# Patient Record
Sex: FEMALE | Race: WHITE | NOT HISPANIC OR LATINO | Employment: FULL TIME | URBAN - METROPOLITAN AREA
[De-identification: names, ages, dates, MRNs, and addresses within clinical notes are randomized per-mention and may not be internally consistent; named-entity substitution may affect disease eponyms.]

---

## 2019-09-29 ENCOUNTER — HOSPITAL ENCOUNTER (EMERGENCY)
Facility: HOSPITAL | Age: 59
Discharge: HOME | End: 2019-09-29
Attending: EMERGENCY MEDICINE | Admitting: EMERGENCY MEDICINE

## 2019-09-29 ENCOUNTER — APPOINTMENT (OUTPATIENT)
Dept: GENERAL RADIOLOGY | Facility: HOSPITAL | Age: 59
End: 2019-09-29

## 2019-09-29 VITALS
HEART RATE: 74 BPM | DIASTOLIC BLOOD PRESSURE: 68 MMHG | HEIGHT: 62 IN | SYSTOLIC BLOOD PRESSURE: 124 MMHG | WEIGHT: 158 LBS | OXYGEN SATURATION: 96 % | RESPIRATION RATE: 16 BRPM | BODY MASS INDEX: 29.08 KG/M2 | TEMPERATURE: 98.1 F

## 2019-09-29 DIAGNOSIS — B34.9 ACUTE VIRAL SYNDROME: ICD-10-CM

## 2019-09-29 DIAGNOSIS — J30.89 ENVIRONMENTAL AND SEASONAL ALLERGIES: ICD-10-CM

## 2019-09-29 DIAGNOSIS — R03.0 ELEVATED BLOOD PRESSURE READING: ICD-10-CM

## 2019-09-29 DIAGNOSIS — Z82.49 FAMILY HISTORY OF CORONARY ARTERY DISEASE IN FATHER: ICD-10-CM

## 2019-09-29 DIAGNOSIS — R06.02 SHORTNESS OF BREATH: Primary | ICD-10-CM

## 2019-09-29 LAB
ALBUMIN SERPL-MCNC: 4.9 G/DL (ref 3.5–5.2)
ALBUMIN/GLOB SERPL: 1.7 G/DL
ALP SERPL-CCNC: 79 U/L (ref 39–117)
ALT SERPL W P-5'-P-CCNC: 20 U/L (ref 1–33)
ANION GAP SERPL CALCULATED.3IONS-SCNC: 13 MMOL/L (ref 5–15)
AST SERPL-CCNC: 19 U/L (ref 1–32)
BASOPHILS # BLD AUTO: 0.09 10*3/MM3 (ref 0–0.2)
BASOPHILS NFR BLD AUTO: 1 % (ref 0–1.5)
BILIRUB SERPL-MCNC: 0.3 MG/DL (ref 0.2–1.2)
BUN BLD-MCNC: 16 MG/DL (ref 6–20)
BUN/CREAT SERPL: 26.7 (ref 7–25)
CALCIUM SPEC-SCNC: 9.6 MG/DL (ref 8.6–10.5)
CHLORIDE SERPL-SCNC: 102 MMOL/L (ref 98–107)
CO2 SERPL-SCNC: 27 MMOL/L (ref 22–29)
CREAT BLD-MCNC: 0.6 MG/DL (ref 0.57–1)
D DIMER PPP FEU-MCNC: 0.51 MCGFEU/ML (ref 0–0.56)
DEPRECATED RDW RBC AUTO: 45.9 FL (ref 37–54)
EOSINOPHIL # BLD AUTO: 0.23 10*3/MM3 (ref 0–0.4)
EOSINOPHIL NFR BLD AUTO: 2.7 % (ref 0.3–6.2)
ERYTHROCYTE [DISTWIDTH] IN BLOOD BY AUTOMATED COUNT: 13.4 % (ref 12.3–15.4)
GFR SERPL CREATININE-BSD FRML MDRD: 103 ML/MIN/1.73
GLOBULIN UR ELPH-MCNC: 2.9 GM/DL
GLUCOSE BLD-MCNC: 96 MG/DL (ref 65–99)
HCT VFR BLD AUTO: 45.3 % (ref 34–46.6)
HGB BLD-MCNC: 14.9 G/DL (ref 12–15.9)
HOLD SPECIMEN: NORMAL
HOLD SPECIMEN: NORMAL
IMM GRANULOCYTES # BLD AUTO: 0.04 10*3/MM3 (ref 0–0.05)
IMM GRANULOCYTES NFR BLD AUTO: 0.5 % (ref 0–0.5)
LYMPHOCYTES # BLD AUTO: 2.52 10*3/MM3 (ref 0.7–3.1)
LYMPHOCYTES NFR BLD AUTO: 29.1 % (ref 19.6–45.3)
MCH RBC QN AUTO: 30.3 PG (ref 26.6–33)
MCHC RBC AUTO-ENTMCNC: 32.9 G/DL (ref 31.5–35.7)
MCV RBC AUTO: 92.3 FL (ref 79–97)
MONOCYTES # BLD AUTO: 0.61 10*3/MM3 (ref 0.1–0.9)
MONOCYTES NFR BLD AUTO: 7 % (ref 5–12)
NEUTROPHILS # BLD AUTO: 5.18 10*3/MM3 (ref 1.7–7)
NEUTROPHILS NFR BLD AUTO: 59.7 % (ref 42.7–76)
NRBC BLD AUTO-RTO: 0 /100 WBC (ref 0–0.2)
NT-PROBNP SERPL-MCNC: 90.6 PG/ML (ref 5–900)
PLATELET # BLD AUTO: 373 10*3/MM3 (ref 140–450)
PMV BLD AUTO: 9.8 FL (ref 6–12)
POTASSIUM BLD-SCNC: 3.9 MMOL/L (ref 3.5–5.2)
PROT SERPL-MCNC: 7.8 G/DL (ref 6–8.5)
RBC # BLD AUTO: 4.91 10*6/MM3 (ref 3.77–5.28)
SODIUM BLD-SCNC: 142 MMOL/L (ref 136–145)
TROPONIN T SERPL-MCNC: <0.01 NG/ML (ref 0–0.03)
TROPONIN T SERPL-MCNC: <0.01 NG/ML (ref 0–0.03)
WBC NRBC COR # BLD: 8.67 10*3/MM3 (ref 3.4–10.8)
WHOLE BLOOD HOLD SPECIMEN: NORMAL
WHOLE BLOOD HOLD SPECIMEN: NORMAL

## 2019-09-29 PROCEDURE — 83880 ASSAY OF NATRIURETIC PEPTIDE: CPT

## 2019-09-29 PROCEDURE — 99284 EMERGENCY DEPT VISIT MOD MDM: CPT

## 2019-09-29 PROCEDURE — 71045 X-RAY EXAM CHEST 1 VIEW: CPT

## 2019-09-29 PROCEDURE — 85379 FIBRIN DEGRADATION QUANT: CPT | Performed by: EMERGENCY MEDICINE

## 2019-09-29 PROCEDURE — 99283 EMERGENCY DEPT VISIT LOW MDM: CPT

## 2019-09-29 PROCEDURE — 84484 ASSAY OF TROPONIN QUANT: CPT | Performed by: EMERGENCY MEDICINE

## 2019-09-29 PROCEDURE — 93005 ELECTROCARDIOGRAM TRACING: CPT | Performed by: EMERGENCY MEDICINE

## 2019-09-29 PROCEDURE — 93005 ELECTROCARDIOGRAM TRACING: CPT

## 2019-09-29 PROCEDURE — 80053 COMPREHEN METABOLIC PANEL: CPT

## 2019-09-29 PROCEDURE — 94640 AIRWAY INHALATION TREATMENT: CPT

## 2019-09-29 PROCEDURE — 96374 THER/PROPH/DIAG INJ IV PUSH: CPT

## 2019-09-29 PROCEDURE — 25010000002 METHYLPREDNISOLONE PER 125 MG: Performed by: EMERGENCY MEDICINE

## 2019-09-29 PROCEDURE — 85025 COMPLETE CBC W/AUTO DIFF WBC: CPT

## 2019-09-29 PROCEDURE — 84484 ASSAY OF TROPONIN QUANT: CPT

## 2019-09-29 RX ORDER — IPRATROPIUM BROMIDE AND ALBUTEROL SULFATE 2.5; .5 MG/3ML; MG/3ML
3 SOLUTION RESPIRATORY (INHALATION) ONCE
Status: COMPLETED | OUTPATIENT
Start: 2019-09-29 | End: 2019-09-29

## 2019-09-29 RX ORDER — FLUTICASONE PROPIONATE 50 MCG
2 SPRAY, SUSPENSION (ML) NASAL DAILY
Qty: 1 BOTTLE | Refills: 0 | Status: SHIPPED | OUTPATIENT
Start: 2019-09-29

## 2019-09-29 RX ORDER — METHYLPREDNISOLONE SODIUM SUCCINATE 125 MG/2ML
125 INJECTION, POWDER, LYOPHILIZED, FOR SOLUTION INTRAMUSCULAR; INTRAVENOUS ONCE
Status: COMPLETED | OUTPATIENT
Start: 2019-09-29 | End: 2019-09-29

## 2019-09-29 RX ORDER — SODIUM CHLORIDE 0.9 % (FLUSH) 0.9 %
10 SYRINGE (ML) INJECTION AS NEEDED
Status: DISCONTINUED | OUTPATIENT
Start: 2019-09-29 | End: 2019-09-29 | Stop reason: HOSPADM

## 2019-09-29 RX ORDER — PREDNISONE 20 MG/1
20 TABLET ORAL DAILY
Qty: 5 TABLET | Refills: 0 | Status: ON HOLD | OUTPATIENT
Start: 2019-09-29 | End: 2022-11-26

## 2019-09-29 RX ADMIN — METHYLPREDNISOLONE SODIUM SUCCINATE 125 MG: 125 INJECTION, POWDER, FOR SOLUTION INTRAMUSCULAR; INTRAVENOUS at 17:38

## 2019-09-29 RX ADMIN — IPRATROPIUM BROMIDE AND ALBUTEROL SULFATE 3 ML: 2.5; .5 SOLUTION RESPIRATORY (INHALATION) at 18:08

## 2019-10-03 NOTE — PROGRESS NOTES
"[facility]  [facility]      Encounter Date:10/04/2019     Lauryn Romo  [address] [place] [address]  [unfilled]    1960    Melissa Lazo APRN    Lauryn Romo is a 58 y.o. female.      Subjective:     Chief Complaint:  Shortness of Breath and Establish Care       HPI   Patient is a 58-year-old female with past medical history significant for hypertension who presents to the chest pain clinic at the request of Dr. Montanez.  Patient presented to the ED on 9/29/2019 with complaints of shortness of breath, fatigue and dizziness.  She denies chest pain.  Labs, chest x-ray and EKG unremarkable. She reports a few weeks ago she started noticing shortness of breath when walking on level ground which is unusual for her. On the day of the ED visit she woke up and was working around the house and couldn't catch her breath and felt lightheaded. She went to rest and had a \"film over her left eye\". Symptoms have slightly improved. Dyspnea aggravated by heat, fast walking. Dizziness is chronic which she relates to vertigo. No near syncope. No palpitations.  Patient unable to exert herself as she normally does due to MILLER    Cardiac risks:   Borderline age, HTN, family hx    Past Medical History:   Diagnosis Date   • Hypertension        Past Surgical History:   Procedure Laterality Date   • BREAST LUMPECTOMY  2012   • HIATAL HERNIA REPAIR  2015   • SINUS SURGERY      x4       Family History   Problem Relation Age of Onset   • No Known Problems Mother    • Heart attack Father 42   • Heart attack Paternal Aunt    • Heart attack Paternal Uncle    • No Known Problems Sister    • Cancer Maternal Grandmother    • Heart failure Maternal Grandmother    • No Known Problems Maternal Grandfather    • No Known Problems Paternal Grandmother    • No Known Problems Paternal Grandfather    • Diabetes Half-Brother        Social History     Socioeconomic History   • Marital status:      Spouse " name: Not on file   • Number of children: Not on file   • Years of education: Not on file   • Highest education level: Not on file   Tobacco Use   • Smoking status: Former Smoker     Last attempt to quit: 10/4/2004     Years since quitting: 15.0   • Smokeless tobacco: Never Used   Substance and Sexual Activity   • Alcohol use: No     Frequency: Never   • Drug use: No   • Sexual activity: Defer   Social History Narrative    Caffeine: 6-7 cups daily       No Known Allergies      Current Outpatient Medications:   •  albuterol (PROAIR RESPICLICK) 108 (90 Base) MCG/ACT inhaler, Inhale 2 puffs Every 4 (Four) Hours As Needed for Wheezing or Shortness of Air., Disp: 1 inhaler, Rfl: 0  •  fluticasone (FLONASE) 50 MCG/ACT nasal spray, 2 sprays into the nostril(s) as directed by provider Daily., Disp: 1 bottle, Rfl: 0  •  predniSONE (DELTASONE) 20 MG tablet, Take 1 tablet by mouth Daily., Disp: 5 tablet, Rfl: 0  •  aspirin 81 MG chewable tablet, Chew 1 tablet Daily., Disp: , Rfl:   •  Cholecalciferol (VITAMIN D3) 400 units capsule, Take 1 tablet by mouth Daily., Disp: , Rfl:   •  Glucosamine Sulfate (SYNOVACIN PO), Take 1,000 mg by mouth Daily., Disp: , Rfl:   •  moexipril (UNIVASC) 7.5 MG tablet, Take 4 tablets by mouth Daily., Disp: , Rfl:   •  Naproxen Sodium (ALEVE) 220 MG capsule, Take 2 tablets by mouth 2 (Two) Times a Day As Needed. Monday through Thursday, Disp: , Rfl:   •  Omega-3 Fatty Acids (FISH OIL) 1000 MG capsule capsule, Take 1 capsule by mouth Daily., Disp: , Rfl:     The following portions of the patient's history were reviewed today and updated as appropriate: allergies, current medications, past family history, past medical history, past social history, past surgical history and problem list     Review of Systems   Constitution: Negative for chills and fever.   HENT: Negative.    Eyes: Negative.    Cardiovascular: Positive for dyspnea on exertion. Negative for chest pain, claudication, cyanosis, irregular  "heartbeat, leg swelling, near-syncope, orthopnea, palpitations, paroxysmal nocturnal dyspnea and syncope.   Respiratory: Positive for shortness of breath. Negative for cough and snoring.    Endocrine: Negative.    Hematologic/Lymphatic: Does not bruise/bleed easily.   Skin: Negative for poor wound healing.   Musculoskeletal: Negative.    Gastrointestinal: Negative for abdominal pain, heartburn, hematemesis, melena, nausea and vomiting.   Genitourinary: Negative.  Negative for hematuria.   Neurological: Positive for dizziness and vertigo.   Psychiatric/Behavioral: Negative.    Allergic/Immunologic: Negative.        Objective:     Vitals:    10/04/19 0804 10/04/19 0805 10/04/19 0806   BP: 138/73 132/69 143/83   BP Location: Right arm Left arm Right arm   Patient Position: Sitting Sitting Standing   Cuff Size: Adult Adult Adult   Pulse: 70 68    Resp: 18     Temp: 97.3 °F (36.3 °C)     TempSrc: Temporal     SpO2: 100% 100%    Weight: 72.7 kg (160 lb 4 oz)     Height: 157.5 cm (62\")         Physical Exam   Constitutional: She is oriented to person, place, and time. She appears well-developed and well-nourished. No distress.   HENT:   Head: Normocephalic.   Eyes: Conjunctivae are normal. Pupils are equal, round, and reactive to light.   Neck: Neck supple. No JVD present. No thyromegaly present.   Cardiovascular: Normal rate, regular rhythm, normal heart sounds and intact distal pulses. Exam reveals no gallop and no friction rub.   No murmur heard.  Pulmonary/Chest: Effort normal and breath sounds normal. No respiratory distress. She has no wheezes. She has no rales. She exhibits no tenderness.   Abdominal: Soft. Bowel sounds are normal.   Musculoskeletal: Normal range of motion. She exhibits no edema.   Neurological: She is alert and oriented to person, place, and time.   Skin: Skin is warm and dry.   Psychiatric: She has a normal mood and affect. Her behavior is normal. Thought content normal.   Vitals " reviewed.      Lab and Diagnostic Review:  Results for orders placed or performed during the hospital encounter of 09/29/19   Comprehensive Metabolic Panel   Result Value Ref Range    Glucose 96 65 - 99 mg/dL    BUN 16 6 - 20 mg/dL    Creatinine 0.60 0.57 - 1.00 mg/dL    Sodium 142 136 - 145 mmol/L    Potassium 3.9 3.5 - 5.2 mmol/L    Chloride 102 98 - 107 mmol/L    CO2 27.0 22.0 - 29.0 mmol/L    Calcium 9.6 8.6 - 10.5 mg/dL    Total Protein 7.8 6.0 - 8.5 g/dL    Albumin 4.90 3.50 - 5.20 g/dL    ALT (SGPT) 20 1 - 33 U/L    AST (SGOT) 19 1 - 32 U/L    Alkaline Phosphatase 79 39 - 117 U/L    Total Bilirubin 0.3 0.2 - 1.2 mg/dL    eGFR Non African Amer 103 >60 mL/min/1.73    Globulin 2.9 gm/dL    A/G Ratio 1.7 g/dL    BUN/Creatinine Ratio 26.7 (H) 7.0 - 25.0    Anion Gap 13.0 5.0 - 15.0 mmol/L   BNP   Result Value Ref Range    proBNP 90.6 5.0 - 900.0 pg/mL   Troponin   Result Value Ref Range    Troponin T <0.010 0.000 - 0.030 ng/mL   CBC Auto Differential   Result Value Ref Range    WBC 8.67 3.40 - 10.80 10*3/mm3    RBC 4.91 3.77 - 5.28 10*6/mm3    Hemoglobin 14.9 12.0 - 15.9 g/dL    Hematocrit 45.3 34.0 - 46.6 %    MCV 92.3 79.0 - 97.0 fL    MCH 30.3 26.6 - 33.0 pg    MCHC 32.9 31.5 - 35.7 g/dL    RDW 13.4 12.3 - 15.4 %    RDW-SD 45.9 37.0 - 54.0 fl    MPV 9.8 6.0 - 12.0 fL    Platelets 373 140 - 450 10*3/mm3    Neutrophil % 59.7 42.7 - 76.0 %    Lymphocyte % 29.1 19.6 - 45.3 %    Monocyte % 7.0 5.0 - 12.0 %    Eosinophil % 2.7 0.3 - 6.2 %    Basophil % 1.0 0.0 - 1.5 %    Immature Grans % 0.5 0.0 - 0.5 %    Neutrophils, Absolute 5.18 1.70 - 7.00 10*3/mm3    Lymphocytes, Absolute 2.52 0.70 - 3.10 10*3/mm3    Monocytes, Absolute 0.61 0.10 - 0.90 10*3/mm3    Eosinophils, Absolute 0.23 0.00 - 0.40 10*3/mm3    Basophils, Absolute 0.09 0.00 - 0.20 10*3/mm3    Immature Grans, Absolute 0.04 0.00 - 0.05 10*3/mm3    nRBC 0.0 0.0 - 0.2 /100 WBC   D-dimer, Quantitative   Result Value Ref Range    D-Dimer, Quantitative 0.51 0.00 -  0.56 MCGFEU/mL   Troponin   Result Value Ref Range    Troponin T <0.010 0.000 - 0.030 ng/mL     EKG 9/29/19 NSR    Assessment and Plan:   1. Shortness of breath  DANI risk score 1 but multiple ASCVD risks and new exercise intolerance concerning for anginal equivalent  - Adult Transthoracic Echo Complete W/ Cont if Necessary Per Protocol; Future  - Stress Test With Myocardial Perfusion (1 Day); Future    2. Anginal equivalent (CMS/HCC)  - Stress Test With Myocardial Perfusion (1 Day); Future    3. Essential hypertension  Controlled  - Adult Transthoracic Echo Complete W/ Cont if Necessary Per Protocol; Future  - Stress Test With Myocardial Perfusion (1 Day); Future    4. Family history of premature CAD  - Stress Test With Myocardial Perfusion (1 Day); Future    5. Lightheadedness  Patient relates symptoms to vertigo  Echo  If recurrent or worsening symptoms, may consider holter monitor    Further plans pending testing    UNFORTUNATELY HER INSURANCE WILL NOT COVER CARDIOLITE SO I HAD TO CHANGE ORDER TO GXT      It has been a pleasure to participate in the care of this patient.  Patient was instructed to call the [facility] with any questions, concerns, or worsening symptoms.    *Please note that portions of this note were completed with a voice recognition program. Efforts were made to edit the dictations, but occasionally words are mistranscribed.

## 2019-10-04 ENCOUNTER — OFFICE VISIT (OUTPATIENT)
Dept: CARDIOLOGY | Facility: HOSPITAL | Age: 59
End: 2019-10-04

## 2019-10-04 ENCOUNTER — TELEPHONE (OUTPATIENT)
Dept: CARDIOLOGY | Facility: HOSPITAL | Age: 59
End: 2019-10-04

## 2019-10-04 ENCOUNTER — HOSPITAL ENCOUNTER (OUTPATIENT)
Dept: CARDIOLOGY | Facility: HOSPITAL | Age: 59
Discharge: HOME | End: 2019-10-04
Admitting: NURSE PRACTITIONER

## 2019-10-04 ENCOUNTER — HOSPITAL ENCOUNTER (OUTPATIENT)
Dept: CARDIOLOGY | Facility: HOSPITAL | Age: 59
Discharge: HOME | End: 2019-10-04

## 2019-10-04 VITALS
HEART RATE: 69 BPM | SYSTOLIC BLOOD PRESSURE: 150 MMHG | BODY MASS INDEX: 29.44 KG/M2 | DIASTOLIC BLOOD PRESSURE: 80 MMHG | WEIGHT: 160 LBS | HEIGHT: 62 IN

## 2019-10-04 VITALS
HEIGHT: 62 IN | DIASTOLIC BLOOD PRESSURE: 83 MMHG | WEIGHT: 160.25 LBS | RESPIRATION RATE: 18 BRPM | HEART RATE: 68 BPM | BODY MASS INDEX: 29.49 KG/M2 | SYSTOLIC BLOOD PRESSURE: 143 MMHG | TEMPERATURE: 97.3 F | OXYGEN SATURATION: 100 %

## 2019-10-04 VITALS
DIASTOLIC BLOOD PRESSURE: 90 MMHG | SYSTOLIC BLOOD PRESSURE: 118 MMHG | BODY MASS INDEX: 29.44 KG/M2 | WEIGHT: 160 LBS | HEIGHT: 62 IN

## 2019-10-04 DIAGNOSIS — Z82.49 FAMILY HISTORY OF PREMATURE CAD: ICD-10-CM

## 2019-10-04 DIAGNOSIS — I10 ESSENTIAL HYPERTENSION: ICD-10-CM

## 2019-10-04 DIAGNOSIS — I20.8 ANGINAL EQUIVALENT (HCC): ICD-10-CM

## 2019-10-04 DIAGNOSIS — R06.02 SHORTNESS OF BREATH: ICD-10-CM

## 2019-10-04 DIAGNOSIS — R42 LIGHTHEADEDNESS: ICD-10-CM

## 2019-10-04 DIAGNOSIS — R06.02 SHORTNESS OF BREATH: Primary | ICD-10-CM

## 2019-10-04 LAB
BH CV ECHO MEAS - AI DEC SLOPE: 151.8 CM/SEC^2
BH CV ECHO MEAS - AI MAX PG: 50.4 MMHG
BH CV ECHO MEAS - AI MAX VEL: 354.1 CM/SEC
BH CV ECHO MEAS - AI P1/2T: 683.1 MSEC
BH CV ECHO MEAS - AO MAX PG (FULL): 1.9 MMHG
BH CV ECHO MEAS - AO MAX PG: 5 MMHG
BH CV ECHO MEAS - AO MEAN PG (FULL): 1.3 MMHG
BH CV ECHO MEAS - AO MEAN PG: 2.9 MMHG
BH CV ECHO MEAS - AO ROOT AREA (BSA CORRECTED): 1.8
BH CV ECHO MEAS - AO ROOT AREA: 7.9 CM^2
BH CV ECHO MEAS - AO ROOT DIAM: 3.2 CM
BH CV ECHO MEAS - AO V2 MAX: 111.5 CM/SEC
BH CV ECHO MEAS - AO V2 MEAN: 78.2 CM/SEC
BH CV ECHO MEAS - AO V2 VTI: 21.2 CM
BH CV ECHO MEAS - ASC AORTA: 3 CM
BH CV ECHO MEAS - AVA(I,A): 2.7 CM^2
BH CV ECHO MEAS - AVA(I,D): 2.7 CM^2
BH CV ECHO MEAS - AVA(V,A): 2.3 CM^2
BH CV ECHO MEAS - AVA(V,D): 2.3 CM^2
BH CV ECHO MEAS - BSA(HAYCOCK): 1.8 M^2
BH CV ECHO MEAS - BSA: 1.7 M^2
BH CV ECHO MEAS - BZI_BMI: 29.3 KILOGRAMS/M^2
BH CV ECHO MEAS - BZI_METRIC_HEIGHT: 157.5 CM
BH CV ECHO MEAS - BZI_METRIC_WEIGHT: 72.6 KG
BH CV ECHO MEAS - EDV(CUBED): 56.1 ML
BH CV ECHO MEAS - EDV(MOD-SP4): 63 ML
BH CV ECHO MEAS - EDV(TEICH): 63 ML
BH CV ECHO MEAS - EF(CUBED): 63.9 %
BH CV ECHO MEAS - EF(MOD-SP4): 69.8 %
BH CV ECHO MEAS - EF(TEICH): 56.2 %
BH CV ECHO MEAS - ESV(CUBED): 20.2 ML
BH CV ECHO MEAS - ESV(MOD-SP4): 19 ML
BH CV ECHO MEAS - ESV(TEICH): 27.6 ML
BH CV ECHO MEAS - FS: 28.8 %
BH CV ECHO MEAS - IVS/LVPW: 0.9
BH CV ECHO MEAS - IVSD: 0.87 CM
BH CV ECHO MEAS - LA DIMENSION: 3.5 CM
BH CV ECHO MEAS - LA/AO: 1.1
BH CV ECHO MEAS - LAT PEAK E' VEL: 11.1 CM/SEC
BH CV ECHO MEAS - LATERAL E/E' RATIO: 6
BH CV ECHO MEAS - LV DIASTOLIC VOL/BSA (35-75): 36.2 ML/M^2
BH CV ECHO MEAS - LV MASS(C)D: 104.2 GRAMS
BH CV ECHO MEAS - LV MASS(C)DI: 60 GRAMS/M^2
BH CV ECHO MEAS - LV MAX PG: 3.1 MMHG
BH CV ECHO MEAS - LV MEAN PG: 1.6 MMHG
BH CV ECHO MEAS - LV SYSTOLIC VOL/BSA (12-30): 10.9 ML/M^2
BH CV ECHO MEAS - LV V1 MAX: 87.4 CM/SEC
BH CV ECHO MEAS - LV V1 MEAN: 58.8 CM/SEC
BH CV ECHO MEAS - LV V1 VTI: 19.9 CM
BH CV ECHO MEAS - LVIDD: 3.8 CM
BH CV ECHO MEAS - LVIDS: 2.7 CM
BH CV ECHO MEAS - LVLD AP4: 7 CM
BH CV ECHO MEAS - LVLS AP4: 5.2 CM
BH CV ECHO MEAS - LVOT AREA (M): 2.8 CM^2
BH CV ECHO MEAS - LVOT AREA: 2.9 CM^2
BH CV ECHO MEAS - LVOT DIAM: 1.9 CM
BH CV ECHO MEAS - LVPWD: 0.96 CM
BH CV ECHO MEAS - MED PEAK E' VEL: 7.3 CM/SEC
BH CV ECHO MEAS - MEDIAL E/E' RATIO: 9
BH CV ECHO MEAS - MV A MAX VEL: 76 CM/SEC
BH CV ECHO MEAS - MV DEC TIME: 0.22 SEC
BH CV ECHO MEAS - MV E MAX VEL: 67.6 CM/SEC
BH CV ECHO MEAS - MV E/A: 0.89
BH CV ECHO MEAS - PA ACC SLOPE: 550.3 CM/SEC^2
BH CV ECHO MEAS - PA ACC TIME: 0.15 SEC
BH CV ECHO MEAS - PA MAX PG: 4.8 MMHG
BH CV ECHO MEAS - PA PR(ACCEL): 13.2 MMHG
BH CV ECHO MEAS - PA V2 MAX: 109 CM/SEC
BH CV ECHO MEAS - RVDD: 2 CM
BH CV ECHO MEAS - SI(AO): 96.1 ML/M^2
BH CV ECHO MEAS - SI(CUBED): 20.6 ML/M^2
BH CV ECHO MEAS - SI(LVOT): 33.5 ML/M^2
BH CV ECHO MEAS - SI(MOD-SP4): 25.3 ML/M^2
BH CV ECHO MEAS - SI(TEICH): 20.4 ML/M^2
BH CV ECHO MEAS - SV(AO): 167.1 ML
BH CV ECHO MEAS - SV(CUBED): 35.9 ML
BH CV ECHO MEAS - SV(LVOT): 58.2 ML
BH CV ECHO MEAS - SV(MOD-SP4): 44 ML
BH CV ECHO MEAS - SV(TEICH): 35.4 ML
BH CV ECHO MEAS - TAPSE (>1.6): 1.2 CM2
BH CV ECHO MEAS - TV MAX PG: 1.4 MMHG
BH CV ECHO MEAS - TV V2 MAX: 58.7 CM/SEC
BH CV ECHO MEASUREMENTS AVERAGE E/E' RATIO: 7.35
BH CV STRESS BP STAGE 1: NORMAL
BH CV STRESS BP STAGE 2: NORMAL
BH CV STRESS BP STAGE 3: NORMAL
BH CV STRESS DURATION MIN STAGE 1: 3
BH CV STRESS DURATION MIN STAGE 2: 3
BH CV STRESS DURATION MIN STAGE 3: 1
BH CV STRESS DURATION SEC STAGE 1: 0
BH CV STRESS DURATION SEC STAGE 2: 0
BH CV STRESS DURATION SEC STAGE 3: 1
BH CV STRESS GRADE STAGE 1: 10
BH CV STRESS GRADE STAGE 2: 12
BH CV STRESS GRADE STAGE 3: 14
BH CV STRESS HR STAGE 1: 118
BH CV STRESS HR STAGE 2: 141
BH CV STRESS HR STAGE 3: 146
BH CV STRESS METS STAGE 1: 5
BH CV STRESS METS STAGE 2: 7.5
BH CV STRESS METS STAGE 3: 10
BH CV STRESS O2 STAGE 1: 94
BH CV STRESS O2 STAGE 2: 99
BH CV STRESS O2 STAGE 3: 100
BH CV STRESS PROTOCOL 1: NORMAL
BH CV STRESS RECOVERY BP: NORMAL MMHG
BH CV STRESS RECOVERY HR: 91 BPM
BH CV STRESS SPEED STAGE 1: 1.7
BH CV STRESS SPEED STAGE 2: 2.5
BH CV STRESS SPEED STAGE 3: 3.4
BH CV STRESS STAGE 1: 1
BH CV STRESS STAGE 2: 2
BH CV STRESS STAGE 3: 3
BH CV VAS BP RIGHT ARM: NORMAL MMHG
BH CV XLRA - RV BASE: 3 CM
BH CV XLRA - RV LENGTH: 5 CM
BH CV XLRA - RV MID: 3 CM
BH CV XLRA - TDI S': 11.8 CM/SEC
LV EF 2D ECHO EST: 65 %
MAXIMAL PREDICTED HEART RATE: 163 BPM
MAXIMAL PREDICTED HEART RATE: 163 BPM
PERCENT MAX PREDICTED HR: 89.57 %
STRESS BASELINE BP: NORMAL MMHG
STRESS BASELINE HR: 67 BPM
STRESS O2 SAT REST: 100 %
STRESS PERCENT HR: 105 %
STRESS POST ESTIMATED WORKLOAD: 8.5 METS
STRESS POST EXERCISE DUR MIN: 7 MIN
STRESS POST EXERCISE DUR SEC: 1 SEC
STRESS POST O2 SAT PEAK: 100 %
STRESS POST PEAK BP: NORMAL MMHG
STRESS POST PEAK HR: 146 BPM
STRESS TARGET HR: 139 BPM
STRESS TARGET HR: 139 BPM

## 2019-10-04 PROCEDURE — 93017 CV STRESS TEST TRACING ONLY: CPT

## 2019-10-04 PROCEDURE — 99204 OFFICE O/P NEW MOD 45 MIN: CPT | Performed by: NURSE PRACTITIONER

## 2019-10-04 PROCEDURE — 93306 TTE W/DOPPLER COMPLETE: CPT | Performed by: INTERNAL MEDICINE

## 2019-10-04 PROCEDURE — 93306 TTE W/DOPPLER COMPLETE: CPT

## 2019-10-04 PROCEDURE — 93018 CV STRESS TEST I&R ONLY: CPT | Performed by: INTERNAL MEDICINE

## 2019-10-04 RX ORDER — MOEXIPRIL HYDROCHLORIDE 7.5 MG/1
4 TABLET, FILM COATED ORAL DAILY
COMMUNITY
End: 2022-11-26 | Stop reason: DRUGHIGH

## 2019-10-04 RX ORDER — IBUPROFEN 800 MG
1 TABLET ORAL DAILY
COMMUNITY

## 2019-10-04 RX ORDER — ASPIRIN 81 MG/1
1 TABLET, CHEWABLE ORAL DAILY
COMMUNITY

## 2019-10-04 RX ORDER — CHLORAL HYDRATE 500 MG
1 CAPSULE ORAL DAILY
COMMUNITY

## 2019-10-04 RX ORDER — COVID-19 ANTIGEN TEST
2 KIT MISCELLANEOUS 2 TIMES DAILY PRN
COMMUNITY
End: 2022-12-29

## 2022-11-26 ENCOUNTER — APPOINTMENT (OUTPATIENT)
Dept: GENERAL RADIOLOGY | Facility: HOSPITAL | Age: 62
End: 2022-11-26

## 2022-11-26 ENCOUNTER — APPOINTMENT (OUTPATIENT)
Dept: MRI IMAGING | Facility: HOSPITAL | Age: 62
End: 2022-11-26

## 2022-11-26 ENCOUNTER — APPOINTMENT (OUTPATIENT)
Dept: CT IMAGING | Facility: HOSPITAL | Age: 62
End: 2022-11-26

## 2022-11-26 ENCOUNTER — HOSPITAL ENCOUNTER (INPATIENT)
Facility: HOSPITAL | Age: 62
LOS: 4 days | Discharge: HOME | End: 2022-12-02
Attending: EMERGENCY MEDICINE | Admitting: INTERNAL MEDICINE

## 2022-11-26 DIAGNOSIS — I67.1 BRAIN ANEURYSM: ICD-10-CM

## 2022-11-26 DIAGNOSIS — G93.89 MASS OF BRAIN: Primary | ICD-10-CM

## 2022-11-26 DIAGNOSIS — R42 DIZZINESS: ICD-10-CM

## 2022-11-26 DIAGNOSIS — R41.841 COGNITIVE COMMUNICATION DEFICIT: ICD-10-CM

## 2022-11-26 DIAGNOSIS — R93.0 ABNORMAL MRI OF THE HEAD: ICD-10-CM

## 2022-11-26 DIAGNOSIS — I63.9 CEREBROVASCULAR ACCIDENT (CVA), UNSPECIFIED MECHANISM: ICD-10-CM

## 2022-11-26 LAB
ALBUMIN SERPL-MCNC: 4.7 G/DL (ref 3.5–5.2)
ALBUMIN/GLOB SERPL: 1.7 G/DL
ALP SERPL-CCNC: 101 U/L (ref 39–117)
ALT SERPL W P-5'-P-CCNC: 20 U/L (ref 1–33)
ANION GAP SERPL CALCULATED.3IONS-SCNC: 11 MMOL/L (ref 5–15)
AST SERPL-CCNC: 18 U/L (ref 1–32)
BACTERIA UR QL AUTO: ABNORMAL /HPF
BASOPHILS # BLD AUTO: 0.04 10*3/MM3 (ref 0–0.2)
BASOPHILS NFR BLD AUTO: 0.5 % (ref 0–1.5)
BILIRUB SERPL-MCNC: 0.5 MG/DL (ref 0–1.2)
BILIRUB UR QL STRIP: NEGATIVE
BUN SERPL-MCNC: 20 MG/DL (ref 8–23)
BUN/CREAT SERPL: 35.7 (ref 7–25)
CALCIUM SPEC-SCNC: 9.6 MG/DL (ref 8.6–10.5)
CHLORIDE SERPL-SCNC: 104 MMOL/L (ref 98–107)
CLARITY UR: CLEAR
CO2 SERPL-SCNC: 24 MMOL/L (ref 22–29)
COLOR UR: YELLOW
CREAT SERPL-MCNC: 0.56 MG/DL (ref 0.57–1)
DEPRECATED RDW RBC AUTO: 46.7 FL (ref 37–54)
EGFRCR SERPLBLD CKD-EPI 2021: 104 ML/MIN/1.73
EOSINOPHIL # BLD AUTO: 0.08 10*3/MM3 (ref 0–0.4)
EOSINOPHIL NFR BLD AUTO: 1.1 % (ref 0.3–6.2)
ERYTHROCYTE [DISTWIDTH] IN BLOOD BY AUTOMATED COUNT: 13.5 % (ref 12.3–15.4)
GLOBULIN UR ELPH-MCNC: 2.8 GM/DL
GLUCOSE BLDC GLUCOMTR-MCNC: 89 MG/DL (ref 70–130)
GLUCOSE SERPL-MCNC: 101 MG/DL (ref 65–99)
GLUCOSE UR STRIP-MCNC: NEGATIVE MG/DL
HCT VFR BLD AUTO: 40.4 % (ref 34–46.6)
HGB BLD-MCNC: 13.8 G/DL (ref 12–15.9)
HGB UR QL STRIP.AUTO: NEGATIVE
HOLD SPECIMEN: NORMAL
HYALINE CASTS UR QL AUTO: ABNORMAL /LPF
IMM GRANULOCYTES # BLD AUTO: 0.02 10*3/MM3 (ref 0–0.05)
IMM GRANULOCYTES NFR BLD AUTO: 0.3 % (ref 0–0.5)
KETONES UR QL STRIP: ABNORMAL
LEUKOCYTE ESTERASE UR QL STRIP.AUTO: ABNORMAL
LYMPHOCYTES # BLD AUTO: 2.1 10*3/MM3 (ref 0.7–3.1)
LYMPHOCYTES NFR BLD AUTO: 28.1 % (ref 19.6–45.3)
MAGNESIUM SERPL-MCNC: 2.4 MG/DL (ref 1.6–2.4)
MCH RBC QN AUTO: 32.2 PG (ref 26.6–33)
MCHC RBC AUTO-ENTMCNC: 34.2 G/DL (ref 31.5–35.7)
MCV RBC AUTO: 94.2 FL (ref 79–97)
MONOCYTES # BLD AUTO: 0.46 10*3/MM3 (ref 0.1–0.9)
MONOCYTES NFR BLD AUTO: 6.2 % (ref 5–12)
NEUTROPHILS NFR BLD AUTO: 4.77 10*3/MM3 (ref 1.7–7)
NEUTROPHILS NFR BLD AUTO: 63.8 % (ref 42.7–76)
NITRITE UR QL STRIP: NEGATIVE
NRBC BLD AUTO-RTO: 0 /100 WBC (ref 0–0.2)
PH UR STRIP.AUTO: 6.5 [PH] (ref 5–8)
PLATELET # BLD AUTO: 251 10*3/MM3 (ref 140–450)
PMV BLD AUTO: 10.8 FL (ref 6–12)
POTASSIUM SERPL-SCNC: 4.7 MMOL/L (ref 3.5–5.2)
PROT SERPL-MCNC: 7.5 G/DL (ref 6–8.5)
PROT UR QL STRIP: NEGATIVE
RBC # BLD AUTO: 4.29 10*6/MM3 (ref 3.77–5.28)
RBC # UR STRIP: ABNORMAL /HPF
REF LAB TEST METHOD: ABNORMAL
SODIUM SERPL-SCNC: 139 MMOL/L (ref 136–145)
SP GR UR STRIP: 1.01 (ref 1–1.03)
SQUAMOUS #/AREA URNS HPF: ABNORMAL /HPF
TRANS CELLS #/AREA URNS HPF: ABNORMAL /HPF
TROPONIN T SERPL-MCNC: <0.01 NG/ML (ref 0–0.03)
UROBILINOGEN UR QL STRIP: ABNORMAL
WBC # UR STRIP: ABNORMAL /HPF
WBC NRBC COR # BLD: 7.47 10*3/MM3 (ref 3.4–10.8)
WHOLE BLOOD HOLD SPECIMEN: NORMAL

## 2022-11-26 PROCEDURE — G0378 HOSPITAL OBSERVATION PER HR: HCPCS

## 2022-11-26 PROCEDURE — 82962 GLUCOSE BLOOD TEST: CPT

## 2022-11-26 PROCEDURE — 81001 URINALYSIS AUTO W/SCOPE: CPT | Performed by: EMERGENCY MEDICINE

## 2022-11-26 PROCEDURE — 0 GADOBENATE DIMEGLUMINE 529 MG/ML SOLUTION: Performed by: EMERGENCY MEDICINE

## 2022-11-26 PROCEDURE — 83735 ASSAY OF MAGNESIUM: CPT | Performed by: EMERGENCY MEDICINE

## 2022-11-26 PROCEDURE — A9577 INJ MULTIHANCE: HCPCS | Performed by: EMERGENCY MEDICINE

## 2022-11-26 PROCEDURE — 80053 COMPREHEN METABOLIC PANEL: CPT | Performed by: EMERGENCY MEDICINE

## 2022-11-26 PROCEDURE — 71045 X-RAY EXAM CHEST 1 VIEW: CPT

## 2022-11-26 PROCEDURE — 99219 PR INITIAL OBSERVATION CARE/DAY 50 MINUTES: CPT | Performed by: INTERNAL MEDICINE

## 2022-11-26 PROCEDURE — 84484 ASSAY OF TROPONIN QUANT: CPT | Performed by: EMERGENCY MEDICINE

## 2022-11-26 PROCEDURE — 93005 ELECTROCARDIOGRAM TRACING: CPT | Performed by: EMERGENCY MEDICINE

## 2022-11-26 PROCEDURE — 93005 ELECTROCARDIOGRAM TRACING: CPT

## 2022-11-26 PROCEDURE — 99285 EMERGENCY DEPT VISIT HI MDM: CPT

## 2022-11-26 PROCEDURE — 85025 COMPLETE CBC W/AUTO DIFF WBC: CPT | Performed by: EMERGENCY MEDICINE

## 2022-11-26 PROCEDURE — 0 IOPAMIDOL PER 1 ML: Performed by: INTERNAL MEDICINE

## 2022-11-26 PROCEDURE — 36415 COLL VENOUS BLD VENIPUNCTURE: CPT

## 2022-11-26 PROCEDURE — 99223 1ST HOSP IP/OBS HIGH 75: CPT | Performed by: CLINICAL NURSE SPECIALIST

## 2022-11-26 PROCEDURE — 70553 MRI BRAIN STEM W/O & W/DYE: CPT

## 2022-11-26 PROCEDURE — 70498 CT ANGIOGRAPHY NECK: CPT

## 2022-11-26 PROCEDURE — 70496 CT ANGIOGRAPHY HEAD: CPT

## 2022-11-26 RX ORDER — SODIUM CHLORIDE 0.9 % (FLUSH) 0.9 %
10 SYRINGE (ML) INJECTION EVERY 12 HOURS SCHEDULED
Status: DISCONTINUED | OUTPATIENT
Start: 2022-11-26 | End: 2022-11-29

## 2022-11-26 RX ORDER — CLOPIDOGREL BISULFATE 75 MG/1
300 TABLET ORAL ONCE
Status: COMPLETED | OUTPATIENT
Start: 2022-11-26 | End: 2022-11-27

## 2022-11-26 RX ORDER — ASPIRIN 81 MG/1
81 TABLET, CHEWABLE ORAL DAILY
Status: DISCONTINUED | OUTPATIENT
Start: 2022-11-26 | End: 2022-12-02 | Stop reason: HOSPADM

## 2022-11-26 RX ORDER — ATORVASTATIN CALCIUM 40 MG/1
80 TABLET, FILM COATED ORAL NIGHTLY
Status: DISCONTINUED | OUTPATIENT
Start: 2022-11-26 | End: 2022-12-02 | Stop reason: HOSPADM

## 2022-11-26 RX ORDER — ALBUTEROL SULFATE 2.5 MG/3ML
2.5 SOLUTION RESPIRATORY (INHALATION) EVERY 6 HOURS PRN
Status: DISCONTINUED | OUTPATIENT
Start: 2022-11-26 | End: 2022-11-27

## 2022-11-26 RX ORDER — CLOPIDOGREL BISULFATE 75 MG/1
75 TABLET ORAL DAILY
Status: DISCONTINUED | OUTPATIENT
Start: 2022-11-27 | End: 2022-12-02 | Stop reason: HOSPADM

## 2022-11-26 RX ORDER — MECLIZINE HYDROCHLORIDE 25 MG/1
25 TABLET ORAL ONCE
Status: COMPLETED | OUTPATIENT
Start: 2022-11-26 | End: 2022-11-26

## 2022-11-26 RX ORDER — ASPIRIN 300 MG/1
300 SUPPOSITORY RECTAL DAILY
Status: DISCONTINUED | OUTPATIENT
Start: 2022-11-26 | End: 2022-12-02 | Stop reason: HOSPADM

## 2022-11-26 RX ORDER — ONDANSETRON 4 MG/1
TABLET, ORALLY DISINTEGRATING ORAL
COMMUNITY
End: 2023-03-27 | Stop reason: SDUPTHER

## 2022-11-26 RX ORDER — MOEXIPRIL HCL 15 MG
15 TABLET ORAL 2 TIMES DAILY
COMMUNITY
End: 2022-12-29

## 2022-11-26 RX ORDER — SODIUM CHLORIDE 9 MG/ML
40 INJECTION, SOLUTION INTRAVENOUS AS NEEDED
Status: DISCONTINUED | OUTPATIENT
Start: 2022-11-26 | End: 2022-11-29

## 2022-11-26 RX ORDER — SODIUM CHLORIDE 0.9 % (FLUSH) 0.9 %
10 SYRINGE (ML) INJECTION AS NEEDED
Status: DISCONTINUED | OUTPATIENT
Start: 2022-11-26 | End: 2022-11-29

## 2022-11-26 RX ORDER — LIDOCAINE 50 MG/G
OINTMENT TOPICAL
COMMUNITY
End: 2022-12-29

## 2022-11-26 RX ADMIN — GADOBENATE DIMEGLUMINE 10 ML: 529 INJECTION, SOLUTION INTRAVENOUS at 17:27

## 2022-11-26 RX ADMIN — IOPAMIDOL 75 ML: 755 INJECTION, SOLUTION INTRAVENOUS at 20:11

## 2022-11-26 RX ADMIN — ASPIRIN 81 MG CHEWABLE TABLET 81 MG: 81 TABLET CHEWABLE at 20:28

## 2022-11-26 RX ADMIN — Medication 10 ML: at 20:29

## 2022-11-26 RX ADMIN — SODIUM CHLORIDE 1000 ML: 9 INJECTION, SOLUTION INTRAVENOUS at 13:42

## 2022-11-26 RX ADMIN — ATORVASTATIN CALCIUM 80 MG: 40 TABLET, FILM COATED ORAL at 20:29

## 2022-11-26 RX ADMIN — MECLIZINE HYDROCHLORIDE 25 MG: 25 TABLET ORAL at 13:42

## 2022-11-27 ENCOUNTER — APPOINTMENT (OUTPATIENT)
Dept: CARDIOLOGY | Facility: HOSPITAL | Age: 62
End: 2022-11-27

## 2022-11-27 PROBLEM — I67.1 BRAIN ANEURYSM: Status: ACTIVE | Noted: 2022-11-27

## 2022-11-27 LAB
CHOLEST SERPL-MCNC: 186 MG/DL (ref 0–200)
GLUCOSE BLDC GLUCOMTR-MCNC: 84 MG/DL (ref 70–130)
HBA1C MFR BLD: 5.5 % (ref 4.8–5.6)
HDLC SERPL-MCNC: 50 MG/DL (ref 40–60)
LDLC SERPL CALC-MCNC: 122 MG/DL (ref 0–100)
LDLC/HDLC SERPL: 2.41 {RATIO}
QT INTERVAL: 406 MS
QTC INTERVAL: 415 MS
TRIGL SERPL-MCNC: 77 MG/DL (ref 0–150)
VLDLC SERPL-MCNC: 14 MG/DL (ref 5–40)

## 2022-11-27 PROCEDURE — 97165 OT EVAL LOW COMPLEX 30 MIN: CPT

## 2022-11-27 PROCEDURE — 92523 SPEECH SOUND LANG COMPREHEN: CPT

## 2022-11-27 PROCEDURE — 83036 HEMOGLOBIN GLYCOSYLATED A1C: CPT | Performed by: CLINICAL NURSE SPECIALIST

## 2022-11-27 PROCEDURE — 97535 SELF CARE MNGMENT TRAINING: CPT

## 2022-11-27 PROCEDURE — 99225 PR SBSQ OBSERVATION CARE/DAY 25 MINUTES: CPT | Performed by: INTERNAL MEDICINE

## 2022-11-27 PROCEDURE — 97161 PT EVAL LOW COMPLEX 20 MIN: CPT

## 2022-11-27 PROCEDURE — G0378 HOSPITAL OBSERVATION PER HR: HCPCS

## 2022-11-27 PROCEDURE — 82962 GLUCOSE BLOOD TEST: CPT

## 2022-11-27 PROCEDURE — 99232 SBSQ HOSP IP/OBS MODERATE 35: CPT | Performed by: PSYCHIATRY & NEUROLOGY

## 2022-11-27 PROCEDURE — 80061 LIPID PANEL: CPT | Performed by: CLINICAL NURSE SPECIALIST

## 2022-11-27 PROCEDURE — 93306 TTE W/DOPPLER COMPLETE: CPT

## 2022-11-27 PROCEDURE — 99222 1ST HOSP IP/OBS MODERATE 55: CPT | Performed by: RADIOLOGY

## 2022-11-27 RX ORDER — FLUTICASONE PROPIONATE 50 MCG
2 SPRAY, SUSPENSION (ML) NASAL DAILY
Status: DISCONTINUED | OUTPATIENT
Start: 2022-11-27 | End: 2022-12-02 | Stop reason: HOSPADM

## 2022-11-27 RX ORDER — BUTALBITAL, ACETAMINOPHEN AND CAFFEINE 50; 325; 40 MG/1; MG/1; MG/1
1 TABLET ORAL EVERY 4 HOURS PRN
Status: DISCONTINUED | OUTPATIENT
Start: 2022-11-27 | End: 2022-12-02 | Stop reason: HOSPADM

## 2022-11-27 RX ORDER — ALBUTEROL SULFATE 2.5 MG/3ML
2.5 SOLUTION RESPIRATORY (INHALATION) EVERY 6 HOURS PRN
Status: DISCONTINUED | OUTPATIENT
Start: 2022-11-27 | End: 2022-12-02 | Stop reason: HOSPADM

## 2022-11-27 RX ADMIN — ASPIRIN 81 MG CHEWABLE TABLET 81 MG: 81 TABLET CHEWABLE at 08:39

## 2022-11-27 RX ADMIN — ATORVASTATIN CALCIUM 80 MG: 40 TABLET, FILM COATED ORAL at 20:02

## 2022-11-27 RX ADMIN — CLOPIDOGREL BISULFATE 75 MG: 75 TABLET ORAL at 08:39

## 2022-11-27 RX ADMIN — CLOPIDOGREL BISULFATE 300 MG: 75 TABLET ORAL at 00:26

## 2022-11-27 RX ADMIN — BUTALBITAL, ACETAMINOPHEN, AND CAFFEINE 1 TABLET: 50; 325; 40 TABLET ORAL at 12:06

## 2022-11-27 RX ADMIN — BUTALBITAL, ACETAMINOPHEN, AND CAFFEINE 1 TABLET: 50; 325; 40 TABLET ORAL at 15:46

## 2022-11-27 RX ADMIN — Medication 10 ML: at 20:02

## 2022-11-28 ENCOUNTER — ANESTHESIA EVENT (OUTPATIENT)
Dept: CARDIOLOGY | Facility: HOSPITAL | Age: 62
End: 2022-11-28

## 2022-11-28 PROBLEM — G93.89 MASS OF BRAIN: Status: ACTIVE | Noted: 2022-11-28

## 2022-11-28 LAB
BH CV ECHO MEAS - AI P1/2T: 691.1 MSEC
BH CV ECHO MEAS - AO MAX PG: 7.7 MMHG
BH CV ECHO MEAS - AO MEAN PG: 4.5 MMHG
BH CV ECHO MEAS - AO ROOT DIAM: 3.1 CM
BH CV ECHO MEAS - AO V2 MAX: 138.5 CM/SEC
BH CV ECHO MEAS - AO V2 VTI: 29.2 CM
BH CV ECHO MEAS - AVA(I,D): 2.8 CM2
BH CV ECHO MEAS - EDV(CUBED): 59.3 ML
BH CV ECHO MEAS - EDV(MOD-SP2): 54.3 ML
BH CV ECHO MEAS - EDV(MOD-SP4): 59.7 ML
BH CV ECHO MEAS - EF(MOD-BP): 61.5 %
BH CV ECHO MEAS - EF(MOD-SP2): 64.3 %
BH CV ECHO MEAS - EF(MOD-SP4): 60.5 %
BH CV ECHO MEAS - ESV(CUBED): 13.8 ML
BH CV ECHO MEAS - ESV(MOD-SP2): 19.4 ML
BH CV ECHO MEAS - ESV(MOD-SP4): 23.6 ML
BH CV ECHO MEAS - FS: 38.5 %
BH CV ECHO MEAS - IVS/LVPW: 1.33 CM
BH CV ECHO MEAS - IVSD: 0.8 CM
BH CV ECHO MEAS - LA DIMENSION: 2.3 CM
BH CV ECHO MEAS - LAT PEAK E' VEL: 17.2 CM/SEC
BH CV ECHO MEAS - LV MASS(C)D: 75.1 GRAMS
BH CV ECHO MEAS - LV MAX PG: 6.4 MMHG
BH CV ECHO MEAS - LV MEAN PG: 3 MMHG
BH CV ECHO MEAS - LV V1 MAX: 125 CM/SEC
BH CV ECHO MEAS - LV V1 VTI: 25.8 CM
BH CV ECHO MEAS - LVIDD: 3.9 CM
BH CV ECHO MEAS - LVIDS: 2.4 CM
BH CV ECHO MEAS - LVOT AREA: 3.1 CM2
BH CV ECHO MEAS - LVOT DIAM: 2 CM
BH CV ECHO MEAS - LVPWD: 0.6 CM
BH CV ECHO MEAS - MED PEAK E' VEL: 10.7 CM/SEC
BH CV ECHO MEAS - MR MAX PG: 22.7 MMHG
BH CV ECHO MEAS - MR MAX VEL: 238 CM/SEC
BH CV ECHO MEAS - MV A MAX VEL: 97.5 CM/SEC
BH CV ECHO MEAS - MV DEC SLOPE: 393 CM/SEC2
BH CV ECHO MEAS - MV DEC TIME: 0.2 MSEC
BH CV ECHO MEAS - MV E MAX VEL: 94.9 CM/SEC
BH CV ECHO MEAS - MV E/A: 0.97
BH CV ECHO MEAS - MV P1/2T: 73 MSEC
BH CV ECHO MEAS - MVA(P1/2T): 3 CM2
BH CV ECHO MEAS - PA ACC TIME: 0.16 SEC
BH CV ECHO MEAS - PA PR(ACCEL): 7.9 MMHG
BH CV ECHO MEAS - PA V2 MAX: 115 CM/SEC
BH CV ECHO MEAS - RAP SYSTOLE: 3 MMHG
BH CV ECHO MEAS - RVSP: 30 MMHG
BH CV ECHO MEAS - SV(LVOT): 81.1 ML
BH CV ECHO MEAS - SV(MOD-SP2): 34.9 ML
BH CV ECHO MEAS - SV(MOD-SP4): 36.1 ML
BH CV ECHO MEAS - TAPSE (>1.6): 2.36 CM
BH CV ECHO MEAS - TR MAX PG: 27 MMHG
BH CV ECHO MEAS - TR MAX VEL: 245.8 CM/SEC
BH CV ECHO MEASUREMENTS AVERAGE E/E' RATIO: 6.8
BH CV ECHO SHUNT ASSESSMENT PERFORMED (HIDDEN SCRIPTING): 1
BH CV XLRA - RV BASE: 3.3 CM
BH CV XLRA - RV LENGTH: 6.6 CM
BH CV XLRA - RV MID: 2.5 CM
BH CV XLRA - TDI S': 18.2 CM/SEC
LEFT ATRIUM VOLUME INDEX: 25.7 ML/M2
MAXIMAL PREDICTED HEART RATE: 159 BPM
PA ADP PRP-ACNC: 73 PRU
STRESS TARGET HR: 135 BPM

## 2022-11-28 PROCEDURE — 99232 SBSQ HOSP IP/OBS MODERATE 35: CPT | Performed by: INTERNAL MEDICINE

## 2022-11-28 PROCEDURE — 85576 BLOOD PLATELET AGGREGATION: CPT | Performed by: RADIOLOGY

## 2022-11-28 PROCEDURE — 03VG3HZ RESTRICTION OF INTRACRANIAL ARTERY WITH INTRALUMINAL DEVICE, FLOW DIVERTER, PERCUTANEOUS APPROACH: ICD-10-PCS | Performed by: RADIOLOGY

## 2022-11-28 RX ADMIN — FLUTICASONE PROPIONATE 2 SPRAY: 50 SPRAY, METERED NASAL at 09:27

## 2022-11-28 RX ADMIN — ATORVASTATIN CALCIUM 80 MG: 40 TABLET, FILM COATED ORAL at 20:15

## 2022-11-28 RX ADMIN — CLOPIDOGREL BISULFATE 75 MG: 75 TABLET ORAL at 09:25

## 2022-11-28 RX ADMIN — BUTALBITAL, ACETAMINOPHEN, AND CAFFEINE 1 TABLET: 50; 325; 40 TABLET ORAL at 18:52

## 2022-11-28 RX ADMIN — ASPIRIN 81 MG CHEWABLE TABLET 81 MG: 81 TABLET CHEWABLE at 09:25

## 2022-11-28 RX ADMIN — BUTALBITAL, ACETAMINOPHEN, AND CAFFEINE 1 TABLET: 50; 325; 40 TABLET ORAL at 09:25

## 2022-11-28 RX ADMIN — BUTALBITAL, ACETAMINOPHEN, AND CAFFEINE 1 TABLET: 50; 325; 40 TABLET ORAL at 15:14

## 2022-11-29 ENCOUNTER — ANESTHESIA (OUTPATIENT)
Dept: CARDIOLOGY | Facility: HOSPITAL | Age: 62
End: 2022-11-29

## 2022-11-29 PROBLEM — E78.5 HYPERLIPIDEMIA: Status: ACTIVE | Noted: 2022-11-29

## 2022-11-29 PROBLEM — I10 ESSENTIAL HYPERTENSION: Status: ACTIVE | Noted: 2022-11-29

## 2022-11-29 PROBLEM — K44.9 PARAESOPHAGEAL HERNIA: Status: ACTIVE | Noted: 2022-11-29

## 2022-11-29 PROCEDURE — C1769 GUIDE WIRE: HCPCS | Performed by: RADIOLOGY

## 2022-11-29 PROCEDURE — 0 IODIXANOL PER 1 ML: Performed by: RADIOLOGY

## 2022-11-29 PROCEDURE — 75894 X-RAYS TRANSCATH THERAPY: CPT | Performed by: RADIOLOGY

## 2022-11-29 PROCEDURE — 75898 FOLLOW-UP ANGIOGRAPHY: CPT | Performed by: RADIOLOGY

## 2022-11-29 PROCEDURE — C1887 CATHETER, GUIDING: HCPCS | Performed by: RADIOLOGY

## 2022-11-29 PROCEDURE — 36224 PLACE CATH CAROTD ART: CPT | Performed by: RADIOLOGY

## 2022-11-29 PROCEDURE — 25010000002 DEXAMETHASONE PER 1 MG: Performed by: NURSE ANESTHETIST, CERTIFIED REGISTERED

## 2022-11-29 PROCEDURE — C1766 INTRO/SHEATH,STRBLE,NON-PEEL: HCPCS | Performed by: RADIOLOGY

## 2022-11-29 PROCEDURE — 25010000002 PHENYLEPHRINE 10 MG/ML SOLUTION: Performed by: NURSE ANESTHETIST, CERTIFIED REGISTERED

## 2022-11-29 PROCEDURE — C1876 STENT, NON-COA/NON-COV W/DEL: HCPCS | Performed by: RADIOLOGY

## 2022-11-29 PROCEDURE — 99232 SBSQ HOSP IP/OBS MODERATE 35: CPT

## 2022-11-29 PROCEDURE — 36226 PLACE CATH VERTEBRAL ART: CPT | Performed by: RADIOLOGY

## 2022-11-29 PROCEDURE — 99232 SBSQ HOSP IP/OBS MODERATE 35: CPT | Performed by: INTERNAL MEDICINE

## 2022-11-29 PROCEDURE — C1894 INTRO/SHEATH, NON-LASER: HCPCS | Performed by: RADIOLOGY

## 2022-11-29 PROCEDURE — C1725 CATH, TRANSLUMIN NON-LASER: HCPCS | Performed by: RADIOLOGY

## 2022-11-29 PROCEDURE — 25010000002 DEXAMETHASONE PER 1 MG

## 2022-11-29 PROCEDURE — 25010000002 HEPARIN (PORCINE) PER 1000 UNITS: Performed by: NURSE ANESTHETIST, CERTIFIED REGISTERED

## 2022-11-29 PROCEDURE — C1760 CLOSURE DEV, VASC: HCPCS | Performed by: RADIOLOGY

## 2022-11-29 PROCEDURE — 25010000002 PROPOFOL 10 MG/ML EMULSION: Performed by: NURSE ANESTHETIST, CERTIFIED REGISTERED

## 2022-11-29 PROCEDURE — 61624 TCAT PERM OCCLS/EMBOLJ CNS: CPT | Performed by: RADIOLOGY

## 2022-11-29 PROCEDURE — 61630 BALO ANGIOPLASTY ICR PERQ: CPT | Performed by: RADIOLOGY

## 2022-11-29 PROCEDURE — 76377 3D RENDER W/INTRP POSTPROCES: CPT | Performed by: RADIOLOGY

## 2022-11-29 PROCEDURE — 25010000002 ONDANSETRON PER 1 MG

## 2022-11-29 DEVICE — IMPLANTABLE DEVICE: Type: IMPLANTABLE DEVICE | Status: FUNCTIONAL

## 2022-11-29 RX ORDER — MEPERIDINE HYDROCHLORIDE 25 MG/ML
12.5 INJECTION INTRAMUSCULAR; INTRAVENOUS; SUBCUTANEOUS
Status: DISCONTINUED | OUTPATIENT
Start: 2022-11-29 | End: 2022-11-29 | Stop reason: HOSPADM

## 2022-11-29 RX ORDER — SODIUM CHLORIDE 0.9 % (FLUSH) 0.9 %
10 SYRINGE (ML) INJECTION AS NEEDED
Status: DISCONTINUED | OUTPATIENT
Start: 2022-11-29 | End: 2022-12-02 | Stop reason: HOSPADM

## 2022-11-29 RX ORDER — IODIXANOL 320 MG/ML
INJECTION, SOLUTION INTRAVASCULAR
Status: DISCONTINUED | OUTPATIENT
Start: 2022-11-29 | End: 2022-11-29 | Stop reason: HOSPADM

## 2022-11-29 RX ORDER — SODIUM CHLORIDE 0.9 % (FLUSH) 0.9 %
3 SYRINGE (ML) INJECTION EVERY 12 HOURS SCHEDULED
Status: DISCONTINUED | OUTPATIENT
Start: 2022-11-29 | End: 2022-11-29 | Stop reason: HOSPADM

## 2022-11-29 RX ORDER — LIDOCAINE HYDROCHLORIDE 10 MG/ML
0.5 INJECTION, SOLUTION EPIDURAL; INFILTRATION; INTRACAUDAL; PERINEURAL ONCE AS NEEDED
Status: DISCONTINUED | OUTPATIENT
Start: 2022-11-29 | End: 2022-11-29

## 2022-11-29 RX ORDER — FENTANYL CITRATE 50 UG/ML
50 INJECTION, SOLUTION INTRAMUSCULAR; INTRAVENOUS
Status: DISCONTINUED | OUTPATIENT
Start: 2022-11-29 | End: 2022-11-29 | Stop reason: HOSPADM

## 2022-11-29 RX ORDER — MIDAZOLAM HYDROCHLORIDE 1 MG/ML
1 INJECTION INTRAMUSCULAR; INTRAVENOUS
Status: DISCONTINUED | OUTPATIENT
Start: 2022-11-29 | End: 2022-11-29

## 2022-11-29 RX ORDER — SODIUM CHLORIDE, SODIUM LACTATE, POTASSIUM CHLORIDE, CALCIUM CHLORIDE 600; 310; 30; 20 MG/100ML; MG/100ML; MG/100ML; MG/100ML
9 INJECTION, SOLUTION INTRAVENOUS CONTINUOUS
Status: DISCONTINUED | OUTPATIENT
Start: 2022-11-29 | End: 2022-11-29

## 2022-11-29 RX ORDER — HYDROMORPHONE HYDROCHLORIDE 1 MG/ML
0.5 INJECTION, SOLUTION INTRAMUSCULAR; INTRAVENOUS; SUBCUTANEOUS
Status: DISCONTINUED | OUTPATIENT
Start: 2022-11-29 | End: 2022-11-29 | Stop reason: HOSPADM

## 2022-11-29 RX ORDER — ONDANSETRON 2 MG/ML
4 INJECTION INTRAMUSCULAR; INTRAVENOUS ONCE AS NEEDED
Status: DISCONTINUED | OUTPATIENT
Start: 2022-11-29 | End: 2022-11-29 | Stop reason: HOSPADM

## 2022-11-29 RX ORDER — HYDRALAZINE HYDROCHLORIDE 20 MG/ML
5 INJECTION INTRAMUSCULAR; INTRAVENOUS
Status: DISCONTINUED | OUTPATIENT
Start: 2022-11-29 | End: 2022-11-29 | Stop reason: HOSPADM

## 2022-11-29 RX ORDER — LIDOCAINE HYDROCHLORIDE 10 MG/ML
INJECTION, SOLUTION EPIDURAL; INFILTRATION; INTRACAUDAL; PERINEURAL AS NEEDED
Status: DISCONTINUED | OUTPATIENT
Start: 2022-11-29 | End: 2022-11-29 | Stop reason: SURG

## 2022-11-29 RX ORDER — SODIUM CHLORIDE 0.9 % (FLUSH) 0.9 %
10 SYRINGE (ML) INJECTION EVERY 12 HOURS SCHEDULED
Status: DISCONTINUED | OUTPATIENT
Start: 2022-11-29 | End: 2022-12-02 | Stop reason: HOSPADM

## 2022-11-29 RX ORDER — SODIUM CHLORIDE 9 MG/ML
40 INJECTION, SOLUTION INTRAVENOUS AS NEEDED
Status: DISCONTINUED | OUTPATIENT
Start: 2022-11-29 | End: 2022-11-29

## 2022-11-29 RX ORDER — PROMETHAZINE HYDROCHLORIDE 25 MG/1
25 TABLET ORAL ONCE AS NEEDED
Status: DISCONTINUED | OUTPATIENT
Start: 2022-11-29 | End: 2022-11-29 | Stop reason: HOSPADM

## 2022-11-29 RX ORDER — DEXAMETHASONE SODIUM PHOSPHATE 4 MG/ML
4 INJECTION, SOLUTION INTRA-ARTICULAR; INTRALESIONAL; INTRAMUSCULAR; INTRAVENOUS; SOFT TISSUE EVERY 6 HOURS
Status: DISCONTINUED | OUTPATIENT
Start: 2022-11-29 | End: 2022-12-01

## 2022-11-29 RX ORDER — LABETALOL HYDROCHLORIDE 5 MG/ML
5 INJECTION, SOLUTION INTRAVENOUS
Status: DISCONTINUED | OUTPATIENT
Start: 2022-11-29 | End: 2022-11-29 | Stop reason: HOSPADM

## 2022-11-29 RX ORDER — ROCURONIUM BROMIDE 10 MG/ML
INJECTION, SOLUTION INTRAVENOUS AS NEEDED
Status: DISCONTINUED | OUTPATIENT
Start: 2022-11-29 | End: 2022-11-29 | Stop reason: SURG

## 2022-11-29 RX ORDER — FAMOTIDINE 20 MG/1
20 TABLET, FILM COATED ORAL ONCE
Status: DISCONTINUED | OUTPATIENT
Start: 2022-11-29 | End: 2022-11-30

## 2022-11-29 RX ORDER — HYDROCODONE BITARTRATE AND ACETAMINOPHEN 5; 325 MG/1; MG/1
1 TABLET ORAL ONCE AS NEEDED
Status: DISCONTINUED | OUTPATIENT
Start: 2022-11-29 | End: 2022-11-29 | Stop reason: HOSPADM

## 2022-11-29 RX ORDER — ONDANSETRON 2 MG/ML
4 INJECTION INTRAMUSCULAR; INTRAVENOUS EVERY 6 HOURS PRN
Status: DISCONTINUED | OUTPATIENT
Start: 2022-11-29 | End: 2022-12-02 | Stop reason: HOSPADM

## 2022-11-29 RX ORDER — SODIUM CHLORIDE, SODIUM LACTATE, POTASSIUM CHLORIDE, CALCIUM CHLORIDE 600; 310; 30; 20 MG/100ML; MG/100ML; MG/100ML; MG/100ML
INJECTION, SOLUTION INTRAVENOUS CONTINUOUS PRN
Status: DISCONTINUED | OUTPATIENT
Start: 2022-11-29 | End: 2022-11-29 | Stop reason: SURG

## 2022-11-29 RX ORDER — DROPERIDOL 2.5 MG/ML
0.62 INJECTION, SOLUTION INTRAMUSCULAR; INTRAVENOUS ONCE AS NEEDED
Status: DISCONTINUED | OUTPATIENT
Start: 2022-11-29 | End: 2022-11-29 | Stop reason: HOSPADM

## 2022-11-29 RX ORDER — PROPOFOL 10 MG/ML
VIAL (ML) INTRAVENOUS AS NEEDED
Status: DISCONTINUED | OUTPATIENT
Start: 2022-11-29 | End: 2022-11-29 | Stop reason: SURG

## 2022-11-29 RX ORDER — SODIUM CHLORIDE 0.9 % (FLUSH) 0.9 %
10 SYRINGE (ML) INJECTION EVERY 12 HOURS SCHEDULED
Status: DISCONTINUED | OUTPATIENT
Start: 2022-11-29 | End: 2022-11-29

## 2022-11-29 RX ORDER — SODIUM CHLORIDE 0.9 % (FLUSH) 0.9 %
3-10 SYRINGE (ML) INJECTION AS NEEDED
Status: DISCONTINUED | OUTPATIENT
Start: 2022-11-29 | End: 2022-11-29 | Stop reason: HOSPADM

## 2022-11-29 RX ORDER — HEPARIN SODIUM 1000 [USP'U]/ML
INJECTION, SOLUTION INTRAVENOUS; SUBCUTANEOUS AS NEEDED
Status: DISCONTINUED | OUTPATIENT
Start: 2022-11-29 | End: 2022-11-29 | Stop reason: SURG

## 2022-11-29 RX ORDER — SODIUM CHLORIDE 0.9 % (FLUSH) 0.9 %
10 SYRINGE (ML) INJECTION AS NEEDED
Status: DISCONTINUED | OUTPATIENT
Start: 2022-11-29 | End: 2022-11-29

## 2022-11-29 RX ORDER — IPRATROPIUM BROMIDE AND ALBUTEROL SULFATE 2.5; .5 MG/3ML; MG/3ML
3 SOLUTION RESPIRATORY (INHALATION) ONCE AS NEEDED
Status: DISCONTINUED | OUTPATIENT
Start: 2022-11-29 | End: 2022-11-29 | Stop reason: HOSPADM

## 2022-11-29 RX ORDER — SODIUM CHLORIDE 9 MG/ML
75 INJECTION, SOLUTION INTRAVENOUS CONTINUOUS
Status: DISCONTINUED | OUTPATIENT
Start: 2022-11-29 | End: 2022-12-01

## 2022-11-29 RX ORDER — PROMETHAZINE HYDROCHLORIDE 25 MG/1
25 SUPPOSITORY RECTAL ONCE AS NEEDED
Status: DISCONTINUED | OUTPATIENT
Start: 2022-11-29 | End: 2022-11-29 | Stop reason: HOSPADM

## 2022-11-29 RX ORDER — FAMOTIDINE 10 MG/ML
20 INJECTION, SOLUTION INTRAVENOUS ONCE
Status: COMPLETED | OUTPATIENT
Start: 2022-11-29 | End: 2022-11-29

## 2022-11-29 RX ORDER — PANTOPRAZOLE SODIUM 40 MG/1
40 TABLET, DELAYED RELEASE ORAL
Status: DISCONTINUED | OUTPATIENT
Start: 2022-11-30 | End: 2022-12-02 | Stop reason: HOSPADM

## 2022-11-29 RX ORDER — SODIUM CHLORIDE 9 MG/ML
40 INJECTION, SOLUTION INTRAVENOUS AS NEEDED
Status: DISCONTINUED | OUTPATIENT
Start: 2022-11-29 | End: 2022-12-02 | Stop reason: HOSPADM

## 2022-11-29 RX ORDER — ESMOLOL HYDROCHLORIDE 10 MG/ML
INJECTION INTRAVENOUS AS NEEDED
Status: DISCONTINUED | OUTPATIENT
Start: 2022-11-29 | End: 2022-11-29 | Stop reason: SURG

## 2022-11-29 RX ORDER — NALOXONE HCL 0.4 MG/ML
0.4 VIAL (ML) INJECTION AS NEEDED
Status: DISCONTINUED | OUTPATIENT
Start: 2022-11-29 | End: 2022-11-29 | Stop reason: HOSPADM

## 2022-11-29 RX ORDER — SODIUM CHLORIDE 9 MG/ML
40 INJECTION, SOLUTION INTRAVENOUS AS NEEDED
Status: DISCONTINUED | OUTPATIENT
Start: 2022-11-29 | End: 2022-11-29 | Stop reason: HOSPADM

## 2022-11-29 RX ORDER — ACETAMINOPHEN 325 MG/1
650 TABLET ORAL EVERY 4 HOURS PRN
Status: DISCONTINUED | OUTPATIENT
Start: 2022-11-29 | End: 2022-12-02 | Stop reason: HOSPADM

## 2022-11-29 RX ORDER — DEXAMETHASONE SODIUM PHOSPHATE 4 MG/ML
INJECTION, SOLUTION INTRA-ARTICULAR; INTRALESIONAL; INTRAMUSCULAR; INTRAVENOUS; SOFT TISSUE AS NEEDED
Status: DISCONTINUED | OUTPATIENT
Start: 2022-11-29 | End: 2022-11-29 | Stop reason: SURG

## 2022-11-29 RX ORDER — LIDOCAINE HYDROCHLORIDE 10 MG/ML
INJECTION, SOLUTION EPIDURAL; INFILTRATION; INTRACAUDAL; PERINEURAL
Status: DISCONTINUED | OUTPATIENT
Start: 2022-11-29 | End: 2022-11-29 | Stop reason: HOSPADM

## 2022-11-29 RX ORDER — DROPERIDOL 2.5 MG/ML
0.62 INJECTION, SOLUTION INTRAMUSCULAR; INTRAVENOUS
Status: DISCONTINUED | OUTPATIENT
Start: 2022-11-29 | End: 2022-11-29 | Stop reason: HOSPADM

## 2022-11-29 RX ORDER — NICARDIPINE HYDROCHLORIDE 2.5 MG/ML
INJECTION INTRAVENOUS
Status: DISPENSED
Start: 2022-11-29 | End: 2022-11-30

## 2022-11-29 RX ORDER — PHENYLEPHRINE HYDROCHLORIDE 10 MG/ML
INJECTION INTRAVENOUS AS NEEDED
Status: DISCONTINUED | OUTPATIENT
Start: 2022-11-29 | End: 2022-11-29 | Stop reason: SURG

## 2022-11-29 RX ORDER — SCOLOPAMINE TRANSDERMAL SYSTEM 1 MG/1
1 PATCH, EXTENDED RELEASE TRANSDERMAL
Status: DISCONTINUED | OUTPATIENT
Start: 2022-11-29 | End: 2022-12-02 | Stop reason: HOSPADM

## 2022-11-29 RX ADMIN — Medication 10 ML: at 21:20

## 2022-11-29 RX ADMIN — ACETAMINOPHEN 650 MG: 325 TABLET ORAL at 18:07

## 2022-11-29 RX ADMIN — SODIUM CHLORIDE 75 ML/HR: 9 INJECTION, SOLUTION INTRAVENOUS at 14:38

## 2022-11-29 RX ADMIN — NICARDIPINE HYDROCHLORIDE 5 MG/HR: 25 INJECTION, SOLUTION INTRAVENOUS at 14:38

## 2022-11-29 RX ADMIN — DEXAMETHASONE SODIUM PHOSPHATE 4 MG: 4 INJECTION INTRA-ARTICULAR; INTRALESIONAL; INTRAMUSCULAR; INTRAVENOUS; SOFT TISSUE at 18:07

## 2022-11-29 RX ADMIN — LIDOCAINE HYDROCHLORIDE 60 MG: 10 INJECTION, SOLUTION EPIDURAL; INFILTRATION; INTRACAUDAL; PERINEURAL at 10:42

## 2022-11-29 RX ADMIN — PHENYLEPHRINE HYDROCHLORIDE 50 MCG: 10 INJECTION INTRAVENOUS at 10:19

## 2022-11-29 RX ADMIN — PROPOFOL 25 MCG/KG/MIN: 10 INJECTION, EMULSION INTRAVENOUS at 10:55

## 2022-11-29 RX ADMIN — DEXAMETHASONE SODIUM PHOSPHATE 4 MG: 4 INJECTION INTRA-ARTICULAR; INTRALESIONAL; INTRAMUSCULAR; INTRAVENOUS; SOFT TISSUE at 23:51

## 2022-11-29 RX ADMIN — PROPOFOL 150 MG: 10 INJECTION, EMULSION INTRAVENOUS at 10:42

## 2022-11-29 RX ADMIN — ROCURONIUM BROMIDE 50 MG: 10 INJECTION, SOLUTION INTRAVENOUS at 10:42

## 2022-11-29 RX ADMIN — HEPARIN SODIUM 5000 UNITS: 1000 INJECTION, SOLUTION INTRAVENOUS; SUBCUTANEOUS at 11:36

## 2022-11-29 RX ADMIN — FAMOTIDINE 20 MG: 10 INJECTION INTRAVENOUS at 14:37

## 2022-11-29 RX ADMIN — ATORVASTATIN CALCIUM 80 MG: 40 TABLET, FILM COATED ORAL at 20:29

## 2022-11-29 RX ADMIN — NICARDIPINE HYDROCHLORIDE 5 MG/HR: 25 INJECTION, SOLUTION INTRAVENOUS at 13:31

## 2022-11-29 RX ADMIN — SODIUM CHLORIDE, POTASSIUM CHLORIDE, SODIUM LACTATE AND CALCIUM CHLORIDE: 600; 310; 30; 20 INJECTION, SOLUTION INTRAVENOUS at 10:23

## 2022-11-29 RX ADMIN — ROCURONIUM BROMIDE 30 MG: 10 INJECTION, SOLUTION INTRAVENOUS at 11:47

## 2022-11-29 RX ADMIN — SUGAMMADEX 200 MG: 100 INJECTION, SOLUTION INTRAVENOUS at 12:27

## 2022-11-29 RX ADMIN — PHENYLEPHRINE HYDROCHLORIDE 50 MCG: 10 INJECTION INTRAVENOUS at 11:55

## 2022-11-29 RX ADMIN — ESMOLOL HYDROCHLORIDE 10 MG: 10 INJECTION, SOLUTION INTRAVENOUS at 10:42

## 2022-11-29 RX ADMIN — PHENYLEPHRINE HYDROCHLORIDE 50 MCG: 10 INJECTION INTRAVENOUS at 11:43

## 2022-11-29 RX ADMIN — DEXAMETHASONE SODIUM PHOSPHATE 8 MG: 4 INJECTION INTRA-ARTICULAR; INTRALESIONAL; INTRAMUSCULAR; INTRAVENOUS; SOFT TISSUE at 10:46

## 2022-11-29 RX ADMIN — ONDANSETRON 4 MG: 2 INJECTION INTRAMUSCULAR; INTRAVENOUS at 12:26

## 2022-11-30 LAB
ALBUMIN SERPL-MCNC: 3.7 G/DL (ref 3.5–5.2)
ALBUMIN/GLOB SERPL: 1.7 G/DL
ALP SERPL-CCNC: 86 U/L (ref 39–117)
ALT SERPL W P-5'-P-CCNC: 13 U/L (ref 1–33)
ANION GAP SERPL CALCULATED.3IONS-SCNC: 11 MMOL/L (ref 5–15)
AST SERPL-CCNC: 11 U/L (ref 1–32)
BASOPHILS # BLD AUTO: 0.02 10*3/MM3 (ref 0–0.2)
BASOPHILS NFR BLD AUTO: 0.2 % (ref 0–1.5)
BILIRUB SERPL-MCNC: 0.3 MG/DL (ref 0–1.2)
BUN SERPL-MCNC: 12 MG/DL (ref 8–23)
BUN/CREAT SERPL: 25.5 (ref 7–25)
CALCIUM SPEC-SCNC: 8.8 MG/DL (ref 8.6–10.5)
CHLORIDE SERPL-SCNC: 105 MMOL/L (ref 98–107)
CO2 SERPL-SCNC: 22 MMOL/L (ref 22–29)
CREAT SERPL-MCNC: 0.47 MG/DL (ref 0.57–1)
DEPRECATED RDW RBC AUTO: 46 FL (ref 37–54)
EGFRCR SERPLBLD CKD-EPI 2021: 108.5 ML/MIN/1.73
EOSINOPHIL # BLD AUTO: 0 10*3/MM3 (ref 0–0.4)
EOSINOPHIL NFR BLD AUTO: 0 % (ref 0.3–6.2)
ERYTHROCYTE [DISTWIDTH] IN BLOOD BY AUTOMATED COUNT: 13.2 % (ref 12.3–15.4)
GLOBULIN UR ELPH-MCNC: 2.2 GM/DL
GLUCOSE SERPL-MCNC: 126 MG/DL (ref 65–99)
HCT VFR BLD AUTO: 38.9 % (ref 34–46.6)
HGB BLD-MCNC: 13 G/DL (ref 12–15.9)
IMM GRANULOCYTES # BLD AUTO: 0.06 10*3/MM3 (ref 0–0.05)
IMM GRANULOCYTES NFR BLD AUTO: 0.6 % (ref 0–0.5)
LYMPHOCYTES # BLD AUTO: 1.11 10*3/MM3 (ref 0.7–3.1)
LYMPHOCYTES NFR BLD AUTO: 10.5 % (ref 19.6–45.3)
MAGNESIUM SERPL-MCNC: 1.8 MG/DL (ref 1.6–2.4)
MCH RBC QN AUTO: 31.3 PG (ref 26.6–33)
MCHC RBC AUTO-ENTMCNC: 33.4 G/DL (ref 31.5–35.7)
MCV RBC AUTO: 93.5 FL (ref 79–97)
MONOCYTES # BLD AUTO: 0.31 10*3/MM3 (ref 0.1–0.9)
MONOCYTES NFR BLD AUTO: 2.9 % (ref 5–12)
NEUTROPHILS NFR BLD AUTO: 85.8 % (ref 42.7–76)
NEUTROPHILS NFR BLD AUTO: 9.05 10*3/MM3 (ref 1.7–7)
NRBC BLD AUTO-RTO: 0 /100 WBC (ref 0–0.2)
PHOSPHATE SERPL-MCNC: 4.5 MG/DL (ref 2.5–4.5)
PLATELET # BLD AUTO: 271 10*3/MM3 (ref 140–450)
PMV BLD AUTO: 10.7 FL (ref 6–12)
POTASSIUM SERPL-SCNC: 4.1 MMOL/L (ref 3.5–5.2)
PROT SERPL-MCNC: 5.9 G/DL (ref 6–8.5)
RBC # BLD AUTO: 4.16 10*6/MM3 (ref 3.77–5.28)
SODIUM SERPL-SCNC: 138 MMOL/L (ref 136–145)
WBC NRBC COR # BLD: 10.55 10*3/MM3 (ref 3.4–10.8)

## 2022-11-30 PROCEDURE — 83735 ASSAY OF MAGNESIUM: CPT

## 2022-11-30 PROCEDURE — 85025 COMPLETE CBC W/AUTO DIFF WBC: CPT

## 2022-11-30 PROCEDURE — 99231 SBSQ HOSP IP/OBS SF/LOW 25: CPT | Performed by: RADIOLOGY

## 2022-11-30 PROCEDURE — 99232 SBSQ HOSP IP/OBS MODERATE 35: CPT

## 2022-11-30 PROCEDURE — 80053 COMPREHEN METABOLIC PANEL: CPT

## 2022-11-30 PROCEDURE — 0 MAGNESIUM SULFATE 4 GM/100ML SOLUTION: Performed by: INTERNAL MEDICINE

## 2022-11-30 PROCEDURE — 25010000002 DEXAMETHASONE PER 1 MG

## 2022-11-30 PROCEDURE — 84100 ASSAY OF PHOSPHORUS: CPT

## 2022-11-30 RX ORDER — MAGNESIUM SULFATE HEPTAHYDRATE 40 MG/ML
2 INJECTION, SOLUTION INTRAVENOUS AS NEEDED
Status: DISCONTINUED | OUTPATIENT
Start: 2022-11-30 | End: 2022-12-02 | Stop reason: HOSPADM

## 2022-11-30 RX ORDER — MAGNESIUM SULFATE HEPTAHYDRATE 40 MG/ML
4 INJECTION, SOLUTION INTRAVENOUS AS NEEDED
Status: DISCONTINUED | OUTPATIENT
Start: 2022-11-30 | End: 2022-12-02 | Stop reason: HOSPADM

## 2022-11-30 RX ADMIN — Medication 10 ML: at 08:43

## 2022-11-30 RX ADMIN — ATORVASTATIN CALCIUM 80 MG: 40 TABLET, FILM COATED ORAL at 20:42

## 2022-11-30 RX ADMIN — DEXAMETHASONE SODIUM PHOSPHATE 4 MG: 4 INJECTION INTRA-ARTICULAR; INTRALESIONAL; INTRAMUSCULAR; INTRAVENOUS; SOFT TISSUE at 18:21

## 2022-11-30 RX ADMIN — SODIUM CHLORIDE 75 ML/HR: 9 INJECTION, SOLUTION INTRAVENOUS at 04:00

## 2022-11-30 RX ADMIN — Medication 10 ML: at 21:09

## 2022-11-30 RX ADMIN — SODIUM CHLORIDE 75 ML/HR: 9 INJECTION, SOLUTION INTRAVENOUS at 22:04

## 2022-11-30 RX ADMIN — DEXAMETHASONE SODIUM PHOSPHATE 4 MG: 4 INJECTION INTRA-ARTICULAR; INTRALESIONAL; INTRAMUSCULAR; INTRAVENOUS; SOFT TISSUE at 12:04

## 2022-11-30 RX ADMIN — MAGNESIUM SULFATE HEPTAHYDRATE 4 G: 40 INJECTION, SOLUTION INTRAVENOUS at 08:43

## 2022-11-30 RX ADMIN — ACETAMINOPHEN 650 MG: 325 TABLET ORAL at 03:59

## 2022-11-30 RX ADMIN — CLOPIDOGREL BISULFATE 75 MG: 75 TABLET ORAL at 08:43

## 2022-11-30 RX ADMIN — ASPIRIN 81 MG CHEWABLE TABLET 81 MG: 81 TABLET CHEWABLE at 08:43

## 2022-11-30 RX ADMIN — DEXAMETHASONE SODIUM PHOSPHATE 4 MG: 4 INJECTION INTRA-ARTICULAR; INTRALESIONAL; INTRAMUSCULAR; INTRAVENOUS; SOFT TISSUE at 05:42

## 2022-11-30 RX ADMIN — DEXAMETHASONE SODIUM PHOSPHATE 4 MG: 4 INJECTION INTRA-ARTICULAR; INTRALESIONAL; INTRAMUSCULAR; INTRAVENOUS; SOFT TISSUE at 23:44

## 2022-11-30 RX ADMIN — PANTOPRAZOLE SODIUM 40 MG: 40 TABLET, DELAYED RELEASE ORAL at 05:30

## 2022-12-01 ENCOUNTER — APPOINTMENT (OUTPATIENT)
Dept: CT IMAGING | Facility: HOSPITAL | Age: 62
End: 2022-12-01

## 2022-12-01 LAB — MAGNESIUM SERPL-MCNC: 2.2 MG/DL (ref 1.6–2.4)

## 2022-12-01 PROCEDURE — 97164 PT RE-EVAL EST PLAN CARE: CPT

## 2022-12-01 PROCEDURE — 97535 SELF CARE MNGMENT TRAINING: CPT

## 2022-12-01 PROCEDURE — 70496 CT ANGIOGRAPHY HEAD: CPT

## 2022-12-01 PROCEDURE — 97530 THERAPEUTIC ACTIVITIES: CPT

## 2022-12-01 PROCEDURE — 0 IOPAMIDOL PER 1 ML: Performed by: INTERNAL MEDICINE

## 2022-12-01 PROCEDURE — 99231 SBSQ HOSP IP/OBS SF/LOW 25: CPT | Performed by: RADIOLOGY

## 2022-12-01 PROCEDURE — 99232 SBSQ HOSP IP/OBS MODERATE 35: CPT

## 2022-12-01 PROCEDURE — 97168 OT RE-EVAL EST PLAN CARE: CPT

## 2022-12-01 PROCEDURE — 83735 ASSAY OF MAGNESIUM: CPT | Performed by: INTERNAL MEDICINE

## 2022-12-01 PROCEDURE — 92507 TX SP LANG VOICE COMM INDIV: CPT

## 2022-12-01 PROCEDURE — 63710000001 DEXAMETHASONE PER 0.25 MG: Performed by: RADIOLOGY

## 2022-12-01 PROCEDURE — 25010000002 DEXAMETHASONE PER 1 MG

## 2022-12-01 RX ORDER — DEXAMETHASONE 4 MG/1
4 TABLET ORAL EVERY 8 HOURS SCHEDULED
Status: DISCONTINUED | OUTPATIENT
Start: 2022-12-01 | End: 2022-12-02 | Stop reason: HOSPADM

## 2022-12-01 RX ADMIN — PANTOPRAZOLE SODIUM 40 MG: 40 TABLET, DELAYED RELEASE ORAL at 05:36

## 2022-12-01 RX ADMIN — Medication 10 ML: at 20:10

## 2022-12-01 RX ADMIN — ATORVASTATIN CALCIUM 80 MG: 40 TABLET, FILM COATED ORAL at 22:04

## 2022-12-01 RX ADMIN — CLOPIDOGREL BISULFATE 75 MG: 75 TABLET ORAL at 08:40

## 2022-12-01 RX ADMIN — DEXAMETHASONE 4 MG: 4 TABLET ORAL at 22:04

## 2022-12-01 RX ADMIN — ASPIRIN 81 MG CHEWABLE TABLET 81 MG: 81 TABLET CHEWABLE at 08:40

## 2022-12-01 RX ADMIN — DEXAMETHASONE SODIUM PHOSPHATE 4 MG: 4 INJECTION INTRA-ARTICULAR; INTRALESIONAL; INTRAMUSCULAR; INTRAVENOUS; SOFT TISSUE at 05:40

## 2022-12-01 RX ADMIN — FLUTICASONE PROPIONATE 2 SPRAY: 50 SPRAY, METERED NASAL at 08:42

## 2022-12-01 RX ADMIN — IOPAMIDOL 75 ML: 755 INJECTION, SOLUTION INTRAVENOUS at 05:02

## 2022-12-01 RX ADMIN — Medication 10 ML: at 08:41

## 2022-12-01 RX ADMIN — DEXAMETHASONE 4 MG: 4 TABLET ORAL at 13:23

## 2022-12-01 NOTE — PLAN OF CARE
Goal Outcome Evaluation:  Plan of Care Reviewed With: patient, spouse        Progress: improving  Outcome Evaluation: PT re-eval completed. Pt limited by decreased functional endurance, mild weakness, and balance deficit. Pt ambulated 500ft and navigated 13 steps with unilateral handrail and SBA. Pt scored 22/24 on DGI. Rec continued skilled PT. d/c rec for home with assist.

## 2022-12-01 NOTE — PLAN OF CARE
Goal Outcome Evaluation:  Plan of Care Reviewed With: patient, spouse        Progress: improving  Outcome Evaluation: OT Re-eval complete. Pt is CGA for functional mobility and independent w/ ADL performance. Pt presents w/ decreased balance from baseline w/ one episode of minor LOB w/ dynamic balance tasks. Recommend cont skilled IPOT POC. Recommend pt DC home w/ assist.

## 2022-12-01 NOTE — THERAPY RE-EVALUATION
Patient Name: Lauryn Romo  : 1960    MRN: [id number]                              Today's Date: 2022       Admit Date: 2022    Visit Dx:     ICD-10-CM ICD-9-CM   1. Mass of brain  G93.89 348.89   2. Dizziness  R42 780.4   3. Abnormal MRI of the head  R93.0 793.0   4. Cerebrovascular accident (CVA), unspecified mechanism (HCC)  I63.9 434.91   5. Cognitive communication deficit  R41.841 799.52     Patient Active Problem List   Diagnosis   • Acute CVA   • Brain aneurysm   • Paraesophageal hernia   • Hyperlipidemia   • HTN     Past Medical History:   Diagnosis Date   • Hypertension      Past Surgical History:   Procedure Laterality Date   • BREAST LUMPECTOMY     • EMBOLIZATION CEREBRAL N/A 2022    Procedure: CV EMBOLIZATION CEREBRAL IR;  Surgeon: Enoch Savage MD;  Location: [facility] INVASIVE LOCATION;  Service: Interventional Radiology;  Laterality: N/A;   • HIATAL HERNIA REPAIR     • SINUS SURGERY      x4      General Information     Row Name 22 1051          Physical Therapy Time and Intention    Document Type re-evaluation  -AY     Mode of Treatment physical therapy  -AY     Row Name 22 1051          General Information    Patient Profile Reviewed yes  -AY     Prior Level of Function independent:;all household mobility;community mobility;gait;transfer;bed mobility;using stairs  -AY     Existing Precautions/Restrictions fall  -AY     Barriers to Rehab medically complex  -AY     Row Name 22 1051          Living Environment    People in Home spouse  -AY     Row Name 22 1051          Home Main Entrance    Number of Stairs, Main Entrance three  -AY     Stair Railings, Main Entrance railing on right side (ascending)  -AY     Row Name 22 1051          Stairs Within Home, Primary    Number of Stairs, Within Home, Primary twelve  -AY     Stair Railings, Within Home, Primary railing on right side (ascending)  -AY     Row Name 22 1051           Cognition    Orientation Status (Cognition) oriented x 4  -AY     Row Name 12/01/22 1051          Safety Issues, Functional Mobility    Safety Issues Affecting Function (Mobility) insight into deficits/self-awareness;sequencing abilities;awareness of need for assistance;safety precaution awareness  -AY     Impairments Affecting Function (Mobility) balance;strength;shortness of breath  -AY           User Key  (r) = Recorded By, (t) = Taken By, (c) = Cosigned By    Initials Name Provider Type    AY Karina Zaman PT Physical Therapist               Mobility     Row Name 12/01/22 1056          Sit-Stand Transfer    Sit-Stand Gray (Transfers) standby assist  -AY     Row Name 12/01/22 1056          Gait/Stairs (Locomotion)    Gray Level (Gait) standby assist  -AY     Distance in Feet (Gait) 500  -AY     Deviations/Abnormal Patterns (Gait) torrie decreased;gait speed decreased;stride length decreased;weight shifting decreased;base of support, narrow  -AY     Bilateral Gait Deviations forward flexed posture;heel strike decreased  -AY     Gray Level (Stairs) stand by assist  -AY     Handrail Location (Stairs) right side (ascending);left side (descending)  -AY     Number of Steps (Stairs) 13  -AY     Ascending Technique (Stairs) step-over-step  -AY     Descending Technique (Stairs) step-over-step  -AY     Comment, (Gait/Stairs) pt demonstrated step through gait pattern with decreased torrie. No LOB noted. DGI performed.  -AY           User Key  (r) = Recorded By, (t) = Taken By, (c) = Cosigned By    Initials Name Provider Type    Karina Sargent PT Physical Therapist               Obj/Interventions     Row Name 12/01/22 1057          Range of Motion Comprehensive    General Range of Motion bilateral lower extremity ROM WFL  -AY     Row Name 12/01/22 1057          Strength Comprehensive (MMT)    General Manual Muscle Testing (MMT) Assessment lower extremity strength deficits identified  -AY      Comment, General Manual Muscle Testing (MMT) Assessment BLE grossly 4/5  -AY     Row Name 12/01/22 1057          Motor Skills    Motor Skills coordination  -AY     Coordination WFL;bilateral;lower extremity;heel to holt  -AY     Row Name 12/01/22 1057          Balance    Balance Assessment sitting static balance;sitting dynamic balance;sit to stand dynamic balance;standing dynamic balance;standing static balance  -AY     Static Sitting Balance independent  -AY     Dynamic Sitting Balance independent  -AY     Position, Sitting Balance unsupported;sitting in chair  -AY     Sit to Stand Dynamic Balance standby assist  -AY     Static Standing Balance standby assist  -AY     Dynamic Standing Balance standby assist  -AY     Position/Device Used, Standing Balance unsupported  -AY     Row Name 12/01/22 1057          Sensory Assessment (Somatosensory)    Sensory Assessment (Somatosensory) LE sensation intact  -AY           User Key  (r) = Recorded By, (t) = Taken By, (c) = Cosigned By    Initials Name Provider Type    Karina Sargent, PT Physical Therapist               Goals/Plan     Row Name 12/01/22 1102          Bed Mobility Goal 1 (PT)    Activity/Assistive Device (Bed Mobility Goal 1, PT) sit to supine/supine to sit  -AY     [place] Level/Cues Needed (Bed Mobility Goal 1, PT) independent  -AY     Time Frame (Bed Mobility Goal 1, PT) long term goal (LTG);10 days  -AY     Progress/Outcomes (Bed Mobility Goal 1, PT) goal ongoing  -AY     Row Name 12/01/22 1102          Transfer Goal 1 (PT)    Activity/Assistive Device (Transfer Goal 1, PT) sit-to-stand/stand-to-sit  -AY     [place] Level/Cues Needed (Transfer Goal 1, PT) independent  -AY     Time Frame (Transfer Goal 1, PT) long term goal (LTG);10 days  -AY     Progress/Outcome (Transfer Goal 1, PT) goal ongoing  -AY     Row Name 12/01/22 1102          Gait Training Goal 1 (PT)    Activity/Assistive Device (Gait Training Goal 1, PT) gait (walking locomotion)   -AY     [place] Level (Gait Training Goal 1, PT) independent  -AY     Distance (Gait Training Goal 1, PT) 500  -AY     Time Frame (Gait Training Goal 1, PT) long term goal (LTG);10 days  -AY     Progress/Outcome (Gait Training Goal 1, PT) goal ongoing  -AY     Row Name 12/01/22 1102          Stairs Goal 1 (PT)    Activity/Assistive Device (Stairs Goal 1, PT) ascending stairs;descending stairs  -AY     [place] Level/Cues Needed (Stairs Goal 1, PT) modified independence  -AY     Number of Stairs (Stairs Goal 1, PT) 12  -AY     Time Frame (Stairs Goal 1, PT) long term goal (LTG);10 days  -AY     Progress/Outcome (Stairs Goal 1, PT) goal ongoing  -AY     Row Name 12/01/22 1102          Therapy Assessment/Plan (PT)    Planned Therapy Interventions (PT) balance training;bed mobility training;gait training;home exercise program;postural re-education;transfer training;strengthening;stair training;patient/family education  -AY           User Key  (r) = Recorded By, (t) = Taken By, (c) = Cosigned By    Initials Name Provider Type    AY Karina Zaman, PT Physical Therapist               Clinical Impression     Row Name 12/01/22 1059          Pain    Pretreatment Pain Rating 1/10  -AY     Posttreatment Pain Rating 1/10  -AY     Pain Location incisional  -AY     Pain Intervention(s) Ambulation/increased activity;Repositioned  -AY     Additional Documentation Pain Scale: Numbers Pre/Post-Treatment (Group)  -AY     Row Name 12/01/22 1058          Plan of Care Review    Plan of Care Reviewed With patient;spouse  -AY     Progress improving  -AY     Outcome Evaluation PT re-eval completed. Pt limited by decreased functional endurance, mild weakness, and balance deficit. Pt ambulated 500ft and navigated 13 steps with unilateral handrail and SBA. Pt scored 22/24 on DGI. Rec continued skilled PT. d/c rec for home with assist.  -AY     Row Name 12/01/22 1053          Therapy Assessment/Plan (PT)    Rehab Potential (PT) good, to  achieve stated therapy goals  -AY     Criteria for Skilled Interventions Met (PT) yes;meets criteria;skilled treatment is necessary  -AY     Therapy Frequency (PT) daily  -AY     Row Name 12/01/22 1059          Vital Signs    Pre Systolic BP Rehab 115  -AY     Pre Treatment Diastolic BP 72  -AY     Post Systolic BP Rehab 124  -AY     Post Treatment Diastolic BP 62  -AY     Pretreatment Heart Rate (beats/min) 56  -AY     Posttreatment Heart Rate (beats/min) 52  -AY     Pre SpO2 (%) 100  -AY     O2 Delivery Pre Treatment room air  -AY     O2 Delivery Intra Treatment room air  -AY     Post SpO2 (%) 100  -AY     O2 Delivery Post Treatment room air  -AY     Pre Patient Position Sitting  -AY     Intra Patient Position Standing  -AY     Post Patient Position Sitting  -AY     Row Name 12/01/22 1059          Positioning and Restraints    Pre-Treatment Position sitting in chair/recliner  -AY     Post Treatment Position chair  -AY     In Chair notified nsg;reclined;sitting;call light within reach;encouraged to call for assist;exit alarm on;legs elevated;with family/caregiver;waffle cushion  -AY           User Key  (r) = Recorded By, (t) = Taken By, (c) = Cosigned By    Initials Name Provider Type    Karina Sargent, PT Physical Therapist               Outcome Measures     Row Name 12/01/22 1102          How much help from another person do you currently need...    Turning from your back to your side while in flat bed without using bedrails? 4  -AY     Moving from lying on back to sitting on the side of a flat bed without bedrails? 4  -AY     Moving to and from a bed to a chair (including a wheelchair)? 3  -AY     Standing up from a chair using your arms (e.g., wheelchair, bedside chair)? 3  -AY     Climbing 3-5 steps with a railing? 3  -AY     To walk in hospital room? 3  -AY     AM-PAC 6 Clicks Score (PT) 20  -AY     Highest level of mobility 6 --> Walked 10 steps or more  -AY     Row Name 12/01/22 1102          Modified  Scott Scale    Pre-Stroke Modified Scott Scale 6 - Unable to determine (UTD) from the medical record documentation  -AY     Modified Scott Scale 2 - Slight disability.  Unable to carry out all previous activities but able to look after own affairs without assistance.  -AY     Row Name 12/01/22 1102          Functional Assessment    Outcome Measure Options AM-PAC 6 Clicks Basic Mobility (PT);Modified Jose Roberto  -AY     Row Name 12/01/22 1102          Dynamic Gait Index (DGI)    Gait Level Surface 3  -AY     Change in Gait Speed 3  -AY     Gait with Horizontal Head Turns 3  -AY     Gait with Vertical Head Turns 3  -AY     Gait and Pivot Turn 3  -AY     Step Over Obstacle 2  -AY     Step Around Obstacles 3  -AY     Steps 2  -AY     Dynamic Gait Index Score 22  -AY           User Key  (r) = Recorded By, (t) = Taken By, (c) = Cosigned By    Initials Name Provider Type    Karina Sargent PT Physical Therapist                             Physical Therapy Education     Title: PT OT SLP Therapies (In Progress)     Topic: Physical Therapy (Done)     Point: Mobility training (Done)     Learning Progress Summary           Patient Acceptance, E,TB, VU,NR by AY at 12/1/2022 1103    Acceptance, E, VU by FW at 11/27/2022 1300   Family Acceptance, E,TB, VU,NR by AY at 12/1/2022 1103                   Point: Home exercise program (Done)     Learning Progress Summary           Patient Acceptance, E,TB, VU,NR by AY at 12/1/2022 1103   Family Acceptance, E,TB, VU,NR by AY at 12/1/2022 1103                   Point: Body mechanics (Done)     Learning Progress Summary           Patient Acceptance, E,TB, VU,NR by AY at 12/1/2022 1103    Acceptance, E, VU by FW at 11/27/2022 1300   Family Acceptance, E,TB, VU,NR by AY at 12/1/2022 1103                   Point: Precautions (Done)     Learning Progress Summary           Patient Acceptance, E,TB, VU,NR by AY at 12/1/2022 1103    Acceptance, E, VU by FW at 11/27/2022 1300   Family Acceptance,  E,TB, VU,NR by AY at 12/1/2022 1103                               User Key     Initials Effective Dates Name Provider Type Discipline    FW 05/05/22 -  Bijan Mims, PT Physical Therapist PT    AY 11/10/20 -  Karina Zaman PT Physical Therapist PT              PT Recommendation and Plan  Planned Therapy Interventions (PT): balance training, bed mobility training, gait training, home exercise program, postural re-education, transfer training, strengthening, stair training, patient/family education  Plan of Care Reviewed With: patient, spouse  Progress: improving  Outcome Evaluation: PT re-eval completed. Pt limited by decreased functional endurance, mild weakness, and balance deficit. Pt ambulated 500ft and navigated 13 steps with unilateral handrail and SBA. Pt scored 22/24 on DGI. Rec continued skilled PT. d/c rec for home with assist.     Time Calculation:    PT Charges     Row Name 12/01/22 1104             Time Calculation    Start Time 1000  -AY      PT Received On 12/01/22  -AY      PT Goal Re-Cert Due Date 12/11/22  -AY         Time Calculation- PT    Total Timed Code Minutes- PT 38 minute(s)  -AY         Timed Charges    39104 - PT Therapeutic Activity Minutes 38  -AY         Untimed Charges    PT Eval/Re-eval Minutes 20  -AY         Total Minutes    Timed Charges Total Minutes 38  -AY      Untimed Charges Total Minutes 20  -AY       Total Minutes 58  -AY            User Key  (r) = Recorded By, (t) = Taken By, (c) = Cosigned By    Initials Name Provider Type    AY Karina Zaman PT Physical Therapist              Therapy Charges for Today     Code Description Service Date Service Provider Modifiers Qty    11675099326 HC PT THERAPEUTIC ACT EA 15 MIN 12/1/2022 Karina Zaman, PT GP 3    67781378469 HC PT RE-EVAL ESTABLISHED PLAN 2 12/1/2022 Karina Zaman, PT GP 1          PT G-Codes  Outcome Measure Options: AM-PAC 6 Clicks Basic Mobility (PT), Modified [place]  AM-PAC 6 Clicks Score (PT): 20  AM-PAC 6  Clicks Score (OT): 22  Modified Jose Roberto Scale: 2 - Slight disability.  Unable to carry out all previous activities but able to look after own affairs without assistance.  PT Discharge Summary  Anticipated Discharge Disposition (PT): home with assist    Karina Zaman, BALDO  12/1/2022

## 2022-12-01 NOTE — PROGRESS NOTES
Care Follow-up     Hospital:  LOS: 3 days   Ms. Lauryn Romo, 62 y.o. female is followed for:   Brain aneurysm          Subjective   Interval History:  Chart reviewed. No acute events overnight. No HA or focal neuro changes. Repeat CTA this AM showed further interval thrombosis of the aneurysm without complicating features. Patient resting comfortably in chair.       The patient's past medical, surgical and social history were reviewed and updated in Epic as appropriate.       Objective     Infusions:  niCARdipine, 5-15 mg/hr, Last Rate: Stopped (11/29/22 1500)      Medications:  aspirin, 81 mg, Oral, Daily   Or  aspirin, 300 mg, Rectal, Daily  atorvastatin, 80 mg, Oral, Nightly  clopidogrel, 75 mg, Oral, Daily  dexamethasone, 4 mg, Oral, Q8H  fluticasone, 2 spray, Each Nare, Daily  pantoprazole, 40 mg, Oral, Q AM  Scopolamine, 1 patch, Transdermal, Q72H  sodium chloride, 10 mL, Intravenous, Q12H      I reviewed the patient's medications.    Vital Sign Min/Max for last 24 hours  Temp  Min: 97.6 °F (36.4 °C)  Max: 98.6 °F (37 °C)   BP  Min: 102/64  Max: 143/80   Pulse  Min: 51  Max: 72   Resp  Min: 12  Max: 18   SpO2  Min: 84 %  Max: 100 %   No data recorded       Input/Output for last 24 hour shift  11/30 0701 - 12/01 0700  In: 1605.1 [P.O.:100; I.V.:1505.1]  Out: -         Physical Exam:  GENERAL: Patient sitting in chair and conversant. No acute distress.   HEENT: Normocephalic and atraumatic. Trachea midline. PER. EOM WNL.   LUNGS: Chest rise of normal depth and symmetric. Lungs clear to auscultation bilaterally. No wheezes, rhonchi, or rales.   HEART: S1,S2 detected. Regular rate and rhythm. No rub, murmur, or gallop.   ABDOMEN: Soft, round, nondistended, and nontender. Bowel sounds present.   EXTREMITIES: No clubbing, edema, or cyanosis. Peripheral pulses present. Skin warm and dry.    NEURO/PSYCH: Alert and oriented. Follows commands. Moves all extremities. No focal deficits.      Results from last 7 days    Lab Units 11/30/22  0555 11/26/22  1332   WBC 10*3/mm3 10.55 7.47   HEMOGLOBIN g/dL 13.0 13.8   PLATELETS 10*3/mm3 271 251     Results from last 7 days   Lab Units 12/01/22  0435 11/30/22  0555 11/26/22  1235   SODIUM mmol/L  --  138 139   POTASSIUM mmol/L  --  4.1 4.7   CO2 mmol/L  --  22.0 24.0   BUN mg/dL  --  12 20   CREATININE mg/dL  --  0.47* 0.56*   MAGNESIUM mg/dL 2.2 1.8 2.4   PHOSPHORUS mg/dL  --  4.5  --    GLUCOSE mg/dL  --  126* 101*     Estimated Creatinine Clearance: 104.2 mL/min (A) (by C-G formula based on SCr of 0.47 mg/dL (L)).          I reviewed the patient's new clinical results.  I reviewed the patient's new imaging results/reports including actual images and agree with reports.     Imaging Results (Last 24 Hours)     Procedure Component Value Units Date/Time    CT Angiogram Head [[id number]] Resulted: 12/01/22 0453     Updated: 12/01/22 0502          Assessment & Plan   Impression      Brain aneurysm    Acute CVA    Hyperlipidemia    HTN       Plan      Lauryn Romo is a 62 year-old female with a PMH of former tobacco use, hypertension, and hyperlipidemia that was having dizziness, ataxia, and generalized weakness for a couple of days at home, admitted to [facility] on 11/29 for a stroke. Imaging showed a tiny right occipital infarct with a giant, partially thrombosed basilar aneurysm. She was monitored under hospital medicine with BP control and DAPT and then underwent an embolization of the basilar aneurysm on 11/29 with Dr. Savage. She has been monitored in the ICU post-procedure and she is doing well with no new neurological deficits. Repeat CTA this AM showed further thrombosis of aneurysm without complicating features.     Brain aneurysm s/p embolization (11/29)  Acute CVA  Hyperlipidemia  HTN  Post-op management per Dr. Savage, BP control, neuro checks per protocol, DAPT, statin, dexamethasone, monitor s/p sheath sites for s&s of bleeding. H&H stable thus far.  Pulmonary toilet. Ambulate.  PT/OT to see.  Educated on diet and encouraged intake. Reports poor appetite still.  AM Labs    DVT Prophylaxis: Venous foot pumps  Dispo: Keep in ICU, potential discharge in AM    Time spent: 35 minutes    Plan of care and goals reviewed with multidisciplinary/antibiotic stewardship team during rounds.   I discussed the patient's findings and my recommendations with patient, family and nursing staff     Yasemin Srivastava, MSN, APRN, AGACNP-BC  Pulmonary and Critical Care Medicine

## 2022-12-01 NOTE — PLAN OF CARE
Goal Outcome Evaluation:    SLP treatment completed. Will sign-off cognitive communication. Please see note for further details and recommendations.

## 2022-12-01 NOTE — PROGRESS NOTES
NAME: MEDHAT PAL  : 1960  PCP: Melissa Lazo APRN  ADMITTING PHYSICIAN: Vito Lyons MD    DATE OF ADMISSION:  2022  DATE OF SERVICE: 2022    HOSPITAL DAY:  3 days    HISTORY OF PRESENT ILLNESS:  62 y.o. female  admitted with dizziness and ataxia, found to have a tiny right occipital infarct and angina (partially thrombosed) proximal basilar cerebral aneurysm.  After lengthy discussion, Ms. Pal elected to pursue embolization, and this was treated with flow diverter therapy (Pipeline Shield) on 2022.    REVIEW OF IMAGING:  CT angiogram of the head dated 2022 was reviewed along with its corresponding radiologic report.  Comparison is made to prior study from 2022.  Changes related to Pipeline embolization for the patient's partially thrombosed, giant basilar aneurysm are noted.  The Pipeline stent construct, and parent vasculature, remain widely patent.  There has been further interval thrombosis of the aneurysm, with only small amount of residual opacification along the superior margin of the aneurysm/neck.  No complicating features.    LABS:  Lab Results   Component Value Date    WBC 10.55 2022    HGB 13.0 2022    HCT 38.9 2022    MCV 93.5 2022     2022     Lab Results   Component Value Date    GLUCOSE 126 (H) 2022    CALCIUM 8.8 2022     2022    K 4.1 2022    CO2 22.0 2022     2022    BUN 12 2022    CREATININE 0.47 (L) 2022    EGFRIFNONA 103 2019    BCR 25.5 (H) 2022    ANIONGAP 11.0 2022     CURRENT MEDS:  Current Facility-Administered Medications   Medication Dose Route Frequency Provider Last Rate Last Admin   • acetaminophen (TYLENOL) tablet 650 mg  650 mg Oral Q4H PRN aJn Matos PA-C   650 mg at 22 0359   • albuterol (PROVENTIL) nebulizer solution 0.083% 2.5 mg/3mL  2.5 mg Nebulization Q6H PRN Jan Matos PA-C        • aspirin chewable tablet 81 mg  81 mg Oral Daily Jan Matos PA-C   81 mg at 12/01/22 0840    Or   • aspirin suppository 300 mg  300 mg Rectal Daily Jan Matos PA-C       • atorvastatin (LIPITOR) tablet 80 mg  80 mg Oral Nightly Jan Matos PA-C   80 mg at 11/30/22 2042   • butalbital-acetaminophen-caffeine (FIORICET, ESGIC) -40 MG per tablet 1 tablet  1 tablet Oral Q4H PRN Jan Matos PA-C   1 tablet at 11/28/22 1852   • clopidogrel (PLAVIX) tablet 75 mg  75 mg Oral Daily Jan Matos PA-C   75 mg at 12/01/22 0840   • dexamethasone (DECADRON) injection 4 mg  4 mg Intravenous Q6H Jan Matos PA-C   4 mg at 12/01/22 0540   • fluticasone (FLONASE) 50 MCG/ACT nasal spray 2 spray  2 spray Each Nare Daily Jan Matos PA-C   2 spray at 12/01/22 0842   • Magnesium Sulfate 2 gram Bolus, followed by 8 gram infusion (total Mg dose 10 grams)- Mg less than or equal to 1mg/dL  2 g Intravenous PRN Vito Lyons MD        Or   • Magnesium Sulfate 2 gram / 50mL Infusion (GIVE X 3 BAGS TO EQUAL 6GM TOTAL DOSE) - Mg 1.1 - 1.5 mg/dl  2 g Intravenous PRN Vito Lynos MD        Or   • Magnesium Sulfate 4 gram infusion- Mg 1.6-1.9 mg/dL  4 g Intravenous PRN Vito Lyons MD 25 mL/hr at 11/30/22 0843 4 g at 11/30/22 0843   • niCARdipine (CARDENE) 25mg in 250mL NS infusion  5-15 mg/hr Intravenous Titrated Jan Matos PA-C   Stopped at 11/29/22 1500   • ondansetron (ZOFRAN) injection 4 mg  4 mg Intravenous Q6H PRN Jan Matos PA-C   4 mg at 11/29/22 1226   • pantoprazole (PROTONIX) EC tablet 40 mg  40 mg Oral Q AM Jan Matos PA-C   40 mg at 12/01/22 0536   • scopolamine patch 1 mg/72 hr  1 patch Transdermal Q72H Jan Matos PA-C       • sodium chloride 0.9 % flush 10 mL  10 mL Intravenous Q12H Jan Matos PA-C   10 mL at 12/01/22 0841   • sodium chloride 0.9 %  flush 10 mL  10 mL Intravenous PRN Jan Matos PA-C       • sodium chloride 0.9 % infusion 40 mL  40 mL Intravenous PRN Jan Matos PA-C           PHYSICAL EXAM:  Vitals:    12/01/22 1100   BP: 115/75   Pulse: 56   Resp:    Temp:    SpO2: 99%      Alert and oriented x3.  Nonfocal neurologic exam.  Speech is clear.  Resting comfortably in bedside chair.  She ambulated around the quach today with PT, without incident. No vision changes or diplopia.  No difficulty swallowing, tolerating p.o. intake very well.    ASSESSMENT/PLAN:  Ms. Romo is a 62 y.o. female status post embolization (flow diverter therapy) for giant, partially thrombosed proximal basilar aneurysm on 11/29/22 .  Ms. Romo is doing very well to this point, with no new headache, brainstem symptoms, or evidence of stroke.  She will need to remain on a dual antiplatelet regimen for at least the next 3-6 months or so.  We will keep her on a steroid taper.  CT angiogram demonstrates a further interval thrombosis of the giant aneurysm, without complicating feature.  I anticipate her being ready to discharge home tomorrow a.m., barring any overnight complications.       Copied text and portions of the note have been reviewed and are accurate as of 12/1/22.

## 2022-12-01 NOTE — THERAPY DISCHARGE NOTE
Acute Care - Speech Language Pathology   Treatment Note/Discharge  [facility]     Patient Name: Lauryn Romo  : 1960  MRN: [id number]  Today's Date: 2022               Admit Date: 2022     Visit Dx:    ICD-10-CM ICD-9-CM   1. Mass of brain  G93.89 348.89   2. Dizziness  R42 780.4   3. Abnormal MRI of the head  R93.0 793.0   4. Cerebrovascular accident (CVA), unspecified mechanism (HCC)  I63.9 434.91   5. Cognitive communication deficit  R41.841 799.52     Patient Active Problem List   Diagnosis   • Acute CVA   • Brain aneurysm   • Paraesophageal hernia   • Hyperlipidemia   • HTN     Past Medical History:   Diagnosis Date   • Hypertension      Past Surgical History:   Procedure Laterality Date   • BREAST LUMPECTOMY     • EMBOLIZATION CEREBRAL N/A 2022    Procedure: CV EMBOLIZATION CEREBRAL IR;  Surgeon: Enoch Savage MD;  Location: [facility] INVASIVE LOCATION;  Service: Interventional Radiology;  Laterality: N/A;   • HIATAL HERNIA REPAIR     • SINUS SURGERY      x4       SLP Recommendation and Plan  SLP Diagnosis: functional speech/language skills, functional cognitive-linguistic skills (22)        [facility] [place] Criteria for Skilled Therapy Interventions Met: yes (22)  Anticipated Discharge Disposition (SLP): home (22)        Daily Summary of Progress (SLP): progress toward functional goals is good, prepare for discharge (22)              Reason for Discharge: all goals and outcomes met, no further needs identified (22)     Treatment Assessment (SLP): improved, cognitive-linguistic disorder (22)     Plan for Continued Treatment (SLP): treatment no longer indicated as all goals met (22)         SLP EVALUATION (last 72 hours)     SLP SLC Evaluation     Row Name 22                   Communication Assessment/Intervention    Document Type therapy note (daily note)  -CH        Subjective Information no  complaints  -        Patient Observations alert;cooperative;agree to therapy  -        Patient/Family/Caregiver Comments/Observations spouse present  -        Patient Effort excellent  -           General Information    Patient Profile Reviewed yes  -        Precautions/Limitations, Vision WFL with corrective lenses;other (see comments)  -        Precautions/Limitations, Hearing WFL;for purposes of eval  -           Pain    Additional Documentation Pain Scale: FACES Pre/Post-Treatment (Group)  -           Pain Scale: FACES Pre/Post-Treatment    Pain: FACES Scale, Pretreatment 0-->no hurt  -        Posttreatment Pain Rating 0-->no hurt  -           Comprehension Assessment/Intervention    Comprehension Assessment/Intervention Auditory Comprehension;Reading Comprehension  -           Auditory Comprehension Assessment/Intervention    Auditory Comprehension (Communication) WFL  -        Able to Identify Objects/Pictures (Communication) WFL;pictures of common objects  -        Answers Questions (Communication) WFL;complex;yes/no  -        Able to Follow Commands (Communication) WFL;multi-step  -        Narrative Discourse WFL;simple paragraph level  -        Successful Auditory Strategies (Communication) repetition  -           Reading Comprehension Assessment/Intervention    Reading Comprehension (Communication) WNL  -        Scanning (Reading) paragraphs;WNL  -CH        Paragraph Level WFL  -CH           Expression Assessment/Intervention    Expression Assessment/Intervention verbal expression  -           Verbal Expression Assessment/Intervention    Verbal Expression WFL  -        Automatic Speech (Communication) WFL  -        Repetition WFL;sentences  -        Phrase Completion WFL;automatic/predictable;unpredictable  -        Responsive Naming WFL;simple  -        Confrontational Naming WFL;low frequency;high frequency  -        Spontaneous/Functional Words WFL  -         Sentence Formulation WFL  -        Conversational Discourse/Fluency WFL  -           Graphic Expression Assessment/Intervention    Graphic Expression WFL  -        Functional Correspondence WFL  -           Motor Speech Assessment/Intervention    Motor Speech Function WNL  -           Cursory Voice Assessment/Intervention    Quality and Resonance (Voice) WNL  -           Cognitive Assessment Intervention- SLP    Orientation Status (Cognition) awareness of basic personal information;person;place;time;situation;WNL  -        Memory (Cognitive) WFL;immediate;delayed  -        Attention (Cognitive) mild impairment;attention to detail  -        Thought Organization (Cognitive) WFL;abstract divergent;concrete divergent;concrete convergent;abstract convergent;drawing conclusions;verbal sequencing;mental manipulation  -        Reasoning (Cognitive) WFL;simple;deductive;analogies  -        Problem Solving (Cognitive) WFL;simple  -        Functional Math (Cognitive) WFL;simple  -        Executive Function (Cognition) WFL  -        Pragmatics (Communication) WFL  -           SLP Evaluation Clinical Impressions    SLP Diagnosis functional speech/language skills;functional cognitive-linguistic skills  -        SLP Diagnosis Comments --  -        Rehab Potential/Prognosis good  -        SLC Criteria for Skilled Therapy Interventions Met yes  -        Functional Impact --  -           SLP Treatment Clinical Impressions    Treatment Assessment (SLP) improved;cognitive-linguistic disorder  -        Daily Summary of Progress (SLP) progress toward functional goals is good;prepare for discharge  -        Plan for Continued Treatment (SLP) treatment no longer indicated as all goals met  -        Care Plan Review evaluation/treatment results reviewed;care plan/treatment goals reviewed  -        Care Plan Review, Other Participant(s) spouse  -           Recommendations    Anticipated  Discharge Disposition (SLP) home  -           SLP Discharge Summary    Discharge Destination unknown  -        Discharge Diagnostic Statement speech, language and cognition are at baseline and WFL  -        Progress Toward Achieving Short/long Term Goals all goals met within established timelines  -        Reason for Discharge all goals and outcomes met, no further needs identified  -              User Key  (r) = Recorded By, (t) = Taken By, (c) = Cosigned By    Initials Name Effective Dates    Larisa Garcia MS CCC-SLP 06/16/21 -                    EDUCATION  The patient has been educated in the following areas:   Cognitive Impairment Communication Impairment.                  Time Calculation:    Time Calculation- SLP     Row Name 12/01/22 1311             Time Calculation- SLP    SLP Start Time 1045  -      SLP Received On 12/01/22  -         Untimed Charges    [id number] Treatment/ST Modification Prosth Aug Alter  45  -CH         Total Minutes    Untimed Charges Total Minutes 45  -CH       Total Minutes 45  -CH            User Key  (r) = Recorded By, (t) = Taken By, (c) = Cosigned By    Initials Name Provider Type    Larisa Garcia MS CCC-SLP Speech and Language Pathologist                Therapy Charges for Today     Code Description Service Date Service Provider Modifiers Qty    [id number]  ST TREATMENT SPEECH 3 12/1/2022 Larisa Cao MS CCC-SLP GN 1                   SLP Discharge Summary  Anticipated Discharge Disposition (SLP): home  Reason for Discharge: all goals and outcomes met, no further needs identified  Progress Toward Achieving Short/long Term Goals: all goals met within established timelines  Discharge Destination: unknown    Larisa Cao MS CCC-SLP  12/1/2022       Patient was not wearing a face mask and did not exhibit coughing during this therapy encounter.  Procedure performed was aerosolizing, involved close contact (within 6 feet for at least 15  minutes or longer), and did not involve contact with infectious secretions or specimens.  Therapist used appropriate personal protective equipment including gloves and standard procedure mask.  Appropriate PPE was worn during the entire therapy session.  Hand hygiene was completed before and after therapy session.

## 2022-12-01 NOTE — THERAPY RE-EVALUATION
Patient Name: Lauryn Romo  : 1960    MRN: [id number]                              Today's Date: 2022       Admit Date: 2022    Visit Dx:     ICD-10-CM ICD-9-CM   1. Mass of brain  G93.89 348.89   2. Dizziness  R42 780.4   3. Abnormal MRI of the head  R93.0 793.0   4. Cerebrovascular accident (CVA), unspecified mechanism (HCC)  I63.9 434.91   5. Cognitive communication deficit  R41.841 799.52     Patient Active Problem List   Diagnosis   • Acute CVA   • Brain aneurysm   • Paraesophageal hernia   • Hyperlipidemia   • HTN     Past Medical History:   Diagnosis Date   • Hypertension      Past Surgical History:   Procedure Laterality Date   • BREAST LUMPECTOMY     • EMBOLIZATION CEREBRAL N/A 2022    Procedure: CV EMBOLIZATION CEREBRAL IR;  Surgeon: Enoch Savage MD;  Location: [facility] INVASIVE LOCATION;  Service: Interventional Radiology;  Laterality: N/A;   • HIATAL HERNIA REPAIR     • SINUS SURGERY      x4      General Information     Row Name 22 1131          OT Time and Intention    Document Type re-evaluation  -CS     Mode of Treatment occupational therapy  -CS     Row Name 22 1131          General Information    Patient Profile Reviewed yes  -CS     Prior Level of Function independent:;all household mobility;ADL's  -CS     Existing Precautions/Restrictions fall  -CS     Barriers to Rehab medically complex  -CS     Row Name 22 1131          Living Environment    People in Home spouse  -CS     Row Name 22 1131          Home Main Entrance    Number of Stairs, Main Entrance two  -CS     Row Name 22 1131          Stairs Within Home, Primary    Stairs, Within Home, Primary 14  -CS     Row Name 22 1131          Cognition    Orientation Status (Cognition) oriented x 4  -CS     Row Name 22 1131          Safety Issues, Functional Mobility    Impairments Affecting Function (Mobility) balance;strength  -CS           User Key  (r) = Recorded  By, (t) = Taken By, (c) = Cosigned By    Initials Name Provider Type    Raeann Soares OT Occupational Therapist                 Mobility/ADL's     Row Name 12/01/22 1132          Transfers    Transfers sit-stand transfer;stand-sit transfer;toilet transfer  -     Row Name 12/01/22 1132          Sit-Stand Transfer    Sit-Stand [place] (Transfers) standby assist  -     Row Name 12/01/22 1132          Stand-Sit Transfer    Stand-Sit [place] (Transfers) standby assist  -     Row Name 12/01/22 1132          Toilet Transfer    Type (Toilet Transfer) stand-sit;sit-stand  -     [place] Level (Toilet Transfer) standby assist  -     Assistive Device (Toilet Transfer) grab bars/safety frame  -     Row Name 12/01/22 1132          Functional Mobility    Functional Mobility- Ind. Level contact guard assist;verbal cues required  -     Functional Mobility-Distance (Feet) --  to/from the bathroom  -     Row Name 12/01/22 1132          Activities of Daily Living    BADL Assessment/Intervention lower body dressing;toileting;grooming  -[facility] Name 12/01/22 1132          Lower Body Dressing Assessment/Training    [place] Level (Lower Body Dressing) don;socks;independent  -CS     Position (Lower Body Dressing) supported sitting  -[facility] Name 12/01/22 1132          Toileting Assessment/Training    [place] Level (Toileting) adjust/manage clothing;change pad/brief;perform perineal hygiene;independent  -CS     Position (Toileting) supported sitting;supported standing  -[facility] Name 12/01/22 1132          Grooming Assessment/Training    [place] Level (Grooming) wash face, hands;independent  -CS     Position (Grooming) unsupported standing  -           User Key  (r) = Recorded By, (t) = Taken By, (c) = Cosigned By    Initials Name Provider Type    Raeann Soares OT Occupational Therapist               Obj/Interventions     Row Name 12/01/22 1133          Sensory Assessment  (Somatosensory)    Sensory Assessment (Somatosensory) UE sensation intact  -     Row Name 12/01/22 1133          Vision Assessment/Intervention    Visual Impairment/Limitations WFL  -     Row Name 12/01/22 1133          Range of Motion Comprehensive    General Range of Motion bilateral upper extremity ROM L  -     Row Name 12/01/22 1133          Strength Comprehensive (MMT)    Comment, General Manual Muscle Testing (MMT) Assessment BUE grossly L  -     Row Name 12/01/22 1133          Motor Skills    Motor Skills coordination  -     Coordination bilateral;upper extremity;fine motor deficit;gross motor deficit;L  -     Row Name 12/01/22 1133          Balance    Balance Assessment sitting static balance;sitting dynamic balance;standing static balance;standing dynamic balance  -     Static Sitting Balance independent  -     Dynamic Sitting Balance independent  -     Position, Sitting Balance sitting in chair  -     Static Standing Balance standby assist  -     Dynamic Standing Balance contact guard  -     Balance Interventions sitting;standing;static;dynamic;dynamic reaching;occupation based/functional task;weight shifting activity  -     Comment, Balance Pt w/ one episode of minor LOB w/ CGA and self-corrected  -           User Key  (r) = Recorded By, (t) = Taken By, (c) = Cosigned By    Initials Name Provider Type    Raeann Soares, OT Occupational Therapist               Goals/Plan     Row Name 12/01/22 1135          Transfer Goal 1 (OT)    Progress/Outcome (Transfer Goal 1, OT) goal partially met  -     Row Name 12/01/22 1135          Dressing Goal 1 (OT)    Progress/Outcome (Dressing Goal 1, OT) goal partially met  -[facility] Name 12/01/22 1135          Toileting Goal 1 (OT)    Progress/Outcome (Toileting Goal 1, OT) goal met  -[facility] Name 12/01/22 1135          Therapy Assessment/Plan (OT)    Planned Therapy Interventions (OT) activity tolerance training;BADL  retraining;adaptive equipment training;functional balance retraining;occupation/activity based interventions;ROM/therapeutic exercise;strengthening exercise;transfer/mobility retraining  -           User Key  (r) = Recorded By, (t) = Taken By, (c) = Cosigned By    Initials Name Provider Type    CS Raeann Oseguera, TOMI Occupational Therapist               Clinical Impression     Row Name 12/01/22 1134          Pain Assessment    Pretreatment Pain Rating 0/10 - no pain  -CS     Posttreatment Pain Rating 0/10 - no pain  -CS     Row Name 12/01/22 1134          Plan of Care Review    Plan of Care Reviewed With patient;spouse  -CS     Progress improving  -     Outcome Evaluation OT Re-eval complete. Pt is CGA for functional mobility and independent w/ ADL performance. Pt presents w/ decreased balance from baseline w/ one episode of minor LOB w/ dynamic balance tasks. Recommend cont skilled IPOT POC. Recommend pt DC home w/ assist.  -     Row Name 12/01/22 1134          Therapy Assessment/Plan (OT)    Patient/Family Therapy Goal Statement (OT) Return to PLOF  -     Rehab Potential (OT) good, to achieve stated therapy goals  -     Criteria for Skilled Therapeutic Interventions Met (OT) yes;skilled treatment is necessary  -     Therapy Frequency (OT) daily  -CS     Row Name 12/01/22 1134          Therapy Plan Review/Discharge Plan (OT)    Anticipated Discharge Disposition (OT) home with assist  -     Row Name 12/01/22 1134          Vital Signs    Pre Systolic BP Rehab --  VSS  -CS     O2 Delivery Pre Treatment room air  -CS     O2 Delivery Intra Treatment room air  -CS     O2 Delivery Post Treatment room air  -CS     Pre Patient Position Sitting  -CS     Intra Patient Position Standing  -CS     Post Patient Position Sitting  -CS     Row Name 12/01/22 1134          Positioning and Restraints    Pre-Treatment Position sitting in chair/recliner  -CS     Post Treatment Position chair  -CS     In Chair notified  nsg;reclined;call light within reach;encouraged to call for assist;exit alarm on;waffle cushion;legs elevated;with family/caregiver  -CS           User Key  (r) = Recorded By, (t) = Taken By, (c) = Cosigned By    Initials Name Provider Type    CS Raeann Oseguera, TOMI Occupational Therapist               Outcome Measures     Row Name 12/01/22 1136          How much help from another is currently needed...    Putting on and taking off regular lower body clothing? 4  -CS     Bathing (including washing, rinsing, and drying) 3  -CS     Toileting (which includes using toilet bed pan or urinal) 4  -CS     Putting on and taking off regular upper body clothing 4  -CS     Taking care of personal grooming (such as brushing teeth) 4  -CS     Eating meals 4  -CS     AM-PAC 6 Clicks Score (OT) 23  -CS     Row Name 12/01/22 1102          How much help from another person do you currently need...    Turning from your back to your side while in flat bed without using bedrails? 4  -AY     Moving from lying on back to sitting on the side of a flat bed without bedrails? 4  -AY     Moving to and from a bed to a chair (including a wheelchair)? 3  -AY     Standing up from a chair using your arms (e.g., wheelchair, bedside chair)? 3  -AY     Climbing 3-5 steps with a railing? 3  -AY     To walk in hospital room? 3  -AY     AM-PAC 6 Clicks Score (PT) 20  -AY     Highest level of mobility 6 --> Walked 10 steps or more  -AY     Row Name 12/01/22 1136 12/01/22 1102       Modified [place] Scale    Pre-Stroke Modified [place] Scale -- 6 - Unable to determine (UTD) from the medical record documentation  -AY    Modified [place] Scale 2 - Slight disability.  Unable to carry out all previous activities but able to look after own affairs without assistance.  -CS 2 - Slight disability.  Unable to carry out all previous activities but able to look after own affairs without assistance.  -AY    Row Name 12/01/22 1136 12/01/22 1102       Functional Assessment     Outcome Measure Options AM-PAC 6 Clicks Daily Activity (OT)  - AM-PAC 6 Clicks Basic Mobility (PT);Modified [place]  -AY          User Key  (r) = Recorded By, (t) = Taken By, (c) = Cosigned By    Initials Name Provider Type     Raeann Oseguera, OT Occupational Therapist    Karina Sargent, BALDO Physical Therapist                Occupational Therapy Education     Title: PT OT SLP Therapies (In Progress)     Topic: Occupational Therapy (In Progress)     Point: ADL training (In Progress)     Description:   Instruct learner(s) on proper safety adaptation and remediation techniques during self care or transfers.   Instruct in proper use of assistive devices.              Learning Progress Summary           Patient Acceptance, E, NR by  at 12/1/2022 1316    Acceptance, E, NR by  at 11/27/2022 1312                   Point: Home exercise program (Not Started)     Description:   Instruct learner(s) on appropriate technique for monitoring, assisting and/or progressing therapeutic exercises/activities.              Learner Progress:  Not documented in this visit.          Point: Precautions (In Progress)     Description:   Instruct learner(s) on prescribed precautions during self-care and functional transfers.              Learning Progress Summary           Patient Acceptance, E, NR by  at 12/1/2022 1316    Acceptance, E, NR by  at 11/27/2022 1312                   Point: Body mechanics (In Progress)     Description:   Instruct learner(s) on proper positioning and spine alignment during self-care, functional mobility activities and/or exercises.              Learning Progress Summary           Patient Acceptance, E, NR by  at 12/1/2022 1316    Acceptance, E, NR by  at 11/27/2022 1312                               User Key     Initials Effective Dates Name Provider Type [facility] 09/02/21 -  Raeann Oseguera, OT Occupational Therapist OT     06/16/21 -  Kenna Hernandes OT Occupational Therapist OT               OT Recommendation and Plan  Planned Therapy Interventions (OT): activity tolerance training, BADL retraining, adaptive equipment training, functional balance retraining, occupation/activity based interventions, ROM/therapeutic exercise, strengthening exercise, transfer/mobility retraining  Therapy Frequency (OT): daily  Plan of Care Review  Plan of Care Reviewed With: patient, spouse  Progress: improving  Outcome Evaluation: OT Re-eval complete. Pt is CGA for functional mobility and independent w/ ADL performance. Pt presents w/ decreased balance from baseline w/ one episode of minor LOB w/ dynamic balance tasks. Recommend cont skilled IPOT POC. Recommend pt DC home w/ assist.     Time Calculation:    Time Calculation- OT     Row Name 12/01/22 1316             Time Calculation- OT    OT Start Time 0935  -CS      OT Received On 12/01/22  -CS      OT Goal Re-Cert Due Date 12/07/22  -CS         Timed Charges    78541 - OT Therapeutic Activity Minutes 10  -CS      24543 - OT Self Care/Mgmt Minutes 15  -CS         Untimed Charges    OT Eval/Re-eval Minutes 20  -CS         Total Minutes    Timed Charges Total Minutes 25  -CS      Untimed Charges Total Minutes 20  -CS       Total Minutes 45  -CS            User Key  (r) = Recorded By, (t) = Taken By, (c) = Cosigned By    Initials Name Provider Type    CS Raeann Oseguera OT Occupational Therapist              Therapy Charges for Today     Code Description Service Date Service Provider Modifiers Qty    75238740956 HC OT THERAPEUTIC ACT EA 15 MIN 12/1/2022 Raeann Oseguera OT GO 1    01835746459 HC OT SELF CARE/MGMT/TRAIN EA 15 MIN 12/1/2022 Raeann Oseguera OT GO 1    96420586817 HC OT RE-EVAL 2 12/1/2022 Raeann Oseguera OT GO 1               Raeann Oseguera OT  12/1/2022

## 2022-12-02 ENCOUNTER — READMISSION MANAGEMENT (OUTPATIENT)
Dept: CALL CENTER | Facility: HOSPITAL | Age: 62
End: 2022-12-02

## 2022-12-02 VITALS
SYSTOLIC BLOOD PRESSURE: 104 MMHG | OXYGEN SATURATION: 100 % | TEMPERATURE: 97.6 F | RESPIRATION RATE: 18 BRPM | HEIGHT: 61 IN | DIASTOLIC BLOOD PRESSURE: 68 MMHG | WEIGHT: 131.17 LBS | BODY MASS INDEX: 24.77 KG/M2 | HEART RATE: 66 BPM

## 2022-12-02 LAB
ANION GAP SERPL CALCULATED.3IONS-SCNC: 10 MMOL/L (ref 5–15)
BUN SERPL-MCNC: 15 MG/DL (ref 8–23)
BUN/CREAT SERPL: 34.9 (ref 7–25)
CALCIUM SPEC-SCNC: 8.9 MG/DL (ref 8.6–10.5)
CHLORIDE SERPL-SCNC: 106 MMOL/L (ref 98–107)
CO2 SERPL-SCNC: 25 MMOL/L (ref 22–29)
CREAT SERPL-MCNC: 0.43 MG/DL (ref 0.57–1)
DEPRECATED RDW RBC AUTO: 46.6 FL (ref 37–54)
EGFRCR SERPLBLD CKD-EPI 2021: 110.8 ML/MIN/1.73
ERYTHROCYTE [DISTWIDTH] IN BLOOD BY AUTOMATED COUNT: 13.7 % (ref 12.3–15.4)
GLUCOSE SERPL-MCNC: 110 MG/DL (ref 65–99)
HCT VFR BLD AUTO: 38.1 % (ref 34–46.6)
HGB BLD-MCNC: 13 G/DL (ref 12–15.9)
MAGNESIUM SERPL-MCNC: 2 MG/DL (ref 1.6–2.4)
MCH RBC QN AUTO: 31.8 PG (ref 26.6–33)
MCHC RBC AUTO-ENTMCNC: 34.1 G/DL (ref 31.5–35.7)
MCV RBC AUTO: 93.2 FL (ref 79–97)
PHOSPHATE SERPL-MCNC: 4.2 MG/DL (ref 2.5–4.5)
PLATELET # BLD AUTO: 246 10*3/MM3 (ref 140–450)
PMV BLD AUTO: 11.4 FL (ref 6–12)
POTASSIUM SERPL-SCNC: 4.4 MMOL/L (ref 3.5–5.2)
RBC # BLD AUTO: 4.09 10*6/MM3 (ref 3.77–5.28)
SODIUM SERPL-SCNC: 141 MMOL/L (ref 136–145)
WBC NRBC COR # BLD: 12.26 10*3/MM3 (ref 3.4–10.8)

## 2022-12-02 PROCEDURE — 84100 ASSAY OF PHOSPHORUS: CPT

## 2022-12-02 PROCEDURE — 99239 HOSP IP/OBS DSCHRG MGMT >30: CPT

## 2022-12-02 PROCEDURE — 85027 COMPLETE CBC AUTOMATED: CPT

## 2022-12-02 PROCEDURE — 63710000001 DEXAMETHASONE PER 0.25 MG: Performed by: RADIOLOGY

## 2022-12-02 PROCEDURE — 80048 BASIC METABOLIC PNL TOTAL CA: CPT

## 2022-12-02 PROCEDURE — 83735 ASSAY OF MAGNESIUM: CPT

## 2022-12-02 PROCEDURE — 99232 SBSQ HOSP IP/OBS MODERATE 35: CPT

## 2022-12-02 RX ORDER — CLOPIDOGREL BISULFATE 75 MG/1
75 TABLET ORAL DAILY
Qty: 30 TABLET | Refills: 3 | Status: SHIPPED | OUTPATIENT
Start: 2022-12-02 | End: 2022-12-29 | Stop reason: SDUPTHER

## 2022-12-02 RX ORDER — PANTOPRAZOLE SODIUM 40 MG/1
40 TABLET, DELAYED RELEASE ORAL
Qty: 30 TABLET | Refills: 0 | Status: SHIPPED | OUTPATIENT
Start: 2022-12-03

## 2022-12-02 RX ORDER — DEXAMETHASONE 2 MG/1
TABLET ORAL
Qty: 30 TABLET | Refills: 0 | Status: SHIPPED | OUTPATIENT
Start: 2022-12-02 | End: 2022-12-29

## 2022-12-02 RX ADMIN — DEXAMETHASONE 4 MG: 4 TABLET ORAL at 06:07

## 2022-12-02 RX ADMIN — PANTOPRAZOLE SODIUM 40 MG: 40 TABLET, DELAYED RELEASE ORAL at 06:06

## 2022-12-02 RX ADMIN — ASPIRIN 81 MG CHEWABLE TABLET 81 MG: 81 TABLET CHEWABLE at 08:25

## 2022-12-02 RX ADMIN — FLUTICASONE PROPIONATE 2 SPRAY: 50 SPRAY, METERED NASAL at 08:25

## 2022-12-02 RX ADMIN — CLOPIDOGREL BISULFATE 75 MG: 75 TABLET ORAL at 08:25

## 2022-12-02 RX ADMIN — Medication 10 ML: at 08:25

## 2022-12-02 NOTE — DISCHARGE SUMMARY
[facility]    Date of Admission: 11/26/2022  Date of Discharge:  12/2/2022    Discharge Diagnosis:   Status post embolization of giant basilar cerebral aneurysm.    Procedures Performed  Procedure(s):  CV EMBOLIZATION CEREBRAL IR       Presenting Problem/History of Present Illness  Mass of brain [G93.89]     Hospital Course  Patient is a 62 y.o. female presented with dizziness and ataxia found to have a tiny right occipital infarct and angina partially thrombosed proximal basilar cerebral aneurysm.  After lengthy discussion, Ms. Romo elected to pursue embolization and this was treated with flow diverter therapy (pipeline shield) on 11/29/2022.  Patient was then admitted to the neuro ICU for monitoring postembolization, where she had an uneventful recovery and will be discharged home today to self-care.  This morning at the bedside Ms. Arroyo is doing very well, with no new headache or brainstem symptoms, no evidence of stroke.  She will need to remain on dual antiplatelet regimen with aspirin and Plavix for the next 3 to 6 months.  We will keep her on a steroid taper of 2 mg p.o. dexamethasone.  Patient most recent CT angiogram demonstrates further interval thrombosis of the giant basilar aneurysm, without complicating feature.  We will follow-up with Ms. Romo in the outpatient clinic in 1 month's time with a new CTA of the head to assess further thrombosis of the giant aneurysm.    Condition on Discharge:  satisfactory    Discharge Disposition  Home or Self Care    Discharge Medications     Discharge Medications      New Medications      Instructions Start Date   clopidogrel 75 MG tablet  Commonly known as: PLAVIX   75 mg, Oral, Daily      dexamethasone 2 MG tablet  Commonly known as: DECADRON   Instruct the patient to take 4 mg PO every 8 hours x 2 days, 2 mg Q8 x3 days, 2 mg every 12 x3 days, 1 mg every 12 x3 days, 1 mg PO daily for 3 days.       pantoprazole 40 MG EC tablet  Commonly known as: PROTONIX   40 mg, Oral, Every Early Morning   Start Date: December 3, 2022        Continue These Medications      Instructions Start Date   albuterol 108 (90 Base) MCG/ACT inhaler  Commonly known as: ProAir RespiClick   2 puffs, Inhalation, Every 4 Hours PRN      aspirin 81 MG chewable tablet   1 tablet, Oral, Daily      fish oil 1000 MG capsule capsule   1 capsule, Oral, Daily      fluticasone 50 MCG/ACT nasal spray  Commonly known as: FLONASE   2 sprays, Nasal, Daily      lidocaine 5 % ointment  Commonly known as: XYLOCAINE   lidocaine 5 % topical ointment      moexipril 15 MG tablet  Commonly known as: UNIVASC   15 mg, Daily      Naproxen Sodium 220 MG capsule   2 tablets, Oral, 2 Times Daily PRN, Monday through Thursday      ondansetron ODT 4 MG disintegrating tablet  Commonly known as: ZOFRAN-ODT   ondansetron 4 mg disintegrating tablet      SYNOVACIN PO   1,000 mg, Oral, Daily      Vitamin D3 10 MCG (400 UNIT) capsule   1 tablet, Oral, Daily      Zinc 50 MG capsule   Oral             Follow-up Appointments  No future appointments.  Additional Instructions for the Follow-ups that You Need to Schedule     Discharge Follow-up with Specified Provider: Dr. Savage; 1 Month   As directed      To: Dr. Savage    Follow Up: 1 Month    Follow Up Details: Follow-up with Dr. Savage in the outpatient clinic in 1 month's time.               Referring Provider  No ref. provider found    PCP   Melissa Lazo APRN Robert P Tinsley, PA-C  12/02/22  09:05 EST

## 2022-12-02 NOTE — PROGRESS NOTES
Intensive Care Follow-up     Hospital:  LOS: 4 days   Ms. Lauryn Romo, 62 y.o. female is followed for:   Glycemic, Electrolyte, Respiratory, and Medical management        Subjective   Interval History:  No events overnight.Patient sitting comfortably in bed. Alert and oriented. Conversant. Reports not eating well since admission, but overall feels well and ready to go home. 97% on RA, NSR 74, /68.       Review of Systems - History obtained from chart review and the patient  General ROS: negative for - fatigue, fever or sleep disturbance  Respiratory ROS: no cough, shortness of breath, or wheezing  Cardiovascular ROS: no chest pain or dyspnea on exertion  Gastrointestinal ROS: no abdominal pain, change in bowel habits, or black or bloody stools  Musculoskeletal ROS: negative for - muscular weakness    The patient's past medical, surgical and social history were reviewed and updated in Epic as appropriate.     Objective     Infusions:  niCARdipine, 5-15 mg/hr, Last Rate: Stopped (11/29/22 1500)    Medications:  aspirin, 81 mg, Oral, Daily   Or  aspirin, 300 mg, Rectal, Daily  atorvastatin, 80 mg, Oral, Nightly  clopidogrel, 75 mg, Oral, Daily  dexamethasone, 4 mg, Oral, Q8H  fluticasone, 2 spray, Each Nare, Daily  pantoprazole, 40 mg, Oral, Q AM  Scopolamine, 1 patch, Transdermal, Q72H  sodium chloride, 10 mL, Intravenous, Q12H      I reviewed the patient's medications.    Vital Sign Min/Max for last 24 hours  Temp  Min: 97.6 °F (36.4 °C)  Max: 98.4 °F (36.9 °C)   BP  Min: 95/64  Max: 126/68   Pulse  Min: 44  Max: 61   Resp  Min: 16  Max: 18   SpO2  Min: 88 %  Max: 99 %   No data recorded       Input/Output for last 24 hour shift  12/01 0701 - 12/02 0700  In: 530 [P.O.:530]  Out: -         Physical Exam:  GENERAL: Patient sitting in bed and conversant. No acute distress.   HEENT: Normocephalic and atraumatic. Trachea midline. PER. EOM WNL.   LUNGS: Chest rise of normal depth and symmetric. Lungs clear to  auscultation bilaterally. No wheezes, rhonchi, or rales.   HEART: S1,S2 detected. Regular rate and rhythm. No rub, murmur, or gallop.   ABDOMEN: Soft, round, nondistended, and nontender. Bowel sounds present.   EXTREMITIES: No clubbing, edema, or cyanosis. Peripheral pulses present. Skin warm and dry. Right groin site C/D/I.   NEURO/PSYCH: Alert and oriented. Follows commands. Moves all extremities. No focal deficits.      Results from last 7 days   Lab Units 12/02/22 0448 11/30/22 0555 11/26/22  1332   WBC 10*3/mm3 12.26* 10.55 7.47   HEMOGLOBIN g/dL 13.0 13.0 13.8   PLATELETS 10*3/mm3 246 271 251     Results from last 7 days   Lab Units 12/02/22 0448 12/01/22  0435 11/30/22  0555 11/26/22  1235   SODIUM mmol/L 141  --  138 139   POTASSIUM mmol/L 4.4  --  4.1 4.7   CO2 mmol/L 25.0  --  22.0 24.0   BUN mg/dL 15  --  12 20   CREATININE mg/dL 0.43*  --  0.47* 0.56*   MAGNESIUM mg/dL 2.0 2.2 1.8 2.4   PHOSPHORUS mg/dL 4.2  --  4.5  --    GLUCOSE mg/dL 110*  --  126* 101*     Estimated Creatinine Clearance: 113.9 mL/min (A) (by C-G formula based on SCr of 0.43 mg/dL (L)).      I reviewed the patient's new clinical results.  I reviewed the patient's new imaging results/reports including actual images and agree with reports.     Imaging Results (Last 24 Hours)     Procedure Component Value Units Date/Time    CT Angiogram Head [[id number]] Collected: 12/01/22 1626     Updated: 12/01/22 1638    Narrative:      DATE OF EXAM: 12/1/2022 4:52 AM     PROCEDURE: CT ANGIOGRAM HEAD-     INDICATIONS: giant basilar aneurysm; G93.89-Other specified disorders of  brain; R42-Dizziness and giddiness; R93.0-Abnormal findings on  diagnostic imaging of skull and head, not elsewhere classified;  I63.9-Cerebral infarction, unspecified; R41.841-Cognitive communication  deficit     COMPARISON: 11/26/2022     TECHNIQUE: CTA of the head was performed after the intravenous  administration of 75 mL of Isovue 370. Reconstructed coronal  and  sagittal images were also obtained. In addition, a 3-D volume rendered  image was obtained after post processing. Automated exposure control and  iterative reconstruction methods were used.     The radiation dose reduction device was turned on for each scan per the  ALARA (As Low as Reasonably Achievable) protocol.     FINDINGS:  Limited nonvascular evaluation demonstrates no acute intracranial  findings. The globes are symmetric in the orbits are normal.  Postoperative changes are present from prior endoscopic sinus surgery.  The carotid siphons demonstrate some calcific atherosclerotic change,  with mild narrowing of the cavernous and supraclinoid segments  bilaterally. The anterior cerebral arteries are normal in course and  caliber. The right middle cerebral artery demonstrates no evidence of  flow-limiting stenosis, large vessel occlusion or aneurysmal dilatation.  The left middle cerebral artery is similarly normal in course and  caliber. The vertebral arteries are patent bilaterally. Stent placement  is noted within a previously identified partially thrombosed basilar  artery aneurysm, without evidence of in-stent narrowing or new basilar  artery stenosis. Surrounding fusiform aneurysm measures 8 mm in greatest  transverse dimension by 11 mm in length, demonstrating continued  interval thrombosis from comparison. The posterior cerebral arteries are  patent bilaterally.       Impression:      Patent basilar artery stent noted within the previously identified  partially thrombosed giant basilar artery aneurysm, today demonstrating  continued thrombosis. Parent vasculature remains widely patent.     This report was finalized on 12/1/2022 4:35 PM by Casey Francis.             Assessment & Plan   Impression      Brain aneurysm    Acute CVA    Hyperlipidemia    HTN       Plan      Lauryn Romo is a 62 y.o. female with PMH former tobacco use, hypertension, and dyslipidemia who was admitted to [facility] on 11/29/2022 after experiencing dizziness, ataxia, and generalized weakness for several days at home.  Imaging revealed a tiny right occipital infarct with a giant, partially-thrombosed basilar aneurysm.  She was monitored by hospital medicine with blood pressure control and DAPT.  She underwent embolization and flow diverter therapy of the basilar aneurysm on 11/29/2022 per Dr. Savage.  She is monitored in the ICU postoperatively and is doing well with no neurological deficits.  Repeat CTA on 12/1/2022 demonstrated further thrombosis of aneurysm without complicating features. No events overnight. She is medically stable for discharge home today, 12/02/2022.    Brain aneurysm s/p embolization 11/29/2022  Post-operative management per neurosurgery  Follow-up with Dr. Savage 1 month  Dexamethasone taper per neurosurgery    Acute CVA  DAPT at discharge    Hyperlipidemia  Continue Lipitor 80 mg    HTN  Continue moexipril 15 mg    DVT Prophylaxis: Mechanical  GI Prophylaxis: PPI  Dispo: Discharge home today, 12/02/2022    Time spent: 15 minutes  Plan of care and goals reviewed with multidisciplinary/antibiotic stewardship team during rounds.   I discussed the patient's findings and my recommendations with patient, family and nursing staff     Slime Delacruz, MSN, APRN, ACNPC-AG  Pulmonary and Critical Care Medicine  Electronically signed by CAILIN Abbott, 12/02/22, 10:51 AM EST.

## 2022-12-02 NOTE — PROGRESS NOTES
Enter Query Response Below      Query Response: Abnormal radiologic findings only             If applicable, please update the problem list.      Patient: Lauryn Romo        : 1960  Account: [id number]           Admit Date:         How to Respond to this query:       a. Click New Note     b. Answer query within the yellow box.                c. Update the Problem List, if applicable.      If you have any questions about this query contact me at: [phone]    Dr. Savage:     62 year old female admitted witha right occipital stroke, and partially thrombosed proximal basilar cerebral aneurysm.  The head CT results from 22 includes findings of mass effect upon the vertebral arteries, proximal basilar artery, and medulla.  On 22 a cerbral embolization was performed.  Medications during her hospital stay included IV and oral Decadron.      After study, can the patient's condition be further clarified as:     Brain compression of clinical significance   Abnormal radiologic findings only  Other____________________________  Unable to determine     By submitting this query, we are merely seeking further clarification of documentation to accurately reflect all conditions that you are monitoring, evaluating, treating or that extend the hospitalization or utilize additional resources of care. Please utilize your independent clinical judgment when addressing the question(s) above.     This query and your response, once completed, will be entered into the legal medical record.    Sincerely,  Alona Coreas RN,BSN    [email or web address]  Clinical Documentation Integrity Program

## 2022-12-03 NOTE — OUTREACH NOTE
Prep Survey    Flowsheet Row Responses   [facility] patient discharged from? [facility]   Is LACE score < 7 ? No   Emergency Room discharge w/ pulse ox? No   Eligibility Readm Mgmt   Discharge diagnosis CV EMBOLIZATION CEREBRAL IR  Brain aneurysm   Does the patient have one of the following disease processes/diagnoses(primary or secondary)? General Surgery   Does the patient have Home health ordered? No   Is there a DME ordered? No   Prep survey completed? Yes          ALONA FLORES - Registered Nurse

## 2022-12-05 ENCOUNTER — READMISSION MANAGEMENT (OUTPATIENT)
Dept: CALL CENTER | Facility: HOSPITAL | Age: 62
End: 2022-12-05

## 2022-12-06 NOTE — OUTREACH NOTE
General Surgery Week 1 Survey    Flowsheet Row Responses   [facility] patient discharged from? [place]   Does the patient have one of the following disease processes/diagnoses(primary or secondary)? General Surgery   Week 1 attempt successful? Yes   Call start time 1911   Call end time 1914   Discharge diagnosis CV EMBOLIZATION CEREBRAL IR  Brain aneurysm   Meds reviewed with patient/caregiver? Yes   Is the patient having any side effects they believe may be caused by any medication additions or changes? No   Does the patient have all medications related to this admission filled (includes all antibiotics, pain medications, etc.) Yes   Is the patient taking all medications as directed (includes completed medication regime)? Yes   Does the patient have a follow up appointment scheduled with their surgeon? Yes   Has the patient kept scheduled appointments due by today? N/A   Has home health visited the patient within 72 hours of discharge? N/A   Psychosocial issues? No   Did the patient receive a copy of their discharge instructions? Yes   Nursing interventions Reviewed instructions with patient   What is the patient's perception of their health status since discharge? Improving   Nursing interventions Nurse provided patient education   Is the patient /caregiver able to teach back basic post-op care? Take showers only when approved by MD-sponge bathe until then, No tub bath, swimming, or hot tub until instructed by MD, Keep incision areas clean,dry and protected, Do not remove steri-strips, Lifting as instructed by MD in discharge instructions   Is the patient/caregiver able to teach back signs and symptoms of incisional infection? Increased redness, swelling or pain at the incisonal site, Increased drainage or bleeding, Incisional warmth, Pus or odor from incision, Fever   Is the patient/caregiver able to teach back steps to recovery at home? Set small, achievable goals for return to baseline health, Rest and  rebuild strength, gradually increase activity, Eat a well-balance diet   If the patient is a current smoker, are they able to teach back resources for cessation? Not a smoker   Is the patient/caregiver able to teach back the hierarchy of who to call/visit for symptoms/problems? PCP, Specialist, Home health nurse, Urgent Care, ED, 911 Yes   Week 1 call completed? Yes          EVENS SKINNER - Registered Nurse

## 2022-12-08 ENCOUNTER — TELEPHONE (OUTPATIENT)
Dept: NEUROSURGERY | Facility: CLINIC | Age: 62
End: 2022-12-08

## 2022-12-08 NOTE — TELEPHONE ENCOUNTER
I spoke with the patient about her bandage and further postop instructions.  Instructed her that the bandage can be removed since she is a week out from her embolization/procedure.  I told her continue with gradually increasing activities continue with avoiding heavy lifting or straining.  The patient can now run water over incision site do not completely submerge.  Patient thankful for the call back.  Patient states she is doing well and denies any other symptoms.  Patient is to continue all medications including her dual antiplatelet therapy and steroid taper as directed and prescribed.

## 2022-12-08 NOTE — TELEPHONE ENCOUNTER
Provider:  Given  Surgery/Procedure:  CV EMBOLIZATION CEREBRAL IR  Surgery/Procedure Date:  11/29/2022  Last visit:   Hosp  Next visit:   12/29/2022     I spoke with the patient about her bandage and further postop instructions.  Instructed her that the bandage can be removed since she is a week out from her embolization/procedure.  I told her continue with gradually increasing activities continue with avoiding heavy lifting or straining.  The patient can now run water over incision site do not completely submerge.  Patient thankful for the call back.  Patient states she is doing well and denies any other symptoms.     Reason for call:    Patients  Chris called and LVM on the clinical line regarding questions about the patients bandage.     I called and talked to the patient. Patient is wanting to know if her bandage can be removed, and if she can shower and let water run over the incision. Patient would like Jan to call back regarding post op care.     #[phone]

## 2022-12-13 ENCOUNTER — READMISSION MANAGEMENT (OUTPATIENT)
Dept: CALL CENTER | Facility: HOSPITAL | Age: 62
End: 2022-12-13

## 2022-12-13 NOTE — OUTREACH NOTE
General Surgery Week 2 Survey    Flowsheet Row Responses   [facility] patient discharged from? [place]   Does the patient have one of the following disease processes/diagnoses(primary or secondary)? General Surgery   Week 2 attempt successful? Yes   Call start time 1514   Call end time 1521   Is patient permission given to speak with other caregiver? Yes   List who call center can speak with pt   Medication alerts for this patient plavix, steroids.   Meds reviewed with patient/caregiver? Yes   Is the patient taking all medications as directed (includes completed medication regime)? Yes   Has the patient kept scheduled appointments due by today? N/A   What is the patient's perception of their health status since discharge? Improving   Week 2 call completed? Yes   Wrap up additional comments Pt reports no pain but severe fatigue. Pt to see pcp this week. Pt has neuro follow up on December 29, 2022.          JUNIE BASILIO - Registered Nurse

## 2022-12-14 ENCOUNTER — APPOINTMENT (OUTPATIENT)
Dept: GENERAL RADIOLOGY | Facility: HOSPITAL | Age: 62
End: 2022-12-14

## 2022-12-14 ENCOUNTER — APPOINTMENT (OUTPATIENT)
Dept: CT IMAGING | Facility: HOSPITAL | Age: 62
End: 2022-12-14

## 2022-12-14 PROCEDURE — 83605 ASSAY OF LACTIC ACID: CPT | Performed by: EMERGENCY MEDICINE

## 2022-12-14 PROCEDURE — 84484 ASSAY OF TROPONIN QUANT: CPT | Performed by: EMERGENCY MEDICINE

## 2022-12-14 PROCEDURE — 99285 EMERGENCY DEPT VISIT HI MDM: CPT

## 2022-12-14 PROCEDURE — 85025 COMPLETE CBC W/AUTO DIFF WBC: CPT | Performed by: EMERGENCY MEDICINE

## 2022-12-14 PROCEDURE — 80053 COMPREHEN METABOLIC PANEL: CPT | Performed by: EMERGENCY MEDICINE

## 2022-12-14 PROCEDURE — 93005 ELECTROCARDIOGRAM TRACING: CPT | Performed by: EMERGENCY MEDICINE

## 2022-12-14 PROCEDURE — 83735 ASSAY OF MAGNESIUM: CPT | Performed by: EMERGENCY MEDICINE

## 2022-12-14 PROCEDURE — 70450 CT HEAD/BRAIN W/O DYE: CPT

## 2022-12-14 PROCEDURE — 84145 PROCALCITONIN (PCT): CPT | Performed by: EMERGENCY MEDICINE

## 2022-12-14 PROCEDURE — 93005 ELECTROCARDIOGRAM TRACING: CPT

## 2022-12-14 PROCEDURE — 71045 X-RAY EXAM CHEST 1 VIEW: CPT

## 2022-12-14 RX ORDER — SODIUM CHLORIDE 0.9 % (FLUSH) 0.9 %
10 SYRINGE (ML) INJECTION AS NEEDED
Status: DISCONTINUED | OUTPATIENT
Start: 2022-12-14 | End: 2022-12-17 | Stop reason: HOSPADM

## 2022-12-15 ENCOUNTER — HOSPITAL ENCOUNTER (OUTPATIENT)
Facility: HOSPITAL | Age: 62
Setting detail: OBSERVATION
Discharge: HOME | End: 2022-12-17
Attending: EMERGENCY MEDICINE | Admitting: INTERNAL MEDICINE

## 2022-12-15 ENCOUNTER — APPOINTMENT (OUTPATIENT)
Dept: NEUROLOGY | Facility: HOSPITAL | Age: 62
End: 2022-12-15

## 2022-12-15 ENCOUNTER — APPOINTMENT (OUTPATIENT)
Dept: MRI IMAGING | Facility: HOSPITAL | Age: 62
End: 2022-12-15

## 2022-12-15 ENCOUNTER — APPOINTMENT (OUTPATIENT)
Dept: CARDIOLOGY | Facility: HOSPITAL | Age: 62
End: 2022-12-15

## 2022-12-15 ENCOUNTER — APPOINTMENT (OUTPATIENT)
Dept: CT IMAGING | Facility: HOSPITAL | Age: 62
End: 2022-12-15

## 2022-12-15 ENCOUNTER — READMISSION MANAGEMENT (OUTPATIENT)
Dept: CALL CENTER | Facility: HOSPITAL | Age: 62
End: 2022-12-15

## 2022-12-15 DIAGNOSIS — N30.00 ACUTE CYSTITIS WITHOUT HEMATURIA: ICD-10-CM

## 2022-12-15 DIAGNOSIS — R55 SYNCOPE AND COLLAPSE: ICD-10-CM

## 2022-12-15 DIAGNOSIS — R55 SYNCOPE, UNSPECIFIED SYNCOPE TYPE: Primary | ICD-10-CM

## 2022-12-15 DIAGNOSIS — R41.841 COGNITIVE COMMUNICATION DEFICIT: ICD-10-CM

## 2022-12-15 LAB
ALBUMIN SERPL-MCNC: 4.1 G/DL (ref 3.5–5.2)
ALBUMIN/GLOB SERPL: 1.5 G/DL
ALP SERPL-CCNC: 86 U/L (ref 39–117)
ALT SERPL W P-5'-P-CCNC: 29 U/L (ref 1–33)
ANION GAP SERPL CALCULATED.3IONS-SCNC: 11 MMOL/L (ref 5–15)
AST SERPL-CCNC: 18 U/L (ref 1–32)
BACTERIA UR QL AUTO: ABNORMAL /HPF
BASOPHILS # BLD AUTO: 0.06 10*3/MM3 (ref 0–0.2)
BASOPHILS NFR BLD AUTO: 0.5 % (ref 0–1.5)
BH CV ECHO MEAS - AO MAX PG: 5.7 MMHG
BH CV ECHO MEAS - AO MEAN PG: 3 MMHG
BH CV ECHO MEAS - AO ROOT DIAM: 3 CM
BH CV ECHO MEAS - AO V2 MAX: 119 CM/SEC
BH CV ECHO MEAS - AO V2 VTI: 29.2 CM
BH CV ECHO MEAS - AVA(I,D): 2.6 CM2
BH CV ECHO MEAS - EDV(CUBED): 103.8 ML
BH CV ECHO MEAS - EDV(MOD-SP2): 48.8 ML
BH CV ECHO MEAS - EDV(MOD-SP4): 56.8 ML
BH CV ECHO MEAS - EF(MOD-BP): 66.7 %
BH CV ECHO MEAS - EF(MOD-SP2): 63.3 %
BH CV ECHO MEAS - EF(MOD-SP4): 68.3 %
BH CV ECHO MEAS - ESV(CUBED): 42.9 ML
BH CV ECHO MEAS - ESV(MOD-SP2): 17.9 ML
BH CV ECHO MEAS - ESV(MOD-SP4): 18 ML
BH CV ECHO MEAS - FS: 25.5 %
BH CV ECHO MEAS - IVS/LVPW: 1 CM
BH CV ECHO MEAS - IVSD: 0.7 CM
BH CV ECHO MEAS - LA DIMENSION: 3.5 CM
BH CV ECHO MEAS - LAT PEAK E' VEL: 13.4 CM/SEC
BH CV ECHO MEAS - LV MASS(C)D: 103.1 GRAMS
BH CV ECHO MEAS - LV MAX PG: 4.5 MMHG
BH CV ECHO MEAS - LV MEAN PG: 2 MMHG
BH CV ECHO MEAS - LV V1 MAX: 106 CM/SEC
BH CV ECHO MEAS - LV V1 VTI: 24.6 CM
BH CV ECHO MEAS - LVIDD: 4.7 CM
BH CV ECHO MEAS - LVIDS: 3.5 CM
BH CV ECHO MEAS - LVOT AREA: 3.1 CM2
BH CV ECHO MEAS - LVOT DIAM: 2 CM
BH CV ECHO MEAS - LVPWD: 0.7 CM
BH CV ECHO MEAS - MED PEAK E' VEL: 10.6 CM/SEC
BH CV ECHO MEAS - MV A MAX VEL: 61.3 CM/SEC
BH CV ECHO MEAS - MV DEC SLOPE: 313 CM/SEC2
BH CV ECHO MEAS - MV DEC TIME: 0.25 MSEC
BH CV ECHO MEAS - MV E MAX VEL: 81.2 CM/SEC
BH CV ECHO MEAS - MV E/A: 1.32
BH CV ECHO MEAS - MV P1/2T: 94.5 MSEC
BH CV ECHO MEAS - MVA(P1/2T): 2.33 CM2
BH CV ECHO MEAS - PA ACC TIME: 0.23 SEC
BH CV ECHO MEAS - PA PR(ACCEL): -24 MMHG
BH CV ECHO MEAS - RAP SYSTOLE: 3 MMHG
BH CV ECHO MEAS - RVSP: 19 MMHG
BH CV ECHO MEAS - SV(LVOT): 77.3 ML
BH CV ECHO MEAS - SV(MOD-SP2): 30.9 ML
BH CV ECHO MEAS - SV(MOD-SP4): 38.8 ML
BH CV ECHO MEAS - TAPSE (>1.6): 1.7 CM
BH CV ECHO MEAS - TR MAX PG: 16.4 MMHG
BH CV ECHO MEAS - TR MAX VEL: 202 CM/SEC
BH CV ECHO MEASUREMENTS AVERAGE E/E' RATIO: 6.77
BH CV XLRA - RV BASE: 3.6 CM
BH CV XLRA - RV LENGTH: 6.2 CM
BH CV XLRA - RV MID: 2.7 CM
BH CV XLRA - TDI S': 13.2 CM/SEC
BILIRUB SERPL-MCNC: 0.3 MG/DL (ref 0–1.2)
BILIRUB UR QL STRIP: NEGATIVE
BUN SERPL-MCNC: 20 MG/DL (ref 8–23)
BUN/CREAT SERPL: 33.9 (ref 7–25)
CALCIUM SPEC-SCNC: 8.9 MG/DL (ref 8.6–10.5)
CHLORIDE SERPL-SCNC: 103 MMOL/L (ref 98–107)
CLARITY UR: ABNORMAL
CO2 SERPL-SCNC: 26 MMOL/L (ref 22–29)
COLOR UR: YELLOW
CREAT SERPL-MCNC: 0.59 MG/DL (ref 0.57–1)
D-LACTATE SERPL-SCNC: 1 MMOL/L (ref 0.5–2)
DEPRECATED RDW RBC AUTO: 51.5 FL (ref 37–54)
EGFRCR SERPLBLD CKD-EPI 2021: 102.7 ML/MIN/1.73
EOSINOPHIL # BLD AUTO: 0.15 10*3/MM3 (ref 0–0.4)
EOSINOPHIL NFR BLD AUTO: 1.4 % (ref 0.3–6.2)
ERYTHROCYTE [DISTWIDTH] IN BLOOD BY AUTOMATED COUNT: 14.7 % (ref 12.3–15.4)
FLUAV RNA RESP QL NAA+PROBE: NOT DETECTED
FLUBV RNA RESP QL NAA+PROBE: NOT DETECTED
GLOBULIN UR ELPH-MCNC: 2.8 GM/DL
GLUCOSE BLDC GLUCOMTR-MCNC: 89 MG/DL (ref 70–130)
GLUCOSE BLDC GLUCOMTR-MCNC: 94 MG/DL (ref 70–130)
GLUCOSE SERPL-MCNC: 112 MG/DL (ref 65–99)
GLUCOSE UR STRIP-MCNC: NEGATIVE MG/DL
HCT VFR BLD AUTO: 42.3 % (ref 34–46.6)
HGB BLD-MCNC: 14.1 G/DL (ref 12–15.9)
HGB UR QL STRIP.AUTO: ABNORMAL
HOLD SPECIMEN: NORMAL
HYALINE CASTS UR QL AUTO: ABNORMAL /LPF
IMM GRANULOCYTES # BLD AUTO: 0.06 10*3/MM3 (ref 0–0.05)
IMM GRANULOCYTES NFR BLD AUTO: 0.5 % (ref 0–0.5)
KETONES UR QL STRIP: ABNORMAL
LEFT ATRIUM VOLUME INDEX: 22.3 ML/M2
LEFT ATRIUM VOLUME: 34.6 ML
LEUKOCYTE ESTERASE UR QL STRIP.AUTO: ABNORMAL
LYMPHOCYTES # BLD AUTO: 2.56 10*3/MM3 (ref 0.7–3.1)
LYMPHOCYTES NFR BLD AUTO: 23.4 % (ref 19.6–45.3)
MAGNESIUM SERPL-MCNC: 2.3 MG/DL (ref 1.6–2.4)
MAXIMAL PREDICTED HEART RATE: 159 BPM
MCH RBC QN AUTO: 32 PG (ref 26.6–33)
MCHC RBC AUTO-ENTMCNC: 33.3 G/DL (ref 31.5–35.7)
MCV RBC AUTO: 95.9 FL (ref 79–97)
MONOCYTES # BLD AUTO: 0.79 10*3/MM3 (ref 0.1–0.9)
MONOCYTES NFR BLD AUTO: 7.2 % (ref 5–12)
NEUTROPHILS NFR BLD AUTO: 67 % (ref 42.7–76)
NEUTROPHILS NFR BLD AUTO: 7.34 10*3/MM3 (ref 1.7–7)
NITRITE UR QL STRIP: POSITIVE
NRBC BLD AUTO-RTO: 0 /100 WBC (ref 0–0.2)
PH UR STRIP.AUTO: <=5 [PH] (ref 5–8)
PLATELET # BLD AUTO: 299 10*3/MM3 (ref 140–450)
PMV BLD AUTO: 10.4 FL (ref 6–12)
POTASSIUM SERPL-SCNC: 4 MMOL/L (ref 3.5–5.2)
PROCALCITONIN SERPL-MCNC: <0.02 NG/ML (ref 0–0.25)
PROT SERPL-MCNC: 6.9 G/DL (ref 6–8.5)
PROT UR QL STRIP: NEGATIVE
QT INTERVAL: 398 MS
QT INTERVAL: 410 MS
QTC INTERVAL: 417 MS
QTC INTERVAL: 433 MS
RBC # BLD AUTO: 4.41 10*6/MM3 (ref 3.77–5.28)
RBC # UR STRIP: ABNORMAL /HPF
REF LAB TEST METHOD: ABNORMAL
SARS-COV-2 RNA RESP QL NAA+PROBE: NOT DETECTED
SODIUM SERPL-SCNC: 140 MMOL/L (ref 136–145)
SP GR UR STRIP: 1.02 (ref 1–1.03)
SQUAMOUS #/AREA URNS HPF: ABNORMAL /HPF
STRESS TARGET HR: 135 BPM
TROPONIN T SERPL-MCNC: <0.01 NG/ML (ref 0–0.03)
TROPONIN T SERPL-MCNC: <0.01 NG/ML (ref 0–0.03)
UROBILINOGEN UR QL STRIP: ABNORMAL
WBC # UR STRIP: ABNORMAL /HPF
WBC NRBC COR # BLD: 10.96 10*3/MM3 (ref 3.4–10.8)
WHOLE BLOOD HOLD COAG: NORMAL
WHOLE BLOOD HOLD SPECIMEN: NORMAL

## 2022-12-15 PROCEDURE — 70498 CT ANGIOGRAPHY NECK: CPT

## 2022-12-15 PROCEDURE — 87086 URINE CULTURE/COLONY COUNT: CPT | Performed by: EMERGENCY MEDICINE

## 2022-12-15 PROCEDURE — 99214 OFFICE O/P EST MOD 30 MIN: CPT

## 2022-12-15 PROCEDURE — G0378 HOSPITAL OBSERVATION PER HR: HCPCS

## 2022-12-15 PROCEDURE — 25010000002 CEFTRIAXONE PER 250 MG: Performed by: EMERGENCY MEDICINE

## 2022-12-15 PROCEDURE — 93306 TTE W/DOPPLER COMPLETE: CPT

## 2022-12-15 PROCEDURE — 96361 HYDRATE IV INFUSION ADD-ON: CPT

## 2022-12-15 PROCEDURE — 96365 THER/PROPH/DIAG IV INF INIT: CPT

## 2022-12-15 PROCEDURE — 70548 MR ANGIOGRAPHY NECK W/DYE: CPT

## 2022-12-15 PROCEDURE — 70551 MRI BRAIN STEM W/O DYE: CPT

## 2022-12-15 PROCEDURE — 82962 GLUCOSE BLOOD TEST: CPT

## 2022-12-15 PROCEDURE — 92523 SPEECH SOUND LANG COMPREHEN: CPT

## 2022-12-15 PROCEDURE — 87636 SARSCOV2 & INF A&B AMP PRB: CPT | Performed by: EMERGENCY MEDICINE

## 2022-12-15 PROCEDURE — 99220 PR INITIAL OBSERVATION CARE/DAY 70 MINUTES: CPT | Performed by: HOSPITALIST

## 2022-12-15 PROCEDURE — 70496 CT ANGIOGRAPHY HEAD: CPT

## 2022-12-15 PROCEDURE — 87040 BLOOD CULTURE FOR BACTERIA: CPT | Performed by: EMERGENCY MEDICINE

## 2022-12-15 PROCEDURE — 0 IOPAMIDOL PER 1 ML: Performed by: EMERGENCY MEDICINE

## 2022-12-15 PROCEDURE — 87186 SC STD MICRODIL/AGAR DIL: CPT | Performed by: EMERGENCY MEDICINE

## 2022-12-15 PROCEDURE — 70544 MR ANGIOGRAPHY HEAD W/O DYE: CPT

## 2022-12-15 PROCEDURE — 96372 THER/PROPH/DIAG INJ SC/IM: CPT

## 2022-12-15 PROCEDURE — 93306 TTE W/DOPPLER COMPLETE: CPT | Performed by: INTERNAL MEDICINE

## 2022-12-15 PROCEDURE — 93005 ELECTROCARDIOGRAM TRACING: CPT | Performed by: EMERGENCY MEDICINE

## 2022-12-15 PROCEDURE — 81001 URINALYSIS AUTO W/SCOPE: CPT | Performed by: EMERGENCY MEDICINE

## 2022-12-15 PROCEDURE — 0 LEVETIRACETAM IN NACL 0.75% 1000 MG/100ML SOLUTION: Performed by: EMERGENCY MEDICINE

## 2022-12-15 PROCEDURE — A9577 INJ MULTIHANCE: HCPCS | Performed by: HOSPITALIST

## 2022-12-15 PROCEDURE — 95816 EEG AWAKE AND DROWSY: CPT

## 2022-12-15 PROCEDURE — 96375 TX/PRO/DX INJ NEW DRUG ADDON: CPT

## 2022-12-15 PROCEDURE — C9803 HOPD COVID-19 SPEC COLLECT: HCPCS

## 2022-12-15 PROCEDURE — 0 GADOBENATE DIMEGLUMINE 529 MG/ML SOLUTION: Performed by: HOSPITALIST

## 2022-12-15 PROCEDURE — 25010000002 HEPARIN (PORCINE) PER 1000 UNITS: Performed by: HOSPITALIST

## 2022-12-15 PROCEDURE — 87088 URINE BACTERIA CULTURE: CPT | Performed by: EMERGENCY MEDICINE

## 2022-12-15 RX ORDER — SODIUM CHLORIDE 9 MG/ML
40 INJECTION, SOLUTION INTRAVENOUS AS NEEDED
Status: DISCONTINUED | OUTPATIENT
Start: 2022-12-15 | End: 2022-12-15 | Stop reason: SDUPTHER

## 2022-12-15 RX ORDER — SODIUM CHLORIDE 0.9 % (FLUSH) 0.9 %
10 SYRINGE (ML) INJECTION AS NEEDED
Status: DISCONTINUED | OUTPATIENT
Start: 2022-12-15 | End: 2022-12-17 | Stop reason: HOSPADM

## 2022-12-15 RX ORDER — ASPIRIN 81 MG/1
81 TABLET, CHEWABLE ORAL DAILY
Status: DISCONTINUED | OUTPATIENT
Start: 2022-12-15 | End: 2022-12-17 | Stop reason: HOSPADM

## 2022-12-15 RX ORDER — FLUTICASONE PROPIONATE 50 MCG
2 SPRAY, SUSPENSION (ML) NASAL DAILY
Status: DISCONTINUED | OUTPATIENT
Start: 2022-12-15 | End: 2022-12-17 | Stop reason: HOSPADM

## 2022-12-15 RX ORDER — CLOPIDOGREL BISULFATE 75 MG/1
75 TABLET ORAL DAILY
Status: DISCONTINUED | OUTPATIENT
Start: 2022-12-15 | End: 2022-12-16

## 2022-12-15 RX ORDER — CLOPIDOGREL BISULFATE 75 MG/1
75 TABLET ORAL DAILY
Status: DISCONTINUED | OUTPATIENT
Start: 2022-12-15 | End: 2022-12-17 | Stop reason: HOSPADM

## 2022-12-15 RX ORDER — BISACODYL 10 MG
10 SUPPOSITORY, RECTAL RECTAL DAILY PRN
Status: DISCONTINUED | OUTPATIENT
Start: 2022-12-15 | End: 2022-12-17 | Stop reason: HOSPADM

## 2022-12-15 RX ORDER — HEPARIN SODIUM 5000 [USP'U]/ML
5000 INJECTION, SOLUTION INTRAVENOUS; SUBCUTANEOUS EVERY 8 HOURS SCHEDULED
Status: DISCONTINUED | OUTPATIENT
Start: 2022-12-15 | End: 2022-12-17 | Stop reason: HOSPADM

## 2022-12-15 RX ORDER — ATORVASTATIN CALCIUM 40 MG/1
80 TABLET, FILM COATED ORAL NIGHTLY
Status: DISCONTINUED | OUTPATIENT
Start: 2022-12-15 | End: 2022-12-17 | Stop reason: HOSPADM

## 2022-12-15 RX ORDER — SODIUM CHLORIDE 0.9 % (FLUSH) 0.9 %
10 SYRINGE (ML) INJECTION EVERY 12 HOURS SCHEDULED
Status: DISCONTINUED | OUTPATIENT
Start: 2022-12-15 | End: 2022-12-17 | Stop reason: HOSPADM

## 2022-12-15 RX ORDER — LEVETIRACETAM 10 MG/ML
1000 INJECTION INTRAVASCULAR ONCE
Status: COMPLETED | OUTPATIENT
Start: 2022-12-15 | End: 2022-12-15

## 2022-12-15 RX ORDER — DEXAMETHASONE 2 MG/1
1 TABLET ORAL
Status: DISCONTINUED | OUTPATIENT
Start: 2022-12-15 | End: 2022-12-16

## 2022-12-15 RX ORDER — SODIUM CHLORIDE 9 MG/ML
40 INJECTION, SOLUTION INTRAVENOUS AS NEEDED
Status: DISCONTINUED | OUTPATIENT
Start: 2022-12-15 | End: 2022-12-17 | Stop reason: HOSPADM

## 2022-12-15 RX ORDER — LISINOPRIL 10 MG/1
10 TABLET ORAL
Status: DISCONTINUED | OUTPATIENT
Start: 2022-12-15 | End: 2022-12-16

## 2022-12-15 RX ORDER — AMOXICILLIN 250 MG
2 CAPSULE ORAL 2 TIMES DAILY
Status: DISCONTINUED | OUTPATIENT
Start: 2022-12-15 | End: 2022-12-17 | Stop reason: HOSPADM

## 2022-12-15 RX ORDER — ONDANSETRON 4 MG/1
4 TABLET, FILM COATED ORAL EVERY 6 HOURS PRN
Status: DISCONTINUED | OUTPATIENT
Start: 2022-12-15 | End: 2022-12-17 | Stop reason: HOSPADM

## 2022-12-15 RX ORDER — ACETAMINOPHEN 325 MG/1
650 TABLET ORAL EVERY 6 HOURS PRN
Status: DISCONTINUED | OUTPATIENT
Start: 2022-12-15 | End: 2022-12-17 | Stop reason: HOSPADM

## 2022-12-15 RX ORDER — ASPIRIN 300 MG/1
300 SUPPOSITORY RECTAL DAILY
Status: DISCONTINUED | OUTPATIENT
Start: 2022-12-15 | End: 2022-12-16

## 2022-12-15 RX ORDER — ASPIRIN 81 MG/1
81 TABLET, CHEWABLE ORAL DAILY
Status: DISCONTINUED | OUTPATIENT
Start: 2022-12-15 | End: 2022-12-16

## 2022-12-15 RX ORDER — POLYETHYLENE GLYCOL 3350 17 G/17G
17 POWDER, FOR SOLUTION ORAL DAILY PRN
Status: DISCONTINUED | OUTPATIENT
Start: 2022-12-15 | End: 2022-12-17 | Stop reason: HOSPADM

## 2022-12-15 RX ORDER — BISACODYL 5 MG/1
5 TABLET, DELAYED RELEASE ORAL DAILY PRN
Status: DISCONTINUED | OUTPATIENT
Start: 2022-12-15 | End: 2022-12-17 | Stop reason: HOSPADM

## 2022-12-15 RX ORDER — SODIUM CHLORIDE 0.9 % (FLUSH) 0.9 %
10 SYRINGE (ML) INJECTION EVERY 12 HOURS SCHEDULED
Status: DISCONTINUED | OUTPATIENT
Start: 2022-12-15 | End: 2022-12-15

## 2022-12-15 RX ORDER — PANTOPRAZOLE SODIUM 40 MG/1
40 TABLET, DELAYED RELEASE ORAL
Status: DISCONTINUED | OUTPATIENT
Start: 2022-12-15 | End: 2022-12-17 | Stop reason: HOSPADM

## 2022-12-15 RX ADMIN — ACETAMINOPHEN 650 MG: 325 TABLET ORAL at 13:09

## 2022-12-15 RX ADMIN — GADOBENATE DIMEGLUMINE 10 ML: 529 INJECTION, SOLUTION INTRAVENOUS at 21:46

## 2022-12-15 RX ADMIN — LEVETIRACETAM 1000 MG: 10 INJECTION INTRAVASCULAR at 06:19

## 2022-12-15 RX ADMIN — HEPARIN SODIUM 5000 UNITS: 5000 INJECTION INTRAVENOUS; SUBCUTANEOUS at 22:24

## 2022-12-15 RX ADMIN — SENNOSIDES AND DOCUSATE SODIUM 2 TABLET: 50; 8.6 TABLET ORAL at 20:21

## 2022-12-15 RX ADMIN — SODIUM CHLORIDE 1000 ML: 9 INJECTION, SOLUTION INTRAVENOUS at 09:23

## 2022-12-15 RX ADMIN — HEPARIN SODIUM 5000 UNITS: 5000 INJECTION INTRAVENOUS; SUBCUTANEOUS at 09:30

## 2022-12-15 RX ADMIN — Medication 10 ML: at 09:30

## 2022-12-15 RX ADMIN — DEXAMETHASONE 1 MG: 2 TABLET ORAL at 09:27

## 2022-12-15 RX ADMIN — FLUTICASONE PROPIONATE 2 SPRAY: 50 SPRAY, METERED NASAL at 09:29

## 2022-12-15 RX ADMIN — CLOPIDOGREL BISULFATE 75 MG: 75 TABLET ORAL at 09:27

## 2022-12-15 RX ADMIN — SODIUM CHLORIDE 1 G: 900 INJECTION INTRAVENOUS at 04:38

## 2022-12-15 RX ADMIN — Medication 10 ML: at 19:02

## 2022-12-15 RX ADMIN — PANTOPRAZOLE SODIUM 40 MG: 40 TABLET, DELAYED RELEASE ORAL at 09:27

## 2022-12-15 RX ADMIN — ATORVASTATIN CALCIUM 80 MG: 40 TABLET, FILM COATED ORAL at 20:21

## 2022-12-15 RX ADMIN — IOPAMIDOL 75 ML: 755 INJECTION, SOLUTION INTRAVENOUS at 03:52

## 2022-12-15 RX ADMIN — ASPIRIN 81 MG CHEWABLE TABLET 81 MG: 81 TABLET CHEWABLE at 09:29

## 2022-12-15 RX ADMIN — HEPARIN SODIUM 5000 UNITS: 5000 INJECTION INTRAVENOUS; SUBCUTANEOUS at 16:46

## 2022-12-15 NOTE — ED PROVIDER NOTES
EMERGENCY DEPARTMENT ENCOUNTER    Pt Name: Lauryn Romo  MRN: [id number]  Pt :   1960  Room Number:    Date of encounter:  2022  PCP: Melissa Lazo APRN  ED Provider: Hawk Acharya MD    Historian: Patient and spouse      HPI:  Chief Complaint: Syncope        Context: Lauryn Romo is a 62 y.o. female who presents to the ED c/o syncopal event at home.  Her  relays the events as he witnessed her collapsed after he was bandaging her left arm after there pet dog scratched her at home with a minor abrasion.  She has been cared for, with a recent stroke including a cerebral stent, and is on medications after this and had little more bleeding, however shortly after this she collapsed in a chair, without injuring herself and was unresponsive for a minute he said she had some tremor or shaking during this class episode but it was not on going, and no history of seizure previously.  Patient states she does not recall any chest pain before this, headache, or other events.      PAST MEDICAL HISTORY  Past Medical History:   Diagnosis Date   • Hypertension          PAST SURGICAL HISTORY  Past Surgical History:   Procedure Laterality Date   • BREAST LUMPECTOMY     • EMBOLIZATION CEREBRAL N/A 2022    Procedure: CV EMBOLIZATION CEREBRAL IR;  Surgeon: Enoch Savage MD;  Location: [facility] INVASIVE LOCATION;  Service: Interventional Radiology;  Laterality: N/A;   • HIATAL HERNIA REPAIR     • SINUS SURGERY      x4         FAMILY HISTORY  Family History   Problem Relation Age of Onset   • No Known Problems Mother    • Heart attack Father 42   • Heart attack Paternal Aunt    • Heart attack Paternal Uncle    • No Known Problems Sister    • Cancer Maternal Grandmother    • Heart failure Maternal Grandmother    • No Known Problems Maternal Grandfather    • No Known Problems Paternal Grandmother    • No Known Problems Paternal Grandfather    • Diabetes Half-Brother          SOCIAL  HISTORY  Social History     Socioeconomic History   • Marital status:    Tobacco Use   • Smoking status: Former     Types: Cigarettes     Quit date: 10/4/2004     Years since quittin.2   • Smokeless tobacco: Never   Substance and Sexual Activity   • Alcohol use: No   • Drug use: No   • Sexual activity: Defer         ALLERGIES  Patient has no known allergies.        REVIEW OF SYSTEMS  Review of Systems       Constitutional: Negative. No fever, no weakness.   HENT: Negative for sneezing and sore throat.    Respiratory: Negative for cough. Negative for shortness of breath.    Cardiovascular: Negative.  Negative for chest pain.   Gastrointestinal: Negative.  Negative for abdominal pain.   Genitourinary: Negative.  Negative for difficulty urinating.     All systems reviewwed and negative except as noted in HPI.    PHYSICAL EXAM    I have reviewed the triage vital signs and nursing notes.    ED Triage Vitals [22]   Temp Heart Rate Resp BP SpO2   98.2 °F (36.8 °C) 77 18 137/79 100 %      Temp src Heart Rate Source Patient Position BP Location FiO2 (%)   -- -- -- -- --       Physical Exam  GENERAL:   Appears alert and oriented conversant  HENT: Nares patent.  EYES: No scleral icterus.  CV: Regular rhythm, regular rate.  RESPIRATORY: Normal effort.  No audible wheezes, rales or rhonchi.  ABDOMEN: Soft, nontender  MUSCULOSKELETAL: No deformities.   NEURO: Alert, moves all extremities, follows commands.  SKIN: Warm, dry, no rash visualized.        LAB RESULTS  Recent Results (from the past 24 hour(s))   ECG 12 Lead ED Triage Standing Order; Weak / Dizzy / AMS    Collection Time: 22  9:01 PM   Result Value Ref Range    QT Interval 398 ms    QTC Interval 417 ms   Comprehensive Metabolic Panel    Collection Time: 22 11:54 PM    Specimen: Blood   Result Value Ref Range    Glucose 112 (H) 65 - 99 mg/dL    BUN 20 8 - 23 mg/dL    Creatinine 0.59 0.57 - 1.00 mg/dL    Sodium 140 136 - 145 mmol/L     Potassium 4.0 3.5 - 5.2 mmol/L    Chloride 103 98 - 107 mmol/L    CO2 26.0 22.0 - 29.0 mmol/L    Calcium 8.9 8.6 - 10.5 mg/dL    Total Protein 6.9 6.0 - 8.5 g/dL    Albumin 4.10 3.50 - 5.20 g/dL    ALT (SGPT) 29 1 - 33 U/L    AST (SGOT) 18 1 - 32 U/L    Alkaline Phosphatase 86 39 - 117 U/L    Total Bilirubin 0.3 0.0 - 1.2 mg/dL    Globulin 2.8 gm/dL    A/G Ratio 1.5 g/dL    BUN/Creatinine Ratio 33.9 (H) 7.0 - 25.0    Anion Gap 11.0 5.0 - 15.0 mmol/L    eGFR 102.7 >60.0 mL/min/1.73   Troponin    Collection Time: 12/14/22 11:54 PM    Specimen: Blood   Result Value Ref Range    Troponin T <0.010 0.000 - 0.030 ng/mL   Magnesium    Collection Time: 12/14/22 11:54 PM    Specimen: Blood   Result Value Ref Range    Magnesium 2.3 1.6 - 2.4 mg/dL   Green Top (Gel)    Collection Time: 12/14/22 11:54 PM   Result Value Ref Range    Extra Tube Hold for add-ons.    Lavender Top    Collection Time: 12/14/22 11:54 PM   Result Value Ref Range    Extra Tube hold for add-on    Gold Top - SST    Collection Time: 12/14/22 11:54 PM   Result Value Ref Range    Extra Tube Hold for add-ons.    Gray Top    Collection Time: 12/14/22 11:54 PM   Result Value Ref Range    Extra Tube Hold for add-ons.    Light Blue Top    Collection Time: 12/14/22 11:54 PM   Result Value Ref Range    Extra Tube Hold for add-ons.    CBC Auto Differential    Collection Time: 12/14/22 11:54 PM    Specimen: Blood   Result Value Ref Range    WBC 10.96 (H) 3.40 - 10.80 10*3/mm3    RBC 4.41 3.77 - 5.28 10*6/mm3    Hemoglobin 14.1 12.0 - 15.9 g/dL    Hematocrit 42.3 34.0 - 46.6 %    MCV 95.9 79.0 - 97.0 fL    MCH 32.0 26.6 - 33.0 pg    MCHC 33.3 31.5 - 35.7 g/dL    RDW 14.7 12.3 - 15.4 %    RDW-SD 51.5 37.0 - 54.0 fl    MPV 10.4 6.0 - 12.0 fL    Platelets 299 140 - 450 10*3/mm3    Neutrophil % 67.0 42.7 - 76.0 %    Lymphocyte % 23.4 19.6 - 45.3 %    Monocyte % 7.2 5.0 - 12.0 %    Eosinophil % 1.4 0.3 - 6.2 %    Basophil % 0.5 0.0 - 1.5 %    Immature Grans % 0.5 0.0 - 0.5  %    Neutrophils, Absolute 7.34 (H) 1.70 - 7.00 10*3/mm3    Lymphocytes, Absolute 2.56 0.70 - 3.10 10*3/mm3    Monocytes, Absolute 0.79 0.10 - 0.90 10*3/mm3    Eosinophils, Absolute 0.15 0.00 - 0.40 10*3/mm3    Basophils, Absolute 0.06 0.00 - 0.20 10*3/mm3    Immature Grans, Absolute 0.06 (H) 0.00 - 0.05 10*3/mm3    nRBC 0.0 0.0 - 0.2 /100 WBC   Lactic Acid, Plasma    Collection Time: 12/14/22 11:54 PM    Specimen: Blood   Result Value Ref Range    Lactate 1.0 0.5 - 2.0 mmol/L   Procalcitonin    Collection Time: 12/14/22 11:54 PM    Specimen: Blood   Result Value Ref Range    Procalcitonin <0.02 0.00 - 0.25 ng/mL   Troponin    Collection Time: 12/14/22 11:54 PM    Specimen: Blood   Result Value Ref Range    Troponin T <0.010 0.000 - 0.030 ng/mL   Urinalysis With Microscopic If Indicated (No Culture) - Urine, Clean Catch    Collection Time: 12/15/22 12:00 AM    Specimen: Urine, Clean Catch   Result Value Ref Range    Color, UA Yellow Yellow, Straw    Appearance, UA Cloudy (A) Clear    pH, UA <=5.0 5.0 - 8.0    Specific Gravity, UA 1.019 1.001 - 1.030    Glucose, UA Negative Negative    Ketones, UA Trace (A) Negative    Bilirubin, UA Negative Negative    Blood, UA Trace (A) Negative    Protein, UA Negative Negative    Leuk Esterase, UA Large (3+) (A) Negative    Nitrite, UA Positive (A) Negative    Urobilinogen, UA 0.2 E.U./dL 0.2 - 1.0 E.U./dL   Urinalysis, Microscopic Only - Urine, Clean Catch    Collection Time: 12/15/22 12:00 AM    Specimen: Urine, Clean Catch   Result Value Ref Range    RBC, UA 0-2 None Seen, 0-2 /HPF    WBC, UA Too Numerous to Count (A) None Seen, 0-2 /HPF    Bacteria, UA 4+ (A) None Seen, Trace /HPF    Squamous Epithelial Cells, UA 0-2 None Seen, 0-2 /HPF    Hyaline Casts, UA 21-30 0 - 6 /LPF    Methodology Automated Microscopy    COVID-19 and FLU A/B PCR - Swab, Nasopharynx    Collection Time: 12/15/22  3:45 AM    Specimen: Nasopharynx; Swab   Result Value Ref Range    COVID19 Not Detected Not  Detected - Ref. Range    Influenza A PCR Not Detected Not Detected    Influenza B PCR Not Detected Not Detected   ECG 12 Lead Syncope    Collection Time: 12/15/22  4:30 AM   Result Value Ref Range    QT Interval 410 ms    QTC Interval 433 ms       If labs were ordered, I independently reviewed the results.        RADIOLOGY  CT Head Without Contrast    Result Date: 12/14/2022  DATE OF EXAM: 12/14/2022 9:51 PM  PROCEDURE: CT HEAD WO CONTRAST-  INDICATIONS: seizure  COMPARISON: CTA head 12/1/2022, MR brain 11/26/2022  TECHNIQUE: Routine transaxial and coronal reconstruction images were obtained through the head without the administration of contrast. Automated exposure control and iterative reconstruction methods were used.  The radiation dose reduction device was turned on for each scan per the ALARA (As Low as Reasonably Achievable) protocol.  FINDINGS: Parenchyma:No acute intracranial hemorrhage. No loss of gray-white differentiation to suggest large territory infarct. Mild parenchymal volume loss. Scattered periventricular and subcortical white matter hypodensities most consistent with small vessel ischemic changes. 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  Ventricles and extra axial spaces:Prominent ventricles and sulci secondary to volume loss. No extra axial fluid collection seen. Again seen is a giant basilar artery aneurysm measuring approximately 2.7 x 2.7 x 2.7 cm, grossly similar in size to prior examination. A stent is seen. Again seen is mass effect on the underlying brain stem from this aneurysm.  Other:Orbits are grossly intact. Paranasal sinuses are clear. Small left mastoid effusion. Calvarium is intact.      No evidence of acute intracranial hemorrhage or large territory infarct.  Again seen is a giant basilar artery aneurysm status post stenting. The aneurysm appears grossly similar in size to prior examinations.  This report was finalized on 12/14/2022 11:09 PM by Ever Shelley.      CT  Angiogram Neck    Result Date: 12/15/2022  EXAM: CT ANGIOGRAM HEAD W AI ANALYSIS OF LVO, CT ANGIOGRAM NECK EXAM DATE: 12/15/2022 3:51 AM INDICATION: Dizziness, headache, possible seizure. History of basilar artery aneurysm status post stent. COMPARISON: 11/26/2022, 12/1/2022. TECHNIQUE: Images through the head and neck with intravenous contrast in the angiographic phase. Maximum intensity projections (MIPs) and/or 3D reconstructions constructed and reviewed at time of study interpretation. NASCET criteria used for interpretation. Low-dose CT acquisition technique included one or more of the following options: Automated exposure control; Adjustment of mA and/or KV according to patient's size; Use of iterative reconstruction.  CONTRAST: Administered.   FINDINGS: TECHNICAL: Technically adequate study. CTA: AORTIC ARCH: No significant stenosis identified. Minimal plaque.  BRACHIOCEPHALIC, SUBCLAVIAN, AND VISUALIZED AXILLARY ARTERIES: No significant stenosis identified. Minimal plaque. RIGHT COMMON CAROTID ARTERY (CCA): No significant stenosis identified. RIGHT INTERNAL CAROTID ARTERY (ICA): No significant stenosis of the proximal internal carotid artery identified near the carotid bifurcation. Minimal plaque about the carotid bifurcation. At most mild intracranial stenosis. Tortuous cervical internal carotid artery. LEFT COMMON CAROTID ARTERY (CCA): No significant stenosis identified. LEFT INTERNAL CAROTID ARTERY (ICA): No stenosis of the proximal internal carotid artery identified near the carotid bifurcation. At most mild intracranial stenosis. Tortuous cervical internal carotid artery. RIGHT VERTEBRAL ARTERY: No significant stenosis identified. LEFT VERTEBRAL ARTERY: No significant stenosis identified. ANTERIOR CEREBRAL ARTERIES (ACAs): No significant stenosis identified. RIGHT MIDDLE CEREBRAL ARTERY (MCA): No significant stenosis identified. LEFT MIDDLE CEREBRAL ARTERY (MCA): No significant stenosis identified.  RIGHT POSTERIOR CEREBRAL ARTERY (PCA): No significant stenosis identified. LEFT POSTERIOR CEREBRAL ARTERY (PCA): No significant stenosis identified. BASILAR ARTERY: No significant stenosis identified. 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 Small volume opacification of the bypassed aneurysm appears decreased since 12/1/2022. BRAIN: No regions of decreased parenchymal blush to suggest acute large territorial infarct. OTHER FINDINGS: OTHER SIGNIFICANT FINDINGS: None. PARANASAL SINUSES: At most mild opacification. Posterior MASTOID AIR CELLS: Opacification of the left mastoid air cells, mild. LUNGS: Calcified lung nodule suggesting prior granulomatous infection.     CTA: No adverse interval change in the appearance of the arteries. 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 Small volume opacification of the bypassed aneurysm appears decreased since 12/1/2022 suggesting progressive thrombosis. Elsewhere, at most mild arterial stenoses, as detailed above. Mild left mastoid effusion. COMMENT: If clinically indicated, and no contraindication, head MRI is offered for further evaluation. Electronically signed by:  Rinku Sapp M.D.  12/15/2022 2:38 AM Mountain Time    XR Chest 1 View    Result Date: 12/14/2022  DATE OF EXAM: 12/14/2022 10:19 PM  PROCEDURE: XR CHEST 1 VW-  INDICATIONS: Weak/Dizzy/AMS triage protocol  COMPARISON: 11/26/2022  TECHNIQUE: Single radiographic AP view of the chest was obtained.  FINDINGS: Normal cardiomediastinal silhouette. No focal opacity, pleural effusion or pneumothorax. Likely calcified granulomas in the right upper lung, similar to prior exam. No evidence of acute osseous abnormality. Visualized upper abdomen is unremarkable.      No radiographic evidence of acute cardiopulmonary process.  This report was finalized on 12/14/2022  11:10 PM by Ever Shelley.      CT Angiogram Head w AI Analysis of LVO    Result Date: 12/15/2022  EXAM: CT ANGIOGRAM HEAD W AI ANALYSIS OF LVO, CT ANGIOGRAM NECK EXAM DATE: 12/15/2022 3:51 AM INDICATION: Dizziness, headache, possible seizure. History of basilar artery aneurysm status post stent. COMPARISON: 11/26/2022, 12/1/2022. TECHNIQUE: Images through the head and neck with intravenous contrast in the angiographic phase. Maximum intensity projections (MIPs) and/or 3D reconstructions constructed and reviewed at time of study interpretation. NASCET criteria used for interpretation. Low-dose CT acquisition technique included one or more of the following options: Automated exposure control; Adjustment of mA and/or KV according to patient's size; Use of iterative reconstruction.  CONTRAST: Administered.   FINDINGS: TECHNICAL: Technically adequate study. CTA: AORTIC ARCH: No significant stenosis identified. Minimal plaque.  BRACHIOCEPHALIC, SUBCLAVIAN, AND VISUALIZED AXILLARY ARTERIES: No significant stenosis identified. Minimal plaque. RIGHT COMMON CAROTID ARTERY (CCA): No significant stenosis identified. RIGHT INTERNAL CAROTID ARTERY (ICA): No significant stenosis of the proximal internal carotid artery identified near the carotid bifurcation. Minimal plaque about the carotid bifurcation. At most mild intracranial stenosis. Tortuous cervical internal carotid artery. LEFT COMMON CAROTID ARTERY (CCA): No significant stenosis identified. LEFT INTERNAL CAROTID ARTERY (ICA): No stenosis of the proximal internal carotid artery identified near the carotid bifurcation. At most mild intracranial stenosis. Tortuous cervical internal carotid artery. RIGHT VERTEBRAL ARTERY: No significant stenosis identified. LEFT VERTEBRAL ARTERY: No significant stenosis identified. ANTERIOR CEREBRAL ARTERIES (ACAs): No significant stenosis identified. RIGHT MIDDLE CEREBRAL ARTERY (MCA): No significant stenosis identified. LEFT MIDDLE  CEREBRAL ARTERY (MCA): No significant stenosis identified. RIGHT POSTERIOR CEREBRAL ARTERY (PCA): No significant stenosis identified. LEFT POSTERIOR CEREBRAL ARTERY (PCA): No significant stenosis identified. BASILAR ARTERY: No significant stenosis identified. 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 Small volume opacification of the bypassed aneurysm appears decreased since 12/1/2022. BRAIN: No regions of decreased parenchymal blush to suggest acute large territorial infarct. OTHER FINDINGS: OTHER SIGNIFICANT FINDINGS: None. PARANASAL SINUSES: At most mild opacification. Posterior MASTOID AIR CELLS: Opacification of the left mastoid air cells, mild. LUNGS: Calcified lung nodule suggesting prior granulomatous infection.     CTA: No adverse interval change in the appearance of the arteries. 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 Small volume opacification of the bypassed aneurysm appears decreased since 12/1/2022 suggesting progressive thrombosis. Elsewhere, at most mild arterial stenoses, as detailed above. Mild left mastoid effusion. COMMENT: If clinically indicated, and no contraindication, head MRI is offered for further evaluation. Electronically signed by:  Rinku Sapp M.D.  12/15/2022 2:38 AM Mountain Time      I ordered and reviewed the above noted radiographic studies.      I viewed images of CT head which showed no intracranial hemorrhage per my independent interpretation.    See radiologist's dictation for official interpretation.        PROCEDURES    Procedures    ECG 12 Lead Syncope   Preliminary Result   Test Reason : Syncope   Blood Pressure :   */*   mmHG   Vent. Rate :  67 BPM     Atrial Rate :  67 BPM      P-R Int : 136 ms          QRS Dur :  94 ms       QT Int : 410 ms       P-R-T Axes :  23 -19  22 degrees      QTc Int : 433 ms       Normal sinus rhythm   Normal ECG   When compared with ECG of 14-DEC-2022 21:01,   No significant change was found      Referred By: EDMD           Confirmed By:       ECG 12 Lead ED Triage Standing Order; Weak / Dizzy / AMS   Final Result   Test Reason : ED Triage Standing Order~   Blood Pressure :   */*   mmHG   Vent. Rate :  66 BPM     Atrial Rate :  66 BPM      P-R Int : 138 ms          QRS Dur :  94 ms       QT Int : 398 ms       P-R-T Axes :  46   0  43 degrees      QTc Int : 417 ms      Normal sinus rhythm   Normal ECG   When compared with ECG of 26-NOV-2022 12:22,   No significant change was found   Confirmed by YESSENIA GONSALEZ ([id number]) on 12/15/2022 3:09:00 AM      Referred By: EDMD           Confirmed By: YESSENIA GONSALEZ          MEDICATIONS GIVEN IN ER    Medications   sodium chloride 0.9 % flush 10 mL (has no administration in time range)   sodium chloride 0.9 % flush 10 mL (0 mL Intravenous Hold 12/15/22 0936)   sodium chloride 0.9 % flush 10 mL (has no administration in time range)   sodium chloride 0.9 % infusion 40 mL (has no administration in time range)   atorvastatin (LIPITOR) tablet 80 mg (has no administration in time range)   aspirin chewable tablet 81 mg (81 mg Oral Not Given 12/15/22 0935)     Or   aspirin suppository 300 mg ( Rectal Not Given:  See Alt 12/15/22 0935)   clopidogrel (PLAVIX) tablet 75 mg (0 mg Oral Hold 12/15/22 0934)   aspirin chewable tablet 81 mg (81 mg Oral Given 12/15/22 0929)   clopidogrel (PLAVIX) tablet 75 mg (75 mg Oral Given 12/15/22 0927)   dexamethasone (DECADRON) tablet 1 mg (1 mg Oral Given 12/15/22 0927)   fluticasone (FLONASE) 50 MCG/ACT nasal spray 2 spray (2 sprays Nasal Given 12/15/22 0929)   lisinopril (PRINIVIL,ZESTRIL) tablet 10 mg (0 mg Oral Hold 12/15/22 0832)   ondansetron (ZOFRAN) tablet 4 mg (has no administration in time range)   pantoprazole (PROTONIX) EC tablet 40 mg (40 mg Oral Given 12/15/22 0927)   sodium chloride 0.9 % flush 10 mL (has no  administration in time range)   sodium chloride 0.9 % infusion 40 mL (has no administration in time range)   heparin (porcine) 5000 UNIT/ML injection 5,000 Units (5,000 Units Subcutaneous Given 12/15/22 0930)   sennosides-docusate (PERICOLACE) 8.6-50 MG per tablet 2 tablet (has no administration in time range)     And   polyethylene glycol (MIRALAX) packet 17 g (has no administration in time range)     And   bisacodyl (DULCOLAX) EC tablet 5 mg (has no administration in time range)     And   bisacodyl (DULCOLAX) suppository 10 mg (has no administration in time range)   sodium chloride 0.9 % bolus 1,000 mL (1,000 mL Intravenous New Bag 12/15/22 0923)   cefTRIAXone (ROCEPHIN) 1 g/100 mL 0.9% NS (MBP) (has no administration in time range)   acetaminophen (TYLENOL) tablet 650 mg (has no administration in time range)   cefTRIAXone (ROCEPHIN) 1 g/100 mL 0.9% NS (MBP) (0 g Intravenous Stopped 12/15/22 0557)   iopamidol (ISOVUE-370) 76 % injection 100 mL (75 mL Intravenous Given 12/15/22 0352)   levETIRAcetam in NaCl 0.75% (KEPPRA) IVPB 1,000 mg (0 mg Intravenous Stopped 12/15/22 0383)         PROGRESS, DATA ANALYSIS, CONSULTS, AND MEDICAL DECISION MAKING    All labs have been independently reviewed by me.  All radiology studies have been reviewed by me and the radiologist dictating the report.   EKG's have been independently viewed and interpreted by me.      Differential diagnoses: Syncope, cardiac event, hypertension, seizure           The stroke team was contacted and evaluated the patient, and ordered CT angiogram for follow-up studies which showed no acute disease.      AS OF 10:09 EST VITALS:    BP - 97/59  HR - 64  TEMP - 98.2 °F (36.8 °C)  O2 SATS - 100%                  DIAGNOSIS  Final diagnoses:   Acute cystitis without hematuria   Syncope and collapse         DISPOSITION  Met           Hawk Acharya MD  12/15/22 1010

## 2022-12-15 NOTE — OUTREACH NOTE
General Surgery Week 3 Survey    Flowsheet Row Responses   [facility] patient discharged from? [facility]   Does the patient have one of the following disease processes/diagnoses(primary or secondary)? General Surgery   Week 3 attempt successful? No   Unsuccessful attempts Attempt 1   Revoke Readmitted          GWEN OSEGUERA - Registered Nurse

## 2022-12-15 NOTE — CONSULTS
Diabetes Education    Patient Name:  Lauryn Romo  YOB: 1960  MRN: [id number]  Admit Date:  12/15/2022      Pt does not meet criteria for diabetes education. Current A1c is 5.5 from 11/27/22, noted during chart review. Pt is on no home meds for diabetes and has no documented history of diabetes. Thank you.    Electronically signed by:  Lydia Klein RN  12/15/22 09:36 EST

## 2022-12-15 NOTE — H&P
[facility] Medicine Services  HISTORY AND PHYSICAL    Patient Name: Lauryn Romo  : 1960  MRN: [id number]  Primary Care Physician: Melissa Lazo APRN  Date of admission: 12/15/2022      Subjective   Subjective     Chief Complaint: Syncope    HPI:  Lauryn Romo is a 62 y.o. 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  Since leaving she states that she has been eating or drinking well, but has otherwise been in her usual state of health. Last night around 6:45 PM she got up and went to the kitchen. She states she had been ambulating for 2 minutes. Then she noted feeling dizzy and that she was beginning to black out. Her  got her into a chair. In the chair she passed out. Her  says she was diaphoretic. He denies witnessing seizure activity. She was out around 2 minutes. She was nauseated after she aroused. He notes that her speech seemed slurred thereafter. Beyond fatigue she feels near baseline now. She denies prior palpitations.      Review of Systems   Constitutional: Positive for activity change, appetite change, diaphoresis and fatigue.   HENT: Negative.    Respiratory: Negative.    Cardiovascular: Negative for palpitations.   Gastrointestinal: Positive for nausea.   Genitourinary: Negative.    Musculoskeletal: Negative.    Neurological: Positive for syncope and light-headedness.   Psychiatric/Behavioral: Negative.         All other systems reviewed and are negative.     Personal History     Past Medical History:   Diagnosis Date   • Hypertension          Past Surgical History:   Procedure Laterality Date   • BREAST LUMPECTOMY     • EMBOLIZATION CEREBRAL N/A 2022    Procedure: CV EMBOLIZATION CEREBRAL IR;  Surgeon: Enoch Savage MD;  Location: [facility] INVASIVE LOCATION;  Service: Interventional Radiology;  Laterality: N/A;   • HIATAL HERNIA  REPAIR  2015   • SINUS SURGERY      x4       Family History: Her family history includes Cancer in her maternal grandmother; Diabetes in her half-brother; Heart attack in her paternal aunt and paternal uncle; Heart attack (age of onset: 42) in her father; Heart failure in her maternal grandmother; No Known Problems in her maternal grandfather, mother, paternal grandfather, paternal grandmother, and sister. Otherwise pertinent FHx was reviewed and unremarkable.     Social History:  reports that she quit smoking about 18 years ago. She has never used smokeless tobacco. She reports that she does not drink alcohol and does not use drugs.  Social History     Social History Narrative    Caffeine: 6-7 cups daily         No Known Allergies    Objective   Objective     Vital Signs:   Temp:  [98.2 °F (36.8 °C)] 98.2 °F (36.8 °C)  Heart Rate:  [61-77] 62  Resp:  [15-18] 15  BP: ()/(63-90) 106/66  Total (NIH Stroke Scale): 2    Physical Exam   NAD, alert and oriented  OP clear, dry MM  Neck supple  No LAD  RRR  CTAB  +BS, ND, NT, soft  HAWK  Normal affect  No rashes    Result Review:  I have personally reviewed the results from the time of this admission to 12/15/2022 07:28 EST and agree with these findings:  []  Laboratory list / accordion  []  Microbiology  []  Radiology  []  EKG/Telemetry   []  Cardiology/Vascular   []  Pathology  []  Old records  []  Other:  Most notable findings include: UA reviewed        LAB RESULTS:      Lab 12/14/22  2354   WBC 10.96*   HEMOGLOBIN 14.1   HEMATOCRIT 42.3   PLATELETS 299   NEUTROS ABS 7.34*   IMMATURE GRANS (ABS) 0.06*   LYMPHS ABS 2.56   MONOS ABS 0.79   EOS ABS 0.15   MCV 95.9   PROCALCITONIN <0.02   LACTATE 1.0         Lab 12/14/22  2354   SODIUM 140   POTASSIUM 4.0   CHLORIDE 103   CO2 26.0   ANION GAP 11.0   BUN 20   CREATININE 0.59   EGFR 102.7   GLUCOSE 112*   CALCIUM 8.9   MAGNESIUM 2.3         Lab 12/14/22  2354   TOTAL PROTEIN 6.9   ALBUMIN 4.10   GLOBULIN 2.8   ALT (SGPT)  29   AST (SGOT) 18   BILIRUBIN 0.3   ALK PHOS 86         Lab 12/14/22  2354   TROPONIN T <0.010  <0.010                 UA    Urinalysis 11/26/22 11/26/22 12/15/22 12/15/22    1241 1241 0000 0000   Squamous Epithelial Cells, UA  0-2  0-2   Specific Gravity, UA 1.006  1.019    Ketones, UA Trace (A)  Trace (A)    Blood, UA Negative  Trace (A)    Leukocytes, UA Small (1+) (A)  Large (3+) (A)    Nitrite, UA Negative  Positive (A)    RBC, UA  0-2  0-2   WBC, UA  3-5 (A)  Too Numerous to Count (A)   Bacteria, UA  Trace  4+ (A)   (A) Abnormal value              Microbiology Results (last 10 days)     Procedure Component Value - Date/Time    COVID PRE-OP / PRE-PROCEDURE SCREENING ORDER (NO ISOLATION) - Swab, Nasopharynx [[id number]]  (Normal) Collected: 12/15/22 0345    Lab Status: Final result Specimen: Swab from Nasopharynx Updated: 12/15/22 0417    Narrative:      The following orders were created for panel order COVID PRE-OP / PRE-PROCEDURE SCREENING ORDER (NO ISOLATION) - Swab, Nasopharynx.  Procedure                               Abnormality         Status                     ---------                               -----------         ------                     COVID-19 and FLU A/B PCR...[[id number]]  Normal              Final result                 Please view results for these tests on the individual orders.    COVID-19 and FLU A/B PCR - Swab, Nasopharynx [[id number]]  (Normal) Collected: 12/15/22 0345    Lab Status: Final result Specimen: Swab from Nasopharynx Updated: 12/15/22 0417     COVID19 Not Detected     Influenza A PCR Not Detected     Influenza B PCR Not Detected    Narrative:      Fact sheet for providers: https://www.fda.gov/media/551374/download    Fact sheet for patients: https://www.fda.gov/media/789368/download    Test performed by PCR.          CT Head Without Contrast    Result Date: 12/14/2022  DATE OF EXAM: 12/14/2022 9:51 PM  PROCEDURE: CT HEAD WO CONTRAST-  INDICATIONS: seizure  COMPARISON: CTA  head 12/1/2022, MR brain 11/26/2022  TECHNIQUE: Routine transaxial and coronal reconstruction images were obtained through the head without the administration of contrast. Automated exposure control and iterative reconstruction methods were used.  The radiation dose reduction device was turned on for each scan per the ALARA (As Low as Reasonably Achievable) protocol.  FINDINGS: Parenchyma:No acute intracranial hemorrhage. No loss of gray-white differentiation to suggest large territory infarct. Mild parenchymal volume loss. Scattered periventricular and subcortical white matter hypodensities most consistent with small vessel ischemic changes. 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  Ventricles and extra axial spaces:Prominent ventricles and sulci secondary to volume loss. No extra axial fluid collection seen. Again seen is a giant basilar artery aneurysm measuring approximately 2.7 x 2.7 x 2.7 cm, grossly similar in size to prior examination. A stent is seen. Again seen is mass effect on the underlying brain stem from this aneurysm.  Other:Orbits are grossly intact. Paranasal sinuses are clear. Small left mastoid effusion. Calvarium is intact.      Impression: No evidence of acute intracranial hemorrhage or large territory infarct.  Again seen is a giant basilar artery aneurysm status post stenting. The aneurysm appears grossly similar in size to prior examinations.  This report was finalized on 12/14/2022 11:09 PM by Ever Shelley.      CT Angiogram Neck    Result Date: 12/15/2022  EXAM: CT ANGIOGRAM HEAD W AI ANALYSIS OF LVO, CT ANGIOGRAM NECK EXAM DATE: 12/15/2022 3:51 AM INDICATION: Dizziness, headache, possible seizure. History of basilar artery aneurysm status post stent. COMPARISON: 11/26/2022, 12/1/2022. TECHNIQUE: Images through the head and neck with intravenous contrast in the angiographic phase. Maximum intensity projections (MIPs) and/or 3D reconstructions constructed and reviewed at time of  study interpretation. NASCET criteria used for interpretation. Low-dose CT acquisition technique included one or more of the following options: Automated exposure control; Adjustment of mA and/or KV according to patient's size; Use of iterative reconstruction.  CONTRAST: Administered.   FINDINGS: TECHNICAL: Technically adequate study. CTA: AORTIC ARCH: No significant stenosis identified. Minimal plaque.  BRACHIOCEPHALIC, SUBCLAVIAN, AND VISUALIZED AXILLARY ARTERIES: No significant stenosis identified. Minimal plaque. RIGHT COMMON CAROTID ARTERY (CCA): No significant stenosis identified. RIGHT INTERNAL CAROTID ARTERY (ICA): No significant stenosis of the proximal internal carotid artery identified near the carotid bifurcation. Minimal plaque about the carotid bifurcation. At most mild intracranial stenosis. Tortuous cervical internal carotid artery. LEFT COMMON CAROTID ARTERY (CCA): No significant stenosis identified. LEFT INTERNAL CAROTID ARTERY (ICA): No stenosis of the proximal internal carotid artery identified near the carotid bifurcation. At most mild intracranial stenosis. Tortuous cervical internal carotid artery. RIGHT VERTEBRAL ARTERY: No significant stenosis identified. LEFT VERTEBRAL ARTERY: No significant stenosis identified. ANTERIOR CEREBRAL ARTERIES (ACAs): No significant stenosis identified. RIGHT MIDDLE CEREBRAL ARTERY (MCA): No significant stenosis identified. LEFT MIDDLE CEREBRAL ARTERY (MCA): No significant stenosis identified. RIGHT POSTERIOR CEREBRAL ARTERY (PCA): No significant stenosis identified. LEFT POSTERIOR CEREBRAL ARTERY (PCA): No significant stenosis identified. BASILAR ARTERY: No significant stenosis identified. 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 Small volume opacification of the bypassed aneurysm appears decreased since 12/1/2022. BRAIN: No regions of  decreased parenchymal blush to suggest acute large territorial infarct. OTHER FINDINGS: OTHER SIGNIFICANT FINDINGS: None. PARANASAL SINUSES: At most mild opacification. Posterior MASTOID AIR CELLS: Opacification of the left mastoid air cells, mild. LUNGS: Calcified lung nodule suggesting prior granulomatous infection.     Impression: CTA: No adverse interval change in the appearance of the arteries. 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 Small volume opacification of the bypassed aneurysm appears decreased since 12/1/2022 suggesting progressive thrombosis. Elsewhere, at most mild arterial stenoses, as detailed above. Mild left mastoid effusion. COMMENT: If clinically indicated, and no contraindication, head MRI is offered for further evaluation. Electronically signed by:  Rinku Sapp M.D.  12/15/2022 2:38 AM Mountain Time    XR Chest 1 View    Result Date: 12/14/2022  DATE OF EXAM: 12/14/2022 10:19 PM  PROCEDURE: XR CHEST 1 VW-  INDICATIONS: Weak/Dizzy/AMS triage protocol  COMPARISON: 11/26/2022  TECHNIQUE: Single radiographic AP view of the chest was obtained.  FINDINGS: Normal cardiomediastinal silhouette. No focal opacity, pleural effusion or pneumothorax. Likely calcified granulomas in the right upper lung, similar to prior exam. No evidence of acute osseous abnormality. Visualized upper abdomen is unremarkable.      Impression: No radiographic evidence of acute cardiopulmonary process.  This report was finalized on 12/14/2022 11:10 PM by Ever Shelley.      CT Angiogram Head w AI Analysis of LVO    Result Date: 12/15/2022  EXAM: CT ANGIOGRAM HEAD W AI ANALYSIS OF LVO, CT ANGIOGRAM NECK EXAM DATE: 12/15/2022 3:51 AM INDICATION: Dizziness, headache, possible seizure. History of basilar artery aneurysm status post stent. COMPARISON: 11/26/2022, 12/1/2022. TECHNIQUE: Images through the head and neck with intravenous contrast in the angiographic phase. Maximum  intensity projections (MIPs) and/or 3D reconstructions constructed and reviewed at time of study interpretation. NASCET criteria used for interpretation. Low-dose CT acquisition technique included one or more of the following options: Automated exposure control; Adjustment of mA and/or KV according to patient's size; Use of iterative reconstruction.  CONTRAST: Administered.   FINDINGS: TECHNICAL: Technically adequate study. CTA: AORTIC ARCH: No significant stenosis identified. Minimal plaque.  BRACHIOCEPHALIC, SUBCLAVIAN, AND VISUALIZED AXILLARY ARTERIES: No significant stenosis identified. Minimal plaque. RIGHT COMMON CAROTID ARTERY (CCA): No significant stenosis identified. RIGHT INTERNAL CAROTID ARTERY (ICA): No significant stenosis of the proximal internal carotid artery identified near the carotid bifurcation. Minimal plaque about the carotid bifurcation. At most mild intracranial stenosis. Tortuous cervical internal carotid artery. LEFT COMMON CAROTID ARTERY (CCA): No significant stenosis identified. LEFT INTERNAL CAROTID ARTERY (ICA): No stenosis of the proximal internal carotid artery identified near the carotid bifurcation. At most mild intracranial stenosis. Tortuous cervical internal carotid artery. RIGHT VERTEBRAL ARTERY: No significant stenosis identified. LEFT VERTEBRAL ARTERY: No significant stenosis identified. ANTERIOR CEREBRAL ARTERIES (ACAs): No significant stenosis identified. RIGHT MIDDLE CEREBRAL ARTERY (MCA): No significant stenosis identified. LEFT MIDDLE CEREBRAL ARTERY (MCA): No significant stenosis identified. RIGHT POSTERIOR CEREBRAL ARTERY (PCA): No significant stenosis identified. LEFT POSTERIOR CEREBRAL ARTERY (PCA): No significant stenosis identified. BASILAR ARTERY: No significant stenosis identified. 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 Small volume  opacification of the bypassed aneurysm appears decreased since 12/1/2022. BRAIN: No regions of decreased parenchymal blush to suggest acute large territorial infarct. OTHER FINDINGS: OTHER SIGNIFICANT FINDINGS: None. PARANASAL SINUSES: At most mild opacification. Posterior MASTOID AIR CELLS: Opacification of the left mastoid air cells, mild. LUNGS: Calcified lung nodule suggesting prior granulomatous infection.     Impression: CTA: No adverse interval change in the appearance of the arteries. 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 Small volume opacification of the bypassed aneurysm appears decreased since 12/1/2022 suggesting progressive thrombosis. Elsewhere, at most mild arterial stenoses, as detailed above. Mild left mastoid effusion. COMMENT: If clinically indicated, and no contraindication, head MRI is offered for further evaluation. Electronically signed by:  Rinku Sapp M.D.  12/15/2022 2:38 AM Mountain Time      Results for orders placed during the hospital encounter of 11/26/22    Adult Transthoracic Echo Complete W/ Cont if Necessary Per Protocol (With Agitated Saline)    Interpretation Summary  •  Saline test results are negative.  •  There is calcification of the aortic valve.  •  Estimated right ventricular systolic pressure from tricuspid regurgitation is normal (<35 mmHg). Calculated right ventricular systolic pressure from tricuspid regurgitation is 30 mmHg.  •  Normal LV systolic function  •  Mild AI  •  Mild MR      Assessment & Plan   Assessment & Plan     Active Hospital Problems    Diagnosis  POA   • **Syncope [R55]  Yes   • HTN [I10]  Yes   • Hyperlipidemia [E78.5]  Yes   • Paraesophageal hernia [K44.9]  Yes   • Brain aneurysm [I67.1]  Yes       Syncope  -IVF, check orthostatics  -does not seem vasovagal  -EKG NSR  -check ECHO  -ask for neurology opinion  -Abnormal UA on admission, possible UTI    UTI/POA  -Rocephin    Recent cerebral aneurysm  embolization    Hx of HTN    HL    DVT prophylaxis:  SCDS      CODE STATUS:  Full/CPR  Code Status and Medical Interventions:   Ordered at: 12/15/22 0726     Code Status (Patient has no pulse and is not breathing):    CPR (Attempt to Resuscitate)     Medical Interventions (Patient has pulse or is breathing):    Full Support       Expected Discharge 12/16       Jr Doan MD  12/15/22

## 2022-12-15 NOTE — PLAN OF CARE
Goal Outcome Evaluation:  Plan of Care Reviewed With: patient        Progress: no change  Outcome Evaluation: Patient admitted from the ED. NIH 2. Passed bedside dysphagia screen, diet advanced.Patient ambulates to bathroom x1 assist. Plan of care discussed with the patient and  at bedside.

## 2022-12-15 NOTE — THERAPY EVALUATION
Acute Care - Speech Language Pathology Initial Evaluation  [facility]   Cognitive-Communication Evaluation     Patient Name: Lauryn Romo  : 1960  MRN: [id number]  Today's Date: 12/15/2022               Admit Date: 12/15/2022     Visit Dx:    ICD-10-CM ICD-9-CM   1. Acute cystitis without hematuria  N30.00 595.0   2. Syncope and collapse  R55 780.2   3. Cognitive communication deficit  R41.841 799.52     Patient Active Problem List   Diagnosis   • Acute CVA   • Brain aneurysm   • Paraesophageal hernia   • Hyperlipidemia   • HTN   • Syncope     Past Medical History:   Diagnosis Date   • Hypertension      Past Surgical History:   Procedure Laterality Date   • BREAST LUMPECTOMY     • EMBOLIZATION CEREBRAL N/A 2022    Procedure: CV EMBOLIZATION CEREBRAL IR;  Surgeon: Enoch Savage MD;  Location: [facility] INVASIVE LOCATION;  Service: Interventional Radiology;  Laterality: N/A;   • HIATAL HERNIA REPAIR     • SINUS SURGERY      x4       SLP Recommendation and Plan  SLP Diagnosis: cognitive-linguistic disorder (12/15/22 1500)           [facility] Criteria for Skilled Therapy Interventions Met: yes (12/15/22 1500)  Anticipated Discharge Disposition (SLP): anticipate therapy at next level of care (12/15/22 1500)        Predicted Duration Therapy Intervention (Days): until discharge (12/15/22 1500)                          Plan of Care Reviewed With: patient, spouse (12/15/22 1608)      SLP EVALUATION (last 72 hours)     SLP SLC Evaluation     Row Name 12/15/22 1500                   Communication Assessment/Intervention    Document Type evaluation  -SM        Subjective Information no complaints  -SM        Patient Observations lethargic;cooperative  -SM           General Information    Patient Profile Reviewed yes  -SM        Pertinent History Of Current Problem Adm with syncope episode. R droop, seemingly resolved. MRI pending. Recent cerebral aneurysm embolization. Pt on full liquid diet,  pending documentation of passed nursing dysphagia screen.  -        Plans/Goals Discussed with patient;spouse/S.O.;agreed upon  -        Barriers to Rehab previous functional deficit  -        Patient's Goals for Discharge patient did not state  -        Family Goals for Discharge patient able to return to previous activities/roles;functional communication;functional cognition  -           Pain    Additional Documentation Pain Scale: Numbers Pre/Post-Treatment (Group)  -           Pain Scale: Numbers Pre/Post-Treatment    Pretreatment Pain Rating 0/10 - no pain  -SM        Posttreatment Pain Rating 0/10 - no pain  -           Comprehension Assessment/Intervention    Comprehension Assessment/Intervention Auditory Comprehension  -           Auditory Comprehension Assessment/Intervention    Answers Questions (Communication) yes/no;wh questions;personal;simple;concrete;WFL;mulit-unit;complex;abstract;mild impairment;moderate impairment  -        Able to Follow Commands (Communication) 1-step;WFL;2-step;mild impairment  -           Expression Assessment/Intervention    Expression Assessment/Intervention verbal expression  -           Verbal Expression Assessment/Intervention    Sentence Formulation simple;WFL  -           Motor Speech Assessment/Intervention    Motor Speech Function mild impairment  -        Characteristics Consistent with Dysarthria decreased articulation;decreased intensity  -           Cognitive Assessment Intervention- SLP    Cognitive Function (Cognition) unable/difficult to assess;other (see comments)  -        Cognition, Comment Pt's spouse reports recent increase speech and cognitive difficulties, especially memory. Pt quite lethargic, not appropriate for full assessment at this time. Pt/spouse agreeable to for tx, where will continue dx tx.  -           SLP Evaluation Clinical Impressions    SLP Diagnosis cognitive-linguistic disorder  -        Rehab  Potential/Prognosis good  -SM        SLC Criteria for Skilled Therapy Interventions Met yes  -SM        Functional Impact difficulty completing home management task  -           Recommendations    Therapy Frequency (SLP SLC) 5 days per week  -SM        Predicted Duration Therapy Intervention (Days) until discharge  -        Anticipated Discharge Disposition (SLP) anticipate therapy at next level of care  -              User Key  (r) = Recorded By, (t) = Taken By, (c) = Cosigned By    Initials Name Effective Dates    Leandra Weiner MS CCC-SLP 06/16/21 -                    EDUCATION  The patient has been educated in the following areas:     Cognitive Impairment Communication Impairment.           SLP GOALS     Row Name 12/15/22 1500             Phonation Goal 1 (SLP)    Improve Phonation By Goal 1 (SLP) using loud speech;90%;with minimal cues (75-90%)  -SM      Time Frame (Phonation Goal 1, SLP) short term goal (STG)  -SM         Articulation Goal 1 (SLP)    Improve Articulation Goal 1 (SLP) by over-articulating at word level;90%;with minimal cues (75-90%)  -SM      Time Frame (Articulation Goal 1, SLP) short term goal (STG)  -SM         SLC STG Goal 1 (SLP)    Additional Goal 1, SLP Dx tx cognitive-communication skills when fully alert  -      Time Frame (Additional Goal 1, SLP) short term goal (STG)  -SM         Patient will demonstrate functional cognitive-linguistic skills for return to discharge environment    [place] with minimal cues  -      Time frame by discharge  -            User Key  (r) = Recorded By, (t) = Taken By, (c) = Cosigned By    Initials Name Provider Type    Leandra Weiner MS CCC-SLP Speech and Language Pathologist                        Time Calculation:      Time Calculation- SLP     Row Name 12/15/22 1609             Time Calculation- SLP    SLP Start Time 1500  -      SLP Received On 12/15/22  -         Untimed Charges    [id number] Eval Speech and  Production w/ Language Minutes 26  -SM         Total Minutes    Untimed Charges Total Minutes 26  -SM       Total Minutes 26  -SM            User Key  (r) = Recorded By, (t) = Taken By, (c) = Cosigned By    Initials Name Provider Type    Leandra Weiner, MS CCC-SLP Speech and Language Pathologist                Therapy Charges for Today     Code Description Service Date Service Provider Modifiers Qty    [id number] HC ST EVAL SPEECH AND PROD W LANG  2 12/15/2022 Leandra Gaffney MS CCC-SLP GN 1                     Leandra Gaffnye MS CCC-SLP  12/15/2022

## 2022-12-15 NOTE — PLAN OF CARE
Goal Outcome Evaluation:  Plan of Care Reviewed With: patient, spouse            SLP evaluation completed. Will continue to address cog-comm deficits. Please see note for further details and recommendations.

## 2022-12-15 NOTE — CONSULTS
Stroke Consult Note    Patient Name: Lauryn Romo   MRN: [id number]  Age: 62 y.o.  Sex: female  : 1960    Primary Care Physician: Melissa Lazo APRN  Referring Physician:  Dr. Acharya    TIME STROKE TEAM CALLED: 0310 EST     TIME PATIENT SEEN: 0320 EST    Handedness: Right  Race:     Chief Complaint/Reason for Consultation: Dizziness, syncopal episode    HPI:  is a 62-year-old  female with known medical diagnosis of hypertension, remote smoking history, recent right occipital infarct and recent giant partially thrombosed basilar aneurysm of which she had embolization and was treated with flow diverter therapy with Dr. Savage on 2022.  Following this procedure she was started on dual antiplatelet therapy as well as a steroid taper and was discharged home.  She presents to [facility] ED today with concerns of a syncopal episode at home.  Per patient and her 's report around  patient was in the kitchen when her  felt that she did not look well, he had her sit down, she states she did not feel well, he notes that she became pale and diaphoretic and passed out.  She states she had a headache was nauseous, dizzy, and lightheaded at this time.  Patient then lost control of her bowels and had generalized shaking noted following this episode as well as right facial droop and dysarthria per patient's .  Patient's  states that EMS was unavailable to transfer patient to hospital so she was driven via private vehicle to [facility] for further evaluation and work-up.  NIH 2 on my assessment.     Last Known Normal Date/Time: 2022 1845 EST     Review of Systems   Constitutional: Positive for fatigue.   Eyes: Negative for visual disturbance.   Gastrointestinal: Positive for nausea.   Musculoskeletal: Positive for gait problem.   Neurological: Positive for dizziness, facial asymmetry, speech difficulty, weakness, light-headedness and headaches.      Past Medical  History:   Diagnosis Date   • Hypertension      Past Surgical History:   Procedure Laterality Date   • BREAST LUMPECTOMY     • EMBOLIZATION CEREBRAL N/A 2022    Procedure: CV EMBOLIZATION CEREBRAL IR;  Surgeon: Enoch Savage MD;  Location: [facility] INVASIVE LOCATION;  Service: Interventional Radiology;  Laterality: N/A;   • HIATAL HERNIA REPAIR  2015   • SINUS SURGERY      x4     Family History   Problem Relation Age of Onset   • No Known Problems Mother    • Heart attack Father 42   • Heart attack Paternal Aunt    • Heart attack Paternal Uncle    • No Known Problems Sister    • Cancer Maternal Grandmother    • Heart failure Maternal Grandmother    • No Known Problems Maternal Grandfather    • No Known Problems Paternal Grandmother    • No Known Problems Paternal Grandfather    • Diabetes Half-Brother      Social History     Socioeconomic History   • Marital status:    Tobacco Use   • Smoking status: Former     Types: Cigarettes     Quit date: 10/4/2004     Years since quittin.2   • Smokeless tobacco: Never   Substance and Sexual Activity   • Alcohol use: No   • Drug use: No   • Sexual activity: Defer     No Known Allergies  Prior to Admission medications    Medication Sig Start Date End Date Taking? Authorizing Provider   albuterol (PROAIR RESPICLICK) 108 (90 Base) MCG/ACT inhaler Inhale 2 puffs Every 4 (Four) Hours As Needed for Wheezing or Shortness of Air. 19   Kwesi Montanez MD   aspirin 81 MG chewable tablet Chew 1 tablet Daily.    Provider, MD Ghulam   Cholecalciferol (VITAMIN D3) 400 units capsule Take 1 tablet by mouth Daily.    Provider, MD Ghulam   clopidogrel (PLAVIX) 75 MG tablet Take 1 tablet by mouth Daily. 22   Jan Matos PA-C   dexamethasone (DECADRON) 2 MG tablet Instruct the patient to take 4 mg PO every 8 hours x 2 days, 2 mg Q8 x3 days, 2 mg every 12 x3 days, 1 mg every 12 x3 days, 1 mg PO daily for 3 days. 22   Shawna  Jan Luciano PA-C   fluticasone (FLONASE) 50 MCG/ACT nasal spray 2 sprays into the nostril(s) as directed by provider Daily. 9/29/19   Kwesi Montanez MD   Glucosamine Sulfate (SYNOVACIN PO) Take 1,000 mg by mouth Daily.    Ghulam Harvey MD   lidocaine (XYLOCAINE) 5 % ointment lidocaine 5 % topical ointment    Guhlam Harvey MD   moexipril (UNIVASC) 15 MG tablet 15 mg Daily.    Ghulam Harvey MD   Naproxen Sodium 220 MG capsule Take 2 tablets by mouth 2 (Two) Times a Day As Needed. Monday through Thursday    Ghulam Harvey MD   Omega-3 Fatty Acids (FISH OIL) 1000 MG capsule capsule Take 1 capsule by mouth Daily.    Ghulam Harvey MD   ondansetron ODT (ZOFRAN-ODT) 4 MG disintegrating tablet ondansetron 4 mg disintegrating tablet    Ghulam Harvey MD   pantoprazole (PROTONIX) 40 MG EC tablet Take 1 tablet by mouth Every Morning. Indications: Take while on the steroid taper. 12/3/22   Jan Matos PA-C   Zinc 50 MG capsule Take  by mouth.    Ghulam Harvey MD         Temp:  [98.2 °F (36.8 °C)] 98.2 °F (36.8 °C)  Heart Rate:  [61-77] 62  Resp:  [15-18] 15  BP: ()/(63-90) 106/66  Neurological Exam  Mental Status  Awake, alert and oriented to person, place and time.Alert. Oriented to person, place, and time. Mild dysarthria present. Language is fluent with no aphasia.    Cranial Nerves  CN II: Right normal visual field. Left normal visual field.  CN III, IV, VI: Extraocular movements intact bilaterally. Pupils equal round and reactive to light bilaterally.  CN V:  Right: Facial sensation is normal.  Left: Facial sensation is normal on the left.  CN VII: Full and symmetric facial movement.  CN IX, X: Palate elevates symmetrically  CN XII: Tongue midline without atrophy or fasciculations.    Motor   Normal muscle tone. Strength is 5/5 in all four extremities except as noted.  LUE +3/5  LLE +3/5.    Sensory  Sensation is intact to light touch,  pinprick, vibration and proprioception in all four extremities.    Coordination  Left: Finger-to-nose abnormality:    Gait    Not assessed.      Physical Exam  Vitals and nursing note reviewed.   Constitutional:       General: She is not in acute distress.  HENT:      Head: Normocephalic and atraumatic.      Mouth/Throat:      Mouth: Mucous membranes are moist.      Pharynx: Oropharynx is clear.   Eyes:      Extraocular Movements: Extraocular movements intact.      Pupils: Pupils are equal, round, and reactive to light.   Cardiovascular:      Rate and Rhythm: Normal rate and regular rhythm.      Pulses: Normal pulses.   Pulmonary:      Effort: Pulmonary effort is normal.   Abdominal:      Palpations: Abdomen is soft.   Musculoskeletal:      Right lower leg: No edema.      Left lower leg: No edema.   Skin:     General: Skin is warm and dry.   Neurological:      Mental Status: She is alert and oriented to person, place, and time.      Cranial Nerves: Dysarthria present. No cranial nerve deficit.      Sensory: No sensory deficit.      Motor: Weakness present.      Coordination: Coordination abnormal.   Psychiatric:         Mood and Affect: Mood normal.         Behavior: Behavior normal.         Thought Content: Thought content normal.         Judgment: Judgment normal.         Acute Stroke Data    Thrombolytic Inclusion / Exclusion Criteria    Time: 07:07 EST  Person Administering Scale: CAILIN Farrar    Inclusion Criteria  [x]   18 years of age or greater   []   Onset of symptoms < 4.5 hours before beginning treatment (stroke onset = time patient was last seen well or without symptoms).   []   Diagnosis of acute ischemic stroke causing measurable disabling deficit (Complete Hemianopia, Any Aphasia, Visual or Sensory Extinction, Any weakness limiting sustained effort against gravity)   []   Any remaining deficit considered potentially disabling in view of patient and practitioner   Exclusion criteria (Do not  proceed with Alteplase if any are checked under exclusion criteria)  [x]   Onset unknown or GREATER than 4.5 hours   []   ICH on CT/MRI   []   CT demonstrates hypodensity representing acute or subacute infarct   []   Significant head trauma or prior stroke in the previous 3 months   []   Symptoms suggestive of subarachnoid hemorrhage   []   History of un-ruptured intracranial aneurysm GREATER than 10 mm   []   Recent intracranial or intraspinal surgery within the last 3 months   []   Arterial puncture at a non-compressible site in the previous 7 days   []   Active internal bleeding   []   Acute bleeding tendency   []   Platelet count LESS than 100,000 for known hematological diseases such as leukemia, thrombocytopenia or chronic cirrhosis   []   Current use of anticoagulant with INR GREATER than 1.7 or PT GREATER than 15 seconds, aPTT GREATER than 40 seconds   []   Heparin received within 48 hours, resulting in abnormally elevated aPTT GREATER than upper limit of normal   []   Current use of direct thrombin inhibitors or direct factor Xa inhibitors in the past 48 hours   []   Elevated blood pressure refractory to treatment (systolic GREATER than 185 mm/Hg or diastolic  GREATER than 110 mm/Hg   []   Suspected infective endocarditis and aortic arch dissection   []   Current use of therapeutic treatment dose of low-molecular-weight heparin (LMWH) within the previous 24 hours   []   Structural GI malignancy or bleed   Relative exclusion for all patients  []   Only minor non-disabling symptoms   []   Pregnancy   []   Seizure at onset with postictal residual neurological impairments   []   Major surgery or previous trauma within past 14 days   []   History of previous spontaneous ICH, intracranial neoplasm, or AV malformation   []   Postpartum (within previous 14 days)   []   Recent GI or urinary tract hemorrhage (within previous 21 days)   []   Recent acute MI (within previous 3 months)   []   History of un-ruptured  intracranial aneurysm LESS than 10 mm   []   History of ruptured intracranial aneurysm   []   Blood glucose LESS than 50 mg/dL (2.7 mmol/L)   []   Dural puncture within the last 7 days   []   Known GREATER than 10 cerebral microbleeds   Additional exclusions for patients with symptoms onset between 3 and 4.5 hours.  []   Age > 80.   []   On any anticoagulants regardless of INR  >>> Warfarin (Coumadin), Heparin, Enoxaparin (Lovenox), fondaparinux (Arixtra), bivalirudin (Angiomax), Argatroban, dabigatran (Pradaxa), rivaroxaban (Xarelto), or apixaban (Eliquis)   []   Severe stroke (NIHSS > 25).   []   History of BOTH diabetes and previous ischemic stroke.   []   The risks and benefits have been discussed with the patient or family related to the administration of IV thrombolytic therapy for stroke symptoms.   []   I have discussed and reviewed the patient's case and imaging with the attending prior to IV thrombolytic therapy.   NA Time IV thrombolytic administered       Hospital Meds:  Scheduled-    Infusions-     PRNs- •  sodium chloride    Functional Status Prior to Current Stroke/[place] Score: 2-3 following most recent hospitalization    NIH Stroke Scale  Time: 07:07 EST  Person Administering Scale: CAILIN Farrar    Interval: baseline  1a. Level of Consciousness: 0-->Alert, keenly responsive  1b. LOC Questions: 0-->Answers both questions correctly  1c. LOC Commands: 0-->Performs both tasks correctly  2. Best Gaze: 0-->Normal  3. Visual: 0-->No visual loss  4. Facial Palsy: 0-->Normal symmetrical movements  5a. Motor Arm, Left: 0-->No drift, limb holds 90 (or 45) degrees for full 10 secs  5b. Motor Arm, Right: 0-->No drift, limb holds 90 (or 45) degrees for full 10 secs  6a. Motor Leg, Left: 0-->No drift, leg holds 30 degree position for full 5 secs  6b. Motor Leg, Right: 0-->No drift, leg holds 30 degree position for full 5 secs  7. Limb Ataxia: 1-->Present in one limb  8. Sensory: 0-->Normal, no sensory  loss  9. Best Language: 0-->No aphasia, normal  10. Dysarthria: 1-->Mild-to-moderate dysarthria, patient slurs at least some words and, at worst, can be understood with some difficulty  11. Extinction and Inattention (formerly Neglect): 0-->No abnormality    Total (NIH Stroke Scale): 2    Results Reviewed:  I have personally reviewed current lab, radiology, and data and agree with results.    CT Head Without Contrast    Result Date: 12/14/2022  No evidence of acute intracranial hemorrhage or large territory infarct.  Again seen is a giant basilar artery aneurysm status post stenting. The aneurysm appears grossly similar in size to prior examinations.  This report was finalized on 12/14/2022 11:09 PM by Ever Shelley.      CT Angiogram Neck    Result Date: 12/15/2022  CTA: No adverse interval change in the appearance of the arteries. 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 Small volume opacification of the bypassed aneurysm appears decreased since 12/1/2022 suggesting progressive thrombosis. Elsewhere, at most mild arterial stenoses, as detailed above. Mild left mastoid effusion. COMMENT: If clinically indicated, and no contraindication, head MRI is offered for further evaluation. Electronically signed by:  Rinku Sapp M.D.  12/15/2022 2:38 AM Mountain Time    XR Chest 1 View    Result Date: 12/14/2022  No radiographic evidence of acute cardiopulmonary process.  This report was finalized on 12/14/2022 11:10 PM by Ever Shelley.      CT Angiogram Head w AI Analysis of LVO    Result Date: 12/15/2022  CTA: No adverse interval change in the appearance of the arteries. 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 Small volume opacification of the bypassed aneurysm appears decreased since 12/1/2022 suggesting progressive thrombosis. Elsewhere, at most mild arterial stenoses, as detailed above. Mild left mastoid effusion. COMMENT: If  clinically indicated, and no contraindication, head MRI is offered for further evaluation. Electronically signed by:  Rinku Sapp M.D.  12/15/2022 2:38 AM Mountain Time    BUN 20/creatinine 0.59  ALT 29/AST 18  Hemoglobin 14.1/hematocrit 42.3  Platelets 299    Results for orders placed during the hospital encounter of 11/26/22    Adult Transthoracic Echo Complete W/ Cont if Necessary Per Protocol (With Agitated Saline)    Interpretation Summary  •  Saline test results are negative.  •  There is calcification of the aortic valve.  •  Estimated right ventricular systolic pressure from tricuspid regurgitation is normal (<35 mmHg). Calculated right ventricular systolic pressure from tricuspid regurgitation is 30 mmHg.  •  Normal LV systolic function  •  Mild AI  •  Mild MR     P2Y12 73  Hemoglobin A1c 5.5  Total cholesterol 186 HDL 50     triglycerides 77    Assessment/Plan:    is a 62-year-old  female with known medical diagnosis of hypertension, remote smoking history, recent right occipital infarct and recent giant partially thrombosed basilar aneurysm of which she had embolization and was treated with flow diverter therapy with Dr. Savage on 11/29/2022.  Following this procedure she was started on dual antiplatelet therapy as well as a steroid taper and was discharged home.  She presents to [facility] ED today with concerns of a syncopal episode at home.  Per patient and her 's report around 1845 patient was in the kitchen when her  felt that she did not look well, he had her sit down, she states she did not feel well, he notes that she became pale and diaphoretic and passed out.  She states she had a headache was nauseous, dizzy, and lightheaded at this time.  Patient then lost control of her bowels and had generalized shaking noted following this episode as well as right facial droop and dysarthria per patient's .  Patient's  states that EMS was unavailable to  transfer patient to hospital so she was driven via private vehicle to [facility] for further evaluation and work-up.  NIH 2 on my assessment.       Antiplatelet PTA: Aspirin 81 mg, Plavix 75 mg  Anticoagulant PTA: N/A        1. Dizziness, syncopal episode  -Repeat CTA head and neck show no adverse interval change of the arteries  -Patient received Keppra 1 g in ED  -EEG in a.m.  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 until cleared by bedside dysphagia screen, the may have cardiac diet  -Neurochecks and NIHSS per protocol  -PT/OT/SLP to evaluate and treat  -Patient recently had TTE, hemoglobin A1c, and lipid panel done, no need to repeat at this time  -Activity as tolerated, fall precautions  -MRI brain without contrast    Discussed plan of care with patient, patient's , nursing staff, and Dr. Acharya.  Patient be admitted to hospitalist service for further evaluation and work-up.  Stroke neurology will continue to follow, please call with any questions or concerns.  Thank you for allowing us to participate in the care of this patient.    Syl Martins, CAILIN  December 15, 2022  03:18 EST

## 2022-12-16 LAB
25(OH)D3 SERPL-MCNC: 42 NG/ML (ref 30–100)
ALBUMIN SERPL-MCNC: 3.8 G/DL (ref 3.5–5.2)
ALBUMIN/GLOB SERPL: 2 G/DL
ALP SERPL-CCNC: 65 U/L (ref 39–117)
ALT SERPL W P-5'-P-CCNC: 19 U/L (ref 1–33)
ANION GAP SERPL CALCULATED.3IONS-SCNC: 11 MMOL/L (ref 5–15)
AST SERPL-CCNC: 11 U/L (ref 1–32)
BASOPHILS # BLD AUTO: 0.04 10*3/MM3 (ref 0–0.2)
BASOPHILS NFR BLD AUTO: 0.5 % (ref 0–1.5)
BILIRUB SERPL-MCNC: 0.5 MG/DL (ref 0–1.2)
BUN SERPL-MCNC: 13 MG/DL (ref 8–23)
BUN/CREAT SERPL: 31 (ref 7–25)
CALCIUM SPEC-SCNC: 8.6 MG/DL (ref 8.6–10.5)
CHLORIDE SERPL-SCNC: 106 MMOL/L (ref 98–107)
CO2 SERPL-SCNC: 22 MMOL/L (ref 22–29)
CREAT SERPL-MCNC: 0.42 MG/DL (ref 0.57–1)
DEPRECATED RDW RBC AUTO: 52.2 FL (ref 37–54)
EGFRCR SERPLBLD CKD-EPI 2021: 111.4 ML/MIN/1.73
EOSINOPHIL # BLD AUTO: 0.21 10*3/MM3 (ref 0–0.4)
EOSINOPHIL NFR BLD AUTO: 2.7 % (ref 0.3–6.2)
ERYTHROCYTE [DISTWIDTH] IN BLOOD BY AUTOMATED COUNT: 14.8 % (ref 12.3–15.4)
FERRITIN SERPL-MCNC: 150.6 NG/ML (ref 13–150)
GLOBULIN UR ELPH-MCNC: 1.9 GM/DL
GLUCOSE BLDC GLUCOMTR-MCNC: 75 MG/DL (ref 70–130)
GLUCOSE SERPL-MCNC: 83 MG/DL (ref 65–99)
HCT VFR BLD AUTO: 38.9 % (ref 34–46.6)
HGB BLD-MCNC: 13 G/DL (ref 12–15.9)
IMM GRANULOCYTES # BLD AUTO: 0.02 10*3/MM3 (ref 0–0.05)
IMM GRANULOCYTES NFR BLD AUTO: 0.3 % (ref 0–0.5)
LYMPHOCYTES # BLD AUTO: 2.33 10*3/MM3 (ref 0.7–3.1)
LYMPHOCYTES NFR BLD AUTO: 29.7 % (ref 19.6–45.3)
MCH RBC QN AUTO: 32.1 PG (ref 26.6–33)
MCHC RBC AUTO-ENTMCNC: 33.4 G/DL (ref 31.5–35.7)
MCV RBC AUTO: 96 FL (ref 79–97)
MONOCYTES # BLD AUTO: 0.58 10*3/MM3 (ref 0.1–0.9)
MONOCYTES NFR BLD AUTO: 7.4 % (ref 5–12)
NEUTROPHILS NFR BLD AUTO: 4.67 10*3/MM3 (ref 1.7–7)
NEUTROPHILS NFR BLD AUTO: 59.4 % (ref 42.7–76)
NRBC BLD AUTO-RTO: 0 /100 WBC (ref 0–0.2)
PA ADP PRP-ACNC: 16 PRU
PLATELET # BLD AUTO: 221 10*3/MM3 (ref 140–450)
PMV BLD AUTO: 10.8 FL (ref 6–12)
POTASSIUM SERPL-SCNC: 4.1 MMOL/L (ref 3.5–5.2)
PROT SERPL-MCNC: 5.7 G/DL (ref 6–8.5)
RBC # BLD AUTO: 4.05 10*6/MM3 (ref 3.77–5.28)
SODIUM SERPL-SCNC: 139 MMOL/L (ref 136–145)
TSH SERPL DL<=0.05 MIU/L-ACNC: 0.85 UIU/ML (ref 0.27–4.2)
VIT B12 BLD-MCNC: 1243 PG/ML (ref 211–946)
WBC NRBC COR # BLD: 7.85 10*3/MM3 (ref 3.4–10.8)

## 2022-12-16 PROCEDURE — G0378 HOSPITAL OBSERVATION PER HR: HCPCS

## 2022-12-16 PROCEDURE — 80050 GENERAL HEALTH PANEL: CPT | Performed by: HOSPITALIST

## 2022-12-16 PROCEDURE — 25010000002 CEFTRIAXONE PER 250 MG: Performed by: HOSPITALIST

## 2022-12-16 PROCEDURE — 97161 PT EVAL LOW COMPLEX 20 MIN: CPT

## 2022-12-16 PROCEDURE — 96372 THER/PROPH/DIAG INJ SC/IM: CPT

## 2022-12-16 PROCEDURE — 82306 VITAMIN D 25 HYDROXY: CPT | Performed by: INTERNAL MEDICINE

## 2022-12-16 PROCEDURE — 82607 VITAMIN B-12: CPT | Performed by: INTERNAL MEDICINE

## 2022-12-16 PROCEDURE — 99225 PR SBSQ OBSERVATION CARE/DAY 25 MINUTES: CPT | Performed by: INTERNAL MEDICINE

## 2022-12-16 PROCEDURE — 85576 BLOOD PLATELET AGGREGATION: CPT | Performed by: RADIOLOGY

## 2022-12-16 PROCEDURE — 99214 OFFICE O/P EST MOD 30 MIN: CPT | Performed by: STUDENT IN AN ORGANIZED HEALTH CARE EDUCATION/TRAINING PROGRAM

## 2022-12-16 PROCEDURE — 25010000002 HEPARIN (PORCINE) PER 1000 UNITS: Performed by: HOSPITALIST

## 2022-12-16 PROCEDURE — 97165 OT EVAL LOW COMPLEX 30 MIN: CPT

## 2022-12-16 PROCEDURE — 82962 GLUCOSE BLOOD TEST: CPT

## 2022-12-16 PROCEDURE — 99221 1ST HOSP IP/OBS SF/LOW 40: CPT | Performed by: RADIOLOGY

## 2022-12-16 PROCEDURE — 82728 ASSAY OF FERRITIN: CPT | Performed by: INTERNAL MEDICINE

## 2022-12-16 PROCEDURE — 25010000002 LEVETIRACETAM IN NACL 0.82% 500 MG/100ML SOLUTION

## 2022-12-16 PROCEDURE — 97116 GAIT TRAINING THERAPY: CPT

## 2022-12-16 RX ORDER — LEVETIRACETAM 5 MG/ML
500 INJECTION INTRAVASCULAR EVERY 12 HOURS SCHEDULED
Status: DISCONTINUED | OUTPATIENT
Start: 2022-12-16 | End: 2022-12-16

## 2022-12-16 RX ORDER — DEXAMETHASONE 2 MG/1
1 TABLET ORAL DAILY
Status: DISCONTINUED | OUTPATIENT
Start: 2022-12-16 | End: 2022-12-16 | Stop reason: SDUPTHER

## 2022-12-16 RX ORDER — DEXAMETHASONE 2 MG/1
1 TABLET ORAL DAILY
Status: COMPLETED | OUTPATIENT
Start: 2022-12-17 | End: 2022-12-17

## 2022-12-16 RX ADMIN — Medication 10 ML: at 20:36

## 2022-12-16 RX ADMIN — SENNOSIDES AND DOCUSATE SODIUM 2 TABLET: 50; 8.6 TABLET ORAL at 20:36

## 2022-12-16 RX ADMIN — PANTOPRAZOLE SODIUM 40 MG: 40 TABLET, DELAYED RELEASE ORAL at 05:10

## 2022-12-16 RX ADMIN — ATORVASTATIN CALCIUM 80 MG: 40 TABLET, FILM COATED ORAL at 20:36

## 2022-12-16 RX ADMIN — SENNOSIDES AND DOCUSATE SODIUM 2 TABLET: 50; 8.6 TABLET ORAL at 08:52

## 2022-12-16 RX ADMIN — SODIUM CHLORIDE 1 G: 900 INJECTION INTRAVENOUS at 05:10

## 2022-12-16 RX ADMIN — HEPARIN SODIUM 5000 UNITS: 5000 INJECTION INTRAVENOUS; SUBCUTANEOUS at 13:20

## 2022-12-16 RX ADMIN — DEXAMETHASONE 1 MG: 2 TABLET ORAL at 08:53

## 2022-12-16 RX ADMIN — ASPIRIN 81 MG CHEWABLE TABLET 81 MG: 81 TABLET CHEWABLE at 08:52

## 2022-12-16 RX ADMIN — HEPARIN SODIUM 5000 UNITS: 5000 INJECTION INTRAVENOUS; SUBCUTANEOUS at 20:36

## 2022-12-16 RX ADMIN — ACETAMINOPHEN 650 MG: 325 TABLET ORAL at 05:12

## 2022-12-16 RX ADMIN — HEPARIN SODIUM 5000 UNITS: 5000 INJECTION INTRAVENOUS; SUBCUTANEOUS at 05:10

## 2022-12-16 RX ADMIN — CLOPIDOGREL BISULFATE 75 MG: 75 TABLET ORAL at 08:53

## 2022-12-16 RX ADMIN — POLYETHYLENE GLYCOL 3350 17 G: 17 POWDER, FOR SOLUTION ORAL at 09:05

## 2022-12-16 RX ADMIN — Medication 10 ML: at 08:52

## 2022-12-16 RX ADMIN — LEVETIRACETAM 500 MG: 5 INJECTION INTRAVASCULAR at 06:39

## 2022-12-16 NOTE — PLAN OF CARE
Goal Outcome Evaluation:               Pt pleasant, AxOx4, flat in affect but cooperative. Had MRI last pm (see chart results) [No acute process identified per radiologist]. Vitals wnl, up to bathroom x1 last night to urinate. Blood sugars remain under control. Neuro on board. NIH at beginning of shift is 1 (see chart). Slept most of night. No complaints.

## 2022-12-16 NOTE — CONSULTS
NAME: MEDHAT PAL  : 1960  PCP: Melissa Lazo APRN  Attending MD: Leida Collins MD    Date of Admission:  12/15/2022  Date of Service: 2022    History of Present Illness:  62 y.o.  female who is well-known to the neuro interventional service, having undergone prior embolization (Pipeline Shield) for giant (partially thrombosed) proximal basilar aneurysm on 2022.  She had initially presented with symptoms of dizziness and ataxia, and was found to have a tiny right occipital infarct.  She had done well since her hospitalization, but on 12/15/2022 she experienced a syncopal episode.  She was sitting on the couch and when she stood up to walk to the kitchen she became diaphoretic and passed out.  She did not lose bowel/bladder control.      Past Medical History:  Past Medical History:   Diagnosis Date   • Hypertension        Past Surgical History:  Past Surgical History:   Procedure Laterality Date   • BREAST LUMPECTOMY     • EMBOLIZATION CEREBRAL N/A 2022    Procedure: CV EMBOLIZATION CEREBRAL IR;  Surgeon: Enoch Savage MD;  Location: [facility] INVASIVE LOCATION;  Service: Interventional Radiology;  Laterality: N/A;   • HIATAL HERNIA REPAIR     • SINUS SURGERY      x4         Medications  Medications Prior to Admission   Medication Sig Dispense Refill Last Dose   • clopidogrel (PLAVIX) 75 MG tablet Take 1 tablet by mouth Daily. 30 tablet 3 2022   • dexamethasone (DECADRON) 2 MG tablet Instruct the patient to take 4 mg PO every 8 hours x 2 days, 2 mg Q8 x3 days, 2 mg every 12 x3 days, 1 mg every 12 x3 days, 1 mg PO daily for 3 days. 30 tablet 0 2022   • moexipril (UNIVASC) 15 MG tablet 15 mg 2 (Two) Times a Day.   2022   • pantoprazole (PROTONIX) 40 MG EC tablet Take 1 tablet by mouth Every Morning. Indications: Take while on the steroid taper. 30 tablet 0 2022   • albuterol (PROAIR RESPICLICK) 108 (90 Base) MCG/ACT inhaler Inhale 2 puffs Every 4  (Four) Hours As Needed for Wheezing or Shortness of Air. 1 inhaler 0    • aspirin 81 MG chewable tablet Chew 1 tablet Daily.      • Cholecalciferol (VITAMIN D3) 400 units capsule Take 1 tablet by mouth Daily.      • fluticasone (FLONASE) 50 MCG/ACT nasal spray 2 sprays into the nostril(s) as directed by provider Daily. 1 bottle 0    • Glucosamine Sulfate (SYNOVACIN PO) Take 1,000 mg by mouth Daily.      • lidocaine (XYLOCAINE) 5 % ointment lidocaine 5 % topical ointment      • Naproxen Sodium 220 MG capsule Take 2 tablets by mouth 2 (Two) Times a Day As Needed. Monday through Thursday      • Omega-3 Fatty Acids (FISH OIL) 1000 MG capsule capsule Take 1 capsule by mouth Daily.      • ondansetron ODT (ZOFRAN-ODT) 4 MG disintegrating tablet ondansetron 4 mg disintegrating tablet      • Zinc 50 MG capsule Take  by mouth.          Allergies:  No Known Allergies    Social Hx:  Social History     Socioeconomic History   • Marital status:    Tobacco Use   • Smoking status: Former     Types: Cigarettes     Quit date: 10/4/2004     Years since quittin.2   • Smokeless tobacco: Never   Substance and Sexual Activity   • Alcohol use: No   • Drug use: No   • Sexual activity: Defer       Family Hx:  Family History   Problem Relation Age of Onset   • No Known Problems Mother    • Heart attack Father 42   • Heart attack Paternal Aunt    • Heart attack Paternal Uncle    • No Known Problems Sister    • Cancer Maternal Grandmother    • Heart failure Maternal Grandmother    • No Known Problems Maternal Grandfather    • No Known Problems Paternal Grandmother    • No Known Problems Paternal Grandfather    • Diabetes Half-Brother        Review of Imaging:  CT/CTA, as well as MRI/MRI examinations from [facility] on 12/15/2022.  Again seen is a largely thrombosed giant proximal basilar aneurysm.  The pipeline stent extending from the right vertebral artery to the basilar artery remains patent without flow limitation  or complicating feature.  Small amount of residual filling of the aneurysm neck has further decreased compared to the 12/1/2022 study consistent with further interval thrombosis.  There are no areas of acute infarct.  There remains mass-effect on the adjacent brainstem, but no edema within the jong or medulla.    Laboratory Result:  Lab Results   Component Value Date    WBC 7.85 12/16/2022    HGB 13.0 12/16/2022    HCT 38.9 12/16/2022    MCV 96.0 12/16/2022     12/16/2022     Lab Results   Component Value Date    GLUCOSE 83 12/16/2022    CALCIUM 8.6 12/16/2022     12/16/2022    K 4.1 12/16/2022    CO2 22.0 12/16/2022     12/16/2022    BUN 13 12/16/2022    CREATININE 0.42 (L) 12/16/2022    EGFRIFNONA 103 09/29/2019    BCR 31.0 (H) 12/16/2022    ANIONGAP 11.0 12/16/2022     Lab Results   Component Value Date    HGBA1C 5.50 11/27/2022     Lab Results   Component Value Date    CHOL 186 11/27/2022    TRIG 77 11/27/2022    HDL 50 11/27/2022     (H) 11/27/2022       Physical Examination:  Vitals:    12/16/22 0852   BP: 101/70   Pulse: 52   Resp:    Temp:    SpO2:         General Appearance:   Well developed, well nourished, well groomed, alert, and cooperative.  Neurological examination:  Alert and oriented x3.  Nonfocal neurologic exam.  Speech is clear.  No diplopia.  Extraocular muscles are full.  Ambulates unassisted.    Diagnoses/Plan:    Ms. Romo is a 62 y.o. female status postembolization of a giant, largely thrombosed proximal basilar aneurysm with Pipeline Shield flow diverter therapy on 11/29/2022.  She initially presented in November with symptoms of ataxia and dizziness, but on 12/15/2022, she experienced a syncopal episode.  Her imaging studies demonstrated progressive thrombosis of the aneurysm with preservation of flow in all parent vasculature and no complicating features.  Specifically, while there does remain mass-effect on the brainstem, there is no appreciable edema.  There  "are no areas of acute infarction.    I greatly appreciate neurology seeing her, as I suspect that her syncopal episode represents a vasovagal syncope/orthostatic hypotension versus less likely a central cephalic seizures.  Her blood pressure has been pretty stable, but she does have episodes of slight bradycardia with heart rate in the 50s.  I think she might benefit from wearing a \"Zio patch\", to assess for any arrhythmia that might contribute to her vasovagal symptoms.    Additionally, Ms. Benz and family are concerned about her general fatigue, tiredness, difficulty sleeping, and poor appetite.  Certainly she could have an element of a depression and situational anxiety related to her recent hospitalizations and medical issues.  She is going to discuss this further with the hospitalist and/or her PCP, as she likely would benefit from a some sort of antidepressant.    She is scheduled to follow-up with me on 12/29/2022, and she should keep this outpatient appointment.  She does not need to have additional imaging performed on 12/29/2022.    "

## 2022-12-16 NOTE — PROGRESS NOTES
Malnutrition Severity Assessment    Patient Name:  Lauryn Romo  YOB: 1960  MRN: [id number]  Admit Date:  12/15/2022    Patient meets criteria for : Moderate (non-severe) Malnutrition (PO intakes <75% est. needs at least past month, loss 6.9% body weight X 1m, mild muscle wasting, and mild subcutaneous fat loss.)    Malnutrition Severity Assessment  Malnutrition Type: Acute Disease or Injury - Related Malnutrition  Malnutrition Type (last 8 hours)     Malnutrition Severity Assessment     Row Name 12/16/22 1717       Malnutrition Severity Assessment    Malnutrition Type Acute Disease or Injury - Related Malnutrition    Row Name 12/16/22 1717       Insufficient Energy Intake     Insufficient Energy Intake Findings Severe  PO intakes <75% est. needs at least past month    Insufficient Energy Intake  <75% of est. energy requirement for > or equal to 1 month    Row Name 12/16/22 1717       Unintentional Weight Loss     Unintentional Weight Loss Findings Severe  loss 6.9% body weight X 1m    Unintentional Weight Loss  Weight loss greater than 5% in one month    Row Name 12/16/22 1717       Muscle Loss    Loss of Muscle Mass Findings Mild  mild temporalis, acromion, clavicular, dorsal, posterior    Row Name 12/16/22 1717       Fat Loss    Subcutaneous Fat Loss Findings Mild  mild orbital, UAR, and thoracic    Row Name 12/16/22 1717       Criteria Met (Must meet criteria for severity in at least 2 of these categories: M Wasting, Fat Loss, Fluid, Secondary Signs, Wt. Status, Intake)    Patient meets criteria for  Moderate (non-severe) Malnutrition  PO intakes <75% est. needs at least past month, loss 6.9% body weight X 1m, mild muscle wasting, and mild subcutaneous fat loss.                Electronically signed by:  Leila Aguila RD  12/16/22 17:20 EST

## 2022-12-16 NOTE — CASE MANAGEMENT/SOCIAL WORK
Discharge Planning Assessment  [facility]     Patient Name: Lauryn Romo  MRN: [id number]  Today's Date: 12/16/2022    Admit Date: 12/15/2022    Plan: Home   Discharge Needs Assessment     Row Name 12/16/22 1418       Living Environment    People in Home spouse    Name(s) of People in Home Chris,     Current Living Arrangements home    Potentially Unsafe Housing Conditions unable to assess    Primary Care Provided by self    Provides Primary Care For no one    Family Caregiver if Needed spouse    Quality of Family Relationships helpful;involved;supportive    Able to Return to Prior Arrangements yes       Resource/Environmental Concerns    Resource/Environmental Concerns none    Transportation Concerns none       Transition Planning    Patient/Family Anticipates Transition to home with family    Patient/Family Anticipated Services at Transition     Transportation Anticipated family or friend will provide       Discharge Needs Assessment    Readmission Within the Last 30 Days no previous admission in last 30 days    Equipment Currently Used at Home none    Concerns to be Addressed denies needs/concerns at this time    Anticipated Changes Related to Illness none               Discharge Plan     Row Name 12/16/22 2654       Plan    Plan Home    Patient/Family in Agreement with Plan yes    Plan Comments Spoke with patient and  at bedside to initiate discharge planning.  Patient confirms she lives in [facility]; PCP is Melissa Lazo; and insurance is [employer].  Patient states she is independent with ADLs and mobility.  She denies DME and HH.  Patient's  will provide transportation at time of discharge.  CM will continue to follow.      15:35 EST  PT recs RW and shower bench, patient agreeable.  Jesus with [employer] will deliver to pt's room prior to DC.    Final Discharge Disposition Code 01 - home or self-care              Continued Care and Services - Admitted Since 12/15/2022     Coordination has not been started for this encounter.       Expected Discharge Date and Time     Expected Discharge Date Expected Discharge Time    Dec 19, 2022          Demographic Summary     Row Name 12/16/22 1417       General Information    Arrived From home    Referral Source admission list    Reason for Consult discharge planning    Preferred Language English       Contact Information    Permission Granted to Share Info With                Functional Status     Row Name 12/16/22 1418       Functional Status    Usual Activity Tolerance good    Current Activity Tolerance good       Functional Status, IADL    Medications independent    Meal Preparation independent    Housekeeping independent    Laundry independent    Shopping independent               Psychosocial    No documentation.                Abuse/Neglect    No documentation.                Legal    No documentation.                Substance Abuse    No documentation.                Patient Forms    No documentation.                   Jemma Machado RN

## 2022-12-16 NOTE — PROGRESS NOTES
[facility] Medicine Services  PROGRESS NOTE    Patient Name: Lauryn Romo  : 1960  MRN: [id number]    Date of Admission: 12/15/2022  Primary Care Physician: Melissa Lazo APRN    Subjective   Subjective     CC:  dizziness    HPI:  Describes episode - felt lightheaded and dizzy, was trying to sit down when she lost consciousness falling into chair. No jerking movements but right sided facial droop + dysarthria during episode. Awoke and had urinated on herself but quickly returned to her previous cognitive function. Glucose normal,  obtained vitals around the time that EMS came with /74, HR 68.  Has dizziness, unsteadiness for a while. Slow to get up to avoid lightheadedness/dizziness.  No chest pain, no palpitations    ROS:  Gen- No fevers, chills  CV- No chest pain, palpitations  Resp- No cough, dyspnea    Objective   Objective     Vital Signs:   Temp:  [97.5 °F (36.4 °C)-98.2 °F (36.8 °C)] 97.9 °F (36.6 °C)  Heart Rate:  [48-72] 55  Resp:  [15-18] 18  BP: ()/(53-78) 119/75     Physical Exam:  Constitutional: No acute distress, awake, alert older female sitting up in bed.  bedside  HENT: NCAT, mucous membranes moist  Respiratory: Clear to auscultation bilaterally, respiratory effort normal   Cardiovascular: Bradycardia, regular rhythm, no murmurs, rubs, or gallops  Gastrointestinal: Soft, nontender, nondistended  Musculoskeletal: Muscle tone decreased throughout, no joint effusions appreciated  Psychiatric: Appropriate affect, cooperative  Neurologic: Alert and oriented, facial movements symmetric and spontaneous movement of all 4 extremities grossly equal bilaterally, speech clear  Skin: Bruise right upper arm, no rashes    Results Reviewed:  LAB RESULTS:      Lab 22  0442 22  2354   WBC 7.85 10.96*   HEMOGLOBIN 13.0 14.1   HEMATOCRIT 38.9 42.3   PLATELETS 221 299   NEUTROS ABS 4.67 7.34*   IMMATURE GRANS (ABS) 0.02 0.06*   LYMPHS ABS 2.33  2.56   MONOS ABS 0.58 0.79   EOS ABS 0.21 0.15   MCV 96.0 95.9   PROCALCITONIN  --  <0.02   LACTATE  --  1.0         Lab 12/16/22 0442 12/14/22 2354   SODIUM 139 140   POTASSIUM 4.1 4.0   CHLORIDE 106 103   CO2 22.0 26.0   ANION GAP 11.0 11.0   BUN 13 20   CREATININE 0.42* 0.59   EGFR 111.4 102.7   GLUCOSE 83 112*   CALCIUM 8.6 8.9   MAGNESIUM  --  2.3         Lab 12/16/22 0442 12/14/22  2354   TOTAL PROTEIN 5.7* 6.9   ALBUMIN 3.80 4.10   GLOBULIN 1.9 2.8   ALT (SGPT) 19 29   AST (SGOT) 11 18   BILIRUBIN 0.5 0.3   ALK PHOS 65 86         Lab 12/14/22 2354   TROPONIN T <0.010  <0.010                 Brief Urine Lab Results  (Last result in the past 365 days)      Color   Clarity   Blood   Leuk Est   Nitrite   Protein   CREAT   Urine HCG        12/15/22 0000 Yellow   Cloudy   Trace   Large (3+)   Positive   Negative                 Microbiology Results Abnormal     Procedure Component Value - Date/Time    Blood Culture - Blood, Hand, Right [[id number]]  (Normal) Collected: 12/15/22 0345    Lab Status: Preliminary result Specimen: Blood from Hand, Right Updated: 12/16/22 0615     Blood Culture No growth at 24 hours    Blood Culture - Blood, Arm, Left [[id number]]  (Normal) Collected: 12/15/22 0345    Lab Status: Preliminary result Specimen: Blood from Arm, Left Updated: 12/16/22 0615     Blood Culture No growth at 24 hours    COVID PRE-OP / PRE-PROCEDURE SCREENING ORDER (NO ISOLATION) - Swab, Nasopharynx [[id number]]  (Normal) Collected: 12/15/22 0345    Lab Status: Final result Specimen: Swab from Nasopharynx Updated: 12/15/22 0417    Narrative:      The following orders were created for panel order COVID PRE-OP / PRE-PROCEDURE SCREENING ORDER (NO ISOLATION) - Swab, Nasopharynx.  Procedure                               Abnormality         Status                     ---------                               -----------         ------                     COVID-19 and FLU A/B PCR...[[id number]]  Normal              Final  result                 Please view results for these tests on the individual orders.    COVID-19 and FLU A/B PCR - Swab, Nasopharynx [[id number]]  (Normal) Collected: 12/15/22 0345    Lab Status: Final result Specimen: Swab from Nasopharynx Updated: 12/15/22 0417     COVID19 Not Detected     Influenza A PCR Not Detected     Influenza B PCR Not Detected    Narrative:      Fact sheet for providers: https://www.fda.gov/media/999909/download    Fact sheet for patients: https://www.fda.gov/media/785264/download    Test performed by PCR.          Adult Transthoracic Echo Complete W/ Cont if Necessary Per Protocol    Result Date: 12/15/2022  •  Left ventricular systolic function is normal. Left ventricular ejection fraction appears to be 56 - 60%. •  Mild aortic valve regurgitation is present.     EEG    Result Date: 12/15/2022  Reason for referral: 62 y.o.female with seizures Technical Summary:  A 19 channel digital EEG was performed using the international 10-20 placement system, including eye leads and EKG leads. Duration: 22 minutes Findings: The awake tracing shows a medium amplitude well-regulated 9 Hz posterior rhythm present symmetrically over the occipital leads.  Low to medium amplitude intermixed theta and alpha activity is seen anteriorly.  Hyperventilation does not elicit abnormality.  Drowsiness is seen with mild slowing of the background but stage II sleep is not seen.  Photic stimulation does not change the background.  No focal features or epileptiform activity are seen Video: Off Technical quality: Good SUMMARY: Normal EEG in the awake and lightly drowsy states No focal features or epileptiform activity are seen     Impression: Normal study This report is transcribed using the Dragon dictation system.      CT Head Without Contrast    Result Date: 12/14/2022  DATE OF EXAM: 12/14/2022 9:51 PM  PROCEDURE: CT HEAD WO CONTRAST-  INDICATIONS: seizure  COMPARISON: CTA head 12/1/2022, MR brain 11/26/2022   TECHNIQUE: Routine transaxial and coronal reconstruction images were obtained through the head without the administration of contrast. Automated exposure control and iterative reconstruction methods were used.  The radiation dose reduction device was turned on for each scan per the ALARA (As Low as Reasonably Achievable) protocol.  FINDINGS: Parenchyma:No acute intracranial hemorrhage. No loss of gray-white differentiation to suggest large territory infarct. Mild parenchymal volume loss. Scattered periventricular and subcortical white matter hypodensities most consistent with small vessel ischemic changes. 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  Ventricles and extra axial spaces:Prominent ventricles and sulci secondary to volume loss. No extra axial fluid collection seen. Again seen is a giant basilar artery aneurysm measuring approximately 2.7 x 2.7 x 2.7 cm, grossly similar in size to prior examination. A stent is seen. Again seen is mass effect on the underlying brain stem from this aneurysm.  Other:Orbits are grossly intact. Paranasal sinuses are clear. Small left mastoid effusion. Calvarium is intact.      Impression: No evidence of acute intracranial hemorrhage or large territory infarct.  Again seen is a giant basilar artery aneurysm status post stenting. The aneurysm appears grossly similar in size to prior examinations.  This report was finalized on 12/14/2022 11:09 PM by Ever Shelley.      CT Angiogram Neck    Result Date: 12/15/2022  EXAM: CT ANGIOGRAM HEAD W AI ANALYSIS OF LVO, CT ANGIOGRAM NECK EXAM DATE: 12/15/2022 3:51 AM INDICATION: Dizziness, headache, possible seizure. History of basilar artery aneurysm status post stent. COMPARISON: 11/26/2022, 12/1/2022. TECHNIQUE: Images through the head and neck with intravenous contrast in the angiographic phase. Maximum intensity projections (MIPs) and/or 3D reconstructions constructed and reviewed at time of study interpretation. NASCET criteria  used for interpretation. Low-dose CT acquisition technique included one or more of the following options: Automated exposure control; Adjustment of mA and/or KV according to patient's size; Use of iterative reconstruction.  CONTRAST: Administered.   FINDINGS: TECHNICAL: Technically adequate study. CTA: AORTIC ARCH: No significant stenosis identified. Minimal plaque.  BRACHIOCEPHALIC, SUBCLAVIAN, AND VISUALIZED AXILLARY ARTERIES: No significant stenosis identified. Minimal plaque. RIGHT COMMON CAROTID ARTERY (CCA): No significant stenosis identified. RIGHT INTERNAL CAROTID ARTERY (ICA): No significant stenosis of the proximal internal carotid artery identified near the carotid bifurcation. Minimal plaque about the carotid bifurcation. At most mild intracranial stenosis. Tortuous cervical internal carotid artery. LEFT COMMON CAROTID ARTERY (CCA): No significant stenosis identified. LEFT INTERNAL CAROTID ARTERY (ICA): No stenosis of the proximal internal carotid artery identified near the carotid bifurcation. At most mild intracranial stenosis. Tortuous cervical internal carotid artery. RIGHT VERTEBRAL ARTERY: No significant stenosis identified. LEFT VERTEBRAL ARTERY: No significant stenosis identified. ANTERIOR CEREBRAL ARTERIES (ACAs): No significant stenosis identified. RIGHT MIDDLE CEREBRAL ARTERY (MCA): No significant stenosis identified. LEFT MIDDLE CEREBRAL ARTERY (MCA): No significant stenosis identified. RIGHT POSTERIOR CEREBRAL ARTERY (PCA): No significant stenosis identified. LEFT POSTERIOR CEREBRAL ARTERY (PCA): No significant stenosis identified. BASILAR ARTERY: No significant stenosis identified. 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 Small volume opacification of the bypassed aneurysm appears decreased since 12/1/2022. BRAIN: No regions of decreased parenchymal blush to suggest acute  large territorial infarct. OTHER FINDINGS: OTHER SIGNIFICANT FINDINGS: None. PARANASAL SINUSES: At most mild opacification. Posterior MASTOID AIR CELLS: Opacification of the left mastoid air cells, mild. LUNGS: Calcified lung nodule suggesting prior granulomatous infection.     Impression: CTA: No adverse interval change in the appearance of the arteries. 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 Small volume opacification of the bypassed aneurysm appears decreased since 12/1/2022 suggesting progressive thrombosis. Elsewhere, at most mild arterial stenoses, as detailed above. Mild left mastoid effusion. COMMENT: If clinically indicated, and no contraindication, head MRI is offered for further evaluation. Electronically signed by:  Rinku Sapp M.D.  12/15/2022 2:38 AM Mountain Time    MRI Angiogram Head Without Contrast    Result Date: 12/16/2022  DATE OF EXAM: 12/15/2022 9:39 PM  PROCEDURE: MRI BRAIN WO CONTRAST-, MRI ANGIOGRAM HEAD WO CONTRAST-, MRI ANGIOGRAM NECK W CONTRAST-  INDICATIONS: Stroke, follow up; N30.00-Acute cystitis without hematuria; R55-Syncope and collapse; R41.841-Cognitive communication deficit  COMPARISON: Same day CTA  TECHNIQUE: Multiplanar multisequence images of the brain were performed without contrast according to routine brain MRI protocol.  Noncontrast MR angiography of the head was performed.  Contrast enhanced MR angiography of the neck was performed after the administration of 10 mL of MultiHance contrast.  FINDINGS: Parenchyma: No evidence of acute infarct. No evidence of hemorrhage. Midline structures appear intact. No midline shift or herniation. Again seen is mass effect on the brainstem from large basilar artery aneurysm. Few scattered periventricular and subcortical white matter FLAIR hyperintensities suggestive chronic small vessel ischemic changes. Normal parenchymal volume.  Ventricles and extra axial spaces: Normal caliber of  ventricles and sulci. No extra axial fluid collections.  Other: Orbits appear intact. Trace bilateral mastoid effusions. Scattered paranasal sinus mucosal thickening. Calvarial marrow signal is intact.  Head angiogram: The distal carotid, middle cerebral anterior cerebral and anterior communicate arteries appear patent without flow-limiting stenosis. The left vertebral artery terminates at the distal V4 segment and is excluded from the stented area. The right vertebral artery is patent and is stented into the basilar artery across the basilar artery aneurysm. The basilar artery appears patent. The posterior cerebral arteries are patent. The superior cerebellar and posterior inferior cerebellar arteries are patent. The anterior inferior cerebellar arteries are not seen. There is residual flow related signal seen into the aneurysm sac from the basilar artery better appreciated on recent CTA.  Neck angiogram: The cervical portions of the internal carotid artery and the common carotid artery are patent. The cervical portions of the vertebral artery are patent. Again seen is exclusion of the left vertebral artery from the stented portion of the basilar artery. Additionally again seen is contrast flow into the aneurysm sac better evaluated on prior CTA.      Impression: No evidence of acute infarct  Again seen is large basilar artery aneurysm. There is stenting extending from the right distal vertebral artery across the basilar artery. The left vertebral artery is excluded from the stenting and terminates at the distal V4 segment. There is residual flow seen within this to the aneurysm sac better evaluated on recent CTA.  The remaining vessels of the head and neck demonstrate no occlusion or flow-limiting stenosis.  This report was finalized on 12/16/2022 1:05 AM by Ever Shelley.      MRI Angiogram Neck With Contrast    Result Date: 12/16/2022  DATE OF EXAM: 12/15/2022 9:39 PM  PROCEDURE: MRI BRAIN WO CONTRAST-, MRI  ANGIOGRAM HEAD WO CONTRAST-, MRI ANGIOGRAM NECK W CONTRAST-  INDICATIONS: Stroke, follow up; N30.00-Acute cystitis without hematuria; R55-Syncope and collapse; R41.841-Cognitive communication deficit  COMPARISON: Same day CTA  TECHNIQUE: Multiplanar multisequence images of the brain were performed without contrast according to routine brain MRI protocol.  Noncontrast MR angiography of the head was performed.  Contrast enhanced MR angiography of the neck was performed after the administration of 10 mL of MultiHance contrast.  FINDINGS: Parenchyma: No evidence of acute infarct. No evidence of hemorrhage. Midline structures appear intact. No midline shift or herniation. Again seen is mass effect on the brainstem from large basilar artery aneurysm. Few scattered periventricular and subcortical white matter FLAIR hyperintensities suggestive chronic small vessel ischemic changes. Normal parenchymal volume.  Ventricles and extra axial spaces: Normal caliber of ventricles and sulci. No extra axial fluid collections.  Other: Orbits appear intact. Trace bilateral mastoid effusions. Scattered paranasal sinus mucosal thickening. Calvarial marrow signal is intact.  Head angiogram: The distal carotid, middle cerebral anterior cerebral and anterior communicate arteries appear patent without flow-limiting stenosis. The left vertebral artery terminates at the distal V4 segment and is excluded from the stented area. The right vertebral artery is patent and is stented into the basilar artery across the basilar artery aneurysm. The basilar artery appears patent. The posterior cerebral arteries are patent. The superior cerebellar and posterior inferior cerebellar arteries are patent. The anterior inferior cerebellar arteries are not seen. There is residual flow related signal seen into the aneurysm sac from the basilar artery better appreciated on recent CTA.  Neck angiogram: The cervical portions of the internal carotid artery and the  common carotid artery are patent. The cervical portions of the vertebral artery are patent. Again seen is exclusion of the left vertebral artery from the stented portion of the basilar artery. Additionally again seen is contrast flow into the aneurysm sac better evaluated on prior CTA.      Impression: No evidence of acute infarct  Again seen is large basilar artery aneurysm. There is stenting extending from the right distal vertebral artery across the basilar artery. The left vertebral artery is excluded from the stenting and terminates at the distal V4 segment. There is residual flow seen within this to the aneurysm sac better evaluated on recent CTA.  The remaining vessels of the head and neck demonstrate no occlusion or flow-limiting stenosis.  This report was finalized on 12/16/2022 1:05 AM by Ever Shelley.      MRI Brain Without Contrast    Result Date: 12/16/2022  DATE OF EXAM: 12/15/2022 9:39 PM  PROCEDURE: MRI BRAIN WO CONTRAST-, MRI ANGIOGRAM HEAD WO CONTRAST-, MRI ANGIOGRAM NECK W CONTRAST-  INDICATIONS: Stroke, follow up; N30.00-Acute cystitis without hematuria; R55-Syncope and collapse; R41.841-Cognitive communication deficit  COMPARISON: Same day CTA  TECHNIQUE: Multiplanar multisequence images of the brain were performed without contrast according to routine brain MRI protocol.  Noncontrast MR angiography of the head was performed.  Contrast enhanced MR angiography of the neck was performed after the administration of 10 mL of MultiHance contrast.  FINDINGS: Parenchyma: No evidence of acute infarct. No evidence of hemorrhage. Midline structures appear intact. No midline shift or herniation. Again seen is mass effect on the brainstem from large basilar artery aneurysm. Few scattered periventricular and subcortical white matter FLAIR hyperintensities suggestive chronic small vessel ischemic changes. Normal parenchymal volume.  Ventricles and extra axial spaces: Normal caliber of ventricles and  sulci. No extra axial fluid collections.  Other: Orbits appear intact. Trace bilateral mastoid effusions. Scattered paranasal sinus mucosal thickening. Calvarial marrow signal is intact.  Head angiogram: The distal carotid, middle cerebral anterior cerebral and anterior communicate arteries appear patent without flow-limiting stenosis. The left vertebral artery terminates at the distal V4 segment and is excluded from the stented area. The right vertebral artery is patent and is stented into the basilar artery across the basilar artery aneurysm. The basilar artery appears patent. The posterior cerebral arteries are patent. The superior cerebellar and posterior inferior cerebellar arteries are patent. The anterior inferior cerebellar arteries are not seen. There is residual flow related signal seen into the aneurysm sac from the basilar artery better appreciated on recent CTA.  Neck angiogram: The cervical portions of the internal carotid artery and the common carotid artery are patent. The cervical portions of the vertebral artery are patent. Again seen is exclusion of the left vertebral artery from the stented portion of the basilar artery. Additionally again seen is contrast flow into the aneurysm sac better evaluated on prior CTA.      Impression: No evidence of acute infarct  Again seen is large basilar artery aneurysm. There is stenting extending from the right distal vertebral artery across the basilar artery. The left vertebral artery is excluded from the stenting and terminates at the distal V4 segment. There is residual flow seen within this to the aneurysm sac better evaluated on recent CTA.  The remaining vessels of the head and neck demonstrate no occlusion or flow-limiting stenosis.  This report was finalized on 12/16/2022 1:05 AM by Ever Shelley.      XR Chest 1 View    Result Date: 12/14/2022  DATE OF EXAM: 12/14/2022 10:19 PM  PROCEDURE: XR CHEST 1 VW-  INDICATIONS: Weak/Dizzy/AMS triage protocol   COMPARISON: 11/26/2022  TECHNIQUE: Single radiographic AP view of the chest was obtained.  FINDINGS: Normal cardiomediastinal silhouette. No focal opacity, pleural effusion or pneumothorax. Likely calcified granulomas in the right upper lung, similar to prior exam. No evidence of acute osseous abnormality. Visualized upper abdomen is unremarkable.      Impression: No radiographic evidence of acute cardiopulmonary process.  This report was finalized on 12/14/2022 11:10 PM by Ever Shelley.      CT Angiogram Head w AI Analysis of LVO    Result Date: 12/15/2022  EXAM: CT ANGIOGRAM HEAD W AI ANALYSIS OF LVO, CT ANGIOGRAM NECK EXAM DATE: 12/15/2022 3:51 AM INDICATION: Dizziness, headache, possible seizure. History of basilar artery aneurysm status post stent. COMPARISON: 11/26/2022, 12/1/2022. TECHNIQUE: Images through the head and neck with intravenous contrast in the angiographic phase. Maximum intensity projections (MIPs) and/or 3D reconstructions constructed and reviewed at time of study interpretation. NASCET criteria used for interpretation. Low-dose CT acquisition technique included one or more of the following options: Automated exposure control; Adjustment of mA and/or KV according to patient's size; Use of iterative reconstruction.  CONTRAST: Administered.   FINDINGS: TECHNICAL: Technically adequate study. CTA: AORTIC ARCH: No significant stenosis identified. Minimal plaque.  BRACHIOCEPHALIC, SUBCLAVIAN, AND VISUALIZED AXILLARY ARTERIES: No significant stenosis identified. Minimal plaque. RIGHT COMMON CAROTID ARTERY (CCA): No significant stenosis identified. RIGHT INTERNAL CAROTID ARTERY (ICA): No significant stenosis of the proximal internal carotid artery identified near the carotid bifurcation. Minimal plaque about the carotid bifurcation. At most mild intracranial stenosis. Tortuous cervical internal carotid artery. LEFT COMMON CAROTID ARTERY (CCA): No significant stenosis identified. LEFT INTERNAL  CAROTID ARTERY (ICA): No stenosis of the proximal internal carotid artery identified near the carotid bifurcation. At most mild intracranial stenosis. Tortuous cervical internal carotid artery. RIGHT VERTEBRAL ARTERY: No significant stenosis identified. LEFT VERTEBRAL ARTERY: No significant stenosis identified. ANTERIOR CEREBRAL ARTERIES (ACAs): No significant stenosis identified. RIGHT MIDDLE CEREBRAL ARTERY (MCA): No significant stenosis identified. LEFT MIDDLE CEREBRAL ARTERY (MCA): No significant stenosis identified. RIGHT POSTERIOR CEREBRAL ARTERY (PCA): No significant stenosis identified. LEFT POSTERIOR CEREBRAL ARTERY (PCA): No significant stenosis identified. BASILAR ARTERY: No significant stenosis identified. 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 Small volume opacification of the bypassed aneurysm appears decreased since 12/1/2022. BRAIN: No regions of decreased parenchymal blush to suggest acute large territorial infarct. OTHER FINDINGS: OTHER SIGNIFICANT FINDINGS: None. PARANASAL SINUSES: At most mild opacification. Posterior MASTOID AIR CELLS: Opacification of the left mastoid air cells, mild. LUNGS: Calcified lung nodule suggesting prior granulomatous infection.     Impression: CTA: No adverse interval change in the appearance of the arteries. 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 Small volume opacification of the bypassed aneurysm appears decreased since 12/1/2022 suggesting progressive thrombosis. Elsewhere, at most mild arterial stenoses, as detailed above. Mild left mastoid effusion. COMMENT: If clinically indicated, and no contraindication, head MRI is offered for further evaluation. Electronically signed by:  Rinku Sapp M.D.  12/15/2022 2:38 AM Mountain Time      Results for orders placed during the hospital encounter of  12/15/22    Adult Transthoracic Echo Complete W/ Cont if Necessary Per Protocol    Interpretation Summary  •  Left ventricular systolic function is normal. Left ventricular ejection fraction appears to be 56 - 60%.  •  Mild aortic valve regurgitation is present.      I have reviewed the medications:  Scheduled Meds:aspirin, 81 mg, Oral, Daily  atorvastatin, 80 mg, Oral, Nightly  cefTRIAXone, 1 g, Intravenous, Q24H  clopidogrel, 75 mg, Oral, Daily  dexamethasone, 1 mg, Oral, Daily With Breakfast  fluticasone, 2 spray, Nasal, Daily  heparin (porcine), 5,000 Units, Subcutaneous, Q8H  levETIRAcetam, 500 mg, Intravenous, Q12H  pantoprazole, 40 mg, Oral, Q AM  senna-docusate sodium, 2 tablet, Oral, BID  sodium chloride, 10 mL, Intravenous, Q12H      Continuous Infusions:   PRN Meds:.•  acetaminophen  •  senna-docusate sodium **AND** polyethylene glycol **AND** bisacodyl **AND** bisacodyl  •  ondansetron  •  sodium chloride  •  sodium chloride  •  sodium chloride  •  sodium chloride    Assessment & Plan   Assessment & Plan     Active Hospital Problems    Diagnosis  POA   • **Syncope [R55]  Yes   • HTN [I10]  Yes   • Hyperlipidemia [E78.5]  Yes   • Paraesophageal hernia [K44.9]  Yes   • Brain aneurysm [I67.1]  Yes      Resolved Hospital Problems   No resolved problems to display.        Brief Hospital Course to date:  Lauryn Romo is a 62 y.o. 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     1) Spell with LOC  -preceding orthostatic sx, LOC, w transient right-sided facial droop + dysarthria. Normal Glc  -except for right-sided facial droop + dysarthria, sounds like orthostatic syncope episode  -neurology recs pending: EEG normal, dc keppra per neurology  -Dr Ngo evaluated and no need for further imaging  -will hold lisinopril and monitor on telemetry. Can d/c with heart  monitor but have lower concern for arrhythmia given patient's chronicity of many lightheaded/dizzy/vertigo symptoms    2) E coli UTI - patient w sx. IV ceftriaxone for now    3) Moderate malnutrition w significant weight loss  -on restrictive diet + intermittent fasting x 7 months with weight loss of >30 lbs. Sees herself as overweight. We have long discussions of food as energy: ordered labs including B12, TSH, ferritin, Vit D levels  -appreciate nutrition support  -feel rapid weight loss contributes to some chronic sx related to #1    Giant proximal basilar aneurysm s/p embolization  - DAPT, s/p embolism procedure w Dr Ngo 12/2  - completes dexamethasone taper 12/17 (last dose ordered)  - f/u 12/29 w Dr Ngo. No need for further imaging at this time and no s/s of edema    Small right PCA territory stroke - likely 2/2 above lesion and evaluated last admission      Expected Discharge Location and Transportation: home  Expected Discharge Date: 12/17 or 12/18      DVT prophylaxis:  Medical and mechanical DVT prophylaxis orders are present.     AM-PAC 6 Clicks Score (PT): 21 (12/16/22 1217)    CODE STATUS:   Code Status and Medical Interventions:   Ordered at: 12/15/22 1208     Code Status (Patient has no pulse and is not breathing):    CPR (Attempt to Resuscitate)     Medical Interventions (Patient has pulse or is breathing):    Full Support       Leida Collins MD  12/16/22

## 2022-12-16 NOTE — THERAPY EVALUATION
Patient Name: Lauryn Romo  : 1960    MRN: [id number]                              Today's Date: 2022       Admit Date: 12/15/2022    Visit Dx:     ICD-10-CM ICD-9-CM   1. Acute cystitis without hematuria  N30.00 595.0   2. Syncope and collapse  R55 780.2   3. Cognitive communication deficit  R41.841 799.52     Patient Active Problem List   Diagnosis   • Acute CVA   • Brain aneurysm   • Paraesophageal hernia   • Hyperlipidemia   • HTN   • Syncope     Past Medical History:   Diagnosis Date   • Hypertension      Past Surgical History:   Procedure Laterality Date   • BREAST LUMPECTOMY     • EMBOLIZATION CEREBRAL N/A 2022    Procedure: CV EMBOLIZATION CEREBRAL IR;  Surgeon: Enoch Savage MD;  Location: [facility] INVASIVE LOCATION;  Service: Interventional Radiology;  Laterality: N/A;   • HIATAL HERNIA REPAIR     • SINUS SURGERY      x4      General Information     Row Name 22 1127          Physical Therapy Time and Intention    Document Type evaluation  -CD     Mode of Treatment physical therapy  -CD     Row Name 22 1127          General Information    Patient Profile Reviewed yes  -CD     Prior Level of Function independent:;all household mobility;ADL's;driving;work  S/P ANEURYSM EMBOLIZATION/CVA 22. 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  PT REPORTS SHE HAS NOT GONE BACK TO WORK YET. C/O GENERALIZED WEAKNESS, FATIGUE, LACK OF APPETITE AND NO ENERGY. DR TURNER NOTIFIED.  -CD     Existing Precautions/Restrictions fall  IMPAIRED COORDINATION L LE WITH INTERMITTENT STAGGER STEP AND LISTING TO THE R ON OCCASION DURING GAIT IN THE [place].  -CD     Barriers to Rehab medically complex  -CD     Row Name 22 1127          Living Environment    People in Home spouse  -CD     Row Name 22 1127          Home Main Entrance    Number of Stairs, Main Entrance other (see comments)  14 STEPS UP FROM BASEMENT.  -CD     Stair Railings, Main Entrance  railings safe and in good condition  -CD     Row Name 12/16/22 1127          Stairs Within Home, Primary    Stairs, Within Home, Primary ONE LEVEL HOME OVER GARAGE BASEMENT.  -CD     Row Name 12/16/22 1127          Cognition    Orientation Status (Cognition) oriented x 3  -CD     Row Name 12/16/22 1127          Safety Issues, Functional Mobility    Safety Issues Affecting Function (Mobility) insight into deficits/self-awareness;safety precaution awareness;safety precautions follow-through/compliance  -CD     Impairments Affecting Function (Mobility) balance;coordination;strength  -CD     Comment, Safety Issues/Impairments (Mobility) PT FOLLOWS ALL COMMANDS. HAS FLAT AFFECT AND SPOUSE CONCERNED PT MAY BE DEPRESSED. DR TURNER/AMANDA NOTIFIED.  -CD           User Key  (r) = Recorded By, (t) = Taken By, (c) = Cosigned By    Initials Name Provider Type    CD Rachell Lawrence, PT Physical Therapist               Mobility     Row Name 12/16/22 1149          Bed Mobility    Comment, (Bed Mobility) PT UI UPON ARRIVAL. STAFF IN TO DRAW BLOOD DURING P.T. EVAL.  -CD     Row Name 12/16/22 1149          Transfers    Comment, (Transfers) PT MILDLY UNSTEADY UPON STANDING.  -CD     Row Name 12/16/22 1149          Sit-Stand Transfer    Sit-Stand [place] (Transfers) supervision  -CD     Row Name 12/16/22 1149          Gait/Stairs (Locomotion)    [place] Level (Gait) contact guard;verbal cues  -CD     Assistive Device (Gait) --  L UE SUPPORT X 150 FEET, R WALKER X 50 FEET.  -CD     Distance in Feet (Gait) 200 FEET  -CD     Deviations/Abnormal Patterns (Gait) gait speed decreased;weight shifting decreased;stride length decreased;base of support, narrow  -CD     Bilateral Gait Deviations forward flexed posture  -CD     Comment, (Gait/Stairs) PT WITH INTERMITTENT SCISSOR STEP WITH L LE AND VEERS TO THE R ON OCCASION. STEADIER WITH R WALKER.  -CD           User Key  (r) = Recorded By, (t) = Taken By, (c) = Cosigned By    Initials  Name Provider Type    CD Rachell Lawrence PT Physical Therapist               Obj/Interventions     Row Name 12/16/22 1158          Range of Motion Comprehensive    General Range of Motion bilateral lower extremity ROM WFL  -CD     Row Name 12/16/22 1158          Strength Comprehensive (MMT)    General Manual Muscle Testing (MMT) Assessment lower extremity strength deficits identified  -CD     Comment, General Manual Muscle Testing (MMT) Assessment GROSSLY 4 TO 4+/5 BLE'S  -CD     Row Name 12/16/22 1158          Motor Skills    Motor Skills coordination  -CD     Coordination minimal impairment;left;lower extremity;heel to shin  DECREASED RECIPROCAL TOE TAP ON L.  -CD     Row Name 12/16/22 1158          Balance    Balance Assessment sitting static balance;sitting dynamic balance;sit to stand dynamic balance;standing dynamic balance;standing static balance  -CD     Static Sitting Balance independent  -CD     Dynamic Sitting Balance independent  -CD     Sit to Stand Dynamic Balance supervision  -CD     Static Standing Balance supervision  -CD     Dynamic Standing Balance contact guard  -CD     Position/Device Used, Standing Balance supported  -CD     Balance Interventions sitting;standing;sit to stand;supported;static;dynamic  -CD     Comment, Balance SEE GAIT NOTE.  -CD     Row Name 12/16/22 1158          Sensory Assessment (Somatosensory)    Sensory Assessment (Somatosensory) LE sensation intact  -CD           User Key  (r) = Recorded By, (t) = Taken By, (c) = Cosigned By    Initials Name Provider Type    CD Rachell Lawrence, PT Physical Therapist               Goals/Plan     Row Name 12/16/22 1216          Bed Mobility Goal 1 (PT)    Activity/Assistive Device (Bed Mobility Goal 1, PT) bed mobility activities, all  -CD     [place] Level/Cues Needed (Bed Mobility Goal 1, PT) independent  -CD     Time Frame (Bed Mobility Goal 1, PT) long term goal (LTG);2 weeks  -CD     Row Name 12/16/22 1216          Transfer Goal 1  (PT)    Activity/Assistive Device (Transfer Goal 1, PT) sit-to-stand/stand-to-sit;bed-to-chair/chair-to-bed  -CD     [place] Level/Cues Needed (Transfer Goal 1, PT) independent  -CD     Time Frame (Transfer Goal 1, PT) long term goal (LTG);2 weeks  -CD     Row Name 12/16/22 1216          Gait Training Goal 1 (PT)    Activity/Assistive Device (Gait Training Goal 1, PT) gait (walking locomotion);assistive device use  -CD     [place] Level (Gait Training Goal 1, PT) independent  -CD     Distance (Gait Training Goal 1, PT) 600 FEET  -CD     Time Frame (Gait Training Goal 1, PT) long term goal (LTG);2 weeks  -CD     Row Name 12/16/22 1216          Stairs Goal 1 (PT)    Activity/Assistive Device (Stairs Goal 1, PT) ascending stairs;descending stairs;step-over step  -CD     [place] Level/Cues Needed (Stairs Goal 1, PT) standby assist  -CD     Number of Stairs (Stairs Goal 1, PT) 14  -CD     Time Frame (Stairs Goal 1, PT) long term goal (LTG);2 weeks  -CD     Row Name 12/16/22 1216          Therapy Assessment/Plan (PT)    Planned Therapy Interventions (PT) balance training;bed mobility training;gait training;strengthening;stair training;motor coordination training;patient/family education;home exercise program  -CD           User Key  (r) = Recorded By, (t) = Taken By, (c) = Cosigned By    Initials Name Provider Type    CD Rachell Lawrence, PT Physical Therapist               Clinical Impression     Row Name 12/16/22 1201          Pain    Pretreatment Pain Rating 5/10  -CD     Posttreatment Pain Rating 5/10  -CD     Pre/Posttreatment Pain Comment PT REPORTS CHRONIC HEADACHE SINCE PRIOR CVA/ANEURYSM EMBOLIZATION. RECOMMENDED PT DISCUSS CHRONIC HA'S WITH MD WHILE ADMITTED.  -CD     Pain Intervention(s) Repositioned;Rest  -CD     Row Name 12/16/22 1201          Plan of Care Review    Plan of Care Reviewed With patient;spouse  -CD     Outcome Evaluation PT PRESENTS WITH EVOLVING SYMPTOMS TO INCLUDE DECRASED  COORDINATION, ATAXIA L LE, IMPAIRED BALANCE AND DECLINE IN FUNCTIONAL MOBILITY. PT STEADIER WITH R WALKER DUE TO L LE INTERMITTENT SCISSORING AND VEERING TO THE R DURING GAIT IN [place]. PT C/O CHRONIC HA, FATIGUE, NO EVERGY, AND DECRASED APPETITE SINCE CVA/ANEURYSM EMBOLIZATION 11/29/22. 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 DR TURNER NOTIFIED. RECOMMEND R WALKER AT D/C AND OPPT. PT/SPOUSE ARE IN AGREEMENT.  -CD     Row Name 12/16/22 1201          Therapy Assessment/Plan (PT)    Patient/Family Therapy Goals Statement (PT) TO RETURN TO PLOF, ENERGY BEFORE RECENT SURGERY.  -CD     Rehab Potential (PT) good, to achieve stated therapy goals  -CD     Criteria for Skilled Interventions Met (PT) yes;meets criteria;skilled treatment is necessary  -CD     Therapy Frequency (PT) daily  -CD     Predicted Duration of Therapy Intervention (PT) 2 WEEKS  -CD     Row Name 12/16/22 1201          Vital Signs    Pre Systolic BP Rehab 136  -CD     Pre Treatment Diastolic BP 84  PRIOR BP STANDING WITH O.T. AT END OF SESSION.  -CD     Post Systolic BP Rehab 119  -CD     Post Treatment Diastolic BP 75  AFTER GAIT IN [place] X 200 FEET.  -CD     Pretreatment Heart Rate (beats/min) 57  -CD     Posttreatment Heart Rate (beats/min) 58  -CD     Pre Patient Position Sitting  -CD     Intra Patient Position Standing  -CD     Post Patient Position Sitting  -CD     Row Name 12/16/22 1201          Positioning and Restraints    Pre-Treatment Position sitting in chair/recliner  -CD     Post Treatment Position chair  -CD     In Chair reclined;call light within reach;encouraged to call for assist;exit alarm on;with family/caregiver;legs elevated;notified nsg  DR TURNER PRESENT.  -CD           User Key  (r) = Recorded By, (t) = Taken By, (c) = Cosigned By    Initials Name Provider Type    CD Rachell Lawrence, PT Physical Therapist               Outcome Measures     Row Name 12/16/22 1217 12/16/22 0800       How much help from  another person do you currently need...    Turning from your back to your side while in flat bed without using bedrails? 4  -CD 4  -HS    Moving from lying on back to sitting on the side of a flat bed without bedrails? 4  -CD 4  -HS    Moving to and from a bed to a chair (including a wheelchair)? 3  -CD 4  -HS    Standing up from a chair using your arms (e.g., wheelchair, bedside chair)? 4  -CD 4  -HS    Climbing 3-5 steps with a railing? 3  -CD 4  -HS    To walk in hospital room? 3  -CD 4  -HS    AM-PAC 6 Clicks Score (PT) 21  -CD 24  -HS    Highest level of mobility 6 --> Walked 10 steps or more  -CD 8 --> Walked 250 feet or more  -HS    Row Name 12/16/22 1217 12/16/22 1057       Modified Joser Oberto Scale    Modified [place] Scale 3 - Moderate disability.  Requiring some help, but able to walk without assistance.  -CD 1 - No significant disability despite symptoms.  Able to carry out all usual duties and activities.  -    Row Name 12/16/22 1057          Functional Assessment    Outcome Measure Options AM-PAC 6 Clicks Daily Activity (OT);Modified [place]  -CS           User Key  (r) = Recorded By, (t) = Taken By, (c) = Cosigned By    Initials Name Provider Type    CD Rachell Lawrence, PT Physical Therapist    HS Olinda Oseguera, RN Registered Nurse    Josh King, OT Occupational Therapist                             Physical Therapy Education     Title: PT OT SLP Therapies (In Progress)     Topic: Physical Therapy (Done)     Point: Mobility training (Done)     Learning Progress Summary           Patient Acceptance, E, VU,NR by CD at 12/16/2022 1218    Comment: BENEFITS OF OOB ACTIVITY, SAFETY WITH MOBILITY, PROGRESSION OF POC, D/C PLANNING,   Significant Other Acceptance, E, VU,NR by CD at 12/16/2022 1218    Comment: BENEFITS OF OOB ACTIVITY, SAFETY WITH MOBILITY, PROGRESSION OF POC, D/C PLANNING,                   Point: Home exercise program (Done)     Learning Progress Summary           Patient Acceptance,  E, VU,NR by CD at 12/16/2022 1218    Comment: BENEFITS OF OOB ACTIVITY, SAFETY WITH MOBILITY, PROGRESSION OF POC, D/C PLANNING,   Significant Other Acceptance, E, VU,NR by CD at 12/16/2022 1218    Comment: BENEFITS OF OOB ACTIVITY, SAFETY WITH MOBILITY, PROGRESSION OF POC, D/C PLANNING,                   Point: Body mechanics (Done)     Learning Progress Summary           Patient Acceptance, E, VU,NR by CD at 12/16/2022 1218    Comment: BENEFITS OF OOB ACTIVITY, SAFETY WITH MOBILITY, PROGRESSION OF POC, D/C PLANNING,   Significant Other Acceptance, E, VU,NR by CD at 12/16/2022 1218    Comment: BENEFITS OF OOB ACTIVITY, SAFETY WITH MOBILITY, PROGRESSION OF POC, D/C PLANNING,                   Point: Precautions (Done)     Learning Progress Summary           Patient Acceptance, E, VU,NR by CD at 12/16/2022 1218    Comment: BENEFITS OF OOB ACTIVITY, SAFETY WITH MOBILITY, PROGRESSION OF POC, D/C PLANNING,   Significant Other Acceptance, E, VU,NR by CD at 12/16/2022 1218    Comment: BENEFITS OF OOB ACTIVITY, SAFETY WITH MOBILITY, PROGRESSION OF POC, D/C PLANNING,                               User Key     Initials Effective Dates Name Provider Type Discipline    CD 06/16/21 -  Rachell Lawrence, PT Physical Therapist PT              PT Recommendation and Plan  Planned Therapy Interventions (PT): balance training, bed mobility training, gait training, strengthening, stair training, motor coordination training, patient/family education, home exercise program  Plan of Care Reviewed With: patient, spouse  Outcome Evaluation: PT PRESENTS WITH EVOLVING SYMPTOMS TO INCLUDE DECRASED COORDINATION, ATAXIA L LE, IMPAIRED BALANCE AND DECLINE IN FUNCTIONAL MOBILITY. PT STEADIER WITH R WALKER DUE TO L LE INTERMITTENT SCISSORING AND VEERING TO THE R DURING GAIT IN [place]. PT C/O CHRONIC HA, FATIGUE, NO EVERGY, AND DECRASED APPETITE SINCE CVA/ANEURYSM EMBOLIZATION 11/29/22. 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 DR TURNER NOTIFIED. RECOMMEND R WALKER AT D/C AND OPPT. PT/SPOUSE ARE IN AGREEMENT.     Time Calculation:    PT Charges     Row Name 12/16/22 1219             Time Calculation    Start Time 1041  -CD      PT Received On 12/16/22  -CD      PT Goal Re-Cert Due Date 12/26/22  -CD         Time Calculation- PT    Total Timed Code Minutes- PT 20 minute(s)  -CD         Timed Charges    33049 - Gait Training Minutes  10  -CD      47845 - PT Therapeutic Activity Minutes 10  -CD         Untimed Charges    PT Eval/Re-eval Minutes 60  -CD         Total Minutes    Timed Charges Total Minutes 20  -CD      Untimed Charges Total Minutes 60  -CD       Total Minutes 80  -CD            User Key  (r) = Recorded By, (t) = Taken By, (c) = Cosigned By    Initials Name Provider Type    CD Rachell Lawrence, PT Physical Therapist              Therapy Charges for Today     Code Description Service Date Service Provider Modifiers Qty    48970161159 HC GAIT TRAINING EA 15 MIN 12/16/2022 Rachell Lawrence, PT GP 1    89404649796 HC PT AQUA EVAL LOW COMPLEXITY 4 12/16/2022 Rachell Lawrence, PT GP 1          PT G-Codes  Outcome Measure Options: AM-PAC 6 Clicks Daily Activity (OT), Modified Jose Roberto  AM-PAC 6 Clicks Score (PT): 21  AM-PAC 6 Clicks Score (OT): 24  Modified Branch Scale: 3 - Moderate disability.  Requiring some help, but able to walk without assistance.  PT Discharge Summary  Anticipated Discharge Disposition (PT): home with assist, home with outpatient therapy services    Rachell Lawrence, BALDO  12/16/2022

## 2022-12-16 NOTE — PLAN OF CARE
Goal Outcome Evaluation:  Plan of Care Reviewed With: patient, spouse           Outcome Evaluation: PT PRESENTS WITH EVOLVING SYMPTOMS TO INCLUDE DECRASED COORDINATION, ATAXIA L LE, IMPAIRED BALANCE AND DECLINE IN FUNCTIONAL MOBILITY. PT STEADIER WITH R WALKER DUE TO L LE INTERMITTENT SCISSORING AND VEERING TO THE R DURING GAIT IN [place]. PT C/O CHRONIC HA, FATIGUE, NO EVERGY, AND DECRASED APPETITE SINCE CVA/ANEURYSM EMBOLIZATION 11/29/22. 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 DR TURNER NOTIFIED. RECOMMEND R WALKER AT D/C AND OPPT. PT/SPOUSE ARE IN AGREEMENT.

## 2022-12-16 NOTE — CONSULTS
"Clinical Nutrition   Reason For Visit: MST score 2+, Reduced oral intake, \"Unsure\" unintentional weight loss    Patient Name: Lauryn Romo  YOB: 1960  MRN: [id number]  Date of Encounter: 12/16/22 16:52 EST  Admission date: 12/15/2022    Pt meets criteria non severe malnutrition in the context of acute illness, see MSA for full assessment.     Diet education/counseling for general balanced diet/malnutrition/disorded eating introduced, will f/u t/o admission/as feasible for further.     Nutrition Assessment     Admission Problem List:    Syncope    Brain aneurysm    Paraesophageal hernia    Hyperlipidemia    HTN     Applicable PMH:    Applicable Nutrition-Related Information:    Applicable medical tests/procedures since admission:    PMH: She  has a past medical history of Hypertension.   PSxH: She  has a past surgical history that includes Sinus surgery; Hiatal hernia repair (2015); Breast lumpectomy (2012); and embolization cerebral (N/A, 11/29/2022).        Reported/Observed/Food/Nutrition Related History     Pt seen earlier today for consult of chronic poor intakes. At time of writing this  note MD placed another consult for \"Patient w very restrictive home diet (micro tracking + intermittent fasting) and needs educational support/diet planning for appropriate intake/energy needs/calories. Feel diet overly restrictive and patient with some negative thought processes around food, d/w patient and open to nutrition support.\"   When speaking with pt earlier, RD did obtain this information about pt's diet and eating behaviors as well. Diet education/counseling was introduced for a general balanced diet and moving from a mindset of restriction to that of nourishment. Pt was very receptive and not opposed to this, voiced understanding importance balanced nutrition. She reported to RD that she has lost ~30 lb over the past ~8 months with a fad diet called \"the fasting solution.\" This involves " intermittent fasting as well as macronutrient tracking/restriction. Did inform pt she is nearing underweight for age/height and identified muscle wasting on NFPE. Also informed of rapid weight loss in the past month and dangers of this. She does report decreased appetite and further reduced intakes in that time. Again pt receptive and not defensive of behaviors, willing to work towards a more balanced diet. She was open to trying ONS. She denied further dietary needs/preferences, NKFA. Will continue to educate/ t/o admission, pt may need further counseling as outpatient.    Anthropometrics   Height: 61 in  Weight: 125 lb stated - 127 lb per bed scale at time RD visit   BMI: 23.63  BMI classification: Normal: 18.5-24.9kg/m2   IBW: 105 lb    UBW: Last 15 Recorded Weights  View Complete Flowsheet  Weight Weight (kg) Weight (lbs) Weight Method VISIT REPORT   12/15/2022 56.7 kg 125 lb - -   12/14/2022 56.7 kg 125 lb - -   11/30/2022 59.5 kg 131 lb 2.8 oz Bed scale -   11/27/2022 55.3 kg 121 lb 14.6 oz - -   11/27/2022 55.339 kg 122 lb Standing scale -   11/26/2022 58.968 kg 130 lb - -   10/4/2019 72.576 kg 160 lb - Report   10/4/2019 72.576 kg 160 lb - Report   10/4/2019 72.689 kg 160 lb 4 oz - Report   9/29/2019 71.668 kg 158 lb - -   11/18/22   131 lb at MDOV    Weight change: pt had loss 9 lb / 6.9% body weight from 11/18-11/27  *suspect greater weight loss since then, however current weight is stated/bed weight likely >actual. Will hold off on asking for zeroed/standing weight with concern for hyperfocus on weight    Nutrition Focused Physical Exam     Mild muscle wasting and mild subcutaneous fat loss, see MSA for full assessment    Labs reviewed   Labs reviewed: Yes    Results from last 7 days   Lab Units 12/16/22  0442 12/14/22  2354   SODIUM mmol/L 139 140   POTASSIUM mmol/L 4.1 4.0   CHLORIDE mmol/L 106 103   CO2 mmol/L 22.0 26.0   BUN mg/dL 13 20   CREATININE mg/dL 0.42* 0.59   GLUCOSE mg/dL 83 112*    CALCIUM mg/dL 8.6 8.9   MAGNESIUM mg/dL  --  2.3     Results from last 7 days   Lab Units 12/16/22  0442 12/14/22  2354   WBC 10*3/mm3 7.85 10.96*   ALBUMIN g/dL 3.80 4.10     Results from last 7 days   Lab Units 12/16/22  0556 12/15/22  2350 12/15/22  1738   GLUCOSE mg/dL 75 89 94     Lab Results   Lab Value Date/Time    HGBA1C 5.50 11/27/2022 0513     Medications reviewed   Medications reviewed: Yes  Scheduled: abx, protonix, pericolace  GTT:  PRN: miralax    Needs Assessment   Height used:   Weight used:   IBW:     Estimated Calories needs:       Estimated Protein needs:       Estimated Fluid needs:       Current Nutrition Prescription   PO: Diet: Cardiac Diets; Healthy Heart (2-3 Na+); Texture: Regular Texture (IDDSI 7); Fluid Consistency: Thin (IDDSI 0)  Oral Nutrition Supplement: No active supplement orders       Average PO intake: none yet documented      Nutrition Diagnosis     Problem Malnutrition non-severe, acute   Etiology Energy needs>energy intake   Signs/Symptoms PO intakes <75% est. needs at least past month, loss 6.9% body weight X 1m, mild muscle wasting, and mild subcutaneous fat loss.        Problem Inadequate energy intake   Etiology Disordered eating behaviors   Signs/Symptoms Pt report on restrictive diet X past 8 months, diet hx reveals disordered eating thoughts and behaviors       Goal:   General: Nutrition to support treatment  PO: Increase intake       Intervention   Intervention: Follow treatment progress, Care plan reviewed, Advise alternate selection, Advised available snacks, Interview for preferences, Menu provided, Encourage intake, Supplement provided, Education provided introduced for malnutrition, disordered eating, general balanced diet    Boost B, Premier Protein D    Monitoring/Evaluation:   Monitoring/Evaluation: Per protocol, I&O, PO intake, Supplement intake, Pertinent labs, Weight, Skin status    Leila Aguila RD  Time Spent: 45

## 2022-12-16 NOTE — PROGRESS NOTES
NAME: MEDHAT PAL  : 1960  PCP: Melissa Lazo APRN  Attending MD: Leida Collins MD    Date of Admission:  12/15/2022  Date of Service: 2022    History of Present Illness:  62 y.o.  female Who is well-known to the neuro interventional service, having undergone prior embolization (Pipeline Shield) for giant (partially thrombosed) proximal basilar aneurysm on 2022.  She had initially presented with symptoms of dizziness and ataxia, and was found to have a tiny right occipital infarct.  She had done well since her hospitalization, but on 12/15/2022 she experienced a syncopal episode.  She was sitting on the couch and when she stood up to walk to the kitchen she became diaphoretic and passed out.  She did not lose bowel/bladder control.      Past Medical History:  Past Medical History:   Diagnosis Date   • Hypertension        Past Surgical History:  Past Surgical History:   Procedure Laterality Date   • BREAST LUMPECTOMY     • EMBOLIZATION CEREBRAL N/A 2022    Procedure: CV EMBOLIZATION CEREBRAL IR;  Surgeon: Enoch Savage MD;  Location: [facility] INVASIVE LOCATION;  Service: Interventional Radiology;  Laterality: N/A;   • HIATAL HERNIA REPAIR     • SINUS SURGERY      x4         Medications  Medications Prior to Admission   Medication Sig Dispense Refill Last Dose   • clopidogrel (PLAVIX) 75 MG tablet Take 1 tablet by mouth Daily. 30 tablet 3 2022   • dexamethasone (DECADRON) 2 MG tablet Instruct the patient to take 4 mg PO every 8 hours x 2 days, 2 mg Q8 x3 days, 2 mg every 12 x3 days, 1 mg every 12 x3 days, 1 mg PO daily for 3 days. 30 tablet 0 2022   • moexipril (UNIVASC) 15 MG tablet 15 mg 2 (Two) Times a Day.   2022   • pantoprazole (PROTONIX) 40 MG EC tablet Take 1 tablet by mouth Every Morning. Indications: Take while on the steroid taper. 30 tablet 0 2022   • albuterol (PROAIR RESPICLICK) 108 (90 Base) MCG/ACT inhaler Inhale 2 puffs Every 4  (Four) Hours As Needed for Wheezing or Shortness of Air. 1 inhaler 0    • aspirin 81 MG chewable tablet Chew 1 tablet Daily.      • Cholecalciferol (VITAMIN D3) 400 units capsule Take 1 tablet by mouth Daily.      • fluticasone (FLONASE) 50 MCG/ACT nasal spray 2 sprays into the nostril(s) as directed by provider Daily. 1 bottle 0    • Glucosamine Sulfate (SYNOVACIN PO) Take 1,000 mg by mouth Daily.      • lidocaine (XYLOCAINE) 5 % ointment lidocaine 5 % topical ointment      • Naproxen Sodium 220 MG capsule Take 2 tablets by mouth 2 (Two) Times a Day As Needed. Monday through Thursday      • Omega-3 Fatty Acids (FISH OIL) 1000 MG capsule capsule Take 1 capsule by mouth Daily.      • ondansetron ODT (ZOFRAN-ODT) 4 MG disintegrating tablet ondansetron 4 mg disintegrating tablet      • Zinc 50 MG capsule Take  by mouth.          Allergies:  No Known Allergies    Social Hx:  Social History     Socioeconomic History   • Marital status:    Tobacco Use   • Smoking status: Former     Types: Cigarettes     Quit date: 10/4/2004     Years since quittin.2   • Smokeless tobacco: Never   Substance and Sexual Activity   • Alcohol use: No   • Drug use: No   • Sexual activity: Defer       Family Hx:  Family History   Problem Relation Age of Onset   • No Known Problems Mother    • Heart attack Father 42   • Heart attack Paternal Aunt    • Heart attack Paternal Uncle    • No Known Problems Sister    • Cancer Maternal Grandmother    • Heart failure Maternal Grandmother    • No Known Problems Maternal Grandfather    • No Known Problems Paternal Grandmother    • No Known Problems Paternal Grandfather    • Diabetes Half-Brother        Review of Imaging:  CT/CTA, as well as MRI/MRI examinations from [facility] on 12/15/2022.  Again seen is a largely thrombosed giant proximal basilar aneurysm.  The pipeline stent extending from the right vertebral artery to the basilar artery remains patent without flow limitation  or complicating feature.  Small amount of residual filling of the aneurysm neck has further decreased compared to the 12/1/2022 study consistent with further interval thrombosis.  There are no areas of acute infarct.  There remains mass-effect on the adjacent brainstem, but no edema within the jong or medulla.    Laboratory Result:  Lab Results   Component Value Date    WBC 7.85 12/16/2022    HGB 13.0 12/16/2022    HCT 38.9 12/16/2022    MCV 96.0 12/16/2022     12/16/2022     Lab Results   Component Value Date    GLUCOSE 83 12/16/2022    CALCIUM 8.6 12/16/2022     12/16/2022    K 4.1 12/16/2022    CO2 22.0 12/16/2022     12/16/2022    BUN 13 12/16/2022    CREATININE 0.42 (L) 12/16/2022    EGFRIFNONA 103 09/29/2019    BCR 31.0 (H) 12/16/2022    ANIONGAP 11.0 12/16/2022     Lab Results   Component Value Date    HGBA1C 5.50 11/27/2022     Lab Results   Component Value Date    CHOL 186 11/27/2022    TRIG 77 11/27/2022    HDL 50 11/27/2022     (H) 11/27/2022       Physical Examination:  Vitals:    12/16/22 0852   BP: 101/70   Pulse: 52   Resp:    Temp:    SpO2:         General Appearance:   Well developed, well nourished, well groomed, alert, and cooperative.  Neurological examination:  Alert and oriented x3.  Nonfocal neurologic exam.  Speech is clear.  No diplopia.  Extraocular muscles are full.  Ambulates unassisted.    Diagnoses/Plan:    Ms. Romo is a 62 y.o. female status postembolization of a giant, largely thrombosed proximal basilar aneurysm with Pipeline Shield flow diverter therapy on 11/29/2022.  She initially presented in November with symptoms of ataxia and dizziness, but on 12/15/2022, she experienced a syncopal episode.  Her imaging studies demonstrated progressive thrombosis of the aneurysm with preservation of flow in all parent vasculature and no complicating features.  Specifically, while there does remain mass-effect on the brainstem, there is no appreciable edema.  There  "are no areas of acute infarction.    I greatly appreciate neurology seeing her, as I suspect that her syncopal episode represents a vasovagal syncope/orthostatic hypotension versus less likely a central cephalic seizures.  Her blood pressure has been pretty stable, but she does have episodes of slight bradycardia with heart rate in the 50s.  I think she might benefit from wearing a \"Zio patch\", to assess for any arrhythmia that might contribute to her vasovagal symptoms.    Additionally, Ms. Benz and family are concerned about her general fatigue, tiredness, difficulty sleeping, and poor appetite.  Certainly she could have an element of a depression and situational anxiety related to her recent hospitalizations and medical issues.  She is going to discuss this further with the hospitalist and/or her PCP, as she likely would benefit from a some sort of antidepressant.    She is scheduled to follow-up with me on 12/29/2022, and she should keep this outpatient appointment.  She does not need to have additional imaging performed on 12/29/2022.                  "

## 2022-12-16 NOTE — PLAN OF CARE
Problem: Adult Inpatient Plan of Care  Goal: Plan of Care Review  Recent Flowsheet Documentation  Taken 12/16/2022 1054 by Josh Oliver OT  Progress: (OT eval completed.) no change  Plan of Care Reviewed With:   patient   spouse  Outcome Evaluation: OT michael completed. Pt presents at baseline for ADL completion with symmetrical BUE strength and sensation intact. Mild bilateral gross motor deficit observed during finger-to-nose assessment, WFL for ADL completion. BP stable during positional changes. OT signing off, please re-consult if needed. Rec d/c to home with assist prn.

## 2022-12-16 NOTE — PROGRESS NOTES
Stroke Progress Note       Chief Complaint: AMS    Subjective    Subjective     Subjective:  Appears fatigued         Objective      Temp:  [97.5 °F (36.4 °C)-98.2 °F (36.8 °C)] 97.9 °F (36.6 °C)  Heart Rate:  [48-72] 55  Resp:  [15-18] 18  BP: (101-132)/(62-78) 119/75    NEURO  MENTAL STATUS fatigue    LANG/SPEECH: Naming and repetition intact, fluent, follows 3-step commands    CRANIAL NERVES:    Pupils equal and reactive, EOMI intact, no gaze deviation, no nystagmus  No facial droop, cough and gag intact, shoulder shrug intact, tongue midline     MOTOR:  Moves all extremities equally    SENSORY: Normal to light touch all throughout     COORDINATION: Normal finger to nose and heel to shin, no tremor, no dysmetria    STATION: Not assessed due to patient condition    GAIT: Not assessed due to patient condition  Results Review:    I reviewed the patient's new clinical results.    Lab Results (last 24 hours)     Procedure Component Value Units Date/Time    Urine Culture - Urine, Urine, Clean Catch [[id number]]  (Abnormal) Collected: 12/15/22 0000    Specimen: Urine, Clean Catch Updated: 12/16/22 1303     Urine Culture >100,000 CFU/mL Escherichia coli    Narrative:      Colonization of the urinary tract without infection is common. Treatment is discouraged unless the patient is symptomatic, pregnant, or undergoing an invasive urologic procedure.    Comprehensive Metabolic Panel [[id number]]  (Abnormal) Collected: 12/16/22 0442    Specimen: Blood Updated: 12/16/22 0624     Glucose 83 mg/dL      BUN 13 mg/dL      Creatinine 0.42 mg/dL      Sodium 139 mmol/L      Potassium 4.1 mmol/L      Chloride 106 mmol/L      CO2 22.0 mmol/L      Calcium 8.6 mg/dL      Total Protein 5.7 g/dL      Albumin 3.80 g/dL      ALT (SGPT) 19 U/L      AST (SGOT) 11 U/L      Alkaline Phosphatase 65 U/L      Total Bilirubin 0.5 mg/dL      Globulin 1.9 gm/dL      Comment: Calculated Result        A/G Ratio 2.0 g/dL      BUN/Creatinine Ratio 31.0      Anion Gap 11.0 mmol/L      eGFR 111.4 mL/min/1.73      Comment: National Kidney Foundation and American Society of Nephrology (ASN) Task Force recommended calculation based on the Chronic Kidney Disease Epidemiology Collaboration (CKD-EPI) equation refit without adjustment for race.       Narrative:      GFR Normal >60  Chronic Kidney Disease <60  Kidney Failure <15      Blood Culture - Blood, Hand, Right [[id number]]  (Normal) Collected: 12/15/22 0345    Specimen: Blood from Hand, Right Updated: 12/16/22 0615     Blood Culture No growth at 24 hours    Blood Culture - Blood, Arm, Left [[id number]]  (Normal) Collected: 12/15/22 0345    Specimen: Blood from Arm, Left Updated: 12/16/22 0615     Blood Culture No growth at 24 hours    POC Glucose Once [[id number]]  (Normal) Collected: 12/16/22 0556    Specimen: Blood Updated: 12/16/22 0558     Glucose 75 mg/dL      Comment: Meter: [id number] : [id number] Andre Ramirez       CBC Auto Differential [[id number]]  (Normal) Collected: 12/16/22 0442    Specimen: Blood Updated: 12/16/22 0539     WBC 7.85 10*3/mm3      RBC 4.05 10*6/mm3      Hemoglobin 13.0 g/dL      Hematocrit 38.9 %      MCV 96.0 fL      MCH 32.1 pg      MCHC 33.4 g/dL      RDW 14.8 %      RDW-SD 52.2 fl      MPV 10.8 fL      Platelets 221 10*3/mm3      Neutrophil % 59.4 %      Lymphocyte % 29.7 %      Monocyte % 7.4 %      Eosinophil % 2.7 %      Basophil % 0.5 %      Immature Grans % 0.3 %      Neutrophils, Absolute 4.67 10*3/mm3      Lymphocytes, Absolute 2.33 10*3/mm3      Monocytes, Absolute 0.58 10*3/mm3      Eosinophils, Absolute 0.21 10*3/mm3      Basophils, Absolute 0.04 10*3/mm3      Immature Grans, Absolute 0.02 10*3/mm3      nRBC 0.0 /100 WBC     POC Glucose Once [[id number]]  (Normal) Collected: 12/15/22 2350    Specimen: Blood Updated: 12/15/22 2351     Glucose 89 mg/dL      Comment: Meter: [id number] : [id number] Maddie Sharp       POC Glucose Once [[id number]]  (Normal) Collected: 12/15/22  1738    Specimen: Blood Updated: 12/15/22 1749     Glucose 94 mg/dL      Comment: Meter: [id number] : [id number] Cabrera           EEG    Result Date: 12/15/2022  Normal study This report is transcribed using the Dragon dictation system.      CT Head Without Contrast    Result Date: 12/14/2022  No evidence of acute intracranial hemorrhage or large territory infarct.  Again seen is a giant basilar artery aneurysm status post stenting. The aneurysm appears grossly similar in size to prior examinations.  This report was finalized on 12/14/2022 11:09 PM by Ever Shelley.      CT Angiogram Neck    Result Date: 12/15/2022  CTA: No adverse interval change in the appearance of the arteries. 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 Small volume opacification of the bypassed aneurysm appears decreased since 12/1/2022 suggesting progressive thrombosis. Elsewhere, at most mild arterial stenoses, as detailed above. Mild left mastoid effusion. COMMENT: If clinically indicated, and no contraindication, head MRI is offered for further evaluation. Electronically signed by:  Rinku Sapp M.D.  12/15/2022 2:38 AM Mountain Time    MRI Angiogram Head Without Contrast    Result Date: 12/16/2022  No evidence of acute infarct  Again seen is large basilar artery aneurysm. There is stenting extending from the right distal vertebral artery across the basilar artery. The left vertebral artery is excluded from the stenting and terminates at the distal V4 segment. There is residual flow seen within this to the aneurysm sac better evaluated on recent CTA.  The remaining vessels of the head and neck demonstrate no occlusion or flow-limiting stenosis.  This report was finalized on 12/16/2022 1:05 AM by Ever Shelley.      MRI Angiogram Neck With Contrast    Result Date: 12/16/2022  No evidence of acute infarct  Again seen is large basilar artery aneurysm. There is stenting extending from  the right distal vertebral artery across the basilar artery. The left vertebral artery is excluded from the stenting and terminates at the distal V4 segment. There is residual flow seen within this to the aneurysm sac better evaluated on recent CTA.  The remaining vessels of the head and neck demonstrate no occlusion or flow-limiting stenosis.  This report was finalized on 12/16/2022 1:05 AM by Ever Shelley.      MRI Brain Without Contrast    Result Date: 12/16/2022  No evidence of acute infarct  Again seen is large basilar artery aneurysm. There is stenting extending from the right distal vertebral artery across the basilar artery. The left vertebral artery is excluded from the stenting and terminates at the distal V4 segment. There is residual flow seen within this to the aneurysm sac better evaluated on recent CTA.  The remaining vessels of the head and neck demonstrate no occlusion or flow-limiting stenosis.  This report was finalized on 12/16/2022 1:05 AM by Ever Shelley.      XR Chest 1 View    Result Date: 12/14/2022  No radiographic evidence of acute cardiopulmonary process.  This report was finalized on 12/14/2022 11:10 PM by Ever Shelley.      CT Angiogram Head w AI Analysis of LVO    Result Date: 12/15/2022  CTA: No adverse interval change in the appearance of the arteries. 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 Small volume opacification of the bypassed aneurysm appears decreased since 12/1/2022 suggesting progressive thrombosis. Elsewhere, at most mild arterial stenoses, as detailed above. Mild left mastoid effusion. COMMENT: If clinically indicated, and no contraindication, head MRI is offered for further evaluation. Electronically signed by:  Rinku Sapp M.D.  12/15/2022 2:38 AM Mountain Time    Results for orders placed during the hospital encounter of 12/15/22    Adult Transthoracic Echo Complete W/ Cont if Necessary Per  Protocol    Interpretation Summary  •  Left ventricular systolic function is normal. Left ventricular ejection fraction appears to be 56 - 60%.  •  Mild aortic valve regurgitation is present.            Assessment/Plan     Assessment/Plan:    Patient was sitting in the couch, stood up and walked to the kitchen, slowly slumped down, with right facial droop, blank stare, followed by loss of bowel and urinary control, generalized shaking .  Patient was also diaphoretic during this episode.     On my exam, patient is weak, with no focal deficits.    The story is consistent with vasovagal syncope/ convulsive syncope/arrythmia./ infectious cause    EEG routine-normal - less likely a seizure, we can DC Keppra.    Plan  -DC Keppra   -Continue aspirin, Plavix and daily, P2Y2 response pending   -Normal blood pressure goals.  -Avoid hypotension  -MRI and MRAs- not impressive for any acute pathology- reviewed with Dr. Savage   -needs an event monitor/orthostatic measurements         UTI- treatment as per hospitalist.     Neurology will follow with the results.       Shay Purvis MD  12/16/22  14:54 EST

## 2022-12-17 VITALS
HEIGHT: 61 IN | BODY MASS INDEX: 23.6 KG/M2 | HEART RATE: 61 BPM | SYSTOLIC BLOOD PRESSURE: 103 MMHG | RESPIRATION RATE: 18 BRPM | WEIGHT: 125 LBS | DIASTOLIC BLOOD PRESSURE: 66 MMHG | TEMPERATURE: 97.3 F | OXYGEN SATURATION: 96 %

## 2022-12-17 PROBLEM — E44.0 MODERATE MALNUTRITION: Status: ACTIVE | Noted: 2022-12-17

## 2022-12-17 LAB — BACTERIA SPEC AEROBE CULT: ABNORMAL

## 2022-12-17 PROCEDURE — 96366 THER/PROPH/DIAG IV INF ADDON: CPT

## 2022-12-17 PROCEDURE — 99217 PR OBSERVATION CARE DISCHARGE MANAGEMENT: CPT | Performed by: INTERNAL MEDICINE

## 2022-12-17 PROCEDURE — 96372 THER/PROPH/DIAG INJ SC/IM: CPT

## 2022-12-17 PROCEDURE — 99214 OFFICE O/P EST MOD 30 MIN: CPT | Performed by: STUDENT IN AN ORGANIZED HEALTH CARE EDUCATION/TRAINING PROGRAM

## 2022-12-17 PROCEDURE — 25010000002 HEPARIN (PORCINE) PER 1000 UNITS: Performed by: HOSPITALIST

## 2022-12-17 PROCEDURE — 25010000002 CEFTRIAXONE PER 250 MG: Performed by: HOSPITALIST

## 2022-12-17 PROCEDURE — G0378 HOSPITAL OBSERVATION PER HR: HCPCS

## 2022-12-17 RX ORDER — ATORVASTATIN CALCIUM 80 MG/1
80 TABLET, FILM COATED ORAL NIGHTLY
Qty: 90 TABLET | Refills: 0 | Status: SHIPPED | OUTPATIENT
Start: 2022-12-17 | End: 2023-01-19 | Stop reason: SDUPTHER

## 2022-12-17 RX ORDER — NITROFURANTOIN 25; 75 MG/1; MG/1
100 CAPSULE ORAL 2 TIMES DAILY
Qty: 10 CAPSULE | Refills: 0 | Status: SHIPPED | OUTPATIENT
Start: 2022-12-18 | End: 2022-12-23

## 2022-12-17 RX ADMIN — PANTOPRAZOLE SODIUM 40 MG: 40 TABLET, DELAYED RELEASE ORAL at 05:47

## 2022-12-17 RX ADMIN — BISACODYL 5 MG: 5 TABLET, COATED ORAL at 09:16

## 2022-12-17 RX ADMIN — Medication 10 ML: at 09:17

## 2022-12-17 RX ADMIN — HEPARIN SODIUM 5000 UNITS: 5000 INJECTION INTRAVENOUS; SUBCUTANEOUS at 05:47

## 2022-12-17 RX ADMIN — DEXAMETHASONE 1 MG: 2 TABLET ORAL at 09:16

## 2022-12-17 RX ADMIN — SENNOSIDES AND DOCUSATE SODIUM 2 TABLET: 50; 8.6 TABLET ORAL at 09:16

## 2022-12-17 RX ADMIN — SODIUM CHLORIDE 1 G: 900 INJECTION INTRAVENOUS at 05:47

## 2022-12-17 RX ADMIN — CLOPIDOGREL BISULFATE 75 MG: 75 TABLET ORAL at 09:16

## 2022-12-17 RX ADMIN — POLYETHYLENE GLYCOL 3350 17 G: 17 POWDER, FOR SOLUTION ORAL at 09:16

## 2022-12-17 RX ADMIN — ACETAMINOPHEN 650 MG: 325 TABLET ORAL at 09:19

## 2022-12-17 RX ADMIN — ASPIRIN 81 MG CHEWABLE TABLET 81 MG: 81 TABLET CHEWABLE at 09:16

## 2022-12-17 NOTE — DISCHARGE SUMMARY
[facility] Medicine Services  DISCHARGE SUMMARY    Patient Name: Lauryn Romo  : 1960  MRN: [id number]    Date of Admission: 12/15/2022  2:46 AM  Date of Discharge:  2022  Primary Care Physician: Melissa Lazo APRN    Consults     No orders found from 2022 to 2022.          Hospital Course     Presenting Problem:   Syncope [R55]    Active Hospital Problems    Diagnosis  POA   • **Syncope [R55]  Yes   • HTN [I10]  Yes   • Hyperlipidemia [E78.5]  Yes   • Paraesophageal hernia [K44.9]  Yes   • Brain aneurysm [I67.1]  Yes      Resolved Hospital Problems   No resolved problems to display.          Hospital Course:  Lauryn Romo is a 62 y.o. female  w remote history of tobacco use, h/o giant proximal basilar aneurysm s/p embolization on  who presented after an episode of LOC with associated right-sided facial droop and dysarthria.      Spell with LOC  -preceding orthostatic sx, LOC, w transient right-sided facial droop + dysarthria. Normal Glc  -except for right-sided facial droop + dysarthria, sounds like orthostatic syncope episode  -neurology consulted.   --  EEG normal, dc keppra per neurology  -- Dr Savage evaluated and no need for further imaging  -- Can d/c with heart monitor but have lower concern for arrhythmia given patient's chronicity of many lightheaded/dizzy/vertigo symptoms. Pt to have heart monitor mailed to her on Monday,       E coli UTI   - patient w sx.   --IV ceftriaxone D2/7, Ecoli is pan-susceptible, will continue with PO ABX upon d/c to complete course     Moderate malnutrition w significant weight loss  -on restrictive diet + intermittent fasting x 7 months with weight loss of >30 lbs. Sees herself as overweight. My partner had long discussion with patient re:  food as energy: Vitamin B12 wnl, TSH wnl, ferritin high, Vit D levels  -appreciate nutrition team consult  -feel rapid weight loss contributes to some chronic sx related  to above     Giant proximal basilar aneurysm s/p embolization  - DAPT, s/p embolism procedure w Dr Savage 12/2  - completes dexamethasone taper today, 12/17 (last dose ordered)  - f/u 12/29 w Dr Ngo. No need for further imaging at this time and no s/s of edema     Small right PCA territory stroke - likely due to above lesion and evaluated last admission              Discharge Follow Up Recommendations for outpatient labs/diagnostics:  Follow up with PCP within one week  Follow up with Dr. Savage on 12/29    Day of Discharge     HPI:   Doing well this am, no new issues overnight. Was able to ambulate to bathroom without syncope or dizziness.     Review of Systems  Gen- No fevers, chills  CV- No chest pain, palpitations  Resp- No cough, dyspnea  GI- No N/V/D, abd pain        Vital Signs:   Temp:  [97.8 °F (36.6 °C)-98.2 °F (36.8 °C)] 98 °F (36.7 °C)  Heart Rate:  [52-62] 60  Resp:  [16-20] 18  BP: (105-126)/(68-81) 105/72      Physical Exam:  Constitutional: No acute distress, awake, alert  HENT: NCAT, mucous membranes moist  Respiratory: Clear to auscultation bilaterally, respiratory effort normal   Cardiovascular: RRR, no murmurs, rubs, or gallops  Gastrointestinal: Positive bowel sounds, soft, nontender, nondistended  Musculoskeletal: No bilateral ankle edema  Psychiatric: flat affect, cooperative  Neurologic: Oriented x 3, strength symmetric in all extremities, Cranial Nerves grossly intact to confrontation, speech clear  Skin: No rashes    Pertinent  and/or Most Recent Results     LAB RESULTS:      Lab 12/16/22  0442 12/14/22  2354   WBC 7.85 10.96*   HEMOGLOBIN 13.0 14.1   HEMATOCRIT 38.9 42.3   PLATELETS 221 299   NEUTROS ABS 4.67 7.34*   IMMATURE GRANS (ABS) 0.02 0.06*   LYMPHS ABS 2.33 2.56   MONOS ABS 0.58 0.79   EOS ABS 0.21 0.15   MCV 96.0 95.9   PROCALCITONIN  --  <0.02   LACTATE  --  1.0         Lab 12/16/22  0442 12/14/22  2354   SODIUM 139 140   POTASSIUM 4.1 4.0   CHLORIDE 106 103   CO2 22.0 26.0    ANION GAP 11.0 11.0   BUN 13 20   CREATININE 0.42* 0.59   EGFR 111.4 102.7   GLUCOSE 83 112*   CALCIUM 8.6 8.9   MAGNESIUM  --  2.3   TSH 0.848  --          Lab 12/16/22  0442 12/14/22  2354   TOTAL PROTEIN 5.7* 6.9   ALBUMIN 3.80 4.10   GLOBULIN 1.9 2.8   ALT (SGPT) 19 29   AST (SGOT) 11 18   BILIRUBIN 0.5 0.3   ALK PHOS 65 86         Lab 12/14/22  2354   TROPONIN T <0.010  <0.010             Lab 12/16/22  1649 12/16/22 0442   FERRITIN  --  150.60*   VITAMIN B 12 1,243*  --          Brief Urine Lab Results  (Last result in the past 365 days)      Color   Clarity   Blood   Leuk Est   Nitrite   Protein   CREAT   Urine HCG        12/15/22 0000 Yellow   Cloudy   Trace   Large (3+)   Positive   Negative               Microbiology Results (last 10 days)     Procedure Component Value - Date/Time    Blood Culture - Blood, Hand, Right [[id number]]  (Normal) Collected: 12/15/22 0345    Lab Status: Preliminary result Specimen: Blood from Hand, Right Updated: 12/17/22 0615     Blood Culture No growth at 2 days    Blood Culture - Blood, Arm, Left [[id number]]  (Normal) Collected: 12/15/22 0345    Lab Status: Preliminary result Specimen: Blood from Arm, Left Updated: 12/17/22 0615     Blood Culture No growth at 2 days    COVID PRE-OP / PRE-PROCEDURE SCREENING ORDER (NO ISOLATION) - Swab, Nasopharynx [[id number]]  (Normal) Collected: 12/15/22 0345    Lab Status: Final result Specimen: Swab from Nasopharynx Updated: 12/15/22 0417    Narrative:      The following orders were created for panel order COVID PRE-OP / PRE-PROCEDURE SCREENING ORDER (NO ISOLATION) - Swab, Nasopharynx.  Procedure                               Abnormality         Status                     ---------                               -----------         ------                     COVID-19 and FLU A/B PCR...[[id number]]  Normal              Final result                 Please view results for these tests on the individual orders.    COVID-19 and FLU A/B PCR -  Swab, Nasopharynx [[id number]]  (Normal) Collected: 12/15/22 0345    Lab Status: Final result Specimen: Swab from Nasopharynx Updated: 12/15/22 0417     COVID19 Not Detected     Influenza A PCR Not Detected     Influenza B PCR Not Detected    Narrative:      Fact sheet for providers: https://www.fda.gov/media/649409/download    Fact sheet for patients: https://www.fda.gov/media/361813/download    Test performed by PCR.    Urine Culture - Urine, Urine, Clean Catch [[id number]]  (Abnormal)  (Susceptibility) Collected: 12/15/22 0000    Lab Status: Final result Specimen: Urine, Clean Catch Updated: 12/17/22 0632     Urine Culture >100,000 CFU/mL Escherichia coli    Narrative:      Colonization of the urinary tract without infection is common. Treatment is discouraged unless the patient is symptomatic, pregnant, or undergoing an invasive urologic procedure.    Susceptibility      Escherichia coli      RODNEY      Ampicillin Susceptible      Ampicillin + Sulbactam Susceptible      Cefazolin Susceptible      Cefepime Susceptible      Ceftazidime Susceptible      Ceftriaxone Susceptible      Gentamicin Susceptible      Levofloxacin Susceptible      Nitrofurantoin Susceptible      Piperacillin + Tazobactam Susceptible      Trimethoprim + Sulfamethoxazole Susceptible                                 Adult Transthoracic Echo Complete W/ Cont if Necessary Per Protocol    Result Date: 12/15/2022  •  Left ventricular systolic function is normal. Left ventricular ejection fraction appears to be 56 - 60%. •  Mild aortic valve regurgitation is present.     EEG    Result Date: 12/15/2022  Reason for referral: 62 y.o.female with seizures Technical Summary:  A 19 channel digital EEG was performed using the international 10-20 placement system, including eye leads and EKG leads. Duration: 22 minutes Findings: The awake tracing shows a medium amplitude well-regulated 9 Hz posterior rhythm present symmetrically over the occipital leads.  Low to  medium amplitude intermixed theta and alpha activity is seen anteriorly.  Hyperventilation does not elicit abnormality.  Drowsiness is seen with mild slowing of the background but stage II sleep is not seen.  Photic stimulation does not change the background.  No focal features or epileptiform activity are seen Video: Off Technical quality: Good SUMMARY: Normal EEG in the awake and lightly drowsy states No focal features or epileptiform activity are seen     Normal study This report is transcribed using the Dragon dictation system.      CT Head Without Contrast    Result Date: 12/14/2022  DATE OF EXAM: 12/14/2022 9:51 PM  PROCEDURE: CT HEAD WO CONTRAST-  INDICATIONS: seizure  COMPARISON: CTA head 12/1/2022, MR brain 11/26/2022  TECHNIQUE: Routine transaxial and coronal reconstruction images were obtained through the head without the administration of contrast. Automated exposure control and iterative reconstruction methods were used.  The radiation dose reduction device was turned on for each scan per the ALARA (As Low as Reasonably Achievable) protocol.  FINDINGS: Parenchyma:No acute intracranial hemorrhage. No loss of gray-white differentiation to suggest large territory infarct. Mild parenchymal volume loss. Scattered periventricular and subcortical white matter hypodensities most consistent with small vessel ischemic changes. 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  Ventricles and extra axial spaces:Prominent ventricles and sulci secondary to volume loss. No extra axial fluid collection seen. Again seen is a giant basilar artery aneurysm measuring approximately 2.7 x 2.7 x 2.7 cm, grossly similar in size to prior examination. A stent is seen. Again seen is mass effect on the underlying brain stem from this aneurysm.  Other:Orbits are grossly intact. Paranasal sinuses are clear. Small left mastoid effusion. Calvarium is intact.      No evidence of acute intracranial hemorrhage or large territory infarct.   Again seen is a giant basilar artery aneurysm status post stenting. The aneurysm appears grossly similar in size to prior examinations.  This report was finalized on 12/14/2022 11:09 PM by Ever Shelley.      CT Angiogram Neck    Result Date: 12/15/2022  EXAM: CT ANGIOGRAM HEAD W AI ANALYSIS OF LVO, CT ANGIOGRAM NECK EXAM DATE: 12/15/2022 3:51 AM INDICATION: Dizziness, headache, possible seizure. History of basilar artery aneurysm status post stent. COMPARISON: 11/26/2022, 12/1/2022. TECHNIQUE: Images through the head and neck with intravenous contrast in the angiographic phase. Maximum intensity projections (MIPs) and/or 3D reconstructions constructed and reviewed at time of study interpretation. NASCET criteria used for interpretation. Low-dose CT acquisition technique included one or more of the following options: Automated exposure control; Adjustment of mA and/or KV according to patient's size; Use of iterative reconstruction.  CONTRAST: Administered.   FINDINGS: TECHNICAL: Technically adequate study. CTA: AORTIC ARCH: No significant stenosis identified. Minimal plaque.  BRACHIOCEPHALIC, SUBCLAVIAN, AND VISUALIZED AXILLARY ARTERIES: No significant stenosis identified. Minimal plaque. RIGHT COMMON CAROTID ARTERY (CCA): No significant stenosis identified. RIGHT INTERNAL CAROTID ARTERY (ICA): No significant stenosis of the proximal internal carotid artery identified near the carotid bifurcation. Minimal plaque about the carotid bifurcation. At most mild intracranial stenosis. Tortuous cervical internal carotid artery. LEFT COMMON CAROTID ARTERY (CCA): No significant stenosis identified. LEFT INTERNAL CAROTID ARTERY (ICA): No stenosis of the proximal internal carotid artery identified near the carotid bifurcation. At most mild intracranial stenosis. Tortuous cervical internal carotid artery. RIGHT VERTEBRAL ARTERY: No significant stenosis identified. LEFT VERTEBRAL ARTERY: No significant stenosis identified.  ANTERIOR CEREBRAL ARTERIES (ACAs): No significant stenosis identified. RIGHT MIDDLE CEREBRAL ARTERY (MCA): No significant stenosis identified. LEFT MIDDLE CEREBRAL ARTERY (MCA): No significant stenosis identified. RIGHT POSTERIOR CEREBRAL ARTERY (PCA): No significant stenosis identified. LEFT POSTERIOR CEREBRAL ARTERY (PCA): No significant stenosis identified. BASILAR ARTERY: No significant stenosis identified. 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 Small volume opacification of the bypassed aneurysm appears decreased since 12/1/2022. BRAIN: No regions of decreased parenchymal blush to suggest acute large territorial infarct. OTHER FINDINGS: OTHER SIGNIFICANT FINDINGS: None. PARANASAL SINUSES: At most mild opacification. Posterior MASTOID AIR CELLS: Opacification of the left mastoid air cells, mild. LUNGS: Calcified lung nodule suggesting prior granulomatous infection.     CTA: No adverse interval change in the appearance of the arteries. 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 Small volume opacification of the bypassed aneurysm appears decreased since 12/1/2022 suggesting progressive thrombosis. Elsewhere, at most mild arterial stenoses, as detailed above. Mild left mastoid effusion. COMMENT: If clinically indicated, and no contraindication, head MRI is offered for further evaluation. Electronically signed by:  Rinku Sapp M.D.  12/15/2022 2:38 AM Mountain Time    MRI Angiogram Head Without Contrast    Result Date: 12/16/2022  DATE OF EXAM: 12/15/2022 9:39 PM  PROCEDURE: MRI BRAIN WO CONTRAST-, MRI ANGIOGRAM HEAD WO CONTRAST-, MRI ANGIOGRAM NECK W CONTRAST-  INDICATIONS: Stroke, follow up; N30.00-Acute cystitis without hematuria; R55-Syncope and collapse; R41.841-Cognitive communication deficit  COMPARISON: Same day CTA  TECHNIQUE: Multiplanar  multisequence images of the brain were performed without contrast according to routine brain MRI protocol.  Noncontrast MR angiography of the head was performed.  Contrast enhanced MR angiography of the neck was performed after the administration of 10 mL of MultiHance contrast.  FINDINGS: Parenchyma: No evidence of acute infarct. No evidence of hemorrhage. Midline structures appear intact. No midline shift or herniation. Again seen is mass effect on the brainstem from large basilar artery aneurysm. Few scattered periventricular and subcortical white matter FLAIR hyperintensities suggestive chronic small vessel ischemic changes. Normal parenchymal volume.  Ventricles and extra axial spaces: Normal caliber of ventricles and sulci. No extra axial fluid collections.  Other: Orbits appear intact. Trace bilateral mastoid effusions. Scattered paranasal sinus mucosal thickening. Calvarial marrow signal is intact.  Head angiogram: The distal carotid, middle cerebral anterior cerebral and anterior communicate arteries appear patent without flow-limiting stenosis. The left vertebral artery terminates at the distal V4 segment and is excluded from the stented area. The right vertebral artery is patent and is stented into the basilar artery across the basilar artery aneurysm. The basilar artery appears patent. The posterior cerebral arteries are patent. The superior cerebellar and posterior inferior cerebellar arteries are patent. The anterior inferior cerebellar arteries are not seen. There is residual flow related signal seen into the aneurysm sac from the basilar artery better appreciated on recent CTA.  Neck angiogram: The cervical portions of the internal carotid artery and the common carotid artery are patent. The cervical portions of the vertebral artery are patent. Again seen is exclusion of the left vertebral artery from the stented portion of the basilar artery. Additionally again seen is contrast flow into the  aneurysm sac better evaluated on prior CTA.      No evidence of acute infarct  Again seen is large basilar artery aneurysm. There is stenting extending from the right distal vertebral artery across the basilar artery. The left vertebral artery is excluded from the stenting and terminates at the distal V4 segment. There is residual flow seen within this to the aneurysm sac better evaluated on recent CTA.  The remaining vessels of the head and neck demonstrate no occlusion or flow-limiting stenosis.  This report was finalized on 12/16/2022 1:05 AM by Ever Shelley.      MRI Angiogram Neck With Contrast    Result Date: 12/16/2022  DATE OF EXAM: 12/15/2022 9:39 PM  PROCEDURE: MRI BRAIN WO CONTRAST-, MRI ANGIOGRAM HEAD WO CONTRAST-, MRI ANGIOGRAM NECK W CONTRAST-  INDICATIONS: Stroke, follow up; N30.00-Acute cystitis without hematuria; R55-Syncope and collapse; R41.841-Cognitive communication deficit  COMPARISON: Same day CTA  TECHNIQUE: Multiplanar multisequence images of the brain were performed without contrast according to routine brain MRI protocol.  Noncontrast MR angiography of the head was performed.  Contrast enhanced MR angiography of the neck was performed after the administration of 10 mL of MultiHance contrast.  FINDINGS: Parenchyma: No evidence of acute infarct. No evidence of hemorrhage. Midline structures appear intact. No midline shift or herniation. Again seen is mass effect on the brainstem from large basilar artery aneurysm. Few scattered periventricular and subcortical white matter FLAIR hyperintensities suggestive chronic small vessel ischemic changes. Normal parenchymal volume.  Ventricles and extra axial spaces: Normal caliber of ventricles and sulci. No extra axial fluid collections.  Other: Orbits appear intact. Trace bilateral mastoid effusions. Scattered paranasal sinus mucosal thickening. Calvarial marrow signal is intact.  Head angiogram: The distal carotid, middle cerebral anterior  cerebral and anterior communicate arteries appear patent without flow-limiting stenosis. The left vertebral artery terminates at the distal V4 segment and is excluded from the stented area. The right vertebral artery is patent and is stented into the basilar artery across the basilar artery aneurysm. The basilar artery appears patent. The posterior cerebral arteries are patent. The superior cerebellar and posterior inferior cerebellar arteries are patent. The anterior inferior cerebellar arteries are not seen. There is residual flow related signal seen into the aneurysm sac from the basilar artery better appreciated on recent CTA.  Neck angiogram: The cervical portions of the internal carotid artery and the common carotid artery are patent. The cervical portions of the vertebral artery are patent. Again seen is exclusion of the left vertebral artery from the stented portion of the basilar artery. Additionally again seen is contrast flow into the aneurysm sac better evaluated on prior CTA.      No evidence of acute infarct  Again seen is large basilar artery aneurysm. There is stenting extending from the right distal vertebral artery across the basilar artery. The left vertebral artery is excluded from the stenting and terminates at the distal V4 segment. There is residual flow seen within this to the aneurysm sac better evaluated on recent CTA.  The remaining vessels of the head and neck demonstrate no occlusion or flow-limiting stenosis.  This report was finalized on 12/16/2022 1:05 AM by Ever Shelley.      MRI Brain Without Contrast    Result Date: 12/16/2022  DATE OF EXAM: 12/15/2022 9:39 PM  PROCEDURE: MRI BRAIN WO CONTRAST-, MRI ANGIOGRAM HEAD WO CONTRAST-, MRI ANGIOGRAM NECK W CONTRAST-  INDICATIONS: Stroke, follow up; N30.00-Acute cystitis without hematuria; R55-Syncope and collapse; R41.841-Cognitive communication deficit  COMPARISON: Same day CTA  TECHNIQUE: Multiplanar multisequence images of the brain  were performed without contrast according to routine brain MRI protocol.  Noncontrast MR angiography of the head was performed.  Contrast enhanced MR angiography of the neck was performed after the administration of 10 mL of MultiHance contrast.  FINDINGS: Parenchyma: No evidence of acute infarct. No evidence of hemorrhage. Midline structures appear intact. No midline shift or herniation. Again seen is mass effect on the brainstem from large basilar artery aneurysm. Few scattered periventricular and subcortical white matter FLAIR hyperintensities suggestive chronic small vessel ischemic changes. Normal parenchymal volume.  Ventricles and extra axial spaces: Normal caliber of ventricles and sulci. No extra axial fluid collections.  Other: Orbits appear intact. Trace bilateral mastoid effusions. Scattered paranasal sinus mucosal thickening. Calvarial marrow signal is intact.  Head angiogram: The distal carotid, middle cerebral anterior cerebral and anterior communicate arteries appear patent without flow-limiting stenosis. The left vertebral artery terminates at the distal V4 segment and is excluded from the stented area. The right vertebral artery is patent and is stented into the basilar artery across the basilar artery aneurysm. The basilar artery appears patent. The posterior cerebral arteries are patent. The superior cerebellar and posterior inferior cerebellar arteries are patent. The anterior inferior cerebellar arteries are not seen. There is residual flow related signal seen into the aneurysm sac from the basilar artery better appreciated on recent CTA.  Neck angiogram: The cervical portions of the internal carotid artery and the common carotid artery are patent. The cervical portions of the vertebral artery are patent. Again seen is exclusion of the left vertebral artery from the stented portion of the basilar artery. Additionally again seen is contrast flow into the aneurysm sac better evaluated on prior  CTA.      No evidence of acute infarct  Again seen is large basilar artery aneurysm. There is stenting extending from the right distal vertebral artery across the basilar artery. The left vertebral artery is excluded from the stenting and terminates at the distal V4 segment. There is residual flow seen within this to the aneurysm sac better evaluated on recent CTA.  The remaining vessels of the head and neck demonstrate no occlusion or flow-limiting stenosis.  This report was finalized on 12/16/2022 1:05 AM by Ever Shelley.      XR Chest 1 View    Result Date: 12/14/2022  DATE OF EXAM: 12/14/2022 10:19 PM  PROCEDURE: XR CHEST 1 VW-  INDICATIONS: Weak/Dizzy/AMS triage protocol  COMPARISON: 11/26/2022  TECHNIQUE: Single radiographic AP view of the chest was obtained.  FINDINGS: Normal cardiomediastinal silhouette. No focal opacity, pleural effusion or pneumothorax. Likely calcified granulomas in the right upper lung, similar to prior exam. No evidence of acute osseous abnormality. Visualized upper abdomen is unremarkable.      No radiographic evidence of acute cardiopulmonary process.  This report was finalized on 12/14/2022 11:10 PM by Ever Shelley.      CT Angiogram Head w AI Analysis of LVO    Result Date: 12/15/2022  EXAM: CT ANGIOGRAM HEAD W AI ANALYSIS OF LVO, CT ANGIOGRAM NECK EXAM DATE: 12/15/2022 3:51 AM INDICATION: Dizziness, headache, possible seizure. History of basilar artery aneurysm status post stent. COMPARISON: 11/26/2022, 12/1/2022. TECHNIQUE: Images through the head and neck with intravenous contrast in the angiographic phase. Maximum intensity projections (MIPs) and/or 3D reconstructions constructed and reviewed at time of study interpretation. NASCET criteria used for interpretation. Low-dose CT acquisition technique included one or more of the following options: Automated exposure control; Adjustment of mA and/or KV according to patient's size; Use of iterative reconstruction.  CONTRAST:  Administered.   FINDINGS: TECHNICAL: Technically adequate study. CTA: AORTIC ARCH: No significant stenosis identified. Minimal plaque.  BRACHIOCEPHALIC, SUBCLAVIAN, AND VISUALIZED AXILLARY ARTERIES: No significant stenosis identified. Minimal plaque. RIGHT COMMON CAROTID ARTERY (CCA): No significant stenosis identified. RIGHT INTERNAL CAROTID ARTERY (ICA): No significant stenosis of the proximal internal carotid artery identified near the carotid bifurcation. Minimal plaque about the carotid bifurcation. At most mild intracranial stenosis. Tortuous cervical internal carotid artery. LEFT COMMON CAROTID ARTERY (CCA): No significant stenosis identified. LEFT INTERNAL CAROTID ARTERY (ICA): No stenosis of the proximal internal carotid artery identified near the carotid bifurcation. At most mild intracranial stenosis. Tortuous cervical internal carotid artery. RIGHT VERTEBRAL ARTERY: No significant stenosis identified. LEFT VERTEBRAL ARTERY: No significant stenosis identified. ANTERIOR CEREBRAL ARTERIES (ACAs): No significant stenosis identified. RIGHT MIDDLE CEREBRAL ARTERY (MCA): No significant stenosis identified. LEFT MIDDLE CEREBRAL ARTERY (MCA): No significant stenosis identified. RIGHT POSTERIOR CEREBRAL ARTERY (PCA): No significant stenosis identified. LEFT POSTERIOR CEREBRAL ARTERY (PCA): No significant stenosis identified. BASILAR ARTERY: No significant stenosis identified. 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 Small volume opacification of the bypassed aneurysm appears decreased since 12/1/2022. BRAIN: No regions of decreased parenchymal blush to suggest acute large territorial infarct. OTHER FINDINGS: OTHER SIGNIFICANT FINDINGS: None. PARANASAL SINUSES: At most mild opacification. Posterior MASTOID AIR CELLS: Opacification of the left mastoid air cells, mild. LUNGS: Calcified lung nodule  suggesting prior granulomatous infection.     CTA: No adverse interval change in the appearance of the arteries. 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 Small volume opacification of the bypassed aneurysm appears decreased since 12/1/2022 suggesting progressive thrombosis. Elsewhere, at most mild arterial stenoses, as detailed above. Mild left mastoid effusion. COMMENT: If clinically indicated, and no contraindication, head MRI is offered for further evaluation. Electronically signed by:  Rinku Sapp M.D.  12/15/2022 2:38 AM Mountain Time              Results for orders placed during the hospital encounter of 12/15/22    Adult Transthoracic Echo Complete W/ Cont if Necessary Per Protocol    Interpretation Summary  •  Left ventricular systolic function is normal. Left ventricular ejection fraction appears to be 56 - 60%.  •  Mild aortic valve regurgitation is present.      Plan for Follow-up of Pending Labs/Results:   Pending Labs     Order Current Status    Blood Culture - Blood, Arm, Left Preliminary result    Blood Culture - Blood, Hand, Right Preliminary result        Discharge Details        Discharge Medications      New Medications      Instructions Start Date   atorvastatin 80 MG tablet  Commonly known as: LIPITOR   80 mg, Oral, Nightly      nitrofurantoin (macrocrystal-monohydrate) 100 MG capsule  Commonly known as: Macrobid   100 mg, Oral, 2 Times Daily   Start Date: December 18, 2022        Continue These Medications      Instructions Start Date   albuterol 108 (90 Base) MCG/ACT inhaler  Commonly known as: ProAir RespiClick   2 puffs, Inhalation, Every 4 Hours PRN      aspirin 81 MG chewable tablet   1 tablet, Oral, Daily      clopidogrel 75 MG tablet  Commonly known as: PLAVIX   75 mg, Oral, Daily      dexamethasone 2 MG tablet  Commonly known as: DECADRON   Instruct the patient to take 4 mg PO every 8 hours x 2 days, 2 mg Q8 x3 days, 2 mg every 12 x3 days, 1  mg every 12 x3 days, 1 mg PO daily for 3 days.      fish oil 1000 MG capsule capsule   1 capsule, Oral, Daily      fluticasone 50 MCG/ACT nasal spray  Commonly known as: FLONASE   2 sprays, Nasal, Daily      lidocaine 5 % ointment  Commonly known as: XYLOCAINE   lidocaine 5 % topical ointment      moexipril 15 MG tablet  Commonly known as: UNIVASC   15 mg, 2 Times Daily      Naproxen Sodium 220 MG capsule   2 tablets, Oral, 2 Times Daily PRN, Monday through Thursday      ondansetron ODT 4 MG disintegrating tablet  Commonly known as: ZOFRAN-ODT   ondansetron 4 mg disintegrating tablet      pantoprazole 40 MG EC tablet  Commonly known as: PROTONIX   40 mg, Oral, Every Early Morning      SYNOVACIN PO   1,000 mg, Oral, Daily      Vitamin D3 10 MCG (400 UNIT) capsule   1 tablet, Oral, Daily      Zinc 50 MG capsule   Oral             No Known Allergies      Discharge Disposition:      Diet:  Hospital:  Diet Order   Procedures   • Diet: Cardiac Diets; Healthy Heart (2-3 Na+); Texture: Regular Texture (IDDSI 7); Fluid Consistency: Thin (IDDSI 0)       Activity:      Restrictions or Other Recommendations:         CODE STATUS:    Code Status and Medical Interventions:   Ordered at: 12/15/22 0726     Code Status (Patient has no pulse and is not breathing):    CPR (Attempt to Resuscitate)     Medical Interventions (Patient has pulse or is breathing):    Full Support       Future Appointments   Date Time Provider Department Center   12/29/2022 10:45 AM [facility] CT 1 [facility] CT SO [facility]   12/29/2022 12:30 PM Given, Enoch CORBETT MD [facility]       Additional Instructions for the Follow-ups that You Need to Schedule     Ambulatory Referral to Physical Therapy Evaluate and treat   As directed      Specialty needed: Evaluate and treat    Follow-up needed: Yes                     Alessandra Simon MD  12/17/22      Time Spent on Discharge:  I spent  35 minutes on this discharge activity which included: face-to-face  encounter with the patient, reviewing the data in the system, coordination of the care with the nursing staff as well as consultants, documentation, and entering orders.

## 2022-12-17 NOTE — PROGRESS NOTES
Stroke Progress Note       Chief Complaint: AMS    Subjective    Subjective     Subjective:  Exam is stable, without dizzy spells         Objective      Temp:  [97.3 °F (36.3 °C)-98.2 °F (36.8 °C)] 97.3 °F (36.3 °C)  Heart Rate:  [52-62] 61  Resp:  [16-20] 18  BP: (103-126)/(66-81) 103/66    NEURO  MENTAL STATUS fatigue    LANG/SPEECH: Naming and repetition intact, fluent, follows 3-step commands    CRANIAL NERVES:    Pupils equal and reactive, EOMI intact, no gaze deviation, no nystagmus  No facial droop, cough and gag intact, shoulder shrug intact, tongue midline     MOTOR:  Moves all extremities equally    SENSORY: Normal to light touch all throughout     COORDINATION: Normal finger to nose and heel to shin, no tremor, no dysmetria    STATION: Not assessed due to patient condition    GAIT: Not assessed due to patient condition  Results Review:    I reviewed the patient's new clinical results.    Lab Results (last 24 hours)     Procedure Component Value Units Date/Time    Urine Culture - Urine, Urine, Clean Catch [[id number]]  (Abnormal)  (Susceptibility) Collected: 12/15/22 0000    Specimen: Urine, Clean Catch Updated: 12/17/22 0632     Urine Culture >100,000 CFU/mL Escherichia coli    Narrative:      Colonization of the urinary tract without infection is common. Treatment is discouraged unless the patient is symptomatic, pregnant, or undergoing an invasive urologic procedure.    Susceptibility      Escherichia coli      RODNEY      Ampicillin Susceptible      Ampicillin + Sulbactam Susceptible      Cefazolin Susceptible      Cefepime Susceptible      Ceftazidime Susceptible      Ceftriaxone Susceptible      Gentamicin Susceptible      Levofloxacin Susceptible      Nitrofurantoin Susceptible      Piperacillin + Tazobactam Susceptible      Trimethoprim + Sulfamethoxazole Susceptible                           Blood Culture - Blood, Hand, Right [[id number]]  (Normal) Collected: 12/15/22 0345    Specimen: Blood from Hand,  Right Updated: 12/17/22 0615     Blood Culture No growth at 2 days    Blood Culture - Blood, Arm, Left [[id number]]  (Normal) Collected: 12/15/22 0345    Specimen: Blood from Arm, Left Updated: 12/17/22 0615     Blood Culture No growth at 2 days    Vitamin D,25-Hydroxy [[id number]]  (Normal) Collected: 12/16/22 1649    Specimen: Blood Updated: 12/16/22 2334     25 Hydroxy, Vitamin D 42.0 ng/ml     Narrative:      Reference Range for Total Vitamin D 25(OH)     Deficiency <20.0 ng/mL   Insufficiency 21-29 ng/mL   Sufficiency  ng/mL  Toxicity >100 ng/ml    Results may be falsely increased if patient taking Biotin.      Vitamin B12 [[id number]]  (Abnormal) Collected: 12/16/22 1649    Specimen: Blood Updated: 12/16/22 2334     Vitamin B-12 1,243 pg/mL     Narrative:      Results may be falsely increased if patient taking Biotin.      P2Y12 Platelet Inhibition [[id number]] Collected: 12/16/22 1810    Specimen: Blood Updated: 12/16/22 1829     P2Y12 Reactivity Unit 16 PRU     Narrative:      P2Y12 Interpretation:  Pre-Drug normal reference range is 194-418 PRU.  Test results are reported in P2Y12 reaction units (PRU). This measures the extent of platelet aggregation in the presence of P2Y12 inhibitor drugs, such as clopidogrel (Plavix), prasugrel (Effient), ticagrelor (Brilinta), ticlopidine (Ticlid).  P2Y12 values <194 PRU (low end of reference range) are specific evidence of a P2Y12 inhibitor effect.  Patients who have been treated with Glycoprotein IIb/IIIa inhibitors should not be tested until platelet function has recovered. This time period is approximately 14 days after discontinuation of abciximab (ReoPro) and up to 48 hours after discontinuation of eptifibatide (Integrilin) and tirofiban (Aggrastat).   The P2Y12 test results should be interpreted in conjunction with other clinical and lab data available to the clinician.    Ferritin [[id number]]  (Abnormal) Collected: 12/16/22 0442    Specimen: Blood Updated:  12/16/22 1708     Ferritin 150.60 ng/mL     Narrative:      Results may be falsely decreased if patient taking Biotin.      TSH [[id number]]  (Normal) Collected: 12/16/22 0442    Specimen: Blood Updated: 12/16/22 1708     TSH 0.848 uIU/mL         MRI Angiogram Head Without Contrast    Result Date: 12/16/2022  No evidence of acute infarct  Again seen is large basilar artery aneurysm. There is stenting extending from the right distal vertebral artery across the basilar artery. The left vertebral artery is excluded from the stenting and terminates at the distal V4 segment. There is residual flow seen within this to the aneurysm sac better evaluated on recent CTA.  The remaining vessels of the head and neck demonstrate no occlusion or flow-limiting stenosis.  This report was finalized on 12/16/2022 1:05 AM by Ever Shelley.      MRI Angiogram Neck With Contrast    Result Date: 12/16/2022  No evidence of acute infarct  Again seen is large basilar artery aneurysm. There is stenting extending from the right distal vertebral artery across the basilar artery. The left vertebral artery is excluded from the stenting and terminates at the distal V4 segment. There is residual flow seen within this to the aneurysm sac better evaluated on recent CTA.  The remaining vessels of the head and neck demonstrate no occlusion or flow-limiting stenosis.  This report was finalized on 12/16/2022 1:05 AM by Ever Shelley.      MRI Brain Without Contrast    Result Date: 12/16/2022  No evidence of acute infarct  Again seen is large basilar artery aneurysm. There is stenting extending from the right distal vertebral artery across the basilar artery. The left vertebral artery is excluded from the stenting and terminates at the distal V4 segment. There is residual flow seen within this to the aneurysm sac better evaluated on recent CTA.  The remaining vessels of the head and neck demonstrate no occlusion or flow-limiting stenosis.  This  report was finalized on 12/16/2022 1:05 AM by Ever Shelley.      Results for orders placed during the hospital encounter of 12/15/22    Adult Transthoracic Echo Complete W/ Cont if Necessary Per Protocol    Interpretation Summary  •  Left ventricular systolic function is normal. Left ventricular ejection fraction appears to be 56 - 60%.  •  Mild aortic valve regurgitation is present.            Assessment/Plan     Assessment/Plan:    Patient was sitting in the couch, stood up and walked to the kitchen, slowly slumped down, with right facial droop, blank stare, followed by loss of bowel and urinary control, generalized shaking .  Patient was also diaphoretic during this episode.     On my exam, patient had no spells.    The story is consistent with vasovagal syncope/ convulsive syncope/arrythmia./ infectious cause    EEG routine-normal - less likely a seizure, we can DC Keppra.    Plan  -DC Keppra   -Continue aspirin, Plavix and daily, P2Y2 response 16.  -Normal blood pressure goals.  -Avoid hypotension  -MRI and MRAs- not impressive for any acute pathology- reviewed with Dr. Savage   -Stroke clinic follow-up in a month.         UTI- treatment as per hospitalist.     No further work-up from stroke standpoint      Shay Purvis MD  12/17/22  13:39 EST

## 2022-12-20 LAB
BACTERIA SPEC AEROBE CULT: NORMAL
BACTERIA SPEC AEROBE CULT: NORMAL

## 2022-12-29 ENCOUNTER — APPOINTMENT (OUTPATIENT)
Dept: CT IMAGING | Facility: HOSPITAL | Age: 62
End: 2022-12-29

## 2022-12-29 ENCOUNTER — OFFICE VISIT (OUTPATIENT)
Dept: NEUROSURGERY | Facility: CLINIC | Age: 62
End: 2022-12-29

## 2022-12-29 VITALS
WEIGHT: 122 LBS | BODY MASS INDEX: 22.45 KG/M2 | TEMPERATURE: 97.7 F | DIASTOLIC BLOOD PRESSURE: 64 MMHG | HEIGHT: 62 IN | SYSTOLIC BLOOD PRESSURE: 108 MMHG

## 2022-12-29 DIAGNOSIS — I67.1 CEREBRAL ANEURYSM, NONRUPTURED: Primary | ICD-10-CM

## 2022-12-29 PROCEDURE — 99214 OFFICE O/P EST MOD 30 MIN: CPT | Performed by: RADIOLOGY

## 2022-12-29 RX ORDER — CLOPIDOGREL BISULFATE 75 MG/1
75 TABLET ORAL DAILY
Qty: 30 TABLET | Refills: 6 | Status: SHIPPED | OUTPATIENT
Start: 2022-12-29

## 2022-12-29 NOTE — PROGRESS NOTES
"NAME: MEDHAT PAL   DOS: 2022  : 1960  PCP: Melissa Lazo APRN    Chief Complaint:    Chief Complaint   Patient presents with   • Cerebral Aneurysm       History of Present Illness:  62 y.o. female who is well-known to the neuro interventional service, having undergone prior embolization (Pipeline Shield) for giant (partially thrombosed) proximal basilar aneurysm on 2022.  She had initially presented with symptoms of dizziness and ataxia, and was found to have a tiny right occipital infarct.    She did well following her embolization, but was readmitted on 12/15/2022 for a \"syncopal\" episode.  Noninvasive imaging studies demonstrated further occlusion of the partially thrombosed giant proximal basilar aneurysm, with no acute infarcts or complicating features.  She continues to struggle with some unsteady gait and dizziness symptoms, but is otherwise neurologically intact.  She has a poor appetite, but denies any nausea or vomiting, no diplopia.  No difficulty swallowing or hoarseness.  She is tolerating a dual antiplatelet regimen quite well, and presents today for routine follow-up.        Past Medical History:  Past Medical History:   Diagnosis Date   • Aneurysm (HCC) 2022   • Headache    • Hyperlipidemia    • Hypertension    • Stroke (HCC) 2022       Past Surgical History:  Past Surgical History:   Procedure Laterality Date   • ARTERIAL ANEURYSM REPAIR     • BREAST LUMPECTOMY     • EMBOLIZATION CEREBRAL N/A 2022    Procedure: CV EMBOLIZATION CEREBRAL IR;  Surgeon: Enoch Savage MD;  Location: [facility] INVASIVE LOCATION;  Service: Interventional Radiology;  Laterality: N/A;   • HIATAL HERNIA REPAIR  2015   • SINUS SURGERY      x4       Review of Systems:        Review of Systems   Constitutional: Positive for activity change and fatigue. Negative for appetite change, chills, diaphoresis, fever and unexpected weight change.   HENT: Negative for congestion, dental " problem, drooling, ear discharge, ear pain, facial swelling, hearing loss, mouth sores, nosebleeds, postnasal drip, rhinorrhea, sinus pressure, sneezing, sore throat, tinnitus, trouble swallowing and voice change.    Eyes: Negative for photophobia, pain, discharge, redness, itching and visual disturbance.   Respiratory: Negative for apnea, cough, choking, chest tightness, shortness of breath, wheezing and stridor.    Cardiovascular: Negative for chest pain, palpitations and leg swelling.   Gastrointestinal: Positive for constipation and nausea. Negative for abdominal distention, abdominal pain, anal bleeding, blood in stool, diarrhea, rectal pain and vomiting.   Endocrine: Negative for cold intolerance, heat intolerance, polydipsia, polyphagia and polyuria.   Genitourinary: Negative for decreased urine volume, difficulty urinating, dysuria, enuresis, flank pain, frequency, genital sores, hematuria and urgency.   Musculoskeletal: Negative for arthralgias, back pain, gait problem, joint swelling, myalgias, neck pain and neck stiffness.   Skin: Negative for color change, pallor, rash and wound.   Allergic/Immunologic: Positive for environmental allergies. Negative for food allergies and immunocompromised state.   Neurological: Positive for dizziness, speech difficulty, light-headedness and headaches. Negative for tremors, seizures, syncope, facial asymmetry, weakness and numbness.   Hematological: Negative for adenopathy. Bruises/bleeds easily.   Psychiatric/Behavioral: Negative for agitation, behavioral problems, confusion, decreased concentration, dysphoric mood, hallucinations, self-injury, sleep disturbance and suicidal ideas. The patient is not nervous/anxious and is not hyperactive.    All other systems reviewed and are negative.       Medications    Current Outpatient Medications:   •  albuterol (PROAIR RESPICLICK) 108 (90 Base) MCG/ACT inhaler, Inhale 2 puffs Every 4 (Four) Hours As Needed for Wheezing or  Shortness of Air., Disp: 1 inhaler, Rfl: 0  •  aspirin 81 MG chewable tablet, Chew 1 tablet Daily., Disp: , Rfl:   •  atorvastatin (LIPITOR) 80 MG tablet, Take 1 tablet by mouth Every Night., Disp: 90 tablet, Rfl: 0  •  Cholecalciferol (VITAMIN D3) 400 units capsule, Take 1 tablet by mouth Daily., Disp: , Rfl:   •  clopidogrel (PLAVIX) 75 MG tablet, Take 1 tablet by mouth Daily., Disp: 30 tablet, Rfl: 6  •  fluticasone (FLONASE) 50 MCG/ACT nasal spray, 2 sprays into the nostril(s) as directed by provider Daily., Disp: 1 bottle, Rfl: 0  •  Glucosamine Sulfate (SYNOVACIN PO), Take 1,000 mg by mouth Daily., Disp: , Rfl:   •  Omega-3 Fatty Acids (FISH OIL) 1000 MG capsule capsule, Take 1 capsule by mouth Daily., Disp: , Rfl:   •  ondansetron ODT (ZOFRAN-ODT) 4 MG disintegrating tablet, ondansetron 4 mg disintegrating tablet, Disp: , Rfl:   •  pantoprazole (PROTONIX) 40 MG EC tablet, Take 1 tablet by mouth Every Morning. Indications: Take while on the steroid taper., Disp: 30 tablet, Rfl: 0  •  Zinc 50 MG capsule, Take  by mouth., Disp: , Rfl:     Allergies:  No Known Allergies    Social Hx:  Social History     Tobacco Use   • Smoking status: Former     Packs/day: 1.00     Years: 15.00     Pack years: 15.00     Types: Cigarettes     Quit date: 10/4/2004     Years since quittin.2   • Smokeless tobacco: Never   Substance Use Topics   • Alcohol use: No   • Drug use: No       Family Hx:  Family History   Problem Relation Age of Onset   • No Known Problems Mother    • Heart attack Father 42   • Heart attack Paternal Aunt    • Heart attack Paternal Uncle    • No Known Problems Sister    • Cancer Maternal Grandmother    • Heart failure Maternal Grandmother    • No Known Problems Maternal Grandfather    • No Known Problems Paternal Grandmother    • No Known Problems Paternal Grandfather    • Diabetes Half-Brother        Review of Imaging:  No new imaging.    Physical Examination:  Vitals:    22 1254   BP: 108/64    Temp: 97.7 °F (36.5 °C)        General Appearance:   Well developed, well nourished, well groomed, alert, and cooperative.  Cardiovascular: Regular rate and rhythm. No carotid bruits      Neurological examination:  Neurologic Exam     Mental Status   Oriented to person, place, and time.   Speech: speech is normal   Level of consciousness: alert    Cranial Nerves     CN II   Visual fields full to confrontation.     CN III, IV, VI   Extraocular motions are normal.     CN VII   Facial expression full, symmetric.     CN VIII   CN VIII normal.     CN IX, X   Palate: symmetric    CN XII   CN XII normal.     Motor Exam     Strength   Strength 5/5 throughout.     Sensory Exam   Light touch normal.     Gait, Coordination, and Reflexes     Gait  Gait: normal      Diagnoses/Plan:    Ms. Romo is a 62 y.o. female status postembolization of a giant, largely thrombosed proximal basilar aneurysm with Pipeline Shield flow diverter therapy on 11/29/2022.    She has done well following her embolization procedure and remains neurologically intact, but was readmitted on 12/15/2022 with a syncopal episode.  Noninvasive imaging studies during her most recent hospitalization demonstrated further interval thrombosis of the aneurysm with preservation of flow in all parent vasculature and no complicating features.  Specifically, while there does remain mass-effect on the brainstem, there is no appreciable edema.  There are no areas of acute infarction.    She has not experienced any additional syncopal episodes since her hospitalization, but does still feel like she has an unsteady gait and some dizziness.  She denies any nausea/vomiting, no diplopia, no brainstem compression symptoms.  We will continue her on her dual antiplatelet regimen for at least the next 3-4 months or so, and I plan on following up with her in the end of March with CT angiogram of the head.  During the interim, she will contact our office and/or call 911 if she  experiences any stroke or TIA additionally symptoms.    From a neurologic standpoint, she is okay to work with physical therapy.  Ms. Benz is not ready to return to work, and I suspect that she will need to be off at least until the end of March '23, at which time we will reassess whether she will be able to return to work in any capacity.     Copied text and portions of the note have been reviewed and are accurate as of 12/29/22.

## 2023-01-05 ENCOUNTER — TELEPHONE (OUTPATIENT)
Dept: NEUROSURGERY | Facility: CLINIC | Age: 63
End: 2023-01-05
Payer: COMMERCIAL

## 2023-01-05 NOTE — TELEPHONE ENCOUNTER
Provider:  Janette  Surgery/Procedure:   CV EMBOLIZATION CEREBRAL IR  Surgery/Procedure Date:  11/29/2022  Last visit:   Office Visit with Enoch Savage MD (12/29/2022)  Next visit: 03/27/2023     Reason for call:    Patient LVM today at 4:35 stating that her left eye has been twitching, starting yesterday.     I s/w Dr. Savage's PA to determine if this is an urgent matter. Jan reviewed patient's history and stated unless she starts to develop double vision, unsteady gait or dizziness she can call to let us know. I s/w patient and told her unless she starts to develop blurry vision in both eyes, balance issues or dizziness to let us know.   Patient voiced understanding and was thankful for the call.

## 2023-01-06 ENCOUNTER — TELEPHONE (OUTPATIENT)
Dept: NEUROSURGERY | Facility: CLINIC | Age: 63
End: 2023-01-06
Payer: COMMERCIAL

## 2023-01-06 NOTE — TELEPHONE ENCOUNTER
I am calling Ms. Romo back today to again reassure her that this new onset left eye \"twitching\" is nothing to currently be concerned with.  We also talked about her current status as far as dizziness and unstable gait.  She stated her dizziness is doing better, but still feels a little bit unsteady on her feet.  Again, reiterated to the patient that if she experiences any diplopia, new syncopal episodes, nausea/vomiting or any stroke/TIA like symptoms to give our office a call or call 911.  She will continue on her dual antiplatelet regimen and we will follow up with her in March with a new CTA of the head.  Patient was thankful for the call back.

## 2023-01-11 ENCOUNTER — TELEPHONE (OUTPATIENT)
Dept: NEUROSURGERY | Facility: CLINIC | Age: 63
End: 2023-01-11

## 2023-01-11 NOTE — TELEPHONE ENCOUNTER
Caller: AZAR PAL    Relationship to patient: Emergency Contact    Best call back number: [phone]    Patient is needing: PATIENTS  CALLED TO LET OFFICE KNOW DISABILITY WILL BE REACHING OUT TO REQUEST MED RECS.          Face Mask

## 2023-01-19 ENCOUNTER — TELEPHONE (OUTPATIENT)
Dept: NEUROSURGERY | Facility: CLINIC | Age: 63
End: 2023-01-19
Payer: COMMERCIAL

## 2023-01-19 DIAGNOSIS — I67.1 CEREBRAL ANEURYSM, NONRUPTURED: Primary | ICD-10-CM

## 2023-01-19 RX ORDER — ATORVASTATIN CALCIUM 80 MG/1
80 TABLET, FILM COATED ORAL NIGHTLY
Qty: 90 TABLET | Refills: 0 | Status: SHIPPED | OUTPATIENT
Start: 2023-01-19

## 2023-01-19 NOTE — TELEPHONE ENCOUNTER
I spoke with the patient's  regarding the patient's chronic headaches following her procedure.  Again, I reassured the patient's  that these headaches are chronic and ongoing and that if she experiences any worst headache of life or any signs or symptoms of subarachnoid hemorrhage or intracranial hemorrhage which was discussed with the patient at her last office visit as well as with the  over the phone, that she is to call 911 or report to the nearest emergency room immediately.  I also encouraged the patient's  to talk with the patient's primary care, they state they are going to visit the primary care on Monday about some new chronic migraine/headache medications seeing is that the patient is only on Tylenol and tramadol for her chronic headaches.  Patient and family was thankful for the call back, they were also instructed to call our office if anything changes in the interim between her next appointment in March.

## 2023-03-27 ENCOUNTER — HOSPITAL ENCOUNTER (OUTPATIENT)
Dept: CT IMAGING | Facility: HOSPITAL | Age: 63
Discharge: HOME | End: 2023-03-27
Admitting: RADIOLOGY
Payer: COMMERCIAL

## 2023-03-27 ENCOUNTER — TELEPHONE (OUTPATIENT)
Dept: NEUROSURGERY | Facility: CLINIC | Age: 63
End: 2023-03-27

## 2023-03-27 ENCOUNTER — OFFICE VISIT (OUTPATIENT)
Dept: NEUROSURGERY | Facility: CLINIC | Age: 63
End: 2023-03-27
Payer: COMMERCIAL

## 2023-03-27 VITALS
SYSTOLIC BLOOD PRESSURE: 140 MMHG | DIASTOLIC BLOOD PRESSURE: 84 MMHG | WEIGHT: 123.4 LBS | HEART RATE: 93 BPM | OXYGEN SATURATION: 100 % | HEIGHT: 61 IN | BODY MASS INDEX: 23.3 KG/M2 | TEMPERATURE: 96.9 F

## 2023-03-27 DIAGNOSIS — I67.1 CEREBRAL ANEURYSM, NONRUPTURED: Primary | ICD-10-CM

## 2023-03-27 DIAGNOSIS — I67.1 CEREBRAL ANEURYSM, NONRUPTURED: ICD-10-CM

## 2023-03-27 LAB — CREAT BLDA-MCNC: 0.5 MG/DL (ref 0.6–1.3)

## 2023-03-27 PROCEDURE — 82565 ASSAY OF CREATININE: CPT

## 2023-03-27 PROCEDURE — 25510000001 IOPAMIDOL PER 1 ML: Performed by: RADIOLOGY

## 2023-03-27 PROCEDURE — 99214 OFFICE O/P EST MOD 30 MIN: CPT | Performed by: RADIOLOGY

## 2023-03-27 PROCEDURE — 70496 CT ANGIOGRAPHY HEAD: CPT

## 2023-03-27 RX ORDER — BUTALBITAL, ACETAMINOPHEN AND CAFFEINE 300; 40; 50 MG/1; MG/1; MG/1
CAPSULE ORAL
COMMUNITY

## 2023-03-27 RX ORDER — NORTRIPTYLINE HYDROCHLORIDE 10 MG/1
CAPSULE ORAL
COMMUNITY
Start: 2023-02-21

## 2023-03-27 RX ORDER — ONDANSETRON 4 MG/1
TABLET, FILM COATED ORAL
COMMUNITY
Start: 2023-02-24

## 2023-03-27 RX ORDER — SERTRALINE HYDROCHLORIDE 25 MG/1
TABLET, FILM COATED ORAL
COMMUNITY
Start: 2023-01-19

## 2023-03-27 RX ORDER — OXYBUTYNIN CHLORIDE 5 MG/1
TABLET, EXTENDED RELEASE ORAL
COMMUNITY

## 2023-03-27 RX ADMIN — IOPAMIDOL 75 ML: 755 INJECTION, SOLUTION INTRAVENOUS at 10:33

## 2023-03-27 NOTE — TELEPHONE ENCOUNTER
"Caller: AZAR PAL    Relationship: Emergency Contact (ON [facility]; SPOUSE)    Best call back number: [phone]    What is the best time to reach you: ANY; OK TO LEAVE V/M IF DOES NOT ANSWER.    Who are you requesting to speak with (clinical staff, provider,  specific staff member): CALLER STATED 'SHAY RAFA' GAVE CARD TO CALL WITH ANY QUESTIONS AFTER TODAY'S APPT.    What was the call regarding: CALLER EXPRESSED GRATITUDE FOR TODAY'S APPOINTMENT, THAT ONE 'RESTRICTION' THAT WAS IN PLACE WAS NO 'SUBMERGED BATHING'; SEATED SHOWERS/SHOWERS ONLY.    PATIENT & CALLER STATE FORGOT TO ASK ABOUT THIS RESTRICTION; \"NEED TO KNOW IF PATIENT IS OKAY TO TAKE SEATED BATH INSTEAD OF ONLY SHOWERS; IS IT OKAY TO BE FULLY SUBMERGED IN WATER BECAUSE OF THE GROIN CATH, OR GO INTO A POOL FOR EXAMPLE.\"    Do you require a callback: YES. CALLER GRATEFUL; WILL AWAIT C/B. NO OTHER QUESTIONS FOLLOWING TODAY'S APPT.  "

## 2023-03-27 NOTE — PROGRESS NOTES
"NAME: MEDHAT PAL   DOS: 3/27/2023  : 1960  PCP: Melissa Lazo APRN    Chief Complaint:    Chief Complaint   Patient presents with   • Follow-up     Giant basilar aneurysm, s/p embolization         History of Present Illness:  62 y.o. female who is well-known to the neuro interventional service, having undergone prior embolization (Pipeline Shield) for giant (partially thrombosed) proximal basilar aneurysm on 2022.  She had initially presented with symptoms of dizziness and ataxia, and was found to have a tiny right occipital infarct.    She did well following her embolization, but was readmitted on 12/15/2022 for a \"syncopal\" episode.  Noninvasive imaging studies demonstrated further occlusion of the partially thrombosed giant proximal basilar aneurysm, with no acute infarcts or complicating features.      She continues to struggle with some unsteady gait and dizziness symptoms, which significantly limits her mobility.  She continues to struggle with generalized weakness.  She has a poor appetite, but denies any nausea or vomiting, no diplopia.  No difficulty swallowing or hoarseness.  She is tolerating a dual antiplatelet regimen quite well, and presents today for routine follow-up.      Past Medical History:  Past Medical History:   Diagnosis Date   • Aneurysm (HCC) 2022   • Headache    • Hyperlipidemia    • Hypertension    • Stroke (HCC) 2022       Past Surgical History:  Past Surgical History:   Procedure Laterality Date   • ARTERIAL ANEURYSM REPAIR     • BREAST LUMPECTOMY     • EMBOLIZATION CEREBRAL N/A 2022    Procedure: CV EMBOLIZATION CEREBRAL IR;  Surgeon: Enoch Savage MD;  Location: [facility] INVASIVE LOCATION;  Service: Interventional Radiology;  Laterality: N/A;   • HIATAL HERNIA REPAIR  2015   • SINUS SURGERY      x4       Review of Systems:        Review of Systems   Constitutional: Negative for activity change, appetite change, chills, diaphoresis, " fatigue, fever and unexpected weight change.   HENT: Negative for congestion, dental problem, drooling, ear discharge, ear pain, facial swelling, hearing loss, mouth sores, nosebleeds, postnasal drip, rhinorrhea, sinus pressure, sinus pain, sneezing, sore throat, tinnitus, trouble swallowing and voice change.    Eyes: Negative for photophobia, pain, discharge, redness, itching and visual disturbance.   Respiratory: Negative for apnea, cough, choking, chest tightness, shortness of breath, wheezing and stridor.    Cardiovascular: Negative for chest pain, palpitations and leg swelling.   Gastrointestinal: Negative for abdominal distention, abdominal pain, anal bleeding, blood in stool, constipation, diarrhea, nausea, rectal pain and vomiting.   Endocrine: Negative for cold intolerance, heat intolerance, polydipsia, polyphagia and polyuria.   Genitourinary: Positive for dysuria. Negative for decreased urine volume, difficulty urinating, dyspareunia, enuresis, flank pain, frequency, genital sores, hematuria, menstrual problem, pelvic pain, urgency, vaginal bleeding, vaginal discharge and vaginal pain.   Musculoskeletal: Negative for arthralgias, back pain, gait problem, joint swelling, myalgias, neck pain and neck stiffness.   Skin: Negative for color change, pallor, rash and wound.   Allergic/Immunologic: Negative for environmental allergies, food allergies and immunocompromised state.   Neurological: Positive for dizziness. Negative for tremors, seizures, syncope, facial asymmetry, speech difficulty, weakness, light-headedness, numbness and headaches.   Hematological: Negative for adenopathy. Does not bruise/bleed easily.   Psychiatric/Behavioral: Negative for agitation, behavioral problems, confusion, decreased concentration, dysphoric mood, hallucinations, self-injury, sleep disturbance and suicidal ideas. The patient is not nervous/anxious and is not hyperactive.         Medications    Current Outpatient  Medications:   •  aspirin 81 MG chewable tablet, Chew 1 tablet Daily., Disp: , Rfl:   •  atorvastatin (LIPITOR) 80 MG tablet, Take 1 tablet by mouth Every Night., Disp: 90 tablet, Rfl: 0  •  butalbital-acetaminophen-caffeine (ORBIVAN) -40 MG capsule capsule, butalbital-acetaminophen-caffeine 50 mg-300 mg-40 mg capsule  Take by oral route for 5 days., Disp: , Rfl:   •  clopidogrel (PLAVIX) 75 MG tablet, Take 1 tablet by mouth Daily., Disp: 30 tablet, Rfl: 6  •  fluticasone (FLONASE) 50 MCG/ACT nasal spray, 2 sprays into the nostril(s) as directed by provider Daily., Disp: 1 bottle, Rfl: 0  •  nortriptyline (PAMELOR) 10 MG capsule, , Disp: , Rfl:   •  ondansetron (ZOFRAN) 4 MG tablet, Zofran 4 MG Oral Tablet QTY: 0 tablet Days: 0 Refills: 0  Written: 02/24/23 Patient Instructions:, Disp: , Rfl:   •  oxybutynin XL (DITROPAN-XL) 5 MG 24 hr tablet, oxybutynin chloride ER 5 mg tablet,extended release 24 hr, Disp: , Rfl:   •  sertraline (ZOLOFT) 25 MG tablet, , Disp: , Rfl:   •  albuterol (PROAIR RESPICLICK) 108 (90 Base) MCG/ACT inhaler, Inhale 2 puffs Every 4 (Four) Hours As Needed for Wheezing or Shortness of Air. (Patient not taking: Reported on 3/27/2023), Disp: 1 inhaler, Rfl: 0  •  Cholecalciferol (VITAMIN D3) 400 units capsule, Take 1 tablet by mouth Daily. (Patient not taking: Reported on 3/27/2023), Disp: , Rfl:   •  Glucosamine Sulfate (SYNOVACIN PO), Take 1,000 mg by mouth Daily. (Patient not taking: Reported on 3/27/2023), Disp: , Rfl:   •  Omega-3 Fatty Acids (FISH OIL) 1000 MG capsule capsule, Take 1 capsule by mouth Daily. (Patient not taking: Reported on 3/27/2023), Disp: , Rfl:   •  pantoprazole (PROTONIX) 40 MG EC tablet, Take 1 tablet by mouth Every Morning. Indications: Take while on the steroid taper. (Patient not taking: Reported on 3/27/2023), Disp: 30 tablet, Rfl: 0  •  Zinc 50 MG capsule, Take  by mouth. (Patient not taking: Reported on 3/27/2023), Disp: , Rfl:   No current  facility-administered medications for this visit.    Allergies:  Allergies   Allergen Reactions   • Tramadol Other (See Comments)     Flushng and passing out       Social Hx:  Social History     Tobacco Use   • Smoking status: Former     Packs/day: 1.00     Years: 15.00     Pack years: 15.00     Types: Cigarettes     Quit date: 10/4/2004     Years since quittin.4   • Smokeless tobacco: Never   Substance Use Topics   • Alcohol use: No   • Drug use: No       Family Hx:  Family History   Problem Relation Age of Onset   • No Known Problems Mother    • Heart attack Father 42   • Heart attack Paternal Aunt    • Heart attack Paternal Uncle    • No Known Problems Sister    • Cancer Maternal Grandmother    • Heart failure Maternal Grandmother    • No Known Problems Maternal Grandfather    • No Known Problems Paternal Grandmother    • No Known Problems Paternal Grandfather    • Diabetes Half-Brother        Review of Imaging:  CT angiogram of the head dated 3/27/2023 from [facility] was reviewed along with its corresponding radiologic report.  Comparison is made to multiple prior studies, the most recent being on 12/15/2022.  Again seen are changes related to prior flow diverter therapy/embolization (Pipeline Shield) for a giant mid basilar trunk aneurysm.  There has been progressive occlusion/thrombosis of the aneurysm, with only a tiny amount of residual opacification (2 mm) outside the confines of the flow diverter stent construct.  The thrombosed giant basilar aneurysm remained stable in size, with posterior displacement of the brainstem.  No new or additional vascular abnormalities are identified.    Physical Examination:  Vitals:    23 1238   BP: 140/84   Pulse: 93   Temp: 96.9 °F (36.1 °C)   SpO2: 100%        General Appearance:   Well developed, well nourished, well groomed, alert, and cooperative.  Cardiovascular: Regular rate and rhythm. No carotid bruits      Neurological examination:  Alert  and oriented x3.  Her speech is clear.  Extraocular muscles are full.  I do not appreciate any gross visual field deficits.  No facial droop or pronator drift.  She does ambulate with assistance of a cane for stability purposes and secondary to dizziness.    Diagnoses/Plan:    Ms. Romo is a 62 y.o. female status postembolization of a giant, largely thrombosed proximal basilar aneurysm with Pipeline Shield flow diverter therapy on 11/29/2022.    She has done well following her embolization procedure, but continues to struggle with the dizziness and unsteady gait.  CT angiogram of the head on 3/27/2023 demonstrates progressive thrombosis of the giant aneurysm with only a tiny amount of a residual opacification (2 mm) at the aneurysm neck.  Aneurysm size remains stable, with posterior displacement of the brainstem appearing unchanged.      We will continue dual antiplatelet regimen for the next 2-3 months or so, and I plan on following up with Ms. Romo in late May 2023, at which time we will likely discontinue her Plavix, albeit she will remain on aspirin indefinitely.  We will tentatively plan on performing a follow-up CT angiogram 3 months or so after that (August 2023).    During the interim, I think it is vital that Ms. Benz continue physical therapy, and this has been temporarily discontinued secondary to blood pressure issues.  She is going to follow-up with her PCP this week, and hopefully those blood pressure issues can be resolved and she can resume physical therapy immediately.  From a neuro interventional/cerebral aneurysm standpoint, she has no limitations, and can resume any and all physical activities (including physical therapy).  She is on Zoloft, but think that she continues to suffer some situational anxiety and/or depression related to her recent events, and she is going to similarly follow-up with her PCP in this regard.      Ms. Romo is not able to return to work secondary to her  aforementioned dizziness, unsteady gait, memory loss issues, labile mood swings, and decreased attention span.  Given her giant cerebral aneurysm, brainstem compression, and prior stroke I suspect that she will not be able to return to work in her previous capacity,     She has not experienced any additional syncopal episodes since her hospitalization, but does still feel like she has an unsteady gait and some dizziness.  She denies any nausea/vomiting, no diplopia, no brainstem compression symptoms.  We will continue her on her dual antiplatelet regimen for at least the next 3-4 months or so, and I plan on following up with her in the end of March with CT angiogram of the head.  During the interim, she will contact our office and/or call 911 if she experiences any stroke or TIA additionally symptoms.     From a neurologic standpoint, she is okay to work with physical therapy.  Ms. Benz is not ready to return to work, and I suspect that she will need to be off at least until the end of March '23, at which time we will reassess whether she will be able to return to work in any capacity, and I suspect that she will need to apply for permanent disability.    Copied text and portions of the note have been reviewed and are accurate as of 3/27/23.

## 2023-03-28 NOTE — TELEPHONE ENCOUNTER
"Provider:  Janette  Surgery/Procedure:  CV EMBOLIZATION CEREBRAL IR  Surgery/Procedure Date:  11/29/2022  Last visit:   Office Visit with Enoch Savage MD (03/27/2023)  Next visit: 05/24/2023     Reason for call:    \"PATIENT & CALLER STATE FORGOT TO ASK ABOUT THIS RESTRICTION; \"NEED TO KNOW IF PATIENT IS OKAY TO TAKE SEATED BATH INSTEAD OF ONLY SHOWERS; IS IT OKAY TO BE FULLY SUBMERGED IN WATER BECAUSE OF THE GROIN CATH, OR GO INTO A POOL FOR EXAMPLE.\"\"  "

## 2023-04-25 DIAGNOSIS — I67.1 CEREBRAL ANEURYSM, NONRUPTURED: ICD-10-CM

## 2023-04-27 RX ORDER — ATORVASTATIN CALCIUM 80 MG/1
80 TABLET, FILM COATED ORAL NIGHTLY
Qty: 90 TABLET | Refills: 1 | Status: SHIPPED | OUTPATIENT
Start: 2023-04-27

## 2023-04-27 NOTE — TELEPHONE ENCOUNTER
Provider:  Given  Surgery/Procedure: Embol for basilar aneurysm    Surgery/Procedure Date: 11/29/2022   Last visit:   3/27/23  Next visit: 5/24/23     Reason for call:  Requested Prescriptions     Pending Prescriptions Disp Refills   • atorvastatin (LIPITOR) 80 MG tablet [Pharmacy Med Name: ATORVASTATIN 80MG] 90 tablet 0     Sig: TAKE 1 TABLET BY MOUTH EVERY NIGHT

## 2023-05-24 ENCOUNTER — OFFICE VISIT (OUTPATIENT)
Dept: NEUROSURGERY | Facility: CLINIC | Age: 63
End: 2023-05-24
Payer: COMMERCIAL

## 2023-05-24 VITALS
SYSTOLIC BLOOD PRESSURE: 120 MMHG | DIASTOLIC BLOOD PRESSURE: 80 MMHG | BODY MASS INDEX: 24.58 KG/M2 | TEMPERATURE: 97.8 F | OXYGEN SATURATION: 100 % | HEART RATE: 76 BPM | HEIGHT: 61 IN | WEIGHT: 130.2 LBS

## 2023-05-24 DIAGNOSIS — I72.5 BASILAR ARTERY ANEURYSM: Primary | ICD-10-CM

## 2023-05-24 PROCEDURE — 99214 OFFICE O/P EST MOD 30 MIN: CPT | Performed by: RADIOLOGY

## 2023-05-24 RX ORDER — HYDROCODONE BITARTRATE AND ACETAMINOPHEN 5; 325 MG/1; MG/1
TABLET ORAL
COMMUNITY
Start: 2023-05-09

## 2023-05-24 RX ORDER — BETHANECHOL CHLORIDE 10 MG/1
TABLET ORAL
COMMUNITY
Start: 2023-05-09

## 2023-05-24 RX ORDER — HYDROCHLOROTHIAZIDE 25 MG/1
TABLET ORAL
COMMUNITY
Start: 2023-04-27

## 2023-05-24 NOTE — PROGRESS NOTES
"NAME: MEDHAT PAL   DOS: 2023  : 1960  PCP: Melissa Lazo APRN    Chief Complaint:    Chief Complaint   Patient presents with   • Follow-up     Cerebral aneurysm       History of Present Illness:  62 y.o. female who is well-known to the neuro interventional service, having undergone prior embolization (Pipeline Shield) for giant (partially thrombosed) proximal basilar aneurysm on 2022.  She had initially presented with symptoms of dizziness and ataxia, and was found to have a tiny right occipital infarct.    She did well following her embolization, but was readmitted on 12/15/2022 for a \"syncopal\" episode.  Noninvasive imaging studies demonstrated further occlusion of the partially thrombosed giant proximal basilar aneurysm, with no acute infarcts or complicating features.       She continues to struggle with unsteady gait and dizziness symptoms, which significantly limits her mobility, as well as recurrent near syncopal episodes.  Her generalized weakness is mildly improved.  She denies any diplopia, but she cannot tolerate rapidly moving objects/alternating objects in her vision without producing symptoms of dizziness/vertigo, and near syncope (I.E.cannot watch a train go by, cannot watch children play).  She does report some difficulty in swallowing with pills, and \"chicken salad\", but otherwise is tolerating p.o. intake very well.  She remains on a dual antiplatelet regimen, and presents today for routine follow-up.       Past Medical History:  Past Medical History:   Diagnosis Date   • Aneurysm 2022   • Headache    • Hyperlipidemia    • Hypertension    • Stroke 2022       Past Surgical History:  Past Surgical History:   Procedure Laterality Date   • ARTERIAL ANEURYSM REPAIR     • BREAST LUMPECTOMY     • CYST REMOVAL Left 2023   • EMBOLIZATION CEREBRAL N/A 2022    Procedure: CV EMBOLIZATION CEREBRAL IR;  Surgeon: Enoch Savage MD;  Location: [facility]" INVASIVE LOCATION;  Service: Interventional Radiology;  Laterality: N/A;   • HIATAL HERNIA REPAIR  2015   • SINUS SURGERY      x4       Review of Systems:        Review of Systems   Constitutional: Positive for fatigue. Negative for activity change, appetite change, chills, diaphoresis, fever and unexpected weight change.   HENT: Negative for congestion, dental problem, drooling, ear discharge, ear pain, facial swelling, hearing loss, mouth sores, nosebleeds, postnasal drip, rhinorrhea, sinus pressure, sinus pain, sneezing, sore throat, tinnitus, trouble swallowing and voice change.    Eyes: Positive for itching and visual disturbance. Negative for photophobia, pain, discharge and redness.   Respiratory: Negative for apnea, cough, choking, chest tightness, shortness of breath, wheezing and stridor.    Cardiovascular: Negative for chest pain, palpitations and leg swelling.   Gastrointestinal: Negative for abdominal distention, abdominal pain, anal bleeding, blood in stool, constipation, diarrhea, nausea, rectal pain and vomiting.   Endocrine: Negative for cold intolerance, heat intolerance, polydipsia, polyphagia and polyuria.   Genitourinary: Positive for frequency and urgency. Negative for decreased urine volume, difficulty urinating, dysuria, enuresis, flank pain, genital sores and hematuria.   Musculoskeletal: Negative for arthralgias, back pain, gait problem, joint swelling, myalgias, neck pain and neck stiffness.   Skin: Negative for color change, pallor, rash and wound.   Allergic/Immunologic: Negative for environmental allergies, food allergies and immunocompromised state.   Neurological: Positive for dizziness, syncope, weakness and headaches. Negative for tremors, seizures, facial asymmetry, speech difficulty, light-headedness and numbness.   Hematological: Negative for adenopathy. Does not bruise/bleed easily.   Psychiatric/Behavioral: Negative for agitation, behavioral problems, confusion, decreased  concentration, dysphoric mood, hallucinations, self-injury, sleep disturbance and suicidal ideas. The patient is not nervous/anxious and is not hyperactive.         Medications    Current Outpatient Medications:   •  albuterol (PROAIR RESPICLICK) 108 (90 Base) MCG/ACT inhaler, Inhale 2 puffs Every 4 (Four) Hours As Needed for Wheezing or Shortness of Air., Disp: 1 inhaler, Rfl: 0  •  aspirin 81 MG chewable tablet, Chew 1 tablet Daily., Disp: , Rfl:   •  atorvastatin (LIPITOR) 80 MG tablet, TAKE 1 TABLET BY MOUTH EVERY NIGHT, Disp: 90 tablet, Rfl: 1  •  bethanechol (URECHOLINE) 10 MG tablet, , Disp: , Rfl:   •  butalbital-acetaminophen-caffeine (ORBIVAN) -40 MG capsule capsule, butalbital-acetaminophen-caffeine 50 mg-300 mg-40 mg capsule  Take by oral route for 5 days., Disp: , Rfl:   •  Cholecalciferol (VITAMIN D3) 400 units capsule, Take 1 tablet by mouth Daily., Disp: , Rfl:   •  clopidogrel (PLAVIX) 75 MG tablet, Take 1 tablet by mouth Daily., Disp: 30 tablet, Rfl: 6  •  fluticasone (FLONASE) 50 MCG/ACT nasal spray, 2 sprays into the nostril(s) as directed by provider Daily., Disp: 1 bottle, Rfl: 0  •  Glucosamine Sulfate (SYNOVACIN PO), Take 1,000 mg by mouth Daily., Disp: , Rfl:   •  hydroCHLOROthiazide (HYDRODIURIL) 25 MG tablet, , Disp: , Rfl:   •  HYDROcodone-acetaminophen (NORCO) 5-325 MG per tablet, , Disp: , Rfl:   •  nortriptyline (PAMELOR) 10 MG capsule, , Disp: , Rfl:   •  Omega-3 Fatty Acids (FISH OIL) 1000 MG capsule capsule, Take 1 capsule by mouth Daily., Disp: , Rfl:   •  ondansetron (ZOFRAN) 4 MG tablet, Zofran 4 MG Oral Tablet QTY: 0 tablet Days: 0 Refills: 0  Written: 02/24/23 Patient Instructions:, Disp: , Rfl:   •  oxybutynin XL (DITROPAN-XL) 5 MG 24 hr tablet, oxybutynin chloride ER 5 mg tablet,extended release 24 hr, Disp: , Rfl:   •  pantoprazole (PROTONIX) 40 MG EC tablet, Take 1 tablet by mouth Every Morning. Indications: Take while on the steroid taper. (Patient taking  differently: Take 1 tablet by mouth Every Morning.), Disp: 30 tablet, Rfl: 0  •  sertraline (ZOLOFT) 50 MG tablet, , Disp: , Rfl:   •  Zinc 50 MG capsule, Take  by mouth., Disp: , Rfl:     Allergies:  Allergies   Allergen Reactions   • Tramadol Other (See Comments)     Flushng and passing out       Social Hx:  Social History     Tobacco Use   • Smoking status: Former     Packs/day: 1.00     Years: 15.00     Pack years: 15.00     Types: Cigarettes     Quit date: 10/4/2004     Years since quittin.6   • Smokeless tobacco: Never   Substance Use Topics   • Alcohol use: No   • Drug use: No       Family Hx:  Family History   Problem Relation Age of Onset   • No Known Problems Mother    • Heart attack Father 42   • Heart attack Paternal Aunt    • Heart attack Paternal Uncle    • No Known Problems Sister    • Cancer Maternal Grandmother    • Heart failure Maternal Grandmother    • No Known Problems Maternal Grandfather    • No Known Problems Paternal Grandmother    • No Known Problems Paternal Grandfather    • Diabetes Half-Brother        Review of Imaging:  No new imaging.    Physical Examination:  Vitals:    23 1305   BP: 120/80   Pulse: 76   Temp: 97.8 °F (36.6 °C)   SpO2: 100%        General Appearance:   Well developed, well nourished, well groomed, alert, and cooperative.  Cardiovascular: Regular rate and rhythm. No carotid bruits      Neurological examination:  She is alert and oriented x3.  Her speech is clear.  No facial droop or pronator drift.  I do not appreciate any gross extraocular muscle dysfunction nor gross visual field deficits.  She ambulates with assistance of a cane secondary to dizziness and instability.    Diagnoses/Plan:    Ms. Romo is a 62 y.o. female who is well-known to the neurointerventional service, status postembolization of a giant, largely thrombosed proximal basilar aneurysm with Pipeline Shield flow diverter therapy on 2022.  She continues to have headaches, but gives no  history of a singular headache to suggest a subarachnoid or intracranial hemorrhage.  Her most recent CT angiogram from 3/27/2023 demonstrated progressive thrombosis of the giant aneurysm with only a small residual neck.  At this point, I think it is reasonable to discontinue her Plavix, albeit she should remain on aspirin indefinitely.  I will plan on seeing her back in 3 months time with a CT angiogram, to ensure complete thrombosis of the aneurysm and exclude any recurrent/residual aneurysm that might necessitate further treatment/intervention.  I anticipate it will take 6-12 months for the aneurysm to show any substantial shrinkage (if at all).      Ms. Romo continues to do home/self physical therapy, and certainly I think she will continue to benefit from this.  We discussed various maneuvers to avoid (flexing the neck), in hopes of alleviating some of her dizziness and near syncopal episodes.  She is going to try this over the next several months, and see if this affords her any improvement.  Unfortunately, given her persistent dizziness, unsteady gait, memory loss issues, difficulty focusing on moving objects, and near syncopal episodes; I do not feel she will be able to return to work in her previous capacity.  Ms. Romo and family agree with this assessment, and they are in the process of applying for disability, and I think she certainly qualifies given her giant basilar aneurysm with brainstem compression symptoms and prior stroke.    Copied text and portions of the note have been reviewed and are accurate as of 5/24/23.

## 2023-08-21 ENCOUNTER — OFFICE VISIT (OUTPATIENT)
Dept: NEUROSURGERY | Facility: CLINIC | Age: 63
End: 2023-08-21
Payer: COMMERCIAL

## 2023-08-21 ENCOUNTER — HOSPITAL ENCOUNTER (OUTPATIENT)
Dept: CT IMAGING | Facility: HOSPITAL | Age: 63
Discharge: HOME | End: 2023-08-21
Admitting: RADIOLOGY
Payer: COMMERCIAL

## 2023-08-21 VITALS
HEIGHT: 61 IN | SYSTOLIC BLOOD PRESSURE: 124 MMHG | DIASTOLIC BLOOD PRESSURE: 72 MMHG | BODY MASS INDEX: 24.28 KG/M2 | WEIGHT: 128.6 LBS | TEMPERATURE: 96.9 F

## 2023-08-21 DIAGNOSIS — I72.5 BASILAR ARTERY ANEURYSM: ICD-10-CM

## 2023-08-21 DIAGNOSIS — I67.1 CEREBRAL ANEURYSM, NONRUPTURED: Primary | ICD-10-CM

## 2023-08-21 LAB — CREAT BLDA-MCNC: 0.5 MG/DL (ref 0.6–1.3)

## 2023-08-21 PROCEDURE — 25510000001 IOPAMIDOL PER 1 ML: Performed by: RADIOLOGY

## 2023-08-21 PROCEDURE — 70496 CT ANGIOGRAPHY HEAD: CPT

## 2023-08-21 PROCEDURE — 82565 ASSAY OF CREATININE: CPT

## 2023-08-21 PROCEDURE — 99214 OFFICE O/P EST MOD 30 MIN: CPT | Performed by: RADIOLOGY

## 2023-08-21 RX ORDER — CLOPIDOGREL BISULFATE 75 MG/1
75 TABLET ORAL DAILY
Qty: 30 TABLET | Refills: 5 | Status: SHIPPED | OUTPATIENT
Start: 2023-08-21

## 2023-08-21 RX ORDER — ESTRADIOL 0.1 MG/G
CREAM VAGINAL
COMMUNITY
Start: 2023-08-08

## 2023-08-21 RX ORDER — SODIUM CHLORIDE 0.9 % (FLUSH) 0.9 %
10 SYRINGE (ML) INJECTION EVERY 12 HOURS SCHEDULED
OUTPATIENT
Start: 2023-08-21

## 2023-08-21 RX ORDER — SODIUM CHLORIDE 0.9 % (FLUSH) 0.9 %
1-10 SYRINGE (ML) INJECTION AS NEEDED
OUTPATIENT
Start: 2023-08-21

## 2023-08-21 RX ORDER — SODIUM CHLORIDE 9 MG/ML
40 INJECTION, SOLUTION INTRAVENOUS AS NEEDED
OUTPATIENT
Start: 2023-08-21

## 2023-08-21 RX ADMIN — IOPAMIDOL 75 ML: 755 INJECTION, SOLUTION INTRAVENOUS at 11:48

## 2023-08-21 NOTE — H&P (VIEW-ONLY)
"NAME: MEDHAT PAL   DOS: 2023  : 1960  PCP: Melissa Lazo APRN    Chief Complaint:    Chief Complaint   Patient presents with    Basilar artery anuerysm        History of Present Illness:  62 y.o. female who is well-known to the neuro interventional service, having undergone prior embolization (Pipeline Shield) for giant (partially thrombosed) proximal basilar aneurysm on 2022.  She had initially presented with symptoms of dizziness and ataxia, and was found to have a tiny right occipital infarct.    She did well following her embolization, but was readmitted on 12/15/2022 for a \"syncopal\" episode.  Noninvasive imaging studies demonstrated further occlusion of the partially thrombosed giant proximal basilar aneurysm, with no acute infarcts or complicating features.       She continues to struggle with unsteady gait and dizziness symptoms, which significantly limits her mobility, as well as recurrent near syncopal episodes.  Since I saw her last, her symptoms have progressed/worsened, with more generalized weakness and visual stimuli significantly exacerbating her symptoms of dizziness/vertigo, and near syncope (I.E.cannot watch a train go by, cannot watch children play).  She is off of Plavix now, being maintained on an aspirin antiplatelet regimen.  She presents today with follow-up CT angiogram, to assess for further occlusion/thrombosis of her giant basilar aneurysm.    Past Medical History:  Past Medical History:   Diagnosis Date    Aneurysm 2022    Headache     Hyperlipidemia     Hypertension     Stroke 2022       Past Surgical History:  Past Surgical History:   Procedure Laterality Date    ARTERIAL ANEURYSM REPAIR      BREAST LUMPECTOMY  2012    CYST REMOVAL Left 2023    EMBOLIZATION CEREBRAL N/A 2022    Procedure: CV EMBOLIZATION CEREBRAL IR;  Surgeon: Enoch Savage MD;  Location: [facility] CATH INVASIVE LOCATION;  Service: Interventional Radiology;  Laterality: " N/A;    HIATAL HERNIA REPAIR  2015    SINUS SURGERY      x4       Review of Systems:        Review of Systems   Constitutional:  Negative for activity change, appetite change, chills, diaphoresis, fatigue, fever and unexpected weight change.   HENT:  Negative for congestion, dental problem, drooling, ear discharge, ear pain, facial swelling, hearing loss, mouth sores, nosebleeds, postnasal drip, rhinorrhea, sinus pressure, sinus pain, sneezing, sore throat, tinnitus, trouble swallowing and voice change.    Eyes:  Negative for photophobia, pain, discharge, redness, itching and visual disturbance.   Respiratory:  Negative for apnea, cough, choking, chest tightness, shortness of breath, wheezing and stridor.    Cardiovascular:  Negative for chest pain, palpitations and leg swelling.   Gastrointestinal:  Negative for abdominal distention, abdominal pain, anal bleeding, blood in stool, constipation, diarrhea, nausea, rectal pain and vomiting.   Endocrine: Negative for cold intolerance, heat intolerance, polydipsia, polyphagia and polyuria.   Genitourinary:  Negative for decreased urine volume, difficulty urinating, dyspareunia, dysuria, enuresis, flank pain, frequency, genital sores, hematuria, menstrual problem, pelvic pain, urgency, vaginal bleeding, vaginal discharge and vaginal pain.   Musculoskeletal:  Negative for arthralgias, back pain, gait problem, joint swelling, myalgias, neck pain and neck stiffness.   Skin:  Negative for color change, pallor, rash and wound.   Allergic/Immunologic: Negative for environmental allergies, food allergies and immunocompromised state.   Neurological:  Positive for dizziness and headaches. Negative for tremors, seizures, syncope, facial asymmetry, speech difficulty, weakness, light-headedness and numbness.   Hematological:  Negative for adenopathy. Does not bruise/bleed easily.   Psychiatric/Behavioral:  Negative for agitation, behavioral problems, confusion, decreased  concentration, dysphoric mood, hallucinations, self-injury, sleep disturbance and suicidal ideas. The patient is not nervous/anxious and is not hyperactive.       Medications    Current Outpatient Medications:     albuterol (PROAIR RESPICLICK) 108 (90 Base) MCG/ACT inhaler, Inhale 2 puffs Every 4 (Four) Hours As Needed for Wheezing or Shortness of Air., Disp: 1 inhaler, Rfl: 0    aspirin 81 MG chewable tablet, Chew 1 tablet Daily., Disp: , Rfl:     atorvastatin (LIPITOR) 80 MG tablet, TAKE 1 TABLET BY MOUTH EVERY NIGHT, Disp: 90 tablet, Rfl: 1    butalbital-acetaminophen-caffeine (ORBIVAN) -40 MG capsule capsule, butalbital-acetaminophen-caffeine 50 mg-300 mg-40 mg capsule  Take by oral route for 5 days., Disp: , Rfl:     Cholecalciferol (VITAMIN D3) 400 units capsule, Take 1 tablet by mouth Daily., Disp: , Rfl:     clopidogrel (PLAVIX) 75 MG tablet, Take 1 tablet by mouth Daily., Disp: 30 tablet, Rfl: 6    estradiol (ESTRACE) 0.1 MG/GM vaginal cream, , Disp: , Rfl:     fluticasone (FLONASE) 50 MCG/ACT nasal spray, 2 sprays into the nostril(s) as directed by provider Daily., Disp: 1 bottle, Rfl: 0    Glucosamine Sulfate (SYNOVACIN PO), Take 1,000 mg by mouth Daily., Disp: , Rfl:     hydroCHLOROthiazide (HYDRODIURIL) 25 MG tablet, , Disp: , Rfl:     HYDROcodone-acetaminophen (NORCO) 5-325 MG per tablet, , Disp: , Rfl:     nortriptyline (PAMELOR) 10 MG capsule, , Disp: , Rfl:     Omega-3 Fatty Acids (FISH OIL) 1000 MG capsule capsule, Take 1 capsule by mouth Daily., Disp: , Rfl:     ondansetron (ZOFRAN) 4 MG tablet, Zofran 4 MG Oral Tablet QTY: 0 tablet Days: 0 Refills: 0  Written: 02/24/23 Patient Instructions:, Disp: , Rfl:     oxybutynin XL (DITROPAN-XL) 5 MG 24 hr tablet, oxybutynin chloride ER 5 mg tablet,extended release 24 hr, Disp: , Rfl:     pantoprazole (PROTONIX) 40 MG EC tablet, Take 1 tablet by mouth Every Morning. Indications: Take while on the steroid taper. (Patient taking differently: Take 1  tablet by mouth Every Morning.), Disp: 30 tablet, Rfl: 0    sertraline (ZOLOFT) 50 MG tablet, , Disp: , Rfl:     Zinc 50 MG capsule, Take  by mouth., Disp: , Rfl:   No current facility-administered medications for this visit.    Allergies:  Allergies   Allergen Reactions    Tramadol Other (See Comments)     Flushng and passing out       Social Hx:  Social History     Tobacco Use    Smoking status: Former     Packs/day: 1.00     Years: 15.00     Pack years: 15.00     Types: Cigarettes     Quit date: 10/4/2004     Years since quittin.8    Smokeless tobacco: Never   Substance Use Topics    Alcohol use: No    Drug use: No       Family Hx:  Family History   Problem Relation Age of Onset    No Known Problems Mother     Heart attack Father 42    Heart attack Paternal Aunt     Heart attack Paternal Uncle     No Known Problems Sister     Cancer Maternal Grandmother     Heart failure Maternal Grandmother     No Known Problems Maternal Grandfather     No Known Problems Paternal Grandmother     No Known Problems Paternal Grandfather     Diabetes Half-Brother        Review of Imaging:  CT angiogram of the head dated 2023 from [facility] was reviewed along with its corresponding radiologic report.  Comparison is made to multiple prior studies, the most recent being on 3/27/2023.  Again seen are changes related to prior flow diverter therapy/embolization (Pipeline Shield) for a giant mid basilar trunk aneurysm.  There is residual opacification of a small amount of the aneurysm the neck (2-3 mm) despite discontinuation of Plavix.  The thrombosed giant basilar aneurysm remained stable in size, with mass effect upon and posterior displacement of the brainstem.  No new or additional vascular abnormalities are identified.     Physical Examination:  Vitals:    23 1246   BP: 124/72   Temp: 96.9 °F (36.1 °C)        General Appearance:   Well developed, well nourished, well groomed, alert, and  cooperative.  Cardiovascular: Regular rate and rhythm. No carotid bruits      Neurological examination:  Alert and oriented x3. Her speech is clear. Extraocular muscles are full. I do not appreciate any gross visual field deficits. No facial droop or pronator drift.  She appears weaker and more frail since I saw her last, and her gait is significantly more unsteady since I saw her last.  She utilizes a cane, and people walk alongside her to ensure that she does not fall.        Diagnoses/Plan:    Ms. Romo is a 62 y.o. female status post embolization of a giant, largely thrombosed proximal basilar aneurysm with Pipeline Shield flow diverter therapy on 11/29/2022.  Since I saw her last, she has discontinued Plavix, but remains on an aspirin antiplatelet regimen.  Despite this, CT angiogram on 8/21/2023 demonstrates persistent opacification and filling of the aneurysm neck, which I feel is prohibiting complete thrombosis and (hopefully) shrinkage of the aneurysm.  There is persistent mass effect upon and posterior displacement of the brainstem.    Since I saw Ms. Romo last, her unsteadiness and gait disturbances, along with dizziness have progressed/worsened.  This is most notable in her gait which is extremely unsteady.  She also clearly has an element of depression and underlying anxiety (almost PTSD-like) in regards to the cerebral aneurysm, and she is going to follow-up with her PCP to see if there is anything else that she can be given to help with this (she is currently on an antidepressant).    Given that the aneurysm continues to fill, and has not shrunk in size, and has persistent mass effect on the brainstem, with worsening symptoms/functional status, I have recommended additional embolization with flow diverter therapy.  I discussed this in detail with Ms. Romo and family, to include risk of stroke, and they expressed an understanding.  Ms. Romo in particular, does not feel like she can continue on  her current path, that she is getting worse, and wishes to proceed with the embolization.  I did explain to them that I anticipate that embolization will result in complete occlusion of the aneurysm (eventually), but I am unsure as to whether the aneurysm will shrink in size (or how long it will take).  While I would hope that some of her symptoms would improve with thrombosis and shrinking of the aneurysm, this is certainly not guaranteed.  Again, they expressed an understanding and wished to proceed with the embolization accordingly.    Ms. Romo will resume Plavix/aspirin dual antiplatelet regimen, and we will tentatively schedule her for embolization of her giant basilar aneurysm in the next couple of weeks or so.    Lastly, Ms. Romo is in the process of applying for disability, and given her current condition, and need for additional embolization procedure, I do not foresee a way in which she can resume employment in her previous capacity.  If I can be of any further assistance with her disability application, please do not hesitate to contact me.     Copied text and portions of the note have been reviewed and are accurate as of 8/21/2023.

## 2023-08-21 NOTE — PROGRESS NOTES
"NAME: MEDHAT PAL   DOS: 2023  : 1960  PCP: Melissa Lazo APRN    Chief Complaint:    Chief Complaint   Patient presents with    Basilar artery anuerysm        History of Present Illness:  62 y.o. female who is well-known to the neuro interventional service, having undergone prior embolization (Pipeline Shield) for giant (partially thrombosed) proximal basilar aneurysm on 2022.  She had initially presented with symptoms of dizziness and ataxia, and was found to have a tiny right occipital infarct.    She did well following her embolization, but was readmitted on 12/15/2022 for a \"syncopal\" episode.  Noninvasive imaging studies demonstrated further occlusion of the partially thrombosed giant proximal basilar aneurysm, with no acute infarcts or complicating features.       She continues to struggle with unsteady gait and dizziness symptoms, which significantly limits her mobility, as well as recurrent near syncopal episodes.  Since I saw her last, her symptoms have progressed/worsened, with more generalized weakness and visual stimuli significantly exacerbating her symptoms of dizziness/vertigo, and near syncope (I.E.cannot watch a train go by, cannot watch children play).  She is off of Plavix now, being maintained on an aspirin antiplatelet regimen.  She presents today with follow-up CT angiogram, to assess for further occlusion/thrombosis of her giant basilar aneurysm.    Past Medical History:  Past Medical History:   Diagnosis Date    Aneurysm 2022    Headache     Hyperlipidemia     Hypertension     Stroke 2022       Past Surgical History:  Past Surgical History:   Procedure Laterality Date    ARTERIAL ANEURYSM REPAIR      BREAST LUMPECTOMY  2012    CYST REMOVAL Left 2023    EMBOLIZATION CEREBRAL N/A 2022    Procedure: CV EMBOLIZATION CEREBRAL IR;  Surgeon: Enoch Savage MD;  Location: [facility] CATH INVASIVE LOCATION;  Service: Interventional Radiology;  Laterality: " N/A;    HIATAL HERNIA REPAIR  2015    SINUS SURGERY      x4       Review of Systems:        Review of Systems   Constitutional:  Negative for activity change, appetite change, chills, diaphoresis, fatigue, fever and unexpected weight change.   HENT:  Negative for congestion, dental problem, drooling, ear discharge, ear pain, facial swelling, hearing loss, mouth sores, nosebleeds, postnasal drip, rhinorrhea, sinus pressure, sinus pain, sneezing, sore throat, tinnitus, trouble swallowing and voice change.    Eyes:  Negative for photophobia, pain, discharge, redness, itching and visual disturbance.   Respiratory:  Negative for apnea, cough, choking, chest tightness, shortness of breath, wheezing and stridor.    Cardiovascular:  Negative for chest pain, palpitations and leg swelling.   Gastrointestinal:  Negative for abdominal distention, abdominal pain, anal bleeding, blood in stool, constipation, diarrhea, nausea, rectal pain and vomiting.   Endocrine: Negative for cold intolerance, heat intolerance, polydipsia, polyphagia and polyuria.   Genitourinary:  Negative for decreased urine volume, difficulty urinating, dyspareunia, dysuria, enuresis, flank pain, frequency, genital sores, hematuria, menstrual problem, pelvic pain, urgency, vaginal bleeding, vaginal discharge and vaginal pain.   Musculoskeletal:  Negative for arthralgias, back pain, gait problem, joint swelling, myalgias, neck pain and neck stiffness.   Skin:  Negative for color change, pallor, rash and wound.   Allergic/Immunologic: Negative for environmental allergies, food allergies and immunocompromised state.   Neurological:  Positive for dizziness and headaches. Negative for tremors, seizures, syncope, facial asymmetry, speech difficulty, weakness, light-headedness and numbness.   Hematological:  Negative for adenopathy. Does not bruise/bleed easily.   Psychiatric/Behavioral:  Negative for agitation, behavioral problems, confusion, decreased  concentration, dysphoric mood, hallucinations, self-injury, sleep disturbance and suicidal ideas. The patient is not nervous/anxious and is not hyperactive.       Medications    Current Outpatient Medications:     albuterol (PROAIR RESPICLICK) 108 (90 Base) MCG/ACT inhaler, Inhale 2 puffs Every 4 (Four) Hours As Needed for Wheezing or Shortness of Air., Disp: 1 inhaler, Rfl: 0    aspirin 81 MG chewable tablet, Chew 1 tablet Daily., Disp: , Rfl:     atorvastatin (LIPITOR) 80 MG tablet, TAKE 1 TABLET BY MOUTH EVERY NIGHT, Disp: 90 tablet, Rfl: 1    butalbital-acetaminophen-caffeine (ORBIVAN) -40 MG capsule capsule, butalbital-acetaminophen-caffeine 50 mg-300 mg-40 mg capsule  Take by oral route for 5 days., Disp: , Rfl:     Cholecalciferol (VITAMIN D3) 400 units capsule, Take 1 tablet by mouth Daily., Disp: , Rfl:     clopidogrel (PLAVIX) 75 MG tablet, Take 1 tablet by mouth Daily., Disp: 30 tablet, Rfl: 6    estradiol (ESTRACE) 0.1 MG/GM vaginal cream, , Disp: , Rfl:     fluticasone (FLONASE) 50 MCG/ACT nasal spray, 2 sprays into the nostril(s) as directed by provider Daily., Disp: 1 bottle, Rfl: 0    Glucosamine Sulfate (SYNOVACIN PO), Take 1,000 mg by mouth Daily., Disp: , Rfl:     hydroCHLOROthiazide (HYDRODIURIL) 25 MG tablet, , Disp: , Rfl:     HYDROcodone-acetaminophen (NORCO) 5-325 MG per tablet, , Disp: , Rfl:     nortriptyline (PAMELOR) 10 MG capsule, , Disp: , Rfl:     Omega-3 Fatty Acids (FISH OIL) 1000 MG capsule capsule, Take 1 capsule by mouth Daily., Disp: , Rfl:     ondansetron (ZOFRAN) 4 MG tablet, Zofran 4 MG Oral Tablet QTY: 0 tablet Days: 0 Refills: 0  Written: 02/24/23 Patient Instructions:, Disp: , Rfl:     oxybutynin XL (DITROPAN-XL) 5 MG 24 hr tablet, oxybutynin chloride ER 5 mg tablet,extended release 24 hr, Disp: , Rfl:     pantoprazole (PROTONIX) 40 MG EC tablet, Take 1 tablet by mouth Every Morning. Indications: Take while on the steroid taper. (Patient taking differently: Take 1  tablet by mouth Every Morning.), Disp: 30 tablet, Rfl: 0    sertraline (ZOLOFT) 50 MG tablet, , Disp: , Rfl:     Zinc 50 MG capsule, Take  by mouth., Disp: , Rfl:   No current facility-administered medications for this visit.    Allergies:  Allergies   Allergen Reactions    Tramadol Other (See Comments)     Flushng and passing out       Social Hx:  Social History     Tobacco Use    Smoking status: Former     Packs/day: 1.00     Years: 15.00     Pack years: 15.00     Types: Cigarettes     Quit date: 10/4/2004     Years since quittin.8    Smokeless tobacco: Never   Substance Use Topics    Alcohol use: No    Drug use: No       Family Hx:  Family History   Problem Relation Age of Onset    No Known Problems Mother     Heart attack Father 42    Heart attack Paternal Aunt     Heart attack Paternal Uncle     No Known Problems Sister     Cancer Maternal Grandmother     Heart failure Maternal Grandmother     No Known Problems Maternal Grandfather     No Known Problems Paternal Grandmother     No Known Problems Paternal Grandfather     Diabetes Half-Brother        Review of Imaging:  CT angiogram of the head dated 2023 from [facility] was reviewed along with its corresponding radiologic report.  Comparison is made to multiple prior studies, the most recent being on 3/27/2023.  Again seen are changes related to prior flow diverter therapy/embolization (Pipeline Shield) for a giant mid basilar trunk aneurysm.  There is residual opacification of a small amount of the aneurysm the neck (2-3 mm) despite discontinuation of Plavix.  The thrombosed giant basilar aneurysm remained stable in size, with mass effect upon and posterior displacement of the brainstem.  No new or additional vascular abnormalities are identified.     Physical Examination:  Vitals:    23 1246   BP: 124/72   Temp: 96.9 øF (36.1 øC)        General Appearance:   Well developed, well nourished, well groomed, alert, and  cooperative.  Cardiovascular: Regular rate and rhythm. No carotid bruits      Neurological examination:  Alert and oriented x3. Her speech is clear. Extraocular muscles are full. I do not appreciate any gross visual field deficits. No facial droop or pronator drift.  She appears weaker and more frail since I saw her last, and her gait is significantly more unsteady since I saw her last.  She utilizes a cane, and people walk alongside her to ensure that she does not fall.        Diagnoses/Plan:    Ms. Romo is a 62 y.o. female status post embolization of a giant, largely thrombosed proximal basilar aneurysm with Pipeline Shield flow diverter therapy on 11/29/2022.  Since I saw her last, she has discontinued Plavix, but remains on an aspirin antiplatelet regimen.  Despite this, CT angiogram on 8/21/2023 demonstrates persistent opacification and filling of the aneurysm neck, which I feel is prohibiting complete thrombosis and (hopefully) shrinkage of the aneurysm.  There is persistent mass effect upon and posterior displacement of the brainstem.    Since I saw Ms. Romo last, her unsteadiness and gait disturbances, along with dizziness have progressed/worsened.  This is most notable in her gait which is extremely unsteady.  She also clearly has an element of depression and underlying anxiety (almost PTSD-like) in regards to the cerebral aneurysm, and she is going to follow-up with her PCP to see if there is anything else that she can be given to help with this (she is currently on an antidepressant).    Given that the aneurysm continues to fill, and has not shrunk in size, and has persistent mass effect on the brainstem, with worsening symptoms/functional status, I have recommended additional embolization with flow diverter therapy.  I discussed this in detail with Ms. Romo and family, to include risk of stroke, and they expressed an understanding.  Ms. Romo in particular, does not feel like she can continue on  her current path, that she is getting worse, and wishes to proceed with the embolization.  I did explain to them that I anticipate that embolization will result in complete occlusion of the aneurysm (eventually), but I am unsure as to whether the aneurysm will shrink in size (or how long it will take).  While I would hope that some of her symptoms would improve with thrombosis and shrinking of the aneurysm, this is certainly not guaranteed.  Again, they expressed an understanding and wished to proceed with the embolization accordingly.    Ms. Romo will resume Plavix/aspirin dual antiplatelet regimen, and we will tentatively schedule her for embolization of her giant basilar aneurysm in the next couple of weeks or so.    Lastly, Ms. Romo is in the process of applying for disability, and given her current condition, and need for additional embolization procedure, I do not foresee a way in which she can resume employment in her previous capacity.  If I can be of any further assistance with her disability application, please do not hesitate to contact me.     Copied text and portions of the note have been reviewed and are accurate as of 8/21/2023.

## 2023-09-05 ENCOUNTER — TELEPHONE (OUTPATIENT)
Dept: NEUROSURGERY | Facility: CLINIC | Age: 63
End: 2023-09-05
Payer: COMMERCIAL

## 2023-09-05 NOTE — TELEPHONE ENCOUNTER
Caller: Lauryn Romo    Relationship to patient: Self    Best call back number: [phone]    Chief complaint: SURGERY    Type of visit: SURGERY    Requested date: ANY       Additional notes:PLEASE CALL TO SCHEDULE SURGERY, PT STATES SHE HAS TRIED SEVERAL TIMES TO CALL DIRECT AND NO ONE ANSWERS.     THANK YOU,

## 2023-09-14 ENCOUNTER — ANESTHESIA EVENT (OUTPATIENT)
Dept: CARDIOLOGY | Facility: HOSPITAL | Age: 63
DRG: 270 | End: 2023-09-14
Payer: COMMERCIAL

## 2023-09-14 NOTE — ANESTHESIA PREPROCEDURE EVALUATION
Anesthesia Evaluation     Patient summary reviewed and Nursing notes reviewed   NPO Solid Status: > 8 hours  NPO Liquid Status: > 2 hours           Airway   Mallampati: I  TM distance: >3 FB  Neck ROM: full  No difficulty expected  Dental      Pulmonary    (+) a smoker Former, COPD (MDI),  (-) asthma, shortness of breath, recent URI, sleep apnea  Cardiovascular     ECG reviewed    (+) hypertension, dysrhythmias, hyperlipidemia  (-) past MI, CAD, angina, cardiac stents    ROS comment: ECG NSR  ECHO 2022 EF normal mild AI MR TR  GXT 2019 normal ECG stress test.       Neuro/Psych  (+) CVA (2022  eye) residual symptoms, headaches, syncope  (-) seizures    ROS Comment: 2022Falls then ataxia. Found to have brain stem mass (aneursym) and infarcts to right occipital lobe..(3x3 cm basilar aneurysm w displacement of the adjacent brainstem + small occipital infarcts secondary to this) Had embolisation under [facility]     CT angiogram 2023 -persistent opacification and filling of the aneurysm neck- (persistent mass effect upon and posterior displacement of the brainstem)   Symptoms (unsteadiness and gait disturbances dizziness) have progressed -gait is extremely unsteady.      GI/Hepatic/Renal/Endo    (+) hiatal hernia (s/P HH REPAIR)  (-) no renal disease, diabetes, no thyroid disorder    Musculoskeletal     Abdominal    Substance History      OB/GYN          Other            Phys Exam Other: Mac 3 grade 1 view 2022 Embolisation -[facility]               Anesthesia Plan    ASA 3     general     (Recent labs pending )  intravenous induction     Anesthetic plan, risks, benefits, and alternatives have been provided, discussed and informed consent has been obtained with: patient.    Plan discussed with CRNA.      CODE STATUS:

## 2023-09-15 ENCOUNTER — HOSPITAL ENCOUNTER (INPATIENT)
Facility: HOSPITAL | Age: 63
LOS: 4 days | Discharge: NURSING, REHAB OR LONG-TERM CARE | DRG: 270 | End: 2023-09-19
Attending: RADIOLOGY | Admitting: RADIOLOGY
Payer: COMMERCIAL

## 2023-09-15 ENCOUNTER — ANESTHESIA (OUTPATIENT)
Dept: CARDIOLOGY | Facility: HOSPITAL | Age: 63
DRG: 270 | End: 2023-09-15
Payer: COMMERCIAL

## 2023-09-15 DIAGNOSIS — I67.1 CEREBRAL ANEURYSM, NONRUPTURED: ICD-10-CM

## 2023-09-15 LAB
ANION GAP SERPL CALCULATED.3IONS-SCNC: 10 MMOL/L (ref 5–15)
BUN BLDA-MCNC: 13 MG/DL (ref 8–26)
BUN SERPL-MCNC: 13 MG/DL (ref 8–23)
BUN/CREAT SERPL: 19.7 (ref 7–25)
CA-I BLDA-SCNC: 1.21 MMOL/L (ref 1.2–1.32)
CALCIUM SPEC-SCNC: 9.8 MG/DL (ref 8.6–10.5)
CHLORIDE BLDA-SCNC: 98 MMOL/L (ref 98–109)
CHLORIDE SERPL-SCNC: 100 MMOL/L (ref 98–107)
CO2 BLDA-SCNC: 29 MMOL/L (ref 24–29)
CO2 SERPL-SCNC: 31 MMOL/L (ref 22–29)
CREAT BLDA-MCNC: 0.6 MG/DL (ref 0.6–1.3)
CREAT SERPL-MCNC: 0.66 MG/DL (ref 0.57–1)
DEPRECATED RDW RBC AUTO: 46.6 FL (ref 37–54)
EGFRCR SERPLBLD CKD-EPI 2021: 102.3 ML/MIN/1.73
EGFRCR SERPLBLD CKD-EPI 2021: 99.9 ML/MIN/1.73
ERYTHROCYTE [DISTWIDTH] IN BLOOD BY AUTOMATED COUNT: 13.4 % (ref 12.3–15.4)
GLUCOSE BLDC GLUCOMTR-MCNC: 98 MG/DL (ref 70–130)
GLUCOSE SERPL-MCNC: 102 MG/DL (ref 65–99)
HCT VFR BLD AUTO: 45.9 % (ref 34–46.6)
HCT VFR BLDA CALC: 47 % (ref 38–51)
HGB BLD-MCNC: 15.4 G/DL (ref 12–15.9)
HGB BLDA-MCNC: 16 G/DL (ref 12–17)
MCH RBC QN AUTO: 31.4 PG (ref 26.6–33)
MCHC RBC AUTO-ENTMCNC: 33.6 G/DL (ref 31.5–35.7)
MCV RBC AUTO: 93.7 FL (ref 79–97)
PA ADP PRP-ACNC: 55 PRU
PLATELET # BLD AUTO: 320 10*3/MM3 (ref 140–450)
PMV BLD AUTO: 10.2 FL (ref 6–12)
POTASSIUM BLDA-SCNC: 4.3 MMOL/L (ref 3.5–4.9)
POTASSIUM SERPL-SCNC: 4.3 MMOL/L (ref 3.5–5.2)
QT INTERVAL: 404 MS
QTC INTERVAL: 436 MS
RBC # BLD AUTO: 4.9 10*6/MM3 (ref 3.77–5.28)
SODIUM BLD-SCNC: 140 MMOL/L (ref 138–146)
SODIUM SERPL-SCNC: 141 MMOL/L (ref 136–145)
WBC NRBC COR # BLD: 8.63 10*3/MM3 (ref 3.4–10.8)

## 2023-09-15 PROCEDURE — 93005 ELECTROCARDIOGRAM TRACING: CPT | Performed by: ANESTHESIOLOGY

## 2023-09-15 PROCEDURE — 85014 HEMATOCRIT: CPT

## 2023-09-15 PROCEDURE — C1894 INTRO/SHEATH, NON-LASER: HCPCS | Performed by: RADIOLOGY

## 2023-09-15 PROCEDURE — C1887 CATHETER, GUIDING: HCPCS | Performed by: RADIOLOGY

## 2023-09-15 PROCEDURE — 99232 SBSQ HOSP IP/OBS MODERATE 35: CPT

## 2023-09-15 PROCEDURE — 75894 X-RAYS TRANSCATH THERAPY: CPT | Performed by: RADIOLOGY

## 2023-09-15 PROCEDURE — 25010000002 SUGAMMADEX 200 MG/2ML SOLUTION: Performed by: NURSE ANESTHETIST, CERTIFIED REGISTERED

## 2023-09-15 PROCEDURE — 61624 TCAT PERM OCCLS/EMBOLJ CNS: CPT | Performed by: RADIOLOGY

## 2023-09-15 PROCEDURE — 80047 BASIC METABLC PNL IONIZED CA: CPT

## 2023-09-15 PROCEDURE — 76937 US GUIDE VASCULAR ACCESS: CPT | Performed by: RADIOLOGY

## 2023-09-15 PROCEDURE — 80048 BASIC METABOLIC PNL TOTAL CA: CPT | Performed by: RADIOLOGY

## 2023-09-15 PROCEDURE — 85027 COMPLETE CBC AUTOMATED: CPT | Performed by: RADIOLOGY

## 2023-09-15 PROCEDURE — 36217 PLACE CATHETER IN ARTERY: CPT | Performed by: RADIOLOGY

## 2023-09-15 PROCEDURE — 85576 BLOOD PLATELET AGGREGATION: CPT | Performed by: RADIOLOGY

## 2023-09-15 PROCEDURE — 25010000002 DEXAMETHASONE PER 1 MG: Performed by: NURSE ANESTHETIST, CERTIFIED REGISTERED

## 2023-09-15 PROCEDURE — 25010000002 ONDANSETRON PER 1 MG: Performed by: NURSE ANESTHETIST, CERTIFIED REGISTERED

## 2023-09-15 PROCEDURE — 03VG3HZ RESTRICTION OF INTRACRANIAL ARTERY WITH INTRALUMINAL DEVICE, FLOW DIVERTER, PERCUTANEOUS APPROACH: ICD-10-PCS | Performed by: RADIOLOGY

## 2023-09-15 PROCEDURE — C1876 STENT, NON-COA/NON-COV W/DEL: HCPCS | Performed by: RADIOLOGY

## 2023-09-15 PROCEDURE — 36226 PLACE CATH VERTEBRAL ART: CPT | Performed by: RADIOLOGY

## 2023-09-15 PROCEDURE — 25010000002 HEPARIN (PORCINE) PER 1000 UNITS: Performed by: RADIOLOGY

## 2023-09-15 PROCEDURE — B31RYZZ FLUOROSCOPY OF INTRACRANIAL ARTERIES USING OTHER CONTRAST: ICD-10-PCS | Performed by: RADIOLOGY

## 2023-09-15 PROCEDURE — C1769 GUIDE WIRE: HCPCS | Performed by: RADIOLOGY

## 2023-09-15 PROCEDURE — 93010 ELECTROCARDIOGRAM REPORT: CPT | Performed by: INTERNAL MEDICINE

## 2023-09-15 PROCEDURE — C1725 CATH, TRANSLUMIN NON-LASER: HCPCS | Performed by: RADIOLOGY

## 2023-09-15 PROCEDURE — 25010000002 PROPOFOL 10 MG/ML EMULSION: Performed by: NURSE ANESTHETIST, CERTIFIED REGISTERED

## 2023-09-15 PROCEDURE — 75898 FOLLOW-UP ANGIOGRAPHY: CPT | Performed by: RADIOLOGY

## 2023-09-15 PROCEDURE — 25010000002 FENTANYL CITRATE (PF) 50 MCG/ML SOLUTION: Performed by: NURSE ANESTHETIST, CERTIFIED REGISTERED

## 2023-09-15 PROCEDURE — 36225 PLACE CATH SUBCLAVIAN ART: CPT | Performed by: RADIOLOGY

## 2023-09-15 PROCEDURE — 0 IODIXANOL PER 1 ML: Performed by: RADIOLOGY

## 2023-09-15 PROCEDURE — 0 LIDOCAINE 1 % SOLUTION: Performed by: RADIOLOGY

## 2023-09-15 DEVICE — IMPLANTABLE DEVICE: Type: IMPLANTABLE DEVICE | Status: FUNCTIONAL

## 2023-09-15 RX ORDER — FENTANYL CITRATE 50 UG/ML
INJECTION, SOLUTION INTRAMUSCULAR; INTRAVENOUS AS NEEDED
Status: DISCONTINUED | OUTPATIENT
Start: 2023-09-15 | End: 2023-09-15 | Stop reason: SURG

## 2023-09-15 RX ORDER — SODIUM CHLORIDE 0.9 % (FLUSH) 0.9 %
10 SYRINGE (ML) INJECTION AS NEEDED
Status: DISCONTINUED | OUTPATIENT
Start: 2023-09-15 | End: 2023-09-15 | Stop reason: HOSPADM

## 2023-09-15 RX ORDER — PROPOFOL 10 MG/ML
VIAL (ML) INTRAVENOUS AS NEEDED
Status: DISCONTINUED | OUTPATIENT
Start: 2023-09-15 | End: 2023-09-15 | Stop reason: SURG

## 2023-09-15 RX ORDER — IODIXANOL 320 MG/ML
INJECTION, SOLUTION INTRAVASCULAR
Status: DISCONTINUED | OUTPATIENT
Start: 2023-09-15 | End: 2023-09-15 | Stop reason: HOSPADM

## 2023-09-15 RX ORDER — LIDOCAINE HYDROCHLORIDE 10 MG/ML
0.5 INJECTION, SOLUTION EPIDURAL; INFILTRATION; INTRACAUDAL; PERINEURAL ONCE AS NEEDED
Status: DISCONTINUED | OUTPATIENT
Start: 2023-09-15 | End: 2023-09-15 | Stop reason: HOSPADM

## 2023-09-15 RX ORDER — SODIUM CHLORIDE 0.9 % (FLUSH) 0.9 %
1-10 SYRINGE (ML) INJECTION AS NEEDED
Status: DISCONTINUED | OUTPATIENT
Start: 2023-09-15 | End: 2023-09-15 | Stop reason: HOSPADM

## 2023-09-15 RX ORDER — FAMOTIDINE 10 MG/ML
20 INJECTION, SOLUTION INTRAVENOUS ONCE
Status: DISCONTINUED | OUTPATIENT
Start: 2023-09-15 | End: 2023-09-15 | Stop reason: HOSPADM

## 2023-09-15 RX ORDER — HEPARIN SODIUM 1000 [USP'U]/ML
INJECTION, SOLUTION INTRAVENOUS; SUBCUTANEOUS
Status: DISCONTINUED | OUTPATIENT
Start: 2023-09-15 | End: 2023-09-15 | Stop reason: HOSPADM

## 2023-09-15 RX ORDER — DEXAMETHASONE SODIUM PHOSPHATE 10 MG/ML
INJECTION INTRAMUSCULAR; INTRAVENOUS AS NEEDED
Status: DISCONTINUED | OUTPATIENT
Start: 2023-09-15 | End: 2023-09-15 | Stop reason: SURG

## 2023-09-15 RX ORDER — SODIUM CHLORIDE 9 MG/ML
40 INJECTION, SOLUTION INTRAVENOUS AS NEEDED
Status: DISCONTINUED | OUTPATIENT
Start: 2023-09-15 | End: 2023-09-15 | Stop reason: HOSPADM

## 2023-09-15 RX ORDER — CLOPIDOGREL BISULFATE 75 MG/1
75 TABLET ORAL DAILY
Status: DISCONTINUED | OUTPATIENT
Start: 2023-09-15 | End: 2023-09-15

## 2023-09-15 RX ORDER — MIDAZOLAM HYDROCHLORIDE 1 MG/ML
1 INJECTION INTRAMUSCULAR; INTRAVENOUS
Status: DISCONTINUED | OUTPATIENT
Start: 2023-09-15 | End: 2023-09-15 | Stop reason: HOSPADM

## 2023-09-15 RX ORDER — FENTANYL CITRATE 50 UG/ML
50 INJECTION, SOLUTION INTRAMUSCULAR; INTRAVENOUS
Status: DISCONTINUED | OUTPATIENT
Start: 2023-09-15 | End: 2023-09-15 | Stop reason: HOSPADM

## 2023-09-15 RX ORDER — SODIUM CHLORIDE 0.9 % (FLUSH) 0.9 %
10 SYRINGE (ML) INJECTION EVERY 12 HOURS SCHEDULED
Status: DISCONTINUED | OUTPATIENT
Start: 2023-09-15 | End: 2023-09-15 | Stop reason: HOSPADM

## 2023-09-15 RX ORDER — SODIUM CHLORIDE 9 MG/ML
INJECTION, SOLUTION INTRAVENOUS CONTINUOUS PRN
Status: DISCONTINUED | OUTPATIENT
Start: 2023-09-15 | End: 2023-09-15 | Stop reason: SURG

## 2023-09-15 RX ORDER — PROCHLORPERAZINE EDISYLATE 5 MG/ML
10 INJECTION INTRAMUSCULAR; INTRAVENOUS ONCE AS NEEDED
Status: DISCONTINUED | OUTPATIENT
Start: 2023-09-15 | End: 2023-09-15 | Stop reason: HOSPADM

## 2023-09-15 RX ORDER — LIDOCAINE HYDROCHLORIDE 10 MG/ML
INJECTION, SOLUTION EPIDURAL; INFILTRATION; INTRACAUDAL; PERINEURAL AS NEEDED
Status: DISCONTINUED | OUTPATIENT
Start: 2023-09-15 | End: 2023-09-15 | Stop reason: SURG

## 2023-09-15 RX ORDER — NITROGLYCERIN 0.4 MG/1
0.4 TABLET SUBLINGUAL
Status: DISCONTINUED | OUTPATIENT
Start: 2023-09-15 | End: 2023-09-19 | Stop reason: HOSPADM

## 2023-09-15 RX ORDER — ONDANSETRON 2 MG/ML
4 INJECTION INTRAMUSCULAR; INTRAVENOUS EVERY 6 HOURS PRN
Status: DISCONTINUED | OUTPATIENT
Start: 2023-09-15 | End: 2023-09-19 | Stop reason: HOSPADM

## 2023-09-15 RX ORDER — SODIUM CHLORIDE, SODIUM LACTATE, POTASSIUM CHLORIDE, CALCIUM CHLORIDE 600; 310; 30; 20 MG/100ML; MG/100ML; MG/100ML; MG/100ML
9 INJECTION, SOLUTION INTRAVENOUS CONTINUOUS
Status: DISCONTINUED | OUTPATIENT
Start: 2023-09-15 | End: 2023-09-18

## 2023-09-15 RX ORDER — ROCURONIUM BROMIDE 10 MG/ML
INJECTION, SOLUTION INTRAVENOUS AS NEEDED
Status: DISCONTINUED | OUTPATIENT
Start: 2023-09-15 | End: 2023-09-15 | Stop reason: SURG

## 2023-09-15 RX ORDER — NICARDIPINE HCL-0.9% SOD CHLOR 1 MG/10 ML
SYRINGE (ML) INTRAVENOUS
Status: DISCONTINUED | OUTPATIENT
Start: 2023-09-15 | End: 2023-09-15 | Stop reason: HOSPADM

## 2023-09-15 RX ORDER — DEXAMETHASONE SODIUM PHOSPHATE 4 MG/ML
8 INJECTION, SOLUTION INTRA-ARTICULAR; INTRALESIONAL; INTRAMUSCULAR; INTRAVENOUS; SOFT TISSUE ONCE AS NEEDED
Status: DISCONTINUED | OUTPATIENT
Start: 2023-09-15 | End: 2023-09-15 | Stop reason: HOSPADM

## 2023-09-15 RX ORDER — ONDANSETRON 2 MG/ML
INJECTION INTRAMUSCULAR; INTRAVENOUS AS NEEDED
Status: DISCONTINUED | OUTPATIENT
Start: 2023-09-15 | End: 2023-09-15 | Stop reason: SURG

## 2023-09-15 RX ORDER — LABETALOL HYDROCHLORIDE 5 MG/ML
INJECTION, SOLUTION INTRAVENOUS AS NEEDED
Status: DISCONTINUED | OUTPATIENT
Start: 2023-09-15 | End: 2023-09-15 | Stop reason: SURG

## 2023-09-15 RX ORDER — FAMOTIDINE 20 MG/1
20 TABLET, FILM COATED ORAL ONCE
Status: COMPLETED | OUTPATIENT
Start: 2023-09-15 | End: 2023-09-15

## 2023-09-15 RX ORDER — CLOPIDOGREL BISULFATE 75 MG/1
75 TABLET ORAL DAILY
Status: DISCONTINUED | OUTPATIENT
Start: 2023-09-16 | End: 2023-09-19 | Stop reason: HOSPADM

## 2023-09-15 RX ORDER — SODIUM CHLORIDE 9 MG/ML
75 INJECTION, SOLUTION INTRAVENOUS CONTINUOUS
Status: DISCONTINUED | OUTPATIENT
Start: 2023-09-15 | End: 2023-09-18

## 2023-09-15 RX ORDER — LORAZEPAM 0.5 MG/1
0.5 TABLET ORAL EVERY 8 HOURS PRN
COMMUNITY

## 2023-09-15 RX ORDER — LIDOCAINE HYDROCHLORIDE 10 MG/ML
INJECTION, SOLUTION INFILTRATION; PERINEURAL
Status: DISCONTINUED | OUTPATIENT
Start: 2023-09-15 | End: 2023-09-15 | Stop reason: HOSPADM

## 2023-09-15 RX ORDER — ACETAMINOPHEN 325 MG/1
650 TABLET ORAL EVERY 4 HOURS PRN
Status: DISCONTINUED | OUTPATIENT
Start: 2023-09-15 | End: 2023-09-19 | Stop reason: HOSPADM

## 2023-09-15 RX ORDER — ATORVASTATIN CALCIUM 40 MG/1
80 TABLET, FILM COATED ORAL NIGHTLY
Status: DISCONTINUED | OUTPATIENT
Start: 2023-09-15 | End: 2023-09-19 | Stop reason: HOSPADM

## 2023-09-15 RX ORDER — SERTRALINE HYDROCHLORIDE 100 MG/1
100 TABLET, FILM COATED ORAL DAILY
Status: DISCONTINUED | OUTPATIENT
Start: 2023-09-15 | End: 2023-09-19 | Stop reason: HOSPADM

## 2023-09-15 RX ADMIN — SODIUM CHLORIDE 75 ML/HR: 9 INJECTION, SOLUTION INTRAVENOUS at 18:11

## 2023-09-15 RX ADMIN — ROCURONIUM BROMIDE 10 MG: 10 SOLUTION INTRAVENOUS at 15:01

## 2023-09-15 RX ADMIN — SERTRALINE HYDROCHLORIDE 100 MG: 100 TABLET ORAL at 18:11

## 2023-09-15 RX ADMIN — PROPOFOL 100 MG: 10 INJECTION, EMULSION INTRAVENOUS at 14:36

## 2023-09-15 RX ADMIN — ONDANSETRON 4 MG: 2 INJECTION INTRAMUSCULAR; INTRAVENOUS at 15:31

## 2023-09-15 RX ADMIN — LIDOCAINE HYDROCHLORIDE 50 MG: 10 INJECTION, SOLUTION EPIDURAL; INFILTRATION; INTRACAUDAL; PERINEURAL at 14:36

## 2023-09-15 RX ADMIN — DEXAMETHASONE SODIUM PHOSPHATE 8 MG: 10 INJECTION INTRAMUSCULAR; INTRAVENOUS at 14:46

## 2023-09-15 RX ADMIN — SODIUM CHLORIDE: 9 INJECTION, SOLUTION INTRAVENOUS at 14:22

## 2023-09-15 RX ADMIN — ROCURONIUM BROMIDE 40 MG: 10 SOLUTION INTRAVENOUS at 14:36

## 2023-09-15 RX ADMIN — ACETAMINOPHEN 650 MG: 325 TABLET ORAL at 16:44

## 2023-09-15 RX ADMIN — FENTANYL CITRATE 50 MCG: 50 INJECTION, SOLUTION INTRAMUSCULAR; INTRAVENOUS at 14:36

## 2023-09-15 RX ADMIN — SUGAMMADEX 200 MG: 100 INJECTION, SOLUTION INTRAVENOUS at 15:31

## 2023-09-15 RX ADMIN — ATORVASTATIN CALCIUM 80 MG: 40 TABLET, FILM COATED ORAL at 20:02

## 2023-09-15 RX ADMIN — Medication 5 MG: at 15:38

## 2023-09-15 RX ADMIN — FAMOTIDINE 20 MG: 20 TABLET, FILM COATED ORAL at 12:05

## 2023-09-15 RX ADMIN — ACETAMINOPHEN 650 MG: 325 TABLET ORAL at 20:55

## 2023-09-15 NOTE — BRIEF OP NOTE
CV IR INTRACRANIAL EMBOLIZATION  Progress Note    Lauryn Romo  9/15/2023    Pre-op Diagnosis:   Cerebral aneurysm, nonruptured [I67.1]       Post-Op Diagnosis Codes:     * Cerebral aneurysm, nonruptured [I67.1]    Procedure/CPT® Codes:        Procedure(s):  Embolization              Surgeon(s):  Enoch Savage MD    Anesthesia: General    Staff:   Invasive Nurse: Roxie Alegria RN; Anita Oseguera RN  Invasive Technologist: Alejandra Ross; Carmen Juarez         Estimated Blood Loss: minimal    Urine Voided: 300 mL    Specimens:                None          Drains: * No LDAs found *    Findings: Giant, 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  There is residual opacification of the aneurysm via the left vertebral artery which may require additional treatment in the future.        Complications: None apparent.          Enoch Savage MD     Date: 9/15/2023  Time: 15:38 EDT

## 2023-09-15 NOTE — CASE MANAGEMENT/SOCIAL WORK
Continued Stay Note  [facility]     Patient Name: Lauryn Romo  MRN: [id number]  Today's Date: 9/15/2023    Admit Date: (Not on file)    Plan: [facility] acute rehab   Discharge Plan       Row Name 09/15/23 1033       Plan    Plan [facility] acute rehab    Plan Comments Received call from charge RN stating Dr. Savage requested this patient go to [facility] for rehab post procedure.  Procedure scheduled for 1100 today.  Referral called to April at [facility]; [facility] is currently full and cannot accept new patient until Monday at the earliest.  PT and OT will need to see patient for evaluation on Saturday.  Insurance approval for rehab will be required.  [facility] will follow patient in [facility] over weekend.  CM will continue to follow.                   Discharge Codes    No documentation.                       Jemma Machado RN

## 2023-09-15 NOTE — Clinical Note
Prepped: right groin and Right Wrist. Prepped with: ChloraPrep. The patient was draped in a sterile fashion.

## 2023-09-15 NOTE — ANESTHESIA POSTPROCEDURE EVALUATION
Patient: Lauryn Romo    Procedure Summary       Date: 09/15/23 Room / Location: [facility] /  [facility]    Anesthesia Start: 1423 Anesthesia Stop: 1548    Procedure: Embolization Diagnosis:       Cerebral aneurysm, nonruptured      (Cerebral aneurysm, nonruptured [I67.1])    Providers: Enoch Savage MD Provider: Se Dia MD    Anesthesia Type: general ASA Status: 3            Anesthesia Type: general    Vitals  Vitals Value Taken Time   /72 09/15/23 1615   Temp 98.1 °F (36.7 °C) 09/15/23 1615   Pulse 60 09/15/23 1615   Resp 16 09/15/23 1615   SpO2 97 % 09/15/23 1615           Post Anesthesia Care and Evaluation    Patient location during evaluation: PACU  Patient participation: complete - patient participated  Level of consciousness: awake and alert  Pain management: adequate    Airway patency: patent  Anesthetic complications: No anesthetic complications  PONV Status: none  Cardiovascular status: hemodynamically stable and acceptable  Respiratory status: nonlabored ventilation, acceptable and nasal cannula  Hydration status: acceptable

## 2023-09-15 NOTE — Clinical Note
A 8 fr sheath was  inserted with ultrasound guidance into the right radial artery. Sheath insertion delayed.

## 2023-09-15 NOTE — PROGRESS NOTES
Intensive Care Follow-up     Hospital:  LOS: 0 days   Ms. Lauryn Romo, 62 y.o. 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 She was readmitted in December for syncopal episodes accompanied with right facial droop and dysarthria. Following this, she continued to have unsteadiness, gait disturbances, and dizziness thought to be syncopal. She was discharged with plans for heart monitor. Repeat CTA showed persistent filling of the aneurysm neck and Dr. Savage felt further thrombosis would be the best decision to hopefully shrink the aneurysm. Patient underwent re-treatment of giant, partially thrombosed basilar aneurysm with additional pipeline flow diverter from the right vertebral artery.     Encountered patient upon arrival to the ICU. She is hemodynamically stable. Does have a headache, which she reports is the same type / level of pain she has at home.      The patient's past medical, surgical and social history were reviewed and updated in Epic as appropriate.       Objective     Infusions:  lactated ringers, 9 mL/hr  niCARdipine, 5-15 mg/hr  phenylephrine, 0.5-3 mcg/kg/min  sodium chloride, 75 mL/hr      Medications:  [MAR Hold] clopidogrel, 75 mg, Oral, Daily      I reviewed the patient's medications.    Vital Sign Min/Max for last 24 hours  Temp  Min: 97.6 °F (36.4 °C)  Max: 98.1 °F (36.7 °C)   BP  Min: 118/72  Max: 147/106   Pulse  Min: 57  Max: 80   Resp  Min: 16  Max: 16   SpO2  Min: 97 %  Max: 100 %   No data recorded       Input/Output for last 24 hour shift  No intake/output data recorded.   S RR:  [4-10] 4    Physical Exam:  GENERAL: Patient lying in bed and conversant. No acute distress.   HEENT: Normocephalic and atraumatic. Trachea midline.  PER. EOM WNL.   LUNGS: Chest rise of normal depth and symmetric. Lungs clear to auscultation bilaterally. No wheezes, rhonchi, or rales.   HEART: S1,S2 detected. Regular rate and rhythm. No rub, murmur, or gallop.   ABDOMEN: Soft, round, nondistended, and nontender. Bowel sounds present.   EXTREMITIES: No clubbing, edema, or cyanosis. Peripheral pulses present. Skin warm and dry. R Radial TR band in place C/D/I   NEURO/PSYCH: Alert and oriented. Follows commands. Moves all extremities. No focal deficits.      Results from last 7 days   Lab Units 09/15/23  1143 09/15/23  1126   WBC 10*3/mm3  --  8.63   HEMOGLOBIN g/dL  --  15.4   HEMOGLOBIN, POC g/dL 16.0  --    PLATELETS 10*3/mm3  --  320     Results from last 7 days   Lab Units 09/15/23  1143 09/15/23  1126   SODIUM mmol/L  --  141   POTASSIUM mmol/L  --  4.3   CO2 mmol/L  --  31.0*   BUN mg/dL  --  13   CREATININE mg/dL 0.60 0.66   GLUCOSE mg/dL  --  102*     Estimated Creatinine Clearance: 80.7 mL/min (by C-G formula based on SCr of 0.6 mg/dL).          I reviewed the patient's new clinical results.  I reviewed the patient's new imaging results/reports including actual images and agree with reports.     Imaging Results (Last 24 Hours)       ** No results found for the last 24 hours. **            Assessment & Plan   Impression      Brain aneurysm       Plan      -Postop orders per Dr. Savage  -Nicardipine for blood pressure control   -Neurovascular checks per protocol  -Pain control  -Mobilize patient per protocol  -Aggressive pulmonary toilet  - Lipitor and Zoloft restarted from home meds. Further home medications per Neurosurgery.   -AM labs      DVT Prophylaxis: SCDs  Dispo: ICU    Time spent: 36  Plan of care and goals reviewed with multidisciplinary/antibiotic stewardship team during rounds.   I discussed the patient's findings and my recommendations with patient, family, and nursing staff     Yasemin Srivastava, MSN, APRN, AGACNP-BC  Pulmonary and Critical  Care Medicine

## 2023-09-16 PROBLEM — R55 SYNCOPE: Status: RESOLVED | Noted: 2022-12-15 | Resolved: 2023-09-16

## 2023-09-16 LAB
ANION GAP SERPL CALCULATED.3IONS-SCNC: 10 MMOL/L (ref 5–15)
BASOPHILS # BLD AUTO: 0.02 10*3/MM3 (ref 0–0.2)
BASOPHILS NFR BLD AUTO: 0.2 % (ref 0–1.5)
BUN SERPL-MCNC: 10 MG/DL (ref 8–23)
BUN/CREAT SERPL: 23.8 (ref 7–25)
CALCIUM SPEC-SCNC: 8.5 MG/DL (ref 8.6–10.5)
CHLORIDE SERPL-SCNC: 104 MMOL/L (ref 98–107)
CO2 SERPL-SCNC: 24 MMOL/L (ref 22–29)
CREAT SERPL-MCNC: 0.42 MG/DL (ref 0.57–1)
DEPRECATED RDW RBC AUTO: 45.8 FL (ref 37–54)
EGFRCR SERPLBLD CKD-EPI 2021: 111.4 ML/MIN/1.73
EOSINOPHIL # BLD AUTO: 0.03 10*3/MM3 (ref 0–0.4)
EOSINOPHIL NFR BLD AUTO: 0.4 % (ref 0.3–6.2)
ERYTHROCYTE [DISTWIDTH] IN BLOOD BY AUTOMATED COUNT: 13.2 % (ref 12.3–15.4)
GLUCOSE SERPL-MCNC: 100 MG/DL (ref 65–99)
HCT VFR BLD AUTO: 40.7 % (ref 34–46.6)
HGB BLD-MCNC: 13.6 G/DL (ref 12–15.9)
IMM GRANULOCYTES # BLD AUTO: 0.02 10*3/MM3 (ref 0–0.05)
IMM GRANULOCYTES NFR BLD AUTO: 0.2 % (ref 0–0.5)
LYMPHOCYTES # BLD AUTO: 1.66 10*3/MM3 (ref 0.7–3.1)
LYMPHOCYTES NFR BLD AUTO: 19.5 % (ref 19.6–45.3)
MAGNESIUM SERPL-MCNC: 2.4 MG/DL (ref 1.6–2.4)
MCH RBC QN AUTO: 31.4 PG (ref 26.6–33)
MCHC RBC AUTO-ENTMCNC: 33.4 G/DL (ref 31.5–35.7)
MCV RBC AUTO: 94 FL (ref 79–97)
MONOCYTES # BLD AUTO: 0.72 10*3/MM3 (ref 0.1–0.9)
MONOCYTES NFR BLD AUTO: 8.5 % (ref 5–12)
NEUTROPHILS NFR BLD AUTO: 6.06 10*3/MM3 (ref 1.7–7)
NEUTROPHILS NFR BLD AUTO: 71.2 % (ref 42.7–76)
NRBC BLD AUTO-RTO: 0 /100 WBC (ref 0–0.2)
PLATELET # BLD AUTO: 257 10*3/MM3 (ref 140–450)
PMV BLD AUTO: 10.6 FL (ref 6–12)
POTASSIUM SERPL-SCNC: 4 MMOL/L (ref 3.5–5.2)
RBC # BLD AUTO: 4.33 10*6/MM3 (ref 3.77–5.28)
SODIUM SERPL-SCNC: 138 MMOL/L (ref 136–145)
WBC NRBC COR # BLD: 8.51 10*3/MM3 (ref 3.4–10.8)

## 2023-09-16 PROCEDURE — 83735 ASSAY OF MAGNESIUM: CPT

## 2023-09-16 PROCEDURE — 99231 SBSQ HOSP IP/OBS SF/LOW 25: CPT | Performed by: NEUROLOGICAL SURGERY

## 2023-09-16 PROCEDURE — 85025 COMPLETE CBC W/AUTO DIFF WBC: CPT | Performed by: RADIOLOGY

## 2023-09-16 PROCEDURE — 97166 OT EVAL MOD COMPLEX 45 MIN: CPT

## 2023-09-16 PROCEDURE — 80048 BASIC METABOLIC PNL TOTAL CA: CPT | Performed by: RADIOLOGY

## 2023-09-16 PROCEDURE — 25010000002 ONDANSETRON PER 1 MG: Performed by: RADIOLOGY

## 2023-09-16 PROCEDURE — 97530 THERAPEUTIC ACTIVITIES: CPT

## 2023-09-16 PROCEDURE — 97162 PT EVAL MOD COMPLEX 30 MIN: CPT

## 2023-09-16 PROCEDURE — 97535 SELF CARE MNGMENT TRAINING: CPT

## 2023-09-16 RX ORDER — BUTALBITAL, ACETAMINOPHEN AND CAFFEINE 50; 325; 40 MG/1; MG/1; MG/1
1 TABLET ORAL EVERY 4 HOURS PRN
Status: DISCONTINUED | OUTPATIENT
Start: 2023-09-16 | End: 2023-09-19 | Stop reason: HOSPADM

## 2023-09-16 RX ORDER — HYDROCODONE BITARTRATE AND ACETAMINOPHEN 5; 325 MG/1; MG/1
1 TABLET ORAL EVERY 4 HOURS PRN
Status: DISCONTINUED | OUTPATIENT
Start: 2023-09-16 | End: 2023-09-19 | Stop reason: HOSPADM

## 2023-09-16 RX ADMIN — BUTALBITAL, ACETAMINOPHEN, AND CAFFEINE 1 TABLET: 50; 325; 40 TABLET ORAL at 18:10

## 2023-09-16 RX ADMIN — BUTALBITAL, ACETAMINOPHEN, AND CAFFEINE 1 TABLET: 50; 325; 40 TABLET ORAL at 14:22

## 2023-09-16 RX ADMIN — CLOPIDOGREL BISULFATE 75 MG: 75 TABLET ORAL at 08:34

## 2023-09-16 RX ADMIN — BUTALBITAL, ACETAMINOPHEN, AND CAFFEINE 1 TABLET: 50; 325; 40 TABLET ORAL at 10:07

## 2023-09-16 RX ADMIN — HYDROCODONE BITARTRATE AND ACETAMINOPHEN 1 TABLET: 5; 325 TABLET ORAL at 18:54

## 2023-09-16 RX ADMIN — SODIUM CHLORIDE 75 ML/HR: 9 INJECTION, SOLUTION INTRAVENOUS at 06:03

## 2023-09-16 RX ADMIN — ACETAMINOPHEN 650 MG: 325 TABLET ORAL at 18:10

## 2023-09-16 RX ADMIN — SERTRALINE HYDROCHLORIDE 100 MG: 100 TABLET ORAL at 08:34

## 2023-09-16 RX ADMIN — ATORVASTATIN CALCIUM 80 MG: 40 TABLET, FILM COATED ORAL at 20:17

## 2023-09-16 RX ADMIN — ONDANSETRON 4 MG: 2 INJECTION INTRAMUSCULAR; INTRAVENOUS at 18:10

## 2023-09-16 NOTE — PLAN OF CARE
Goal Outcome Evaluation:  Patient afebrile and vital signs stable overnight. Consistent neuro exam throughout the shift, patient did however complain of headache that she says is typical of her frequent headaches at baseline. Tylenol given prn although patient states tylenol is usually not helpful. TR band removed with no oozing afterwards and bruising at the site and of the palm of hand.

## 2023-09-16 NOTE — PLAN OF CARE
Goal Outcome Evaluation:  Plan of Care Reviewed With: patient           Outcome Evaluation: Physical therapy evaluation complete. The patient presents with balance and activity tolerance deficits affecting mobility. Patient presents below baseline for mobility and would continue to benefit from skilled PT to address deficits and decrease falls risk. At hospital discharge recommend IPR.      Anticipated Discharge Disposition (PT): inpatient rehabilitation facility

## 2023-09-16 NOTE — PLAN OF CARE
Problem: Adult Inpatient Plan of Care  Goal: Plan of Care Review  Outcome: Ongoing, Progressing  Flowsheets (Taken 9/16/2023 1706)  Progress: improving  Plan of Care Reviewed With: patient  Goal: Patient-Specific Goal (Individualized)  Outcome: Ongoing, Progressing  Goal: Absence of Hospital-Acquired Illness or Injury  Outcome: Ongoing, Progressing  Intervention: Identify and Manage Fall Risk  Recent Flowsheet Documentation  Taken 9/16/2023 1622 by Richa Ruiz RN  Safety Promotion/Fall Prevention:   activity supervised   assistive device/personal items within reach   safety round/check completed  Taken 9/16/2023 1402 by Richa Ruiz RN  Safety Promotion/Fall Prevention:   activity supervised   assistive device/personal items within reach   clutter free environment maintained   fall prevention program maintained   lighting adjusted   muscle strengthening facilitated   nonskid shoes/slippers when out of bed   room organization consistent   safety round/check completed  Intervention: Prevent Skin Injury  Recent Flowsheet Documentation  Taken 9/16/2023 1622 by Richa Ruiz RN  Body Position:   supine   position changed independently  Taken 9/16/2023 1402 by Richa Ruiz RN  Body Position:   supine   position changed independently  Intervention: Prevent and Manage VTE (Venous Thromboembolism) Risk  Recent Flowsheet Documentation  Taken 9/16/2023 1622 by Rihca Ruiz RN  Activity Management: activity encouraged  VTE Prevention/Management:   bilateral   sequential compression devices on  Taken 9/16/2023 1402 by Richa Ruiz RN  Activity Management: activity encouraged  VTE Prevention/Management:   bilateral   sequential compression devices off  Range of Motion: active ROM (range of motion) encouraged  Intervention: Prevent Infection  Recent Flowsheet Documentation  Taken 9/16/2023 1622 by Richa Ruiz RN  Infection Prevention:   hand hygiene promoted   rest/sleep promoted  Taken 9/16/2023  1402 by Richa Ruiz RN  Infection Prevention:   hand hygiene promoted   rest/sleep promoted  Goal: Optimal Comfort and Wellbeing  Outcome: Ongoing, Progressing  Intervention: Monitor Pain and Promote Comfort  Recent Flowsheet Documentation  Taken 9/16/2023 1402 by Richa Ruiz RN  Pain Management Interventions: see MAR  Intervention: Provide Person-Centered Care  Recent Flowsheet Documentation  Taken 9/16/2023 1622 by Richa Ruiz RN  Trust Relationship/Rapport: care explained  Taken 9/16/2023 1402 by Richa Ruiz RN  Trust Relationship/Rapport:   care explained   choices provided   questions answered   reassurance provided   questions encouraged  Goal: Readiness for Transition of Care  Outcome: Ongoing, Progressing     Problem: Fall Injury Risk  Goal: Absence of Fall and Fall-Related Injury  Outcome: Ongoing, Progressing  Intervention: Identify and Manage Contributors  Recent Flowsheet Documentation  Taken 9/16/2023 1622 by Richa Ruiz RN  Self-Care Promotion: independence encouraged  Taken 9/16/2023 1402 by Richa Ruiz RN  Medication Review/Management: medications reviewed  Self-Care Promotion: independence encouraged  Intervention: Promote Injury-Free Environment  Recent Flowsheet Documentation  Taken 9/16/2023 1622 by Richa Ruiz RN  Safety Promotion/Fall Prevention:   activity supervised   assistive device/personal items within reach   safety round/check completed  Taken 9/16/2023 1402 by Richa Ruiz RN  Safety Promotion/Fall Prevention:   activity supervised   assistive device/personal items within reach   clutter free environment maintained   fall prevention program maintained   lighting adjusted   muscle strengthening facilitated   nonskid shoes/slippers when out of bed   room organization consistent   safety round/check completed   Goal Outcome Evaluation:  Plan of Care Reviewed With: patient      Pt currently in bed resting quietly. No complaints of pain or discomfort  at this time. Consistent complaints of headaches. Pt voices improvement in them as the day goes by. Vitals stable. Pt ambulatory in room with 1 assist and cane. No other observations at this time. Call bell in reach.  Progress: improving

## 2023-09-16 NOTE — THERAPY EVALUATION
Patient Name: Lauryn Romo  : 1960    MRN: [id number]                              Today's Date: 2023       Admit Date: 9/15/2023    Visit Dx:     ICD-10-CM ICD-9-CM   1. Cerebral aneurysm, nonruptured  I67.1 437.3     Patient Active Problem List   Diagnosis    Acute CVA    Brain aneurysm    Paraesophageal hernia    Hyperlipidemia    HTN    Syncope    Moderate malnutrition     Past Medical History:   Diagnosis Date    Aneurysm 2022    Headache     Hyperlipidemia     Hypertension     Stroke 2022     Past Surgical History:   Procedure Laterality Date    ARTERIAL ANEURYSM REPAIR      BREAST LUMPECTOMY  2012    CYST REMOVAL Left 2023    EMBOLIZATION CEREBRAL N/A 2022    Procedure: CV EMBOLIZATION CEREBRAL IR;  Surgeon: Enoch Savage MD;  Location: [facility] INVASIVE LOCATION;  Service: Interventional Radiology;  Laterality: N/A;    HIATAL HERNIA REPAIR  2015    SINUS SURGERY      x4      General Information       Row Name 23 0947          OT Time and Intention    Document Type evaluation  -JR     Mode of Treatment occupational therapy  -JR       Row Name 23 0947          General Information    Patient Profile Reviewed yes  -JR     Prior Level of Function independent:;gait;transfer;grooming;feeding;dressing;min assist:;bathing;dependent:;cooking;cleaning;driving;shopping  Pt has been experiencing dizziness, near syncope and ataxia with mobility prior and using a RWx or cane. Pt also reports furniture surfing at home due to her dog. Reports her  helps her dry off after bathing, completes home mgmt tasks.  -JR     Existing Precautions/Restrictions fall  -JR     Barriers to Rehab medically complex;previous functional deficit;visual deficit  -JR       Row Name 23 0947          Occupational Profile    Occupational History/Life Experiences (Occupational Profile) Pt reports she has been experiencing dizziness, ataxia and near syncope prior. Reports  difficulty with vision prior. Pt reports she avoids bending forward with ADL's due to dizziness. Pt would benefit from AE training. Pt reports she prefers tub baths, has a shower seat  -       Row Name 09/16/23 0947          Living Environment    People in Home spouse  -       Row Name 09/16/23 0947          Home Main Entrance    Number of Stairs, Main Entrance other (see comments)  Pt reports she has a flight of steps from basement garage  -       Row Name 09/16/23 0947          Stairs Within Home, Primary    Number of Stairs, Within Home, Primary other (see comments)  Has second story to home, bedroom first floor  -       Row Name 09/16/23 0947          Cognition    Orientation Status (Cognition) oriented x 3  -       Row Name 09/16/23 0947          Safety Issues, Functional Mobility    Safety Issues Affecting Function (Mobility) awareness of need for assistance;insight into deficits/self-awareness  -     Impairments Affecting Function (Mobility) balance;coordination;endurance/activity tolerance;grasp;visual/perceptual;strength;pain  -               User Key  (r) = Recorded By, (t) = Taken By, (c) = Cosigned By      Initials Name Provider Type    JR Philomena Slater, OT Occupational Therapist                     Mobility/ADL's       Row Name 09/16/23 0955          Bed Mobility    Bed Mobility supine-sit  -     Supine-Sit [place] (Bed Mobility) standby assist  -     Assistive Device (Bed Mobility) head of bed elevated  -     Comment, (Bed Mobility) Pt reported feeling lightheaded with supine to sit./85 Pt also reported headache at 9/10 throughout. Headache pain did not increase or decrease throughout.  -       Row Name 09/16/23 0955          Transfers    Transfers sit-stand transfer  -       Row Name 09/16/23 0955          Sit-Stand Transfer    Sit-Stand [place] (Transfers) contact guard  -     Assistive Device (Sit-Stand Transfers) walker, front-wheeled  -     Comment,  "(Sit-Stand Transfer) Pt initially jerking posterior with sit to stand from EOB. No LOB noted and pt able to gain balance with RWx and complete mobility.  -[facility] Name 09/16/23 0955          Functional Mobility    Functional Mobility- Ind. Level minimum assist (75% patient effort);verbal cues required  -     Functional Mobility- Device walker, front-wheeled  -     Functional Mobility-Distance (Feet) --  in hallway  -     Functional Mobility- Safety Issues step length decreased;weight-shifting ability decreased  -     Functional Mobility- Comment Pt able to complete majority of mobility with slow pace and CGA, no LOB noted. Upon returning to room pt noted with tremors throughout body. Pt cognizant, no LOB, and asked, \"why am I shaking.\" RN notified. BP stable.  -[facility] Name 09/16/23 0955          Activities of Daily Living    BADL Assessment/Intervention upper body dressing  -[facility] Name 09/16/23 0955          Upper Body Dressing Assessment/Training    [place] Level (Upper Body Dressing) don;front opening garment;maximum assist (25% patient effort)  -     Position (Upper Body Dressing) edge of bed sitting  -               User Key  (r) = Recorded By, (t) = Taken By, (c) = Cosigned By      Initials Name Provider Type     Philomena Slater, OT Occupational Therapist                   Obj/Interventions       [facility] Name 09/16/23 0959          Vision Assessment/Intervention    Visual Impairment/Limitations corrective lenses full-time  -     Vision Assessment Comment Deferred vision testing this date due to previous reports of difficulty with vision due to dizziness  -[facility] Name 09/16/23 0959          Range of Motion Comprehensive    General Range of Motion no range of motion deficits identified  -[facility] Name 09/16/23 0959          Strength Comprehensive (MMT)    Comment, General Manual Muscle Testing (MMT) Assessment B UE functionally 4/5  -[facility] Name 09/16/23 0959 "          Motor Skills    Motor Skills coordination;muscle tone  -JR     Coordination bilateral;upper extremity;moderate impairment;finger to nose  -JR     Muscle Tone WNL  -       Row Name 09/16/23 0959          Balance    Balance Assessment sitting static balance;standing dynamic balance  -JR     Static Sitting Balance contact guard  -JR     Dynamic Standing Balance minimal assist  -JR     Position/Device Used, Standing Balance walker, rolling  -JR               User Key  (r) = Recorded By, (t) = Taken By, (c) = Cosigned By      Initials Name Provider Type    Philomena Freed OT Occupational Therapist                   Goals/Plan       Row Name 09/16/23 1003          Transfer Goal 1 (OT)    Activity/Assistive Device (Transfer Goal 1, OT) transfers, all  -JR     [place] Level/Cues Needed (Transfer Goal 1, OT) standby assist;verbal cues required  -JR     Time Frame (Transfer Goal 1, OT) long term goal (LTG);by discharge  -JR     Progress/Outcome (Transfer Goal 1, OT) new goal;goal ongoing  -[facility] Name 09/16/23 1003          Dressing Goal 1 (OT)    Activity/Device (Dressing Goal 1, OT) lower body dressing;other (see comments)  with AE PRN to reduce dizziness and increase safety with ADL's  -JR     [place]/Cues Needed (Dressing Goal 1, OT) minimum assist (75% or more patient effort);verbal cues required  -JR     Time Frame (Dressing Goal 1, OT) long term goal (LTG);by discharge  -JR     Progress/Outcome (Dressing Goal 1, OT) new goal  -[facility] Name 09/16/23 1003          Toileting Goal 1 (OT)    Activity/Device (Toileting Goal 1, OT) toileting skills, all  -JR     [place] Level/Cues Needed (Toileting Goal 1, OT) minimum assist (75% or more patient effort);verbal cues required  -JR     Time Frame (Toileting Goal 1, OT) long term goal (LTG);by discharge  -JR     Progress/Outcome (Toileting Goal 1, OT) new goal  -[facility] Name 09/16/23 1003          Problem Specific Goal 1 (OT)     Problem Specific Goal 1 (OT) Pt to demo independence with B UE coordination HEP to support ADL independence.  -JR     Time Frame (Problem Specific Goal 1, OT) long term goal (LTG);by discharge  -JR     Progress/Outcome (Problem Specific Goal 1, OT) new goal  -JR       Row Name 09/16/23 1003          Therapy Assessment/Plan (OT)    Planned Therapy Interventions (OT) activity tolerance training;adaptive equipment training;BADL retraining;functional balance retraining;occupation/activity based interventions;ROM/therapeutic exercise;strengthening exercise;transfer/mobility retraining;patient/caregiver education/training;neuromuscular control/coordination retraining;cognitive/visual perception retraining  -JR               User Key  (r) = Recorded By, (t) = Taken By, (c) = Cosigned By      Initials Name Provider Type    JR Philomena Slater, OT Occupational Therapist                   Clinical Impression       Row Name 09/16/23 1000          Pain Assessment    Pretreatment Pain Rating 9/10  -JR     Posttreatment Pain Rating 9/10  -JR     Pain Location - head  -JR     Pain Intervention(s) Cold pack;Ambulation/increased activity  -JR     Additional Documentation Pain Scale: Word Pre/Post-Treatment (Group)  -       Row Name 09/16/23 1000          Plan of Care Review    Plan of Care Reviewed With patient  -JR     Outcome Evaluation OT initial eval and expanded chart review completed. Pt presents with decreased balance, strength, coordination and activity tolerance limiting independence with ADL's and mobility. Recommend continued skilled OT services and transfer to IRF at d/c to ensure safe transfer to home.  -       Row Name 09/16/23 1000          Therapy Assessment/Plan (OT)    Patient/Family Therapy Goal Statement (OT) go home  -JR     Rehab Potential (OT) good, to achieve stated therapy goals  -JR     Criteria for Skilled Therapeutic Interventions Met (OT) yes;meets criteria;skilled treatment is necessary  -JR      Therapy Frequency (OT) daily  -JR       Row Name 09/16/23 1000          Therapy Plan Review/Discharge Plan (OT)    Anticipated Discharge Disposition (OT) inpatient rehabilitation facility  -       Row Name 09/16/23 1000          Vital Signs    Pre Systolic BP Rehab 138  -JR     Pre Treatment Diastolic BP 65  -JR     Intra Systolic BP Rehab 155  -JR     Intra Treatment Diastolic BP 85  -JR     Post Systolic BP Rehab 155  -JR     Post Treatment Diastolic BP 74  -JR     Pretreatment Heart Rate (beats/min) 61  -JR     Posttreatment Heart Rate (beats/min) 61  -JR     Pre SpO2 (%) 100  -JR     O2 Delivery Pre Treatment room air  -JR     Post SpO2 (%) 100  -JR     O2 Delivery Post Treatment room air  -JR     Pre Patient Position Supine  -JR     Intra Patient Position Standing  -JR     Post Patient Position Sitting  -JR       Row Name 09/16/23 1000          Positioning and Restraints    Pre-Treatment Position in bed  -JR     Post Treatment Position chair  -JR     In Chair notified nsg;reclined;call light within reach;encouraged to call for assist  -JR               User Key  (r) = Recorded By, (t) = Taken By, (c) = Cosigned By      Initials Name Provider Type    JR Philomena Slater, OT Occupational Therapist                   Outcome Measures       Row Name 09/16/23 1005          How much help from another is currently needed...    Putting on and taking off regular lower body clothing? 2  -JR     Bathing (including washing, rinsing, and drying) 2  -JR     Toileting (which includes using toilet bed pan or urinal) 2  -JR     Putting on and taking off regular upper body clothing 2  -JR     Taking care of personal grooming (such as brushing teeth) 3  -JR     Eating meals 4  -JR     AM-PAC 6 Clicks Score (OT) 15  -JR       Row Name 09/16/23 1008 09/16/23 0800       How much help from another person do you currently need...    Turning from your back to your side while in flat bed without using bedrails? 4  -ML 4  -SW    Moving  from lying on back to sitting on the side of a flat bed without bedrails? 3  -ML 4  -SW    Moving to and from a bed to a chair (including a wheelchair)? 3  -ML 3  -SW    Standing up from a chair using your arms (e.g., wheelchair, bedside chair)? 3  -ML 3  -SW    Climbing 3-5 steps with a railing? 2  -ML 3  -SW    To walk in hospital room? 3  -ML 3  -SW    AM-PAC 6 Clicks Score (PT) 18  -ML 20  -SW    Highest level of mobility 6 --> Walked 10 steps or more  -ML 6 --> Walked 10 steps or more  -SW      Row Name 09/16/23 1005          Modified [place] Roberto Scale    Pre-Stroke Modified [place] Scale 3 - Moderate disability.  Requiring some help, but able to walk without assistance.  -       Row Name 09/16/23 1008 09/16/23 1005       Functional Assessment    Outcome Measure Options AM-PAC 6 Clicks Basic Mobility (PT)  -ML AM-PAC 6 Clicks Daily Activity (OT);Modified [place]  -              User Key  (r) = Recorded By, (t) = Taken By, (c) = Cosigned By      Initials Name Provider Type    Philomena Freed, OT Occupational Therapist    Che Hitchcock, RN Registered Nurse    Lakisha Baron Physical Therapist                    Occupational Therapy Education       Title: PT OT SLP Therapies (In Progress)       Topic: Occupational Therapy (In Progress)       Point: ADL training (In Progress)       Description:   Instruct learner(s) on proper safety adaptation and remediation techniques during self care or transfers.   Instruct in proper use of assistive devices.                  Learning Progress Summary             Patient Acceptance, E, NR by  at 9/16/2023 0820    Comment: Educated pt regarding role of therapy and ongoing treatment plan                         Point: Home exercise program (Not Started)       Description:   Instruct learner(s) on appropriate technique for monitoring, assisting and/or progressing therapeutic exercises/activities.                  Learner Progress:  Not documented in this visit.               Point: Precautions (Not Started)       Description:   Instruct learner(s) on prescribed precautions during self-care and functional transfers.                  Learner Progress:  Not documented in this visit.              Point: Body mechanics (Not Started)       Description:   Instruct learner(s) on proper positioning and spine alignment during self-care, functional mobility activities and/or exercises.                  Learner Progress:  Not documented in this visit.                              User Key       Initials Effective Dates Name Provider Type Discipline     02/03/23 -  Philomena Slater, OT Occupational Therapist OT                  OT Recommendation and Plan  Planned Therapy Interventions (OT): activity tolerance training, adaptive equipment training, BADL retraining, functional balance retraining, occupation/activity based interventions, ROM/therapeutic exercise, strengthening exercise, transfer/mobility retraining, patient/caregiver education/training, neuromuscular control/coordination retraining, cognitive/visual perception retraining  Therapy Frequency (OT): daily  Plan of Care Review  Plan of Care Reviewed With: patient  Outcome Evaluation: OT initial eval and expanded chart review completed. Pt presents with decreased balance, strength, coordination and activity tolerance limiting independence with ADL's and mobility. Recommend continued skilled OT services and transfer to IRF at d/c to ensure safe transfer to home.     Time Calculation:   Evaluation Complexity (OT)  Review Occupational Profile/Medical/Therapy History Complexity: expanded/moderate complexity  Assessment, Occupational Performance/Identification of Deficit Complexity: 5 or more performance deficits  Clinical Decision Making Complexity (OT): detailed assessment/moderate complexity  Overall Complexity of Evaluation (OT): moderate complexity     Time Calculation- OT       Row Name 09/16/23 1009             Time Calculation- OT     OT Start Time 0820  -JR      OT Received On 09/16/23  -JR      OT Goal Re-Cert Due Date 09/26/23  -JR         Timed Charges    74962 - OT Self Care/Mgmt Minutes 8  -JR         Untimed Charges    OT Eval/Re-eval Minutes 46  -JR         Total Minutes    Timed Charges Total Minutes 8  -JR      Untimed Charges Total Minutes 46  -JR       Total Minutes 54  -JR                User Key  (r) = Recorded By, (t) = Taken By, (c) = Cosigned By      Initials Name Provider Type     Philomena Slater, OT Occupational Therapist                  Therapy Charges for Today       Code Description Service Date Service Provider Modifiers Qty    58956124544 HC OT SELF CARE/MGMT/TRAIN EA 15 MIN 9/16/2023 Philomena Slater, OT GO 1    [id number] HC OT EVAL MOD COMPLEXITY 4 9/16/2023 Philomena Slater OT GO 1                 Philomena Slater, OT  9/16/2023

## 2023-09-16 NOTE — PLAN OF CARE
Goal Outcome Evaluation:  Plan of Care Reviewed With: patient           Outcome Evaluation: OT initial eval and expanded chart review completed. Pt presents with decreased balance, strength, coordination and activity tolerance limiting independence with ADL's and mobility. Recommend continued skilled OT services and transfer to IRF at d/c to ensure safe transfer to home.      Anticipated Discharge Disposition (OT): inpatient rehabilitation facility

## 2023-09-16 NOTE — PROGRESS NOTES
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  She endorses headache, however this is her baseline.  She does have chronic headaches.  No facial droop.  Speech production is fluent with no paraphasic errors.  She is sitting at the side of the bed with nursing staff.    Lab Results   Component Value Date     2023       A/P:   Admit Diagnosis:   Cerebral aneurysm, nonruptured [I67.1]     Okay to transfer to [facility] today.  Continue antiplatelet therapy per Dr. Savage.  Likely discharge to [facility] on Monday.

## 2023-09-16 NOTE — THERAPY EVALUATION
Patient Name: Lauryn Romo  : 1960    MRN: [id number]                              Today's Date: 2023       Admit Date: 9/15/2023    Visit Dx:     ICD-10-CM ICD-9-CM   1. Cerebral aneurysm, nonruptured  I67.1 437.3     Patient Active Problem List   Diagnosis    Acute CVA    Brain aneurysm    Paraesophageal hernia    Hyperlipidemia    HTN    Syncope    Moderate malnutrition     Past Medical History:   Diagnosis Date    Aneurysm 2022    Headache     Hyperlipidemia     Hypertension     Stroke 2022     Past Surgical History:   Procedure Laterality Date    ARTERIAL ANEURYSM REPAIR      BREAST LUMPECTOMY  2012    CYST REMOVAL Left 2023    EMBOLIZATION CEREBRAL N/A 2022    Procedure: CV EMBOLIZATION CEREBRAL IR;  Surgeon: Enoch Savage MD;  Location: [facility] INVASIVE LOCATION;  Service: Interventional Radiology;  Laterality: N/A;    HIATAL HERNIA REPAIR  2015    SINUS SURGERY      x4      General Information       Row Name 23 0958          Physical Therapy Time and Intention    Document Type evaluation  -ML     Mode of Treatment physical therapy  -ML       Row Name 23 0958          General Information    Patient Profile Reviewed yes  -ML     Prior Level of Function independent:;gait;transfer;grooming;dressing;feeding;min assist:;bathing;dependent:;home management;cooking;cleaning;driving;shopping  Pt has been experiencing dizziness, near syncope and ataxia with mobility prior and using a RWx or cane. Pt also reports furniture surfing at home due to her dog. Reports her  helps her dry off after bathing, completes home mgmt tasks.  -ML     Existing Precautions/Restrictions fall  -ML     Barriers to Rehab medically complex;previous functional deficit;visual deficit  -ML       Row Name 23 0958          Living Environment    People in Home spouse  -ML       Row Name 23 0958          Home Main Entrance    Number of Stairs, Main Entrance twelve  Pt  reports she has a flight of steps from basement garage  -ML     Stair Railings, Main Entrance railing on right side (ascending)  -ML       Row Name 09/16/23 0958          Stairs Within Home, Primary    Number of Stairs, Within Home, Primary twelve;other (see comments)  Has second story to home, bedroom first floor  -ML     Stair Railings, Within Home, Primary railing on right side (ascending)  -ML       Row Name 09/16/23 0958          Cognition    Orientation Status (Cognition) oriented x 3  -ML       Row Name 09/16/23 0958          Safety Issues, Functional Mobility    Safety Issues Affecting Function (Mobility) awareness of need for assistance;insight into deficits/self-awareness;safety precaution awareness  -ML     Impairments Affecting Function (Mobility) balance;coordination;endurance/activity tolerance;grasp;visual/perceptual;strength;pain  -ML               User Key  (r) = Recorded By, (t) = Taken By, (c) = Cosigned By      Initials Name Provider Type     Lakisha Bourne Physical Therapist                   Mobility       Row Name 09/16/23 1000          Bed Mobility    Bed Mobility supine-sit  -ML     Supine-Sit Stark (Bed Mobility) standby assist  -ML     Assistive Device (Bed Mobility) head of bed elevated  -ML     Comment, (Bed Mobility) Patient initillay with complaints of lightheadedness while seated at EOB, BP stable at 155/85.  -ML       Row Name 09/16/23 1000          Sit-Stand Transfer    Sit-Stand Stark (Transfers) contact guard  -ML     Assistive Device (Sit-Stand Transfers) walker, front-wheeled  -ML     Comment, (Sit-Stand Transfer) Patient initially jerking posteriorly with sit to stand transfer.  -ML       Row Name 09/16/23 1000          Gait/Stairs (Locomotion)    Stark Level (Gait) verbal cues;contact guard  -ML     Assistive Device (Gait) walker, front-wheeled  -ML     Distance in Feet (Gait) 50  -ML     Deviations/Abnormal Patterns (Gait) bilateral deviations;base of  support, narrow;torrie decreased;gait speed decreased;stride length decreased  -ML     Comment, (Gait/Stairs) At end of ambulation distance patient shaking, prompting returnt to seated position. Again BP stable.  -ML               User Key  (r) = Recorded By, (t) = Taken By, (c) = Cosigned By      Initials Name Provider Type    ML Lakisha Bourne Physical Therapist                   Obj/Interventions       Row Name 09/16/23 1003          Range of Motion Comprehensive    General Range of Motion bilateral lower extremity ROM WFL  -ML       Row Name 09/16/23 1003          Strength Comprehensive (MMT)    General Manual Muscle Testing (MMT) Assessment no strength deficits identified  -ML       Row Name 09/16/23 1003          Balance    Balance Assessment sitting static balance;sit to stand dynamic balance;standing static balance;standing dynamic balance  -ML     Static Sitting Balance contact guard  -ML     Position, Sitting Balance unsupported;sitting edge of bed  -ML     Sit to Stand Dynamic Balance contact guard;verbal cues  -ML     Static Standing Balance contact guard;verbal cues  -ML     Dynamic Standing Balance contact guard;verbal cues  -ML     Position/Device Used, Standing Balance supported;walker, front-wheeled  -ML     Balance Interventions sitting;standing;sit to stand;supported;occupation based/functional task  -ML       Row Name 09/16/23 1003          Sensory Assessment (Somatosensory)    Sensory Assessment (Somatosensory) LE sensation intact  -ML               User Key  (r) = Recorded By, (t) = Taken By, (c) = Cosigned By      Initials Name Provider Type    Lakisha Baron Physical Therapist                   Goals/Plan       Row Name 09/16/23 1006          Bed Mobility Goal 1 (PT)    Activity/Assistive Device (Bed Mobility Goal 1, PT) sit to supine/supine to sit  -ML     [place] Level/Cues Needed (Bed Mobility Goal 1, PT) independent  -ML     Time Frame (Bed Mobility Goal 1, PT) 10 days  -[facility]  Name 09/16/23 1006          Transfer Goal 1 (PT)    Activity/Assistive Device (Transfer Goal 1, PT) sit-to-stand/stand-to-sit;bed-to-chair/chair-to-bed;walker, rolling  -ML     [place] Level/Cues Needed (Transfer Goal 1, PT) modified independence  -ML     Time Frame (Transfer Goal 1, PT) 10 days  -ML       Row Name 09/16/23 1006          Gait Training Goal 1 (PT)    Activity/Assistive Device (Gait Training Goal 1, PT) gait (walking locomotion);decrease fall risk;improve balance and speed;increase endurance/gait distance;walker, rolling  -ML     [place] Level (Gait Training Goal 1, PT) modified independence  -ML     Distance (Gait Training Goal 1, PT) 300  -ML     Time Frame (Gait Training Goal 1, PT) 10 days  -ML       Row Name 09/16/23 1006          Stairs Goal 1 (PT)    Activity/Assistive Device (Stairs Goal 1, PT) ascending stairs;descending stairs;using handrail, right;step-to-step  -ML     [place] Level/Cues Needed (Stairs Goal 1, PT) standby assist  -ML     Number of Stairs (Stairs Goal 1, PT) 12  -ML     Time Frame (Stairs Goal 1, PT) 10 days  -ML       Row Name 09/16/23 1006          Therapy Assessment/Plan (PT)    Planned Therapy Interventions (PT) balance training;bed mobility training;gait training;home exercise program;neuromuscular re-education;patient/family education;postural re-education;ROM (range of motion);stair training;strengthening;stretching;transfer training  -ML               User Key  (r) = Recorded By, (t) = Taken By, (c) = Cosigned By      Initials Name Provider Type    ML Lakisha Bourne Physical Therapist                   Clinical Impression       Row Name 09/16/23 1003          Pain    Pretreatment Pain Rating 9/10  -ML     Posttreatment Pain Rating 9/10  -ML     Pain Location generalized  -ML     Pain Location - head  -ML     Pain Intervention(s) Cold applied;Ambulation/increased activity;Rest;Nursing Notified  -       Row Name 09/16/23 1003          Plan of Care Review     Plan of Care Reviewed With patient  -ML     Outcome Evaluation Physical therapy evaluation complete. The patient presents with balance and activity tolerance deficits affecting mobility. Patient presents below baseline for mobility and would continue to benefit from skilled PT to address deficits and decrease falls risk. At hospital discharge recommend IPR.  -ML       Row Name 09/16/23 1003          Therapy Assessment/Plan (PT)    Rehab Potential (PT) good, to achieve stated therapy goals  -ML     Criteria for Skilled Interventions Met (PT) yes;meets criteria;skilled treatment is necessary  -ML     Therapy Frequency (PT) daily  -ML       Row Name 09/16/23 1003          Vital Signs    Pre Systolic BP Rehab 140  -ML     Pre Treatment Diastolic BP 67  -ML     Intra Systolic BP Rehab 155  -ML     Intra Treatment Diastolic BP 85  -ML     Post Systolic BP Rehab 140  -ML     Post Treatment Diastolic BP 67  -ML     Pretreatment Heart Rate (beats/min) 62  -ML     Intratreatment Heart Rate (beats/min) 70  -ML     Posttreatment Heart Rate (beats/min) 61  -ML     Pre SpO2 (%) 100  -ML     O2 Delivery Pre Treatment room air  -ML     Pre Patient Position Supine  -ML     Intra Patient Position Standing  -ML     Post Patient Position Sitting  -ML       Row Name 09/16/23 1003          Positioning and Restraints    Pre-Treatment Position in bed  -ML     Post Treatment Position chair  -ML     In Chair notified nsg;reclined;call light within reach;encouraged to call for assist;waffle cushion;legs elevated  -ML               User Key  (r) = Recorded By, (t) = Taken By, (c) = Cosigned By      Initials Name Provider Type    ML Lakisha Bourne Physical Therapist                   Outcome Measures       Row Name 09/16/23 1008 09/16/23 0800       How much help from another person do you currently need...    Turning from your back to your side while in flat bed without using bedrails? 4  -ML 4  -SW    Moving from lying on back to sitting on  the side of a flat bed without bedrails? 3  -ML 4  -SW    Moving to and from a bed to a chair (including a wheelchair)? 3  -ML 3  -SW    Standing up from a chair using your arms (e.g., wheelchair, bedside chair)? 3  -ML 3  -SW    Climbing 3-5 steps with a railing? 2  -ML 3  -SW    To walk in hospital room? 3  -ML 3  -SW    AM-PAC 6 Clicks Score (PT) 18  -ML 20  -SW    Highest level of mobility 6 --> Walked 10 steps or more  -ML 6 --> Walked 10 steps or more  -SW      Row Name 09/16/23 1005          Modified Newton Scale    Pre-Stroke Modified Jose Roberto Scale 3 - Moderate disability.  Requiring some help, but able to walk without assistance.  -       Row Name 09/16/23 1008 09/16/23 1005       Functional Assessment    Outcome Measure Options AM-PAC 6 Clicks Basic Mobility (PT)  -ML AM-PAC 6 Clicks Daily Activity (OT);Modified Jose Roberto  -              User Key  (r) = Recorded By, (t) = Taken By, (c) = Cosigned By      Initials Name Provider Type     Philomena Slater OT Occupational Therapist    Che Hitchcock RN Registered Nurse    Lakisha Baron Physical Therapist                                 Physical Therapy Education       Title: PT OT SLP Therapies (In Progress)       Topic: Physical Therapy (In Progress)       Point: Mobility training (Done)       Learning Progress Summary             Patient Acceptance, E, VU,NR by  at 9/16/2023 1012                         Point: Home exercise program (Not Started)       Learner Progress:  Not documented in this visit.              Point: Body mechanics (Not Started)       Learner Progress:  Not documented in this visit.              Point: Precautions (Done)       Learning Progress Summary             Patient Acceptance, E, VU,NR by  at 9/16/2023 1012                                         User Key       Initials Effective Dates Name Provider Type Discipline     04/22/21 -  Lakisha Bourne Physical Therapist PT                  PT Recommendation and  Plan  Planned Therapy Interventions (PT): balance training, bed mobility training, gait training, home exercise program, neuromuscular re-education, patient/family education, postural re-education, ROM (range of motion), stair training, strengthening, stretching, transfer training  Plan of Care Reviewed With: patient  Outcome Evaluation: Physical therapy evaluation complete. The patient presents with balance and activity tolerance deficits affecting mobility. Patient presents below baseline for mobility and would continue to benefit from skilled PT to address deficits and decrease falls risk. At hospital discharge recommend IPR.     Time Calculation:   PT Evaluation Complexity  History, PT Evaluation Complexity: 1-2 personal factors and/or comorbidities  Examination of Body Systems (PT Eval Complexity): total of 3 or more elements  Clinical Presentation (PT Evaluation Complexity): evolving  Clinical Decision Making (PT Evaluation Complexity): moderate complexity  Overall Complexity (PT Evaluation Complexity): moderate complexity     PT Charges       Row Name 09/16/23 1012             Time Calculation    Start Time 0830  -ML      PT Received On 09/16/23  -ML      PT Goal Re-Cert Due Date 09/26/23  -ML         Timed Charges    62799 - PT Therapeutic Activity Minutes 10  -ML         Untimed Charges    PT Eval/Re-eval Minutes 46  -ML         Total Minutes    Timed Charges Total Minutes 10  -ML      Untimed Charges Total Minutes 46  -ML       Total Minutes 56  -ML                User Key  (r) = Recorded By, (t) = Taken By, (c) = Cosigned By      Initials Name Provider Type    Lakisha Baron Physical Therapist                  Therapy Charges for Today       Code Description Service Date Service Provider Modifiers Qty    05024020561 HC PT THERAPEUTIC ACT EA 15 MIN 9/16/2023 Lakisha Bourne GP 1    84407156976 HC PT EVAL MOD COMPLEXITY 4 9/16/2023 Lakisha Bourne GP 1            PT G-Codes  Outcome Measure Options: AM-PAC 6  Clicks Basic Mobility (PT)  AM-PAC 6 Clicks Score (PT): 18  AM-PAC 6 Clicks Score (OT): 15  PT Discharge Summary  Anticipated Discharge Disposition (PT): inpatient rehabilitation facility    Lakisha Bourne  9/16/2023

## 2023-09-17 ENCOUNTER — APPOINTMENT (OUTPATIENT)
Dept: MRI IMAGING | Facility: HOSPITAL | Age: 63
DRG: 270 | End: 2023-09-17
Payer: COMMERCIAL

## 2023-09-17 PROCEDURE — 99231 SBSQ HOSP IP/OBS SF/LOW 25: CPT | Performed by: RADIOLOGY

## 2023-09-17 PROCEDURE — 70551 MRI BRAIN STEM W/O DYE: CPT

## 2023-09-17 PROCEDURE — P9612 CATHETERIZE FOR URINE SPEC: HCPCS

## 2023-09-17 RX ORDER — HYDROCHLOROTHIAZIDE 25 MG/1
25 TABLET ORAL DAILY
Status: DISCONTINUED | OUTPATIENT
Start: 2023-09-17 | End: 2023-09-19 | Stop reason: HOSPADM

## 2023-09-17 RX ORDER — NORTRIPTYLINE HYDROCHLORIDE 10 MG/1
10 CAPSULE ORAL NIGHTLY
Status: DISCONTINUED | OUTPATIENT
Start: 2023-09-17 | End: 2023-09-17

## 2023-09-17 RX ADMIN — SODIUM CHLORIDE 75 ML/HR: 9 INJECTION, SOLUTION INTRAVENOUS at 09:28

## 2023-09-17 RX ADMIN — HYDROCHLOROTHIAZIDE 25 MG: 25 TABLET ORAL at 13:52

## 2023-09-17 RX ADMIN — ATORVASTATIN CALCIUM 80 MG: 40 TABLET, FILM COATED ORAL at 20:57

## 2023-09-17 RX ADMIN — SERTRALINE HYDROCHLORIDE 100 MG: 100 TABLET ORAL at 09:03

## 2023-09-17 RX ADMIN — BUTALBITAL, ACETAMINOPHEN, AND CAFFEINE 1 TABLET: 50; 325; 40 TABLET ORAL at 21:00

## 2023-09-17 RX ADMIN — CLOPIDOGREL BISULFATE 75 MG: 75 TABLET ORAL at 09:03

## 2023-09-17 RX ADMIN — HYDROCODONE BITARTRATE AND ACETAMINOPHEN 1 TABLET: 5; 325 TABLET ORAL at 03:36

## 2023-09-17 RX ADMIN — BUTALBITAL, ACETAMINOPHEN, AND CAFFEINE 1 TABLET: 50; 325; 40 TABLET ORAL at 13:52

## 2023-09-17 NOTE — PROGRESS NOTES
NAME: MEDHAT ROMO  : 1960  PCP: Melissa Lazo APRN  ADMITTING PHYSICIAN: Enoch Savage MD    DATE OF ADMISSION:  9/15/2023  DATE OF SERVICE: 2023    HOSPITAL DAY:  2 days    HISTORY OF PRESENT ILLNESS:  62 y.o. female who is well-known to the neuro interventional service, having undergone prior embolization (Pipeline Shield) for giant (partially thrombosed) proximal basilar aneurysm on 2022.  She had initially presented with symptoms of dizziness and ataxia, and was found to have a tiny right occipital infarct.         She continued to struggle with unsteady gait and dizziness symptoms, which significantly limits her mobility, as well as recurrent near syncopal episodes.  Subsequent CT angiogram demonstrated persistent filling of the aneurysm, and after a lengthy deliberation, Ms. Romo elected to pursue further embolization.    She was admitted on 9/15/2023, and catheter angiography demonstrated a persistent filling of the aneurysm.  The right vertebral/basilar Pipeline stent construct treated with additional placement of a 2.75 mm Pipeline stent, which eliminated all filling of the aneurysm via the right vertebral artery.  Injection of the left vertebral artery does demonstrate persistent filling of a small crescent of the aneurysm which then supplies the right AICA, which was not treated.      REVIEW OF IMAGING:  MRI pending.    LABS:  Lab Results   Component Value Date    WBC 8.51 2023    HGB 13.6 2023    HCT 40.7 2023    MCV 94.0 2023     2023     Lab Results   Component Value Date    GLUCOSE 100 (H) 2023    CALCIUM 8.5 (L) 2023     2023    K 4.0 2023    CO2 24.0 2023     2023    BUN 10 2023    CREATININE 0.42 (L) 2023    EGFRIFNONA 103 2019    BCR 23.8 2023    ANIONGAP 10.0 2023       CURRENT MEDS:  Current Facility-Administered Medications   Medication Dose Route  Frequency Provider Last Rate Last Admin    acetaminophen (TYLENOL) tablet 650 mg  650 mg Oral Q4H PRN Given, Enoch CORBETT MD   650 mg at 09/16/23 1810    atorvastatin (LIPITOR) tablet 80 mg  80 mg Oral Nightly Yasemin Srivastava APRN   80 mg at 09/16/23 2017    butalbital-acetaminophen-caffeine (FIORICET, ESGIC) -40 MG per tablet 1 tablet  1 tablet Oral Q4H PRN Lulu Sims PA-C   1 tablet at 09/17/23 1352    clopidogrel (PLAVIX) tablet 75 mg  75 mg Oral Daily Yasemin Srivastava APRN   75 mg at 09/17/23 0903    hydroCHLOROthiazide (HYDRODIURIL) tablet 25 mg  25 mg Oral Daily Oseas Machado DO   25 mg at 09/17/23 1352    HYDROcodone-acetaminophen (NORCO) 5-325 MG per tablet 1 tablet  1 tablet Oral Q4H PRN Lulu Sims PA-C   1 tablet at 09/17/23 0336    lactated ringers infusion  9 mL/hr Intravenous Continuous Given, Enoch CORBETT MD        niCARdipine (CARDENE) 25mg in 250mL NS infusion  5-15 mg/hr Intravenous Titrated Given, Enoch CORBETT MD        nitroglycerin (NITROSTAT) SL tablet 0.4 mg  0.4 mg Sublingual Q5 Min PRN Given, Enoch CORBETT MD        ondansetron (ZOFRAN) injection 4 mg  4 mg Intravenous Q6H PRN Given, Enoch CORBETT MD   4 mg at 09/16/23 1810    phenylephrine (GILLES-SYNEPHRINE) 50 mg in sodium chloride 0.9 % 250 mL infusion  0.5-3 mcg/kg/min Intravenous Titrated Given, Enoch CORBETT MD   Held at 09/15/23 1811    sertraline (ZOLOFT) tablet 100 mg  100 mg Oral Daily Yasemin Srivastava APRN   100 mg at 09/17/23 0903    sodium chloride 0.9 % infusion  75 mL/hr Intravenous Continuous Given, Enoch CORBETT MD 75 mL/hr at 09/17/23 0928 75 mL/hr at 09/17/23 0928       PHYSICAL EXAM:  Vitals:    09/17/23 1134   BP: 137/97   Pulse: 73   Resp: 16   Temp: 98 °F (36.7 °C)   SpO2:       Headaches are stable.  Symmetric strength in the upper and lower extremities.  Tolerating p.o. intake.  Speech is clear.  No new deficits.    ASSESSMENT/PLAN:  Ms. Romo is a 62 y.o. female status postembolization of  recurrent/residual giant basilar aneurysm with placement of an additional Pipeline flow diverter.  There is no residual filling of the aneurysm from the right vertebral system; however, there is a tiny crescent of residual aneurysm that fills from the left vertebral injection, without flow of the aneurysm supplying the right AICA.  This was not treated during this setting.    We will plan on obtaining MRI of the head today, to assess aneurysm size, mass effect on the brainstem, and any edema.    We will still plan for discharge to [facility], hopefully tomorrow.    Ms. Romo will need to remain on a dual antiplatelet regimen.

## 2023-09-17 NOTE — PLAN OF CARE
Per reporting nurse, patient only voids 100 ml at a time. At 1930, bladder scan showed 530ml. Per patient, straight cath on hold (patient states she will try to void ). Denies pain (including HA ) discomfort at this time. VSS on RA. SR on cardiac mx. Call light in reach.

## 2023-09-17 NOTE — PROGRESS NOTES
[facility] Medicine Services  PROGRESS NOTE    Patient Name: Lauryn Romo  : 1960  MRN: [id number]    Date of Admission: 9/15/2023  Primary Care Physician: Melissa Lazo APRN    Subjective   Subjective     CC:  Headache    HPI:  Patient reports resolution of her headache this morning.  Denies any dizziness, focal numbness or weakness in extremities.  Denies fevers, chills, sweats.      Objective   Objective     Vital Signs:   Temp:  [97.8 °F (36.6 °C)-98.4 °F (36.9 °C)] 98.3 °F (36.8 °C)  Heart Rate:  [50-62] 55  Resp:  [16] 16  BP: (118-138)/(64-78) 138/78     Physical Exam:  General appearance: alert, awake, oriented, no acute distress   Cardiovascular: RRR, no murmurs or rubs, radial pulse full 2/4 BL   Respiratory: CTAB, no crackles or wheezes   Abdomen: soft, non-tender, no organomegaly, bowel sounds normoactive    Neuro/CNS: alert and oriented x3, normal speech, moves all 4 extremities, sensation grossly intact  Results Reviewed:  LAB RESULTS:      Lab 23  0438 09/15/23  1143 09/15/23  1126   WBC 8.51  --  8.63   HEMOGLOBIN 13.6  --  15.4   HEMOGLOBIN, POC  --  16.0  --    HEMATOCRIT 40.7  --  45.9   HEMATOCRIT POC  --  47  --    PLATELETS 257  --  320   NEUTROS ABS 6.06  --   --    IMMATURE GRANS (ABS) 0.02  --   --    LYMPHS ABS 1.66  --   --    MONOS ABS 0.72  --   --    EOS ABS 0.03  --   --    MCV 94.0  --  93.7         Lab 23  0438 09/15/23  1143 09/15/23  1126   SODIUM 138  --  141   POTASSIUM 4.0  --  4.3   CHLORIDE 104  --  100   CO2 24.0  --  31.0*   ANION GAP 10.0  --  10.0   BUN 10  --  13   CREATININE 0.42* 0.60 0.66   EGFR 111.4 102.3 99.9   GLUCOSE 100*  --  102*   CALCIUM 8.5*  --  9.8   MAGNESIUM 2.4  --   --                          Brief Urine Lab Results  (Last result in the past 365 days)        Color   Clarity   Blood   Leuk Est   Nitrite   Protein   CREAT   Urine HCG        12/15/22 0000 Yellow   Cloudy   Trace   Large (3+)    Positive   Negative                   Microbiology Results Abnormal       None            No radiology results from the last 24 hrs    Results for orders placed during the hospital encounter of 12/15/22    Adult Transthoracic Echo Complete W/ Cont if Necessary Per Protocol    Interpretation Summary    Left ventricular systolic function is normal. Left ventricular ejection fraction appears to be 56 - 60%.    Mild aortic valve regurgitation is present.      Current medications:  Scheduled Meds:atorvastatin, 80 mg, Oral, Nightly  clopidogrel, 75 mg, Oral, Daily  sertraline, 100 mg, Oral, Daily      Continuous Infusions:lactated ringers, 9 mL/hr  niCARdipine, 5-15 mg/hr  phenylephrine, 0.5-3 mcg/kg/min, Last Rate: Stopped (09/15/23 1811)  sodium chloride, 75 mL/hr, Last Rate: 75 mL/hr (09/17/23 0928)      PRN Meds:.  acetaminophen    butalbital-acetaminophen-caffeine    HYDROcodone-acetaminophen    nitroglycerin    ondansetron    Assessment & Plan   Assessment & Plan     Active Hospital Problems    Diagnosis  POA    **Cerebral aneurysm, nonruptured [I67.1]  Yes    Moderate malnutrition [E44.0]  Yes    Paraesophageal hernia [K44.9]  Yes    Hyperlipidemia [E78.5]  Yes    HTN [I10]  Yes      Resolved Hospital Problems    Diagnosis Date Resolved POA    Syncope [R55] 09/16/2023 Yes        Brief Hospital Course to date:  Lauryn Romo is a 62 y.o. female with past medical history significant for cerebral aneurysm complicated by headache, gait disturbances, dizziness, who was admitted to undergo neurosurgical intervention for her aneurysm.  She underwent embolization 9/15 without complication.    Vertebrobasilar junction aneurysm s/p pipeline embolization  POD#2  Gait abnormality, imbalance  -Patient asymptomatic  -Continue dual antiplatelet therapy per neurosurgery  -PT OT    Hypertension  Hyperlipidemia  -Currently hemodynamically stable   -Restart home HCTZ   -Continue lipitor    Expected Discharge Location and  Transportation: IPR  Expected Discharge   Expected Discharge Date: 9/18/2023; Expected Discharge Time:      DVT prophylaxis:  Mechanical DVT prophylaxis orders are present.     AM-PAC 6 Clicks Score (PT): 18 (09/16/23 1008)    CODE STATUS:   Code Status and Medical Interventions:   Ordered at: 09/16/23 1058     Code Status (Patient has no pulse and is not breathing):    CPR (Attempt to Resuscitate)     Medical Interventions (Patient has pulse or is breathing):    Full Support       Oseas Machado, DO  09/17/23

## 2023-09-18 LAB
ANION GAP SERPL CALCULATED.3IONS-SCNC: 7 MMOL/L (ref 5–15)
BUN SERPL-MCNC: 7 MG/DL (ref 8–23)
BUN/CREAT SERPL: 15.2 (ref 7–25)
CALCIUM SPEC-SCNC: 8.6 MG/DL (ref 8.6–10.5)
CHLORIDE SERPL-SCNC: 105 MMOL/L (ref 98–107)
CHOLEST SERPL-MCNC: 111 MG/DL (ref 0–200)
CO2 SERPL-SCNC: 27 MMOL/L (ref 22–29)
CREAT SERPL-MCNC: 0.46 MG/DL (ref 0.57–1)
DEPRECATED RDW RBC AUTO: 46.4 FL (ref 37–54)
EGFRCR SERPLBLD CKD-EPI 2021: 109 ML/MIN/1.73
ERYTHROCYTE [DISTWIDTH] IN BLOOD BY AUTOMATED COUNT: 13.6 % (ref 12.3–15.4)
GLUCOSE SERPL-MCNC: 92 MG/DL (ref 65–99)
HBA1C MFR BLD: 5.7 % (ref 4.8–5.6)
HCT VFR BLD AUTO: 38 % (ref 34–46.6)
HDLC SERPL-MCNC: 40 MG/DL (ref 40–60)
HGB BLD-MCNC: 13.1 G/DL (ref 12–15.9)
LDLC SERPL CALC-MCNC: 54 MG/DL (ref 0–100)
LDLC/HDLC SERPL: 1.34 {RATIO}
MCH RBC QN AUTO: 32.1 PG (ref 26.6–33)
MCHC RBC AUTO-ENTMCNC: 34.5 G/DL (ref 31.5–35.7)
MCV RBC AUTO: 93.1 FL (ref 79–97)
PLATELET # BLD AUTO: 260 10*3/MM3 (ref 140–450)
PMV BLD AUTO: 10.4 FL (ref 6–12)
POTASSIUM SERPL-SCNC: 3.9 MMOL/L (ref 3.5–5.2)
RBC # BLD AUTO: 4.08 10*6/MM3 (ref 3.77–5.28)
SODIUM SERPL-SCNC: 139 MMOL/L (ref 136–145)
TRIGL SERPL-MCNC: 87 MG/DL (ref 0–150)
VLDLC SERPL-MCNC: 17 MG/DL (ref 5–40)
WBC NRBC COR # BLD: 7.89 10*3/MM3 (ref 3.4–10.8)

## 2023-09-18 PROCEDURE — 99231 SBSQ HOSP IP/OBS SF/LOW 25: CPT | Performed by: RADIOLOGY

## 2023-09-18 PROCEDURE — 85027 COMPLETE CBC AUTOMATED: CPT | Performed by: STUDENT IN AN ORGANIZED HEALTH CARE EDUCATION/TRAINING PROGRAM

## 2023-09-18 PROCEDURE — 83036 HEMOGLOBIN GLYCOSYLATED A1C: CPT | Performed by: STUDENT IN AN ORGANIZED HEALTH CARE EDUCATION/TRAINING PROGRAM

## 2023-09-18 PROCEDURE — 80048 BASIC METABOLIC PNL TOTAL CA: CPT | Performed by: STUDENT IN AN ORGANIZED HEALTH CARE EDUCATION/TRAINING PROGRAM

## 2023-09-18 PROCEDURE — 80061 LIPID PANEL: CPT | Performed by: STUDENT IN AN ORGANIZED HEALTH CARE EDUCATION/TRAINING PROGRAM

## 2023-09-18 RX ORDER — NORTRIPTYLINE HYDROCHLORIDE 10 MG/1
10 CAPSULE ORAL NIGHTLY
Status: DISCONTINUED | OUTPATIENT
Start: 2023-09-18 | End: 2023-09-19 | Stop reason: HOSPADM

## 2023-09-18 RX ADMIN — ATORVASTATIN CALCIUM 80 MG: 40 TABLET, FILM COATED ORAL at 20:45

## 2023-09-18 RX ADMIN — HYDROCODONE BITARTRATE AND ACETAMINOPHEN 1 TABLET: 5; 325 TABLET ORAL at 14:37

## 2023-09-18 RX ADMIN — BUTALBITAL, ACETAMINOPHEN, AND CAFFEINE 1 TABLET: 50; 325; 40 TABLET ORAL at 22:52

## 2023-09-18 RX ADMIN — SERTRALINE HYDROCHLORIDE 100 MG: 100 TABLET ORAL at 09:15

## 2023-09-18 RX ADMIN — BUTALBITAL, ACETAMINOPHEN, AND CAFFEINE 1 TABLET: 50; 325; 40 TABLET ORAL at 05:15

## 2023-09-18 RX ADMIN — NORTRIPTYLINE HYDROCHLORIDE 10 MG: 10 CAPSULE ORAL at 20:45

## 2023-09-18 RX ADMIN — CLOPIDOGREL BISULFATE 75 MG: 75 TABLET ORAL at 09:15

## 2023-09-18 RX ADMIN — HYDROCHLOROTHIAZIDE 25 MG: 25 TABLET ORAL at 09:15

## 2023-09-18 RX ADMIN — BUTALBITAL, ACETAMINOPHEN, AND CAFFEINE 1 TABLET: 50; 325; 40 TABLET ORAL at 18:29

## 2023-09-18 NOTE — PROGRESS NOTES
NAME: MEDHAT ROMO  : 1960  PCP: Melissa Lazo APRN  ADMITTING PHYSICIAN: Enoch Savage MD    DATE OF ADMISSION:  9/15/2023  DATE OF SERVICE: 2023    HOSPITAL DAY:  3 days    HISTORY OF PRESENT ILLNESS:  62 y.o. female who is well-known to the neuro interventional service, having undergone prior embolization (Pipeline Shield) for giant (partially thrombosed) proximal basilar aneurysm on 2022.  She had initially presented with symptoms of dizziness and ataxia, and was found to have a tiny right occipital infarct.         She continued to struggle with unsteady gait and dizziness symptoms, which significantly limits her mobility, as well as recurrent near syncopal episodes.  Subsequent CT angiogram demonstrated persistent filling of the aneurysm, and after a lengthy deliberation, Ms. Romo elected to pursue further embolization.     She was admitted on 9/15/2023, and catheter angiography demonstrated a persistent filling of the aneurysm.  The right vertebral/basilar Pipeline stent construct treated with additional placement of a 2.75 mm Pipeline stent, which eliminated all filling of the aneurysm via the right vertebral artery.  Injection of the left vertebral artery does demonstrate persistent filling of a small crescent of the aneurysm which then supplies the right AICA, which was not treated.    REVIEW OF IMAGING:  MRI of the brain dated 2023 from [facility] was reviewed along with its corresponding radiologic report.  Comparison is made to pre-- intervention MRI dated 2022.  There has been slight interval decrease in size of the giant mid basilar aneurysm.  However, there is a noticeable decrease in mass effect on the adjacent brainstem.  There are no areas of acute infarction.  There is no appreciable edema within the brainstem on FLAIR/T2 imaging.    LABS:  Lab Results   Component Value Date    WBC 7.89 2023    HGB 13.1 2023    HCT 38.0  09/18/2023    MCV 93.1 09/18/2023     09/18/2023     Lab Results   Component Value Date    GLUCOSE 92 09/18/2023    CALCIUM 8.6 09/18/2023     09/18/2023    K 3.9 09/18/2023    CO2 27.0 09/18/2023     09/18/2023    BUN 7 (L) 09/18/2023    CREATININE 0.46 (L) 09/18/2023    EGFRIFNONA 103 09/29/2019    BCR 15.2 09/18/2023    ANIONGAP 7.0 09/18/2023           CURRENT MEDS:  Current Facility-Administered Medications   Medication Dose Route Frequency Provider Last Rate Last Admin    acetaminophen (TYLENOL) tablet 650 mg  650 mg Oral Q4H PRN Given, Enoch CORBETT MD   650 mg at 09/16/23 1810    atorvastatin (LIPITOR) tablet 80 mg  80 mg Oral Nightly Yasemin Srivastava APRN   80 mg at 09/17/23 2057    butalbital-acetaminophen-caffeine (FIORICET, ESGIC) -40 MG per tablet 1 tablet  1 tablet Oral Q4H PRN Lulu Sims PA-C   1 tablet at 09/18/23 0515    clopidogrel (PLAVIX) tablet 75 mg  75 mg Oral Daily Yasemin Srivastava APRN   75 mg at 09/18/23 0915    hydroCHLOROthiazide (HYDRODIURIL) tablet 25 mg  25 mg Oral Daily Oseas Machado DO   25 mg at 09/18/23 0915    HYDROcodone-acetaminophen (NORCO) 5-325 MG per tablet 1 tablet  1 tablet Oral Q4H PRN Lulu Sims PA-C   1 tablet at 09/17/23 0336    niCARdipine (CARDENE) 25mg in 250mL NS infusion  5-15 mg/hr Intravenous Titrated Given, Enoch CORBETT MD        nitroglycerin (NITROSTAT) SL tablet 0.4 mg  0.4 mg Sublingual Q5 Min PRN Given, Enoch CORBETT MD        ondansetron (ZOFRAN) injection 4 mg  4 mg Intravenous Q6H PRN Given, Enoch CORBETT MD   4 mg at 09/16/23 1810    sertraline (ZOLOFT) tablet 100 mg  100 mg Oral Daily Yasemin Srivastava APRN   100 mg at 09/18/23 0915    sodium chloride 0.9 % infusion  75 mL/hr Intravenous Continuous Given, Enoch CORBETT MD 75 mL/hr at 09/17/23 0928 75 mL/hr at 09/17/23 0928       PHYSICAL EXAM:  Vitals:    09/18/23 1039   BP: 148/81   Pulse: 67   Resp: 18   Temp: 98 °F (36.7 °C)   SpO2: 95%      Headaches are  stable.  Symmetric strength in the upper and lower extremities.  Tolerating p.o. intake.  Speech is clear.  No new deficits.     ASSESSMENT/PLAN:  Ms. Romo is a 62 y.o. female status postembolization of recurrent/residual giant basilar aneurysm with placement of an additional Pipeline flow diverter.  There is no residual filling of the aneurysm from the right vertebral system; however, there is a tiny crescent of residual aneurysm that fills from the left vertebral injection, without flow of the aneurysm supplying the right AICA.  This was not treated during this setting.     MRI of the head on 9/18/2023 demonstrates slight interval decrease in size of aneurysm status post embolization.  There is a noticeable decrease in mass effect on the adjacent brainstem, with no appreciable edema in the brainstem..     Ms. Romo will need to remain on a dual antiplatelet regimen.    She is ready for transfer to [facility] when a bed becomes available.    Copied text and portions of the note have been reviewed and are accurate as of 9/18/23.

## 2023-09-18 NOTE — CASE MANAGEMENT/SOCIAL WORK
Continued Stay Note  [facility]     Patient Name: Lauryn Romo  MRN: [id number]  Today's Date: 9/18/2023    Admit Date: 9/15/2023    Plan: [facility] acute rehab   Discharge Plan       Row Name 09/18/23 1314       Plan    Plan Comments April with [facility] is following and if she is able to accept will begin insurance precert. CM to follow                   Discharge Codes    No documentation.                 Expected Discharge Date and Time       Expected Discharge Date Expected Discharge Time    Sep 18, 2023               Gloria Cary RN

## 2023-09-18 NOTE — PAYOR COMM NOTE
"Laurie Hernandes, HARVEY  Utilization Management  P:[phone]  F:[phone]    Auth# [id number]     Medhat Pal (62 y.o. Female)       Date of Birth   1960    Social Security Number       Address   [address] [place], [place] [address]    Home Phone   [phone]    MRN   [id number]       Yarsanism   Jehovah's witness    Marital Status                               Admission Date   9/15/23    Admission Type   Urgent    Admitting Provider   Enoch Savage MD    Attending Provider   Enoch Savage MD    Department, Room/Bed   [facility] 3H, S378/1       Discharge Date       Discharge Disposition       Discharge Destination                                 Attending Provider: Enoch Savage MD    Allergies: Tramadol    Isolation: None   Infection: None   Code Status: CPR    Ht: 154.9 cm (61\")   Wt: 58 kg (127 lb 13.9 oz)    Admission Cmt: None   Principal Problem: Cerebral aneurysm, nonruptured [I67.1]                   Active Insurance as of 9/15/2023       Primary Coverage       Payor Plan Insurance Group Employer/Plan Group    [employer] BLUE CROSS AN[employer]M BLUE CROSS BLUE SHIELD PPO [id number]       Payor Plan Address Payor Plan Phone Number Payor Plan Fax Number Effective Dates    [address] [phone]  2019 - None Entered    [place] [address]         Subscriber Name Subscriber Birth Date Member ID       MEDHAT PAL 1960 [id number]                   History & Physical        Jan Matos PA-C at 09/15/23 1113       Attestation signed by Enoch Savage MD at 09/15/23 1405    AGree with above                  H&P reviewed. The patient was examined and there are no changes to the H&P.          Electronically signed by Enoch Savage MD at 09/15/23 1402   Source Note          NAME: MEDHAT A PAL   DOS: 2023  : 1960  PCP: Melissa Lazo APRN    Chief Complaint:    Chief Complaint   Patient presents with    Basilar artery anuerysm  " "    History of Present Illness:  62 y.o. female who is well-known to the neuro interventional service, having undergone prior embolization (Pipeline Shield) for giant (partially thrombosed) proximal basilar aneurysm on 11/29/2022.  She had initially presented with symptoms of dizziness and ataxia, and was found to have a tiny right occipital infarct.    She did well following her embolization, but was readmitted on 12/15/2022 for a \"syncopal\" episode.  Noninvasive imaging studies demonstrated further occlusion of the partially thrombosed giant proximal basilar aneurysm, with no acute infarcts or complicating features.       She continues to struggle with unsteady gait and dizziness symptoms, which significantly limits her mobility, as well as recurrent near syncopal episodes.  Since I saw her last, her symptoms have progressed/worsened, with more generalized weakness and visual stimuli significantly exacerbating her symptoms of dizziness/vertigo, and near syncope (I.E.cannot watch a train go by, cannot watch children play).  She is off of Plavix now, being maintained on an aspirin antiplatelet regimen.  She presents today with follow-up CT angiogram, to assess for further occlusion/thrombosis of her giant basilar aneurysm.    Past Medical History:  Past Medical History:   Diagnosis Date    Aneurysm 11/26/2022    Headache     Hyperlipidemia     Hypertension     Stroke 11/26/2022     Past Surgical History:  Past Surgical History:   Procedure Laterality Date    ARTERIAL ANEURYSM REPAIR      BREAST LUMPECTOMY  2012    CYST REMOVAL Left 05/2023    EMBOLIZATION CEREBRAL N/A 11/29/2022    Procedure: CV EMBOLIZATION CEREBRAL IR;  Surgeon: Enoch Savage MD;  Location: [facility] INVASIVE LOCATION;  Service: Interventional Radiology;  Laterality: N/A;    HIATAL HERNIA REPAIR  2015    SINUS SURGERY      x4     Review of Systems:        Review of Systems   Constitutional:  Negative for activity change, appetite change, " chills, diaphoresis, fatigue, fever and unexpected weight change.   HENT:  Negative for congestion, dental problem, drooling, ear discharge, ear pain, facial swelling, hearing loss, mouth sores, nosebleeds, postnasal drip, rhinorrhea, sinus pressure, sinus pain, sneezing, sore throat, tinnitus, trouble swallowing and voice change.    Eyes:  Negative for photophobia, pain, discharge, redness, itching and visual disturbance.   Respiratory:  Negative for apnea, cough, choking, chest tightness, shortness of breath, wheezing and stridor.    Cardiovascular:  Negative for chest pain, palpitations and leg swelling.   Gastrointestinal:  Negative for abdominal distention, abdominal pain, anal bleeding, blood in stool, constipation, diarrhea, nausea, rectal pain and vomiting.   Endocrine: Negative for cold intolerance, heat intolerance, polydipsia, polyphagia and polyuria.   Genitourinary:  Negative for decreased urine volume, difficulty urinating, dyspareunia, dysuria, enuresis, flank pain, frequency, genital sores, hematuria, menstrual problem, pelvic pain, urgency, vaginal bleeding, vaginal discharge and vaginal pain.   Musculoskeletal:  Negative for arthralgias, back pain, gait problem, joint swelling, myalgias, neck pain and neck stiffness.   Skin:  Negative for color change, pallor, rash and wound.   Allergic/Immunologic: Negative for environmental allergies, food allergies and immunocompromised state.   Neurological:  Positive for dizziness and headaches. Negative for tremors, seizures, syncope, facial asymmetry, speech difficulty, weakness, light-headedness and numbness.   Hematological:  Negative for adenopathy. Does not bruise/bleed easily.   Psychiatric/Behavioral:  Negative for agitation, behavioral problems, confusion, decreased concentration, dysphoric mood, hallucinations, self-injury, sleep disturbance and suicidal ideas. The patient is not nervous/anxious and is not hyperactive.       Medications    Current  Outpatient Medications:     albuterol (PROAIR RESPICLICK) 108 (90 Base) MCG/ACT inhaler, Inhale 2 puffs Every 4 (Four) Hours As Needed for Wheezing or Shortness of Air., Disp: 1 inhaler, Rfl: 0    aspirin 81 MG chewable tablet, Chew 1 tablet Daily., Disp: , Rfl:     atorvastatin (LIPITOR) 80 MG tablet, TAKE 1 TABLET BY MOUTH EVERY NIGHT, Disp: 90 tablet, Rfl: 1    butalbital-acetaminophen-caffeine (ORBIVAN) -40 MG capsule capsule, butalbital-acetaminophen-caffeine 50 mg-300 mg-40 mg capsule  Take by oral route for 5 days., Disp: , Rfl:     Cholecalciferol (VITAMIN D3) 400 units capsule, Take 1 tablet by mouth Daily., Disp: , Rfl:     clopidogrel (PLAVIX) 75 MG tablet, Take 1 tablet by mouth Daily., Disp: 30 tablet, Rfl: 6    estradiol (ESTRACE) 0.1 MG/GM vaginal cream, , Disp: , Rfl:     fluticasone (FLONASE) 50 MCG/ACT nasal spray, 2 sprays into the nostril(s) as directed by provider Daily., Disp: 1 bottle, Rfl: 0    Glucosamine Sulfate (SYNOVACIN PO), Take 1,000 mg by mouth Daily., Disp: , Rfl:     hydroCHLOROthiazide (HYDRODIURIL) 25 MG tablet, , Disp: , Rfl:     HYDROcodone-acetaminophen (NORCO) 5-325 MG per tablet, , Disp: , Rfl:     nortriptyline (PAMELOR) 10 MG capsule, , Disp: , Rfl:     Omega-3 Fatty Acids (FISH OIL) 1000 MG capsule capsule, Take 1 capsule by mouth Daily., Disp: , Rfl:     ondansetron (ZOFRAN) 4 MG tablet, Zofran 4 MG Oral Tablet QTY: 0 tablet Days: 0 Refills: 0  Written: 02/24/23 Patient Instructions:, Disp: , Rfl:     oxybutynin XL (DITROPAN-XL) 5 MG 24 hr tablet, oxybutynin chloride ER 5 mg tablet,extended release 24 hr, Disp: , Rfl:     pantoprazole (PROTONIX) 40 MG EC tablet, Take 1 tablet by mouth Every Morning. Indications: Take while on the steroid taper. (Patient taking differently: Take 1 tablet by mouth Every Morning.), Disp: 30 tablet, Rfl: 0    sertraline (ZOLOFT) 50 MG tablet, , Disp: , Rfl:     Zinc 50 MG capsule, Take  by mouth., Disp: , Rfl:   No current  facility-administered medications for this visit.    Allergies:  Allergies   Allergen Reactions    Tramadol Other (See Comments)     Flushng and passing out     Social Hx:  Social History     Tobacco Use    Smoking status: Former     Packs/day: 1.00     Years: 15.00     Pack years: 15.00     Types: Cigarettes     Quit date: 10/4/2004     Years since quittin.8    Smokeless tobacco: Never   Substance Use Topics    Alcohol use: No    Drug use: No     Family Hx:  Family History   Problem Relation Age of Onset    No Known Problems Mother     Heart attack Father 42    Heart attack Paternal Aunt     Heart attack Paternal Uncle     No Known Problems Sister     Cancer Maternal Grandmother     Heart failure Maternal Grandmother     No Known Problems Maternal Grandfather     No Known Problems Paternal Grandmother     No Known Problems Paternal Grandfather     Diabetes Half-Brother      Review of Imaging:  CT angiogram of the head dated 2023 from [facility] was reviewed along with its corresponding radiologic report.  Comparison is made to multiple prior studies, the most recent being on 3/27/2023.  Again seen are changes related to prior flow diverter therapy/embolization (Pipeline Shield) for a giant mid basilar trunk aneurysm.  There is residual opacification of a small amount of the aneurysm the neck (2-3 mm) despite discontinuation of Plavix.  The thrombosed giant basilar aneurysm remained stable in size, with mass effect upon and posterior displacement of the brainstem.  No new or additional vascular abnormalities are identified.     Physical Examination:  Vitals:    23 1246   BP: 124/72   Temp: 96.9 °F (36.1 °C)        General Appearance:   Well developed, well nourished, well groomed, alert, and cooperative.  Cardiovascular: Regular rate and rhythm. No carotid bruits      Neurological examination:  Alert and oriented x3. Her speech is clear. Extraocular muscles are full. I do not appreciate  any gross visual field deficits. No facial droop or pronator drift.  She appears weaker and more frail since I saw her last, and her gait is significantly more unsteady since I saw her last.  She utilizes a cane, and people walk alongside her to ensure that she does not fall.        Diagnoses/Plan:    Ms. Romo is a 62 y.o. female status post embolization of a giant, largely thrombosed proximal basilar aneurysm with Pipeline Shield flow diverter therapy on 11/29/2022.  Since I saw her last, she has discontinued Plavix, but remains on an aspirin antiplatelet regimen.  Despite this, CT angiogram on 8/21/2023 demonstrates persistent opacification and filling of the aneurysm neck, which I feel is prohibiting complete thrombosis and (hopefully) shrinkage of the aneurysm.  There is persistent mass effect upon and posterior displacement of the brainstem.    Since I saw Ms. oRmo last, her unsteadiness and gait disturbances, along with dizziness have progressed/worsened.  This is most notable in her gait which is extremely unsteady.  She also clearly has an element of depression and underlying anxiety (almost PTSD-like) in regards to the cerebral aneurysm, and she is going to follow-up with her PCP to see if there is anything else that she can be given to help with this (she is currently on an antidepressant).    Given that the aneurysm continues to fill, and has not shrunk in size, and has persistent mass effect on the brainstem, with worsening symptoms/functional status, I have recommended additional embolization with flow diverter therapy.  I discussed this in detail with Ms. Romo and family, to include risk of stroke, and they expressed an understanding.  Ms. Romo in particular, does not feel like she can continue on her current path, that she is getting worse, and wishes to proceed with the embolization.  I did explain to them that I anticipate that embolization will result in complete occlusion of the aneurysm  "(eventually), but I am unsure as to whether the aneurysm will shrink in size (or how long it will take).  While I would hope that some of her symptoms would improve with thrombosis and shrinking of the aneurysm, this is certainly not guaranteed.  Again, they expressed an understanding and wished to proceed with the embolization accordingly.    Ms. Pal will resume Plavix/aspirin dual antiplatelet regimen, and we will tentatively schedule her for embolization of her giant basilar aneurysm in the next couple of weeks or so.    Lastly, Ms. Pal is in the process of applying for disability, and given her current condition, and need for additional embolization procedure, I do not foresee a way in which she can resume employment in her previous capacity.  If I can be of any further assistance with her disability application, please do not hesitate to contact me.     Copied text and portions of the note have been reviewed and are accurate as of 2023.                       Electronically signed by Enoch Savage MD at 23 [id number]                 Enoch Savage MD at 23 1136          NAME: MEDHAT PAL   DOS: 2023  : 1960  PCP: Melissa Lazo APRN    Chief Complaint:    Chief Complaint   Patient presents with    Basilar artery anuerysm        History of Present Illness:  62 y.o. female who is well-known to the neuro interventional service, having undergone prior embolization (Pipeline Shield) for giant (partially thrombosed) proximal basilar aneurysm on 2022.  She had initially presented with symptoms of dizziness and ataxia, and was found to have a tiny right occipital infarct.    She did well following her embolization, but was readmitted on 12/15/2022 for a \"syncopal\" episode.  Noninvasive imaging studies demonstrated further occlusion of the partially thrombosed giant proximal basilar aneurysm, with no acute infarcts or complicating features.       She continues to struggle with " unsteady gait and dizziness symptoms, which significantly limits her mobility, as well as recurrent near syncopal episodes.  Since I saw her last, her symptoms have progressed/worsened, with more generalized weakness and visual stimuli significantly exacerbating her symptoms of dizziness/vertigo, and near syncope (I.E.cannot watch a train go by, cannot watch children play).  She is off of Plavix now, being maintained on an aspirin antiplatelet regimen.  She presents today with follow-up CT angiogram, to assess for further occlusion/thrombosis of her giant basilar aneurysm.    Past Medical History:  Past Medical History:   Diagnosis Date    Aneurysm 11/26/2022    Headache     Hyperlipidemia     Hypertension     Stroke 11/26/2022       Past Surgical History:  Past Surgical History:   Procedure Laterality Date    ARTERIAL ANEURYSM REPAIR      BREAST LUMPECTOMY  2012    CYST REMOVAL Left 05/2023    EMBOLIZATION CEREBRAL N/A 11/29/2022    Procedure: CV EMBOLIZATION CEREBRAL IR;  Surgeon: Enoch Savage MD;  Location: [facility] INVASIVE LOCATION;  Service: Interventional Radiology;  Laterality: N/A;    HIATAL HERNIA REPAIR  2015    SINUS SURGERY      x4       Review of Systems:        Review of Systems   Constitutional:  Negative for activity change, appetite change, chills, diaphoresis, fatigue, fever and unexpected weight change.   HENT:  Negative for congestion, dental problem, drooling, ear discharge, ear pain, facial swelling, hearing loss, mouth sores, nosebleeds, postnasal drip, rhinorrhea, sinus pressure, sinus pain, sneezing, sore throat, tinnitus, trouble swallowing and voice change.    Eyes:  Negative for photophobia, pain, discharge, redness, itching and visual disturbance.   Respiratory:  Negative for apnea, cough, choking, chest tightness, shortness of breath, wheezing and stridor.    Cardiovascular:  Negative for chest pain, palpitations and leg swelling.   Gastrointestinal:  Negative for abdominal  distention, abdominal pain, anal bleeding, blood in stool, constipation, diarrhea, nausea, rectal pain and vomiting.   Endocrine: Negative for cold intolerance, heat intolerance, polydipsia, polyphagia and polyuria.   Genitourinary:  Negative for decreased urine volume, difficulty urinating, dyspareunia, dysuria, enuresis, flank pain, frequency, genital sores, hematuria, menstrual problem, pelvic pain, urgency, vaginal bleeding, vaginal discharge and vaginal pain.   Musculoskeletal:  Negative for arthralgias, back pain, gait problem, joint swelling, myalgias, neck pain and neck stiffness.   Skin:  Negative for color change, pallor, rash and wound.   Allergic/Immunologic: Negative for environmental allergies, food allergies and immunocompromised state.   Neurological:  Positive for dizziness and headaches. Negative for tremors, seizures, syncope, facial asymmetry, speech difficulty, weakness, light-headedness and numbness.   Hematological:  Negative for adenopathy. Does not bruise/bleed easily.   Psychiatric/Behavioral:  Negative for agitation, behavioral problems, confusion, decreased concentration, dysphoric mood, hallucinations, self-injury, sleep disturbance and suicidal ideas. The patient is not nervous/anxious and is not hyperactive.       Medications    Current Outpatient Medications:     albuterol (PROAIR RESPICLICK) 108 (90 Base) MCG/ACT inhaler, Inhale 2 puffs Every 4 (Four) Hours As Needed for Wheezing or Shortness of Air., Disp: 1 inhaler, Rfl: 0    aspirin 81 MG chewable tablet, Chew 1 tablet Daily., Disp: , Rfl:     atorvastatin (LIPITOR) 80 MG tablet, TAKE 1 TABLET BY MOUTH EVERY NIGHT, Disp: 90 tablet, Rfl: 1    butalbital-acetaminophen-caffeine (ORBIVAN) -40 MG capsule capsule, butalbital-acetaminophen-caffeine 50 mg-300 mg-40 mg capsule  Take by oral route for 5 days., Disp: , Rfl:     Cholecalciferol (VITAMIN D3) 400 units capsule, Take 1 tablet by mouth Daily., Disp: , Rfl:     clopidogrel  (PLAVIX) 75 MG tablet, Take 1 tablet by mouth Daily., Disp: 30 tablet, Rfl: 6    estradiol (ESTRACE) 0.1 MG/GM vaginal cream, , Disp: , Rfl:     fluticasone (FLONASE) 50 MCG/ACT nasal spray, 2 sprays into the nostril(s) as directed by provider Daily., Disp: 1 bottle, Rfl: 0    Glucosamine Sulfate (SYNOVACIN PO), Take 1,000 mg by mouth Daily., Disp: , Rfl:     hydroCHLOROthiazide (HYDRODIURIL) 25 MG tablet, , Disp: , Rfl:     HYDROcodone-acetaminophen (NORCO) 5-325 MG per tablet, , Disp: , Rfl:     nortriptyline (PAMELOR) 10 MG capsule, , Disp: , Rfl:     Omega-3 Fatty Acids (FISH OIL) 1000 MG capsule capsule, Take 1 capsule by mouth Daily., Disp: , Rfl:     ondansetron (ZOFRAN) 4 MG tablet, Zofran 4 MG Oral Tablet QTY: 0 tablet Days: 0 Refills: 0  Written: 23 Patient Instructions:, Disp: , Rfl:     oxybutynin XL (DITROPAN-XL) 5 MG 24 hr tablet, oxybutynin chloride ER 5 mg tablet,extended release 24 hr, Disp: , Rfl:     pantoprazole (PROTONIX) 40 MG EC tablet, Take 1 tablet by mouth Every Morning. Indications: Take while on the steroid taper. (Patient taking differently: Take 1 tablet by mouth Every Morning.), Disp: 30 tablet, Rfl: 0    sertraline (ZOLOFT) 50 MG tablet, , Disp: , Rfl:     Zinc 50 MG capsule, Take  by mouth., Disp: , Rfl:   No current facility-administered medications for this visit.    Allergies:  Allergies   Allergen Reactions    Tramadol Other (See Comments)     Flushng and passing out       Social Hx:  Social History     Tobacco Use    Smoking status: Former     Packs/day: 1.00     Years: 15.00     Pack years: 15.00     Types: Cigarettes     Quit date: 10/4/2004     Years since quittin.8    Smokeless tobacco: Never   Substance Use Topics    Alcohol use: No    Drug use: No       Family Hx:  Family History   Problem Relation Age of Onset    No Known Problems Mother     Heart attack Father 42    Heart attack Paternal Aunt     Heart attack Paternal Uncle     No Known Problems Sister      Cancer Maternal Grandmother     Heart failure Maternal Grandmother     No Known Problems Maternal Grandfather     No Known Problems Paternal Grandmother     No Known Problems Paternal Grandfather     Diabetes Half-Brother        Review of Imaging:  CT angiogram of the head dated 8/21/2023 from [facility] was reviewed along with its corresponding radiologic report.  Comparison is made to multiple prior studies, the most recent being on 3/27/2023.  Again seen are changes related to prior flow diverter therapy/embolization (Pipeline Shield) for a giant mid basilar trunk aneurysm.  There is residual opacification of a small amount of the aneurysm the neck (2-3 mm) despite discontinuation of Plavix.  The thrombosed giant basilar aneurysm remained stable in size, with mass effect upon and posterior displacement of the brainstem.  No new or additional vascular abnormalities are identified.     Physical Examination:  Vitals:    08/21/23 1246   BP: 124/72   Temp: 96.9 °F (36.1 °C)        General Appearance:   Well developed, well nourished, well groomed, alert, and cooperative.  Cardiovascular: Regular rate and rhythm. No carotid bruits      Neurological examination:  Alert and oriented x3. Her speech is clear. Extraocular muscles are full. I do not appreciate any gross visual field deficits. No facial droop or pronator drift.  She appears weaker and more frail since I saw her last, and her gait is significantly more unsteady since I saw her last.  She utilizes a cane, and people walk alongside her to ensure that she does not fall.        Diagnoses/Plan:    Ms. Romo is a 62 y.o. female status post embolization of a giant, largely thrombosed proximal basilar aneurysm with Pipeline Shield flow diverter therapy on 11/29/2022.  Since I saw her last, she has discontinued Plavix, but remains on an aspirin antiplatelet regimen.  Despite this, CT angiogram on 8/21/2023 demonstrates persistent opacification and  filling of the aneurysm neck, which I feel is prohibiting complete thrombosis and (hopefully) shrinkage of the aneurysm.  There is persistent mass effect upon and posterior displacement of the brainstem.    Since I saw Ms. Romo last, her unsteadiness and gait disturbances, along with dizziness have progressed/worsened.  This is most notable in her gait which is extremely unsteady.  She also clearly has an element of depression and underlying anxiety (almost PTSD-like) in regards to the cerebral aneurysm, and she is going to follow-up with her PCP to see if there is anything else that she can be given to help with this (she is currently on an antidepressant).    Given that the aneurysm continues to fill, and has not shrunk in size, and has persistent mass effect on the brainstem, with worsening symptoms/functional status, I have recommended additional embolization with flow diverter therapy.  I discussed this in detail with Ms. Romo and family, to include risk of stroke, and they expressed an understanding.  Ms. Romo in particular, does not feel like she can continue on her current path, that she is getting worse, and wishes to proceed with the embolization.  I did explain to them that I anticipate that embolization will result in complete occlusion of the aneurysm (eventually), but I am unsure as to whether the aneurysm will shrink in size (or how long it will take).  While I would hope that some of her symptoms would improve with thrombosis and shrinking of the aneurysm, this is certainly not guaranteed.  Again, they expressed an understanding and wished to proceed with the embolization accordingly.    Ms. Romo will resume Plavix/aspirin dual antiplatelet regimen, and we will tentatively schedule her for embolization of her giant basilar aneurysm in the next couple of weeks or so.    Lastly, Ms. Romo is in the process of applying for disability, and given her current condition, and need for additional  embolization procedure, I do not foresee a way in which she can resume employment in her previous capacity.  If I can be of any further assistance with her disability application, please do not hesitate to contact me.     Copied text and portions of the note have been reviewed and are accurate as of 8/21/2023.                       Electronically signed by Enoch Savage MD at 08/21/23 1624       Current Facility-Administered Medications   Medication Dose Route Frequency Provider Last Rate Last Admin    acetaminophen (TYLENOL) tablet 650 mg  650 mg Oral Q4H PRN Given, Enoch CORBETT MD   650 mg at 09/16/23 1810    atorvastatin (LIPITOR) tablet 80 mg  80 mg Oral Nightly HumYasemin wilhelm, APRN   80 mg at 09/17/23 2057    butalbital-acetaminophen-caffeine (FIORICET, ESGIC) -40 MG per tablet 1 tablet  1 tablet Oral Q4H PRN Lulu Sims PA-C   1 tablet at 09/18/23 0515    clopidogrel (PLAVIX) tablet 75 mg  75 mg Oral Daily Yasemin Srivastava APRN   75 mg at 09/18/23 0915    hydroCHLOROthiazide (HYDRODIURIL) tablet 25 mg  25 mg Oral Daily Oseas Machado DO   25 mg at 09/18/23 0915    HYDROcodone-acetaminophen (NORCO) 5-325 MG per tablet 1 tablet  1 tablet Oral Q4H PRN Lulu Sims PA-C   1 tablet at 09/17/23 0336    lactated ringers infusion  9 mL/hr Intravenous Continuous Given, Enoch CORBETT MD        niCARdipine (CARDENE) 25mg in 250mL NS infusion  5-15 mg/hr Intravenous Titrated Given, Enoch CORBETT MD        nitroglycerin (NITROSTAT) SL tablet 0.4 mg  0.4 mg Sublingual Q5 Min PRN Given, Enoch CORBETT MD        ondansetron (ZOFRAN) injection 4 mg  4 mg Intravenous Q6H PRN Given, Enoch CORBETT MD   4 mg at 09/16/23 1810    phenylephrine (GILLES-SYNEPHRINE) 50 mg in sodium chloride 0.9 % 250 mL infusion  0.5-3 mcg/kg/min Intravenous Titrated Given, Enoch CORBETT MD   Held at 09/15/23 1811    sertraline (ZOLOFT) tablet 100 mg  100 mg Oral Daily Yasemin Srivastava APRN   100 mg at 09/18/23 0989    sodium chloride 0.9  % infusion  75 mL/hr Intravenous Continuous Given, Enoch CORBETT MD 75 mL/hr at 09/17/23 0928 75 mL/hr at 09/17/23 0928     Lab Results (all)       Procedure Component Value Units Date/Time    Basic Metabolic Panel [[id number]]  (Abnormal) Collected: 09/18/23 0610    Specimen: Blood Updated: 09/18/23 0652     Glucose 92 mg/dL      BUN 7 mg/dL      Creatinine 0.46 mg/dL      Sodium 139 mmol/L      Potassium 3.9 mmol/L      Chloride 105 mmol/L      CO2 27.0 mmol/L      Calcium 8.6 mg/dL      BUN/Creatinine Ratio 15.2     Anion Gap 7.0 mmol/L      eGFR 109.0 mL/min/1.73     Narrative:      GFR Normal >60  Chronic Kidney Disease <60  Kidney Failure <15      CBC (No Diff) [[id number]]  (Normal) Collected: 09/18/23 0610    Specimen: Blood Updated: 09/18/23 0639     WBC 7.89 10*3/mm3      RBC 4.08 10*6/mm3      Hemoglobin 13.1 g/dL      Hematocrit 38.0 %      MCV 93.1 fL      MCH 32.1 pg      MCHC 34.5 g/dL      RDW 13.6 %      RDW-SD 46.4 fl      MPV 10.4 fL      Platelets 260 10*3/mm3     Magnesium [[id number]]  (Normal) Collected: 09/16/23 0438    Specimen: Blood Updated: 09/16/23 0548     Magnesium 2.4 mg/dL     Basic Metabolic Panel [[id number]]  (Abnormal) Collected: 09/16/23 0438    Specimen: Blood Updated: 09/16/23 0548     Glucose 100 mg/dL      BUN 10 mg/dL      Creatinine 0.42 mg/dL      Sodium 138 mmol/L      Potassium 4.0 mmol/L      Chloride 104 mmol/L      CO2 24.0 mmol/L      Calcium 8.5 mg/dL      BUN/Creatinine Ratio 23.8     Anion Gap 10.0 mmol/L      eGFR 111.4 mL/min/1.73     Narrative:      GFR Normal >60  Chronic Kidney Disease <60  Kidney Failure <15      CBC & Differential [[id number]]  (Abnormal) Collected: 09/16/23 0438    Specimen: Blood Updated: 09/16/23 0520    Narrative:      The following orders were created for panel order CBC & Differential.  Procedure                               Abnormality         Status                     ---------                               -----------         ------                      CBC Auto Differential[[id number]]        Abnormal            Final result                 Please view results for these tests on the individual orders.    CBC Auto Differential [[id number]]  (Abnormal) Collected: 09/16/23 0438    Specimen: Blood Updated: 09/16/23 0520     WBC 8.51 10*3/mm3      RBC 4.33 10*6/mm3      Hemoglobin 13.6 g/dL      Hematocrit 40.7 %      MCV 94.0 fL      MCH 31.4 pg      MCHC 33.4 g/dL      RDW 13.2 %      RDW-SD 45.8 fl      MPV 10.6 fL      Platelets 257 10*3/mm3      Neutrophil % 71.2 %      Lymphocyte % 19.5 %      Monocyte % 8.5 %      Eosinophil % 0.4 %      Basophil % 0.2 %      Immature Grans % 0.2 %      Neutrophils, Absolute 6.06 10*3/mm3      Lymphocytes, Absolute 1.66 10*3/mm3      Monocytes, Absolute 0.72 10*3/mm3      Eosinophils, Absolute 0.03 10*3/mm3      Basophils, Absolute 0.02 10*3/mm3      Immature Grans, Absolute 0.02 10*3/mm3      nRBC 0.0 /100 WBC     Basic Metabolic Panel [[id number]]  (Abnormal) Collected: 09/15/23 1126    Specimen: Blood Updated: 09/15/23 1208     Glucose 102 mg/dL      BUN 13 mg/dL      Creatinine 0.66 mg/dL      Sodium 141 mmol/L      Potassium 4.3 mmol/L      Chloride 100 mmol/L      CO2 31.0 mmol/L      Calcium 9.8 mg/dL      BUN/Creatinine Ratio 19.7     Anion Gap 10.0 mmol/L      eGFR 99.9 mL/min/1.73     Narrative:      GFR Normal >60  Chronic Kidney Disease <60  Kidney Failure <15      P2Y12 Platelet Inhibition [[id number]] Collected: 09/15/23 1126    Specimen: Blood Updated: 09/15/23 1203     P2Y12 Reactivity Unit 55 PRU     Narrative:      P2Y12 Interpretation:  Pre-Drug normal reference range is 194-418 PRU.  Test results are reported in P2Y12 reaction units (PRU). This measures the extent of platelet aggregation in the presence of P2Y12 inhibitor drugs, such as clopidogrel (Plavix), prasugrel (Effient), ticagrelor (Brilinta), ticlopidine (Ticlid).  P2Y12 values <194 PRU (low end of reference range) are specific evidence of  a P2Y12 inhibitor effect.  Patients who have been treated with Glycoprotein IIb/IIIa inhibitors should not be tested until platelet function has recovered. This time period is approximately 14 days after discontinuation of abciximab (ReoPro) and up to 48 hours after discontinuation of eptifibatide (Integrilin) and tirofiban (Aggrastat).   The P2Y12 test results should be interpreted in conjunction with other clinical and lab data available to the clinician.    POC CHEM 8 [[id number]]  (Normal) Collected: 09/15/23 1143    Specimen: Blood Updated: 09/15/23 1156     Glucose 98 mg/dL      BUN 13 mg/dL      Creatinine 0.60 mg/dL      Sodium 140 mmol/L      POC Potassium 4.3 mmol/L      Chloride 98 mmol/L      Total CO2 29 mmol/L      Hemoglobin 16.0 g/dL      Comment: Serial Number: [id number]:  [id number]        Hematocrit 47 %      Ionized Calcium 1.21 mmol/L      eGFR 102.3 mL/min/1.73     CBC (No Diff) [[id number]]  (Normal) Collected: 09/15/23 1126    Specimen: Blood Updated: 09/15/23 1147     WBC 8.63 10*3/mm3      RBC 4.90 10*6/mm3      Hemoglobin 15.4 g/dL      Hematocrit 45.9 %      MCV 93.7 fL      MCH 31.4 pg      MCHC 33.6 g/dL      RDW 13.4 %      RDW-SD 46.6 fl      MPV 10.2 fL      Platelets 320 10*3/mm3           Imaging Results (All)       Procedure Component Value Units Date/Time    MRI Brain Without Contrast [[id number]] Collected: 09/18/23 0052     Updated: 09/18/23 0100    Narrative:      MRI BRAIN WO CONTRAST    Date of Exam: 9/17/2023 11:20 PM EDT    Indication: Giant basilar aneurysm.     Comparison: CT angiogram head 8/21/2023 and brain MRI 12/15/2022.    Technique:  Routine multiplanar/multisequence sequence images of the brain were obtained without contrast administration.      Findings:  There is a new acute/subacute lacunar type infarct in the lateral inferior left cerebellar hemisphere. No additional diffusion restriction. This area appears T2 hyperintense. There is otherwise mild FLAIR  hyperintense signal abnormality in the cerebral   white matter in a nonspecific distribution. There is mild ventriculomegaly, likely result of leukomalacia. There is redemonstration of a large basilar artery aneurysm measuring 24 mm with associated excluding stent. Flow dynamics within the aneurysm   would suggest continued partial flow. There is mass effect exerted upon the jong. There are additional new signal abnormalities in the brain. The other major arterial flow voids appear intact. Orbits are unremarkable. There is mild residual paranasal   sinus mucosal thickening status post sinus surgery. There is a small left mastoid effusion. Calvarial and superficial soft tissue signal appears within normal limits. The midline structures are preserved.      Impression:      Impression:  1.There is a new acute/subacute lacunar type infarct in the left cerebellar hemisphere.  2.Mild chronic small vessel ischemic change.  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  4.Mild residual paranasal sinus mucosal thickening status post sinus surgery. Small left mastoid effusion.        Electronically Signed: Carlos Bowden MD    9/18/2023 12:57 AM EDT    Workstation ID: [id number]             Operative/Procedure Notes (all)        Enoch Savage MD at 09/15/23 1420  Version 1 of 1         CV IR INTRACRANIAL EMBOLIZATION  Progress Note    Lauryn Wadey  9/15/2023    Pre-op Diagnosis:   Cerebral aneurysm, nonruptured [I67.1]       Post-Op Diagnosis Codes:     * Cerebral aneurysm, nonruptured [I67.1]    Procedure/CPT® Codes:        Procedure(s):  Embolization              Surgeon(s):  Enoch Savage MD    Anesthesia: General    Staff:   Invasive Nurse: Roxie Alegria RN; Anita Oseguera RN  Invasive Technologist: Alejandra Ross; Carmen Juarez         Estimated Blood Loss: minimal    Urine Voided: 300 mL    Specimens:                None          Drains: * No LDAs found *    Findings: Giant, partially  thrombosed basilar aneurysm was retreated with additional pipeline flow diverter from the right vertebral artery.  There is residual opacification of the aneurysm via the left vertebral artery which may require additional treatment in the future.        Complications: None apparent.          Enoch Savage MD     Date: 9/15/2023  Time: 15:38 EDT          Electronically signed by Enoch Savage MD at 09/15/23 1539          Physician Progress Notes (all)        Enoch Savage MD at 23 [phone]          NAME: MEDHAT ROMO  : 1960  PCP: Melissa Lazo APRN  ADMITTING PHYSICIAN: Enoch Savage MD    DATE OF ADMISSION:  9/15/2023  DATE OF SERVICE: 2023    HOSPITAL DAY:  2 days    HISTORY OF PRESENT ILLNESS:  62 y.o. female who is well-known to the neuro interventional service, having undergone prior embolization (Pipeline Shield) for giant (partially thrombosed) proximal basilar aneurysm on 2022.  She had initially presented with symptoms of dizziness and ataxia, and was found to have a tiny right occipital infarct.         She continued to struggle with unsteady gait and dizziness symptoms, which significantly limits her mobility, as well as recurrent near syncopal episodes.  Subsequent CT angiogram demonstrated persistent filling of the aneurysm, and after a lengthy deliberation, Ms. Romo elected to pursue further embolization.    She was admitted on 9/15/2023, and catheter angiography demonstrated a persistent filling of the aneurysm.  The right vertebral/basilar Pipeline stent construct treated with additional placement of a 2.75 mm Pipeline stent, which eliminated all filling of the aneurysm via the right vertebral artery.  Injection of the left vertebral artery does demonstrate persistent filling of a small crescent of the aneurysm which then supplies the right AICA, which was not treated.      REVIEW OF IMAGING:  MRI pending.    LABS:  Lab Results   Component Value Date    WBC  8.51 09/16/2023    HGB 13.6 09/16/2023    HCT 40.7 09/16/2023    MCV 94.0 09/16/2023     09/16/2023     Lab Results   Component Value Date    GLUCOSE 100 (H) 09/16/2023    CALCIUM 8.5 (L) 09/16/2023     09/16/2023    K 4.0 09/16/2023    CO2 24.0 09/16/2023     09/16/2023    BUN 10 09/16/2023    CREATININE 0.42 (L) 09/16/2023    EGFRIFNONA 103 09/29/2019    BCR 23.8 09/16/2023    ANIONGAP 10.0 09/16/2023       CURRENT MEDS:  Current Facility-Administered Medications   Medication Dose Route Frequency Provider Last Rate Last Admin    acetaminophen (TYLENOL) tablet 650 mg  650 mg Oral Q4H PRN Given, Enoch CORBETT MD   650 mg at 09/16/23 1810    atorvastatin (LIPITOR) tablet 80 mg  80 mg Oral Nightly Yasemin Srivastava APRN   80 mg at 09/16/23 2017    butalbital-acetaminophen-caffeine (FIORICET, ESGIC) -40 MG per tablet 1 tablet  1 tablet Oral Q4H PRN Lulu Sims PA-C   1 tablet at 09/17/23 1352    clopidogrel (PLAVIX) tablet 75 mg  75 mg Oral Daily Yasemin Srivastava APRN   75 mg at 09/17/23 0903    hydroCHLOROthiazide (HYDRODIURIL) tablet 25 mg  25 mg Oral Daily Oseas Machado DO   25 mg at 09/17/23 1352    HYDROcodone-acetaminophen (NORCO) 5-325 MG per tablet 1 tablet  1 tablet Oral Q4H PRN Lulu Sims PA-C   1 tablet at 09/17/23 0336    lactated ringers infusion  9 mL/hr Intravenous Continuous Given, Enoch CORBETT MD        niCARdipine (CARDENE) 25mg in 250mL NS infusion  5-15 mg/hr Intravenous Titrated Given, Enoch CORBETT MD        nitroglycerin (NITROSTAT) SL tablet 0.4 mg  0.4 mg Sublingual Q5 Min PRN Given, Enoch CORBETT MD        ondansetron (ZOFRAN) injection 4 mg  4 mg Intravenous Q6H PRN Given, Enoch CORBETT MD   4 mg at 09/16/23 1810    phenylephrine (GILLES-SYNEPHRINE) 50 mg in sodium chloride 0.9 % 250 mL infusion  0.5-3 mcg/kg/min Intravenous Titrated Given, Enoch CORBETT MD   Held at 09/15/23 1811    sertraline (ZOLOFT) tablet 100 mg  100 mg Oral Daily Yasemin Srivastava, APRN    100 mg at 23 0903    sodium chloride 0.9 % infusion  75 mL/hr Intravenous Continuous Given, Enoch CORBETT MD 75 mL/hr at 23 0928 75 mL/hr at 23       PHYSICAL EXAM:  Vitals:    23 1134   BP: 137/97   Pulse: 73   Resp: 16   Temp: 98 °F (36.7 °C)   SpO2:       Headaches are stable.  Symmetric strength in the upper and lower extremities.  Tolerating p.o. intake.  Speech is clear.  No new deficits.    ASSESSMENT/PLAN:  Ms. Romo is a 62 y.o. female status postembolization of recurrent/residual giant basilar aneurysm with placement of an additional Pipeline flow diverter.  There is no residual filling of the aneurysm from the right vertebral system; however, there is a tiny crescent of residual aneurysm that fills from the left vertebral injection, without flow of the aneurysm supplying the right AICA.  This was not treated during this setting.    We will plan on obtaining MRI of the head today, to assess aneurysm size, mass effect on the brainstem, and any edema.    We will still plan [facility], hopefully tomorrow.    Ms. Romo will need to remain on a dual antiplatelet regimen.                   Electronically signed by Enoch Savage MD at 23 1456       Oseas Machado DO at 23 1102              [facility] Medicine Services  PROGRESS NOTE    Patient Name: Lauryn Romo  : 1960  MRN: [id number]    Date of Admission: 9/15/2023  Primary Care Physician: Melissa Lazo APRN    Subjective   Subjective     CC:  Headache    HPI:  Patient reports resolution of her headache this morning.  Denies any dizziness, focal numbness or weakness in extremities.  Denies fevers, chills, sweats.      Objective   Objective     Vital Signs:   Temp:  [97.8 °F (36.6 °C)-98.4 °F (36.9 °C)] 98.3 °F (36.8 °C)  Heart Rate:  [50-62] 55  Resp:  [16] 16  BP: (118-138)/(64-78) 138/78     Physical Exam:  General appearance: alert, awake, oriented, no  acute distress   Cardiovascular: RRR, no murmurs or rubs, radial pulse full 2/4 BL   Respiratory: CTAB, no crackles or wheezes   Abdomen: soft, non-tender, no organomegaly, bowel sounds normoactive    Neuro/CNS: alert and oriented x3, normal speech, moves all 4 extremities, sensation grossly intact  Results Reviewed:  LAB RESULTS:      Lab 09/16/23 0438 09/15/23  1143 09/15/23  1126   WBC 8.51  --  8.63   HEMOGLOBIN 13.6  --  15.4   HEMOGLOBIN, POC  --  16.0  --    HEMATOCRIT 40.7  --  45.9   HEMATOCRIT POC  --  47  --    PLATELETS 257  --  320   NEUTROS ABS 6.06  --   --    IMMATURE GRANS (ABS) 0.02  --   --    LYMPHS ABS 1.66  --   --    MONOS ABS 0.72  --   --    EOS ABS 0.03  --   --    MCV 94.0  --  93.7         Lab 09/16/23 0438 09/15/23  1143 09/15/23  1126   SODIUM 138  --  141   POTASSIUM 4.0  --  4.3   CHLORIDE 104  --  100   CO2 24.0  --  31.0*   ANION GAP 10.0  --  10.0   BUN 10  --  13   CREATININE 0.42* 0.60 0.66   EGFR 111.4 102.3 99.9   GLUCOSE 100*  --  102*   CALCIUM 8.5*  --  9.8   MAGNESIUM 2.4  --   --                          Brief Urine Lab Results  (Last result in the past 365 days)        Color   Clarity   Blood   Leuk Est   Nitrite   Protein   CREAT   Urine HCG        12/15/22 0000 Yellow   Cloudy   Trace   Large (3+)   Positive   Negative                   Microbiology Results Abnormal       None            No radiology results from the last 24 hrs    Results for orders placed during the hospital encounter of 12/15/22    Adult Transthoracic Echo Complete W/ Cont if Necessary Per Protocol    Interpretation Summary    Left ventricular systolic function is normal. Left ventricular ejection fraction appears to be 56 - 60%.    Mild aortic valve regurgitation is present.      Current medications:  Scheduled Meds:atorvastatin, 80 mg, Oral, Nightly  clopidogrel, 75 mg, Oral, Daily  sertraline, 100 mg, Oral, Daily      Continuous Infusions:lactated ringers, 9 mL/hr  niCARdipine, 5-15  mg/hr  phenylephrine, 0.5-3 mcg/kg/min, Last Rate: Stopped (09/15/23 1811)  sodium chloride, 75 mL/hr, Last Rate: 75 mL/hr (09/17/23 0928)      PRN Meds:.  acetaminophen    butalbital-acetaminophen-caffeine    HYDROcodone-acetaminophen    nitroglycerin    ondansetron    Assessment & Plan   Assessment & Plan     Active Hospital Problems    Diagnosis  POA    **Cerebral aneurysm, nonruptured [I67.1]  Yes    Moderate malnutrition [E44.0]  Yes    Paraesophageal hernia [K44.9]  Yes    Hyperlipidemia [E78.5]  Yes    HTN [I10]  Yes      Resolved Hospital Problems    Diagnosis Date Resolved POA    Syncope [R55] 09/16/2023 Yes        Brief Hospital Course to date:  Lauryn Romo is a 62 y.o. female with past medical history significant for cerebral aneurysm complicated by headache, gait disturbances, dizziness, who was admitted to undergo neurosurgical intervention for her aneurysm.  She underwent embolization 9/15 without complication.    Vertebrobasilar junction aneurysm s/p pipeline embolization  POD#2  Gait abnormality, imbalance  -Patient asymptomatic  -Continue dual antiplatelet therapy per neurosurgery  -PT OT    Hypertension  Hyperlipidemia  -Currently hemodynamically stable   -Restart home HCTZ   -Continue lipitor    Expected Discharge Location and Transportation: [facility]  Expected Discharge   Expected Discharge Date: 9/18/2023; Expected Discharge Time:      DVT prophylaxis:  Mechanical DVT prophylaxis orders are present.     AM-PAC 6 Clicks Score (PT): 18 (09/16/23 1008)    CODE STATUS:   Code Status and Medical Interventions:   Ordered at: 09/16/23 1058     Code Status (Patient has no pulse and is not breathing):    CPR (Attempt to Resuscitate)     Medical Interventions (Patient has pulse or is breathing):    Full Support       Oseas Machado DO  09/17/23        Electronically signed by Oseas Machado DO at 09/17/23 1157       Joe Mendez MD at 09/16/23 1019          Chief complaint:  Vertebrobasilar junction aneurysm    Admit Diagnosis:   Cerebral aneurysm, nonruptured [I67.1]     Subjective: Postoperative day 1 status post pipeline embolization of complex vertebrobasilar junction aneurysm    Objective:    Vitals:    23 1000   BP: 123/67   Pulse: 59   Resp: 16   Temp:    SpO2: 100%     Pulse  Av  Min: 47  Max: 80  Systolic (24hrs), Av , Min:97 , Max:147     Diastolic (24hrs), Av, Min:63, Max:107    Temp (24hrs), Av °F (36.7 °C), Min:97.6 °F (36.4 °C), Max:98.5 °F (36.9 °C)      She is awake, alert, pleasant, conversant and appropriate.  She endorses headache, however this is her baseline.  She does have chronic headaches.  No facial droop.  Speech production is fluent with no paraphasic errors.  She is sitting at the side of the bed with nursing staff.    Lab Results   Component Value Date     2023       A/P:   Admit Diagnosis:   Cerebral aneurysm, nonruptured [I67.1]     [facility] to [facility] today.  Continue antiplatelet therapy per Dr. Savage.  Likely discharge to [facility] on Monday.      Electronically signed by Joe Mendez MD at 23 1020       Yasemin Srivastava APRN at 09/15/23 1702            Intensive Care Follow-up     Hospital:  LOS: 0 days   Ms. Lauryn Romo, 62 y.o. female is followed for:   Brain aneurysm     Subjective     Subjective   Interval History:  Lauryn Romo is a 62 year-old female with HTN, former tobacco use, and proximal basilar aneurysm s/p embolization (pipeline shield) on 22 which was found during admission to [facility] with tiny right occipital infarct. She was readmitted in December for syncopal episodes accompanied with right facial droop and dysarthria. Following this, she continued to have unsteadiness, gait disturbances, and dizziness thought to be syncopal. She was discharged with plans for heart monitor. Repeat CTA showed persistent filling of the aneurysm neck and Dr. Savage felt further  thrombosis would be the best decision to hopefully shrink the aneurysm. Patient underwent re-treatment of giant, partially thrombosed basilar aneurysm with additional pipeline flow diverter from the right vertebral artery.     Encountered patient upon arrival to the ICU. She is hemodynamically stable. Does have a headache, which she reports is the same type / level of pain she has at home.      The patient's past medical, surgical and social history were reviewed and updated in Epic as appropriate.    Objective   Objective     Infusions:  lactated ringers, 9 mL/hr  niCARdipine, 5-15 mg/hr  phenylephrine, 0.5-3 mcg/kg/min  sodium chloride, 75 mL/hr      Medications:  [MAR Hold] clopidogrel, 75 mg, Oral, Daily      I reviewed the patient's medications.    Vital Sign Min/Max for last 24 hours  Temp  Min: 97.6 °F (36.4 °C)  Max: 98.1 °F (36.7 °C)   BP  Min: 118/72  Max: 147/106   Pulse  Min: 57  Max: 80   Resp  Min: 16  Max: 16   SpO2  Min: 97 %  Max: 100 %   No data recorded       Input/Output for last 24 hour shift  No intake/output data recorded.   S RR:  [4-10] 4    Physical Exam:  GENERAL: Patient lying in bed and conversant. No acute distress.   HEENT: Normocephalic and atraumatic. Trachea midline. PER. EOM WNL.   LUNGS: Chest rise of normal depth and symmetric. Lungs clear to auscultation bilaterally. No wheezes, rhonchi, or rales.   HEART: S1,S2 detected. Regular rate and rhythm. No rub, murmur, or gallop.   ABDOMEN: Soft, round, nondistended, and nontender. Bowel sounds present.   EXTREMITIES: No clubbing, edema, or cyanosis. Peripheral pulses present. Skin warm and dry. R Radial TR band in place C/D/I   NEURO/PSYCH: Alert and oriented. Follows commands. Moves all extremities. No focal deficits.      Results from last 7 days   Lab Units 09/15/23  1143 09/15/23  1126   WBC 10*3/mm3  --  8.63   HEMOGLOBIN g/dL  --  15.4   HEMOGLOBIN, POC g/dL 16.0  --    PLATELETS 10*3/mm3  --  320     Results from last 7 days    Lab Units 09/15/23  1143 09/15/23  1126   SODIUM mmol/L  --  141   POTASSIUM mmol/L  --  4.3   CO2 mmol/L  --  31.0*   BUN mg/dL  --  13   CREATININE mg/dL 0.60 0.66   GLUCOSE mg/dL  --  102*     Estimated Creatinine Clearance: 80.7 mL/min (by C-G formula based on SCr of 0.6 mg/dL).          I reviewed the patient's new clinical results.  I reviewed the patient's new imaging results/reports including actual images and agree with reports.     Imaging Results (Last 24 Hours)       ** No results found for the last 24 hours. **            Assessment & Plan   Impression      Brain aneurysm       Plan      -Postop orders per Dr. Savage  -Nicardipine for blood pressure control   -Neurovascular checks per protocol  -Pain control  -Mobilize patient per protocol  -Aggressive pulmonary toilet  - Lipitor and Zoloft restarted from home meds. Further home medications per Neurosurgery.   -AM labs      DVT Prophylaxis: SCDs  Dispo: ICU    Time spent: 36  Plan of care and goals reviewed with multidisciplinary/antibiotic stewardship team during rounds.   I discussed the patient's findings and my recommendations with patient, family, and nursing staff     Yasemin Srivastava, MSN, APRN, AGAP-BC  Pulmonary and Critical Care Medicine         Electronically signed by Yasemin Srivastava, CAILIN at 09/15/23 6550

## 2023-09-18 NOTE — PLAN OF CARE
Goal Outcome Evaluation:  Plan of Care Reviewed With: patient        Progress: no change          Pt is alert and oriented X 4. VSS. RA. Fioricet administered for headache. Ambulates with one assist, walker and gait belt to the bathroom. Voiding spontaneously. Sinus paresh to NSR on telemetry. MRI head completed.

## 2023-09-18 NOTE — PROGRESS NOTES
[facility] Medicine Services  PROGRESS NOTE    Patient Name: Lauryn Romo  : 1960  MRN: [id number]    Date of Admission: 9/15/2023  Primary Care Physician: Melissa Lazo APRN    Subjective   Subjective     CC:  Headache    HPI:  Patient reports right occipital headache this morning. Denies change in vision, nausea, vomiting, dizziness.       Objective   Objective     Vital Signs:   Temp:  [98 °F (36.7 °C)-98.3 °F (36.8 °C)] 98.1 °F (36.7 °C)  Heart Rate:  [58-71] 66  Resp:  [16-18] 18  BP: (128-148)/(72-92) 133/78     Physical Exam:  General appearance: alert, awake, oriented, no acute distress   Cardiovascular: RRR, no murmurs or rubs, radial pulse full 2/4 BL   Respiratory: CTAB, no crackles or wheezes   Abdomen: soft, non-tender, no organomegaly, bowel sounds normoactive    Neuro/CNS: alert and oriented x3, normal speech, moves all 4 extremities, sensation grossly intact      Results Reviewed:  LAB RESULTS:      Lab 09/18/23  0610 09/16/23  0438 09/15/23  1143 09/15/23  1126   WBC 7.89 8.51  --  8.63   HEMOGLOBIN 13.1 13.6  --  15.4   HEMOGLOBIN, POC  --   --  16.0  --    HEMATOCRIT 38.0 40.7  --  45.9   HEMATOCRIT POC  --   --  47  --    PLATELETS 260 257  --  320   NEUTROS ABS  --  6.06  --   --    IMMATURE GRANS (ABS)  --  0.02  --   --    LYMPHS ABS  --  1.66  --   --    MONOS ABS  --  0.72  --   --    EOS ABS  --  0.03  --   --    MCV 93.1 94.0  --  93.7         Lab 09/18/23  0610 09/16/23  0438 09/15/23  1143 09/15/23  1126   SODIUM 139 138  --  141   POTASSIUM 3.9 4.0  --  4.3   CHLORIDE 105 104  --  100   CO2 27.0 24.0  --  31.0*   ANION GAP 7.0 10.0  --  10.0   BUN 7* 10  --  13   CREATININE 0.46* 0.42* 0.60 0.66   EGFR 109.0 111.4 102.3 99.9   GLUCOSE 92 100*  --  102*   CALCIUM 8.6 8.5*  --  9.8   MAGNESIUM  --  2.4  --   --                          Brief Urine Lab Results  (Last result in the past 365 days)        Color   Clarity   Blood   Leuk Est   Nitrite    Protein   CREAT   Urine HCG        12/15/22 0000 Yellow   Cloudy   Trace   Large (3+)   Positive   Negative                   Microbiology Results Abnormal       None            MRI Brain Without Contrast    Result Date: 9/18/2023  MRI BRAIN WO CONTRAST Date of Exam: 9/17/2023 11:20 PM EDT Indication: Giant basilar aneurysm.  Comparison: CT angiogram head 8/21/2023 and brain MRI 12/15/2022. Technique:  Routine multiplanar/multisequence sequence images of the brain were obtained without contrast administration. Findings: There is a new acute/subacute lacunar type infarct in the lateral inferior left cerebellar hemisphere. No additional diffusion restriction. This area appears T2 hyperintense. There is otherwise mild FLAIR hyperintense signal abnormality in the cerebral white matter in a nonspecific distribution. There is mild ventriculomegaly, likely result of leukomalacia. There is redemonstration of a large basilar artery aneurysm measuring 24 mm with associated excluding stent. Flow dynamics within the aneurysm would suggest continued partial flow. There is mass effect exerted upon the jong. There are additional new signal abnormalities in the brain. The other major arterial flow voids appear intact. Orbits are unremarkable. There is mild residual paranasal sinus mucosal thickening status post sinus surgery. There is a small left mastoid effusion. Calvarial and superficial soft tissue signal appears within normal limits. The midline structures are preserved.     Impression: Impression: 1.There is a new acute/subacute lacunar type infarct in the left cerebellar hemisphere. 2.Mild chronic small vessel ischemic change. 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 4.Mild residual paranasal sinus mucosal thickening status post sinus surgery. Small left mastoid effusion. Electronically Signed: Carlos Bowden MD  9/18/2023 12:57 AM EDT  Workstation ID: [id number]     Results for orders placed during the hospital  encounter of 12/15/22    Adult Transthoracic Echo Complete W/ Cont if Necessary Per Protocol    Interpretation Summary    Left ventricular systolic function is normal. Left ventricular ejection fraction appears to be 56 - 60%.    Mild aortic valve regurgitation is present.      Current medications:  Scheduled Meds:atorvastatin, 80 mg, Oral, Nightly  clopidogrel, 75 mg, Oral, Daily  hydroCHLOROthiazide, 25 mg, Oral, Daily  nortriptyline, 10 mg, Oral, Nightly  sertraline, 100 mg, Oral, Daily      Continuous Infusions:niCARdipine, 5-15 mg/hr      PRN Meds:.  acetaminophen    butalbital-acetaminophen-caffeine    HYDROcodone-acetaminophen    nitroglycerin    ondansetron    Assessment & Plan   Assessment & Plan     Active Hospital Problems    Diagnosis  POA    **Cerebral aneurysm, nonruptured [I67.1]  Yes    Moderate malnutrition [E44.0]  Yes    Paraesophageal hernia [K44.9]  Yes    Hyperlipidemia [E78.5]  Yes    HTN [I10]  Yes      Resolved Hospital Problems    Diagnosis Date Resolved POA    Syncope [R55] 09/16/2023 Yes        Brief Hospital Course to date:  Lauryn Romo is a 62 y.o. female with past medical history significant for cerebral aneurysm complicated by headache, gait disturbances, dizziness, who was admitted to undergo neurosurgical intervention for her aneurysm. She underwent embolization 9/15 without complication.    Vertebrobasilar junction aneurysm s/p pipeline embolization  Gait abnormality, Imbalance  - MRI brain 9/18 showed slight interval decrease in size of aneurysm status post embolization, decrease in mass effect. New acute/subacute lacunar type infarct in the left cerebellar hemisphere.   - Neurosurgery managing, continue dual antiplatelet therapy.   - PT OT evaluated, patient awaiting discharge to [facility]. A1c, lipid panel pending.     Headache  - history of chronic headaches, on nortriptyline at home.   - resumed home nortriptyline.     Hypertension  - Continue home HCTZ.    Hyperlipidemia  -  Continue home atorvastatin.     Expected Discharge Location and Transportation: [facility]  Expected Discharge   Expected Discharge Date: 9/20/2023; Expected Discharge Time:      DVT prophylaxis:  Mechanical DVT prophylaxis orders are present.     AM-PAC 6 Clicks Score (PT): 19 (09/18/23 0805)    CODE STATUS:   Code Status and Medical Interventions:   Ordered at: 09/16/23 1058     Code Status (Patient has no pulse and is not breathing):    CPR (Attempt to Resuscitate)     Medical Interventions (Patient has pulse or is breathing):    Full Support       Shirley Rasheed,   09/18/23

## 2023-09-19 VITALS
RESPIRATION RATE: 18 BRPM | WEIGHT: 127.87 LBS | HEART RATE: 66 BPM | OXYGEN SATURATION: 98 % | SYSTOLIC BLOOD PRESSURE: 138 MMHG | HEIGHT: 61 IN | BODY MASS INDEX: 24.14 KG/M2 | DIASTOLIC BLOOD PRESSURE: 80 MMHG | TEMPERATURE: 97.7 F

## 2023-09-19 LAB
ANION GAP SERPL CALCULATED.3IONS-SCNC: 11 MMOL/L (ref 5–15)
BUN SERPL-MCNC: 10 MG/DL (ref 8–23)
BUN/CREAT SERPL: 20.4 (ref 7–25)
CALCIUM SPEC-SCNC: 8.4 MG/DL (ref 8.6–10.5)
CHLORIDE SERPL-SCNC: 105 MMOL/L (ref 98–107)
CO2 SERPL-SCNC: 23 MMOL/L (ref 22–29)
CREAT SERPL-MCNC: 0.49 MG/DL (ref 0.57–1)
EGFRCR SERPLBLD CKD-EPI 2021: 107.4 ML/MIN/1.73
GLUCOSE SERPL-MCNC: 76 MG/DL (ref 65–99)
POTASSIUM SERPL-SCNC: 3.9 MMOL/L (ref 3.5–5.2)
SODIUM SERPL-SCNC: 139 MMOL/L (ref 136–145)

## 2023-09-19 PROCEDURE — 80048 BASIC METABOLIC PNL TOTAL CA: CPT | Performed by: STUDENT IN AN ORGANIZED HEALTH CARE EDUCATION/TRAINING PROGRAM

## 2023-09-19 PROCEDURE — 25010000002 DEXAMETHASONE SODIUM PHOSPHATE 100 MG/10ML SOLUTION: Performed by: STUDENT IN AN ORGANIZED HEALTH CARE EDUCATION/TRAINING PROGRAM

## 2023-09-19 PROCEDURE — 99231 SBSQ HOSP IP/OBS SF/LOW 25: CPT | Performed by: RADIOLOGY

## 2023-09-19 PROCEDURE — 97535 SELF CARE MNGMENT TRAINING: CPT

## 2023-09-19 PROCEDURE — 25010000002 PROCHLORPERAZINE 10 MG/2ML SOLUTION: Performed by: STUDENT IN AN ORGANIZED HEALTH CARE EDUCATION/TRAINING PROGRAM

## 2023-09-19 PROCEDURE — 25010000002 MAGNESIUM SULFATE IN D5W 1G/100ML (PREMIX) 1-5 GM/100ML-% SOLUTION: Performed by: STUDENT IN AN ORGANIZED HEALTH CARE EDUCATION/TRAINING PROGRAM

## 2023-09-19 PROCEDURE — 25010000002 DIPHENHYDRAMINE PER 50 MG: Performed by: STUDENT IN AN ORGANIZED HEALTH CARE EDUCATION/TRAINING PROGRAM

## 2023-09-19 PROCEDURE — 99222 1ST HOSP IP/OBS MODERATE 55: CPT | Performed by: PSYCHIATRY & NEUROLOGY

## 2023-09-19 RX ORDER — DIPHENHYDRAMINE HYDROCHLORIDE 50 MG/ML
25 INJECTION INTRAMUSCULAR; INTRAVENOUS ONCE
Status: COMPLETED | OUTPATIENT
Start: 2023-09-19 | End: 2023-09-19

## 2023-09-19 RX ORDER — TOPIRAMATE 25 MG/1
25 TABLET ORAL DAILY
Status: DISCONTINUED | OUTPATIENT
Start: 2023-09-19 | End: 2023-09-19 | Stop reason: HOSPADM

## 2023-09-19 RX ORDER — PROCHLORPERAZINE EDISYLATE 5 MG/ML
10 INJECTION INTRAMUSCULAR; INTRAVENOUS ONCE
Status: COMPLETED | OUTPATIENT
Start: 2023-09-19 | End: 2023-09-19

## 2023-09-19 RX ORDER — ASPIRIN 81 MG/1
81 TABLET, CHEWABLE ORAL DAILY
Status: DISCONTINUED | OUTPATIENT
Start: 2023-09-19 | End: 2023-09-19 | Stop reason: HOSPADM

## 2023-09-19 RX ORDER — TOPIRAMATE 25 MG/1
25 TABLET ORAL DAILY
Qty: 30 TABLET | Refills: 3 | Status: SHIPPED | OUTPATIENT
Start: 2023-09-19

## 2023-09-19 RX ORDER — MAGNESIUM SULFATE 1 G/100ML
1 INJECTION INTRAVENOUS ONCE
Status: COMPLETED | OUTPATIENT
Start: 2023-09-19 | End: 2023-09-19

## 2023-09-19 RX ADMIN — BUTALBITAL, ACETAMINOPHEN, AND CAFFEINE 1 TABLET: 50; 325; 40 TABLET ORAL at 10:41

## 2023-09-19 RX ADMIN — HYDROCHLOROTHIAZIDE 25 MG: 25 TABLET ORAL at 08:43

## 2023-09-19 RX ADMIN — DEXAMETHASONE SODIUM PHOSPHATE 10 MG: 10 INJECTION, SOLUTION INTRAMUSCULAR; INTRAVENOUS at 14:06

## 2023-09-19 RX ADMIN — PROCHLORPERAZINE EDISYLATE 10 MG: 5 INJECTION INTRAMUSCULAR; INTRAVENOUS at 12:59

## 2023-09-19 RX ADMIN — TOPIRAMATE 25 MG: 25 TABLET, FILM COATED ORAL at 12:58

## 2023-09-19 RX ADMIN — ASPIRIN 81 MG: 81 TABLET, CHEWABLE ORAL at 12:58

## 2023-09-19 RX ADMIN — MAGNESIUM SULFATE HEPTAHYDRATE 1 G: 1 INJECTION, SOLUTION INTRAVENOUS at 12:58

## 2023-09-19 RX ADMIN — DIPHENHYDRAMINE HYDROCHLORIDE 25 MG: 50 INJECTION INTRAMUSCULAR; INTRAVENOUS at 12:58

## 2023-09-19 RX ADMIN — SERTRALINE HYDROCHLORIDE 100 MG: 100 TABLET ORAL at 08:44

## 2023-09-19 RX ADMIN — CLOPIDOGREL BISULFATE 75 MG: 75 TABLET ORAL at 08:44

## 2023-09-19 NOTE — DISCHARGE SUMMARY
PATIENT:  MEDHAT ROMO  YOB: 1960  VISIT ID:  [id number]  PRIMARY CARE:  Melissa Lazo APRN  ADMITTING PHYSICIAN:  Enoch Savage MD    DATE OF ADMISSION:  9/15/2023  DATE OF DISCHARGE:  09/19/23      ADMITTING DIAGNOSIS:  Recurrent/residual giant basilar aneurysm, unruptured  Migraine headaches    DISCHARGE DIAGNOSIS:  Recurrent/residual giant basilar aneurysm, unruptured  Migraine headaches    PROCEDURES:  1.  Diagnostic carotid/cerebral angiography.  2.  Intracranial embolization, recurrent/residual giant basilar aneurysm, unruptured.    BRIEF HOSPITAL COURSE:  Ms. Romo is a 62 y.o. female who is well-known to the neuro interventional service, having undergone prior embolization (Pipeline Shield) for giant (partially thrombosed) proximal basilar aneurysm on 11/29/2022.  She had initially presented with symptoms of dizziness and ataxia, and was found to have a tiny right occipital infarct.       She continued to struggle with unsteady gait and dizziness symptoms, which significantly limits her mobility, as well as recurrent near syncopal episodes.  Follow-up CT angiogram demonstrated a small amount of residual opacification at the aneurysm neck, with the 90% thrombosis of the giant basilar aneurysm.  Given her worsening neurologic symptoms, along with persistent filling of the aneurysm, she presented for additional embolization on 9/15/2023.  The previously placed pipeline stent construct from the right vertebral artery into the distal basilar artery was retreated with placement of an additional 2.75 mm Pipeline Shield flow diverter device.  This resulted in occlusion of the aneurysm from the right vertebral system.  There is persistent filling of a small crescent of the central portions of the aneurysm via the left vertebral injection, with outflow supplying the right AICA.  The risk of rupture from the small crescent of of residual aneurysm is deemed quite low, and the risk of stroke is  "quite high, and thus this is going to be managed conservatively.    MRI of the head on 9/17/2023 demonstrates slight interval decrease in size of the aneurysm (compared to preoperative imaging), but with a noticeable decrease in brainstem compression.  There is no edema within the adjacent brainstem.  There are a few punctate areas of restricted diffusion within the left cerebellar hemisphere, but these are clinically silent.    Ms. Benz remained at her neurologic baseline, but still struggles with balance/gait issues, limited strength and functional endurance.  She was evaluated and deemed appropriate for inpatient physical therapy.    She will need to remain on a dual antiplatelet regimen.    In regards to her chronic headaches, neurology was able to see her during the hospital, and she was given a migraine \"cocktail\", and started on Topamax therapy. She will follow-up with neurology as an outpatient, as well as Dr. Savage in the neurointerventional clinic.        DISCHARGE MEDICATIONS:     Discharge Medications        New Medications        Instructions Start Date   topiramate 25 MG tablet  Commonly known as: TOPAMAX   25 mg, Oral, Daily             Continue These Medications        Instructions Start Date   aspirin 81 MG chewable tablet   1 tablet, Oral, Nightly      atorvastatin 80 MG tablet  Commonly known as: LIPITOR   80 mg, Oral, Nightly      butalbital-acetaminophen-caffeine -40 MG capsule capsule  Commonly known as: ORBIVAN   Take 1 capsule by mouth As Needed.      clopidogrel 75 MG tablet  Commonly known as: PLAVIX   75 mg, Oral, Daily      clopidogrel 75 MG tablet  Commonly known as: Plavix   75 mg, Oral, Daily      estradiol 0.1 MG/GM vaginal cream  Commonly known as: ESTRACE   Insert  into the vagina 2 (Two) Times a Week.      fluticasone 50 MCG/ACT nasal spray  Commonly known as: FLONASE   2 sprays, Nasal, Daily      hydroCHLOROthiazide 25 MG tablet  Commonly known as: HYDRODIURIL   25 mg, " Oral, Daily      LORazepam 0.5 MG tablet  Commonly known as: ATIVAN   0.5 mg, Oral, Every 8 Hours PRN      nortriptyline 10 MG capsule  Commonly known as: PAMELOR   10 mg, Oral, Nightly      oxybutynin XL 5 MG 24 hr tablet  Commonly known as: DITROPAN-XL   oxybutynin chloride ER 5 mg tablet,extended release 24 hr      sertraline 50 MG tablet  Commonly known as: ZOLOFT   100 mg, Oral, Daily                 ACTIVITY:  Ad emelia.    DIET:  Cardiac diet    FOLLOW UP:       Contact information for follow-up providers       Janette, Enoch CORBETT MD Follow up in 1 month(s).    Specialty: Neurosurgery  Contact information:  [address]  [facility] [address]  [place] [id number] [phone]               Melissa Lazo, APRN .    Specialty: Nurse Practitioner  Contact information:  [address]  [facility] [address] [phone]                       Contact information for after-discharge care       Destination       [facility] .    Service: Inpatient Rehabilitation  Contact information:  [address] [facility] [address] [phone]                                    Approximately 28 minutes was utilized in preparing this discharge summary, to include (but not limited to): Reviewing signs/symptoms of stroke, providing discharge instructions, reviewing discharge medications, and establishing follow-up care.

## 2023-09-19 NOTE — CASE MANAGEMENT/SOCIAL WORK
Case Management Discharge Note      Final Note: Spoke with April at [facility].  Insurance precert has been approved and a bed is available today on Brain Injury Unit.  RN to call report to [phone] and fax DC summary to [phone].  [facility] to transport.  Pt to be at the Maternity Entrance of the 1700 bldg today at 1415.         Selected Continued Care - Admitted Since 9/15/2023       Destination Coordination complete.      Service Provider Selected Services Address Phone Fax Patient Preferred    [facility] [address] [facility] [address] [phone] --       Internal Comment last updated by Jemma Machado RN 9/15/2023 1037    Referral called to April per Dr. Savage request.                         Durable Medical Equipment    No services have been selected for the patient.                Dialysis/Infusion    No services have been selected for the patient.                Home Medical Care    No services have been selected for the patient.                Therapy    No services have been selected for the patient.                Community Resources    No services have been selected for the patient.                Community & DME    No services have been selected for the patient.                         Final Discharge Disposition Code: 62 - inpatient rehab facility

## 2023-09-19 NOTE — CASE MANAGEMENT/SOCIAL WORK
Discharge Planning Assessment  [facility]     Patient Name: Lauryn Romo  MRN: [id number]  Today's Date: 9/19/2023    Admit Date: 9/15/2023    Plan: IRF   Discharge Needs Assessment    No documentation.                  Discharge Plan       Row Name 09/19/23 1048       Plan    Plan IRF    Plan Comments Spoke with April at [facility].  Insurance precert was started yesterday and is still pending at present.  CM will cont to follow for determination.    Final Discharge Disposition Code 30 - still a patient                  Continued Care and Services - Admitted Since 9/15/2023       Destination       Service Provider Request Status Selected Services Address Phone Fax Patient Preferred    [facility] Pending - No Request Sent N/A [address] [facility] [id number] [phone] --       Internal Comment last updated by Jemma Machado, RN 9/15/2023 1033    Referral called to April per  Given request.                             Expected Discharge Date and Time       Expected Discharge Date Expected Discharge Time    Sep 20, 2023            Demographic Summary    No documentation.                  Functional Status    No documentation.                  Psychosocial    No documentation.                  Abuse/Neglect    No documentation.                  Legal    No documentation.                  Substance Abuse    No documentation.                  Patient Forms    No documentation.                     Haley Contreras, RN

## 2023-09-19 NOTE — PROGRESS NOTES
[facility] Medicine Services  PROGRESS NOTE    Patient Name: Lauryn Romo  : 1960  MRN: [id number]    Date of Admission: 9/15/2023  Primary Care Physician: Melissa Lazo APRN    Subjective   Subjective     CC:  Headache    HPI:  Evaluated patient this morning. Continues to have right sided occipital headache, improved from yesterday. Pain is worse with turning head. Awaiting Neurology evaluation before discharge.       Objective   Objective     Vital Signs:   Temp:  [97.5 °F (36.4 °C)-98.2 °F (36.8 °C)] 97.7 °F (36.5 °C)  Heart Rate:  [52-66] 66  Resp:  [18] 18  BP: (122-140)/(73-83) 138/80     Physical Exam:  General appearance: alert, awake, oriented, no acute distress   Cardiovascular: RRR, no murmurs or rubs  Respiratory: CTAB, no crackles or wheezes   Abdomen: soft, non-tender, no organomegaly, bowel sounds normoactive    Neuro: alert and oriented x3, normal speech, moves all 4 extremities, sensation grossly intact      Results Reviewed:  LAB RESULTS:      Lab 23  0610 23  0438 09/15/23  1143 09/15/23  1126   WBC 7.89 8.51  --  8.63   HEMOGLOBIN 13.1 13.6  --  15.4   HEMOGLOBIN, POC  --   --  16.0  --    HEMATOCRIT 38.0 40.7  --  45.9   HEMATOCRIT POC  --   --  47  --    PLATELETS 260 257  --  320   NEUTROS ABS  --  6.06  --   --    IMMATURE GRANS (ABS)  --  0.02  --   --    LYMPHS ABS  --  1.66  --   --    MONOS ABS  --  0.72  --   --    EOS ABS  --  0.03  --   --    MCV 93.1 94.0  --  93.7           Lab 23  0548 23  0610 23  0438 09/15/23  1143 09/15/23  1126   SODIUM 139 139 138  --  141   POTASSIUM 3.9 3.9 4.0  --  4.3   CHLORIDE 105 105 104  --  100   CO2 23.0 27.0 24.0  --  31.0*   ANION GAP 11.0 7.0 10.0  --  10.0   BUN 10 7* 10  --  13   CREATININE 0.49* 0.46* 0.42* 0.60 0.66   EGFR 107.4 109.0 111.4 102.3 99.9   GLUCOSE 76 92 100*  --  102*   CALCIUM 8.4* 8.6 8.5*  --  9.8   MAGNESIUM  --   --  2.4  --   --    HEMOGLOBIN A1C  --   5.70*  --   --   --                    Lab 09/18/23  0610   CHOLESTEROL 111   LDL CHOL 54   HDL CHOL 40   TRIGLYCERIDES 87             Brief Urine Lab Results  (Last result in the past 365 days)        Color   Clarity   Blood   Leuk Est   Nitrite   Protein   CREAT   Urine HCG        12/15/22 0000 Yellow   Cloudy   Trace   Large (3+)   Positive   Negative                   Microbiology Results Abnormal       None            MRI Brain Without Contrast    Result Date: 9/18/2023  MRI BRAIN WO CONTRAST Date of Exam: 9/17/2023 11:20 PM EDT Indication: Giant basilar aneurysm.  Comparison: CT angiogram head 8/21/2023 and brain MRI 12/15/2022. Technique:  Routine multiplanar/multisequence sequence images of the brain were obtained without contrast administration. Findings: There is a new acute/subacute lacunar type infarct in the lateral inferior left cerebellar hemisphere. No additional diffusion restriction. This area appears T2 hyperintense. There is otherwise mild FLAIR hyperintense signal abnormality in the cerebral white matter in a nonspecific distribution. There is mild ventriculomegaly, likely result of leukomalacia. There is redemonstration of a large basilar artery aneurysm measuring 24 mm with associated excluding stent. Flow dynamics within the aneurysm would suggest continued partial flow. There is mass effect exerted upon the jong. There are additional new signal abnormalities in the brain. The other major arterial flow voids appear intact. Orbits are unremarkable. There is mild residual paranasal sinus mucosal thickening status post sinus surgery. There is a small left mastoid effusion. Calvarial and superficial soft tissue signal appears within normal limits. The midline structures are preserved.     Impression: Impression: 1.There is a new acute/subacute lacunar type infarct in the left cerebellar hemisphere. 2.Mild chronic small vessel ischemic change. 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  4.Mild residual paranasal sinus mucosal thickening status post sinus surgery. Small left mastoid effusion. Electronically Signed: Carlos Bowden MD  9/18/2023 12:57 AM EDT  Workstation ID: [id number]     Results for orders placed during the hospital encounter of 12/15/22    Adult Transthoracic Echo Complete W/ Cont if Necessary Per Protocol    Interpretation Summary    Left ventricular systolic function is normal. Left ventricular ejection fraction appears to be 56 - 60%.    Mild aortic valve regurgitation is present.      Current medications:  Scheduled Meds:    Continuous Infusions:No current facility-administered medications for this encounter.    PRN Meds:.    Assessment & Plan   Assessment & Plan     Active Hospital Problems    Diagnosis  POA    **Cerebral aneurysm, nonruptured [I67.1]  Yes    Moderate malnutrition [E44.0]  Yes    Paraesophageal hernia [K44.9]  Yes    Hyperlipidemia [E78.5]  Yes    HTN [I10]  Yes      Resolved Hospital Problems    Diagnosis Date Resolved POA    Syncope [R55] 09/16/2023 Yes        Brief Hospital Course to date:  Lauryn Romo is a 62 y.o. female with past medical history significant for cerebral aneurysm complicated by headache, gait disturbances, dizziness, who was admitted to undergo neurosurgical intervention for her aneurysm. She underwent embolization 9/15 without complication.    Vertebrobasilar junction aneurysm s/p pipeline embolization  Gait abnormality, Imbalance  - MRI brain 9/18 showed slight interval decrease in size of aneurysm status post embolization, decrease in mass effect. New acute/subacute lacunar type infarct in the left cerebellar hemisphere.   - Neurosurgery managing, continue dual antiplatelet therapy.   - PT/OT evaluated, patient discharging to [facility] today. Follow-up with Neurosurgery as outpatient.    Headache  - History of chronic headaches, on nortriptyline at home.   - Neurology evaluated today, recommended migraine cocktail before discharge and  addition of topiramate 25 mg daily.   - Continue home nortriptyline. Would benefit from outpatient neurology follow-up.    Hypertension  - Continue home hydrochlorothiazide.    Hyperlipidemia  - Continue home atorvastatin.     Expected Discharge Location and Transportation: [facility]  Expected Discharge   Expected Discharge Date: 9/19/2023; Expected Discharge Time:      DVT prophylaxis:  Mechanical DVT prophylaxis orders are present.     AM-PAC 6 Clicks Score (PT): 19 (09/18/23 0805)    CODE STATUS:   Code Status and Medical Interventions:   Ordered at: 09/16/23 1058     Code Status (Patient has no pulse and is not breathing):    CPR (Attempt to Resuscitate)     Medical Interventions (Patient has pulse or is breathing):    Full Support       Shirley Rasheed, DO  09/19/23

## 2023-09-19 NOTE — CONSULTS
Neurology Note    Patient:  Lauryn Romo    YOB: 1960    REFERRING PHYSICIAN:  Enoch Savage MD    CHIEF COMPLAINT:    headaches    HISTORY OF PRESENT ILLNESS:   The patient is a 62 y.o. female with h/o a giant basilar artery aneurism, prior small right PCA territory stroke on DAPT, admitted for elective repeat aneurism stent and embolization on 9/15. MRI brain on  with a small new left cerebellar stroke. She is c/o 7/10 throbbing headache today which is typical for her frequent nearly daily headaches for several years. She was started on nortriptyline 8-9 months ago ago which has helped with sleep, not so much headache frequency. She takes prn hydrocodone or Fioricet about 4 times a week for severe headaches only with some relief.    Past Medical History:  Past Medical History:   Diagnosis Date    Aneurysm 2022    Headache     Hyperlipidemia     Hypertension     Stroke 2022       Past Surgical History:  Past Surgical History:   Procedure Laterality Date    ARTERIAL ANEURYSM REPAIR      BREAST LUMPECTOMY  2012    CYST REMOVAL Left 2023    EMBOLIZATION CEREBRAL N/A 2022    Procedure: CV EMBOLIZATION CEREBRAL IR;  Surgeon: Enoch Savage MD;  Location: [facility] INVASIVE LOCATION;  Service: Interventional Radiology;  Laterality: N/A;    HIATAL HERNIA REPAIR  2015    SINUS SURGERY      x4       Social History:   Social History     Socioeconomic History    Marital status:    Tobacco Use    Smoking status: Former     Packs/day: 1.00     Years: 15.00     Pack years: 15.00     Types: Cigarettes     Quit date: 10/4/2004     Years since quittin.9    Smokeless tobacco: Never   Substance and Sexual Activity    Alcohol use: No    Drug use: Never    Sexual activity: Not Currently        Family History:   Family History   Problem Relation Age of Onset    No Known Problems Mother     Heart attack Father 42    Heart attack Paternal Aunt     Heart attack Paternal  Uncle     No Known Problems Sister     Cancer Maternal Grandmother     Heart failure Maternal Grandmother     No Known Problems Maternal Grandfather     No Known Problems Paternal Grandmother     No Known Problems Paternal Grandfather     Diabetes Half-Brother        Medications Prior to Admission:    Prior to Admission medications    Medication Sig Start Date End Date Taking? Authorizing Provider   aspirin 81 MG chewable tablet Chew 1 tablet Every Night.   Yes Ghulam Harvey MD   atorvastatin (LIPITOR) 80 MG tablet TAKE 1 TABLET BY MOUTH EVERY NIGHT 4/27/23  Yes Jan Matos PA-C   butalbital-acetaminophen-caffeine (ORBIVAN) -40 MG capsule capsule Take 1 capsule by mouth As Needed.   Yes Ghulam Harvey MD   clopidogrel (PLAVIX) 75 MG tablet Take 1 tablet by mouth Daily. 12/29/22  Yes Given, Enoch CORBETT MD   clopidogrel (Plavix) 75 MG tablet Take 1 tablet by mouth Daily. 8/21/23  Yes Given, Enoch CORBETT MD   estradiol (ESTRACE) 0.1 MG/GM vaginal cream Insert  into the vagina 2 (Two) Times a Week. 8/8/23  Yes Ghulam Harvey MD   fluticasone (FLONASE) 50 MCG/ACT nasal spray 2 sprays into the nostril(s) as directed by provider Daily. 9/29/19  Yes Kwesi Montanez MD   hydroCHLOROthiazide (HYDRODIURIL) 25 MG tablet Take 1 tablet by mouth Daily. 4/27/23  Yes Ghulam Harvey MD   LORazepam (ATIVAN) 0.5 MG tablet Take 1 tablet by mouth Every 8 (Eight) Hours As Needed for Anxiety.   Yes Ghulam Harvey MD   nortriptyline (PAMELOR) 10 MG capsule Take 1 capsule by mouth Every Night. 2/21/23  Yes Ghulam Harvey MD   oxybutynin XL (DITROPAN-XL) 5 MG 24 hr tablet oxybutynin chloride ER 5 mg tablet,extended release 24 hr   Yes Ghulam Harvey MD   sertraline (ZOLOFT) 50 MG tablet Take 2 tablets by mouth Daily. 4/27/23  Yes Ghulam Harvey MD       Allergies:  Tramadol      Review of system  Review of Systems   Neurological:  Positive for headaches.   All other  systems reviewed and are negative.    Vitals:    09/19/23 1016   BP: 138/80   Pulse: 66   Resp: 18   Temp: 97.7 °F (36.5 °C)   SpO2: 98%       Physical exam  Physical Exam  Constitutional:       Appearance: She is well-developed.   HENT:      Head: Normocephalic and atraumatic.   Eyes:      Extraocular Movements: Extraocular movements intact.      Pupils: Pupils are equal, round, and reactive to light.   Cardiovascular:      Rate and Rhythm: Normal rate and regular rhythm.   Pulmonary:      Effort: Pulmonary effort is normal.   Neurological:      Mental Status: She is alert and oriented to person, place, and time.      Sensory: Sensation is intact.      Deep Tendon Reflexes: Reflexes are normal and symmetric. Babinski sign absent on the right side. Babinski sign absent on the left side.      Comments: Speech clear, VFF, no facial droop or limb drift. Slight clumsiness on FTN, worse on the left.   Psychiatric:         Behavior: Behavior normal.         Thought Content: Thought content normal.         Lab Results   Component Value Date    WBC 7.89 09/18/2023    HGB 13.1 09/18/2023    HCT 38.0 09/18/2023    MCV 93.1 09/18/2023     09/18/2023     Lab Results   Component Value Date    GLUCOSE 76 09/19/2023    BUN 10 09/19/2023    CREATININE 0.49 (L) 09/19/2023    EGFRIFNONA 103 09/29/2019    BCR 20.4 09/19/2023    CO2 23.0 09/19/2023    CALCIUM 8.4 (L) 09/19/2023    ALBUMIN 3.80 12/16/2022    AST 11 12/16/2022    ALT 19 12/16/2022     P2Y12 Platelet Inhibition  Order: [id number]  Status: Final result       Visible to patient: Yes (seen)       Next appt: None       Dx: Cerebral aneurysm, nonruptured    Specimen Information: Blood   0 Result Notes        Component  Ref Range & Units 4 d ago  (9/15/23) 9 mo ago  (12/16/22) 9 mo ago  (11/28/22)   P2Y12 Reactivity Unit  PRU 55 16 73          Radiological Studies:   MRI Brain Without Contrast    Result Date: 9/18/2023  MRI BRAIN WO CONTRAST Date of Exam: 9/17/2023 11:20  PM EDT Indication: Giant basilar aneurysm.  Comparison: CT angiogram head 8/21/2023 and brain MRI 12/15/2022. Technique:  Routine multiplanar/multisequence sequence images of the brain were obtained without contrast administration. Findings: There is a new acute/subacute lacunar type infarct in the lateral inferior left cerebellar hemisphere. No additional diffusion restriction. This area appears T2 hyperintense. There is otherwise mild FLAIR hyperintense signal abnormality in the cerebral white matter in a nonspecific distribution. There is mild ventriculomegaly, likely result of leukomalacia. There is redemonstration of a large basilar artery aneurysm measuring 24 mm with associated excluding stent. Flow dynamics within the aneurysm would suggest continued partial flow. There is mass effect exerted upon the jong. There are additional new signal abnormalities in the brain. The other major arterial flow voids appear intact. Orbits are unremarkable. There is mild residual paranasal sinus mucosal thickening status post sinus surgery. There is a small left mastoid effusion. Calvarial and superficial soft tissue signal appears within normal limits. The midline structures are preserved.     Impression: 1.There is a new acute/subacute lacunar type infarct in the left cerebellar hemisphere. 2.Mild chronic small vessel ischemic change. 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 4.Mild residual paranasal sinus mucosal thickening status post sinus surgery. Small left mastoid effusion. Electronically Signed: Carlos Bowden MD  9/18/2023 12:57 AM EDT  Workstation ID: [id number]    Cardiac Catheterization/Vascular Study    Result Date: 9/18/2023  Clinical Indication: 62-year-old female with giant, partially thrombosed basilar aneurysm with brainstem compression.  She has undergone prior embolization, but subsequent imaging studies have demonstrated persistent/residual aneurysm. : Dr. Enoch Savage. Assistant:  "Dr. Kraig Adams. Access: Right radial artery.  Ultrasound was used to identify the right radial artery for access. It was patent, appropriate for catheterization, and used for guidance of the micropuncture. Successful cannulation was achieved and images were saved to PACS for review. Estimated blood loss: Negligible Complication: None apparent. Procedures: 1.  Selective right vertebral artery catheterization with subsequent diagnostic right vertebral angiography. 2.  Selective left subclavian artery catheterization with subsequent diagnostic left vertebral angiography. 3.  Selective microcatheterization of the basilar artery via right vertebral access (third order brachiocephalic), for intervention. 4.  Intracranial embolization, giant basilar aneurysm, unruptured 5.  Follow-up angiography after embolization x2 6.  Ultrasound-guided arterial access, right radial artery. Technique: Formal written consent for the procedure was obtained from the patient/patient's family after explained risks to include bleeding, infection, contrast reaction, vascular injury, and stroke.  The right wrist region was prepped and draped in the usual, sterile fashion.  Local anesthesia with 1% lidocaine infiltration was achieved.  The patient was placed under general endotracheal anesthesia by the anesthesia service.  Utilizing Ultrasound guidance and Seldinger technique, a 6 Portuguese vascular sheath was placed into the right radial artery without difficulty.  A radial \"cocktail\" was then administered, per protocol, the details of which are documented in the nursing record.  The patient was also maintained on their Plavix/aspirin dual antiplatelet regimen leading up to the procedure.  Subsequently, a 6 Portuguese RIST guide sheath was advanced into the distal cervical right vertebral artery over a Berenstein catheter/Glidewire without difficulty.   Appropriate angiographic sequences were filmed following contrast injection.  Findings: Selective " right vertebral artery catheterization and vertebral angiography: Injection of the proximal right subclavian artery demonstrates an unremarkable appearance of the right vertebral artery origin.  The intracranial circulation was opacified via a right vertebral artery injection, demonstrating changes related to prior embolization (flow diverter therapy) for a giant proximal basilar aneurysm.  The aneurysm has progressed to a near occlusion, but there is faint residual opacification through the stent construct into the left vertebral artery.  Mild stenosis at the VB junction appears stable to slightly improved compared to the prior studies.  No new or additional vascular abnormalities are identified. With the 6 [place] Islander RIST catheter purchased in the distal cervical right vertebral artery, a Green Is Good 27 microcatheter was navigated over Transcend EX soft tip microwire through the aforementioned vertebrobasilar flow diverter stent construct and into the right PCA without difficulty.  Subsequently, a 2.75 x 12 mm Pipeline Shield flow diverter was advanced through the microcatheter and deployed through the previous stent construct without difficulty.  The stent construct was then post-- dilated with a 2.5 mm coronary balloon, with follow-up angiography demonstrating excellent expansion of the flow diverter stent construct, with only a mild (less than 50%) residual stenosis at the VB junction, and decrease in aforementioned visualization of a giant aneurysm.  All microcatheters were removed, and control right vertebral intracranial angiography again demonstrates a widely patent stent construct with no residual visualization of giant aneurysm and no evidence of distal emboli (TICI 3 flow). The RIST catheter was removed, and a 5 Cook Islander Deluca 2 catheter was advanced into the aortic arch and used to select the left subclavian artery without difficulty.  Injection of the proximal left subclavian artery demonstrates an unremarkable  "appearance of the left vertebral artery origin.  The intracranial circulation was opacified via a left subclavian injection, again demonstrating changes related to prior flow diverter therapy from the right vertebral artery into the basilar artery for a giant aneurysm.  The left vertebral artery does supply a small residual \"crescent\" of aneurysm (along the flow diverter construct) which in turn washes out into (supplies) the right AICA (from the apex of the residual aneurysm).  At this point, no further embolization was deemed indicated, as sacrificing the left vertebral artery/coiling the residual aneurysm, would resulting in occlusion of the sizable right AICA branch.  Subsequently, all catheters and sheaths were removed from the wrist and hemostasis was achieved at the puncture site utilizing manual compression and placement of a radial \"TR band\".  The patient was extubated in the biplanar angiography suite, and transferred to the PACU for postprocedural care/recovery.  There were no immediate complications.       Status post prior flow diverter embolization for a giant proximal basilar aneurysm.  There is residual opacification of the aneurysm through the stent construct, which was treated with additional 2.75 x 12 mm pipeline Shield flow diverter from the right vertebral artery into the basilar artery.  This resulted in isolation of the aneurysm from the right vertebral circulation, with preservation of flow in all parent vasculature (TICI 3 flow).  However, there is a small residual \"crescent\" of aneurysm filling from the left vertebral artery which washes out into/supplies the right AICA, and thus was not treated during this setting.  There were no immediate complications.     CT Angiogram Head    Result Date: 8/21/2023  CT ANGIOGRAM HEAD Date of Exam: 8/21/2023 11:22 AM EDT Indication: basilar aneurysm s/p embolization. Comparison: 3/27/2023. Technique: CTA of the head was performed after the uneventful " intravenous administration of 100 mL Isovue-370. Reconstructed coronal and sagittal images were also obtained. In addition, a 3-D volume rendered image was created for interpretation. Automated exposure control and iterative reconstruction methods were used. Findings: Redemonstrated prior pipeline stenting of a giant basilar aneurysm, with stable persisting enhancement outside of the stent lumen, around 2 mm in maximal thickness without evidence of interval enlargement. The basilar artery remains patent distally, and the right vertebral artery similarly appears patent. The carotid siphons demonstrate unchanged mild calcific atherosclerosis, with no significant new atherosclerotic narrowing. The anterior cerebral arteries are normal in course and caliber. The right and left middle cerebral arteries demonstrate no evidence of new flow-limiting stenosis, occlusion or aneurysm.     Impression: Redemonstrated stenting of a previously noted large basilar aneurysm, stable in appearance with minimal persistent opacification external to the stent lumen. Parent vessels remain patent. Electronically Signed: Marcelino Francis MD  8/21/2023 12:01 PM EDT  Workstation ID: [id number]       During this visit the following were done:  Labs Reviewed [x]    Labs Ordered []    Radiology Reports Reviewed [x]    Radiology Ordered []    EKG, echo, and/or stress test reviewed []    EEG results reviewed  []    EEG reviewed and interpreted per myself   []    Discussed case with neurointerventionalist or neuroradiologist []    Referring Provider Records Reviewed []    ER Records Reviewed []    Hospital Records Reviewed []    History Obtained From Family []    Radiological images view and Interpreted per myself [x]    Case Discussed with referring provider [x]     Decision to obtain and request outside records  []        Assessment and Plan     Chronic migraine, perhaps contributed to by giant basilar artery aneurism.  - Migraine cocktail. Decadron,  Compazine, Benadryl, mag ordered.  - Start Topamax 25 mg qd.  - Continue prn hydrocodone sparingly to avoid medication rebound, would not exceed 2-3 doses a week.  - Consider a trial of Nurtec as outpatient.  - F/U general neurology clinic first available.    2. Small new asymptomatic left cerebellar stroke likely related to giant partially thrombosed basilar artery aneurism.   - -140, DBP<80, avoid hypotension.   - Continue DAPT long term.   - Statin.    Call for questions, will see prn. Thanks.                Electronically signed by Kale Soto MD on 9/19/2023 at 11:37 EDT

## 2023-09-19 NOTE — PROGRESS NOTES
NAME: MEDHAT ROMO  : 1960  PCP: Melissa Lazo APRN  ADMITTING PHYSICIAN: Enoch Savage MD    DATE OF ADMISSION:  9/15/2023  DATE OF SERVICE: 2023    HOSPITAL DAY:  4 days    HISTORY OF PRESENT ILLNESS:  62 y.o. female who is well-known to the neuro interventional service, having undergone prior embolization (Pipeline Shield) for giant (partially thrombosed) proximal basilar aneurysm on 2022.  She had initially presented with symptoms of dizziness and ataxia, and was found to have a tiny right occipital infarct.         She continued to struggle with unsteady gait and dizziness symptoms, which significantly limits her mobility, as well as recurrent near syncopal episodes.  Subsequent CT angiogram demonstrated persistent filling of the aneurysm, and after a lengthy deliberation, Ms. Romo elected to pursue further embolization.     She was admitted on 9/15/2023, and catheter angiography demonstrated a persistent filling of the aneurysm.  The right vertebral/basilar Pipeline stent construct treated with additional placement of a 2.75 mm Pipeline stent, which eliminated all filling of the aneurysm via the right vertebral artery.  Injection of the left vertebral artery does demonstrate persistent filling of a small crescent of the aneurysm which then supplies the right AICA, which was not treated.    REVIEW OF IMAGING:  MRI of the brain dated 2023 from [facility] was reviewed along with its corresponding radiologic report. Comparison is made to pre-- intervention MRI dated 2022. There has been slight interval decrease in size of the giant mid basilar aneurysm. However, there is a noticeable decrease in mass effect on the adjacent brainstem.  There are a few areas of punctate restricted diffusion within the left cerebellar hemisphere, but these are clinically silent. There is no appreciable edema within the brainstem on FLAIR/T2 imaging.     LABS:  Lab Results    Component Value Date    WBC 7.89 09/18/2023    HGB 13.1 09/18/2023    HCT 38.0 09/18/2023    MCV 93.1 09/18/2023     09/18/2023     Lab Results   Component Value Date    GLUCOSE 76 09/19/2023    CALCIUM 8.4 (L) 09/19/2023     09/19/2023    K 3.9 09/19/2023    CO2 23.0 09/19/2023     09/19/2023    BUN 10 09/19/2023    CREATININE 0.49 (L) 09/19/2023    EGFRIFNONA 103 09/29/2019    BCR 20.4 09/19/2023    ANIONGAP 11.0 09/19/2023       CURRENT MEDS:  Current Facility-Administered Medications   Medication Dose Route Frequency Provider Last Rate Last Admin    acetaminophen (TYLENOL) tablet 650 mg  650 mg Oral Q4H PRN Given, Enoch CORBETT MD   650 mg at 09/16/23 1810    atorvastatin (LIPITOR) tablet 80 mg  80 mg Oral Nightly Yasemin Srivastava APRN   80 mg at 09/18/23 2045    butalbital-acetaminophen-caffeine (FIORICET, ESGIC) -40 MG per tablet 1 tablet  1 tablet Oral Q4H PRN Lulu Sims PA-C   1 tablet at 09/19/23 1041    clopidogrel (PLAVIX) tablet 75 mg  75 mg Oral Daily Yasemin Srivastava, APRN   75 mg at 09/19/23 0844    hydroCHLOROthiazide (HYDRODIURIL) tablet 25 mg  25 mg Oral Daily Oseas Machado DO   25 mg at 09/19/23 0843    HYDROcodone-acetaminophen (NORCO) 5-325 MG per tablet 1 tablet  1 tablet Oral Q4H PRN Lulu Sims PA-C   1 tablet at 09/18/23 1437    niCARdipine (CARDENE) 25mg in 250mL NS infusion  5-15 mg/hr Intravenous Titrated Given, Enoch CORBETT MD        nitroglycerin (NITROSTAT) SL tablet 0.4 mg  0.4 mg Sublingual Q5 Min PRN Given, Enoch CORBETT MD        nortriptyline (PAMELOR) capsule 10 mg  10 mg Oral Nightly Shirley Rasheed DO   10 mg at 09/18/23 2045    ondansetron (ZOFRAN) injection 4 mg  4 mg Intravenous Q6H PRN Given, Enoch CORBETT MD   4 mg at 09/16/23 1810    sertraline (ZOLOFT) tablet 100 mg  100 mg Oral Daily Yasemin Srivastava APRN   100 mg at 09/19/23 0844       PHYSICAL EXAM:  Vitals:    09/19/23 0700   BP: 131/76   Pulse: 56   Resp: 18    Temp: 97.5 °F (36.4 °C)   SpO2: 95%      Alert and oriented x3.  Nonfocal neurologic exam.  She has no new deficits.  Her headaches are slightly worse, but consistent with her chronic headaches.    ASSESSMENT/PLAN:  Ms. Romo is a 62 y.o. female status postembolization of recurrent/residual giant basilar aneurysm with placement of an additional Pipeline flow diverter.  There is no residual filling of the aneurysm from the right vertebral system; however, there is a tiny crescent of residual aneurysm that fills from the left vertebral injection, without flow of the aneurysm supplying the right AICA. This was not treated during this setting.     MRI of the head on 9/18/2023 demonstrates slight interval decrease in size of aneurysm status post embolization.  There is a noticeable decrease in mass effect on the adjacent brainstem, with no appreciable edema in the brainstem.  There are a few punctate areas of restricted diffusion within the left cerebellar hemisphere, but these are clinically silent.     Ms. Romo will need to remain on a dual antiplatelet regimen.     Her biggest issues right now are her generalized weakness, unsteady gait, and a chronic headaches.  Again, I think she will benefit from [facility] inpatient rehabilitation, and she is ready to transfer when a bed becomes available.  She is going to be scheduled to see neurology as an outpatient for management of her headaches, but I am going to consult inpatient neurology to see if they can do anything to help with her headaches in the short-term.    Copied text and portions of the note have been reviewed and are accurate as of 9/19/23.

## 2023-09-19 NOTE — PLAN OF CARE
Problem: Adult Inpatient Plan of Care  Goal: Plan of Care Review  Recent Flowsheet Documentation  Taken 9/19/2023 0837 by Tammy Oleary OT  Progress: improving  Plan of Care Reviewed With: patient  Outcome Evaluation: Pt is A/Ox4 and motivated to work with therapy. Pt up in room/bathroom with RW support and CGAx1. Education and cues for RW positioning. Good effort. Slow pace. Pt is improving, but remains below her baseline limited by strength, balance, and functional endurance deficits. Able to stand sink side for morning grooming this session with Set-up and CGA. OT will continue to follow IP. Recommend IRF at d/c for best outcome.

## 2023-09-19 NOTE — THERAPY TREATMENT NOTE
Patient Name: Lauryn Romo  : 1960    MRN: [id number]                              Today's Date: 2023       Admit Date: 9/15/2023    Visit Dx:     ICD-10-CM ICD-9-CM   1. Cerebral aneurysm, nonruptured  I67.1 437.3     Patient Active Problem List   Diagnosis    Acute CVA    Cerebral aneurysm, nonruptured    Paraesophageal hernia    Hyperlipidemia    HTN    Moderate malnutrition     Past Medical History:   Diagnosis Date    Aneurysm 2022    Headache     Hyperlipidemia     Hypertension     Stroke 2022     Past Surgical History:   Procedure Laterality Date    ARTERIAL ANEURYSM REPAIR      BREAST LUMPECTOMY      CYST REMOVAL Left 2023    EMBOLIZATION CEREBRAL N/A 2022    Procedure: CV EMBOLIZATION CEREBRAL IR;  Surgeon: Enoch Savage MD;  Location: [facility] INVASIVE LOCATION;  Service: Interventional Radiology;  Laterality: N/A;    HIATAL HERNIA REPAIR      SINUS SURGERY      x4      General Information       Row Name 23 0830          OT Time and Intention    Document Type therapy note (daily note)  -TB     Mode of Treatment occupational therapy;individual therapy  -TB       Row Name 2330          General Information    Patient Profile Reviewed yes  -TB     Existing Precautions/Restrictions fall;other (see comments)  Persistent occipital headache, dizziness  -TB     Barriers to Rehab medically complex;previous functional deficit;visual deficit  -TB       Row Name 23          Cognition    Orientation Status (Cognition) oriented x 4  -TB       Row Name 2330          Safety Issues, Functional Mobility    Safety Issues Affecting Function (Mobility) insight into deficits/self-awareness  -TB     Impairments Affecting Function (Mobility) balance;coordination;endurance/activity tolerance;pain;strength;visual/perceptual  -TB     Comment, Safety Issues/Impairments (Mobility) Pt up in room/bathroom with RW support and CGAx1. Education and  cues for RW positioning. Good effort. Slow pace.  -TB               User Key  (r) = Recorded By, (t) = Taken By, (c) = Cosigned By      Initials Name Provider Type    TB Tammy Oleary, OT Occupational Therapist                     Mobility/ADL's       Row Name 09/19/23 0832          Bed Mobility    Bed Mobility supine-sit;scooting/bridging  -TB     Scooting/Bridging [place] (Bed Mobility) verbal cues;standby assist  -TB     Supine-Sit [place] (Bed Mobility) verbal cues;standby assist  -TB     Assistive Device (Bed Mobility) head of bed elevated;bed rails  -TB     Comment, (Bed Mobility) Slow transition to EOB to limit dizziness. Good sequencing and effort.  -TB       Row Name 09/19/23 0832          Transfers    Transfers sit-stand transfer;stand-sit transfer;toilet transfer;bed-chair transfer  -TB     Comment, (Transfers) Education and cues for sequencing  -TB       Row Name 09/19/23 0832          Bed-Chair Transfer    Bed-Chair [place] (Transfers) contact guard;1 person assist;verbal cues  -TB     Assistive Device (Bed-Chair Transfers) walker, front-wheeled  -TB       Row Name 09/19/23 0832          Sit-Stand Transfer    Sit-Stand [place] (Transfers) contact guard;1 person assist;verbal cues  -TB     Assistive Device (Sit-Stand Transfers) walker, front-wheeled  -TB       Row Name 09/19/23 0832          Stand-Sit Transfer    Stand-Sit [place] (Transfers) contact guard;1 person assist;verbal cues  -TB     Assistive Device (Stand-Sit Transfers) walker, front-wheeled  -TB       Row Name 09/19/23 0832          Toilet Transfer    Type (Toilet Transfer) sit-stand;stand-sit  -TB     [place] Level (Toilet Transfer) contact guard;1 person assist;verbal cues  -TB     Assistive Device (Toilet Transfer) walker, front-wheeled;raised toilet seat;grab bars/safety frame  -TB       Row Name 09/19/23 0832          Functional Mobility    Functional Mobility- Ind. Level contact guard assist;1  person;verbal cues required  -TB     Functional Mobility- Device walker, front-wheeled  -TB     Functional Mobility- Safety Issues balance decreased during turns;sequencing ability decreased  -TB     Functional Mobility- Comment Pt up in room/bathroom with RW support and CGAx1. Education and cues for RW positioning. Good effort. Slow pace.  -TB       Row Name 09/19/23 0832          Activities of Daily Living    BADL Assessment/Intervention grooming;toileting;feeding;upper body dressing;lower body dressing  -TB       Row Name 09/19/23 08          Upper Body Dressing Assessment/Training    [place] Level (Upper Body Dressing) don;pajama/robe;minimum assist (75% patient effort)  -TB     Position (Upper Body Dressing) edge of bed sitting  -TB       Row Name 09/19/23 08          Grooming Assessment/Training    [place] Level (Grooming) set up;contact guard assist;wash face, hands;oral care regimen;hair care, combing/brushing  -TB     Position (Grooming) sink side  -TB     Comment, (Grooming) Education and cues for RW positioning at sink side  -TB       Row Name 09/19/23 08          Toileting Assessment/Training    [place] Level (Toileting) minimum assist (75% patient effort);adjust/manage clothing;standby assist;perform perineal hygiene  -TB     Position (Toileting) unsupported sitting;supported standing  -TB       Row Name 09/19/23 08          Self-Feeding Assessment/Training    [place] Level (Feeding) independent;liquids to mouth  -TB     Position (Self-Feeding) supported sitting  -TB       Row Name 09/19/23 08          Lower Body Dressing Assessment/Training    [place] Level (Lower Body Dressing) don;socks;contact guard assist  -TB     Position (Lower Body Dressing) edge of bed sitting  -TB               User Key  (r) = Recorded By, (t) = Taken By, (c) = Cosigned By      Initials Name Provider Type    TB Tammy Oleary OT Occupational Therapist                    Obj/Interventions       [facility] Name 09/19/23 0835          Motor Skills    Therapeutic Exercise --  Education reviewed for benefits of OOB activity/therapy and role of OT  -TB       [facility] Name 09/19/23 0835          Balance    Balance Assessment sitting dynamic balance;sit to stand dynamic balance;standing dynamic balance  -TB     Dynamic Sitting Balance supervision  -TB     Position, Sitting Balance unsupported;sitting in chair;sitting edge of bed  Commode  -TB     Sit to Stand Dynamic Balance contact guard;1-person assist;verbal cues  -TB     Dynamic Standing Balance contact guard;1-person assist;verbal cues  -TB     Position/Device Used, Standing Balance supported;walker, front-wheeled  -TB     Balance Interventions sitting;standing;sit to stand;supported;dynamic;dynamic reaching;occupation based/functional task;UE activity with balance activity  -TB     Comment, Balance c/o mild dizziness with change of direction, no LOB  -TB               User Key  (r) = Recorded By, (t) = Taken By, (c) = Cosigned By      Initials Name Provider Type    TB Tammy Oleary, OT Occupational Therapist                   Goals/Plan    No documentation.                  Clinical Impression       [facility] Name 09/19/23 0837          Pain Assessment    Pain Intervention(s) Ambulation/increased activity;Repositioned;Cold applied  -TB     Additional Documentation Pain Scale: FACES Pre/Post-Treatment (Group)  -TB       [facility] Name 09/19/23 0829          Pain Scale: FACES Pre/Post-Treatment    Pain: FACES Scale, Pretreatment 6-->hurts even more  -TB     Posttreatment Pain Rating 6-->hurts even more  -TB     Pain Location - Side/Orientation Right  -TB     Pain Location posterior  -TB     Pain Location - head  -TB     Pre/Posttreatment Pain Comment Pt reports persistent occipital headache  -TB       Row Name 09/19/23 0837          Plan of Care Review    Plan of Care Reviewed With patient  -TB     Progress improving  -TB     Outcome Evaluation Pt is  A/Ox4 and motivated to work with therapy. Pt up in room/bathroom with RW support and CGAx1. Education and cues for RW positioning. Good effort. Slow pace. Pt is improving, but remains below her baseline limited by strength, balance, and functional endurance deficits. Able to stand sink side for morning grooming this session with Set-up and CGA. OT will continue to follow IP. Recommend IRF at d/c for best outcome.  -TB       Row Name 09/19/23 0837          Therapy Plan Review/Discharge Plan (OT)    Anticipated Discharge Disposition (OT) inpatient rehabilitation facility  -TB       Row Name 09/19/23 0837          Vital Signs    Pre Systolic BP Rehab 131  RN cleared OT  -TB     Pre Treatment Diastolic BP 76  -TB     O2 Delivery Pre Treatment room air  -TB     Pre Patient Position Supine  -TB     Intra Patient Position Standing  -TB     Post Patient Position Sitting  -TB       Row Name 09/19/23 0837          Positioning and Restraints    Pre-Treatment Position in bed  -TB     Post Treatment Position chair  -TB     In Chair notified nsg;reclined;call light within reach;encouraged to call for assist;legs elevated  -TB               User Key  (r) = Recorded By, (t) = Taken By, (c) = Cosigned By      Initials Name Provider Type    TB Tammy Oleary, OT Occupational Therapist                   Outcome Measures       Row Name 09/19/23 0840          How much help from another is currently needed...    Putting on and taking off regular lower body clothing? 3  -TB     Bathing (including washing, rinsing, and drying) 3  -TB     Toileting (which includes using toilet bed pan or urinal) 3  -TB     Putting on and taking off regular upper body clothing 3  -TB     Taking care of personal grooming (such as brushing teeth) 3  -TB     Eating meals 4  -TB     AM-PAC 6 Clicks Score (OT) 19  -TB       Row Name 09/19/23 0840          Functional Assessment    Outcome Measure Options AM-PAC 6 Clicks Daily Activity (OT)  -TB                User Key  (r) = Recorded By, (t) = Taken By, (c) = Cosigned By      Initials Name Provider Type     Tammy Oleary, OT Occupational Therapist                    Occupational Therapy Education       Title: PT OT SLP Therapies (Done)       Topic: Occupational Therapy (Done)       Point: ADL training (Done)       Description:   Instruct learner(s) on proper safety adaptation and remediation techniques during self care or transfers.   Instruct in proper use of assistive devices.                  Learning Progress Summary             Patient Acceptance, E,D, VU,DU,NR by VIANCA at 9/19/2023 0841    Acceptance, E,TB, VU by DIEGO at 9/16/2023 1705    Acceptance, E, NR by  at 9/16/2023 0820    Comment: Educated pt regarding role of therapy and ongoing treatment plan                         Point: Home exercise program (Done)       Description:   Instruct learner(s) on appropriate technique for monitoring, assisting and/or progressing therapeutic exercises/activities.                  Learning Progress Summary             Patient Acceptance, E,TB, VU by DIEGO at 9/16/2023 1705                         Point: Precautions (Done)       Description:   Instruct learner(s) on prescribed precautions during self-care and functional transfers.                  Learning Progress Summary             Patient Acceptance, E,TB, VU by DIEGO at 9/16/2023 1705                         Point: Body mechanics (Done)       Description:   Instruct learner(s) on proper positioning and spine alignment during self-care, functional mobility activities and/or exercises.                  Learning Progress Summary             Patient Acceptance, E,TB, VU by DIEGO at 9/16/2023 1705                                         User Key       Initials Effective Dates Name Provider Type Discipline     07/11/23 -  Tammy Oleary, OT Occupational Therapist OT     02/03/23 -  Philomena Slater, OT Occupational Therapist OT    DIEGO 07/01/20 -  Richa Ruiz  ARIANA, RN Registered Nurse Nurse                  OT Recommendation and Plan     Plan of Care Review  Plan of Care Reviewed With: patient  Progress: improving  Outcome Evaluation: Pt is A/Ox4 and motivated to work with therapy. Pt up in room/bathroom with RW support and CGAx1. Education and cues for RW positioning. Good effort. Slow pace. Pt is improving, but remains below her baseline limited by strength, balance, and functional endurance deficits. Able to stand sink side for morning grooming this session with Set-up and CGA. OT will continue to follow IP. Recommend IRF at d/c for best outcome.     Time Calculation:         Time Calculation- OT       Row Name 09/19/23 0748             Time Calculation- OT    OT Start Time 0748  -TB      OT Received On 09/19/23  -TB      OT Goal Re-Cert Due Date 09/26/23  -TB         Timed Charges    35022 - OT Self Care/Mgmt Minutes 38  -TB         Total Minutes    Timed Charges Total Minutes 38  -TB       Total Minutes 38  -TB                User Key  (r) = Recorded By, (t) = Taken By, (c) = Cosigned By      Initials Name Provider Type    TB Tammy Oleary OT Occupational Therapist                  Therapy Charges for Today       Code Description Service Date Service Provider Modifiers Qty    [id number] HC OT SELF CARE/MGMT/TRAIN EA 15 MIN 9/19/2023 Tmamy Oleary OT GO 3                 Tammy Oleary OT  9/19/2023

## 2023-09-29 ENCOUNTER — TELEPHONE (OUTPATIENT)
Dept: NEUROSURGERY | Facility: CLINIC | Age: 63
End: 2023-09-29
Payer: COMMERCIAL

## 2023-09-29 ENCOUNTER — HOSPITAL ENCOUNTER (EMERGENCY)
Facility: HOSPITAL | Age: 63
Discharge: HOME | End: 2023-09-30
Attending: EMERGENCY MEDICINE
Payer: COMMERCIAL

## 2023-09-29 ENCOUNTER — APPOINTMENT (OUTPATIENT)
Dept: GENERAL RADIOLOGY | Facility: HOSPITAL | Age: 63
End: 2023-09-29
Payer: COMMERCIAL

## 2023-09-29 VITALS
SYSTOLIC BLOOD PRESSURE: 148 MMHG | TEMPERATURE: 98.2 F | WEIGHT: 130 LBS | OXYGEN SATURATION: 100 % | HEIGHT: 61 IN | DIASTOLIC BLOOD PRESSURE: 100 MMHG | HEART RATE: 82 BPM | BODY MASS INDEX: 24.55 KG/M2 | RESPIRATION RATE: 16 BRPM

## 2023-09-29 DIAGNOSIS — K59.00 CONSTIPATION, UNSPECIFIED CONSTIPATION TYPE: Primary | ICD-10-CM

## 2023-09-29 LAB
ALBUMIN SERPL-MCNC: 4.5 G/DL (ref 3.5–5.2)
ALBUMIN/GLOB SERPL: 1.5 G/DL
ALP SERPL-CCNC: 143 U/L (ref 39–117)
ALT SERPL W P-5'-P-CCNC: 41 U/L (ref 1–33)
ANION GAP SERPL CALCULATED.3IONS-SCNC: 12 MMOL/L (ref 5–15)
AST SERPL-CCNC: 26 U/L (ref 1–32)
BACTERIA UR QL AUTO: ABNORMAL /HPF
BASOPHILS # BLD AUTO: 0.09 10*3/MM3 (ref 0–0.2)
BASOPHILS NFR BLD AUTO: 0.9 % (ref 0–1.5)
BILIRUB SERPL-MCNC: 0.2 MG/DL (ref 0–1.2)
BILIRUB UR QL STRIP: NEGATIVE
BUN SERPL-MCNC: 16 MG/DL (ref 8–23)
BUN/CREAT SERPL: 18 (ref 7–25)
CALCIUM SPEC-SCNC: 9.1 MG/DL (ref 8.6–10.5)
CHLORIDE SERPL-SCNC: 100 MMOL/L (ref 98–107)
CLARITY UR: CLEAR
CO2 SERPL-SCNC: 28 MMOL/L (ref 22–29)
COLOR UR: YELLOW
CREAT SERPL-MCNC: 0.89 MG/DL (ref 0.57–1)
DEPRECATED RDW RBC AUTO: 47.9 FL (ref 37–54)
EGFRCR SERPLBLD CKD-EPI 2021: 73.9 ML/MIN/1.73
EOSINOPHIL # BLD AUTO: 0.19 10*3/MM3 (ref 0–0.4)
EOSINOPHIL NFR BLD AUTO: 1.9 % (ref 0.3–6.2)
ERYTHROCYTE [DISTWIDTH] IN BLOOD BY AUTOMATED COUNT: 13.5 % (ref 12.3–15.4)
GLOBULIN UR ELPH-MCNC: 3 GM/DL
GLUCOSE SERPL-MCNC: 99 MG/DL (ref 65–99)
GLUCOSE UR STRIP-MCNC: NEGATIVE MG/DL
HCT VFR BLD AUTO: 44 % (ref 34–46.6)
HGB BLD-MCNC: 14.8 G/DL (ref 12–15.9)
HGB UR QL STRIP.AUTO: NEGATIVE
HYALINE CASTS UR QL AUTO: ABNORMAL /LPF
IMM GRANULOCYTES # BLD AUTO: 0.03 10*3/MM3 (ref 0–0.05)
IMM GRANULOCYTES NFR BLD AUTO: 0.3 % (ref 0–0.5)
KETONES UR QL STRIP: NEGATIVE
LEUKOCYTE ESTERASE UR QL STRIP.AUTO: ABNORMAL
LIPASE SERPL-CCNC: 17 U/L (ref 13–60)
LYMPHOCYTES # BLD AUTO: 2.71 10*3/MM3 (ref 0.7–3.1)
LYMPHOCYTES NFR BLD AUTO: 26.9 % (ref 19.6–45.3)
MAGNESIUM SERPL-MCNC: 2.2 MG/DL (ref 1.6–2.4)
MCH RBC QN AUTO: 32.1 PG (ref 26.6–33)
MCHC RBC AUTO-ENTMCNC: 33.6 G/DL (ref 31.5–35.7)
MCV RBC AUTO: 95.4 FL (ref 79–97)
MONOCYTES # BLD AUTO: 0.69 10*3/MM3 (ref 0.1–0.9)
MONOCYTES NFR BLD AUTO: 6.9 % (ref 5–12)
NEUTROPHILS NFR BLD AUTO: 6.35 10*3/MM3 (ref 1.7–7)
NEUTROPHILS NFR BLD AUTO: 63.1 % (ref 42.7–76)
NITRITE UR QL STRIP: NEGATIVE
NRBC BLD AUTO-RTO: 0 /100 WBC (ref 0–0.2)
PH UR STRIP.AUTO: 7 [PH] (ref 5–8)
PLATELET # BLD AUTO: 351 10*3/MM3 (ref 140–450)
PMV BLD AUTO: 10.8 FL (ref 6–12)
POTASSIUM SERPL-SCNC: 3.6 MMOL/L (ref 3.5–5.2)
PROT SERPL-MCNC: 7.5 G/DL (ref 6–8.5)
PROT UR QL STRIP: NEGATIVE
RBC # BLD AUTO: 4.61 10*6/MM3 (ref 3.77–5.28)
RBC # UR STRIP: ABNORMAL /HPF
REF LAB TEST METHOD: ABNORMAL
SODIUM SERPL-SCNC: 140 MMOL/L (ref 136–145)
SP GR UR STRIP: 1.01 (ref 1–1.03)
SQUAMOUS #/AREA URNS HPF: ABNORMAL /HPF
UROBILINOGEN UR QL STRIP: ABNORMAL
WBC # UR STRIP: ABNORMAL /HPF
WBC NRBC COR # BLD: 10.06 10*3/MM3 (ref 3.4–10.8)

## 2023-09-29 PROCEDURE — 85025 COMPLETE CBC W/AUTO DIFF WBC: CPT | Performed by: EMERGENCY MEDICINE

## 2023-09-29 PROCEDURE — 80053 COMPREHEN METABOLIC PANEL: CPT | Performed by: EMERGENCY MEDICINE

## 2023-09-29 PROCEDURE — 74022 RADEX COMPL AQT ABD SERIES: CPT

## 2023-09-29 PROCEDURE — 83735 ASSAY OF MAGNESIUM: CPT | Performed by: EMERGENCY MEDICINE

## 2023-09-29 PROCEDURE — 83690 ASSAY OF LIPASE: CPT | Performed by: EMERGENCY MEDICINE

## 2023-09-29 PROCEDURE — 99283 EMERGENCY DEPT VISIT LOW MDM: CPT

## 2023-09-29 PROCEDURE — 81001 URINALYSIS AUTO W/SCOPE: CPT | Performed by: EMERGENCY MEDICINE

## 2023-09-29 RX ORDER — AMOXICILLIN 250 MG
2 CAPSULE ORAL NIGHTLY PRN
Qty: 14 TABLET | Refills: 0 | Status: SHIPPED | OUTPATIENT
Start: 2023-09-29 | End: 2023-10-06

## 2023-09-29 RX ORDER — SODIUM CHLORIDE 0.9 % (FLUSH) 0.9 %
10 SYRINGE (ML) INJECTION AS NEEDED
Status: DISCONTINUED | OUTPATIENT
Start: 2023-09-29 | End: 2023-09-30 | Stop reason: HOSPADM

## 2023-09-29 RX ADMIN — MAGNESIUM HYDROXIDE 10 ML: 400 SUSPENSION ORAL at 23:54

## 2023-09-29 NOTE — TELEPHONE ENCOUNTER
Provider:  Janette  Surgery/Procedure:  Rachel  Surgery/Procedure Date:  09/15/2023  Last visit:   8/21/23  Next visit: 10/23/23         Reason for call:  Pt was discharged from [facility] on Topamax 50mg  Dr. Savage originally prescribed 25mg. Pt wants to know which mg to take?

## 2023-09-30 NOTE — ED PROVIDER NOTES
Subjective   History of Present Illness  62-year-old female presents emergency department accompanied by her spouse with concerns about constipation for 2 weeks.  She reports her last bowel movement was on 9/15/2023.  She reports at baseline she has a bowel movement approximately 2 times per week.  She recently had an aneurysm coiling by Dr. Savage and took just a couple of opiate analgesics a couple of weeks ago.  She has had constipation since that time.  She denies recent nausea.  She reports generalized abdominal distention but denies abdominal pain.  She denies recent vomiting, however she has had a Nissen fundoplication and states that she is unable to vomit typically.  She has been taking Amitiza, magnesium Gummies, MiraLAX, Metamucil, Linzess, prune juice, Dulcolax, and an over-the-counter enema without relief.  She denies recent fever.  She recently completed a course of cefdinir for acute cystitis.  She denies any acute urinary symptoms.    Review of Systems   Constitutional:  Negative for diaphoresis and fever.   Eyes:  Negative for photophobia and discharge.   Respiratory:  Negative for stridor.    Gastrointestinal:  Positive for constipation. Negative for diarrhea, nausea and vomiting.     Past Medical History:   Diagnosis Date    Aneurysm 11/26/2022    Headache     Hyperlipidemia     Hypertension     Stroke 11/26/2022       Allergies   Allergen Reactions    Tramadol Other (See Comments)     Flushng and passing out       Past Surgical History:   Procedure Laterality Date    ARTERIAL ANEURYSM REPAIR      BREAST LUMPECTOMY  2012    CYST REMOVAL Left 05/2023    EMBOLIZATION CEREBRAL N/A 11/29/2022    Procedure: CV EMBOLIZATION CEREBRAL IR;  Surgeon: Enoch Savage MD;  Location: [facility] INVAS[facility] LOCATION;  Service: Interventional Radiology;  Laterality: N/A;    HIATAL HERNIA REPAIR  2015    INTERVENTIONAL RADIOLOGY PROCEDURE N/A 9/15/2023    Procedure: Embolization;  Surgeon: Enoch Savage MD;   Location: [facility] INVASIVE LOCATION;  Service: Interventional Radiology;  Laterality: N/A;    SINUS SURGERY      x4       Family History   Problem Relation Age of Onset    No Known Problems Mother     Heart attack Father 42    Heart attack Paternal Aunt     Heart attack Paternal Uncle     No Known Problems Sister     Cancer Maternal Grandmother     Heart failure Maternal Grandmother     No Known Problems Maternal Grandfather     No Known Problems Paternal Grandmother     No Known Problems Paternal Grandfather     Diabetes Half-Brother        Social History     Socioeconomic History    Marital status:    Tobacco Use    Smoking status: Former     Packs/day: 1.00     Years: 15.00     Pack years: 15.00     Types: Cigarettes     Quit date: 10/4/2004     Years since quittin.0    Smokeless tobacco: Never   Substance and Sexual Activity    Alcohol use: No    Drug use: Never    Sexual activity: Not Currently           Objective   Physical Exam  Vitals and nursing note reviewed.   Constitutional:       General: She is not in acute distress.     Appearance: She is not diaphoretic.      Comments: BMI 24.  Patient is initially assessed in the provider in triage area due to a full ER   Eyes:      General: No scleral icterus.  Pulmonary:      Effort: Pulmonary effort is normal. No respiratory distress.      Breath sounds: No stridor.   Abdominal:      General: There is no distension.      Palpations: Abdomen is soft.      Tenderness: There is no abdominal tenderness. There is no guarding or rebound.   Genitourinary:     Comments: Once patient was brought to a room, rectal exam and no stool is in the rectum. Normal anal tone, nontender, no mass or fissure, no gross blood.   Skin:     General: Skin is warm and dry.      Coloration: Skin is not jaundiced.   Neurological:      Mental Status: She is alert.      Comments: Ambulatory with rolling walker in the emergency department.  Moves all extremities.  No dysarthria.                   ED Course  ED Course as of 09/30/23 0003   Sat Sep 30, 2023   0002 Results and plan discussed with patient and spouse at bedside, all questions addressed. [LD]      ED Course User Index  [LD] Lara Griffin MD           Notably, magnesium citrate is currently experiencing a national shortage and is unavailable. Patient had milk of magnesia orally in the ED today.                        GORGE reviewed by Lara Griffin MD       Medical Decision Making  Differential diagnosis includes constipation, electrolyte abnormality, fecal impaction, and others.    Problems Addressed:  Constipation, unspecified constipation type: complicated acute illness or injury    Amount and/or Complexity of Data Reviewed  Independent Historian: spouse  Labs: ordered. Decision-making details documented in ED Course.  Radiology: ordered. Decision-making details documented in ED Course.    Risk  OTC drugs.  Prescription drug management.      Recent Results (from the past 24 hour(s))   Comprehensive Metabolic Panel    Collection Time: 09/29/23 10:28 PM    Specimen: Blood   Result Value Ref Range    Glucose 99 65 - 99 mg/dL    BUN 16 8 - 23 mg/dL    Creatinine 0.89 0.57 - 1.00 mg/dL    Sodium 140 136 - 145 mmol/L    Potassium 3.6 3.5 - 5.2 mmol/L    Chloride 100 98 - 107 mmol/L    CO2 28.0 22.0 - 29.0 mmol/L    Calcium 9.1 8.6 - 10.5 mg/dL    Total Protein 7.5 6.0 - 8.5 g/dL    Albumin 4.5 3.5 - 5.2 g/dL    ALT (SGPT) 41 (H) 1 - 33 U/L    AST (SGOT) 26 1 - 32 U/L    Alkaline Phosphatase 143 (H) 39 - 117 U/L    Total Bilirubin 0.2 0.0 - 1.2 mg/dL    Globulin 3.0 gm/dL    A/G Ratio 1.5 g/dL    BUN/Creatinine Ratio 18.0 7.0 - 25.0    Anion Gap 12.0 5.0 - 15.0 mmol/L    eGFR 73.9 >60.0 mL/min/1.73   Lipase    Collection Time: 09/29/23 10:28 PM    Specimen: Blood   Result Value Ref Range    Lipase 17 13 - 60 U/L   Magnesium    Collection Time: 09/29/23 10:28 PM    Specimen: Blood   Result Value Ref Range    Magnesium 2.2 1.6 -  2.4 mg/dL   CBC Auto Differential    Collection Time: 09/29/23 10:28 PM    Specimen: Blood   Result Value Ref Range    WBC 10.06 3.40 - 10.80 10*3/mm3    RBC 4.61 3.77 - 5.28 10*6/mm3    Hemoglobin 14.8 12.0 - 15.9 g/dL    Hematocrit 44.0 34.0 - 46.6 %    MCV 95.4 79.0 - 97.0 fL    MCH 32.1 26.6 - 33.0 pg    MCHC 33.6 31.5 - 35.7 g/dL    RDW 13.5 12.3 - 15.4 %    RDW-SD 47.9 37.0 - 54.0 fl    MPV 10.8 6.0 - 12.0 fL    Platelets 351 140 - 450 10*3/mm3    Neutrophil % 63.1 42.7 - 76.0 %    Lymphocyte % 26.9 19.6 - 45.3 %    Monocyte % 6.9 5.0 - 12.0 %    Eosinophil % 1.9 0.3 - 6.2 %    Basophil % 0.9 0.0 - 1.5 %    Immature Grans % 0.3 0.0 - 0.5 %    Neutrophils, Absolute 6.35 1.70 - 7.00 10*3/mm3    Lymphocytes, Absolute 2.71 0.70 - 3.10 10*3/mm3    Monocytes, Absolute 0.69 0.10 - 0.90 10*3/mm3    Eosinophils, Absolute 0.19 0.00 - 0.40 10*3/mm3    Basophils, Absolute 0.09 0.00 - 0.20 10*3/mm3    Immature Grans, Absolute 0.03 0.00 - 0.05 10*3/mm3    nRBC 0.0 0.0 - 0.2 /100 WBC   Urinalysis With Microscopic If Indicated (No Culture) - Urine, Clean Catch    Collection Time: 09/29/23 10:28 PM    Specimen: Urine, Clean Catch   Result Value Ref Range    Color, UA Yellow Yellow, Straw    Appearance, UA Clear Clear    pH, UA 7.0 5.0 - 8.0    Specific Gravity, UA 1.011 1.001 - 1.030    Glucose, UA Negative Negative    Ketones, UA Negative Negative    Bilirubin, UA Negative Negative    Blood, UA Negative Negative    Protein, UA Negative Negative    Leuk Esterase, UA Trace (A) Negative    Nitrite, UA Negative Negative    Urobilinogen, UA 0.2 E.U./dL 0.2 - 1.0 E.U./dL   Urinalysis, Microscopic Only - Urine, Clean Catch    Collection Time: 09/29/23 10:28 PM    Specimen: Urine, Clean Catch   Result Value Ref Range    RBC, UA 0-2 None Seen, 0-2 /HPF    WBC, UA 3-5 (A) None Seen, 0-2 /HPF    Bacteria, UA None Seen None Seen, Trace /HPF    Squamous Epithelial Cells, UA 0-2 None Seen, 0-2 /HPF    Hyaline Casts, UA None Seen 0 - 6 /LPF  "   Methodology Automated Microscopy      Note: In addition to lab results from this visit, the labs listed above may include labs taken at another facility or during a different encounter within the last 24 hours. Please correlate lab times with ED admission and discharge times for further clarification of the services performed during this visit.    XR Abdomen 2+ VW with Chest 1 VW   Final Result   Impression:   No acute cardiopulmonary process. Very large stool burden present, likely related to constipation/obstipation. Nonobstructive bowel gas pattern.            Electronically Signed: Darlene Kennedy MD     9/29/2023 11:10 PM EDT     Workstation ID: [id number]        Vitals:    09/29/23 2033   BP: 148/100   BP Location: Left arm   Patient Position: Sitting   Pulse: 82   Resp: 16   Temp: 98.2 °F (36.8 °C)   TempSrc: Oral   SpO2: 100%   Weight: 59 kg (130 lb)   Height: 154.9 cm (61\")     Medications   sodium chloride 0.9 % flush 10 mL (has no administration in time range)   magnesium hydroxide (MILK OF MAGNESIA) 400 MG/5ML suspension 10 mL (10 mL Oral Given 9/29/23 6016)     ECG/EMG Results (last 24 hours)       ** No results found for the last 24 hours. **          No orders to display     Discharge instructions include:  Tomorrow, mix 10 capfuls of Miralax with 32 ounces of non-red gatorade (also avoid lemon-lime flavor) or similar sports drink. Start by taking one Senna S capsule. Then drink the Miralax 32 ounces. Two hours after you finish drinking the Miralax 32 ounces, take one additional Senna S capsule. You can take clear liquids, popsicles, and broth during the bowel clean out. You should drink plenty of clear liquids throughout the day while doing this bowel clean out to avoid dehydration.       Final diagnoses:   Constipation, unspecified constipation type       ED Disposition  ED Disposition       ED Disposition   Discharge    Condition   Stable    Comment   --               Melissa Lazo, APRN  [address]  [place] [id number]  [phone]    Schedule an appointment as soon as possible for a visit in 1 week  primary care provider         Medication List        New Prescriptions      sennosides-docusate 8.6-50 MG per tablet  Commonly known as: Senna S  Take 2 tablets by mouth At Night As Needed for Constipation for up to 7 days. PRN constipation               Where to Get Your Medications        These medications were sent to [place], [place] - [address] [place] [address] [phone] [place] [id number] [address] [address]      Phone: [phone]   sennosides-docusate 8.6-50 MG per tablet            Lara Griffin MD  09/30/23 0003

## 2023-09-30 NOTE — DISCHARGE INSTRUCTIONS
Tomorrow, mix 10 capfuls of Miralax with 32 ounces of non-red gatorade (also avoid lemon-lime flavor) or similar sports drink. Start by taking one Senna S capsule. Then drink the Miralax 32 ounces. Two hours after you finish drinking the Miralax 32 ounces, take one additional Senna S capsule. You can take clear liquids, popsicles, and broth during the bowel clean out. You should drink plenty of clear liquids throughout the day while doing this bowel clean out to avoid dehydration.

## 2023-10-06 ENCOUNTER — TRANSCRIBE ORDERS (OUTPATIENT)
Dept: ADMINISTRATIVE | Facility: HOSPITAL | Age: 63
End: 2023-10-06
Payer: COMMERCIAL

## 2023-10-06 DIAGNOSIS — K22.2 ESOPHAGEAL STENOSIS: Primary | ICD-10-CM

## 2023-10-11 ENCOUNTER — HOSPITAL ENCOUNTER (OUTPATIENT)
Dept: GENERAL RADIOLOGY | Facility: HOSPITAL | Age: 63
Discharge: HOME | End: 2023-10-11
Admitting: INTERNAL MEDICINE
Payer: COMMERCIAL

## 2023-10-11 DIAGNOSIS — K22.2 ESOPHAGEAL STENOSIS: ICD-10-CM

## 2023-10-11 PROCEDURE — 74220 X-RAY XM ESOPHAGUS 1CNTRST: CPT

## 2023-10-11 RX ADMIN — BARIUM SULFATE 183 ML: 960 POWDER, FOR SUSPENSION ORAL at 11:36

## 2023-10-23 ENCOUNTER — OFFICE VISIT (OUTPATIENT)
Dept: NEUROSURGERY | Facility: CLINIC | Age: 63
End: 2023-10-23
Payer: COMMERCIAL

## 2023-10-23 VITALS
TEMPERATURE: 97.1 F | WEIGHT: 123.8 LBS | BODY MASS INDEX: 23.37 KG/M2 | HEIGHT: 61 IN | DIASTOLIC BLOOD PRESSURE: 80 MMHG | SYSTOLIC BLOOD PRESSURE: 140 MMHG

## 2023-10-23 DIAGNOSIS — I67.1 CEREBRAL ANEURYSM, NONRUPTURED: Primary | ICD-10-CM

## 2023-10-23 PROCEDURE — 99212 OFFICE O/P EST SF 10 MIN: CPT | Performed by: RADIOLOGY

## 2023-10-23 RX ORDER — ONDANSETRON 4 MG/1
TABLET, ORALLY DISINTEGRATING ORAL
COMMUNITY

## 2023-10-23 RX ORDER — LUBIPROSTONE 8 UG/1
CAPSULE ORAL
COMMUNITY

## 2023-10-23 RX ORDER — PRUCALOPRIDE 2 MG/1
TABLET, FILM COATED ORAL DAILY
COMMUNITY

## 2023-10-23 RX ORDER — PANTOPRAZOLE SODIUM 20 MG/1
TABLET, DELAYED RELEASE ORAL
COMMUNITY

## 2023-10-23 RX ORDER — OLOPATADINE HYDROCHLORIDE 2 MG/ML
SOLUTION/ DROPS OPHTHALMIC
COMMUNITY
Start: 2023-05-26

## 2023-10-23 NOTE — PROGRESS NOTES
NAME: MEDHAT PAL   DOS: 10/23/2023  : 1960  PCP: Melissa Lazo APRN    Chief Complaint:    Chief Complaint   Patient presents with    Hospital Follow Up Visit     Embolization       History of Present Illness:  62 y.o. female who is well-known to the neuro interventional service, having undergone prior embolization (Pipeline Shield) for giant (partially thrombosed) proximal basilar aneurysm on 2022.  She had initially presented with symptoms of dizziness and ataxia, and was found to have a tiny right occipital infarct.       She continued to struggle with unsteady gait and dizziness symptoms, which significantly limits her mobility, as well as recurrent near syncopal episodes.  Given her worsening neurologic symptoms, along with persistent filling of the aneurysm on CTA, she presented for additional embolization on 9/15/2023.  The previously placed Pipeline stent construct from the right vertebral artery into the distal basilar artery was retreated with placement of an additional 2.75 mm Pipeline Shield flow diverter device.  This resulted in occlusion of the aneurysm from the right vertebral system.  There is persistent filling of a small crescent of the central portions of the aneurysm via the left vertebral injection, with outflow supplying the right AICA.  The risk of rupture from the small crescent of of residual aneurysm is deemed quite low, and the risk of stroke with occlusion is not insignificant, and thus this is going to be managed conservatively.     MRI of the head on 2023 demonstrates slight interval decrease in size of the aneurysm (compared to preoperative imaging), but with a noticeable decrease in brainstem compression.  There is no edema within the adjacent brainstem.  There are a few punctate areas of restricted diffusion within the left cerebellar hemisphere, but these are clinically silent.     Ms. Benz remained at her neurologic baseline, but still struggles with  balance/gait issues, limited strength and functional endurance, and thus she was discharged to inpatient physical therapy.    She has had some improvement since her hospitalization, with some increase in activity.  She does still struggle with gait/balance issues and functional endurance, and transient left lower extremity weakness.  She has some baseline swallowing difficulty, but this is stable.  She denies any new stroke or TIA-like symptoms.  She does suffer from chronic headaches, but no singular headache to suggest a subarachnoid or intracranial hemorrhage.  She presents today for routine follow-up.      Past Medical History:  Past Medical History:   Diagnosis Date    Aneurysm 11/26/2022    Headache     Hyperlipidemia     Hypertension     Stroke 11/26/2022       Past Surgical History:  Past Surgical History:   Procedure Laterality Date    ARTERIAL ANEURYSM REPAIR      BREAST LUMPECTOMY  2012    CYST REMOVAL Left 05/2023    EMBOLIZATION CEREBRAL N/A 11/29/2022    Procedure: CV EMBOLIZATION CEREBRAL IR;  Surgeon: Enoch Savage MD;  Location:  [facility];  Service: Interventional Radiology;  Laterality: N/A;    HIATAL HERNIA REPAIR  2015    INTERVENTIONAL RADIOLOGY PROCEDURE N/A 9/15/2023    Procedure: Embolization;  Surgeon: Enoch Savage MD;  Location:  [facility];  Service: Interventional Radiology;  Laterality: N/A;    SINUS SURGERY      x4       Review of Systems:        Review of Systems   Constitutional:  Positive for fatigue. Negative for activity change, appetite change, chills, diaphoresis, fever and unexpected weight change.   HENT:  Negative for congestion, dental problem, drooling, ear discharge, ear pain, facial swelling, hearing loss, mouth sores, nosebleeds, postnasal drip, rhinorrhea, sinus pressure, sinus pain, sneezing, sore throat, tinnitus, trouble swallowing and voice change.    Eyes:  Negative for photophobia, pain, discharge, redness, itching and  visual disturbance.   Respiratory:  Negative for apnea, cough, choking, chest tightness, shortness of breath, wheezing and stridor.    Cardiovascular:  Negative for chest pain, palpitations and leg swelling.   Gastrointestinal:  Negative for abdominal distention, abdominal pain, anal bleeding, blood in stool, constipation, diarrhea, nausea, rectal pain and vomiting.   Endocrine: Negative for cold intolerance, heat intolerance, polydipsia, polyphagia and polyuria.   Genitourinary:  Negative for decreased urine volume, difficulty urinating, dysuria, enuresis, flank pain, frequency, genital sores, hematuria and urgency.   Musculoskeletal:  Negative for arthralgias, back pain, gait problem, joint swelling, myalgias, neck pain and neck stiffness.   Skin:  Negative for color change, pallor, rash and wound.   Allergic/Immunologic: Negative for environmental allergies, food allergies and immunocompromised state.   Neurological:  Positive for dizziness, light-headedness and headaches. Negative for tremors, seizures, syncope, facial asymmetry, speech difficulty, weakness and numbness.   Hematological:  Negative for adenopathy. Does not bruise/bleed easily.   Psychiatric/Behavioral:  Positive for dysphoric mood. Negative for agitation, behavioral problems, confusion, decreased concentration, hallucinations, self-injury, sleep disturbance and suicidal ideas. The patient is nervous/anxious. The patient is not hyperactive.         Medications    Current Outpatient Medications:     aspirin 81 MG chewable tablet, Chew 1 tablet Every Night., Disp: , Rfl:     atorvastatin (LIPITOR) 80 MG tablet, TAKE 1 TABLET BY MOUTH EVERY NIGHT, Disp: 90 tablet, Rfl: 1    butalbital-acetaminophen-caffeine (ORBIVAN) -40 MG capsule capsule, Take 1 capsule by mouth As Needed., Disp: , Rfl:     clopidogrel (PLAVIX) 75 MG tablet, Take 1 tablet by mouth Daily., Disp: 30 tablet, Rfl: 6    clopidogrel (Plavix) 75 MG tablet, Take 1 tablet by mouth  Daily., Disp: 30 tablet, Rfl: 5    fluticasone (FLONASE) 50 MCG/ACT nasal spray, 2 sprays into the nostril(s) as directed by provider Daily., Disp: 1 bottle, Rfl: 0    LORazepam (ATIVAN) 0.5 MG tablet, Take 1 tablet by mouth Every 8 (Eight) Hours As Needed for Anxiety., Disp: , Rfl:     nortriptyline (PAMELOR) 10 MG capsule, Take 1 capsule by mouth Every Night., Disp: , Rfl:     olopatadine (Pataday) 0.2 % solution ophthalmic solution, INSTILL 1 DROP INTO AFFECTED EYE(S) BY OPHTHALMIC ROUTE ONCE DAILY, Disp: , Rfl:     oxybutynin XL (DITROPAN-XL) 5 MG 24 hr tablet, oxybutynin chloride ER 5 mg tablet,extended release 24 hr, Disp: , Rfl:     Prucalopride Succinate (Motegrity) 2 MG tablet, Take  by mouth Daily., Disp: , Rfl:     sertraline (ZOLOFT) 50 MG tablet, Take 2 tablets by mouth Daily., Disp: , Rfl:     topiramate (TOPAMAX) 25 MG tablet, Take 1 tablet by mouth Daily., Disp: 30 tablet, Rfl: 3    estradiol (ESTRACE) 0.1 MG/GM vaginal cream, Insert  into the vagina 2 (Two) Times a Week. (Patient not taking: Reported on 10/23/2023), Disp: , Rfl:     hydroCHLOROthiazide (HYDRODIURIL) 25 MG tablet, Take 1 tablet by mouth Daily. (Patient not taking: Reported on 10/23/2023), Disp: , Rfl:     lubiprostone (AMITIZA) 8 MCG capsule, Take 1 capsule twice a day by oral route., Disp: , Rfl:     ondansetron ODT (ZOFRAN-ODT) 4 MG disintegrating tablet, Place 1 tablet twice a day by translingual route as needed., Disp: , Rfl:     pantoprazole (PROTONIX) 20 MG EC tablet, , Disp: , Rfl:     Allergies:  Allergies   Allergen Reactions    Tramadol Other (See Comments)     Flushng and passing out       Social Hx:  Social History     Tobacco Use    Smoking status: Former     Packs/day: 1.00     Years: 15.00     Additional pack years: 0.00     Total pack years: 15.00     Types: Cigarettes     Quit date: 10/4/2004     Years since quittin.0    Smokeless tobacco: Never   Substance Use Topics    Alcohol use: No    Drug use: Never        Family Hx:  Family History   Problem Relation Age of Onset    No Known Problems Mother     Heart attack Father 42    Heart attack Paternal Aunt     Heart attack Paternal Uncle     No Known Problems Sister     Cancer Maternal Grandmother     Heart failure Maternal Grandmother     No Known Problems Maternal Grandfather     No Known Problems Paternal Grandmother     No Known Problems Paternal Grandfather     Diabetes Half-Brother        Review of Imaging:  No new imaging.    Physical Examination:  Vitals:    10/23/23 1239   BP: 140/80   Temp: 97.1 °F (36.2 °C)        General Appearance:   Well developed, well nourished, well groomed, alert, and cooperative.  Cardiovascular: Regular rate and rhythm. No carotid bruits      Neurological examination:  Alert and oriented x3.  She has some intermittent word finding difficulty, but she is fluent.  4/5 strength in the left lower extremity, normal strength in the right lower extremity, symmetric strength in the upper extremities.  Extraocular muscles are full.  Tongue protrudes to midline.  Symmetrical elevation of the palate.  She denies any diplopia.  She does ambulate with assistance of a cane for stability purposes.    Diagnoses/Plan:    Ms. Romo is a 62 y.o. female status post additional embolization on 9/15/2023 for a giant, largely thrombosed basilar apex aneurysm with additional Pipeline Shield flow diverter therapy.  There is some mild residual filling of the aneurysm from the left vertebral artery, but this supplies the right PICA (via the aneurysm), and thus is being managed conservatively.  MRI at the time of discharge in September demonstrated some decrease in size of the aneurysm with a noticeable decrease in mass effect on the brainstem.  Ms. Romo has done well following her hospitalization, with some increase in strength and activity.  She has been discharged from [facility], but does continue with outpatient physical therapy.  I do think she would  continue to benefit from a physical therapy, and hopefully she will be able to continue this for quite some time.  Unfortunately, given her neurologic deficits secondary to the aneurysm and prior stroke, she is not going to be able to return to work in her previous capacity.  She is in the process of applying for disability, and I think she certainly qualifies for this.    Ms. Romo will remain on a dual antiplatelet regimen, and I plan on seeing her back in 6 months time with an MRI (not MRA) of the head, to assess for any change in aneurysm size that might necessitate further treatment/intervention.  During the interim, she will contact our office in/or call 911 if she develops any stroke or TIA-like symptoms.    Copied text and portions of the note have been reviewed and are accurate as of 10/23/23.

## 2023-11-06 ENCOUNTER — TELEPHONE (OUTPATIENT)
Dept: NEUROSURGERY | Facility: CLINIC | Age: 63
End: 2023-11-06
Payer: COMMERCIAL

## 2023-11-06 NOTE — TELEPHONE ENCOUNTER
Caller: Lauryn Romo     Relationship: [unfilled]     Best call back number: [phone]    What is your medical concern? PT HAS STARTED TO HAVE NEW SYMPTOMS--SATURDAY  STATES PT HAD SLUR TALKING, MUMBLE. REAL BAD HEADACHES. BALANCE WAS OFF. DIZZINESS, PT TOOK A SLIP/FALL IN DOORFRAME ON BASEMENT STEP AND COULD NOT GET UP. SATURDAY NIGHT LEFT SIDE OF MOUTH WAS DRUPPING BUT ONLY LASTED 60 SECONDS. PT STARTED GOING PALE BUT STATES COLOR STARTED COMING BACK YESTERDAY. PT MOSTLY JUST LAID ON THE COUCH AND SLEPT.     How long has this issue been going on? 48 HOURS    Is your provider already aware of this issue? NO    Have you been treated for this issue? NO    HUB ALSO WT TO OFFICE.     THANK YOU,

## 2023-11-06 NOTE — TELEPHONE ENCOUNTER
Provider:  Given  Surgery/Procedure:  Aneurysm embolization for giant proximal basilar aneurysm.  Surgery/Procedure Date:  9/15/23  Last visit:   10/23/23  Next visit: 3/25/24     Reason for call:  Spoke to patient and her . She reports that on Saturday she was going down stairs with her  and fall back against the door, us unsure of how she fell (no memory) and unsure if she struck her head on the fall. She did not lose consciousness. Since then she has had very bad headaches only relieved by sleeping. She feels a little off balance and dizzy, her  has noticed her lifting her right foot up high and awkwardly as if she is unsure where to place her foot. She does not have any new numbness or weakness, no change in vision no confusion. Her  did report she has been a little more difficult to understand since the incident, not so much slurring but more of mumbling when she speaks. She also recalls the left side of her face drooping for about 45 seconds yesterday that resolved on it's own.     I educated them both on signs of stroke to be aware of, and to report to the ED if she were to begin experiencing any of those symptoms.

## 2023-11-08 NOTE — TELEPHONE ENCOUNTER
She will hold off on a follow up for now. She continues to have headaches and some dizziness. Educated her on stroke symptoms and she did state she would report to an ER if she noticed any stroke like symptoms.

## 2023-11-10 NOTE — TELEPHONE ENCOUNTER
AMANDA FROM [facility] CALLED IN AND IS REQUESTING A MESSAGE SENT OVER TO GIVEN TO SEE PATIENT SOONER- CHELLE RUIZ WAS REQUESTING TO SPEAK WITH HIM BUT SHE WENT IN WITH A PATIENT- SARA BALDERRAMA- REQUESTING CALL BACK    C/B- [phone]    ASK TO SPEAK WITH CHRISTI HERNÁNDEZ    PLEASE ADVISE- THANK YOU.

## 2023-11-15 ENCOUNTER — OFFICE VISIT (OUTPATIENT)
Dept: NEUROSURGERY | Facility: CLINIC | Age: 63
End: 2023-11-15
Payer: COMMERCIAL

## 2023-11-15 VITALS
HEART RATE: 70 BPM | DIASTOLIC BLOOD PRESSURE: 80 MMHG | WEIGHT: 121.4 LBS | OXYGEN SATURATION: 95 % | HEIGHT: 61 IN | SYSTOLIC BLOOD PRESSURE: 130 MMHG | BODY MASS INDEX: 22.92 KG/M2 | TEMPERATURE: 96.2 F

## 2023-11-15 DIAGNOSIS — I67.1 CEREBRAL ANEURYSM WITHOUT RUPTURE: Primary | ICD-10-CM

## 2023-11-15 PROCEDURE — 99212 OFFICE O/P EST SF 10 MIN: CPT | Performed by: RADIOLOGY

## 2023-11-15 RX ORDER — SERTRALINE HYDROCHLORIDE 100 MG/1
TABLET, FILM COATED ORAL
COMMUNITY

## 2023-11-15 NOTE — PROGRESS NOTES
"NAME: MEDHAT ROMO   DOS: 11/15/2023  : 1960  PCP: Melissa Lazo APRN    Chief Complaint:    Chief Complaint   Patient presents with    Follow-up     Cerebral aneurysm   Dizziness, Fall         History of Present Illness:  62 y.o. female who is well-known to the neuro interventional service, having undergone prior embolization (Pipeline Shield) for giant (partially thrombosed) proximal basilar aneurysm on 2022.  She had initially presented with symptoms of dizziness and ataxia, and was found to have a tiny right occipital infarct.       She continued to struggle with dizziness/unsteady gait, and near syncopal episodes.  CT angiograms demonstrated a persistent filling of the aneurysm.  She underwent placement of a second pipeline Shield flow diverter device on 9/15/2023.  However, there remained a persistent filling of the central portions of the aneurysm (adjacent to the pipeline), fed via the left vertebral artery, without flow from the residual aneurysm supplying the right AICA.  The risk of rupture from the small crescent of of residual aneurysm was deemed quite low, and the risk of stroke with occlusion is not insignificant, and thus the residual aneurysm is being managed conservatively.     MRI of the head on 2023 demonstrates slight interval decrease in size of the aneurysm (compared to preoperative imaging), but with a noticeable decrease in brainstem compression.  There is no edema within the adjacent brainstem.      Ms. Romo continues to struggle with the dizziness, and unsteady gait, and actually suffered a fall a few weeks ago.  While she does have \"some days which are worse than others\", she feels like her neurologic exam has been stable.  She presents today for routine follow-up.    Past Medical History:  Past Medical History:   Diagnosis Date    Aneurysm 2022    Headache     Hyperlipidemia     Hypertension     Stroke 2022       Past Surgical History:  Past " Surgical History:   Procedure Laterality Date    ARTERIAL ANEURYSM REPAIR      BREAST LUMPECTOMY  2012    CYST REMOVAL Left 05/2023    EMBOLIZATION CEREBRAL N/A 11/29/2022    Procedure: CV EMBOLIZATION CEREBRAL IR;  Surgeon: Enoch Savage MD;  Location:  [facility];  Service: Interventional Radiology;  Laterality: N/A;    HIATAL HERNIA REPAIR  2015    INTERVENTIONAL RADIOLOGY PROCEDURE N/A 9/15/2023    Procedure: Embolization;  Surgeon: Enoch Savage MD;  Location:  [facility];  Service: Interventional Radiology;  Laterality: N/A;    SINUS SURGERY      x4       Review of Systems:        Review of Systems   Constitutional:  Negative for activity change, appetite change, chills, diaphoresis, fatigue, fever and unexpected weight change.   HENT:  Negative for congestion, dental problem, drooling, ear discharge, ear pain, facial swelling, hearing loss, mouth sores, nosebleeds, postnasal drip, rhinorrhea, sinus pressure, sinus pain, sneezing, sore throat, tinnitus, trouble swallowing and voice change.    Eyes:  Positive for itching. Negative for photophobia, pain, discharge, redness and visual disturbance.   Respiratory:  Negative for apnea, cough, choking, chest tightness, shortness of breath, wheezing and stridor.    Cardiovascular:  Negative for chest pain, palpitations and leg swelling.   Gastrointestinal:  Negative for abdominal distention, abdominal pain, anal bleeding, blood in stool, constipation, diarrhea, nausea, rectal pain and vomiting.   Endocrine: Negative for cold intolerance, heat intolerance, polydipsia, polyphagia and polyuria.   Genitourinary:  Negative for decreased urine volume, difficulty urinating, dysuria, enuresis, flank pain, frequency, genital sores, hematuria and urgency.   Musculoskeletal:  Negative for arthralgias, back pain, gait problem, joint swelling, myalgias, neck pain and neck stiffness.   Skin:  Negative for color change, pallor, rash and  wound.   Allergic/Immunologic: Negative for environmental allergies, food allergies and immunocompromised state.   Neurological:  Positive for dizziness and headaches. Negative for tremors, seizures, syncope, facial asymmetry, speech difficulty, weakness, light-headedness and numbness.   Hematological:  Negative for adenopathy. Does not bruise/bleed easily.   Psychiatric/Behavioral:  Negative for agitation, behavioral problems, confusion, decreased concentration, dysphoric mood, hallucinations, self-injury, sleep disturbance and suicidal ideas. The patient is not nervous/anxious and is not hyperactive.         Medications    Current Outpatient Medications:     aspirin 81 MG chewable tablet, Chew 1 tablet Every Night., Disp: , Rfl:     atorvastatin (LIPITOR) 80 MG tablet, TAKE 1 TABLET BY MOUTH EVERY NIGHT, Disp: 90 tablet, Rfl: 1    butalbital-acetaminophen-caffeine (ORBIVAN) -40 MG capsule capsule, Take 1 capsule by mouth As Needed., Disp: , Rfl:     clopidogrel (PLAVIX) 75 MG tablet, Take 1 tablet by mouth Daily., Disp: 30 tablet, Rfl: 6    clopidogrel (Plavix) 75 MG tablet, Take 1 tablet by mouth Daily., Disp: 30 tablet, Rfl: 5    estradiol (ESTRACE) 0.1 MG/GM vaginal cream, Insert  into the vagina 2 (Two) Times a Week., Disp: , Rfl:     fluticasone (FLONASE) 50 MCG/ACT nasal spray, 2 sprays into the nostril(s) as directed by provider Daily., Disp: 1 bottle, Rfl: 0    hydroCHLOROthiazide (HYDRODIURIL) 25 MG tablet, Take 1 tablet by mouth Daily., Disp: , Rfl:     LORazepam (ATIVAN) 0.5 MG tablet, Take 1 tablet by mouth Every 8 (Eight) Hours As Needed for Anxiety., Disp: , Rfl:     lubiprostone (AMITIZA) 8 MCG capsule, Take 1 capsule twice a day by oral route., Disp: , Rfl:     nortriptyline (PAMELOR) 10 MG capsule, Take 1 capsule by mouth Every Night., Disp: , Rfl:     olopatadine (Pataday) 0.2 % solution ophthalmic solution, INSTILL 1 DROP INTO AFFECTED EYE(S) BY OPHTHALMIC ROUTE ONCE DAILY, Disp: , Rfl:      ondansetron ODT (ZOFRAN-ODT) 4 MG disintegrating tablet, Place 1 tablet twice a day by translingual route as needed., Disp: , Rfl:     oxybutynin XL (DITROPAN-XL) 5 MG 24 hr tablet, oxybutynin chloride ER 5 mg tablet,extended release 24 hr, Disp: , Rfl:     pantoprazole (PROTONIX) 20 MG EC tablet, , Disp: , Rfl:     Prucalopride Succinate (Motegrity) 2 MG tablet, Take  by mouth Daily., Disp: , Rfl:     sertraline (ZOLOFT) 100 MG tablet, Take 1 tablet every day by oral route., Disp: , Rfl:     topiramate (TOPAMAX) 25 MG tablet, Take 1 tablet by mouth Daily., Disp: 30 tablet, Rfl: 3    Allergies:  Allergies   Allergen Reactions    Tramadol Other (See Comments)     Flushng and passing out       Social Hx:  Social History     Tobacco Use    Smoking status: Former     Packs/day: 1.00     Years: 15.00     Additional pack years: 0.00     Total pack years: 15.00     Types: Cigarettes     Quit date: 10/4/2004     Years since quittin.1    Smokeless tobacco: Never   Substance Use Topics    Alcohol use: No    Drug use: Never       Family Hx:  Family History   Problem Relation Age of Onset    No Known Problems Mother     Heart attack Father 42    Heart attack Paternal Aunt     Heart attack Paternal Uncle     No Known Problems Sister     Cancer Maternal Grandmother     Heart failure Maternal Grandmother     No Known Problems Maternal Grandfather     No Known Problems Paternal Grandmother     No Known Problems Paternal Grandfather     Diabetes Half-Brother        Review of Imaging:  No new imaging.    Physical Examination:  Vitals:    11/15/23 1209   BP: 130/80   Pulse: 70   Temp: 96.2 °F (35.7 °C)   SpO2: 95%        General Appearance:   Well developed, well nourished, well groomed, alert, and cooperative.  Cardiovascular: Regular rate and rhythm. No carotid bruits      Neurological examination:  Alert and oriented x3.  She has some intermittent word finding difficulty, but she is fluent, and her speech is normal today.   "4/5 strength in the left lower extremity, normal strength in the right lower extremity, symmetric strength in the upper extremities.  Extraocular muscles are full.  Tongue protrudes to midline.  Symmetrical elevation of the palate.  She denies any diplopia.  She does ambulate with assistance of a cane for stability purposes.     Diagnoses/Plan:    Ms. Romo is a 62 y.o. female status post additional embolization on 9/15/2023 for a giant, largely thrombosed basilar apex aneurysm with additional Pipeline Shield flow diverter therapy.  The aneurysm is completely isolated from the right vertebral/basilar circulation, but does have a small amount of residual aneurysm supplied via the left vertebral artery; however, the outflow of this residual aneurysm supplies the right PICA, and thus this has been managed conservatively. MRI at the time of discharge in September demonstrated some decrease in size of the aneurysm with a noticeable decrease in mass effect on the brainstem.      Ms. Romo had a fall recently, and continues to struggle with dizziness and unsteady gait.  While she states that she has some \"good days and bad days\", she feels like overall her neurologic exam has been stable.  She is certainly going to continue to struggle with dizziness and unsteady gait, but I have encouraged her to resume physical therapy, and she can do so without any restriction or limitation.    We did again discuss the possibility of additional embolization with sacrifice of the left vertebral artery at the VB junction, which would likely result in complete thrombosis of the aneurysm (with hopefully some further shrinkage and decreased mass effect), but would jeopardize the right AICA and be at risk for some sort of stroke.  Again, I do think she would have adequate collateral flow from the PICA/superior cerebellar arteries, and thus this would be a stroke that she would make a meaningful recovery from.  At this point, she wishes to " continue conservative management, and I think this is certainly reasonable.  Ms. Romo will remain on a dual antiplatelet regimen, and I plan on seeing her back in March 2023 with an MRI (not MRA) of the head, to assess for any change in aneurysm size that might necessitate further treatment/intervention.  During the interim, she will contact our office in/or call 911 if she develops any stroke or TIA-like symptoms.     Copied text and portions of the note have been reviewed and are accurate as of 11/15/2023.

## 2023-12-18 ENCOUNTER — OFFICE VISIT (OUTPATIENT)
Dept: NEUROLOGY | Facility: CLINIC | Age: 63
End: 2023-12-18
Payer: COMMERCIAL

## 2023-12-18 VITALS
WEIGHT: 121 LBS | SYSTOLIC BLOOD PRESSURE: 136 MMHG | TEMPERATURE: 98.4 F | OXYGEN SATURATION: 94 % | HEIGHT: 61 IN | BODY MASS INDEX: 22.84 KG/M2 | HEART RATE: 75 BPM | DIASTOLIC BLOOD PRESSURE: 80 MMHG

## 2023-12-18 DIAGNOSIS — G44.89 OTHER HEADACHE SYNDROME: ICD-10-CM

## 2023-12-18 DIAGNOSIS — R42 VERTIGO AS LATE EFFECT OF CEREBROVASCULAR ACCIDENT (CVA): ICD-10-CM

## 2023-12-18 DIAGNOSIS — R42 VERTIGO: ICD-10-CM

## 2023-12-18 DIAGNOSIS — I10 ESSENTIAL HYPERTENSION: ICD-10-CM

## 2023-12-18 DIAGNOSIS — I69.398 VERTIGO AS LATE EFFECT OF CEREBROVASCULAR ACCIDENT (CVA): ICD-10-CM

## 2023-12-18 DIAGNOSIS — Z86.73 HISTORY OF CVA (CEREBROVASCULAR ACCIDENT): Primary | ICD-10-CM

## 2023-12-18 DIAGNOSIS — I67.1 CEREBRAL ANEURYSM, NONRUPTURED: ICD-10-CM

## 2023-12-18 DIAGNOSIS — E78.2 MIXED HYPERLIPIDEMIA: ICD-10-CM

## 2023-12-18 PROCEDURE — 99214 OFFICE O/P EST MOD 30 MIN: CPT | Performed by: NURSE PRACTITIONER

## 2023-12-18 RX ORDER — SCOLOPAMINE TRANSDERMAL SYSTEM 1 MG/1
1 PATCH, EXTENDED RELEASE TRANSDERMAL
Qty: 10 PATCH | Refills: 2 | Status: SHIPPED | OUTPATIENT
Start: 2023-12-18 | End: 2024-12-17

## 2023-12-18 RX ORDER — NORTRIPTYLINE HYDROCHLORIDE 10 MG/1
10 CAPSULE ORAL NIGHTLY
Qty: 30 CAPSULE | Refills: 3 | Status: SHIPPED | OUTPATIENT
Start: 2023-12-18

## 2023-12-18 NOTE — PROGRESS NOTES
New Patient Office Visit      Encounter Date: 2023   Patient Name: Lauryn Romo  : 1960   MRN: [id number]   PCP: Melissa Lazo APRN    Referring Provider: No ref. provider found     Chief Complaint:    Chief Complaint   Patient presents with    Follow-up       History of Present Illness: Lauryn Romo is a 63 y.o. female who is here today to establish care.  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  She has been followed inpatient by our service on several admissions dating back to 2022.  Her last admission was noted to be 2023 where she was diagnosed with a new asymptomatic left cerebellar stroke most likely secondary to her basilar artery aneurysm.  She is followed outpatient by Dr. Savage for her basilar aneurysm.  Per his last note in November of this year he reports that she would like to continue conservative management and will remain on dual antiplatelet therapy.  His plan is to see her back in March of next year with MRI brain with and without contrast to assess for any change in aneurysm size that might necessitate further treatment/intervention.    She continues to struggle with unsteady gait and dizziness.  This does significantly limit her mobility at times. She reports that she has not been seen for headaches. She had improvement with her headache. She has difficulty at times with even completing her therapy. She reports that she has had difficulty with completing basic tasks at time. She has been released to continue PT.     Clinic Visit 2023:  She tells me that approximately a week ago she was in her tub soaking when she had difficulty adjusting in the tub and had a fall over in the tub. She denies any injury. She reports that secondary to constipation that she was  recently told to not continue her Nortriptyline (she has been on this medication approximately 1 year). She has also been utilizing PRN Fioricet to provide her with some relief. She has been compliant on the Topamax that she was discharged with, but feels that she would able to get more relief with more medication assistance.     She has taken Meclizine in the past but she reports that she felt like this made her dizziness worse. She has never tried another medication treatment to assist her. She reports that she has been compliant with her therapy. She tells me that daily in the afternoons that she has to lay down and nap for an hour or two to ensure that she can make it through the rest of the day. She tells me that she is very thankful that she does not have any worse symptoms than what she does.     Stroke Risk Factors: hyperlipidemia and hypertension    Subjective      Review of Systems:   Review of Systems   HENT:  Negative for trouble swallowing.    Eyes:  Negative for visual disturbance.   Musculoskeletal:  Positive for gait problem.   Neurological:  Positive for dizziness, speech difficulty and headache. Negative for facial asymmetry, weakness, light-headedness and numbness.     Past Medical History:   Past Medical History:   Diagnosis Date    Aneurysm 11/26/2022    Cluster headache 2022    Had headaches for several months before stroke and mass was found    Difficulty walking 11/26/22    After stroke    Headache     Headache, tension-type 2022    Hyperlipidemia     Hypertension     Memory loss 11/26/22    Migraine 2022    Stroke 11/26/2022    Vision loss 11/26/22    When dizzy or headache       Past Surgical History:   Past Surgical History:   Procedure Laterality Date    ARTERIAL ANEURYSM REPAIR      BREAST LUMPECTOMY  2012    CYST REMOVAL Left 05/2023    EMBOLIZATION CEREBRAL N/A 11/29/2022    Procedure: CV EMBOLIZATION CEREBRAL IR;  Surgeon: Enoch Savage MD;  Location: [facility] INVASIVE LOCATION;   Service: Interventional Radiology;  Laterality: N/A;    HIATAL HERNIA REPAIR      INTERVENTIONAL RADIOLOGY PROCEDURE N/A 09/15/2023    Procedure: Embolization;  Surgeon: Enoch Savage MD;  Location: [facility] INVASIVE LOCATION;  Service: Interventional Radiology;  Laterality: N/A;    SINUS SURGERY      x4       Family History:   Family History   Problem Relation Age of Onset    No Known Problems Mother     Heart attack Father 42    Heart attack Paternal Aunt     Heart attack Paternal Uncle     No Known Problems Sister     Cancer Maternal Grandmother     Heart failure Maternal Grandmother     No Known Problems Maternal Grandfather     No Known Problems Paternal Grandmother     No Known Problems Paternal Grandfather     Diabetes Half-Brother        Social History:   Social History     Socioeconomic History    Marital status:    Tobacco Use    Smoking status: Former     Packs/day: 1.00     Years: 15.00     Additional pack years: 0.00     Total pack years: 15.00     Types: Cigarettes     Quit date: 10/4/2004     Years since quittin.2     Passive exposure: Past    Smokeless tobacco: Never   Substance and Sexual Activity    Alcohol use: No    Drug use: Never    Sexual activity: Not Currently     Partners: Male     Birth control/protection: Tubal ligation       Medications:     Current Outpatient Medications:     aspirin 81 MG chewable tablet, Chew 1 tablet Every Night., Disp: , Rfl:     atorvastatin (LIPITOR) 80 MG tablet, TAKE 1 TABLET BY MOUTH EVERY NIGHT, Disp: 90 tablet, Rfl: 1    butalbital-acetaminophen-caffeine (ORBIVAN) -40 MG capsule capsule, Take 1 capsule by mouth As Needed., Disp: , Rfl:     clopidogrel (PLAVIX) 75 MG tablet, Take 1 tablet by mouth Daily., Disp: 30 tablet, Rfl: 6    fluticasone (FLONASE) 50 MCG/ACT nasal spray, 2 sprays into the nostril(s) as directed by provider Daily., Disp: 1 bottle, Rfl: 0    LORazepam (ATIVAN) 0.5 MG tablet, Take 1 tablet by mouth Every 8  "(Eight) Hours As Needed for Anxiety., Disp: , Rfl:     lubiprostone (AMITIZA) 8 MCG capsule, Take 1 capsule twice a day by oral route., Disp: , Rfl:     nortriptyline (PAMELOR) 10 MG capsule, Take 1 capsule by mouth Every Night., Disp: 30 capsule, Rfl: 3    olopatadine (Pataday) 0.2 % solution ophthalmic solution, INSTILL 1 DROP INTO AFFECTED EYE(S) BY OPHTHALMIC ROUTE ONCE DAILY, Disp: , Rfl:     ondansetron ODT (ZOFRAN-ODT) 4 MG disintegrating tablet, Place 1 tablet twice a day by translingual route as needed., Disp: , Rfl:     oxybutynin XL (DITROPAN-XL) 5 MG 24 hr tablet, oxybutynin chloride ER 5 mg tablet,extended release 24 hr, Disp: , Rfl:     pantoprazole (PROTONIX) 20 MG EC tablet, , Disp: , Rfl:     sertraline (ZOLOFT) 100 MG tablet, Take 1 tablet every day by oral route., Disp: , Rfl:     topiramate (TOPAMAX) 25 MG tablet, Take 1 tablet by mouth Daily., Disp: 30 tablet, Rfl: 3    Scopolamine 1 MG/3DAYS patch, Place 1 patch on the skin as directed by provider Every 72 (Seventy-Two) Hours., Disp: 10 patch, Rfl: 2    Allergies:   Allergies   Allergen Reactions    Tramadol Other (See Comments)     Flushng and passing out       Objective     Physical Exam:  Vital Signs:   Vitals:    12/18/23 0922   BP: 136/80   Pulse: 75   Temp: 98.4 °F (36.9 °C)   SpO2: 94%   Weight: 54.9 kg (121 lb)   Height: 154.9 cm (60.98\")     Body mass index is 22.87 kg/m².     Physical Exam  Constitutional:       General: She is not in acute distress.     Appearance: Normal appearance. She is normal weight.   HENT:      Head: Normocephalic and atraumatic.      Mouth/Throat:      Pharynx: Oropharynx is clear.   Eyes:      Extraocular Movements: Extraocular movements intact.      Pupils: Pupils are equal, round, and reactive to light.   Pulmonary:      Effort: Pulmonary effort is normal.   Abdominal:      General: Abdomen is flat.   Musculoskeletal:         General: Normal range of motion.      Cervical back: Normal range of motion. "   Skin:     General: Skin is warm and dry.   Neurological:      Mental Status: She is oriented to person, place, and time.      Sensory: No sensory deficit.      Motor: Motor strength is normal.Weakness present.      Gait: Gait abnormal.   Psychiatric:         Mood and Affect: Mood normal.         Speech: Speech normal.         Behavior: Behavior normal.         Thought Content: Thought content normal.         Judgment: Judgment normal.         Neurological Exam  Mental Status  Awake, alert and oriented to person, place and time. Oriented to person, place, time and situation. Oriented to person, place, and time. Speech is normal. Language is fluent with no aphasia. Fund of knowledge is appropriate for level of education.    Cranial Nerves  CN II: Visual fields full to confrontation.  CN III, IV, VI: Extraocular movements intact bilaterally. Pupils equal round and reactive to light bilaterally.  CN V:  Right: Facial sensation is normal.  Left: Facial sensation is normal on the left.  CN VII:  Right: There is no facial weakness.  Left: There is no facial weakness.  CN VIII: Hearing appears to be intact bilaterally .  CN IX, X: Palate elevates symmetrically    Motor  Normal muscle bulk throughout. No fasciculations present. Normal muscle tone. Strength is 5/5 throughout all four extremities.    Sensory  Light touch is normal in upper and lower extremities.     Coordination  Right: Finger-to-nose normal.Left: Finger-to-nose normal.    Gait   Abnormal gait.  Utilizing cane for ambulation assistance .       Imaging Reviewed:   MRI BRAIN WO CONTRAST     Date of Exam: 9/17/2023 11:20 PM EDT     Indication: Giant basilar aneurysm.     Comparison: CT angiogram head 8/21/2023 and brain MRI 12/15/2022.     Technique:  Routine multiplanar/multisequence sequence images of the brain were obtained without contrast administration.      Findings:  There is a new acute/subacute lacunar type infarct in the lateral inferior left  cerebellar hemisphere. No additional diffusion restriction. This area appears T2 hyperintense. There is otherwise mild FLAIR hyperintense signal abnormality in the cerebral   white matter in a nonspecific distribution. There is mild ventriculomegaly, likely result of leukomalacia. There is redemonstration of a large basilar artery aneurysm measuring 24 mm with associated excluding stent. Flow dynamics within the aneurysm   would suggest continued partial flow. There is mass effect exerted upon the jong. There are additional new signal abnormalities in the brain. The other major arterial flow voids appear intact. Orbits are unremarkable. There is mild residual paranasal   sinus mucosal thickening status post sinus surgery. There is a small left mastoid effusion. Calvarial and superficial soft tissue signal appears within normal limits. The midline structures are preserved.     IMPRESSION:  Impression:  1.There is a new acute/subacute lacunar type infarct in the left cerebellar hemisphere.  2.Mild chronic small vessel ischemic change.  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  4.Mild residual paranasal sinus mucosal thickening status post sinus surgery. Small left mastoid effusion.           Electronically Signed: Carlos Bodwen MD    9/18/2023 12:57 AM EDT    Workstation ID: [id number]    DATE OF EXAM: 12/15/2022 9:39 PM     PROCEDURE: MRI BRAIN WO CONTRAST-, MRI ANGIOGRAM HEAD WO CONTRAST-, MRI  ANGIOGRAM NECK W CONTRAST-     INDICATIONS: Stroke, follow up; N30.00-Acute cystitis without hematuria;  R55-Syncope and collapse; R41.841-Cognitive communication deficit     COMPARISON: Same day CTA      TECHNIQUE: Multiplanar multisequence images of the brain were performed  without contrast according to routine brain MRI protocol.      Noncontrast MR angiography of the head was performed.     Contrast enhanced MR angiography of the neck was performed after the  administration of 10 mL of MultiHance contrast.      FINDINGS:   Parenchyma: No evidence of acute infarct. No evidence of hemorrhage.  Midline structures appear intact. No midline shift or herniation. Again  seen is mass effect on the brainstem from large basilar artery aneurysm.  Few scattered periventricular and subcortical white matter FLAIR  hyperintensities suggestive chronic small vessel ischemic changes.  Normal parenchymal volume.     Ventricles and extra axial spaces: Normal caliber of ventricles and  sulci. No extra axial fluid collections.     Other: Orbits appear intact. Trace bilateral mastoid effusions.  Scattered paranasal sinus mucosal thickening. Calvarial marrow signal is  intact.     Head angiogram: The distal carotid, middle cerebral anterior cerebral  and anterior communicate arteries appear patent without flow-limiting  stenosis. The left vertebral artery terminates at the distal V4 segment  and is excluded from the stented area. The right vertebral artery is  patent and is stented into the basilar artery across the basilar artery  aneurysm. The basilar artery appears patent. The posterior cerebral  arteries are patent. The superior cerebellar and posterior inferior  cerebellar arteries are patent. The anterior inferior cerebellar  arteries are not seen. There is residual flow related signal seen into  the aneurysm sac from the basilar artery better appreciated on recent  CTA.     Neck angiogram: The cervical portions of the internal carotid artery and  the common carotid artery are patent. The cervical portions of the  vertebral artery are patent. Again seen is exclusion of the left  vertebral artery from the stented portion of the basilar artery.  Additionally again seen is contrast flow into the aneurysm sac better  evaluated on prior CTA.     IMPRESSION:  No evidence of acute infarct     Again seen is large basilar artery aneurysm. There is stenting extending  from the right distal vertebral artery across the basilar artery. The  left  vertebral artery is excluded from the stenting and terminates at  the distal V4 segment. There is residual flow seen within this to the  aneurysm sac better evaluated on recent CTA.     The remaining vessels of the head and neck demonstrate no occlusion or  flow-limiting stenosis.     This report was finalized on 12/16/2022 1:05 AM by Ever Shelley.    Laboratory Results:     WBC   Date Value Ref Range Status   09/29/2023 10.06 3.40 - 10.80 10*3/mm3 Final     RBC   Date Value Ref Range Status   09/29/2023 4.61 3.77 - 5.28 10*6/mm3 Final     Hemoglobin   Date Value Ref Range Status   09/29/2023 14.8 12.0 - 15.9 g/dL Final     Hematocrit   Date Value Ref Range Status   09/29/2023 44.0 34.0 - 46.6 % Final     MCV   Date Value Ref Range Status   09/29/2023 95.4 79.0 - 97.0 fL Final     MCH   Date Value Ref Range Status   09/29/2023 32.1 26.6 - 33.0 pg Final     MCHC   Date Value Ref Range Status   09/29/2023 33.6 31.5 - 35.7 g/dL Final     RDW   Date Value Ref Range Status   09/29/2023 13.5 12.3 - 15.4 % Final     RDW-SD   Date Value Ref Range Status   09/29/2023 47.9 37.0 - 54.0 fl Final     MPV   Date Value Ref Range Status   09/29/2023 10.8 6.0 - 12.0 fL Final     Platelets   Date Value Ref Range Status   09/29/2023 351 140 - 450 10*3/mm3 Final     Neutrophil %   Date Value Ref Range Status   09/29/2023 63.1 42.7 - 76.0 % Final     Lymphocyte %   Date Value Ref Range Status   09/29/2023 26.9 19.6 - 45.3 % Final     Monocyte %   Date Value Ref Range Status   09/29/2023 6.9 5.0 - 12.0 % Final     Eosinophil %   Date Value Ref Range Status   09/29/2023 1.9 0.3 - 6.2 % Final     Basophil %   Date Value Ref Range Status   09/29/2023 0.9 0.0 - 1.5 % Final     Immature Grans %   Date Value Ref Range Status   09/29/2023 0.3 0.0 - 0.5 % Final     Neutrophils, Absolute   Date Value Ref Range Status   09/29/2023 6.35 1.70 - 7.00 10*3/mm3 Final     Lymphocytes, Absolute   Date Value Ref Range Status   09/29/2023 2.71 0.70 -  3.10 10*3/mm3 Final     Monocytes, Absolute   Date Value Ref Range Status   2023 0.69 0.10 - 0.90 10*3/mm3 Final     Eosinophils, Absolute   Date Value Ref Range Status   2023 0.19 0.00 - 0.40 10*3/mm3 Final     Basophils, Absolute   Date Value Ref Range Status   2023 0.09 0.00 - 0.20 10*3/mm3 Final     Immature Grans, Absolute   Date Value Ref Range Status   2023 0.03 0.00 - 0.05 10*3/mm3 Final     nRBC   Date Value Ref Range Status   2023 0.0 0.0 - 0.2 /100 WBC Final     Lab Results   Component Value Date    GLUCOSE 99 2023    BUN 16 2023    CREATININE 0.89 2023    EGFR 73.9 2023    BCR 18.0 2023    K 3.6 2023    CO2 28.0 2023    CALCIUM 9.1 2023    ALBUMIN 4.5 2023    BILITOT 0.2 2023    AST 26 2023    ALT 41 (H) 2023     Lipid Panel          2023    06:10   Lipid Panel   Total Cholesterol 111    Triglycerides 87    HDL Cholesterol 40    VLDL Cholesterol 17    LDL Cholesterol  54    LDL/HDL Ratio 1.34      Hemoglobin A1c 5.7 (2023)  P2 Y12: 55 (2023)    Modified Jose Roberto Score     MODIFIED JOSE ROBERTO SCALE (to be assessed for each patient having history of stroke) []Stroke history but not assessed  []0: No symptoms at all  [x]1: No significant disability despite symptoms  []2: Slight disability  []3: Moderate disability  []4: Moderately severe disability  []5: Severe disability  []6: Death        NIH Stroke Scale    1a  Level of consciousness: 0=alert; keenly responsive   1b. LOC questions:  0=Answers both questions correctly    1c. LOC commands: 0=Performs both tasks correctly   2.  Best Gaze: 0=normal   3. Visual: 0=No visual loss   4. Facial Palsy: 0=Normal symmetric movement   5a. Motor left arm: 0=No drift, limb holds 90 (or 45) degrees for full 10 seconds   5b.  Motor right arm: 0=No drift, limb holds 90 (or 45) degrees for full 10 seconds   6a. Motor left le=No drift, limb holds 90 (or 45) degrees  "for full 10 seconds   6b  Motor right le=No drift, limb holds 90 (or 45) degrees for full 10 seconds   7. Limb Ataxia: 0=Absent   8.  Sensory: 0=Normal; no sensory loss   9. Best Language:  0=No aphasia, normal   10. Dysarthria: 0=Normal   11. Extinction and Inattention: 0=No abnormality    Total:   0       PHQ-9 Depression Screening  Little interest or pleasure in doing things? 0-->not at all   Feeling down, depressed, or hopeless? 1-->several days   Trouble falling or staying asleep, or sleeping too much?     Feeling tired or having little energy?     Poor appetite or overeating?     Feeling bad about yourself - or that you are a failure or have let yourself or your family down?     Trouble concentrating on things, such as reading the newspaper or watching television?     Moving or speaking so slowly that other people could have noticed? Or the opposite - being so fidgety or restless that you have been moving around a lot more than usual?     Thoughts that you would be better off dead, or of hurting yourself in some way?     PHQ-9 Total Score 1   If you checked off any problems, how difficult have these problems made it for you to do your work, take care of things at home, or get along with other people?       STOP-Bang Questionnaire :  STOP-Bang Score  Have you been diagnosed with Sleep Apnea?: no  Snoring?: no  Tired?: no  Observed?: no  Pressure?: no  Stop Score: 0  Body Mass Index more than 35 kg/m2?: no  Age older than 50 year old?: yes  Neck large? \">17\"/43cm-M, >16\"/41cm-F: no  Gender=Male?: no  Total Stop-Bang Score: 1       STEADI Fall Risk Clinician Key Questions   Have you fallen in the past year?: Yes  Do you feel unsteady with walking?: Yes  Are you worried about falling?: No .     Assessment / Plan      Assessment/Plan:   Diagnoses and all orders for this visit:    1. History of CVA (cerebrovascular accident) (Primary)  Initial stroke  with secondary asymptomatic stroke after aneurysm repair " 9/2023  -Continue DAPT  -High intensity statin  -heart healthy diet  -Increase physical activity as tolerated   -Continue PT per their recommendations     2. Cerebral aneurysm, nonruptured  -Initial pipeline flow diverter 11/2022  -Secondary intervention 9/2023  -Management and recommendations per Dr. Savage   -F/U March with MRI brain     3. Mixed hyperlipidemia  -Goal LDL <70  -Last LDL 54  -Continue Lipitor 80mg nightly    4. HTN  -Goal BP <140/80  -BP today in clinic 136/80  -Continue management per PCP     5. Other headache syndrome  -     Ambulatory Referral to Neurology: first available for assistance with headache management   -     nortriptyline (PAMELOR) 10 MG capsule; Take 1 capsule by mouth Every Night.  Dispense: 30 capsule; Refill: 3  - Continue Topamax  - Continue Fioricet as needed     6. 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  Dispense: 10 patch; Refill: 2  -Continue therapy as needed per their recommendations  -Discussed slow position change (lying, sitting, standing)     Stroke Plan:    Stroke Education   Blood Pressure control to < 130/80   Goal LDL <70-recommend high dose statins   Serum Glucose < 140   Call 911 for stroke any stroke symptoms    Discussed the importance of medication compliance Plavix 75mg daily, Aspirin 81mg daily, and Atorvastatin 80mg nightly and lifestyle modifications adequate control of blood pressure, adequate control of cholesterol (goal LDL <70), adequate control of glucose (<140, A1c goal <7), increased physical activity, and implementation of healthy diet to help reduce the risk of future cerebrovascular events.  Also discussed the signs symptoms that would warrant the patient return back to the emergency department including unilateral weakness, unilateral numbness, visual disturbances, loss of balance, speech difficulties, and/or a  sudden severe headache.  Patient verbalized understanding.    Follow Up:   Return in about 2 months (around 2/18/2024) for Recheck.    CAILIN Monterroso  [facility] – [place], [place] [facility]

## 2024-01-06 ENCOUNTER — APPOINTMENT (OUTPATIENT)
Dept: CT IMAGING | Facility: HOSPITAL | Age: 64
End: 2024-01-06
Payer: COMMERCIAL

## 2024-01-06 ENCOUNTER — HOSPITAL ENCOUNTER (EMERGENCY)
Facility: HOSPITAL | Age: 64
Discharge: HOME | End: 2024-01-06
Attending: EMERGENCY MEDICINE
Payer: COMMERCIAL

## 2024-01-06 VITALS
SYSTOLIC BLOOD PRESSURE: 132 MMHG | WEIGHT: 122 LBS | DIASTOLIC BLOOD PRESSURE: 75 MMHG | OXYGEN SATURATION: 98 % | TEMPERATURE: 98.6 F | HEART RATE: 64 BPM | BODY MASS INDEX: 23.03 KG/M2 | RESPIRATION RATE: 12 BRPM | HEIGHT: 61 IN

## 2024-01-06 DIAGNOSIS — R51.9 RECURRENT HEADACHE: Primary | ICD-10-CM

## 2024-01-06 LAB
CREAT BLDA-MCNC: 0.7 MG/DL (ref 0.6–1.3)
HOLD SPECIMEN: NORMAL
WHOLE BLOOD HOLD COAG: NORMAL
WHOLE BLOOD HOLD SPECIMEN: NORMAL

## 2024-01-06 PROCEDURE — 96375 TX/PRO/DX INJ NEW DRUG ADDON: CPT

## 2024-01-06 PROCEDURE — 25010000002 ONDANSETRON PER 1 MG: Performed by: PHYSICIAN ASSISTANT

## 2024-01-06 PROCEDURE — 99285 EMERGENCY DEPT VISIT HI MDM: CPT

## 2024-01-06 PROCEDURE — 96374 THER/PROPH/DIAG INJ IV PUSH: CPT

## 2024-01-06 PROCEDURE — 70450 CT HEAD/BRAIN W/O DYE: CPT

## 2024-01-06 PROCEDURE — 25810000003 SODIUM CHLORIDE 0.9 % SOLUTION: Performed by: PHYSICIAN ASSISTANT

## 2024-01-06 PROCEDURE — 70496 CT ANGIOGRAPHY HEAD: CPT

## 2024-01-06 PROCEDURE — 25510000001 IOPAMIDOL PER 1 ML: Performed by: EMERGENCY MEDICINE

## 2024-01-06 PROCEDURE — 82565 ASSAY OF CREATININE: CPT

## 2024-01-06 PROCEDURE — 25010000002 HYDROMORPHONE PER 4 MG: Performed by: EMERGENCY MEDICINE

## 2024-01-06 RX ORDER — ONDANSETRON 2 MG/ML
4 INJECTION INTRAMUSCULAR; INTRAVENOUS ONCE
Status: COMPLETED | OUTPATIENT
Start: 2024-01-06 | End: 2024-01-06

## 2024-01-06 RX ORDER — HYDROMORPHONE HYDROCHLORIDE 1 MG/ML
0.5 INJECTION, SOLUTION INTRAMUSCULAR; INTRAVENOUS; SUBCUTANEOUS ONCE
Status: COMPLETED | OUTPATIENT
Start: 2024-01-06 | End: 2024-01-06

## 2024-01-06 RX ORDER — SODIUM CHLORIDE 0.9 % (FLUSH) 0.9 %
10 SYRINGE (ML) INJECTION AS NEEDED
Status: DISCONTINUED | OUTPATIENT
Start: 2024-01-06 | End: 2024-01-06 | Stop reason: HOSPADM

## 2024-01-06 RX ORDER — METOCLOPRAMIDE 5 MG/1
5 TABLET ORAL 2 TIMES DAILY PRN
Qty: 10 TABLET | Refills: 0 | Status: SHIPPED | OUTPATIENT
Start: 2024-01-06

## 2024-01-06 RX ADMIN — ONDANSETRON 4 MG: 2 INJECTION INTRAMUSCULAR; INTRAVENOUS at 18:36

## 2024-01-06 RX ADMIN — SODIUM CHLORIDE 1000 ML: 9 INJECTION, SOLUTION INTRAVENOUS at 17:48

## 2024-01-06 RX ADMIN — HYDROMORPHONE HYDROCHLORIDE 0.5 MG: 1 INJECTION, SOLUTION INTRAMUSCULAR; INTRAVENOUS; SUBCUTANEOUS at 18:36

## 2024-01-06 RX ADMIN — IOPAMIDOL 75 ML: 755 INJECTION, SOLUTION INTRAVENOUS at 19:14

## 2024-01-07 LAB — CREAT BLDA-MCNC: 0.7 MG/DL (ref 0.6–1.3)

## 2024-01-07 NOTE — ED PROVIDER NOTES
Subjective   History of Present Illness  Ms. Romo is a 63 yr old female with history of hypertension, hyperlipidemia, tension headaches, prior basilar artery aneurysm treated with stenting (Dr. Jansen, 2022), who presents to the emergency department with recurrent frontal and right temporal headaches for the past 1 week.  The patient states that she has been taking Topamax and nortriptyline without relief.  She takes Fioricet on a as needed basis but it has not helped either.  She has no associated nausea or vomiting.  No fever or chills.  No numbness or weakness in the extremities.      Review of Systems   Constitutional:  Negative for chills and fever.   HENT:  Negative for sore throat.    Eyes:  Negative for visual disturbance.   Respiratory:  Negative for cough and shortness of breath.    Cardiovascular:  Negative for chest pain and palpitations.   Gastrointestinal:  Negative for abdominal pain, nausea and vomiting.   Genitourinary:  Negative for dysuria.   Musculoskeletal:  Negative for back pain and neck pain.   Skin:  Negative for rash.   Allergic/Immunologic: Negative for immunocompromised state.   Neurological:  Positive for headaches. Negative for dizziness, seizures, speech difficulty, weakness, light-headedness and numbness.   Hematological:  Does not bruise/bleed easily.   Psychiatric/Behavioral:  Negative for confusion.        Past Medical History:   Diagnosis Date    Aneurysm 11/26/2022    Cluster headache 2022    Had headaches for several months before stroke and mass was found    Difficulty walking 11/26/22    After stroke    Headache     Headache, tension-type 2022    Hyperlipidemia     Hypertension     Memory loss 11/26/22    Migraine 2022    Stroke 11/26/2022    Vision loss 11/26/22    When dizzy or headache       Allergies   Allergen Reactions    Tramadol Other (See Comments)     Flushng and passing out       Past Surgical History:   Procedure Laterality Date    ARTERIAL ANEURYSM REPAIR       BREAST LUMPECTOMY  2012    CYST REMOVAL Left 2023    EMBOLIZATION CEREBRAL N/A 2022    Procedure: CV EMBOLIZATION CEREBRAL IR;  Surgeon: Enoch Savage MD;  Location:  [facility];  Service: Interventional Radiology;  Laterality: N/A;    HIATAL HERNIA REPAIR  2015    INTERVENTIONAL RADIOLOGY PROCEDURE N/A 09/15/2023    Procedure: Embolization;  Surgeon: Enoch Savage MD;  Location:  [facility];  Service: Interventional Radiology;  Laterality: N/A;    SINUS SURGERY      x4       Family History   Problem Relation Age of Onset    No Known Problems Mother     Heart attack Father 42    Heart attack Paternal Aunt     Heart attack Paternal Uncle     No Known Problems Sister     Cancer Maternal Grandmother     Heart failure Maternal Grandmother     No Known Problems Maternal Grandfather     No Known Problems Paternal Grandmother     No Known Problems Paternal Grandfather     Diabetes Half-Brother        Social History     Socioeconomic History    Marital status:    Tobacco Use    Smoking status: Former     Packs/day: 1.00     Years: 15.00     Additional pack years: 0.00     Total pack years: 15.00     Types: Cigarettes     Quit date: 10/4/2004     Years since quittin.2     Passive exposure: Past    Smokeless tobacco: Never   Substance and Sexual Activity    Alcohol use: No    Drug use: Never    Sexual activity: Not Currently     Partners: Male     Birth control/protection: Tubal ligation           Objective   Physical Exam  Constitutional:       General: She is not in acute distress.     Appearance: Normal appearance. She is not ill-appearing, toxic-appearing or diaphoretic.   HENT:      Head: Normocephalic.      Right Ear: Tympanic membrane normal.      Left Ear: Tympanic membrane normal.      Nose: Nose normal.      Mouth/Throat:      Mouth: Mucous membranes are moist.   Eyes:      Extraocular Movements: Extraocular movements intact.      Conjunctiva/sclera:  Conjunctivae normal.      Pupils: Pupils are equal, round, and reactive to light.   Cardiovascular:      Rate and Rhythm: Normal rate and regular rhythm.      Pulses: Normal pulses.      Heart sounds: Normal heart sounds.   Pulmonary:      Effort: Pulmonary effort is normal.      Breath sounds: Normal breath sounds.   Abdominal:      General: Bowel sounds are normal.      Tenderness: There is no abdominal tenderness.   Musculoskeletal:      Cervical back: Neck supple. No rigidity.      Right lower leg: No edema.      Left lower leg: No edema.   Skin:     General: Skin is warm and dry.      Findings: No rash.   Neurological:      General: No focal deficit present.      Mental Status: She is alert and oriented to person, place, and time.   Psychiatric:         Mood and Affect: Mood normal.         Procedures           ED Course      In summary, 63-year-old female with history of hypertension, hyperlipidemia, cluster headaches, prior basilar artery brain aneurysm stented in 2022 (Dr. Ngo), presents with recurrent frontal and right temporal headaches for the past week.  She has had no relief of her symptoms with Topamax or nortriptyline or Fioricet.  She has no associated nausea or vomiting.  No fever or chills.  No numbness or weakness.    MDM: Differential includes cluster headaches, recurrent aneurysm, migraine, etc.    Patient sent for CT brain and CTA brain.  Patient given a dose of IV fluids, antiemetics and small dose of Dilaudid.    CT angiiogram head:  The bilateral intracranial internal carotid, bilateral middle cerebral, bilateral anterior cerebral, and anterior communicating arteries are widely patent. The bilateral intracranial vertebral and bilateral posterior cerebral arteries are widely patent.   A stent within the basilar artery is widely patent. An excluded aneurysm sac appears stable in caliber. As noted on prior angiography there is some extravasation of contrast along the left side of the stent  similar to prior exam. There are patent   bilateral posterior communicating arteries. The visualized portions of the bilateral superior cerebellar, anteroinferior cerebellar, and anterior inferior cerebellar arteries are unremarkable.     IMPRESSION:  Impression:  1.Major intracranial arterial vasculature is widely patent.  2.Stable widely patent stent within basilar artery. Stable excluded aneurysm sac and stable extravasation of contrast along left side of the stent.    CT head:  1.No acute intracranial process identified.  2.Findings suggestive of mild chronic small vessel ischemic disease.  3.Stable stent traversing basilar artery with stable excluded aneurysm.  4.Moderate paranasal sinus mucosal disease.    20:15 EST  I spoke with the pt about  her workup.  She states her headache is resolved after meds and fluids here.  She states she has been referred to a headache clinic and will follow up with them.                                               Medical Decision Making  Amount and/or Complexity of Data Reviewed  Labs: ordered.  Radiology: ordered.    Risk  Prescription drug management.        Final diagnoses:   Recurrent headache       ED Disposition  ED Disposition       ED Disposition   Discharge    Condition   Stable    Comment   --               Melissa Lazo, APRN  [address]  [facility] [address]  [phone]      call on Monday for follow up    [facility] EMERGENCY DEPARTMENT  [address] [place] [address] [phone]    If symptoms worsen      follow up with your headache clinic previously referred to             Medication List        New Prescriptions      metoclopramide 5 MG tablet  Commonly known as: REGLAN  Take 1 tablet by mouth 2 (Two) Times a Day As Needed (headaches).               Where to Get Your Medications        These medications were sent to [facility] [address] [phone] [facility] [phone] FX   [address] [place] [id number]      Phone: [phone]   metoclopramide 5 MG tablet            Alfred Moreno, PA  01/06/24 2015

## 2024-01-07 NOTE — DISCHARGE INSTRUCTIONS
Rest.  Follow up with your PCP and with headache clinic previously referred to.  Continue current meds.  Reglan as prescribed if your current meds don't help.  Return if worse.

## 2024-01-22 ENCOUNTER — OFFICE VISIT (OUTPATIENT)
Dept: NEUROLOGY | Facility: CLINIC | Age: 64
End: 2024-01-22
Payer: COMMERCIAL

## 2024-01-22 VITALS
DIASTOLIC BLOOD PRESSURE: 78 MMHG | OXYGEN SATURATION: 99 % | SYSTOLIC BLOOD PRESSURE: 142 MMHG | TEMPERATURE: 97.5 F | HEART RATE: 77 BPM | WEIGHT: 122 LBS | BODY MASS INDEX: 23.03 KG/M2 | HEIGHT: 61 IN

## 2024-01-22 DIAGNOSIS — F33.1 MODERATE RECURRENT MAJOR DEPRESSION: Primary | ICD-10-CM

## 2024-01-22 PROCEDURE — 99214 OFFICE O/P EST MOD 30 MIN: CPT | Performed by: NURSE PRACTITIONER

## 2024-01-22 RX ORDER — TOPIRAMATE 50 MG/1
TABLET, FILM COATED ORAL
COMMUNITY

## 2024-01-22 NOTE — PROGRESS NOTES
Follow Up Office Visit      Encounter Date: 2024   Patient Name: Lauryn Romo  : 1960   MRN: [id number]   PCP: Melissa Lazo APRN    Chief Complaint:  No chief complaint on file.      History of Present Illness:Lauryn Romo is a 63 y.o. female who is here today to establish care.  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  She has been followed inpatient by our service on several admissions dating back to 2022.  Her last admission was noted to be 2023 where she was diagnosed with a new asymptomatic left cerebellar stroke most likely secondary to her basilar artery aneurysm.  She is followed outpatient by Dr. Savage for her basilar aneurysm.  Per his last note in November of this year he reports that she would like to continue conservative management and will remain on dual antiplatelet therapy.  His plan is to see her back in March of next year with MRI brain with and without contrast to assess for any change in aneurysm size that might necessitate further treatment/intervention.     She continues to struggle with unsteady gait and dizziness.  This does significantly limit her mobility at times. She reports that she has not been seen for headaches. She had improvement with her headache. She has difficulty at times with even completing her therapy. She reports that she has had difficulty with completing basic tasks at time. She has been released to continue PT.      Clinic Visit 2023:  She tells me that approximately a week ago she was in her tub soaking when she had difficulty adjusting in the tub and had a fall over in the tub. She denies any injury. She reports that secondary to constipation that she was recently told to not continue her Nortriptyline (she has been on this medication  approximately 1 year). She has also been utilizing PRN Fioricet to provide her with some relief. She has been compliant on the Topamax that she was discharged with, but feels that she would able to get more relief with more medication assistance.      She has taken Meclizine in the past but she reports that she felt like this made her dizziness worse. She has never tried another medication treatment to assist her. She reports that she has been compliant with her therapy. She tells me that daily in the afternoons that she has to lay down and nap for an hour or two to ensure that she can make it through the rest of the day. She tells me that she is very thankful that she does not have any worse symptoms than what she does.     Clinic Visit 1/22/24: Patient presents to clinic accompanied by her . She ambulates with a walker. She becomes very emotionally overwhelmed during appointment and often is tearful which is appropriate given all she has been through. She reports depression and anxiety. She is taking Zoloft 100 mg and has Ativan that she can use for anxiety Q8H PRN. She reports it has helped some but still very much a problem. I will place a behavioral health referral to see if any further medication adjustments or changes may be helpful. She also reports headaches almost daily, 5/10. She has been taking fiorcet PRN, amytripyline nightly and Topamax. PCP recently increased Topamax from 25 mg to 50 mg daily. She is set to see PCP in beginning of February and if this change has not been successful then they will increase Topamax to 50 mg BID. She has an appointment in the migraine clinic in March for long term management. She has an appointent with Dr. Savage with neurointervention in March with repeat MRI brain w/wo contrast to evaluate for need for any further intervention. Per Dr. Savage's recommendation patient is maintained on DAPT (ASA 81 + Plavix 75 mg). She has not had any new stroke like symptoms.  Continued dizziness and headaches.     Subjective        I have reviewed and the following portions of the patient's history were updated as appropriate: past family history, past medical history, past social history, past surgical history and problem list.    Medications:     Current Outpatient Medications:     aspirin 81 MG chewable tablet, Chew 1 tablet Every Night., Disp: , Rfl:     atorvastatin (LIPITOR) 80 MG tablet, TAKE 1 TABLET BY MOUTH EVERY NIGHT, Disp: 90 tablet, Rfl: 1    butalbital-acetaminophen-caffeine (ORBIVAN) -40 MG capsule capsule, Take 1 capsule by mouth As Needed., Disp: , Rfl:     clopidogrel (PLAVIX) 75 MG tablet, Take 1 tablet by mouth Daily., Disp: 30 tablet, Rfl: 6    fluticasone (FLONASE) 50 MCG/ACT nasal spray, 2 sprays into the nostril(s) as directed by provider Daily., Disp: 1 bottle, Rfl: 0    LORazepam (ATIVAN) 0.5 MG tablet, Take 1 tablet by mouth Every 8 (Eight) Hours As Needed for Anxiety., Disp: , Rfl:     lubiprostone (AMITIZA) 8 MCG capsule, Take 1 capsule twice a day by oral route., Disp: , Rfl:     metoclopramide (REGLAN) 5 MG tablet, Take 1 tablet by mouth 2 (Two) Times a Day As Needed (headaches)., Disp: 10 tablet, Rfl: 0    nortriptyline (PAMELOR) 10 MG capsule, Take 1 capsule by mouth Every Night., Disp: 30 capsule, Rfl: 3    olopatadine (Pataday) 0.2 % solution ophthalmic solution, INSTILL 1 DROP INTO AFFECTED EYE(S) BY OPHTHALMIC ROUTE ONCE DAILY, Disp: , Rfl:     ondansetron ODT (ZOFRAN-ODT) 4 MG disintegrating tablet, Place 1 tablet twice a day by translingual route as needed., Disp: , Rfl:     oxybutynin XL (DITROPAN-XL) 5 MG 24 hr tablet, oxybutynin chloride ER 5 mg tablet,extended release 24 hr, Disp: , Rfl:     pantoprazole (PROTONIX) 20 MG EC tablet, , Disp: , Rfl:     Scopolamine 1 MG/3DAYS patch, Place 1 patch on the skin as directed by provider Every 72 (Seventy-Two) Hours., Disp: 10 patch, Rfl: 2    sertraline (ZOLOFT) 100 MG tablet, Take 1 tablet every  "day by oral route., Disp: , Rfl:     topiramate (TOPAMAX) 50 MG tablet, Take 1 tablet every day by oral route at bedtime for 30 days., Disp: , Rfl:     topiramate (TOPAMAX) 25 MG tablet, Take 1 tablet by mouth Daily. (Patient not taking: Reported on 1/22/2024), Disp: 30 tablet, Rfl: 3    Allergies:   Allergies   Allergen Reactions    Tramadol Other (See Comments)     Flushng and passing out       Objective     Physical Exam:   Vital Signs:   Vitals:    01/22/24 1114   BP: 142/78   Pulse: 77   Temp: 97.5 °F (36.4 °C)   SpO2: 99%   Weight: 55.3 kg (122 lb)   Height: 154.9 cm (60.98\")     Body mass index is 23.06 kg/m².    Physical Exam  Vitals and nursing note reviewed.   Constitutional:       General: She is awake. She is not in acute distress.     Appearance: Normal appearance. She is not ill-appearing.      Comments:  female    HENT:      Head: Normocephalic and atraumatic.      Nose: Nose normal.      Mouth/Throat:      Mouth: Mucous membranes are moist.   Eyes:      General: Lids are normal.      Extraocular Movements: Extraocular movements intact.      Pupils: Pupils are equal, round, and reactive to light.   Cardiovascular:      Rate and Rhythm: Normal rate and regular rhythm.      Pulses: Normal pulses.   Pulmonary:      Effort: Pulmonary effort is normal. No respiratory distress.   Skin:     General: Skin is warm and dry.   Neurological:      Mental Status: She is alert and oriented to person, place, and time.      Motor: Weakness present.      Comments: LLE 4+/5   Psychiatric:         Mood and Affect: Mood normal.         Speech: Speech normal.         Behavior: Behavior normal.       Neurological Exam  Mental Status  Awake and alert. Oriented to person, place and time. Oriented to person, place, and time. Speech is normal. Language is fluent with no aphasia. Attention and concentration are normal. Fund of knowledge is appropriate for level of education.    Cranial Nerves  CN II: Right visual " acuity: Counts fingers. Left visual acuity: Counts fingers. Visual fields full to confrontation.  CN III, IV, VI: Extraocular movements intact bilaterally. Normal lids and orbits bilaterally. Pupils equal round and reactive to light bilaterally.  CN V: Facial sensation is normal.  CN VII: Full and symmetric facial movement.  CN VIII: Hearing is normal to speech .  CN XI: Shoulder shrug strength is normal.    Motor  Normal muscle bulk throughout. No fasciculations present. Normal muscle tone. Strength is 5/5 in all four extremities except as noted.  LLE 4+/5.    Sensory  Light touch is normal in upper and lower extremities.     Coordination  Right: Finger-to-nose normal.Left: Finger-to-nose normal.  No obvious dysmetria .    Gait    Arrives in WC .       Modified [place] Score: 2-3        0  No Symptoms    1 No significant disability. Able to carry out all usual activities, despite some symptoms.    2 Slight disability. Able to look after own affairs without assistance, but unable to carry out all previous activities.    3 Moderate disability. Requires some help, but able to walk unassisted.    4 Moderately severe disability. Unable to attend to own bodily needs without assistance, and unable to walk unassisted.    5 Severe disability. Requires constant nursing care and attention, bedridden, incontinent.    6 Dead      PHQ-9 Depression Screening  Little interest or pleasure in doing things? 0-->not at all   Feeling down, depressed, or hopeless? 1-->several days   Trouble falling or staying asleep, or sleeping too much?     Feeling tired or having little energy?     Poor appetite or overeating?     Feeling bad about yourself - or that you are a failure or have let yourself or your family down?     Trouble concentrating on things, such as reading the newspaper or watching television?     Moving or speaking so slowly that other people could have noticed? Or the opposite - being so fidgety or restless that you have been  moving around a lot more than usual?     Thoughts that you would be better off dead, or of hurting yourself in some way?     PHQ-9 Total Score 1   If you checked off any problems, how difficult have these problems made it for you to do your work, take care of things at home, or get along with other people?         [facility] Fall Risk Clinician Key Questions   Have you fallen in the past year?: Yes  Do you feel unsteady with walking?: Yes  Are you worried about falling?: No      MRI BRAIN WO CONTRAST     Date of Exam: 9/17/2023 11:20 PM EDT     Indication: Giant basilar aneurysm.     Comparison: CT angiogram head 8/21/2023 and brain MRI 12/15/2022.     Technique:  Routine multiplanar/multisequence sequence images of the brain were obtained without contrast administration.      Findings:  There is a new acute/subacute lacunar type infarct in the lateral inferior left cerebellar hemisphere. No additional diffusion restriction. This area appears T2 hyperintense. There is otherwise mild FLAIR hyperintense signal abnormality in the cerebral   white matter in a nonspecific distribution. There is mild ventriculomegaly, likely result of leukomalacia. There is redemonstration of a large basilar artery aneurysm measuring 24 mm with associated excluding stent. Flow dynamics within the aneurysm   would suggest continued partial flow. There is mass effect exerted upon the jong. There are additional new signal abnormalities in the brain. The other major arterial flow voids appear intact. Orbits are unremarkable. There is mild residual paranasal   sinus mucosal thickening status post sinus surgery. There is a small left mastoid effusion. Calvarial and superficial soft tissue signal appears within normal limits. The midline structures are preserved.     IMPRESSION:  Impression:  1.There is a new acute/subacute lacunar type infarct in the left cerebellar hemisphere.  2.Mild chronic small vessel ischemic change.  3.Large basilar artery  aneurysm with associated stent.  4.Mild residual paranasal sinus mucosal thickening status post sinus surgery. Small left mastoid effusion.           Electronically Signed: Carlos Bowden MD    9/18/2023 12:57 AM EDT    Workstation ID: [id number]     DATE OF EXAM: 12/15/2022 9:39 PM     PROCEDURE: MRI BRAIN WO CONTRAST-, MRI ANGIOGRAM HEAD WO CONTRAST-, MRI  ANGIOGRAM NECK W CONTRAST-     INDICATIONS: Stroke, follow up; N30.00-Acute cystitis without hematuria;  R55-Syncope and collapse; R41.841-Cognitive communication deficit     COMPARISON: Same day CTA      TECHNIQUE: Multiplanar multisequence images of the brain were performed  without contrast according to routine brain MRI protocol.      Noncontrast MR angiography of the head was performed.     Contrast enhanced MR angiography of the neck was performed after the  administration of 10 mL of MultiHance contrast.     FINDINGS:   Parenchyma: No evidence of acute infarct. No evidence of hemorrhage.  Midline structures appear intact. No midline shift or herniation. Again  seen is mass effect on the brainstem from large basilar artery aneurysm.  Few scattered periventricular and subcortical white matter FLAIR  hyperintensities suggestive chronic small vessel ischemic changes.  Normal parenchymal volume.     Ventricles and extra axial spaces: Normal caliber of ventricles and  sulci. No extra axial fluid collections.     Other: Orbits appear intact. Trace bilateral mastoid effusions.  Scattered paranasal sinus mucosal thickening. Calvarial marrow signal is  intact.     Head angiogram: The distal carotid, middle cerebral anterior cerebral  and anterior communicate arteries appear patent without flow-limiting  stenosis. The left vertebral artery terminates at the distal V4 segment  and is excluded from the stented area. The right vertebral artery is  patent and is stented into the basilar artery across the basilar artery  aneurysm. The basilar artery appears patent. The  posterior cerebral  arteries are patent. The superior cerebellar and posterior inferior  cerebellar arteries are patent. The anterior inferior cerebellar  arteries are not seen. There is residual flow related signal seen into  the aneurysm sac from the basilar artery better appreciated on recent  CTA.     Neck angiogram: The cervical portions of the internal carotid artery and  the common carotid artery are patent. The cervical portions of the  vertebral artery are patent. Again seen is exclusion of the left  vertebral artery from the stented portion of the basilar artery.  Additionally again seen is contrast flow into the aneurysm sac better  evaluated on prior CTA.     IMPRESSION:  No evidence of acute infarct     Again seen is large basilar artery aneurysm. There is stenting extending  from the right distal vertebral artery across the basilar artery. The  left vertebral artery is excluded from the stenting and terminates at  the distal V4 segment. There is residual flow seen within this to the  aneurysm sac better evaluated on recent CTA.     The remaining vessels of the head and neck demonstrate no occlusion or  flow-limiting stenosis.     This report was finalized on 12/16/2022 1:05 AM by Ever Shelley.     Lipid Panel          9/18/2023    06:10   Lipid Panel   Total Cholesterol 111    Triglycerides 87    HDL Cholesterol 40    VLDL Cholesterol 17    LDL Cholesterol  54    LDL/HDL Ratio 1.34       Most Recent A1C          9/18/2023    06:10   HGBA1C Most Recent   Hemoglobin A1C 5.70         Assessment / Plan      Assessment/Plan:   Diagnoses and all orders for this visit:     1. History of CVA (cerebrovascular accident) (Primary)  -Initial stroke 2022 with secondary asymptomatic stroke after aneurysm repair 9/2023  -Continue DAPT per Dr. Savage's recommendations   -High intensity statin  -Heart healthy diet  -Increase physical activity as tolerated   -Continue PT/OT per their recommendations   -follow up in  stroke clinic in 4 months      2. Cerebral aneurysm, nonruptured  -Initial pipeline flow diverter 11/2022  -Secondary intervention 9/2023  -Management and recommendations per Dr. Savage.  -F/U 3/23/24 with MRI brain w/wo prior     3. Mixed hyperlipidemia  -Goal LDL <70  -Last LDL 54  -Continue Lipitor 80mg nightly     4. HTN  -Goal BP <140/80  -BP today in clinic 142/78  -Continue management per PCP      5. Other headache syndrome  -     Ambulatory Referral to Neurology with Caleb SIMEON 3/12/24  -     continue nortriptyline (PAMELOR) 10 MG capsule; Take 1 capsule by mouth Every Night.    - Topamax recently increased from 25 to 50 mg daily. Has appointment for follow up with PCP to potentially increase to 50 BID at beginning of February. I can inquire with Dr. Fuentes if this can be increased sooner  - Continue Fioricet as needed      6. 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 Depression and anxiety   -currently on Zoloft 100 mg daily and Ativan 0.5 mg Q8H PRN   -have referred patient to behavioral health to see if they have long term recommendations on medication management      Stroke Plan:               Stroke Education              Blood Pressure control to < 130/80              Goal LDL <70-recommend high dose statins              Serum Glucose < 140              Call 911 for stroke any stroke symptoms     Discussed the importance of medication compliance Plavix 75mg daily, Aspirin 81mg daily, and Atorvastatin 80mg nightly and lifestyle modifications adequate control of blood pressure, adequate control of cholesterol (goal LDL <70), adequate control of glucose (<140, A1c goal <7), increased physical activity, and implementation of healthy diet to help reduce the risk of future cerebrovascular events.  Also discussed the signs symptoms that would warrant the  patient return back to the emergency department including unilateral weakness, unilateral numbness, visual disturbances, loss of balance, speech difficulties, and/or a sudden severe headache.  Patient verbalized understanding.     Follow Up:   Return in about 4 months (around 5/22/2024).    CAILIN Walls  [facility]

## 2024-01-24 ENCOUNTER — PATIENT ROUNDING (BHMG ONLY) (OUTPATIENT)
Dept: NEUROLOGY | Facility: CLINIC | Age: 64
End: 2024-01-24
Payer: COMMERCIAL

## 2024-01-24 NOTE — PROGRESS NOTES
January 24, 2024    Hello, may I speak with Lauryn Romo?    My name is Kristel Khan      I am  with [facility]  [address]  [place] [facility] [address] [phone].    Before we get started may I verify your date of birth? 1960    I am calling to officially welcome you to our practice and ask about your recent visit. Is this a good time to talk? yes    Tell me about your visit with us. What things went well?  The visit went well.  Staff were really nice.       We're always looking for ways to make our patients' experiences even better. Do you have recommendations on ways we may improve?  no    Overall were you satisfied with your first visit to our practice? yes       I appreciate you taking the time to speak with me today. Is there anything else I can do for you? no      Thank you, and have a great day.

## 2024-01-26 DIAGNOSIS — I67.1 CEREBRAL ANEURYSM WITHOUT RUPTURE: Primary | ICD-10-CM

## 2024-01-26 RX ORDER — TOPIRAMATE 25 MG/1
25 TABLET ORAL DAILY
Qty: 30 TABLET | Refills: 2 | Status: SHIPPED | OUTPATIENT
Start: 2024-01-26

## 2024-01-26 NOTE — TELEPHONE ENCOUNTER
Provider:  Janette  Surgery/Procedure:  Cardiac Catheterization/Vascular Study  Surgery/Procedure Date:  9/15/23  Last visit:   11/15/23  Next visit: 3/25/24     Reason for call:  Refill request for Topamax pending. Patient had previous embolization for giant proximal basilar aneurysm on 11/29/22.

## 2024-03-09 DIAGNOSIS — I67.1 CEREBRAL ANEURYSM, NONRUPTURED: ICD-10-CM

## 2024-03-11 RX ORDER — CLOPIDOGREL BISULFATE 75 MG/1
75 TABLET ORAL DAILY
Qty: 30 TABLET | Refills: 4 | Status: SHIPPED | OUTPATIENT
Start: 2024-03-11

## 2024-03-12 ENCOUNTER — OFFICE VISIT (OUTPATIENT)
Dept: NEUROLOGY | Facility: CLINIC | Age: 64
End: 2024-03-12
Payer: COMMERCIAL

## 2024-03-12 VITALS
HEIGHT: 61 IN | BODY MASS INDEX: 23.41 KG/M2 | HEART RATE: 62 BPM | WEIGHT: 124 LBS | OXYGEN SATURATION: 99 % | DIASTOLIC BLOOD PRESSURE: 80 MMHG | SYSTOLIC BLOOD PRESSURE: 122 MMHG

## 2024-03-12 DIAGNOSIS — Z86.73 HISTORY OF CVA (CEREBROVASCULAR ACCIDENT): Primary | ICD-10-CM

## 2024-03-12 DIAGNOSIS — G44.89 OTHER HEADACHE SYNDROME: ICD-10-CM

## 2024-03-12 RX ORDER — VIBEGRON 75 MG/1
TABLET, FILM COATED ORAL
COMMUNITY
Start: 2024-03-04

## 2024-03-12 RX ORDER — RIMEGEPANT SULFATE 75 MG/75MG
75 TABLET, ORALLY DISINTEGRATING ORAL AS NEEDED
Qty: 4 TABLET | Refills: 0 | COMMUNITY
Start: 2024-03-12

## 2024-03-12 RX ORDER — NORTRIPTYLINE HYDROCHLORIDE 10 MG/1
20 CAPSULE ORAL NIGHTLY
Qty: 30 CAPSULE | Refills: 3 | Status: SHIPPED | OUTPATIENT
Start: 2024-03-12

## 2024-03-12 RX ORDER — TOPIRAMATE 100 MG/1
100 TABLET, FILM COATED ORAL DAILY
COMMUNITY
Start: 2024-03-09

## 2024-03-12 NOTE — PROGRESS NOTES
Neuro Office Visit      Encounter Date: 2024   Patient Name: Lauryn Romo  : 1960   MRN: [id number]   PCP:  Melissa Lazo APRN     Chief Complaint:    Chief Complaint   Patient presents with    Headache       History of Present Illness: Lauryn Romo is a 63 y.o. female who is here today in Neurology for headache.     In  Lauryn presented to [facility] with dizziness and ataxia and was found to have a tiny right occipital infarct as well as a partially thrombosed proximal basilar cerebral aneurysm.    She was treated with embolization and a pipeline shield on 2022.    Has continued to have unsteady gait and intermittent dizziness for which she has been to physical therapy.    Increasing headaches over the past year.    Seen in the emergency department on 2024 and headache resolved with Dilaudid, Zofran, Reglan.    Started on Topamax and is currently on 100 mg which has helped significantly to decrease the headaches.    She does note that she has had some numbness in her fingers but feels that was present prior to starting on Topamax.    Headache Symptoms:   Days per month: Daily  Location: Right temple , Occiput , and right eye, wraps around her head from her right eye to the occiput.  Significant aching behind her eyes bilaterally.  Quality: Aching        Duration: Several hours to all day  Severity: Moderate to severe  Triggers: None  Associated Symptoms: Nausea and Dizziness  Aura: None  Visual Changes: None    Abortives: Butalbital-acetaminophen-caffeine  Preventives: Nortriptyline, Topamax                                                                            Previous Treatments: Tylenol      Subjective      Past Medical History:   Past Medical History:   Diagnosis Date    Aneurysm 2022    Cluster headache     Had headaches for several months before stroke and mass was found    Difficulty walking 22    After stroke    Headache     Headache,  tension-type     Hyperlipidemia     Hypertension     Memory loss 22    Migraine     Stroke 2022    Vision loss 22    When dizzy or headache       Past Surgical History:   Past Surgical History:   Procedure Laterality Date    ARTERIAL ANEURYSM REPAIR      BREAST LUMPECTOMY  2012    CYST REMOVAL Left 2023    EMBOLIZATION CEREBRAL N/A 2022    Procedure: CV EMBOLIZATION CEREBRAL IR;  Surgeon: Enoch Savage MD;  Location:  [facility];  Service: Interventional Radiology;  Laterality: N/A;    HIATAL HERNIA REPAIR      INTERVENTIONAL RADIOLOGY PROCEDURE N/A 09/15/2023    Procedure: Embolization;  Surgeon: Enoch Savage MD;  Location:  [facility];  Service: Interventional Radiology;  Laterality: N/A;    SINUS SURGERY      x4       Family History:   Family History   Problem Relation Age of Onset    No Known Problems Mother     Heart attack Father 42    Heart attack Paternal Aunt     Heart attack Paternal Uncle     No Known Problems Sister     Cancer Maternal Grandmother     Heart failure Maternal Grandmother     No Known Problems Maternal Grandfather     No Known Problems Paternal Grandmother     No Known Problems Paternal Grandfather     Diabetes Half-Brother        Social History:   Social History     Socioeconomic History    Marital status:    Tobacco Use    Smoking status: Former     Current packs/day: 0.00     Average packs/day: 1 pack/day for 15.0 years (15.0 ttl pk-yrs)     Types: Cigarettes     Start date: 10/4/1989     Quit date: 10/4/2004     Years since quittin.4     Passive exposure: Past    Smokeless tobacco: Never   Substance and Sexual Activity    Alcohol use: No    Drug use: Never    Sexual activity: Not Currently     Partners: Male     Birth control/protection: Tubal ligation       Medications:     Current Outpatient Medications:     aspirin 81 MG chewable tablet, Chew 1 tablet Every Night., Disp: , Rfl:      atorvastatin (LIPITOR) 80 MG tablet, TAKE 1 TABLET BY MOUTH EVERY NIGHT, Disp: 90 tablet, Rfl: 1    butalbital-acetaminophen-caffeine (ORBIVAN) -40 MG capsule capsule, Take 1 capsule by mouth As Needed., Disp: , Rfl:     clopidogrel (PLAVIX) 75 MG tablet, TAKE 1 TABLET BY MOUTH DAILY, Disp: 30 tablet, Rfl: 4    fluticasone (FLONASE) 50 MCG/ACT nasal spray, 2 sprays into the nostril(s) as directed by provider Daily., Disp: 1 bottle, Rfl: 0    Gemtesa 75 MG tablet, , Disp: , Rfl:     LORazepam (ATIVAN) 0.5 MG tablet, Take 1 tablet by mouth Every 8 (Eight) Hours As Needed for Anxiety., Disp: , Rfl:     lubiprostone (AMITIZA) 8 MCG capsule, Take 1 capsule twice a day by oral route., Disp: , Rfl:     nortriptyline (PAMELOR) 10 MG capsule, Take 2 capsules by mouth Every Night., Disp: 30 capsule, Rfl: 3    olopatadine (Pataday) 0.2 % solution ophthalmic solution, INSTILL 1 DROP INTO AFFECTED EYE(S) BY OPHTHALMIC ROUTE ONCE DAILY, Disp: , Rfl:     ondansetron ODT (ZOFRAN-ODT) 4 MG disintegrating tablet, Place 1 tablet twice a day by translingual route as needed., Disp: , Rfl:     oxybutynin XL (DITROPAN-XL) 5 MG 24 hr tablet, oxybutynin chloride ER 5 mg tablet,extended release 24 hr, Disp: , Rfl:     pantoprazole (PROTONIX) 20 MG EC tablet, , Disp: , Rfl:     Scopolamine 1 MG/3DAYS patch, Place 1 patch on the skin as directed by provider Every 72 (Seventy-Two) Hours., Disp: 10 patch, Rfl: 2    sertraline (ZOLOFT) 100 MG tablet, Take 1 tablet every day by oral route., Disp: , Rfl:     topiramate (TOPAMAX) 100 MG tablet, Take 1 tablet by mouth Daily., Disp: , Rfl:     Allergies:   Allergies   Allergen Reactions    Tramadol Other (See Comments)     Flushng and passing out       PHQ-9 Total Score:     STEADI Fall Risk Assessment was completed, and patient is at HIGH risk for falls. Assessment completed on:1/22/2024    Objective     Physical Exam:   Physical Exam  Vitals reviewed.   Eyes:      Extraocular Movements: EOM  normal.      Pupils: Pupils are equal, round, and reactive to light.   Neurological:      Mental Status: She is oriented to person, place, and time.      Coordination: Romberg Test abnormal.      Gait: Tandem walk abnormal.      Deep Tendon Reflexes:      Reflex Scores:       Tricep reflexes are 2+ on the right side and 2+ on the left side.       Bicep reflexes are 2+ on the right side and 2+ on the left side.       Brachioradialis reflexes are 2+ on the right side and 2+ on the left side.       Patellar reflexes are 2+ on the right side and 2+ on the left side.       Achilles reflexes are 2+ on the right side and 2+ on the left side.  Psychiatric:         Mood and Affect: Affect is tearful.         Speech: Speech normal.         Neurologic Exam     Mental Status   Oriented to person, place, and time.   Attention: normal. Concentration: normal.   Speech: speech is normal   Level of consciousness: alert  Knowledge: good.     Cranial Nerves     CN II   Visual fields full to confrontation.     CN III, IV, VI   Pupils are equal, round, and reactive to light.  Extraocular motions are normal.   CN III: no CN III palsy  CN VI: no CN VI palsy    CN V   Facial sensation intact.     CN VII   Facial expression full, symmetric.     CN VIII   CN VIII normal.     CN IX, X   CN IX normal.   CN X normal.     CN XI   CN XI normal.     Motor Exam     Strength   Right neck flexion: 4/5  Left neck flexion: 4/5  Right neck extension: 4/5  Left neck extension: 4/5  Right deltoid: 4/5  Left deltoid: 4/5  Right biceps: 4/5  Left biceps: 4/5  Right triceps: 4/5  Left triceps: 4/5  Right wrist flexion: 4/5  Left wrist flexion: 4/5  Right wrist extension: 4/5  Left wrist extension: 4/5  Right interossei: 4/5  Left interossei: 4/5  Right abdominals: 4/5  Left abdominals: 4/5  Right iliopsoas: 4/5  Left iliopsoas: 4/5  Right quadriceps: 4/5  Left quadriceps: 4/5  Right hamstrin/5  Left hamstrin/5  Right glutei: 4/5  Left glutei:  "4/5  Right anterior tibial: 4/5  Left anterior tibial: 4/5  Right posterior tibial: 4/5  Left posterior tibial: 4/5  Right peroneal: 4/5  Left peroneal: 4/5  Right gastroc: 4/5  Left gastroc: 4/5    Sensory Exam   Light touch normal.     Gait, Coordination, and Reflexes     Coordination   Romberg: positive  Tandem walking coordination: abnormal    Tremor   Resting tremor: absent  Intention tremor: absent  Action tremor: absent    Reflexes   Right brachioradialis: 2+  Left brachioradialis: 2+  Right biceps: 2+  Left biceps: 2+  Right triceps: 2+  Left triceps: 2+  Right patellar: 2+  Left patellar: 2+  Right achilles: 2+  Left achilles: 2+  Right : 2+  Left : 2+Ataxic gait, uses cane for support        Vital Signs:   Vitals:    03/12/24 0856   BP: 122/80   Pulse: 62   SpO2: 99%   Weight: 56.2 kg (124 lb)   Height: 154.9 cm (60.98\")     Body mass index is 23.44 kg/m².     Results:   Imaging:   CT Angiogram Head    Result Date: 1/6/2024  Impression: 1.Major intracranial arterial vasculature is widely patent. 2.Stable widely patent stent within basilar artery. Stable excluded aneurysm sac and stable extravasation of contrast along left side of the stent. Electronically Signed: Chris Acuña MD  1/6/2024 7:31 PM EST  Workstation ID: [id number]    CT Head Without Contrast    Result Date: 1/6/2024  Impression: 1.No acute intracranial process identified. 2.Findings suggestive of mild chronic small vessel ischemic disease. 3.Stable stent traversing basilar artery with stable excluded aneurysm. 4.Moderate paranasal sinus mucosal disease. Electronically Signed: Chris Acuña MD  1/6/2024 7:24 PM EST  Workstation ID: [id number]       Labs:    No results found for: \"CMP\", \"PROTEIN\", \"ANTIMOGAB\", \"AOLOLW7BKHW\", \"JCVRESULT\", \"QUANTTBGOLD\", \"CBCDIF\", \"IGGALBSER\"     Assessment / Plan      Assessment/Plan:   Diagnoses and all orders for this visit:    1. History of CVA (cerebrovascular accident) " (Primary)  Comments:  Followed by stroke clinic    2. Other headache syndrome  -     nortriptyline (PAMELOR) 10 MG capsule; Take 2 capsules by mouth Every Night.  Dispense: 30 capsule; Refill: 3           Patient Education:     Reviewed medications, potential side effects and signs and symptoms to report. Discussed risk versus benefits of treatment plan with patient and/or family-including medications, labs and radiology that may be ordered. Addressed questions and concerns during visit. Patient and/or family verbalized understanding and agree with plan. Instructed to call the office with any questions and report to ER with any life-threatening symptoms.     Follow Up:   Return in about 3 months (around 6/12/2024).    I spent 30  minutes face to face with the patient and family. I personally spent 50 percent of this time counseling and discussing diagnosis, diagnostic testing, driving, treatment options, and management .       During this visit the following were done:  Labs Reviewed [x]    Labs Ordered []    Radiology Reports Reviewed [x]    Radiology Ordered []    PCP Records Reviewed []    Referring Provider Records Reviewed [x]    ER Records Reviewed [x]    Hospital Records Reviewed [x]    History Obtained From Family []    Radiology Images Reviewed [x]    Other Reviewed []    Records Requested []      CAILIN Parra  [facility]  [address]  [place] [id number] [phone]

## 2024-03-18 ENCOUNTER — SPECIALTY PHARMACY (OUTPATIENT)
Dept: ONCOLOGY | Facility: HOSPITAL | Age: 64
End: 2024-03-18
Payer: COMMERCIAL

## 2024-03-19 PROBLEM — G44.89 OTHER HEADACHE SYNDROME: Status: ACTIVE | Noted: 2024-03-19

## 2024-03-19 NOTE — PROGRESS NOTES
Specialty Pharmacy Refill Coordination Note     Lauryn is a 63 y.o. female contacted today regarding initial fill of Ubrlevy specialty medication(s).    Reviewed and verified with patient:  Allergies  Meds  Problems       Specialty medication(s) and dose(s) confirmed: yes        Delivery Questions      Flowsheet Row Most Recent Value   Delivery method [employer]   Delivery address verified with patient/caregiver? Yes   Delivery address Home   Number of medications in delivery 1   Medication(s) being filled and delivered Ubrogepant   Doses left of specialty medications new start   Copay verified? Yes   Copay amount Ubrelvy co-pay $0   Copay form of payment No copayment ($0)                   Follow-up: 28 day(s)     Sydney Dobbs, StephanieD

## 2024-03-19 NOTE — PROGRESS NOTES
Specialty Pharmacy Patient Management Program  Neurology Initial Assessment     Lauryn Romo is a 63 y.o. female with migraines seen by a Neurology provider and enrolled in the Neurology Patient Management program offered by [facility].  An initial outreach was conducted, including assessment of therapy appropriateness and specialty medication education for Ubrelvy. The patient was introduced to services offered by [facility], including: regular assessments, refill coordination, curbside pick-up or mail order delivery options, prior authorization maintenance, and financial assistance programs as applicable. The patient was also provided with contact information for the pharmacy team.     Insurance Coverage & Financial Support  [email or web address], Ubrelvy co-pay card    Relevant Past Medical History and Comorbidities  Relevant medical history and concomitant health conditions were discussed with the patient. The patient's chart has been reviewed for relevant past medical history and comorbid health conditions and updated as necessary.   Past Medical History:   Diagnosis Date    Aneurysm 2022    Cluster headache     Had headaches for several months before stroke and mass was found    Difficulty walking 22    After stroke    Headache     Headache, tension-type     Hyperlipidemia     Hypertension     Memory loss 22    Migraine     Stroke 2022    Vision loss 22    When dizzy or headache     Social History     Socioeconomic History    Marital status:    Tobacco Use    Smoking status: Former     Current packs/day: 0.00     Average packs/day: 1 pack/day for 15.0 years (15.0 ttl pk-yrs)     Types: Cigarettes     Start date: 10/4/1989     Quit date: 10/4/2004     Years since quittin.4     Passive exposure: Past    Smokeless tobacco: Never   Substance and Sexual Activity    Alcohol use: No    Drug use: Never    Sexual  activity: Not Currently     Partners: Male     Birth control/protection: Tubal ligation     Problem list reviewed by Sydney Dobbs PharmD on 3/19/2024 at 10:33 AM    Allergies  Known allergies and reactions were discussed with the patient. The patient's chart has been reviewed for  allergy information and updated as necessary.   Allergies   Allergen Reactions    Tramadol Other (See Comments)     Flushng and passing out     Allergies reviewed by Sydney Dobbs PharmD on 3/19/2024 at 10:53 AM    Relevant Laboratory Values  Common labs          9/19/2023    05:48 9/29/2023    22:28 1/6/2024    17:51   Common Labs   Glucose 76  99     BUN 10  16     Creatinine 0.49  0.89  0.70     0.70    Sodium 139  140     Potassium 3.9  3.6     Chloride 105  100     Calcium 8.4  9.1     Albumin  4.5     Total Bilirubin  0.2     Alkaline Phosphatase  143     AST (SGOT)  26     ALT (SGPT)  41     WBC  10.06     Hemoglobin  14.8     Hematocrit  44.0     Platelets  351         Lab Assessment  The above labs have been reviewed. No dose adjustments are needed for the specialty medication(s) based on the labs.     Current Medication List  This medication list has been reviewed with the patient and evaluated for any interactions or necessary modifications/recommendations, and updated to include all prescription medications, OTC medications, and supplements the patient is currently taking.  This list reflects what is contained in the patient's profile, which has also been marked as reviewed to communicate to other providers it is the most up to date version of the patient's current medication therapy.     Current Outpatient Medications:     aspirin 81 MG chewable tablet, Chew 1 tablet Every Night., Disp: , Rfl:     atorvastatin (LIPITOR) 80 MG tablet, TAKE 1 TABLET BY MOUTH EVERY NIGHT, Disp: 90 tablet, Rfl: 1    butalbital-acetaminophen-caffeine (ORBIVAN) -40 MG capsule capsule, Take 1 capsule by mouth As Needed., Disp: , Rfl:      clopidogrel (PLAVIX) 75 MG tablet, TAKE 1 TABLET BY MOUTH DAILY, Disp: 30 tablet, Rfl: 4    fluticasone (FLONASE) 50 MCG/ACT nasal spray, 2 sprays into the nostril(s) as directed by provider Daily., Disp: 1 bottle, Rfl: 0    Gemtesa 75 MG tablet, , Disp: , Rfl:     LORazepam (ATIVAN) 0.5 MG tablet, Take 1 tablet by mouth Every 8 (Eight) Hours As Needed for Anxiety., Disp: , Rfl:     lubiprostone (AMITIZA) 8 MCG capsule, Take 1 capsule twice a day by oral route., Disp: , Rfl:     nortriptyline (PAMELOR) 10 MG capsule, Take 2 capsules by mouth Every Night., Disp: 30 capsule, Rfl: 3    olopatadine (Pataday) 0.2 % solution ophthalmic solution, INSTILL 1 DROP INTO AFFECTED EYE(S) BY OPHTHALMIC ROUTE ONCE DAILY, Disp: , Rfl:     ondansetron ODT (ZOFRAN-ODT) 4 MG disintegrating tablet, Place 1 tablet twice a day by translingual route as needed., Disp: , Rfl:     oxybutynin XL (DITROPAN-XL) 5 MG 24 hr tablet, oxybutynin chloride ER 5 mg tablet,extended release 24 hr, Disp: , Rfl:     pantoprazole (PROTONIX) 20 MG EC tablet, , Disp: , Rfl:     Scopolamine 1 MG/3DAYS patch, Place 1 patch on the skin as directed by provider Every 72 (Seventy-Two) Hours., Disp: 10 patch, Rfl: 2    sertraline (ZOLOFT) 100 MG tablet, Take 1 tablet every day by oral route., Disp: , Rfl:     topiramate (TOPAMAX) 100 MG tablet, Take 1 tablet by mouth Daily., Disp: , Rfl:     ubrogepant (Ubrelvy) 100 MG tablet, Take 1 tablet by mouth Daily As Needed (migraines). Take at onset of headache - if symptoms persist or return, may repeat dose in 2 hours. Maximum: 200 mg per 24 hours, Disp: 16 tablet, Rfl: 11    Medicines reviewed by Sydney Dobbs, PharmD on 3/19/2024 at 10:33 AM    Drug Interactions  No relevant drug/drug interactions noted with Ubrelvy     Initial Education Provided for Specialty Medication  The patient has been provided with the following education and any applicable administration techniques (i.e. self-injection) have been demonstrated  for the therapies indicated. All questions and concerns have been addressed prior to the patient receiving the medication, and the patient has verbalized understanding of the education and any materials provided.  Additional patient education shall be provided and documented upon request by the patient, provider or payer.      Ubrelvy (Ubrogepant)  Medication Expectations   Why am I taking this medication? You are taking this medication to treat acute migraines.   What should I expect while on this medication? You should expect to see a decrease in the frequency and severity of your migraines.   How does the medication work? Ubrelvy is a monoclonal antibody that binds to calcitonin gene-related peptide (CGRP) and blocks its binding to the receptor decreasing the severity of migraines.   How long will I be on this medication for? The amount of time you will be on this medication will be determined by your doctor and your response to the medication.    How do I take this medication? Take as directed on your prescription label.  Reviewed plan for Ubrelvy 100mg (1 tablet) PO daily prn; may repeat x 1 in 2 hours, if needed. Max dosage = 200mg/24 hours.    What are some possible side effects? Potential side effects including, but not limited to nausea and somnolence. Pt verbalized understanding.   What happens if I miss a dose? This is taken as needed for migraines.     Medication Safety   What are things I should warn my doctor immediately about? Allergic reaction: Itching or hives, swelling in your face or hands, swelling or tingling in your mouth or throat, chest tightness, trouble breathing     What are things that I should be cautious of? Tell your doctor if you are pregnant or breastfeeding, or if you have kidney disease or liver disease.   What are some medications that can interact with this one? Concomitant use of strong CY inhibitors, check with your pharmacist or provider before starting any new  medications, avoid grapefruit juice.     Medication Storage/Handling   How should I handle this medication? Keep this medication out of reach of pets/children.   How does this medication need to be stored? Store at a controlled room temperature between 20 and 25 degrees C (68 and 77 degrees F), with excursions permitted between 15 and 30 degrees C (59 and 86 degrees F) away from direct sunlight and moisture.   How should I dispose of this medication? There should not be a need to dispose of this medication unless your provider decides to change the dose or therapy. If that is the case, take to your local police station for proper disposal. Some pharmacies also have take-back bins for medication drop-off.      Resources/Support   How can I remind myself to take this medication? This is taken as needed for migraines.   Is financial support available?  Yes, [employer] can provide co-pay cards if you have commercial insurance or patient assistance if you have Medicare or no insurance.    Which vaccines are recommended for me? Talk to your doctor about these vaccines: Flu, Coronavirus (COVID-19), Pneumococcal (pneumonia), Tdap, Hepatitis B, Zoster (shingles)          Adherence and Self-Administration  Adherence related to the patient's specialty therapy was discussed with the patient. The Adherence segment of this outreach has been reviewed and updated.   Is there a concern with patient's ability to self administer the medication correctly and without issue?: No  Were any potential barriers to adherence identified during the initial assessment or patient education?: No  Are there any concerns regarding the patient's understanding of the importance of medication adherence?: No  Methods for Supporting Patient Adherence and/or Self-Administration: not required    Goals of Therapy  Goals related to the patient's specialty therapy were discussed with the patient. The Patient Goals segment of this outreach has  been reviewed and updated.   Goals Addressed Today        Specialty Pharmacy General Goal      Decrease frequency, severity and duration of acute migraine attacks.                 Reassessment Plan & Follow-Up  Medication Therapy Changes: Start Ubrelvy 100 mg po once daily as needed for migraines - Take at onset of headache - May repeat x 1 in 2 hours if needed (max of 200 mg/ 24 hrs)  Related Plans, Therapy Recommendations, or Therapy Problems to Be Addressed: none  Pharmacist to perform regular reassessments no more than (6) months from the previous assessment.  Care Coordinator to set up future refill outreaches, coordinate prescription delivery, and escalate clinical questions to pharmacist.   Welcome information and patient satisfaction survey to be sent by specialty pharmacy team with patient's initial fill.    Attestation  Therapeutic appropriateness: Appropriate   I attest the patient was actively involved in and has agreed to the above plan of care. If the prescribed therapy is at any point deemed not appropriate based on the current or future assessments, a consultation will be initiated with the patient's specialty care provider to determine the best course of action. The revised plan of therapy will be documented along with any additional patient education provided. Discussed aforementioned material with patient via telemedicine.    Sydney Dobbs, PharmD, [facility]  Clinic Specialty Pharmacist, Neurology  3/19/2024  10:55 EDT

## 2024-03-25 ENCOUNTER — OFFICE VISIT (OUTPATIENT)
Dept: NEUROSURGERY | Facility: CLINIC | Age: 64
End: 2024-03-25
Payer: COMMERCIAL

## 2024-03-25 ENCOUNTER — HOSPITAL ENCOUNTER (OUTPATIENT)
Dept: MRI IMAGING | Facility: HOSPITAL | Age: 64
Discharge: HOME | End: 2024-03-25
Admitting: RADIOLOGY
Payer: COMMERCIAL

## 2024-03-25 VITALS
DIASTOLIC BLOOD PRESSURE: 90 MMHG | HEART RATE: 76 BPM | BODY MASS INDEX: 23.12 KG/M2 | WEIGHT: 122.3 LBS | TEMPERATURE: 97.3 F | OXYGEN SATURATION: 100 % | SYSTOLIC BLOOD PRESSURE: 140 MMHG

## 2024-03-25 DIAGNOSIS — I67.1 CEREBRAL ANEURYSM, NONRUPTURED: ICD-10-CM

## 2024-03-25 DIAGNOSIS — I67.1 CEREBRAL ANEURYSM, NONRUPTURED: Primary | ICD-10-CM

## 2024-03-25 LAB — CREAT BLDA-MCNC: 0.6 MG/DL (ref 0.6–1.3)

## 2024-03-25 PROCEDURE — 70553 MRI BRAIN STEM W/O & W/DYE: CPT

## 2024-03-25 PROCEDURE — 99213 OFFICE O/P EST LOW 20 MIN: CPT | Performed by: RADIOLOGY

## 2024-03-25 PROCEDURE — 82565 ASSAY OF CREATININE: CPT

## 2024-03-25 PROCEDURE — A9577 INJ MULTIHANCE: HCPCS | Performed by: RADIOLOGY

## 2024-03-25 PROCEDURE — 0 GADOBENATE DIMEGLUMINE 529 MG/ML SOLUTION: Performed by: RADIOLOGY

## 2024-03-25 RX ORDER — VIBEGRON 75 MG/1
1 TABLET, FILM COATED ORAL DAILY
COMMUNITY

## 2024-03-25 RX ADMIN — GADOBENATE DIMEGLUMINE 10 ML: 529 INJECTION, SOLUTION INTRAVENOUS at 12:23

## 2024-03-25 NOTE — PROGRESS NOTES
"NAME: MEDHAT ROMO   DOS: 3/26/2024  : 1960  PCP: Melissa Lazo APRN    Chief Complaint:    Chief Complaint   Patient presents with    Follow-up     Giant basilar aneurysm, s/p embolization       History of Present Illness:  63 y.o. female who is well-known to the neuro interventional service, having undergone prior embolization (Pipeline Shield) for giant (partially thrombosed) proximal basilar aneurysm on 2022.  She had initially presented with symptoms of dizziness and ataxia, and was found to have a tiny right occipital infarct.       She continued to struggle with dizziness/unsteady gait, and near syncopal episodes.  CT angiograms demonstrated a persistent filling of the aneurysm.  She underwent placement of a second Pipeline Shield flow diverter device on 9/15/2023.  However, there remained a persistent filling of the central portions of the aneurysm (adjacent to the Pipeline), fed via the left vertebral artery, without flow from the residual aneurysm supplying the right AICA.  The risk of rupture from the small crescent of of residual aneurysm was deemed quite low, and the risk of stroke with occlusion is not insignificant, and thus the residual aneurysm is being managed conservatively.     Ms. Romo continues to struggle with the dizziness, and unsteady gait, and has suffered a few falls.  Overall, she feels like her dizziness and unsteady gait have actually gotten worse since I saw her last.  She has chronic headaches, and these are stable.  She gives no history of a singular headache to suggest a subarachnoid or intracranial hemorrhage.  Additionally, she does have some persistent visual disturbances, and these are exacerbated by \"moving objects\", and this significantly limits her ability to be out in public and in crowded areas.  She is unable to drive. she presents today for routine follow-up.    Past Medical History:  Past Medical History:   Diagnosis Date    Aneurysm 2022    " Cluster headache 2022    Had headaches for several months before stroke and mass was found    Difficulty walking 11/26/22    After stroke    Headache     Headache, tension-type 2022    Hyperlipidemia     Hypertension     Memory loss 11/26/22    Migraine 2022    Stroke 11/26/2022    Vision loss 11/26/22    When dizzy or headache       Past Surgical History:  Past Surgical History:   Procedure Laterality Date    ARTERIAL ANEURYSM REPAIR      BREAST LUMPECTOMY  2012    CYST REMOVAL Left 05/2023    EMBOLIZATION CEREBRAL N/A 11/29/2022    Procedure: CV EMBOLIZATION CEREBRAL IR;  Surgeon: Enoch Savage MD;  Location:  [facility];  Service: Interventional Radiology;  Laterality: N/A;    HIATAL HERNIA REPAIR  2015    INTERVENTIONAL RADIOLOGY PROCEDURE N/A 09/15/2023    Procedure: Embolization;  Surgeon: Enoch Savage MD;  Location:  [facility];  Service: Interventional Radiology;  Laterality: N/A;    SINUS SURGERY      x4       Review of Systems:        Review of Systems   Constitutional:  Negative for activity change, appetite change, chills, diaphoresis, fatigue, fever and unexpected weight change.   HENT:  Positive for voice change. Negative for congestion, dental problem, drooling, ear discharge, ear pain, facial swelling, hearing loss, mouth sores, nosebleeds, postnasal drip, rhinorrhea, sinus pressure, sinus pain, sneezing, sore throat, tinnitus and trouble swallowing.    Eyes:  Positive for itching. Negative for photophobia, pain, discharge, redness and visual disturbance.   Respiratory:  Negative for apnea, cough, choking, chest tightness, shortness of breath, wheezing and stridor.    Cardiovascular:  Negative for chest pain, palpitations and leg swelling.   Gastrointestinal:  Negative for abdominal distention, abdominal pain, anal bleeding, blood in stool, constipation, diarrhea, nausea, rectal pain and vomiting.   Endocrine: Negative for cold intolerance, heat intolerance,  polydipsia, polyphagia and polyuria.   Genitourinary:  Negative for decreased urine volume, difficulty urinating, dysuria, enuresis, flank pain, frequency, genital sores, hematuria and urgency.   Musculoskeletal:  Positive for gait problem. Negative for arthralgias, back pain, joint swelling, myalgias, neck pain and neck stiffness.   Skin:  Negative for color change, pallor, rash and wound.   Allergic/Immunologic: Negative for environmental allergies, food allergies and immunocompromised state.   Neurological:  Positive for dizziness, tremors, weakness and headaches. Negative for seizures, syncope, facial asymmetry, speech difficulty, light-headedness and numbness.   Hematological:  Negative for adenopathy. Does not bruise/bleed easily.   Psychiatric/Behavioral:  Positive for confusion. Negative for agitation, behavioral problems, decreased concentration, dysphoric mood, hallucinations, self-injury, sleep disturbance and suicidal ideas. The patient is not nervous/anxious and is not hyperactive.         Medications    Current Outpatient Medications:     aspirin 81 MG chewable tablet, Chew 1 tablet Every Night., Disp: , Rfl:     atorvastatin (LIPITOR) 80 MG tablet, TAKE 1 TABLET BY MOUTH EVERY NIGHT, Disp: 90 tablet, Rfl: 1    butalbital-acetaminophen-caffeine (ORBIVAN) -40 MG capsule capsule, Take 1 capsule by mouth As Needed., Disp: , Rfl:     clopidogrel (PLAVIX) 75 MG tablet, TAKE 1 TABLET BY MOUTH DAILY, Disp: 30 tablet, Rfl: 4    fluticasone (FLONASE) 50 MCG/ACT nasal spray, 2 sprays into the nostril(s) as directed by provider Daily., Disp: 1 bottle, Rfl: 0    Gemtesa 75 MG tablet, , Disp: , Rfl:     LORazepam (ATIVAN) 0.5 MG tablet, Take 1 tablet by mouth Every 8 (Eight) Hours As Needed for Anxiety., Disp: , Rfl:     lubiprostone (AMITIZA) 8 MCG capsule, Take 1 capsule twice a day by oral route., Disp: , Rfl:     nortriptyline (PAMELOR) 10 MG capsule, Take 2 capsules by mouth Every Night., Disp: 30  capsule, Rfl: 3    olopatadine (Pataday) 0.2 % solution ophthalmic solution, INSTILL 1 DROP INTO AFFECTED EYE(S) BY OPHTHALMIC ROUTE ONCE DAILY, Disp: , Rfl:     ondansetron ODT (ZOFRAN-ODT) 4 MG disintegrating tablet, Place 1 tablet twice a day by translingual route as needed., Disp: , Rfl:     oxybutynin XL (DITROPAN-XL) 5 MG 24 hr tablet, oxybutynin chloride ER 5 mg tablet,extended release 24 hr, Disp: , Rfl:     pantoprazole (PROTONIX) 20 MG EC tablet, , Disp: , Rfl:     Scopolamine 1 MG/3DAYS patch, Place 1 patch on the skin as directed by provider Every 72 (Seventy-Two) Hours., Disp: 10 patch, Rfl: 2    sertraline (ZOLOFT) 100 MG tablet, Take 1 tablet every day by oral route., Disp: , Rfl:     topiramate (TOPAMAX) 100 MG tablet, Take 1 tablet by mouth Daily., Disp: , Rfl:     ubrogepant (Ubrelvy) 100 MG tablet, Take 1 tablet by mouth Daily As Needed (migraines). Take at onset of headache - if symptoms persist or return, may repeat dose in 2 hours. Maximum: 200 mg per 24 hours, Disp: 16 tablet, Rfl: 11    Vibegron (Gemtesa) 75 MG tablet, Take 1 tablet by mouth Daily., Disp: , Rfl:   No current facility-administered medications for this visit.    Allergies:  Allergies   Allergen Reactions    Tramadol Other (See Comments)     Flushng and passing out       Social Hx:  Social History     Tobacco Use    Smoking status: Former     Current packs/day: 0.00     Average packs/day: 1 pack/day for 15.0 years (15.0 ttl pk-yrs)     Types: Cigarettes     Start date: 10/4/1989     Quit date: 10/4/2004     Years since quittin.4     Passive exposure: Past    Smokeless tobacco: Never   Substance Use Topics    Alcohol use: No    Drug use: Never       Family Hx:  Family History   Problem Relation Age of Onset    No Known Problems Mother     Heart attack Father 42    Heart attack Paternal Aunt     Heart attack Paternal Uncle     No Known Problems Sister     Cancer Maternal Grandmother     Heart failure Maternal Grandmother      No Known Problems Maternal Grandfather     No Known Problems Paternal Grandmother     No Known Problems Paternal Grandfather     Diabetes Half-Brother        Review of Imaging:  MRI dated 3/25/2024 from [facility] was reviewed along with its corresponding radiologic report.  Comparison is made to prior studies from 9/19/2023 and 12/15/2022.  Given differences in technique, there has been further slight interval decrease in size and mass effect from the patient's largely thrombosed (95% occluded) giant basilar aneurysm.  Based on axial T2 and sagittal T1 imaging, the aneurysm currently measures 25.7 x 24.2 x 22.5 mm in maximum dimension (was 29.2 x 24.2 x 23.0 mm on 9/19/2023, was 33.2 x 26.5 x 26.3 mm on 12/15/2022).    Physical Examination:  Vitals:    03/25/24 1346   BP: 140/90   Pulse: 76   Temp: 97.3 °F (36.3 °C)   SpO2: 100%        General Appearance:   Well developed, well nourished, well groomed, alert, and cooperative.  Cardiovascular: Regular rate and rhythm.       Neurological examination:  Alert and oriented x 3.  She has good strength in her upper and lower extremities.  Her gait is unsteady, and she ambulates with assistance of a cane.  Her speech is clear.  I do not appreciate any focal visual deficits.    Diagnoses/Plan:    Ms. Romo is a 63 y.o. female status post multiple embolizations for a giant, largely thrombosed basilar apex aneurysm (most recent embolization on 9/15/2023) with Pipeline Shield flow diverter therapy.  The aneurysm is completely isolated from the right vertebral/basilar circulation, but there is a small amount of residual aneurysm supplied via the left vertebral artery; however, the outflow of this residual aneurysm supplies the right PICA, and thus this has been managed conservatively (as occlusion would result in a high risk of stroke).    Follow-up MRI on 3/25/2024 demonstrates further interval decrease in size and mass effect from the aforementioned giant  basilar aneurysm on the adjacent brainstem.    Ms. Romo continues to struggle with dizziness, unsteady gait, and occasional falls.  Overall, she feels like her symptoms have gotten slightly worse since I saw her last.  She is unable to drive.  She is unable to go up and down stairs without assistance (for fear of falls).  She cannot stand for longer than an hour without being fatigued and secondary to her unsteady gait.  She continues to have difficulty/inability to be in public in large crowds secondary to moving objects exacerbating her headaches and her dizziness.  She does not give any history of a singular headache to suggest a subarachnoid or intracranial hemorrhage.  Nonetheless, given the persistence of her aforementioned symptoms, she will not be able to return to work, and I am in full support of her application for disability.      Ms. Romo will remain on a dual antiplatelet regimen, and I plan on seeing her back in 6 months for routine follow-up, and we will likely perform another MRI in 12 months.  Ms. See will continue to follow-up with neurology for assistance with her headaches.  I am going to refer her to a therapist, to see if any behavioral therapy might be beneficial for her issues with public places and moving objects.  She is agreeable to this.  During the interim, she will contact our office in/or call 911 if she develops any stroke or TIA-like symptoms.     Copied text and portions of the note have been reviewed and are accurate as of 3/25/2024.

## 2024-04-17 ENCOUNTER — SPECIALTY PHARMACY (OUTPATIENT)
Dept: ONCOLOGY | Facility: HOSPITAL | Age: 64
End: 2024-04-17
Payer: COMMERCIAL

## 2024-04-17 NOTE — PROGRESS NOTES
Specialty Pharmacy Refill Coordination Note     Lauryn is a 63 y.o. female contacted today regarding refills of Ubrelvy specialty medication(s).    Reviewed and verified with patient:       Specialty medication(s) and dose(s) confirmed: yes    Refill Questions      Flowsheet Row Most Recent Value   Changes to allergies? No   Changes to medications? No   New conditions or infections since last clinic visit No   Unplanned office visit, urgent care, ED, or hospital admission in the last 4 weeks  No   How does patient/caregiver feel medication is working? Good   Financial problems or insurance changes  No   Since the previous refill, were any specialty medication doses or scheduled injections missed or delayed?  No   Does this patient require a clinical escalation to a pharmacist? No            Delivery Questions      Flowsheet Row Most Recent Value   Delivery method [employer]   Delivery address verified with patient/caregiver? Yes   Delivery address Home   Number of medications in delivery 1   Medication(s) being filled and delivered Ubrogepant   Doses left of specialty medications Ubrelvy 3 tablets left as of 04/16   Copay verified? Yes   Copay amount N/A   Copay form of payment No copayment ($0)                   Follow-up: 30 day(s)     Evon Robbins  Specialty Pharmacy Technician

## 2024-05-10 ENCOUNTER — SPECIALTY PHARMACY (OUTPATIENT)
Dept: ONCOLOGY | Facility: HOSPITAL | Age: 64
End: 2024-05-10
Payer: COMMERCIAL

## 2024-05-14 DIAGNOSIS — G44.89 OTHER HEADACHE SYNDROME: ICD-10-CM

## 2024-05-14 RX ORDER — NORTRIPTYLINE HYDROCHLORIDE 10 MG/1
CAPSULE ORAL
Qty: 30 CAPSULE | Refills: 2 | Status: SHIPPED | OUTPATIENT
Start: 2024-05-14

## 2024-05-14 NOTE — TELEPHONE ENCOUNTER
Rx Refill Note  Requested Prescriptions     Pending Prescriptions Disp Refills    nortriptyline (PAMELOR) 10 MG capsule [Pharmacy Med Name: NORTRIPTYLIN 10MG] 30 capsule 2     Sig: TAKE 2 CAPSULES BY MOUTH ONCE DAILY [IN THE EVENING]      Last filled:  03/12/2024 w/3  Last office visit with prescribing clinician: 3/12/2024      Next office visit with prescribing clinician: 6/17/2024     Lyn Perez MA  05/14/24, 08:59 EDT

## 2024-05-20 ENCOUNTER — OFFICE VISIT (OUTPATIENT)
Dept: NEUROLOGY | Facility: CLINIC | Age: 64
End: 2024-05-20
Payer: COMMERCIAL

## 2024-05-20 VITALS
TEMPERATURE: 98.3 F | OXYGEN SATURATION: 99 % | WEIGHT: 122 LBS | SYSTOLIC BLOOD PRESSURE: 154 MMHG | HEART RATE: 72 BPM | DIASTOLIC BLOOD PRESSURE: 90 MMHG | BODY MASS INDEX: 23.03 KG/M2 | HEIGHT: 61 IN

## 2024-05-20 DIAGNOSIS — F33.0 MILD RECURRENT MAJOR DEPRESSION: ICD-10-CM

## 2024-05-20 DIAGNOSIS — C44.41 BASAL CELL CARCINOMA (BCC) OF SCALP: Primary | ICD-10-CM

## 2024-05-20 DIAGNOSIS — F32.A ANXIETY AND DEPRESSION: ICD-10-CM

## 2024-05-20 DIAGNOSIS — F41.9 ANXIETY AND DEPRESSION: ICD-10-CM

## 2024-05-20 PROCEDURE — 99214 OFFICE O/P EST MOD 30 MIN: CPT

## 2024-05-20 NOTE — PROGRESS NOTES
Follow Up Office Visit      Encounter Date: 2024   Patient Name: Lauryn Romo  : 1960   MRN: [id number]   PCP: Melissa Lazo APRN    Chief Complaint:    Chief Complaint   Patient presents with    Follow-up    Stroke       History of Present Illness:Lauryn Romo is a 63 y.o. female who is here today to establish care.  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  She has been followed inpatient by our service on several admissions dating back to 2022.  Her last admission was noted to be 2023 where she was diagnosed with a new asymptomatic left cerebellar stroke most likely secondary to her basilar artery aneurysm.  She is followed outpatient by Dr. Savage for her basilar aneurysm.  Per his last note in November of this year he reports that she would like to continue conservative management and will remain on dual antiplatelet therapy.  His plan is to see her back in March of next year with MRI brain with and without contrast to assess for any change in aneurysm size that might necessitate further treatment/intervention.     She continues to struggle with unsteady gait and dizziness.  This does significantly limit her mobility at times. She reports that she has not been seen for headaches. She had improvement with her headache. She has difficulty at times with even completing her therapy. She reports that she has had difficulty with completing basic tasks at time. She has been released to continue PT.      Clinic Visit 2023:  She tells me that approximately a week ago she was in her tub soaking when she had difficulty adjusting in the tub and had a fall over in the tub. She denies any injury. She reports that secondary to constipation that she was recently told to not continue her  Nortriptyline (she has been on this medication approximately 1 year). She has also been utilizing PRN Fioricet to provide her with some relief. She has been compliant on the Topamax that she was discharged with, but feels that she would able to get more relief with more medication assistance.      She has taken Meclizine in the past but she reports that she felt like this made her dizziness worse. She has never tried another medication treatment to assist her. She reports that she has been compliant with her therapy. She tells me that daily in the afternoons that she has to lay down and nap for an hour or two to ensure that she can make it through the rest of the day. She tells me that she is very thankful that she does not have any worse symptoms than what she does.     Clinic Visit 1/22/24: Patient presents to clinic accompanied by her . She ambulates with a walker. She becomes very emotionally overwhelmed during appointment and often is tearful which is appropriate given all she has been through. She reports depression and anxiety. She is taking Zoloft 100 mg and has Ativan that she can use for anxiety Q8H PRN. She reports it has helped some but still very much a problem. I will place a behavioral health referral to see if any further medication adjustments or changes may be helpful. She also reports headaches almost daily, 5/10. She has been taking fiorcet PRN, amytripyline nightly and Topamax. PCP recently increased Topamax from 25 mg to 50 mg daily. She is set to see PCP in beginning of February and if this change has not been successful then they will increase Topamax to 50 mg BID. She has an appointment in the migraine clinic in March for long term management. She has an appointent with Dr. Savage with neurointervention in March with repeat MRI brain w/wo contrast to evaluate for need for any further intervention. Per Dr. Savage's recommendation patient is maintained on DAPT (ASA 81 + Plavix 75 mg). She  has not had any new stroke like symptoms. Continued dizziness and headaches.     Clinic Visit 5/20/24: Patient was seen today at clinic with her  by side. She continues to report significant gait instability and was seen today walking assisted with a cane. Patient states that when she turns or moves her head too quickly she has severe dizziness. Per the patient, these symptoms have persisted since her stroke without improvement or worsening of symptoms. She does have occasional falls, particularly when walking up or down stairs. I have advised the patient to use caution when walking up and down stairs using handrails to assist her and to avoid movements which exacerbate her dizziness. She also continues to have daily headaches and was recently referred to general neurology migraine clinic. She was started on Ubrelvy, but has not had much improvement. Additionally, patient was recently increased on Topamax to 100 mg daily and nortriptyline. Following her last visit to Neuro stroke, patient was referred to Behavioral Health for depression, anxiety, and labile affect. She reports seeing [facility] for 3 cognitive/behavioral counseling sessions, but she has no dedicated psychiatrist. As the patient continues to have depression/anxiety, she may benefit from a psychiatrist referral and the patient is in agreement to this. Mrs Romo has recently seen Dr. Savage with Neurointervention and continues to recommend DAPT with Plavix 75mg and ASA 81 mg. Patient has been compliant with these medications, however she has held Plavix this week for a procedure with ENT per their recommendation later this week. She will resume Plavix after the procedure. She will follow again with Dr. Savage in September. She has no new or worsening complaints of stroke related symptoms today and her exam is stable compared with previous descriptions and per the patient and her . She is in agreement with current continued treatment plan  and a follow up with stroke clinic in 6 months. She regularly sees her PCP, CAILIN Malagon, who has recently completed lab work. Will attempt to obtain these labs    Patient has additional complaint concerning a new left calf skin lesion as she was recently diagnosed with a Basal cell carcinoma on her scalp, but her dermatologist is booked for 6 months. She requests a referral to a [facility] affiliated dermatologist to see if she can get in sooner, and I am happy to request this referral for her.     Subjective        I have reviewed and the following portions of the patient's history were updated as appropriate: past family history, past medical history, past social history, past surgical history and problem list.    Medications:     Current Outpatient Medications:     aspirin 81 MG chewable tablet, Chew 1 tablet Every Night., Disp: , Rfl:     atorvastatin (LIPITOR) 80 MG tablet, TAKE 1 TABLET BY MOUTH EVERY NIGHT, Disp: 90 tablet, Rfl: 1    butalbital-acetaminophen-caffeine (ORBIVAN) -40 MG capsule capsule, Take 1 capsule by mouth As Needed., Disp: , Rfl:     clopidogrel (PLAVIX) 75 MG tablet, TAKE 1 TABLET BY MOUTH DAILY, Disp: 30 tablet, Rfl: 4    fluticasone (FLONASE) 50 MCG/ACT nasal spray, 2 sprays into the nostril(s) as directed by provider Daily., Disp: 1 bottle, Rfl: 0    LORazepam (ATIVAN) 0.5 MG tablet, Take 1 tablet by mouth Every 8 (Eight) Hours As Needed for Anxiety., Disp: , Rfl:     lubiprostone (AMITIZA) 8 MCG capsule, Take 1 capsule twice a day by oral route., Disp: , Rfl:     nortriptyline (PAMELOR) 10 MG capsule, TAKE 2 CAPSULES BY MOUTH ONCE DAILY [IN THE EVENING], Disp: 30 capsule, Rfl: 2    olopatadine (Pataday) 0.2 % solution ophthalmic solution, INSTILL 1 DROP INTO AFFECTED EYE(S) BY OPHTHALMIC ROUTE ONCE DAILY, Disp: , Rfl:     ondansetron ODT (ZOFRAN-ODT) 4 MG disintegrating tablet, Place 1 tablet twice a day by translingual route as needed., Disp: , Rfl:     oxybutynin XL  "(DITROPAN-XL) 5 MG 24 hr tablet, oxybutynin chloride ER 5 mg tablet,extended release 24 hr, Disp: , Rfl:     pantoprazole (PROTONIX) 20 MG EC tablet, , Disp: , Rfl:     sertraline (ZOLOFT) 100 MG tablet, Take 1 tablet every day by oral route., Disp: , Rfl:     topiramate (TOPAMAX) 100 MG tablet, Take 1 tablet by mouth Daily., Disp: , Rfl:     ubrogepant (Ubrelvy) 100 MG tablet, Take 1 tablet by mouth Daily As Needed (migraines). Take at onset of headache - if symptoms persist or return, may repeat dose in 2 hours. Maximum: 200 mg per 24 hours, Disp: 16 tablet, Rfl: 11    Gemtesa 75 MG tablet, , Disp: , Rfl:     Scopolamine 1 MG/3DAYS patch, Place 1 patch on the skin as directed by provider Every 72 (Seventy-Two) Hours. (Patient not taking: Reported on 5/20/2024), Disp: 10 patch, Rfl: 2    Vibegron (Gemtesa) 75 MG tablet, Take 1 tablet by mouth Daily. (Patient not taking: Reported on 5/20/2024), Disp: , Rfl:     Allergies:   Allergies   Allergen Reactions    Tramadol Other (See Comments)     Flushng and passing out       Objective     Physical Exam:   Vital Signs:   Vitals:    05/20/24 0942   BP: 154/90   Pulse: 72   Temp: 98.3 °F (36.8 °C)   SpO2: 99%   Weight: 55.3 kg (122 lb)   Height: 154.9 cm (60.98\")     Body mass index is 23.06 kg/m².    Physical Exam  Vitals and nursing note reviewed.   Constitutional:       General: She is not in acute distress.     Appearance: Normal appearance. She is not ill-appearing.   HENT:      Head: Normocephalic and atraumatic.      Nose: Nose normal.      Mouth/Throat:      Mouth: Mucous membranes are moist.   Eyes:      General: Lids are normal.      Extraocular Movements: Extraocular movements intact.      Pupils: Pupils are equal, round, and reactive to light.   Cardiovascular:      Rate and Rhythm: Normal rate and regular rhythm.      Pulses: Normal pulses.   Pulmonary:      Effort: Pulmonary effort is normal. No respiratory distress.   Musculoskeletal:         General: No " signs of injury.      Right lower leg: No edema.      Left lower leg: No edema.   Skin:     General: Skin is warm and dry.   Neurological:      Mental Status: She is oriented to person, place, and time.      Sensory: No sensory deficit.      Motor: Weakness present.      Coordination: Coordination abnormal.      Gait: Gait abnormal.      Comments: LLE 4+/5   Psychiatric:         Mood and Affect: Mood normal.         Speech: Speech normal.       Neurological Exam  Mental Status  Awake, alert and oriented to person, place and time. Oriented to person, place and time. Oriented to person, place, and time. Speech is normal. Language is fluent with no aphasia. Attention and concentration are normal.    Cranial Nerves  CN II: Right visual acuity: Counts fingers. Left visual acuity: Counts fingers. Visual fields full to confrontation.  CN III, IV, VI: Extraocular movements intact bilaterally. Normal lids and orbits bilaterally. Pupils equal round and reactive to light bilaterally.  CN V: Facial sensation is normal.  CN VII: Full and symmetric facial movement.  CN VIII: Hearing is normal to speech .  CN XI: Shoulder shrug strength is normal.  CN XII: Tongue midline without atrophy or fasciculations.    Motor  Normal muscle bulk throughout. No fasciculations present. Normal muscle tone. Strength is 5/5 in all four extremities except as noted.  LLE 4+/5.    Sensory  Light touch is normal in upper and lower extremities.     Coordination  Right: Finger-to-nose abnormality:Left: Finger-to-nose abnormality:  Patient demonstrates no ataxia when extending to touch my finger, but when touching her own nose, she was unable to appropriately complete the test and was either touching both cheeks or forehead before touching her nose. .    Gait   Abnormal gait.  Arrives in  .       Modified [place] Score: 3        0  No Symptoms    1 No significant disability. Able to carry out all usual activities, despite some symptoms.    2 Slight  disability. Able to look after own affairs without assistance, but unable to carry out all previous activities.    3 Moderate disability. Requires some help, but able to walk unassisted.    4 Moderately severe disability. Unable to attend to own bodily needs without assistance, and unable to walk unassisted.    5 Severe disability. Requires constant nursing care and attention, bedridden, incontinent.    6 Dead      PHQ-9 Depression Screening  Little interest or pleasure in doing things? 0-->not at all   Feeling down, depressed, or hopeless? 1-->several days   Trouble falling or staying asleep, or sleeping too much?     Feeling tired or having little energy?     Poor appetite or overeating?     Feeling bad about yourself - or that you are a failure or have let yourself or your family down?     Trouble concentrating on things, such as reading the newspaper or watching television?     Moving or speaking so slowly that other people could have noticed? Or the opposite - being so fidgety or restless that you have been moving around a lot more than usual?     Thoughts that you would be better off dead, or of hurting yourself in some way?     PHQ-9 Total Score 1   If you checked off any problems, how difficult have these problems made it for you to do your work, take care of things at home, or get along with other people?         [facility] Fall Risk Clinician Key Questions   Have you fallen in the past year?: Yes  Do you feel unsteady with walking?: Yes  Are you worried about falling?: No      MRI BRAIN WO CONTRAST     Date of Exam: 9/17/2023 11:20 PM EDT     Indication: Giant basilar aneurysm.     Comparison: CT angiogram head 8/21/2023 and brain MRI 12/15/2022.     Technique:  Routine multiplanar/multisequence sequence images of the brain were obtained without contrast administration.      Findings:  There is a new acute/subacute lacunar type infarct in the lateral inferior left cerebellar hemisphere. No additional diffusion  restriction. This area appears T2 hyperintense. There is otherwise mild FLAIR hyperintense signal abnormality in the cerebral   white matter in a nonspecific distribution. There is mild ventriculomegaly, likely result of leukomalacia. There is redemonstration of a large basilar artery aneurysm measuring 24 mm with associated excluding stent. Flow dynamics within the aneurysm   would suggest continued partial flow. There is mass effect exerted upon the jong. There are additional new signal abnormalities in the brain. The other major arterial flow voids appear intact. Orbits are unremarkable. There is mild residual paranasal   sinus mucosal thickening status post sinus surgery. There is a small left mastoid effusion. Calvarial and superficial soft tissue signal appears within normal limits. The midline structures are preserved.     IMPRESSION:  Impression:  1.There is a new acute/subacute lacunar type infarct in the left cerebellar hemisphere.  2.Mild chronic small vessel ischemic change.  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  4.Mild residual paranasal sinus mucosal thickening status post sinus surgery. Small left mastoid effusion.           Electronically Signed: Carlos Bowden MD    9/18/2023 12:57 AM EDT    Workstation ID: [id number]     DATE OF EXAM: 12/15/2022 9:39 PM     PROCEDURE: MRI BRAIN WO CONTRAST-, MRI ANGIOGRAM HEAD WO CONTRAST-, MRI  ANGIOGRAM NECK W CONTRAST-     INDICATIONS: Stroke, follow up; N30.00-Acute cystitis without hematuria;  R55-Syncope and collapse; R41.841-Cognitive communication deficit     COMPARISON: Same day CTA      TECHNIQUE: Multiplanar multisequence images of the brain were performed  without contrast according to routine brain MRI protocol.      Noncontrast MR angiography of the head was performed.     Contrast enhanced MR angiography of the neck was performed after the  administration of 10 mL of MultiHance contrast.     FINDINGS:   Parenchyma: No evidence of  acute infarct. No evidence of hemorrhage.  Midline structures appear intact. No midline shift or herniation. Again  seen is mass effect on the brainstem from large basilar artery aneurysm.  Few scattered periventricular and subcortical white matter FLAIR  hyperintensities suggestive chronic small vessel ischemic changes.  Normal parenchymal volume.     Ventricles and extra axial spaces: Normal caliber of ventricles and  sulci. No extra axial fluid collections.     Other: Orbits appear intact. Trace bilateral mastoid effusions.  Scattered paranasal sinus mucosal thickening. Calvarial marrow signal is  intact.     Head angiogram: The distal carotid, middle cerebral anterior cerebral  and anterior communicate arteries appear patent without flow-limiting  stenosis. The left vertebral artery terminates at the distal V4 segment  and is excluded from the stented area. The right vertebral artery is  patent and is stented into the basilar artery across the basilar artery  aneurysm. The basilar artery appears patent. The posterior cerebral  arteries are patent. The superior cerebellar and posterior inferior  cerebellar arteries are patent. The anterior inferior cerebellar  arteries are not seen. There is residual flow related signal seen into  the aneurysm sac from the basilar artery better appreciated on recent  CTA.     Neck angiogram: The cervical portions of the internal carotid artery and  the common carotid artery are patent. The cervical portions of the  vertebral artery are patent. Again seen is exclusion of the left  vertebral artery from the stented portion of the basilar artery.  Additionally again seen is contrast flow into the aneurysm sac better  evaluated on prior CTA.     IMPRESSION:  No evidence of acute infarct     Again seen is large basilar artery aneurysm. There is stenting extending  from the right distal vertebral artery across the basilar artery. The  left vertebral artery is excluded from the stenting  and terminates at  the distal V4 segment. There is residual flow seen within this to the  aneurysm sac better evaluated on recent CTA.     The remaining vessels of the head and neck demonstrate no occlusion or  flow-limiting stenosis.     This report was finalized on 12/16/2022 1:05 AM by Ever Shelley.     Lipid Panel          9/18/2023    06:10   Lipid Panel   Total Cholesterol 111    Triglycerides 87    HDL Cholesterol 40    VLDL Cholesterol 17    LDL Cholesterol  54    LDL/HDL Ratio 1.34       Most Recent A1C          9/18/2023    06:10   HGBA1C Most Recent   Hemoglobin A1C 5.70         Assessment / Plan      Assessment/Plan:   Diagnoses and all orders for this visit:     #History of CVA (cerebrovascular accident) (Primary)  #Cerebral aneurysm, nonruptured  #HTN  -Initial stroke 2022 with secondary asymptomatic stroke after aneurysm repair 9/2023  -Continue DAPT per Dr. Savage's recommendations   -F/U 9/23/24 with Dr. Savage (recommending MRI w/wo in March 2025 assuming no new complications)  -Goal LDL < 70, Last LDL 54, Continue Atorvastatin 80 mg nightly  -Heart healthy diet  Goal BP <140/80, BP in clinic today 154/90, Management per PCP  -Increase physical activity as tolerated   -follow up in stroke clinic in 6 months     #headache syndrome  -Continue to see Caleb SIMEON (next appt on 6/17/24  -continue nortriptyline (PAMELOR) 10 MG capsule; Take 1 capsule by mouth Every Night.    -Topamax recently increased to 100 mg daily. Managed by PCP  -Recently started Ubrelvey 100mg PRN for migraine    #Depression and anxiety   -Continue to see [facility] for cognitive/behavioral counseling  -currently on Zoloft 100 mg daily and Ativan 0.5 mg Q8H PRN   -have referred patient to outpatient psychiatry to assist with coordination of care and additional recommendations      Discussed the importance of medication compliance Plavix 75mg daily, Aspirin 81mg daily, and Atorvastatin 80mg nightly and lifestyle  modifications adequate control of blood pressure, adequate control of cholesterol (goal LDL <70), adequate control of glucose (<140, A1c goal <7), increased physical activity, and implementation of healthy diet to help reduce the risk of future cerebrovascular events.  Also discussed the signs symptoms that would warrant the patient return back to the emergency department including unilateral weakness, unilateral numbness, visual disturbances, loss of balance, speech difficulties, and/or a sudden severe headache.  Patient verbalized understanding.     Follow Up:   Follow up with Stroke Clinic in ~ 6 months     Jeremy Zheng PA-C  [facility] [place] [facility]  5/20/24

## 2024-06-07 ENCOUNTER — OFFICE VISIT (OUTPATIENT)
Age: 64
End: 2024-06-07
Payer: COMMERCIAL

## 2024-06-07 VITALS
HEIGHT: 61 IN | BODY MASS INDEX: 23.24 KG/M2 | HEART RATE: 64 BPM | WEIGHT: 123.1 LBS | OXYGEN SATURATION: 100 % | SYSTOLIC BLOOD PRESSURE: 118 MMHG | DIASTOLIC BLOOD PRESSURE: 82 MMHG

## 2024-06-07 DIAGNOSIS — F33.1 MAJOR DEPRESSIVE DISORDER, RECURRENT EPISODE, MODERATE DEGREE: Primary | ICD-10-CM

## 2024-06-07 RX ORDER — SERTRALINE HYDROCHLORIDE 100 MG/1
100 TABLET, FILM COATED ORAL DAILY
Qty: 30 TABLET | Refills: 2 | Status: SHIPPED | OUTPATIENT
Start: 2024-06-07

## 2024-06-07 NOTE — PROGRESS NOTES
New Patient Office Visit      Date: 2024  Patient Name: Lauryn Romo  : 1960   MRN: [id number]     Referring Provider: Melissa Lazo APRN    Chief Complaint:      ICD-10-CM ICD-9-CM   1. Major depressive disorder, recurrent episode, moderate degree  F33.1 296.32      History of Present Illness:   Lauryn Romo is a 63 y.o. female who is here today for intake appointment.    Patient is seen and evaluated in the office with her  present.  She appears to be in no acute distress at this time.  She is calm and cooperative with the evaluation, and exhibits a linear and goal directed thought process.  Patient was referred for behavioral health evaluation by her neurologist.  She is currently prescribed Zoloft 100 mg a day and Ativan 0.5 mg as needed anxiety.  She was prescribed this after she suffered a stroke with an aneurysm in 2022.  Prior to the stroke, patient worked nonstop.  She is always on the go.  She would get up at 4 AM and go to work, and then would not quit going when she got home.  She states that she was happy and active.  Now, she states that she is okay.  She states that she is not herself the.   describes her as being stonewalled with a stroke.  She has a lot of physical limitations that prevent her from doing things that she was previously able to do.  She struggles with balance and dizziness.  She often falls and bumps into things.  She used to really enjoy gardening, and now physical limitations prevent her from being able to do this like she was before.  She cannot drive.  She states that she is working on finding things that she can do.  She had a talk with her doctor recently who informed her that there is a possibility that she may continue to live with her current limitations.  She states that it was hard for her to come to  with this, but she is coping with it okay.  Her  helps her to manage her garden so that she still has her  flowers to enjoy.  She has been learning how to make bread.  She is active in her [employer], and is still getting a lot of community/socialization with this.  However, she does experience panic attacks when she is in crowded areas.  This happens at times when she is in [employer], and the fellowship quach, and happened recently when she went to one of her grandchildren's archery competitions.  Patient states that she does not really use Ativan because it makes her tired and she is fearful of abusing narcotics.  Writer spoke with patient and  regarding uses of Ativan.  We talked about avoiding daily/multiple times a day use, but using it as needed can be very helpful when experiencing these panic symptoms.  Writer suggested taking half a tablet instead of a full tablet since a full tablet has been making her feel too tired.  Writer recommended keeping these on hand for situations like [employer] if she needs them.  Patient states that she falls asleep okay, but is restless at night.  She believes that her appetite is okay, but  admits that she does not feel as though she eats a lot.  She states that she has to avoid certain foods because she has difficulty swallowing.  She denies being fearful or anxious of choking.  She is agreeable to this.  She denies any symptoms of mandy, auditory or visual hallucinations.  She does not elicit any signs of psychosis.    We discussed medication management today.  Patient will start to utilize Ativan as needed to help in social situations.  We will also increase Zoloft today to see if this helps with depression.  Patient is currently in therapy at [facility], so she will continue with this.  We will follow-up in 1 month to see how she is doing.      Subjective      Review of Systems:   Review of Systems   Constitutional:  Negative for chills, fatigue and fever.   HENT:  Negative for congestion, hearing loss, sore throat and trouble swallowing.    Eyes:  Negative for blurred  vision and double vision.   Respiratory:  Negative for cough, chest tightness and shortness of breath.    Cardiovascular:  Negative for chest pain and palpitations.   Gastrointestinal:  Negative for abdominal pain, constipation, diarrhea, nausea and vomiting.   Endocrine: Negative for polydipsia and polyuria.   Genitourinary:  Negative for hematuria and urgency.   Musculoskeletal:  Negative for arthralgias.   Skin:  Negative for skin lesions and bruise.   Neurological:  Negative for dizziness, tremors, seizures and light-headedness.   Hematological:  Negative for adenopathy.     Screening Scores:   PHQ-9 : 5  FOREST-7 : 4    Past Psychiatric History:   Has not seen a psychiatrist but is currently seeing a therapist at [facility]  No past psych hospitalizations  No past SA/self harm    Abuse/trauma History:              Physical: denies              Sexual: denies              Emotional/Neglect: denies              Significant death/loss: denies              Other trauma: denies                Substance Abuse History:              Alcohol: denies              Tobacco/Vape: hx of smoking cigarettes 19 years ago              Illicit Drugs: denies                Legal History:   denies     Social History  Where does patient currently live: [place], [place]  Living situation: lives with   Patient's Occupation: stopped working after her stroke Nov 2022  Leisure and Recreation: gardening, spending time with family, [employer]  Support system: family  [employer]: L[employer]-day     Family History:   Family History   Problem Relation Age of Onset    No Known Problems Mother     Heart attack Father 42    Heart attack Paternal Aunt     Heart attack Paternal Uncle     No Known Problems Sister     Cancer Maternal Grandmother     Heart failure Maternal Grandmother     No Known Problems Maternal Grandfather     No Known Problems Paternal Grandmother     No Known Problems Paternal Grandfather     Diabetes Half-Brother      Past Medical  History:   Past Medical History:   Diagnosis Date    Aneurysm 11/26/2022    Cancer 05/2024    Basal Cell Carcinoma removed from scalp by dermatology    Cluster headache 2022    Had headaches for several months before stroke and mass was found    Difficulty walking 11/26/22    After stroke    Headache     Headache, tension-type 2022    Hyperlipidemia     Hypertension     Memory loss 11/26/22    Migraine 2022    Stroke 11/26/2022    Vision loss 11/26/22    When dizzy or headache     Past Surgical History:   Past Surgical History:   Procedure Laterality Date    ARTERIAL ANEURYSM REPAIR      BREAST LUMPECTOMY  2012    CYST REMOVAL Left 05/2023    EMBOLIZATION CEREBRAL N/A 11/29/2022    Procedure: CV EMBOLIZATION CEREBRAL IR;  Surgeon: Enoch Savage MD;  Location:  [facility];  Service: Interventional Radiology;  Laterality: N/A;    HIATAL HERNIA REPAIR  2015    INTERVENTIONAL RADIOLOGY PROCEDURE N/A 09/15/2023    Procedure: Embolization;  Surgeon: Enoch Savage MD;  Location:  [facility];  Service: Interventional Radiology;  Laterality: N/A;    SINUS SURGERY      x4    SKIN CANCER EXCISION      cancer removed off top of head     Medications:     Current Outpatient Medications:     aspirin 81 MG chewable tablet, Chew 1 tablet Every Night., Disp: , Rfl:     atorvastatin (LIPITOR) 80 MG tablet, TAKE 1 TABLET BY MOUTH EVERY NIGHT, Disp: 90 tablet, Rfl: 1    butalbital-acetaminophen-caffeine (ORBIVAN) -40 MG capsule capsule, Take 1 capsule by mouth As Needed., Disp: , Rfl:     clopidogrel (PLAVIX) 75 MG tablet, TAKE 1 TABLET BY MOUTH DAILY, Disp: 30 tablet, Rfl: 4    fluticasone (FLONASE) 50 MCG/ACT nasal spray, 2 sprays into the nostril(s) as directed by provider Daily., Disp: 1 bottle, Rfl: 0    LORazepam (ATIVAN) 0.5 MG tablet, Take 1 tablet by mouth Every 8 (Eight) Hours As Needed for Anxiety., Disp: , Rfl:     lubiprostone (AMITIZA) 8 MCG capsule, Take 1 capsule twice a  "day by oral route., Disp: , Rfl:     nortriptyline (PAMELOR) 10 MG capsule, TAKE 2 CAPSULES BY MOUTH ONCE DAILY [IN THE EVENING], Disp: 30 capsule, Rfl: 2    olopatadine (Pataday) 0.2 % solution ophthalmic solution, INSTILL 1 DROP INTO AFFECTED EYE(S) BY OPHTHALMIC ROUTE ONCE DAILY, Disp: , Rfl:     ondansetron ODT (ZOFRAN-ODT) 4 MG disintegrating tablet, Place 1 tablet twice a day by translingual route as needed., Disp: , Rfl:     oxybutynin XL (DITROPAN-XL) 5 MG 24 hr tablet, oxybutynin chloride ER 5 mg tablet,extended release 24 hr, Disp: , Rfl:     pantoprazole (PROTONIX) 20 MG EC tablet, , Disp: , Rfl:     Scopolamine 1 MG/3DAYS patch, Place 1 patch on the skin as directed by provider Every 72 (Seventy-Two) Hours. (Patient taking differently: Place 1 patch on the skin as directed by provider Every 72 (Seventy-Two) Hours. Taking PRN), Disp: 10 patch, Rfl: 2    sertraline (ZOLOFT) 100 MG tablet, Take 1 tablet by mouth Daily., Disp: 30 tablet, Rfl: 2    topiramate (TOPAMAX) 100 MG tablet, Take 1 tablet by mouth Daily., Disp: , Rfl:     ubrogepant (Ubrelvy) 100 MG tablet, Take 1 tablet by mouth Daily As Needed (migraines). Take at onset of headache - if symptoms persist or return, may repeat dose in 2 hours. Maximum: 200 mg per 24 hours, Disp: 16 tablet, Rfl: 11    sertraline (Zoloft) 50 MG tablet, Take 1 tablet by mouth Daily., Disp: 30 tablet, Rfl: 2    Medication Considerations:  GORGE reviewed and appropriate.      Allergies:   Allergies   Allergen Reactions    Tramadol Other (See Comments)     Flushng and passing out     Objective     Physical Exam:  Vital Signs:   Vitals:    06/07/24 0956   BP: 118/82   Pulse: 64   SpO2: 100%   Weight: 55.8 kg (123 lb 1.6 oz)   Height: 154.9 cm (60.98\")     Body mass index is 23.27 kg/m².     Mental Status Exam:   MENTAL STATUS EXAM   General Appearance:  Cleanly groomed and dressed  Eye Contact:  Good eye contact  Attitude:  Cooperative  Motor Activity:  Normal gait, " posture  Muscle Strength:  Normal  Speech:  Normal rate, tone, volume  Language:  Spontaneous  Mood and affect:  Depressed and constricted  Hopelessness:  5  Thought Process:  Logical and goal-directed  Associations/ Thought Content:  No delusions  Hallucinations:  None  Suicidal Ideations:  Not present  Homicidal Ideation:  Not present  Sensorium:  Alert  Orientation:  Person, place, time and situation  Immediate Recall, Recent, and Remote Memory:  Intact  Attention Span/ Concentration:  Good  Fund of Knowledge:  Appropriate for age and educational level  Intellectual Functioning:  Average range  Insight:  Fair  Judgement:  Fair  Reliability:  Fair  Impulse Control:  Fair     SUICIDE RISK ASSESSMENT/CSSRS:  1. Does patient have thoughts of suicide? denies  2. Does patient have intent for suicide? denies  3. Does patient have a current plan for suicide? denies  4. History of suicide attempts: denies  5. Family history of suicide or attempts: denies  6. History of violent behaviors towards others or property or thoughts of committing suicide: denies  7. History of sexual aggression toward others: denies  8. Access to firearms or weapons: denies    Assessment / Plan      Visit Diagnosis/Orders Placed This Visit:  Diagnoses and all orders for this visit:    1. Major depressive disorder, recurrent episode, moderate degree (Primary)  - Increase Zoloft to 150 mg po daily  - Decrease Ativan to 0.25 mg po PRN anxiety  - Continue therapy  - Follow up with writer in 1 mo  Labs Reviewed : labs from 9/29/23 reviewed  EKG Reviewed : EKG from 9/15/23 reviewed:   Chart Reviewed     Functional Status: Mild impairment     Prognosis: Fair with Ongoing Treatment     Impression/Formulation:  Patient appeared alert and oriented.  Patient is voluntarily requesting to begin outpatient treatment at [facility] [address].  Patient is receptive to assistance with maintaining a stable lifestyle.  Patient  presents with history of     ICD-10-CM ICD-9-CM   1. Major depressive disorder, recurrent episode, moderate degree  F33.1 296.32     Treatment Plan:     Patient will continue supportive psychotherapy efforts and medications as indicated. Clinic will obtain release of information for current treatment team for continuity of care as needed. Patient will contact this office, call 911 or present to the nearest emergency room should suicidal or homicidal ideations occur. Discussed medication options and treatment plan of prescribed medication(s) as well as the risks, benefits, and potential side effects. Patient ackowledged and verbally consented to continue with current treatment plan and was educated on the importance of compliance with treatment and follow-up appointments.     Follow Up:   Return in about 1 month (around 7/7/2024) for Medication Management, follow up with therapy.    Short-term goals: Patient will adhere to medication regimen and experience continued improvement in symptoms over the next 3 months.   Long-term goals: Patient will adhere to medication treatment plan and report improvement in symptoms over the next 6 months    Quality Measures:   Tobacco: Lauryn Romo  reports that she quit smoking about 19 years ago. Her smoking use included cigarettes. She started smoking about 34 years ago. She has a 15 pack-year smoking history. She has been exposed to tobacco smoke. She has never used smokeless tobacco. I have educated her on the risk of diseases from using tobacco products such as cancer, COPD, and heart disease.     Erika Whatley MD   [facility] [address]     This is electronically signed by Erika Whatley MD  06/07/2024 09:56 EDT

## 2024-06-11 ENCOUNTER — SPECIALTY PHARMACY (OUTPATIENT)
Dept: ONCOLOGY | Facility: HOSPITAL | Age: 64
End: 2024-06-11
Payer: COMMERCIAL

## 2024-06-17 ENCOUNTER — OFFICE VISIT (OUTPATIENT)
Dept: NEUROLOGY | Facility: CLINIC | Age: 64
End: 2024-06-17
Payer: COMMERCIAL

## 2024-06-17 VITALS
OXYGEN SATURATION: 99 % | HEART RATE: 68 BPM | BODY MASS INDEX: 23.45 KG/M2 | HEIGHT: 61 IN | SYSTOLIC BLOOD PRESSURE: 122 MMHG | WEIGHT: 124.2 LBS | DIASTOLIC BLOOD PRESSURE: 82 MMHG

## 2024-06-17 DIAGNOSIS — G44.89 OTHER HEADACHE SYNDROME: Primary | ICD-10-CM

## 2024-06-17 DIAGNOSIS — Z86.73 HISTORY OF CVA (CEREBROVASCULAR ACCIDENT): ICD-10-CM

## 2024-06-17 PROCEDURE — 99214 OFFICE O/P EST MOD 30 MIN: CPT | Performed by: NURSE PRACTITIONER

## 2024-06-17 RX ORDER — BETHANECHOL CHLORIDE 10 MG/1
10 TABLET ORAL 2 TIMES DAILY
COMMUNITY
Start: 2024-06-11

## 2024-06-17 NOTE — PROGRESS NOTES
Neuro Office Visit      Encounter Date: 2024   Patient Name: Lauryn Romo  : 1960   MRN: [id number]   PCP:  Melissa Lazo APRN     Chief Complaint:    Chief Complaint   Patient presents with    History of CVA       History of Present Illness: Lauryn Romo is a 63 y.o. female who is here today in Neurology for headache.    Last visit with me on 3/12/2024 continued Topamax and nortriptyline.    Accompanied by her  today who assists with history.  History of Present Illness  The patient reports an improvement in her headaches, although she finds it challenging to predict them.     Continues to experience 2 to 3 severe headaches per month, going one of which she experienced near syncope and nausea, a symptom she has not previously encountered.     She managed these headaches with Lortab and Zofran, which provided some relief upon waking. However, the headaches persisted until the following morning, prompting her to take another Lortab.     She was prescribed Pamelor in 2023, which she reports has provided some relief. She denies experiencing morning drowsiness or grogginess. However, she does report experiencing fatigue in the afternoons, particularly towards the evening.     Her current medication regimen includes 150 mg of sertraline, Topamax 100 mg daily, and Ubrelvy. She reports that Ubrelvy provides temporary relief for her headaches, but does not completely eliminate them.      Subjective      Past Medical History:   Past Medical History:   Diagnosis Date    Aneurysm 2022    Cancer 2024    Basal Cell Carcinoma removed from scalp by dermatology    Cluster headache     Had headaches for several months before stroke and mass was found    Difficulty walking 23    After stroke    Headache     Headache, tension-type     Hyperlipidemia     Hypertension     Memory loss 22    Migraine     Stroke 2022    Vision loss 22    When dizzy or  headache       Past Surgical History:   Past Surgical History:   Procedure Laterality Date    ARTERIAL ANEURYSM REPAIR      BREAST LUMPECTOMY  2012    CYST REMOVAL Left 2023    EMBOLIZATION CEREBRAL N/A 2022    Procedure: CV EMBOLIZATION CEREBRAL IR;  Surgeon: Enoch Savage MD;  Location:  [facility];  Service: Interventional Radiology;  Laterality: N/A;    HIATAL HERNIA REPAIR  2015    INTERVENTIONAL RADIOLOGY PROCEDURE N/A 09/15/2023    Procedure: Embolization;  Surgeon: Enoch Savage MD;  Location:  [facility];  Service: Interventional Radiology;  Laterality: N/A;    SINUS SURGERY      x4    SKIN CANCER EXCISION      cancer removed off top of head       Family History:   Family History   Problem Relation Age of Onset    No Known Problems Mother     Heart attack Father 42    Heart attack Paternal Aunt     Heart attack Paternal Uncle     No Known Problems Sister     Cancer Maternal Grandmother     Heart failure Maternal Grandmother     No Known Problems Maternal Grandfather     No Known Problems Paternal Grandmother     No Known Problems Paternal Grandfather     Diabetes Half-Brother        Social History:   Social History     Socioeconomic History    Marital status:    Tobacco Use    Smoking status: Former     Current packs/day: 0.00     Average packs/day: 1 pack/day for 15.0 years (15.0 ttl pk-yrs)     Types: Cigarettes     Start date: 10/4/1989     Quit date: 10/4/2004     Years since quittin.7     Passive exposure: Past    Smokeless tobacco: Never   Vaping Use    Vaping status: Never Used   Substance and Sexual Activity    Alcohol use: No    Drug use: Never    Sexual activity: Not Currently     Partners: Male     Birth control/protection: Tubal ligation       Medications:     Current Outpatient Medications:     aspirin 81 MG chewable tablet, Chew 1 tablet Every Night., Disp: , Rfl:     atorvastatin (LIPITOR) 80 MG tablet, TAKE 1 TABLET BY MOUTH  EVERY NIGHT, Disp: 90 tablet, Rfl: 1    bethanechol (URECHOLINE) 10 MG tablet, Take 1 tablet by mouth 2 (Two) Times a Day., Disp: , Rfl:     butalbital-acetaminophen-caffeine (ORBIVAN) -40 MG capsule capsule, Take 1 capsule by mouth As Needed., Disp: , Rfl:     clopidogrel (PLAVIX) 75 MG tablet, TAKE 1 TABLET BY MOUTH DAILY, Disp: 30 tablet, Rfl: 4    fluticasone (FLONASE) 50 MCG/ACT nasal spray, 2 sprays into the nostril(s) as directed by provider Daily., Disp: 1 bottle, Rfl: 0    LORazepam (ATIVAN) 0.5 MG tablet, Take 1 tablet by mouth Every 8 (Eight) Hours As Needed for Anxiety., Disp: , Rfl:     lubiprostone (AMITIZA) 8 MCG capsule, Take 1 capsule twice a day by oral route., Disp: , Rfl:     nortriptyline (PAMELOR) 10 MG capsule, TAKE 2 CAPSULES BY MOUTH ONCE DAILY [IN THE EVENING], Disp: 30 capsule, Rfl: 2    olopatadine (Pataday) 0.2 % solution ophthalmic solution, INSTILL 1 DROP INTO AFFECTED EYE(S) BY OPHTHALMIC ROUTE ONCE DAILY, Disp: , Rfl:     ondansetron ODT (ZOFRAN-ODT) 4 MG disintegrating tablet, Place 1 tablet twice a day by translingual route as needed., Disp: , Rfl:     oxybutynin XL (DITROPAN-XL) 5 MG 24 hr tablet, oxybutynin chloride ER 5 mg tablet,extended release 24 hr, Disp: , Rfl:     pantoprazole (PROTONIX) 20 MG EC tablet, , Disp: , Rfl:     Scopolamine 1 MG/3DAYS patch, Place 1 patch on the skin as directed by provider Every 72 (Seventy-Two) Hours. (Patient taking differently: Place 1 patch on the skin as directed by provider Every 72 (Seventy-Two) Hours. Taking PRN), Disp: 10 patch, Rfl: 2    sertraline (ZOLOFT) 100 MG tablet, Take 1 tablet by mouth Daily. (Patient taking differently: Take 1.5 tablets by mouth Daily.), Disp: 30 tablet, Rfl: 2    sertraline (Zoloft) 50 MG tablet, Take 1 tablet by mouth Daily., Disp: 30 tablet, Rfl: 2    topiramate (TOPAMAX) 100 MG tablet, Take 1 tablet by mouth Daily., Disp: , Rfl:     ubrogepant (Ubrelvy) 100 MG tablet, Take 1 tablet by mouth Daily  As Needed (migraines). Take at onset of headache - if symptoms persist or return, may repeat dose in 2 hours. Maximum: 200 mg per 24 hours, Disp: 16 tablet, Rfl: 11    Allergies:   Allergies   Allergen Reactions    Tramadol Other (See Comments)     Flushng and passing out       PHQ-9 Total Score:     MODESTO Fall Risk Assessment was completed, and patient is at HIGH risk for falls. Assessment completed on:2024    Objective     Physical Exam:     Neurologic Exam     Mental Status   Oriented to person, place, and time.   Attention: normal.   Speech: speech is normal   Level of consciousness: alert  Knowledge: good.     Cranial Nerves     CN II   Visual fields full to confrontation.     CN III, IV, VI   Pupils are equal, round, and reactive to light.  Extraocular motions are normal.   CN III: no CN III palsy  CN VI: no CN VI palsy    CN V   Facial sensation intact.     CN VII   Facial expression full, symmetric.     CN VIII   CN VIII normal.     CN IX, X   CN IX normal.   CN X normal.     CN XI   CN XI normal.     CN XII   CN XII normal.     Motor Exam   Muscle bulk: normal  Overall muscle tone: normal    Strength   Right neck flexion: 5/5  Left neck flexion: 5/5  Right neck extension: 5/5  Left neck extension: 5/5  Right deltoid: 5/5  Left deltoid: 5/5  Right biceps: 5/5  Left biceps: 5/5  Right triceps: 5/5  Left triceps: 5/5  Right wrist flexion: 5/5  Left wrist flexion: 5/5  Right wrist extension: 5/5  Left wrist extension: 5/5  Right interossei: 5/5  Left interossei: 5/5  Right abdominals: 5/5  Left abdominals: 5/5  Right iliopsoas: 5/5  Left iliopsoas: 5/5  Right quadriceps: 5/5  Left quadriceps: 5/5  Right hamstrin/5  Left hamstrin/5  Right glutei: 5/5  Left glutei: 5/5  Right anterior tibial: 5/5  Left anterior tibial: 5/5  Right posterior tibial: 5/5  Left posterior tibial: 5/5  Right peroneal: 5/5  Left peroneal: 5/5  Right gastroc: 5/5  Left gastroc: 5/5    Sensory Exam   Light touch normal.  "    Gait, Coordination, and Reflexes     Gait  Gait: wide-based    Tremor   Resting tremor: absent  Intention tremor: absent  Action tremor: absent    Reflexes   Right brachioradialis: 2+  Left brachioradialis: 2+  Right biceps: 2+  Left biceps: 2+  Right triceps: 2+  Left triceps: 2+  Right patellar: 2+  Left patellar: 2+  Right achilles: 2+  Left achilles: 2+  Right : 2+  Left : 2+       Vital Signs:   Vitals:    06/17/24 0950   BP: 122/82   Pulse: 68   SpO2: 99%   Weight: 56.3 kg (124 lb 3.2 oz)   Height: 154.9 cm (60.98\")     Body mass index is 23.48 kg/m².     Results:   Results       Imaging:   MRI Brain With & Without Contrast    Result Date: 3/25/2024  1.Basilar artery aneurysm sac measures 2.5 cm (TR) x 2.3 cm (AP). This aneurysm is traversed by a metallic stent which is better visualized on the head CTA from 1/6/2024. Aneurysm sac appears similar in size since that study. There is persistent mass effect upon the lower jong and upper medulla. 2.10 mm enhancing focus external to the stent (series 8, image 10) corresponds to enhancing focus seen on head CTA from 1/6/2024 and appears grossly similar in size accounting for differences in technique. Continued CTA follow-up is recommended as CTA better demonstrates this finding. 3.Findings compatible with chronic microvascular ischemic change. 4.No diffusion restriction is identified to suggest acute infarct. Electronically Signed: Sid Hartman MD  3/25/2024 1:26 PM EDT  Workstation ID: [id number]       Labs:   No results found for: \"CMP\", \"PROTEIN\", \"ANTIMOGAB\", \"PDVIAV9HPVF\", \"JCVRESULT\", \"QUANTTBGOLD\", \"CBCDIF\", \"IGGALBSER\"     Assessment / Plan      Assessment/Plan:   Diagnoses and all orders for this visit:    1. Other headache syndrome (Primary)  Comments:  Continue nortriptyline and Topamax  Start Emgality    2. History of CVA (cerebrovascular accident)           Patient Education:     Reviewed medications, potential side effects and signs and " symptoms to report. Discussed risk versus benefits of treatment plan with patient and/or family-including medications, labs and radiology that may be ordered. Addressed questions and concerns during visit. Patient and/or family verbalized understanding and agree with plan. Instructed to call the office with any questions and report to ER with any life-threatening symptoms.     Follow Up:   Return in about 3 months (around 9/17/2024).    I spent 30 minutes caring for Lauryn on this date of service. This time includes time spent by me in the following activities: preparing for the visit, obtaining and/or reviewing a separately obtained history, performing a medically appropriate examination and/or evaluation, counseling and educating the patient/family/caregiver, ordering medications, tests, or procedures, and documenting information in the medical record.        During this visit the following were done:  Labs Reviewed [x]    Labs Ordered []    Radiology Reports Reviewed []    Radiology Ordered []    PCP Records Reviewed []    Referring Provider Records Reviewed []    ER Records Reviewed []    Hospital Records Reviewed []    History Obtained From Family []    Radiology Images Reviewed []    Other Reviewed [x]    Records Requested []      Patient or patient representative verbalized consent for the use of Ambient Listening during the visit with  CAILIN Parra for chart documentation. 6/17/2024  11:00 EDT    CAILIN Parra   [facility]  [address]  [facility] [id number] [phone]

## 2024-06-18 ENCOUNTER — SPECIALTY PHARMACY (OUTPATIENT)
Dept: ONCOLOGY | Facility: HOSPITAL | Age: 64
End: 2024-06-18
Payer: COMMERCIAL

## 2024-06-19 NOTE — PROGRESS NOTES
Specialty Pharmacy Patient Management Program  Neurology Medication Addition Assessment     Lauryn Romo is a 63 y.o. female with chronic migraines seen by a Neurology provider and enrolled in the Neurology Patient Management program offered by [facility].  This assessment was conducted as a result of a specialty medication addition or substitution. The patient was previously introduced to services offered by [facility], including: regular assessments, refill coordination, curbside pick-up or mail order delivery options, prior authorization maintenance, and financial assistance programs as applicable. The patient was also provided with contact information for the pharmacy team.     An initial outreach was conducted, including assessment of therapy appropriateness and specialty medication education for emgality.    A follow-up outreach was conducted, including assessment of continued therapy appropriateness, medication adherence, and side effect incidence and management for ubrelvy.    Insurance Coverage & Financial Support   [facility] with copay card    Relevant Past Medical History and Comorbidities  Relevant medical history and concomitant health conditions were discussed with the patient. The patient's chart has been reviewed for relevant past medical history and comorbid health conditions and updated as necessary.   Past Medical History:   Diagnosis Date    Aneurysm 11/26/2022    Cancer 05/2024    Basal Cell Carcinoma removed from scalp by dermatology    Cluster headache 2022    Had headaches for several months before stroke and mass was found    Difficulty walking 11/26/22    After stroke    Headache     Headache, tension-type 2022    Hyperlipidemia     Hypertension     Memory loss 11/26/22    Migraine 2022    Stroke 11/26/2022    Vision loss 11/26/22    When dizzy or headache     Social History     Socioeconomic History    Marital status:     Tobacco Use    Smoking status: Former     Current packs/day: 0.00     Average packs/day: 1 pack/day for 15.0 years (15.0 ttl pk-yrs)     Types: Cigarettes     Start date: 10/4/1989     Quit date: 10/4/2004     Years since quittin.7     Passive exposure: Past    Smokeless tobacco: Never   Vaping Use    Vaping status: Never Used   Substance and Sexual Activity    Alcohol use: No    Drug use: Never    Sexual activity: Not Currently     Partners: Male     Birth control/protection: Tubal ligation     Problem list reviewed by Demarcus Arrington, PharmD on 2024 at  1:41 PM    Hospitalizations and Urgent Care Since Last Assessment  ED Visits, Admissions, or Hospitalizations: none   Urgent Office Visits: none     Allergies  Known allergies and reactions were discussed with the patient. The patient's chart has been reviewed for  allergy information and updated as necessary.   Allergies   Allergen Reactions    Tramadol Other (See Comments)     Flushng and passing out     Allergies reviewed by Demarcus Arrington, PharmD on 2024 at  1:41 PM    Relevant Laboratory Values  Common labs          2023    22:28 2024    17:51 3/25/2024    11:26   Common Labs   Glucose 99      BUN 16      Creatinine 0.89  0.70     0.70  0.60    Sodium 140      Potassium 3.6      Chloride 100      Calcium 9.1      Albumin 4.5      Total Bilirubin 0.2      Alkaline Phosphatase 143      AST (SGOT) 26      ALT (SGPT) 41      WBC 10.06      Hemoglobin 14.8      Hematocrit 44.0      Platelets 351          Lab Assessment   The above labs have been reviewed. No dose adjustments are needed for the specialty medication(s) based on the labs.     Current Medication List  This medication list has been reviewed with the patient and evaluated for any interactions or necessary modifications/recommendations, and updated to include all prescription medications, OTC medications, and supplements the patient is currently taking.  This list  reflects what is contained in the patient's profile, which has also been marked as reviewed to communicate to other providers it is the most up to date version of the patient's current medication therapy.     Current Outpatient Medications:     aspirin 81 MG chewable tablet, Chew 1 tablet Every Night., Disp: , Rfl:     atorvastatin (LIPITOR) 80 MG tablet, TAKE 1 TABLET BY MOUTH EVERY NIGHT, Disp: 90 tablet, Rfl: 1    bethanechol (URECHOLINE) 10 MG tablet, Take 1 tablet by mouth 2 (Two) Times a Day., Disp: , Rfl:     butalbital-acetaminophen-caffeine (ORBIVAN) -40 MG capsule capsule, Take 1 capsule by mouth As Needed., Disp: , Rfl:     clopidogrel (PLAVIX) 75 MG tablet, TAKE 1 TABLET BY MOUTH DAILY, Disp: 30 tablet, Rfl: 4    fluticasone (FLONASE) 50 MCG/ACT nasal spray, 2 sprays into the nostril(s) as directed by provider Daily., Disp: 1 bottle, Rfl: 0    galcanezumab-gnlm (EMGALITY) 120 MG/ML auto-injector pen, Inject 2 mL under the skin into the appropriate area as directed 1 (One) Time for 1 dose. Use one syringe then repeat second dose immediately for loading dose., Disp: 2 mL, Rfl: 0    [START ON 7/18/2024] galcanezumab-gnlm (EMGALITY) 120 MG/ML auto-injector pen, Inject 1 mL under the skin into the appropriate area as directed Every 28 (Twenty-Eight) Days., Disp: 1 mL, Rfl: 11    LORazepam (ATIVAN) 0.5 MG tablet, Take 1 tablet by mouth Every 8 (Eight) Hours As Needed for Anxiety., Disp: , Rfl:     lubiprostone (AMITIZA) 8 MCG capsule, Take 1 capsule twice a day by oral route., Disp: , Rfl:     nortriptyline (PAMELOR) 10 MG capsule, TAKE 2 CAPSULES BY MOUTH ONCE DAILY [IN THE EVENING], Disp: 30 capsule, Rfl: 2    olopatadine (Pataday) 0.2 % solution ophthalmic solution, INSTILL 1 DROP INTO AFFECTED EYE(S) BY OPHTHALMIC ROUTE ONCE DAILY, Disp: , Rfl:     ondansetron ODT (ZOFRAN-ODT) 4 MG disintegrating tablet, Place 1 tablet twice a day by translingual route as needed., Disp: , Rfl:     oxybutynin XL  (DITROPAN-XL) 5 MG 24 hr tablet, oxybutynin chloride ER 5 mg tablet,extended release 24 hr, Disp: , Rfl:     pantoprazole (PROTONIX) 20 MG EC tablet, , Disp: , Rfl:     Scopolamine 1 MG/3DAYS patch, Place 1 patch on the skin as directed by provider Every 72 (Seventy-Two) Hours. (Patient taking differently: Place 1 patch on the skin as directed by provider Every 72 (Seventy-Two) Hours. Taking PRN), Disp: 10 patch, Rfl: 2    sertraline (ZOLOFT) 100 MG tablet, Take 1 tablet by mouth Daily. (Patient taking differently: Take 1.5 tablets by mouth Daily.), Disp: 30 tablet, Rfl: 2    sertraline (Zoloft) 50 MG tablet, Take 1 tablet by mouth Daily., Disp: 30 tablet, Rfl: 2    topiramate (TOPAMAX) 100 MG tablet, Take 1 tablet by mouth Daily., Disp: , Rfl:     ubrogepant (Ubrelvy) 100 MG tablet, Take 1 tablet by mouth Daily As Needed (migraines). Take at onset of headache - if symptoms persist or return, may repeat dose in 2 hours. Maximum: 200 mg per 24 hours, Disp: 16 tablet, Rfl: 11    Medicines reviewed by Demarcus Arrington, PharmD on 6/19/2024 at  1:41 PM    Drug Interactions  No relevant drug drug interactions with specialty medication.       Initial Education Provided for Specialty Medication  The patient has been provided with the following education and any applicable administration techniques (i.e. self-injection) have been demonstrated for the therapies indicated. All questions and concerns have been addressed prior to the patient receiving the medication, and the patient has verbalized comprehension of the education and any materials provided.  Additional patient education shall be provided and documented upon request by the patient, provider or payer.              Emgality (Galcanezumab-gnlm)        Medication Expectations   Why am I taking this medication? You are taking this medication for migraine prophylaxis.   What should I expect while on this medication? You should expect to a decrease in the frequency  and severity of your migraines.   How does the medication work? Emgality is a monoclonal antibody that binds to calcitonin gene-related peptide (CGRP) and blocks its binding to the receptor decreasing the severity of migraines.   How long will I be on this medication for? The amount of time you will be on this medication will be determined by your doctor and your response to the medication.    How do I take this medication? Take as directed on your prescription label. This medication is a self-injection given every 28 days.    What are some possible side effects? Injection site reactions and hypersensitivity reactions.   What happens if I miss a dose? If you miss a dose, take it as soon as you remember, and time next dose 28 days from last dose.                  Medication Safety   What are things I should warn my doctor immediately about? Hypersensitivity reactions.   What are things that I should be cautious of? Injection site reaction.   What are some medications that can interact with this one? No drug interactions identified.            Medication Storage/Handling   How should I handle this medication? Keep this medication our of reach of pets/children in original container.  On the day your Emgality is due let it set at room temperature for 30 minutes prior to injection. (do NOT warm using a heat source such as hot water or a microwave).  Administer in the abdomen, thigh, back of the upper arm, or buttocks.  Do not inject where the skin is tender, bruised, red or hard.  Rotate injection sites.   How does this medication need to be stored? Store in refrigerator and keep dry.   How should I dispose of this medication? You can dispose of the empty syringe in a sharps container, and if this is not available you may use an empty hard plastic container such as a milk jug or tide container.            Resources/Support   How can I remind myself to take this medication? You can download a reminder jackie on your phone or  use a calandar  to help with your monthly injection.   Is financial support available?  Yes, [employer] can provide co-pay cards if you have commercial insurance or patient assistance if you have Medicare or no insurance.    Which vaccines are recommended for me? Talk to your doctor about these vaccines: Flu, Coronavirus (COVID-19), Pneumococcal (pneumonia), Tdap, Hepatitis B, Zoster (shingles)                  Adherence, Self-Administration, and Current Therapy Problems  Adherence related to the patient's specialty therapy was discussed with the patient. The Adherence segment of this outreach has been reviewed and updated.     Is there a concern with patient's ability to self administer the medication correctly and without issue?: No  Were any potential barriers to adherence identified during the initial assessment or patient education?: No  Are there any concerns regarding the patient's understanding of the importance of medication adherence?: No    Adherence Questions  Linked Medication(s) Assessed: Ubrogepant  On average, how many doses/injections does the patient miss per month?: 0  What are the identified reasons for non-adherence or missed doses? : no problems identified  What is the estimated medication adherence level?: %  Based on the patient/caregiver response and refill history, does this patient require an MTP to track adherence improvements?: no    Additional Barriers to Patient Self-Administration: none  Methods for Supporting Patient Self-Administration: she has some experience with injections and her daughter is a nurse.    Recently Close Medication Therapy Problems  No medication therapy recommendations to display  Open Medication Therapy Problems  No medication therapy recommendations to display     Adverse Drug Reactions  Medication tolerability: Tolerating with no to minimal ADRs          Medication plan: Continue therapy with normal follow-up  Plan for ADR Management:  patient report    Goals  of Therapy  Goals related to the patient's specialty therapy were discussed with the patient. The Patient Goals segment of this outreach has been reviewed and updated.   Goals Addressed Today        Specialty Pharmacy General Goal      Decrease frequency, severity and duration of acute migraine attacks.                 Quality of Life Assessment   Quality of Life related to the patient's enrollment in the patient management program and services provided was discussed with the patient. The QOL segment of this outreach has been reviewed and updated.   Quality of Life Improvement Scale: 7-Somewhat better    Reassessment Plan & Follow-Up  Medication Therapy Changes: begin emgality 120 mg/mL x 2 doses for loading dose then 120 mg/mL SQ every 28 days.  Related Plans, Therapy Recommendations, or Issues to Be Addressed:  conitnue with ubrelvy  100 mg po prn.  Pharmacist to perform regular reassessments no more than (6) months from the previous assessment.  Care Coordinator to set up future refill outreaches, coordinate prescription delivery, and escalate clinical questions to pharmacist.     Attestation  Therapeutic appropriateness: Appropriate  I attest the patient was actively involved in and has agreed to the above plan of care. If the prescribed therapy is at any point deemed not appropriate based on the current or future assessments, a consultation will be initiated with the patient's specialty care provider to determine the best course of action. The revised plan of therapy will be documented along with any additional patient education provided. Discussed aforementioned material with patient by phone.    Demarcus Arrington, PharmD, [facility] Specialty Pharmacist, Neurology  6/19/2024  13:55 EDT

## 2024-07-09 ENCOUNTER — OFFICE VISIT (OUTPATIENT)
Age: 64
End: 2024-07-09
Payer: COMMERCIAL

## 2024-07-09 VITALS
SYSTOLIC BLOOD PRESSURE: 130 MMHG | BODY MASS INDEX: 23.71 KG/M2 | HEART RATE: 65 BPM | OXYGEN SATURATION: 100 % | WEIGHT: 125.4 LBS | DIASTOLIC BLOOD PRESSURE: 90 MMHG

## 2024-07-09 DIAGNOSIS — F33.1 MAJOR DEPRESSIVE DISORDER, RECURRENT EPISODE, MODERATE DEGREE: Primary | Chronic | ICD-10-CM

## 2024-07-09 DIAGNOSIS — F41.1 GENERALIZED ANXIETY DISORDER: Chronic | ICD-10-CM

## 2024-07-09 PROCEDURE — 99214 OFFICE O/P EST MOD 30 MIN: CPT | Performed by: STUDENT IN AN ORGANIZED HEALTH CARE EDUCATION/TRAINING PROGRAM

## 2024-07-09 PROCEDURE — 90833 PSYTX W PT W E/M 30 MIN: CPT | Performed by: STUDENT IN AN ORGANIZED HEALTH CARE EDUCATION/TRAINING PROGRAM

## 2024-07-09 RX ORDER — BUPROPION HYDROCHLORIDE 150 MG/1
150 TABLET ORAL EVERY MORNING
Qty: 30 TABLET | Refills: 1 | Status: SHIPPED | OUTPATIENT
Start: 2024-07-09

## 2024-07-09 RX ORDER — SERTRALINE HYDROCHLORIDE 100 MG/1
100 TABLET, FILM COATED ORAL DAILY
Qty: 90 TABLET | Refills: 1 | Status: SHIPPED | OUTPATIENT
Start: 2024-07-09

## 2024-07-09 NOTE — PROGRESS NOTES
[facility]             Follow Up Office Visit      Date: 2024   Patient Name: Lauryn Romo  : 1960   MRN: [id number]     Referring Provider: Melissa Lazo APRN    Chief Complaint:      ICD-10-CM ICD-9-CM   1. Major depressive disorder, recurrent episode, moderate degree  F33.1 296.32   2. Generalized anxiety disorder  F41.1 300.02      History of Present Illness:   Lauryn Romo is a 63 y.o. female who is here today for follow up with [facility].    Patient is seen and evaluated in the office with her  present. She appears to be in no acute distress at this time. She is calm and cooperative with the evaluation, and exhibits a linear and goal directed thought process.  Patient states that she may have noticed some slight improvement in mood since increasing dose of Zoloft.  She does report having a spell on .  She was at [employer].  She normally sits in the back of the Islam, but she has been trying to sit closer to the front.  She was sitting in a corner pew that has less vision of the crowd.  After Islam, she states that she stood up and was unable to walk or go anywhere.  She could not move at all.  This lasted for about 3 minutes.  Writer spoke with patient and  regarding this incident.  It is likely that it was triggered by anxiety and stress.  Writer suggested that this would be a good time to have her Ativan as it would likely help in these situations.  Writer also encouraged her to try using the Ativan prior to charge in situations where she will be in crowded environments.  Patient is agreeable with this.  She states that these incidences have happened a few times at home as well.  She continues to endorse severe feelings of hopelessness, and difficulties with limitations that she has physically after her stroke.  She gets frustrated with this, and with the headaches and mood symptoms that she experiences daily.  She states that  often times she does not really even know what is wrong or how she is feeling, but she feels flat and has no motivation or energy to do anything.  This is one of the things that bothers her most.  Writer recommended starting Wellbutrin to help with this.  She will monitor for any increase in anxiety/irritability over the next month with Wellbutrin.  She denies any suicidal ideation, plan, intent.  She is continuing with therapy.  We talked about how therapist has been encouraging her to put herself in uncomfortable situations.  We talked about not throwing herself into the deepen with this, but taking a step by step.  She is agreeable with this.  She was allowed space to process her emotions and feelings.  She was provided validation for these feelings.  Overall, she is happy with plan today.  We will follow-up in 1 month.    Previous Medication Trials:  none    Subjective      Review of Systems:   Review of Systems   Constitutional:  Negative for chills, fatigue and fever.   HENT:  Negative for congestion, hearing loss, sore throat and trouble swallowing.    Eyes:  Negative for blurred vision and double vision.   Respiratory:  Negative for cough, chest tightness and shortness of breath.    Cardiovascular:  Negative for chest pain and palpitations.   Gastrointestinal:  Negative for abdominal pain, constipation, diarrhea, nausea and vomiting.   Endocrine: Negative for polydipsia and polyuria.   Genitourinary:  Negative for hematuria and urgency.   Musculoskeletal:  Negative for arthralgias.   Skin:  Negative for skin lesions and bruise.   Neurological:  Negative for dizziness, tremors, seizures and light-headedness.   Hematological:  Negative for adenopathy.     Screening Scores:   PHQ-9 : 9  FOREST-7 : 0    Medications:     Current Outpatient Medications:     [START ON 7/18/2024] galcanezumab-gnlm (EMGALITY) 120 MG/ML auto-injector pen, Inject 1 mL under the skin into the appropriate area as directed Every 28  (Twenty-Eight) Days., Disp: 1 mL, Rfl: 11    sertraline (ZOLOFT) 100 MG tablet, Take 1 tablet by mouth Daily., Disp: 90 tablet, Rfl: 1    sertraline (Zoloft) 50 MG tablet, Take 1 tablet by mouth Daily., Disp: 90 tablet, Rfl: 1    aspirin 81 MG chewable tablet, Chew 1 tablet Every Night., Disp: , Rfl:     atorvastatin (LIPITOR) 80 MG tablet, TAKE 1 TABLET BY MOUTH EVERY NIGHT, Disp: 90 tablet, Rfl: 1    bethanechol (URECHOLINE) 10 MG tablet, Take 1 tablet by mouth 2 (Two) Times a Day., Disp: , Rfl:     buPROPion XL (Wellbutrin XL) 150 MG 24 hr tablet, Take 1 tablet by mouth Every Morning., Disp: 30 tablet, Rfl: 1    butalbital-acetaminophen-caffeine (ORBIVAN) -40 MG capsule capsule, Take 1 capsule by mouth As Needed., Disp: , Rfl:     clopidogrel (PLAVIX) 75 MG tablet, TAKE 1 TABLET BY MOUTH DAILY, Disp: 30 tablet, Rfl: 4    fluticasone (FLONASE) 50 MCG/ACT nasal spray, 2 sprays into the nostril(s) as directed by provider Daily., Disp: 1 bottle, Rfl: 0    LORazepam (ATIVAN) 0.5 MG tablet, Take 1 tablet by mouth Every 8 (Eight) Hours As Needed for Anxiety., Disp: , Rfl:     lubiprostone (AMITIZA) 8 MCG capsule, Take 1 capsule twice a day by oral route., Disp: , Rfl:     nortriptyline (PAMELOR) 10 MG capsule, TAKE 2 CAPSULES BY MOUTH ONCE DAILY [IN THE EVENING], Disp: 30 capsule, Rfl: 2    olopatadine (Pataday) 0.2 % solution ophthalmic solution, INSTILL 1 DROP INTO AFFECTED EYE(S) BY OPHTHALMIC ROUTE ONCE DAILY, Disp: , Rfl:     ondansetron ODT (ZOFRAN-ODT) 4 MG disintegrating tablet, Place 1 tablet twice a day by translingual route as needed., Disp: , Rfl:     oxybutynin XL (DITROPAN-XL) 5 MG 24 hr tablet, oxybutynin chloride ER 5 mg tablet,extended release 24 hr, Disp: , Rfl:     pantoprazole (PROTONIX) 20 MG EC tablet, , Disp: , Rfl:     Scopolamine 1 MG/3DAYS patch, Place 1 patch on the skin as directed by provider Every 72 (Seventy-Two) Hours. (Patient taking differently: Place 1 patch on the skin as  directed by provider Every 72 (Seventy-Two) Hours. Taking PRN), Disp: 10 patch, Rfl: 2    topiramate (TOPAMAX) 100 MG tablet, Take 1 tablet by mouth Daily., Disp: , Rfl:     ubrogepant (Ubrelvy) 100 MG tablet, Take 1 tablet by mouth Daily As Needed (migraines). Take at onset of headache - if symptoms persist or return, may repeat dose in 2 hours. Maximum: 200 mg per 24 hours, Disp: 16 tablet, Rfl: 11    Medication Considerations:  GORGE reviewed and appropriate.     Allergies:   Allergies   Allergen Reactions    Tramadol Other (See Comments)     Flushng and passing out     Objective     Vital Signs:   Vitals:    07/09/24 1018   BP: 130/90   Pulse: 65   SpO2: 100%   Weight: 56.9 kg (125 lb 6.4 oz)     Body mass index is 23.71 kg/m².     Mental Status Exam:   MENTAL STATUS EXAM   General Appearance:  Cleanly groomed and dressed  Eye Contact:  Good eye contact  Attitude:  Cooperative  Motor Activity:  Other  Other Comment:  Uses cane and support from   Muscle Strength:  Normal  Speech:  Soft spoken  Language:  Spontaneous  Mood and affect:  Depressed  Hopelessness:  8  Thought Process:  Logical and goal-directed  Associations/ Thought Content:  No delusions  Hallucinations:  None  Suicidal Ideations:  Not present  Homicidal Ideation:  Not present  Sensorium:  Alert  Orientation:  Person, place, time and situation  Immediate Recall, Recent, and Remote Memory:  Intact  Attention Span/ Concentration:  Good  Fund of Knowledge:  Appropriate for age and educational level  Intellectual Functioning:  Average range  Insight:  Fair  Judgement:  Fair  Reliability:  Fair  Impulse Control:  Fair      SUICIDE RISK ASSESSMENT/CSSRS:  1. Does patient have thoughts of suicide? denies  2. Does patient have intent for suicide? denies  3. Does patient have a current plan for suicide? denies  4. History of suicide attempts: denies  5. Family history of suicide or attempts: denies  6. History of violent behaviors towards others or  property or thoughts of committing suicide: denies  7. History of sexual aggression toward others: denies  8. Access to firearms or weapons: denies    Assessment / Plan      Visit Diagnosis/Orders Placed This Visit:  Diagnoses and all orders for this visit:    Major depressive disorder, recurrent episode, moderate degree (Primary)  Generalized Anxiety Disorder  - Continue Zoloft 150 mg po daily  - Start Wellbutrin  mg po daily  - Continue Ativan 0.25 mg po PRN anxiety  - Continue therapy  - Follow up with writer in 1 mo  Labs Reviewed : labs from 9/29/23 reviewed  EKG Reviewed : EKG from 9/15/23 reviewed:   Chart Reviewed     Face to Face time: 10:35-11:08 : 33 min     Functional Status: Mild impairment      Prognosis: Fair with Ongoing Treatment     Impression/Formulation:  Patient appeared alert and oriented. Patient is receptive to assistance with maintaining a stable lifestyle.  Patient presents with history of     ICD-10-CM ICD-9-CM   1. Major depressive disorder, recurrent episode, moderate degree  F33.1 296.32   2. Generalized anxiety disorder  F41.1 300.02     Treatment Plan:     Patient will continue supportive psychotherapy efforts and medications as indicated. Clinic will obtain release of information for current treatment team for continuity of care as needed. Patient will contact this office, call 911 or present to the nearest emergency room should suicidal or homicidal ideations occur.  Discussed medication options and treatment plan of prescribed medication(s) as well as the risks, benefits, and potential side effects. Patient ackowledged and verbally consented to continue with current treatment plan and was educated on the importance of compliance with treatment and follow-up appointments.     Quality Measures:  Tobacco: Lauryn Romo  reports that she quit smoking about 19 years ago. Her smoking use included cigarettes. She started smoking about 34 years ago. She has a 15 pack-year  smoking history. She has been exposed to tobacco smoke. She has never used smokeless tobacco. I have educated her on the risk of diseases from using tobacco products such as cancer, COPD, and heart disease.     Follow Up:   Return in about 1 month (around 8/9/2024) for Medication Management, follow up with therapy.    Short-term goals: Patient will adhere to medication regimen and experience continued improvement in symptoms over the next 3 months.   Long-term goals: Patient will adhere to medication treatment plan and report improvement in symptoms over the next 6 months    Erika Whatley MD  [facility] [address]     This is electronically signed by Erika Whatley MD   07/09/2024 10:37 EDT

## 2024-07-10 DIAGNOSIS — G44.89 OTHER HEADACHE SYNDROME: ICD-10-CM

## 2024-07-10 RX ORDER — NORTRIPTYLINE HYDROCHLORIDE 10 MG/1
CAPSULE ORAL
Qty: 30 CAPSULE | Refills: 2 | Status: SHIPPED | OUTPATIENT
Start: 2024-07-10

## 2024-07-10 NOTE — TELEPHONE ENCOUNTER
Rx Refill Note  Requested Prescriptions     Pending Prescriptions Disp Refills    nortriptyline (PAMELOR) 10 MG capsule [Pharmacy Med Name: NORTRIPTYLIN 10MG] 30 capsule 1     Sig: TAKE 2 CAPSULES BY MOUTH ONCE DAILY EVERY EVENING      Last filled: 5/14/24, 30 with 2 refills.   Last office visit with prescribing clinician: 6/17/2024      Next office visit with prescribing clinician: 9/24/2024     Syl Chavez MA  07/10/24, 11:41 EDT

## 2024-07-21 ENCOUNTER — HOSPITAL ENCOUNTER (EMERGENCY)
Facility: HOSPITAL | Age: 64
Discharge: HOME | End: 2024-07-21
Attending: EMERGENCY MEDICINE | Admitting: EMERGENCY MEDICINE
Payer: COMMERCIAL

## 2024-07-21 ENCOUNTER — APPOINTMENT (OUTPATIENT)
Dept: MRI IMAGING | Facility: HOSPITAL | Age: 64
End: 2024-07-21
Payer: COMMERCIAL

## 2024-07-21 ENCOUNTER — APPOINTMENT (OUTPATIENT)
Dept: GENERAL RADIOLOGY | Facility: HOSPITAL | Age: 64
End: 2024-07-21
Payer: COMMERCIAL

## 2024-07-21 VITALS
SYSTOLIC BLOOD PRESSURE: 143 MMHG | OXYGEN SATURATION: 100 % | HEIGHT: 61 IN | TEMPERATURE: 98.3 F | WEIGHT: 125.44 LBS | BODY MASS INDEX: 23.68 KG/M2 | RESPIRATION RATE: 18 BRPM | DIASTOLIC BLOOD PRESSURE: 86 MMHG | HEART RATE: 86 BPM

## 2024-07-21 DIAGNOSIS — R42 DIZZINESS: Primary | ICD-10-CM

## 2024-07-21 LAB
ALBUMIN SERPL-MCNC: 4.5 G/DL (ref 3.5–5.2)
ALBUMIN/GLOB SERPL: 2 G/DL
ALP SERPL-CCNC: 142 U/L (ref 39–117)
ALT SERPL W P-5'-P-CCNC: 39 U/L (ref 1–33)
ANION GAP SERPL CALCULATED.3IONS-SCNC: 9 MMOL/L (ref 5–15)
AST SERPL-CCNC: 25 U/L (ref 1–32)
BACTERIA UR QL AUTO: ABNORMAL /HPF
BASOPHILS # BLD AUTO: 0.04 10*3/MM3 (ref 0–0.2)
BASOPHILS NFR BLD AUTO: 0.5 % (ref 0–1.5)
BILIRUB SERPL-MCNC: 0.2 MG/DL (ref 0–1.2)
BILIRUB UR QL STRIP: NEGATIVE
BUN SERPL-MCNC: 18 MG/DL (ref 8–23)
BUN/CREAT SERPL: 17.5 (ref 7–25)
CALCIUM SPEC-SCNC: 8.8 MG/DL (ref 8.6–10.5)
CHLORIDE SERPL-SCNC: 105 MMOL/L (ref 98–107)
CLARITY UR: CLEAR
CO2 SERPL-SCNC: 25 MMOL/L (ref 22–29)
COLOR UR: YELLOW
CREAT SERPL-MCNC: 1.03 MG/DL (ref 0.57–1)
DEPRECATED RDW RBC AUTO: 49 FL (ref 37–54)
EGFRCR SERPLBLD CKD-EPI 2021: 61.6 ML/MIN/1.73
EOSINOPHIL # BLD AUTO: 0.13 10*3/MM3 (ref 0–0.4)
EOSINOPHIL NFR BLD AUTO: 1.7 % (ref 0.3–6.2)
ERYTHROCYTE [DISTWIDTH] IN BLOOD BY AUTOMATED COUNT: 13.5 % (ref 12.3–15.4)
GLOBULIN UR ELPH-MCNC: 2.3 GM/DL
GLUCOSE SERPL-MCNC: 111 MG/DL (ref 65–99)
GLUCOSE UR STRIP-MCNC: NEGATIVE MG/DL
HCT VFR BLD AUTO: 42.3 % (ref 34–46.6)
HGB BLD-MCNC: 13.8 G/DL (ref 12–15.9)
HGB UR QL STRIP.AUTO: NEGATIVE
HOLD SPECIMEN: NORMAL
HYALINE CASTS UR QL AUTO: ABNORMAL /LPF
IMM GRANULOCYTES # BLD AUTO: 0.02 10*3/MM3 (ref 0–0.05)
IMM GRANULOCYTES NFR BLD AUTO: 0.3 % (ref 0–0.5)
KETONES UR QL STRIP: NEGATIVE
LEUKOCYTE ESTERASE UR QL STRIP.AUTO: ABNORMAL
LYMPHOCYTES # BLD AUTO: 2.18 10*3/MM3 (ref 0.7–3.1)
LYMPHOCYTES NFR BLD AUTO: 27.7 % (ref 19.6–45.3)
MAGNESIUM SERPL-MCNC: 2.4 MG/DL (ref 1.6–2.4)
MCH RBC QN AUTO: 31.7 PG (ref 26.6–33)
MCHC RBC AUTO-ENTMCNC: 32.6 G/DL (ref 31.5–35.7)
MCV RBC AUTO: 97.2 FL (ref 79–97)
MONOCYTES # BLD AUTO: 0.45 10*3/MM3 (ref 0.1–0.9)
MONOCYTES NFR BLD AUTO: 5.7 % (ref 5–12)
NEUTROPHILS NFR BLD AUTO: 5.04 10*3/MM3 (ref 1.7–7)
NEUTROPHILS NFR BLD AUTO: 64.1 % (ref 42.7–76)
NITRITE UR QL STRIP: NEGATIVE
NRBC BLD AUTO-RTO: 0 /100 WBC (ref 0–0.2)
PH UR STRIP.AUTO: 6.5 [PH] (ref 5–8)
PLATELET # BLD AUTO: 244 10*3/MM3 (ref 140–450)
PMV BLD AUTO: 11.2 FL (ref 6–12)
POTASSIUM SERPL-SCNC: 4.5 MMOL/L (ref 3.5–5.2)
PROT SERPL-MCNC: 6.8 G/DL (ref 6–8.5)
PROT UR QL STRIP: NEGATIVE
RBC # BLD AUTO: 4.35 10*6/MM3 (ref 3.77–5.28)
RBC # UR STRIP: ABNORMAL /HPF
REF LAB TEST METHOD: ABNORMAL
SODIUM SERPL-SCNC: 139 MMOL/L (ref 136–145)
SP GR UR STRIP: 1.01 (ref 1–1.03)
SQUAMOUS #/AREA URNS HPF: ABNORMAL /HPF
TROPONIN T SERPL HS-MCNC: <6 NG/L
UROBILINOGEN UR QL STRIP: ABNORMAL
WBC # UR STRIP: ABNORMAL /HPF
WBC NRBC COR # BLD AUTO: 7.86 10*3/MM3 (ref 3.4–10.8)
WHOLE BLOOD HOLD COAG: NORMAL
WHOLE BLOOD HOLD SPECIMEN: NORMAL

## 2024-07-21 PROCEDURE — 99284 EMERGENCY DEPT VISIT MOD MDM: CPT

## 2024-07-21 PROCEDURE — 93005 ELECTROCARDIOGRAM TRACING: CPT

## 2024-07-21 PROCEDURE — 70551 MRI BRAIN STEM W/O DYE: CPT

## 2024-07-21 PROCEDURE — 71045 X-RAY EXAM CHEST 1 VIEW: CPT

## 2024-07-21 PROCEDURE — 80053 COMPREHEN METABOLIC PANEL: CPT | Performed by: EMERGENCY MEDICINE

## 2024-07-21 PROCEDURE — 85025 COMPLETE CBC W/AUTO DIFF WBC: CPT | Performed by: EMERGENCY MEDICINE

## 2024-07-21 PROCEDURE — 93005 ELECTROCARDIOGRAM TRACING: CPT | Performed by: EMERGENCY MEDICINE

## 2024-07-21 PROCEDURE — 81001 URINALYSIS AUTO W/SCOPE: CPT | Performed by: EMERGENCY MEDICINE

## 2024-07-21 PROCEDURE — 83735 ASSAY OF MAGNESIUM: CPT | Performed by: EMERGENCY MEDICINE

## 2024-07-21 PROCEDURE — 84484 ASSAY OF TROPONIN QUANT: CPT | Performed by: EMERGENCY MEDICINE

## 2024-07-21 RX ORDER — SODIUM CHLORIDE 0.9 % (FLUSH) 0.9 %
10 SYRINGE (ML) INJECTION AS NEEDED
Status: DISCONTINUED | OUTPATIENT
Start: 2024-07-21 | End: 2024-07-22 | Stop reason: HOSPADM

## 2024-07-21 NOTE — ED PROVIDER NOTES
Subjective  History of Present Illness:    Chief Complaint: Worsening dizziness, difficulty with speech, and balance  History of Present Illness: 63-year-old female with significant past medical history for recurrent\residual giant basilar aneurysm and, history of migraine headaches, she had undergone prior embolization, pipeline shield for giant partially thrombosed proximal basilar aneurysm on November 29, 2022 she initially presented with dizziness, ataxia, and was found to have a tiny right occipital infarct at that time.  She continues to struggle with symptoms.  Along with the persistent filling of the aneurysm, she presented for additional embolization on September 15, 2023.  She is with her  who states she continues to have dizziness, difficulty with walking, and speaks difficult, he has noticed her symptoms worsen over the last few weeks.  Onset: Gradual  Duration: 1 to 2 weeks  Exacerbating / Alleviating factors: Previous history of giant basilar aneurysm with previous stents, and embolization  Associated symptoms: Worsening dizziness, speech difficulty and balance      Nurses Notes reviewed and agree, including vitals, allergies, social history and prior medical history.     REVIEW OF SYSTEMS: All systems reviewed and not pertinent unless noted.    Review of Systems   Neurological:  Positive for dizziness, speech difficulty and weakness.   All other systems reviewed and are negative.      Past Medical History:   Diagnosis Date    Aneurysm 11/26/2022    Cancer 05/2024    Basal Cell Carcinoma removed from scalp by dermatology    Cluster headache 2022    Had headaches for several months before stroke and mass was found    Difficulty walking 11/26/22    After stroke    Headache     Headache, tension-type 2022    Hyperlipidemia     Hypertension     Memory loss 11/26/22    Migraine 2022    Stroke 11/26/2022    Vision loss 11/26/22    When dizzy or headache       Allergies:    Tramadol      Past Surgical  "History:   Procedure Laterality Date    ARTERIAL ANEURYSM REPAIR      BREAST LUMPECTOMY  2012    CYST REMOVAL Left 2023    EMBOLIZATION CEREBRAL N/A 2022    Procedure: CV EMBOLIZATION CEREBRAL IR;  Surgeon: Enoch Savage MD;  Location:  [facility];  Service: Interventional Radiology;  Laterality: N/A;    HIATAL HERNIA REPAIR      INTERVENTIONAL RADIOLOGY PROCEDURE N/A 09/15/2023    Procedure: Embolization;  Surgeon: Enoch Savage MD;  Location:  [facility];  Service: Interventional Radiology;  Laterality: N/A;    SINUS SURGERY      x4    SKIN CANCER EXCISION      cancer removed off top of head         Social History     Socioeconomic History    Marital status:    Tobacco Use    Smoking status: Former     Current packs/day: 0.00     Average packs/day: 1 pack/day for 15.0 years (15.0 ttl pk-yrs)     Types: Cigarettes     Start date: 10/4/1989     Quit date: 10/4/2004     Years since quittin.8     Passive exposure: Past    Smokeless tobacco: Never   Vaping Use    Vaping status: Never Used   Substance and Sexual Activity    Alcohol use: No    Drug use: Never    Sexual activity: Not Currently     Partners: Male     Birth control/protection: Tubal ligation         Family History   Problem Relation Age of Onset    No Known Problems Mother     Heart attack Father 42    Heart attack Paternal Aunt     Heart attack Paternal Uncle     No Known Problems Sister     Cancer Maternal Grandmother     Heart failure Maternal Grandmother     No Known Problems Maternal Grandfather     No Known Problems Paternal Grandmother     No Known Problems Paternal Grandfather     Diabetes Half-Brother        Objective  Physical Exam:  /86 (BP Location: Right arm, Patient Position: Sitting)   Pulse 86   Temp 98.3 °F (36.8 °C) (Oral)   Resp 18   Ht 154.9 cm (61\")   Wt 56.9 kg (125 lb 7.1 oz)   SpO2 100%   BMI 23.70 kg/m²      Physical Exam      Procedures    ED " Course:    ED Course as of 07/21/24 2259   Sun Jul 21, 2024 2229 Review of previous  non ED visits, prior labs, prior imaging, available notes from prior evaluations or visits with specialists, medication list, allergies, past medical history, past surgical history     [CS]   2229 I Personally reviewed all laboratory studies performed in the emergency department  [CS]   2230 Chest x-ray interpretation by me: No acute cardiopulmonary abnormality [CS]   2239 I had a discussion with the patient and family at the bedside, discussing lab results, urinalysis, and MRI result.  No acute changes of her pre-existing known aneurysm, that she has had stents placed in the past.  Patient and family state that they feel comfortable going home, she feels safe, she has had several sessions of physical therapy and Occupational Therapy at home and she has equipment, they would prefer to be discharged home, will follow-up with primary care physician they did have a question about the newly started Wellbutrin I recommended that they discuss with the primary care physician that started, sometimes this may need to be tapered or they may want to change her to a different medication. [CS]      ED Course User Index  [CS] Joe Guillaume Jr., SARA       Lab Results (last 24 hours)       Procedure Component Value Units Date/Time    CBC & Differential [[id number]]  (Abnormal) Collected: 07/21/24 2046    Specimen: Blood Updated: 07/21/24 2055    Narrative:      The following orders were created for panel order CBC & Differential.  Procedure                               Abnormality         Status                     ---------                               -----------         ------                     CBC Auto Differential[[id number]]        Abnormal            Final result                 Please view results for these tests on the individual orders.    Comprehensive Metabolic Panel [[id number]]  (Abnormal) Collected: 07/21/24 2046    Specimen:  Blood Updated: 07/21/24 2112     Glucose 111 mg/dL      BUN 18 mg/dL      Creatinine 1.03 mg/dL      Sodium 139 mmol/L      Potassium 4.5 mmol/L      Comment: Slight hemolysis detected by analyzer. Result may be falsely elevated.        Chloride 105 mmol/L      CO2 25.0 mmol/L      Calcium 8.8 mg/dL      Total Protein 6.8 g/dL      Albumin 4.5 g/dL      ALT (SGPT) 39 U/L      AST (SGOT) 25 U/L      Alkaline Phosphatase 142 U/L      Total Bilirubin 0.2 mg/dL      Globulin 2.3 gm/dL      Comment: Calculated Result        A/G Ratio 2.0 g/dL      BUN/Creatinine Ratio 17.5     Anion Gap 9.0 mmol/L      eGFR 61.6 mL/min/1.73     Narrative:      GFR Normal >60  Chronic Kidney Disease <60  Kidney Failure <15      Single High Sensitivity Troponin T [[id number]]  (Normal) Collected: 07/21/24 2046    Specimen: Blood Updated: 07/21/24 2112     HS Troponin T <6 ng/L     Narrative:      High Sensitive Troponin T Reference Range:  <14.0 ng/L- Negative Female for AMI  <22.0 ng/L- Negative Male for AMI  >=14 - Abnormal Female indicating possible myocardial injury.  >=22 - Abnormal Male indicating possible myocardial injury.   Clinicians would have to utilize clinical acumen, EKG, Troponin, and serial changes to determine if it is an Acute Myocardial Infarction or myocardial injury due to an underlying chronic condition.         Magnesium [[id number]]  (Normal) Collected: 07/21/24 2046    Specimen: Blood Updated: 07/21/24 2112     Magnesium 2.4 mg/dL     CBC Auto Differential [[id number]]  (Abnormal) Collected: 07/21/24 2046    Specimen: Blood Updated: 07/21/24 2055     WBC 7.86 10*3/mm3      RBC 4.35 10*6/mm3      Hemoglobin 13.8 g/dL      Hematocrit 42.3 %      MCV 97.2 fL      MCH 31.7 pg      MCHC 32.6 g/dL      RDW 13.5 %      RDW-SD 49.0 fl      MPV 11.2 fL      Platelets 244 10*3/mm3      Neutrophil % 64.1 %      Lymphocyte % 27.7 %      Monocyte % 5.7 %      Eosinophil % 1.7 %      Basophil % 0.5 %      Immature Grans % 0.3 %       Neutrophils, Absolute 5.04 10*3/mm3      Lymphocytes, Absolute 2.18 10*3/mm3      Monocytes, Absolute 0.45 10*3/mm3      Eosinophils, Absolute 0.13 10*3/mm3      Basophils, Absolute 0.04 10*3/mm3      Immature Grans, Absolute 0.02 10*3/mm3      nRBC 0.0 /100 WBC     Urinalysis With Microscopic If Indicated (No Culture) - Urine, Clean Catch [[id number]]  (Abnormal) Collected: 07/21/24 2208    Specimen: Urine, Clean Catch Updated: 07/21/24 2225     Color, UA Yellow     Appearance, UA Clear     pH, UA 6.5     Specific Gravity, UA 1.012     Glucose, UA Negative     Ketones, UA Negative     Bilirubin, UA Negative     Blood, UA Negative     Protein, UA Negative     Leuk Esterase, UA Trace     Nitrite, UA Negative     Urobilinogen, UA 0.2 E.U./dL    Urinalysis, Microscopic Only - Urine, Clean Catch [[id number]]  (Abnormal) Collected: 07/21/24 2208    Specimen: Urine, Clean Catch Updated: 07/21/24 2225     RBC, UA 0-2 /HPF      WBC, UA 3-5 /HPF      Bacteria, UA None Seen /HPF      Squamous Epithelial Cells, UA None Seen /HPF      Hyaline Casts, UA None Seen /LPF      Methodology Automated Microscopy             MRI Brain Without Contrast    Result Date: 7/21/2024  MRI BRAIN WO CONTRAST Date of Exam: 7/21/2024 9:20 PM EDT Indication: dizziness, difficulty walking, previous stroke symptoms for 2 weeks worsening.  Comparison: MRI of the brain dated 3/25/2024 Technique:  Routine multiplanar/multisequence sequence images of the brain were obtained without contrast administration. Findings: Again seen is a basilar artery aneurysm measuring up to 2.5 x 2.3 cm. This aneurysm is traversed by metallic stent which has been previously better demonstrated on head CT angiogram from January 6, 2024. The aneurysm sac is unchanged in size. There is some mass effect on the lower jong in the upper aspect of the medulla. There is no new mass or mass effect. There are areas of increased T2 and FLAIR signal throughout the bilateral  periventricular and some areas in the subcortical white matter consistent chronic microvascular ischemic change. There is no pathologic restricted diffusion to indicate acute or subacute infarct. The paranasal sinuses and mastoid air cells remain clear.     Impression: Impression: Stable basilar artery aneurysm. Stable chronic microvascular ischemic change. No acute intracranial abnormality. Electronically Signed: Rinku Tran MD  7/21/2024 10:26 PM EDT  Workstation ID: [id number]    XR Chest 1 View    Result Date: 7/21/2024  XR CHEST 1 VW Date of Exam: 7/21/2024 8:55 PM EDT Indication: Weak/Dizzy/AMS triage protocol Comparison: Chest radiograph December 14, 2022 Findings: There are no airspace consolidations. No pleural fluid. No pneumothorax. The pulmonary vasculature appears within normal limits. The cardiac and mediastinal silhouette appear unremarkable. No acute osseous abnormality identified.     Impression: Impression: No active disease. Electronically Signed: Rinku Tran MD  7/21/2024 9:45 PM EDT  Workstation ID: [id number]        Medical Decision Making  Patient Presentation 63-year-old female with dizziness, progressively worsening over the last several weeks.    DDX dizziness, weakness, dehydration, acute kidney insufficiency, urinary tract infection, electrolyte abnormality    Data Review/ Non ED Records /Analysis/Ordering unique tests  Review of previous  non ED visits, prior labs, prior imaging, available notes from prior evaluations or visits with specialists, medication list, allergies, past medical history, past surgical history        Independent Review Studies  I Personally reviewed all laboratory studies performed in the emergency department     Intervention/Re-evaluation no acute intervention on reevaluation patient remained stable    Independent Clinician no consultation    Risk Stratification tools/clinical decision rules patient presented with known history of previous aneurysm that had  previously had stent and embolization performed, patient and family presented to the ED with concern for worsening stability at home, some degree expected with her history however they wanted to be sure that this aneurysm had not gotten worse or no new ischemic strokes have developed, MRI stable no acute findings.  Lab work showed no emergent findings, patient had good family support and safety at home, and they felt comfortable going home I had a discussion with the patient family about options including admission however they felt back that she would best be served at home, they will follow-up with her primary care physician.    Shared Decision Making discussed this plan of care with patient and family they were agreeable    Disposition patient stable for discharge    Problems Addressed:  Dizziness: complicated acute illness or injury    Amount and/or Complexity of Data Reviewed  External Data Reviewed: labs, radiology and notes.  Labs: ordered. Decision-making details documented in ED Course.  Radiology: ordered.  ECG/medicine tests: ordered.    Risk  Prescription drug management.          Final diagnoses:   Dizziness           Disposition DISCHARGE    Patient discharged in stable condition.    Reviewed implications of results, diagnosis, meds, responsibility to follow up, warning signs and symptoms of possible worsening, potential complications and reasons to return to ER.    Patient/Family voiced understanding of above instructions.    Discussed plan for discharge, as there is no emergent indication for admission.  Pt/family is agreeable and understands need for follow up and possible repeat testing.  Pt/family is aware that discharge does not mean that nothing is wrong but that it indicates no emergency is currently present that requires admission and they must continue care with follow-up as given below or with a physician of their choice.     FOLLOW-UP  [facility] EMERGENCY DEPARTMENT  [id number]  [facility] [id number] [phone]    If symptoms worsen    Melissa Lazo, APRN  [address] [id number]  [phone]    Schedule an appointment as soon as possible for a visit            Medication List        Changed      Scopolamine 1 MG/3DAYS patch  Place 1 patch on the skin as directed by provider Every 72 (Seventy-Two) Hours.  What changed: additional instructions                  Joe Guillaume Jr., PA-C  07/21/24 [id number]

## 2024-07-22 ENCOUNTER — TELEPHONE (OUTPATIENT)
Age: 64
End: 2024-07-22
Payer: COMMERCIAL

## 2024-07-22 ENCOUNTER — SPECIALTY PHARMACY (OUTPATIENT)
Dept: ONCOLOGY | Facility: HOSPITAL | Age: 64
End: 2024-07-22
Payer: COMMERCIAL

## 2024-07-22 LAB
QT INTERVAL: 364 MS
QTC INTERVAL: 425 MS

## 2024-07-22 NOTE — TELEPHONE ENCOUNTER
Called PT to relay providers message:     It is ok to stop wellbutrin.     Informed PT of the above, informed PT if she would like we can reschedule her for an earlier f/u? PT asked when f/u is, I informed her as of now she has a 8/13/2024 f/u. PT stated she would like to keep this appt for now. Informed PT to call back with any persistent or worsening SE, PT verbalized understanding and had no further questions at this time.

## 2024-07-22 NOTE — TELEPHONE ENCOUNTER
Lauryn called and said that she started experiencing the following SE a few days ago, possibly from taking Wellbutrin:  dizziness / off-balance, tremors in legs, leaning forward to the right while walking, numbless / tingling moving from in between her eyes down toward her teeth.  She went to the hospital on 7/21/24 due to all of this.  Consequently, she has stopped taking Wellbutrin (she said she took her last dose on 7/21/24).  Please advise and then call her back to discuss any necessary changes.

## 2024-07-22 NOTE — PROGRESS NOTES
Specialty Pharmacy Refill Coordination Note     Lauryn is a 63 y.o. female contacted today regarding refills of Emgality and Ubrelvy specialty medication(s).Patient is due for injection on 07/25.    Reviewed and verified with patient:       Specialty medication(s) and dose(s) confirmed: yes    Refill Questions      Flowsheet Row Most Recent Value   Changes to allergies? No   Changes to medications? No   New conditions or infections since last clinic visit No   Unplanned office visit, urgent care, ED, or hospital admission in the last 4 weeks  No   How does patient/caregiver feel medication is working? Good   Financial problems or insurance changes  No   Since the previous refill, were any specialty medication doses or scheduled injections missed or delayed?  No   Does this patient require a clinical escalation to a pharmacist? No            Delivery Questions      Flowsheet Row Most Recent Value   Delivery method [employer]   Delivery address verified with patient/caregiver? Yes   Delivery address Home   Number of medications in delivery 2   Medication(s) being filled and delivered Ubrogepant, Galcanezumab-Gnlm   Doses left of specialty medications Ubrelvy 2 tablets left as of 07/22   Copay verified? Yes   Copay amount N/A   Copay form of payment No copayment ($0)   Ship Date 07/22   Delivery Date 07/23   Signature Required No                   Follow-up: 28 day(s)     Evon Robbins  Specialty Pharmacy Technician

## 2024-08-03 DIAGNOSIS — I67.1 CEREBRAL ANEURYSM, NONRUPTURED: ICD-10-CM

## 2024-08-05 RX ORDER — CLOPIDOGREL BISULFATE 75 MG/1
75 TABLET ORAL DAILY
Qty: 30 TABLET | Refills: 3 | Status: SHIPPED | OUTPATIENT
Start: 2024-08-05

## 2024-08-07 DIAGNOSIS — G44.89 OTHER HEADACHE SYNDROME: ICD-10-CM

## 2024-08-07 RX ORDER — NORTRIPTYLINE HYDROCHLORIDE 10 MG/1
CAPSULE ORAL
Qty: 60 CAPSULE | Refills: 1 | Status: SHIPPED | OUTPATIENT
Start: 2024-08-07

## 2024-08-07 NOTE — TELEPHONE ENCOUNTER
Rx Refill Note  Requested Prescriptions     Pending Prescriptions Disp Refills    nortriptyline (PAMELOR) 10 MG capsule [Pharmacy Med Name: NORTRIPTYLINE HYDROCHLORI 10 MG CAPSULE] 30 capsule 1     Sig: TAKE 2 CAPSULES BY MOUTH ONCE DAILY EVERY EVENING      Last filled: 7/10/24, 30 with 2 Refills.   Last office visit with prescribing clinician: 6/17/2024      Next office visit with prescribing clinician: 9/24/2024     Syl Chavez MA  08/07/24, 11:31 EDT

## 2024-08-13 ENCOUNTER — OFFICE VISIT (OUTPATIENT)
Age: 64
End: 2024-08-13
Payer: COMMERCIAL

## 2024-08-13 VITALS
DIASTOLIC BLOOD PRESSURE: 78 MMHG | SYSTOLIC BLOOD PRESSURE: 120 MMHG | HEIGHT: 61 IN | HEART RATE: 64 BPM | WEIGHT: 125.9 LBS | BODY MASS INDEX: 23.77 KG/M2

## 2024-08-13 DIAGNOSIS — F33.1 MAJOR DEPRESSIVE DISORDER, RECURRENT EPISODE, MODERATE DEGREE: Primary | Chronic | ICD-10-CM

## 2024-08-13 DIAGNOSIS — F41.1 GENERALIZED ANXIETY DISORDER: Chronic | ICD-10-CM

## 2024-08-13 PROCEDURE — 99214 OFFICE O/P EST MOD 30 MIN: CPT | Performed by: STUDENT IN AN ORGANIZED HEALTH CARE EDUCATION/TRAINING PROGRAM

## 2024-08-13 RX ORDER — SERTRALINE HYDROCHLORIDE 100 MG/1
100 TABLET, FILM COATED ORAL DAILY
Qty: 90 TABLET | Refills: 1 | Status: SHIPPED | OUTPATIENT
Start: 2024-08-13

## 2024-08-13 NOTE — PROGRESS NOTES
[facility]             Follow Up Office Visit      Date: 2024   Patient Name: Lauryn Romo  : 1960   MRN: [id number]     Referring Provider: Melissa Lazo APRN    Chief Complaint:      ICD-10-CM ICD-9-CM   1. Major depressive disorder, recurrent episode, moderate degree  F33.1 296.32   2. Generalized anxiety disorder  F41.1 300.02     History of Present Illness:   Lauryn Romo is a 63 y.o. female who is here today for follow up with [facility].    Patient is seen and evaluated in the office with her  present. She appears to be in no acute distress at this time. She is calm and cooperative with the evaluation, and exhibits a linear and goal directed thought process.  Patient took Wellbutrin for about a week.  After about 7 or 8 days of taking the medication, she experienced an episode where she felt numb on the left side of her body was numb and she could barely stand up.  She states that they went to the hospital because she was scared that she was having a stroke.  She was medically cleared at that time.  They suggested discontinuing the Wellbutrin as it was the only recent change that had been made to her medications.  After discontinuing this, patient had another episode several days later.  Writer suggested that this could also be from dehydration.   admits that she has not been drinking as much water lately, but drinks coffee throughout the day.  Patient states that she is working on increasing her water intake.  Writer encouraged this.  We will not restart Wellbutrin in case this episode was due to the medication.  Instead, we will try Vraylar.  Patient will start with taking it every other day.   believes that patient's anxiety has improved over the past month.  She has been doing a lot better when going out in social situations.  She keeps Ativan with her, but has not needed it many times.  She denies any suicidal ideation, plan,  intent.  We will follow-up in 1 month to see how she is tolerating the Vraylar.    Previous Medication Trials:  Wellbutrin    Subjective      Review of Systems:   Review of Systems   Constitutional:  Negative for chills, fatigue and fever.   HENT:  Negative for congestion, hearing loss, sore throat and trouble swallowing.    Eyes:  Negative for blurred vision and double vision.   Respiratory:  Negative for cough, chest tightness and shortness of breath.    Cardiovascular:  Negative for chest pain and palpitations.   Gastrointestinal:  Negative for abdominal pain, constipation, diarrhea, nausea and vomiting.   Endocrine: Negative for polydipsia and polyuria.   Genitourinary:  Negative for hematuria and urgency.   Musculoskeletal:  Negative for arthralgias.   Skin:  Negative for skin lesions and bruise.   Neurological:  Negative for dizziness, tremors, seizures and light-headedness.   Hematological:  Negative for adenopathy.     Screening Scores:   PHQ-9 : 11  FOREST-7 : 7    Medications:     Current Outpatient Medications:     aspirin 81 MG chewable tablet, Chew 1 tablet Every Night., Disp: , Rfl:     atorvastatin (LIPITOR) 80 MG tablet, TAKE 1 TABLET BY MOUTH EVERY NIGHT, Disp: 90 tablet, Rfl: 1    bethanechol (URECHOLINE) 10 MG tablet, Take 1 tablet by mouth 2 (Two) Times a Day., Disp: , Rfl:     butalbital-acetaminophen-caffeine (ORBIVAN) -40 MG capsule capsule, Take 1 capsule by mouth As Needed., Disp: , Rfl:     clopidogrel (PLAVIX) 75 MG tablet, TAKE 1 TABLET BY MOUTH DAILY, Disp: 30 tablet, Rfl: 3    fluticasone (FLONASE) 50 MCG/ACT nasal spray, 2 sprays into the nostril(s) as directed by provider Daily., Disp: 1 bottle, Rfl: 0    galcanezumab-gnlm (EMGALITY) 120 MG/ML auto-injector pen, Inject 1 mL under the skin into the appropriate area as directed Every 28 (Twenty-Eight) Days., Disp: 1 mL, Rfl: 11    LORazepam (ATIVAN) 0.5 MG tablet, Take 1 tablet by mouth Every 8 (Eight) Hours As Needed for Anxiety.,  Disp: , Rfl:     lubiprostone (AMITIZA) 8 MCG capsule, Take 1 capsule twice a day by oral route., Disp: , Rfl:     nortriptyline (PAMELOR) 10 MG capsule, TAKE 2 CAPSULES BY MOUTH ONCE DAILY EVERY EVENING, Disp: 60 capsule, Rfl: 1    olopatadine (Pataday) 0.2 % solution ophthalmic solution, INSTILL 1 DROP INTO AFFECTED EYE(S) BY OPHTHALMIC ROUTE ONCE DAILY, Disp: , Rfl:     ondansetron ODT (ZOFRAN-ODT) 4 MG disintegrating tablet, Place 1 tablet twice a day by translingual route as needed., Disp: , Rfl:     oxybutynin XL (DITROPAN-XL) 5 MG 24 hr tablet, oxybutynin chloride ER 5 mg tablet,extended release 24 hr, Disp: , Rfl:     pantoprazole (PROTONIX) 20 MG EC tablet, , Disp: , Rfl:     Scopolamine 1 MG/3DAYS patch, Place 1 patch on the skin as directed by provider Every 72 (Seventy-Two) Hours. (Patient taking differently: Place 1 patch on the skin as directed by provider Every 72 (Seventy-Two) Hours. Taking PRN), Disp: 10 patch, Rfl: 2    sertraline (ZOLOFT) 100 MG tablet, Take 1 tablet by mouth Daily., Disp: 90 tablet, Rfl: 1    sertraline (Zoloft) 50 MG tablet, Take 1 tablet by mouth Daily., Disp: 90 tablet, Rfl: 1    topiramate (TOPAMAX) 100 MG tablet, Take 1 tablet by mouth Daily., Disp: , Rfl:     ubrogepant (Ubrelvy) 100 MG tablet, Take 1 tablet by mouth Daily As Needed (migraines). Take at onset of headache - if symptoms persist or return, may repeat dose in 2 hours. Maximum: 200 mg per 24 hours, Disp: 16 tablet, Rfl: 11    Cariprazine HCl (Vraylar) 1.5 MG capsule capsule, Take 1 capsule by mouth Daily., Disp: 14 capsule, Rfl: 0    Cariprazine HCl (Vraylar) 1.5 MG capsule capsule, Take 1 capsule by mouth Daily., Disp: 30 capsule, Rfl: 0    Medication Considerations:  GORGE reviewed and appropriate.     Allergies:   Allergies   Allergen Reactions    Tramadol Other (See Comments)     Flushng and passing out     Objective     Vital Signs:   Vitals:    08/13/24 0914   BP: 120/78   Pulse: 64   Weight: 57.1 kg (125  "lb 14.4 oz)   Height: 154.9 cm (60.98\")     Body mass index is 23.8 kg/m².     Mental Status Exam:   MENTAL STATUS EXAM   General Appearance:  Cleanly groomed and dressed  Eye Contact:  Good eye contact  Attitude:  Cooperative  Motor Activity:  Other  Other Comment:  Uses cane and support from   Muscle Strength:  Normal  Speech:  Soft spoken  Language:  Spontaneous  Mood and affect:  Depressed  Hopelessness:  8  Thought Process:  Logical and goal-directed  Associations/ Thought Content:  No delusions  Hallucinations:  None  Suicidal Ideations:  Not present  Homicidal Ideation:  Not present  Sensorium:  Alert  Orientation:  Person, place, time and situation  Immediate Recall, Recent, and Remote Memory:  Intact  Attention Span/ Concentration:  Good  Fund of Knowledge:  Appropriate for age and educational level  Intellectual Functioning:  Average range  Insight:  Fair  Judgement:  Fair  Reliability:  Fair  Impulse Control:  Fair      SUICIDE RISK ASSESSMENT/CSSRS:  1. Does patient have thoughts of suicide? denies  2. Does patient have intent for suicide? denies  3. Does patient have a current plan for suicide? denies  4. History of suicide attempts: denies  5. Family history of suicide or attempts: denies  6. History of violent behaviors towards others or property or thoughts of committing suicide: denies  7. History of sexual aggression toward others: denies  8. Access to firearms or weapons: denies    Assessment / Plan      Visit Diagnosis/Orders Placed This Visit:  Diagnoses and all orders for this visit:    Major depressive disorder, recurrent episode, moderate degree (Primary)  Generalized Anxiety Disorder  - Continue Zoloft 150 mg po daily  - Start Vraylar 1.5 mg po every other day (2 boxes samples provided in office)  - Continue Ativan 0.25 mg po PRN anxiety  - Continue therapy  - Follow up with writer in 1 mo  Labs Reviewed : labs from 9/29/23 reviewed  EKG Reviewed : EKG from 9/15/23 reviewed: QTC " 436  Chart Reviewed     Face to Face time: 10:35-11:08 : 33 min     Functional Status: Mild impairment      Prognosis: Fair with Ongoing Treatment     Impression/Formulation:  Patient appeared alert and oriented. Patient is receptive to assistance with maintaining a stable lifestyle.  Patient presents with history of     ICD-10-CM ICD-9-CM   1. Major depressive disorder, recurrent episode, moderate degree  F33.1 296.32   2. Generalized anxiety disorder  F41.1 300.02     Treatment Plan:     Patient will continue supportive psychotherapy efforts and medications as indicated. Clinic will obtain release of information for current treatment team for continuity of care as needed. Patient will contact this office, call 911 or present to the nearest emergency room should suicidal or homicidal ideations occur.  Discussed medication options and treatment plan of prescribed medication(s) as well as the risks, benefits, and potential side effects. Patient ackowledged and verbally consented to continue with current treatment plan and was educated on the importance of compliance with treatment and follow-up appointments.     Quality Measures:  Tobacco: Lauryn Romo  reports that she quit smoking about 19 years ago. Her smoking use included cigarettes. She started smoking about 34 years ago. She has a 15 pack-year smoking history. She has been exposed to tobacco smoke. She has never used smokeless tobacco. I have educated her on the risk of diseases from using tobacco products such as cancer, COPD, and heart disease.     Follow Up:   Return in about 1 month (around 9/13/2024) for Medication Management, follow up with therapy.    Short-term goals: Patient will adhere to medication regimen and experience continued improvement in symptoms over the next 3 months.   Long-term goals: Patient will adhere to medication treatment plan and report improvement in symptoms over the next 6 months    Erika Whatley MD  [facility]     This is electronically signed by Erika Whatley MD

## 2024-08-15 ENCOUNTER — TELEPHONE (OUTPATIENT)
Dept: NEUROLOGY | Facility: CLINIC | Age: 64
End: 2024-08-15

## 2024-08-15 NOTE — TELEPHONE ENCOUNTER
Provider: DIMITRI    Caller: MEDHAT    Phone Number: [phone]    Reason for Call: PT CALLED AND IS WANTING TO KNOW IF SHE IS TO TAKE HER EMGALITY INJECTION IF WHEN IT IS DUE SHE IS NOT HAVING A MIGRAINE? PT STATES THAT SHE DID INJECTION ON 06/28/24 BUT DID NOT TAKE IT IN JULY AS SHE FELT SHE DID NOT NEED IT AT THEM TIME BUT IS NOW HAVING A HORRIBLE MIGRAINE. CAN PT TAKE MEDICATION NOW?    PLEASE REVIEW AND ADVISE  THANK YOU

## 2024-08-16 ENCOUNTER — SPECIALTY PHARMACY (OUTPATIENT)
Dept: ONCOLOGY | Facility: HOSPITAL | Age: 64
End: 2024-08-16
Payer: COMMERCIAL

## 2024-08-16 NOTE — PROGRESS NOTES
Specialty Pharmacy Refill Coordination Note     Lauryn is a 63 y.o. female contacted today regarding refills of Emgality and Ubrelvy specialty medication(s).Patient is due for injection on 08/25.    Reviewed and verified with patient:       Specialty medication(s) and dose(s) confirmed: yes    Refill Questions      Flowsheet Row Most Recent Value   Changes to allergies? No   Changes to medications? No   New conditions or infections since last clinic visit No   Unplanned office visit, urgent care, ED, or hospital admission in the last 4 weeks  No   How does patient/caregiver feel medication is working? Good   Financial problems or insurance changes  No   Since the previous refill, were any specialty medication doses or scheduled injections missed or delayed?  No   Does this patient require a clinical escalation to a pharmacist? No            Delivery Questions      Flowsheet Row Most Recent Value   Delivery method [employer]   Delivery address verified with patient/caregiver? Yes   Delivery address Home   Number of medications in delivery 2   Medication(s) being filled and delivered Galcanezumab-Gnlm, Ubrogepant   Doses left of specialty medications Ubrelvy 5 tablets left as of 08/16   Copay verified? Yes   Copay amount Ubrelvy and Emgality =$0   Copay form of payment No copayment ($0)   Ship Date 08/21   Delivery Date 08/22   Signature Required No                   Follow-up: 28 day(s)     Evon Robbins  Specialty Pharmacy Technician

## 2024-09-10 ENCOUNTER — OFFICE VISIT (OUTPATIENT)
Age: 64
End: 2024-09-10
Payer: COMMERCIAL

## 2024-09-10 VITALS
HEIGHT: 61 IN | OXYGEN SATURATION: 100 % | SYSTOLIC BLOOD PRESSURE: 128 MMHG | BODY MASS INDEX: 24.47 KG/M2 | WEIGHT: 129.6 LBS | HEART RATE: 62 BPM | DIASTOLIC BLOOD PRESSURE: 80 MMHG

## 2024-09-10 DIAGNOSIS — F41.1 GENERALIZED ANXIETY DISORDER: Chronic | ICD-10-CM

## 2024-09-10 DIAGNOSIS — F33.1 MAJOR DEPRESSIVE DISORDER, RECURRENT EPISODE, MODERATE DEGREE: Primary | Chronic | ICD-10-CM

## 2024-09-10 PROCEDURE — 99214 OFFICE O/P EST MOD 30 MIN: CPT | Performed by: STUDENT IN AN ORGANIZED HEALTH CARE EDUCATION/TRAINING PROGRAM

## 2024-09-10 NOTE — PROGRESS NOTES
[facility]             Follow Up Office Visit      Patient Name: Lauryn Romo  : 1960   MRN: [id number]     Referring Provider: Melissa Lazo APRN    Chief Complaint:      ICD-10-CM ICD-9-CM   1. Major depressive disorder, recurrent episode, moderate degree  F33.1 296.32   2. Generalized anxiety disorder  F41.1 300.02     History of Present Illness:   Lauryn Romo is a 63 y.o. female who is here today for follow up with [facility].    Patient is seen and evaluated in the office with her  present. She appears to be in no acute distress at this time. She is calm and cooperative with the evaluation, and exhibits a linear and goal directed thought process.   states that since last visit things have gone downhill.  She has been having a lot more issues with her balance/coordination/dizziness/headaches.  She has not been incontinent, but is having significantly more difficulties making it to the bathroom on time.  She states very tired.  She spent most of the [place] day at [employer] [place]  for a [place], but states that she is still exhausted from this today.   does admit that she has been doing somewhat better in the crowds at [employer] as long as the Christian is behind her.  Patient states that since starting Vraylar she has increased weight even though she is not eating any differently.  She has been trying to walk on her treadmill, and recently bought a bowflex that she has been using.  She still talks to people in her sleep frequently.  There was an episode where she sprung out in her sleep and fell onto the floor.   woke up and tried to help her, and states that she was laughing and seemed out of it.  She did not remember what happened.  They did not feel as though there has been any significant benefit with mood symptoms with the use of Vraylar, so we will discontinue this today.  It is likely causing weight gain, and we would like to  take caution if it is increasing any dizziness or difficulties with balance/coordination.  Patient follows up with her neurosurgeon on 9/23 and her neurologist on 9/24.  We will follow up after she has these appointments in order to discuss next steps of medication.  We discussed whether or not we feel as though there has been benefit with Zoloft throughout our trial.   does feel as though this has helped with her anxiety.  She is able to ride in the car without looking down the whole time.  She was also unable to attend [employer] before starting this medication.  She is agreeable with this.  We will continue this for now, although we may try a lower dose in the future.  She denies any suicidal ideation, plan, intent.  We will follow-up in 3 to 4 weeks.    Previous Medication Trials:  Wellbutrin    Subjective      Review of Systems:   Review of Systems   Constitutional:  Negative for chills, fatigue and fever.   HENT:  Negative for congestion, hearing loss, sore throat and trouble swallowing.    Eyes:  Negative for blurred vision and double vision.   Respiratory:  Negative for cough, chest tightness and shortness of breath.    Cardiovascular:  Negative for chest pain and palpitations.   Gastrointestinal:  Negative for abdominal pain, constipation, diarrhea, nausea and vomiting.   Endocrine: Negative for polydipsia and polyuria.   Genitourinary:  Negative for hematuria and urgency.   Musculoskeletal:  Negative for arthralgias.   Skin:  Negative for skin lesions and bruise.   Neurological:  Negative for dizziness, tremors, seizures and light-headedness.   Hematological:  Negative for adenopathy.     Screening Scores:   PHQ-9 : 3  FOREST-7 : 3    Medications:     Current Outpatient Medications:     aspirin 81 MG chewable tablet, Chew 1 tablet Every Night., Disp: , Rfl:     atorvastatin (LIPITOR) 80 MG tablet, TAKE 1 TABLET BY MOUTH EVERY NIGHT, Disp: 90 tablet, Rfl: 1    bethanechol (URECHOLINE) 10 MG tablet, Take 1  tablet by mouth 2 (Two) Times a Day., Disp: , Rfl:     butalbital-acetaminophen-caffeine (ORBIVAN) -40 MG capsule capsule, Take 1 capsule by mouth As Needed., Disp: , Rfl:     Cariprazine HCl (Vraylar) 1.5 MG capsule capsule, Take 1 capsule by mouth Daily., Disp: 30 capsule, Rfl: 0    clopidogrel (PLAVIX) 75 MG tablet, TAKE 1 TABLET BY MOUTH DAILY, Disp: 30 tablet, Rfl: 3    fluticasone (FLONASE) 50 MCG/ACT nasal spray, 2 sprays into the nostril(s) as directed by provider Daily., Disp: 1 bottle, Rfl: 0    galcanezumab-gnlm (EMGALITY) 120 MG/ML auto-injector pen, Inject 1 mL under the skin into the appropriate area as directed Every 28 (Twenty-Eight) Days., Disp: 1 mL, Rfl: 11    LORazepam (ATIVAN) 0.5 MG tablet, Take 1 tablet by mouth Every 8 (Eight) Hours As Needed for Anxiety., Disp: , Rfl:     lubiprostone (AMITIZA) 8 MCG capsule, Take 1 capsule twice a day by oral route., Disp: , Rfl:     nortriptyline (PAMELOR) 10 MG capsule, TAKE 2 CAPSULES BY MOUTH ONCE DAILY EVERY EVENING, Disp: 60 capsule, Rfl: 1    olopatadine (Pataday) 0.2 % solution ophthalmic solution, INSTILL 1 DROP INTO AFFECTED EYE(S) BY OPHTHALMIC ROUTE ONCE DAILY, Disp: , Rfl:     ondansetron ODT (ZOFRAN-ODT) 4 MG disintegrating tablet, Place 1 tablet twice a day by translingual route as needed., Disp: , Rfl:     oxybutynin XL (DITROPAN-XL) 5 MG 24 hr tablet, oxybutynin chloride ER 5 mg tablet,extended release 24 hr, Disp: , Rfl:     pantoprazole (PROTONIX) 20 MG EC tablet, , Disp: , Rfl:     sertraline (ZOLOFT) 100 MG tablet, Take 1 tablet by mouth Daily., Disp: 90 tablet, Rfl: 1    sertraline (Zoloft) 50 MG tablet, Take 1 tablet by mouth Daily., Disp: 90 tablet, Rfl: 1    topiramate (TOPAMAX) 100 MG tablet, Take 1 tablet by mouth Daily., Disp: , Rfl:     ubrogepant (Ubrelvy) 100 MG tablet, Take 1 tablet by mouth Daily As Needed (migraines). Take at onset of headache - if symptoms persist or return, may repeat dose in 2 hours. Maximum: 200  "mg per 24 hours, Disp: 16 tablet, Rfl: 11    Medication Considerations:  GORGE reviewed and appropriate.     Allergies:   Allergies   Allergen Reactions    Tramadol Other (See Comments)     Flushng and passing out     Objective     Vital Signs:   Vitals:    09/10/24 1354   BP: 128/80   Pulse: 62   SpO2: 100%   Weight: 58.8 kg (129 lb 9.6 oz)   Height: 154.9 cm (60.98\")       Body mass index is 24.5 kg/m².     Mental Status Exam:   MENTAL STATUS EXAM   General Appearance:  Cleanly groomed and dressed  Eye Contact:  Good eye contact  Attitude:  Cooperative  Motor Activity:  Other  Other Comment:  Uses cane and support from   Muscle Strength:  Normal  Speech:  Soft spoken  Language:  Spontaneous  Mood and affect:  Depressed  Hopelessness:  5  Thought Process:  Logical and goal-directed  Associations/ Thought Content:  No delusions  Hallucinations:  None  Suicidal Ideations:  Not present  Homicidal Ideation:  Not present  Sensorium:  Alert  Orientation:  Person, place, time and situation  Immediate Recall, Recent, and Remote Memory:  Intact  Attention Span/ Concentration:  Good  Fund of Knowledge:  Appropriate for age and educational level  Intellectual Functioning:  Average range  Insight:  Fair  Judgement:  Fair  Reliability:  Fair  Impulse Control:  Fair      SUICIDE RISK ASSESSMENT/CSSRS:  1. Does patient have thoughts of suicide? denies  2. Does patient have intent for suicide? denies  3. Does patient have a current plan for suicide? denies  4. History of suicide attempts: denies  5. Family history of suicide or attempts: denies  6. History of violent behaviors towards others or property or thoughts of committing suicide: denies  7. History of sexual aggression toward others: denies  8. Access to firearms or weapons: denies    Assessment / Plan      Visit Diagnosis/Orders Placed This Visit:  Diagnoses and all orders for this visit:    Major depressive disorder, recurrent episode, moderate degree " (Primary)  Generalized Anxiety Disorder  - Continue Zoloft 150 mg po daily  - Stop Vraylar 1.5 mg po every other day  - Continue Ativan 0.25 mg po PRN anxiety  - Continue therapy  - Follow up with writer in 1 mo  Labs Reviewed : labs from 9/29/23 reviewed  EKG Reviewed : EKG from 9/15/23 reviewed:   Chart Reviewed      Functional Status: Mild impairment      Prognosis: Fair with Ongoing Treatment     Impression/Formulation:  Patient appeared alert and oriented. Patient is receptive to assistance with maintaining a stable lifestyle.  Patient presents with history of     ICD-10-CM ICD-9-CM   1. Major depressive disorder, recurrent episode, moderate degree  F33.1 296.32   2. Generalized anxiety disorder  F41.1 300.02     Treatment Plan:     Patient will continue supportive psychotherapy efforts and medications as indicated. Clinic will obtain release of information for current treatment team for continuity of care as needed. Patient will contact this office, call 911 or present to the nearest emergency room should suicidal or homicidal ideations occur.  Discussed medication options and treatment plan of prescribed medication(s) as well as the risks, benefits, and potential side effects. Patient ackowledged and verbally consented to continue with current treatment plan and was educated on the importance of compliance with treatment and follow-up appointments.     Quality Measures:  Tobacco: Lauryn Romo  reports that she quit smoking about 19 years ago. Her smoking use included cigarettes. She started smoking about 34 years ago. She has a 15 pack-year smoking history. She has been exposed to tobacco smoke. She has never used smokeless tobacco. I have educated her on the risk of diseases from using tobacco products such as cancer, COPD, and heart disease.     Follow Up:   Return in about 1 month (around 10/10/2024) for follow up with therapy, Medication Management.    Short-term goals: Patient will adhere to  medication regimen and experience continued improvement in symptoms over the next 3 months.   Long-term goals: Patient will adhere to medication treatment plan and report improvement in symptoms over the next 6 months    Erika Whatley MD  [facility]     This is electronically signed by Erika Whatley MD

## 2024-09-23 ENCOUNTER — OFFICE VISIT (OUTPATIENT)
Dept: NEUROSURGERY | Facility: CLINIC | Age: 64
End: 2024-09-23
Payer: COMMERCIAL

## 2024-09-23 VITALS
TEMPERATURE: 96.4 F | HEIGHT: 61 IN | OXYGEN SATURATION: 98 % | BODY MASS INDEX: 24.77 KG/M2 | SYSTOLIC BLOOD PRESSURE: 142 MMHG | HEART RATE: 82 BPM | WEIGHT: 131.2 LBS | DIASTOLIC BLOOD PRESSURE: 84 MMHG

## 2024-09-23 DIAGNOSIS — I67.1 CEREBRAL ANEURYSM, NONRUPTURED: Primary | ICD-10-CM

## 2024-09-23 PROCEDURE — 99214 OFFICE O/P EST MOD 30 MIN: CPT | Performed by: RADIOLOGY

## 2024-09-23 RX ORDER — BETHANECHOL CHLORIDE 25 MG/1
TABLET ORAL
COMMUNITY
Start: 2024-07-15

## 2024-09-24 ENCOUNTER — SPECIALTY PHARMACY (OUTPATIENT)
Dept: ONCOLOGY | Facility: HOSPITAL | Age: 64
End: 2024-09-24
Payer: COMMERCIAL

## 2024-09-24 ENCOUNTER — OFFICE VISIT (OUTPATIENT)
Dept: NEUROLOGY | Facility: CLINIC | Age: 64
End: 2024-09-24
Payer: COMMERCIAL

## 2024-09-24 VITALS
HEART RATE: 67 BPM | OXYGEN SATURATION: 98 % | DIASTOLIC BLOOD PRESSURE: 78 MMHG | HEIGHT: 61 IN | BODY MASS INDEX: 24.73 KG/M2 | WEIGHT: 131 LBS | SYSTOLIC BLOOD PRESSURE: 116 MMHG

## 2024-09-24 DIAGNOSIS — G44.89 OTHER HEADACHE SYNDROME: Primary | ICD-10-CM

## 2024-09-24 DIAGNOSIS — R42 DIZZINESS: ICD-10-CM

## 2024-09-24 PROCEDURE — 99214 OFFICE O/P EST MOD 30 MIN: CPT | Performed by: NURSE PRACTITIONER

## 2024-09-24 RX ORDER — METHYLPREDNISOLONE 4 MG
TABLET, DOSE PACK ORAL
Qty: 1 EACH | Refills: 0 | Status: SHIPPED | OUTPATIENT
Start: 2024-09-24

## 2024-10-01 DIAGNOSIS — G44.89 OTHER HEADACHE SYNDROME: ICD-10-CM

## 2024-10-01 RX ORDER — NORTRIPTYLINE HCL 10 MG
CAPSULE ORAL
Qty: 60 CAPSULE | Refills: 0 | Status: ON HOLD | OUTPATIENT
Start: 2024-10-01

## 2024-10-06 ENCOUNTER — ANESTHESIA (OUTPATIENT)
Dept: CARDIOLOGY | Facility: HOSPITAL | Age: 64
End: 2024-10-06
Payer: COMMERCIAL

## 2024-10-06 ENCOUNTER — APPOINTMENT (OUTPATIENT)
Dept: GENERAL RADIOLOGY | Facility: HOSPITAL | Age: 64
DRG: 023 | End: 2024-10-06
Payer: COMMERCIAL

## 2024-10-06 ENCOUNTER — APPOINTMENT (OUTPATIENT)
Dept: CT IMAGING | Facility: HOSPITAL | Age: 64
DRG: 023 | End: 2024-10-06
Payer: COMMERCIAL

## 2024-10-06 ENCOUNTER — HOSPITAL ENCOUNTER (INPATIENT)
Facility: HOSPITAL | Age: 64
LOS: 5 days | Discharge: NURSING, REHAB OR LONG-TERM CARE | DRG: 023 | End: 2024-10-11
Attending: EMERGENCY MEDICINE | Admitting: INTERNAL MEDICINE
Payer: COMMERCIAL

## 2024-10-06 ENCOUNTER — ANESTHESIA EVENT (OUTPATIENT)
Dept: CARDIOLOGY | Facility: HOSPITAL | Age: 64
End: 2024-10-06
Payer: COMMERCIAL

## 2024-10-06 ENCOUNTER — APPOINTMENT (OUTPATIENT)
Dept: CARDIOLOGY | Facility: HOSPITAL | Age: 64
DRG: 023 | End: 2024-10-06
Payer: COMMERCIAL

## 2024-10-06 DIAGNOSIS — I67.1 CEREBRAL ANEURYSM: ICD-10-CM

## 2024-10-06 DIAGNOSIS — Z86.73 HISTORY OF STROKE: ICD-10-CM

## 2024-10-06 DIAGNOSIS — G44.89 OTHER HEADACHE SYNDROME: ICD-10-CM

## 2024-10-06 DIAGNOSIS — I65.1 BASILAR ARTERY THROMBOSIS: Primary | ICD-10-CM

## 2024-10-06 DIAGNOSIS — R13.12 OROPHARYNGEAL DYSPHAGIA: ICD-10-CM

## 2024-10-06 LAB
ALBUMIN SERPL-MCNC: 4.2 G/DL (ref 3.5–5.2)
ALBUMIN/GLOB SERPL: 1.9 G/DL
ALP SERPL-CCNC: 124 U/L (ref 39–117)
ALT SERPL W P-5'-P-CCNC: 65 U/L (ref 1–33)
AMMONIA BLD-SCNC: 28 UMOL/L (ref 11–51)
AMPHET+METHAMPHET UR QL: NEGATIVE
AMPHETAMINES UR QL: NEGATIVE
ANION GAP SERPL CALCULATED.3IONS-SCNC: 12 MMOL/L (ref 5–15)
APAP SERPL-MCNC: <5 MCG/ML (ref 0–30)
APTT PPP: 27.1 SECONDS (ref 22–39)
ARTERIAL PATENCY WRIST A: ABNORMAL
ARTERIAL PATENCY WRIST A: ABNORMAL
AST SERPL-CCNC: 34 U/L (ref 1–32)
ATMOSPHERIC PRESS: ABNORMAL MM[HG]
ATMOSPHERIC PRESS: ABNORMAL MM[HG]
BARBITURATES UR QL SCN: NEGATIVE
BASE EXCESS BLDA CALC-SCNC: -6.4 MMOL/L (ref 0–2)
BASE EXCESS BLDA CALC-SCNC: -7 MMOL/L (ref 0–2)
BASOPHILS # BLD AUTO: 0.05 10*3/MM3 (ref 0–0.2)
BASOPHILS NFR BLD AUTO: 0.7 % (ref 0–1.5)
BDY SITE: ABNORMAL
BDY SITE: ABNORMAL
BENZODIAZ UR QL SCN: NEGATIVE
BILIRUB SERPL-MCNC: 0.3 MG/DL (ref 0–1.2)
BODY TEMPERATURE: 37
BODY TEMPERATURE: 37
BUN BLDA-MCNC: 26 MG/DL (ref 8–26)
BUN SERPL-MCNC: 22 MG/DL (ref 8–23)
BUN/CREAT SERPL: 31.9 (ref 7–25)
BUPRENORPHINE SERPL-MCNC: NEGATIVE NG/ML
CA-I BLDA-SCNC: 1.16 MMOL/L (ref 1.2–1.32)
CALCIUM SPEC-SCNC: 8.6 MG/DL (ref 8.6–10.5)
CANNABINOIDS SERPL QL: NEGATIVE
CHLORIDE BLDA-SCNC: 110 MMOL/L (ref 98–109)
CHLORIDE SERPL-SCNC: 110 MMOL/L (ref 98–107)
CK SERPL-CCNC: 56 U/L (ref 20–180)
CO2 BLDA-SCNC: 17.7 MMOL/L (ref 22–33)
CO2 BLDA-SCNC: 20.1 MMOL/L (ref 22–33)
CO2 BLDA-SCNC: 23 MMOL/L (ref 24–29)
CO2 SERPL-SCNC: 19 MMOL/L (ref 22–29)
COCAINE UR QL: NEGATIVE
COHGB MFR BLD: 0.6 % (ref 0–2)
COHGB MFR BLD: 0.7 % (ref 0–2)
CREAT BLDA-MCNC: 0.4 MG/DL (ref 0.6–1.3)
CREAT SERPL-MCNC: 0.69 MG/DL (ref 0.57–1)
D-LACTATE SERPL-SCNC: 1 MMOL/L (ref 0.5–2)
DEPRECATED RDW RBC AUTO: 47.6 FL (ref 37–54)
EGFRCR SERPLBLD CKD-EPI 2021: 112.1 ML/MIN/1.73
EGFRCR SERPLBLD CKD-EPI 2021: 98.3 ML/MIN/1.73
EOSINOPHIL # BLD AUTO: 0.08 10*3/MM3 (ref 0–0.4)
EOSINOPHIL NFR BLD AUTO: 1.1 % (ref 0.3–6.2)
EPAP: 0
EPAP: 0
ERYTHROCYTE [DISTWIDTH] IN BLOOD BY AUTOMATED COUNT: 13.1 % (ref 12.3–15.4)
ETHANOL BLD-MCNC: <10 MG/DL (ref 0–10)
FENTANYL UR-MCNC: NEGATIVE NG/ML
GLOBULIN UR ELPH-MCNC: 2.2 GM/DL
GLUCOSE BLDC GLUCOMTR-MCNC: 101 MG/DL (ref 70–130)
GLUCOSE BLDC GLUCOMTR-MCNC: 134 MG/DL (ref 70–130)
GLUCOSE SERPL-MCNC: 82 MG/DL (ref 65–99)
HCO3 BLDA-SCNC: 16.8 MMOL/L (ref 20–26)
HCO3 BLDA-SCNC: 19 MMOL/L (ref 20–26)
HCT VFR BLD AUTO: 46 % (ref 34–46.6)
HCT VFR BLD CALC: 45.7 % (ref 38–51)
HCT VFR BLD CALC: 48.7 % (ref 38–51)
HCT VFR BLDA CALC: 43 % (ref 38–51)
HGB BLD-MCNC: 14.6 G/DL (ref 12–15.9)
HGB BLDA-MCNC: 14.6 G/DL (ref 12–17)
HGB BLDA-MCNC: 14.9 G/DL (ref 14–18)
HGB BLDA-MCNC: 15.9 G/DL (ref 14–18)
HOLD SPECIMEN: NORMAL
IMM GRANULOCYTES # BLD AUTO: 0.02 10*3/MM3 (ref 0–0.05)
IMM GRANULOCYTES NFR BLD AUTO: 0.3 % (ref 0–0.5)
INHALED O2 CONCENTRATION: 100 %
INHALED O2 CONCENTRATION: 45 %
INR PPP: 1.1 (ref 0.8–1.2)
IPAP: 0
IPAP: 0
LYMPHOCYTES # BLD AUTO: 1.36 10*3/MM3 (ref 0.7–3.1)
LYMPHOCYTES NFR BLD AUTO: 18.9 % (ref 19.6–45.3)
MAGNESIUM SERPL-MCNC: 2.4 MG/DL (ref 1.6–2.4)
MCH RBC QN AUTO: 31.2 PG (ref 26.6–33)
MCHC RBC AUTO-ENTMCNC: 31.7 G/DL (ref 31.5–35.7)
MCV RBC AUTO: 98.3 FL (ref 79–97)
METHADONE UR QL SCN: NEGATIVE
METHGB BLD QL: 0.4 % (ref 0–1.5)
METHGB BLD QL: 0.5 % (ref 0–1.5)
MODALITY: ABNORMAL
MODALITY: ABNORMAL
MONOCYTES # BLD AUTO: 0.46 10*3/MM3 (ref 0.1–0.9)
MONOCYTES NFR BLD AUTO: 6.4 % (ref 5–12)
NEUTROPHILS NFR BLD AUTO: 5.22 10*3/MM3 (ref 1.7–7)
NEUTROPHILS NFR BLD AUTO: 72.6 % (ref 42.7–76)
NRBC BLD AUTO-RTO: 0 /100 WBC (ref 0–0.2)
OPIATES UR QL: NEGATIVE
OXYCODONE UR QL SCN: NEGATIVE
OXYHGB MFR BLDV: 98.5 % (ref 94–99)
OXYHGB MFR BLDV: 99.1 % (ref 94–99)
PAW @ PEAK INSP FLOW SETTING VENT: 0 CMH2O
PAW @ PEAK INSP FLOW SETTING VENT: 0 CMH2O
PCO2 BLDA: 28.9 MM HG (ref 35–45)
PCO2 BLDA: 36.7 MM HG (ref 35–45)
PCO2 TEMP ADJ BLD: 28.9 MM HG (ref 35–45)
PCO2 TEMP ADJ BLD: 36.7 MM HG (ref 35–45)
PCP UR QL SCN: NEGATIVE
PEEP RESPIRATORY: 5 CM[H2O]
PH BLDA: 7.32 PH UNITS (ref 7.35–7.45)
PH BLDA: 7.37 PH UNITS (ref 7.35–7.45)
PH, TEMP CORRECTED: 7.32 PH UNITS
PH, TEMP CORRECTED: 7.37 PH UNITS
PLATELET # BLD AUTO: 242 10*3/MM3 (ref 140–450)
PMV BLD AUTO: 10.7 FL (ref 6–12)
PO2 BLDA: 195 MM HG (ref 83–108)
PO2 BLDA: 502 MM HG (ref 83–108)
PO2 TEMP ADJ BLD: 195 MM HG (ref 83–108)
PO2 TEMP ADJ BLD: 502 MM HG (ref 83–108)
POTASSIUM BLDA-SCNC: 5.1 MMOL/L (ref 3.5–4.9)
POTASSIUM SERPL-SCNC: 4.3 MMOL/L (ref 3.5–5.2)
PROT SERPL-MCNC: 6.4 G/DL (ref 6–8.5)
PROTHROMBIN TIME: 13.5 SECONDS (ref 12.8–15.2)
QT INTERVAL: 378 MS
QTC INTERVAL: 405 MS
RBC # BLD AUTO: 4.68 10*6/MM3 (ref 3.77–5.28)
SALICYLATES SERPL-MCNC: <0.3 MG/DL
SODIUM BLD-SCNC: 141 MMOL/L (ref 138–146)
SODIUM SERPL-SCNC: 141 MMOL/L (ref 136–145)
TOTAL RATE: 0 BREATHS/MINUTE
TOTAL RATE: 0 BREATHS/MINUTE
TRICYCLICS UR QL SCN: POSITIVE
TROPONIN T SERPL HS-MCNC: <6 NG/L
VENTILATOR MODE: ABNORMAL
VENTILATOR MODE: ABNORMAL
VT ON VENT VENT: 0.35 ML
WBC NRBC COR # BLD AUTO: 7.19 10*3/MM3 (ref 3.4–10.8)
WHOLE BLOOD HOLD COAG: NORMAL
WHOLE BLOOD HOLD SPECIMEN: NORMAL

## 2024-10-06 PROCEDURE — 0 IODIXANOL PER 1 ML: Performed by: NEUROLOGICAL SURGERY

## 2024-10-06 PROCEDURE — 85025 COMPLETE CBC W/AUTO DIFF WBC: CPT | Performed by: EMERGENCY MEDICINE

## 2024-10-06 PROCEDURE — C1769 GUIDE WIRE: HCPCS | Performed by: NEUROLOGICAL SURGERY

## 2024-10-06 PROCEDURE — 99223 1ST HOSP IP/OBS HIGH 75: CPT

## 2024-10-06 PROCEDURE — 85610 PROTHROMBIN TIME: CPT

## 2024-10-06 PROCEDURE — 36600 WITHDRAWAL OF ARTERIAL BLOOD: CPT

## 2024-10-06 PROCEDURE — 4A03X5D MEASUREMENT OF ARTERIAL FLOW, INTRACRANIAL, EXTERNAL APPROACH: ICD-10-PCS | Performed by: NEUROLOGICAL SURGERY

## 2024-10-06 PROCEDURE — 70496 CT ANGIOGRAPHY HEAD: CPT

## 2024-10-06 PROCEDURE — C1894 INTRO/SHEATH, NON-LASER: HCPCS | Performed by: NEUROLOGICAL SURGERY

## 2024-10-06 PROCEDURE — 82140 ASSAY OF AMMONIA: CPT | Performed by: EMERGENCY MEDICINE

## 2024-10-06 PROCEDURE — 93005 ELECTROCARDIOGRAM TRACING: CPT | Performed by: EMERGENCY MEDICINE

## 2024-10-06 PROCEDURE — 25010000002 DEXAMETHASONE PER 1 MG: Performed by: NURSE ANESTHETIST, CERTIFIED REGISTERED

## 2024-10-06 PROCEDURE — 25010000002 PROPOFOL 10 MG/ML EMULSION: Performed by: NURSE ANESTHETIST, CERTIFIED REGISTERED

## 2024-10-06 PROCEDURE — 5A1935Z RESPIRATORY VENTILATION, LESS THAN 24 CONSECUTIVE HOURS: ICD-10-PCS | Performed by: NEUROLOGICAL SURGERY

## 2024-10-06 PROCEDURE — C1887 CATHETER, GUIDING: HCPCS | Performed by: NEUROLOGICAL SURGERY

## 2024-10-06 PROCEDURE — 25810000003 SODIUM CHLORIDE 0.9 % SOLUTION 250 ML FLEX CONT: Performed by: NURSE ANESTHETIST, CERTIFIED REGISTERED

## 2024-10-06 PROCEDURE — 61645 PERQ ART M-THROMBECT &/NFS: CPT | Performed by: NEUROLOGICAL SURGERY

## 2024-10-06 PROCEDURE — 71045 X-RAY EXAM CHEST 1 VIEW: CPT

## 2024-10-06 PROCEDURE — 99291 CRITICAL CARE FIRST HOUR: CPT

## 2024-10-06 PROCEDURE — 82948 REAGENT STRIP/BLOOD GLUCOSE: CPT

## 2024-10-06 PROCEDURE — 80179 DRUG ASSAY SALICYLATE: CPT | Performed by: EMERGENCY MEDICINE

## 2024-10-06 PROCEDURE — 83605 ASSAY OF LACTIC ACID: CPT

## 2024-10-06 PROCEDURE — 25010000002 EPTIFIBATIDE PER 5 MG: Performed by: NEUROLOGICAL SURGERY

## 2024-10-06 PROCEDURE — B3111ZZ FLUOROSCOPY OF RIGHT BRACHIOCEPHALIC-SUBCLAVIAN ARTERY USING LOW OSMOLAR CONTRAST: ICD-10-PCS | Performed by: NEUROLOGICAL SURGERY

## 2024-10-06 PROCEDURE — 74018 RADEX ABDOMEN 1 VIEW: CPT

## 2024-10-06 PROCEDURE — 70498 CT ANGIOGRAPHY NECK: CPT

## 2024-10-06 PROCEDURE — 03CG3Z7 EXTIRPATION OF MATTER FROM INTRACRANIAL ARTERY USING STENT RETRIEVER, PERCUTANEOUS APPROACH: ICD-10-PCS | Performed by: NEUROLOGICAL SURGERY

## 2024-10-06 PROCEDURE — 25010000002 LIDOCAINE PF 1% 1 % SOLUTION: Performed by: NEUROLOGICAL SURGERY

## 2024-10-06 PROCEDURE — 82550 ASSAY OF CK (CPK): CPT | Performed by: EMERGENCY MEDICINE

## 2024-10-06 PROCEDURE — 70450 CT HEAD/BRAIN W/O DYE: CPT

## 2024-10-06 PROCEDURE — 25010000002 LEVETRIRACETAM PER 10 MG

## 2024-10-06 PROCEDURE — 83735 ASSAY OF MAGNESIUM: CPT | Performed by: EMERGENCY MEDICINE

## 2024-10-06 PROCEDURE — 94799 UNLISTED PULMONARY SVC/PX: CPT

## 2024-10-06 PROCEDURE — 85014 HEMATOCRIT: CPT

## 2024-10-06 PROCEDURE — 25010000002 MAGNESIUM SULFATE IN D5W 1G/100ML (PREMIX) 1-5 GM/100ML-% SOLUTION

## 2024-10-06 PROCEDURE — 25810000003 SODIUM CHLORIDE 0.9 % SOLUTION: Performed by: NURSE ANESTHETIST, CERTIFIED REGISTERED

## 2024-10-06 PROCEDURE — 03VG3HZ RESTRICTION OF INTRACRANIAL ARTERY WITH INTRALUMINAL DEVICE, FLOW DIVERTER, PERCUTANEOUS APPROACH: ICD-10-PCS | Performed by: NEUROLOGICAL SURGERY

## 2024-10-06 PROCEDURE — 83050 HGB METHEMOGLOBIN QUAN: CPT

## 2024-10-06 PROCEDURE — 25010000002 HEPARIN (PORCINE) PER 1000 UNITS: Performed by: NURSE ANESTHETIST, CERTIFIED REGISTERED

## 2024-10-06 PROCEDURE — 25810000003 SODIUM CHLORIDE 0.9 % SOLUTION 250 ML FLEX CONT

## 2024-10-06 PROCEDURE — 80053 COMPREHEN METABOLIC PANEL: CPT | Performed by: EMERGENCY MEDICINE

## 2024-10-06 PROCEDURE — 25010000002 LIDOCAINE PF 1% 1 % SOLUTION: Performed by: NURSE ANESTHETIST, CERTIFIED REGISTERED

## 2024-10-06 PROCEDURE — B31D1ZZ FLUOROSCOPY OF RIGHT VERTEBRAL ARTERY USING LOW OSMOLAR CONTRAST: ICD-10-PCS | Performed by: NEUROLOGICAL SURGERY

## 2024-10-06 PROCEDURE — 25010000002 DEXAMETHASONE PER 1 MG

## 2024-10-06 PROCEDURE — 0BH17EZ INSERTION OF ENDOTRACHEAL AIRWAY INTO TRACHEA, VIA NATURAL OR ARTIFICIAL OPENING: ICD-10-PCS | Performed by: NEUROLOGICAL SURGERY

## 2024-10-06 PROCEDURE — 25010000002 PHENYLEPHRINE 10 MG/ML SOLUTION 1 ML VIAL: Performed by: NURSE ANESTHETIST, CERTIFIED REGISTERED

## 2024-10-06 PROCEDURE — 82077 ASSAY SPEC XCP UR&BREATH IA: CPT | Performed by: EMERGENCY MEDICINE

## 2024-10-06 PROCEDURE — 80143 DRUG ASSAY ACETAMINOPHEN: CPT | Performed by: EMERGENCY MEDICINE

## 2024-10-06 PROCEDURE — 99291 CRITICAL CARE FIRST HOUR: CPT | Performed by: INTERNAL MEDICINE

## 2024-10-06 PROCEDURE — 25010000002 EPTIFIBATIDE 20 MG/10ML SOLUTION: Performed by: NEUROLOGICAL SURGERY

## 2024-10-06 PROCEDURE — 25010000002 NICARDIPINE 2.5 MG/ML SOLUTION 10 ML VIAL

## 2024-10-06 PROCEDURE — 0042T HC CT CEREBRAL PERFUSION W/WO CONTRAST: CPT

## 2024-10-06 PROCEDURE — 80047 BASIC METABLC PNL IONIZED CA: CPT

## 2024-10-06 PROCEDURE — C1766 INTRO/SHEATH,STRBLE,NON-PEEL: HCPCS | Performed by: NEUROLOGICAL SURGERY

## 2024-10-06 PROCEDURE — 80307 DRUG TEST PRSMV CHEM ANLYZR: CPT | Performed by: EMERGENCY MEDICINE

## 2024-10-06 PROCEDURE — 85730 THROMBOPLASTIN TIME PARTIAL: CPT | Performed by: EMERGENCY MEDICINE

## 2024-10-06 PROCEDURE — C1760 CLOSURE DEV, VASC: HCPCS | Performed by: NEUROLOGICAL SURGERY

## 2024-10-06 PROCEDURE — 36415 COLL VENOUS BLD VENIPUNCTURE: CPT

## 2024-10-06 PROCEDURE — 25510000001 IOPAMIDOL PER 1 ML: Performed by: EMERGENCY MEDICINE

## 2024-10-06 PROCEDURE — 84484 ASSAY OF TROPONIN QUANT: CPT | Performed by: EMERGENCY MEDICINE

## 2024-10-06 PROCEDURE — 25010000002 PROCHLORPERAZINE 10 MG/2ML SOLUTION

## 2024-10-06 PROCEDURE — 82805 BLOOD GASES W/O2 SATURATION: CPT

## 2024-10-06 PROCEDURE — 25010000002 CEFTRIAXONE PER 250 MG

## 2024-10-06 PROCEDURE — C1874 STENT, COATED/COV W/DEL SYS: HCPCS | Performed by: NEUROLOGICAL SURGERY

## 2024-10-06 PROCEDURE — 82375 ASSAY CARBOXYHB QUANT: CPT

## 2024-10-06 PROCEDURE — 94002 VENT MGMT INPAT INIT DAY: CPT

## 2024-10-06 DEVICE — XIENCE SKYPOINT™ EVEROLIMUS ELUTING CORONARY STENT SYSTEM 2.50 MM X 15 MM / RAPID-EXCHANGE
Type: IMPLANTABLE DEVICE | Status: FUNCTIONAL
Brand: XIENCE SKYPOINT™

## 2024-10-06 RX ORDER — PROCHLORPERAZINE EDISYLATE 5 MG/ML
5 INJECTION INTRAMUSCULAR; INTRAVENOUS ONCE
Status: COMPLETED | OUTPATIENT
Start: 2024-10-06 | End: 2024-10-06

## 2024-10-06 RX ORDER — LIDOCAINE HYDROCHLORIDE 10 MG/ML
INJECTION, SOLUTION EPIDURAL; INFILTRATION; INTRACAUDAL; PERINEURAL
Status: DISCONTINUED | OUTPATIENT
Start: 2024-10-06 | End: 2024-10-06 | Stop reason: HOSPADM

## 2024-10-06 RX ORDER — ACETAMINOPHEN 325 MG/1
650 TABLET ORAL EVERY 6 HOURS PRN
Status: DISCONTINUED | OUTPATIENT
Start: 2024-10-06 | End: 2024-10-06

## 2024-10-06 RX ORDER — IOPAMIDOL 755 MG/ML
125 INJECTION, SOLUTION INTRAVASCULAR
Status: COMPLETED | OUTPATIENT
Start: 2024-10-06 | End: 2024-10-06

## 2024-10-06 RX ORDER — CHLORHEXIDINE GLUCONATE ORAL RINSE 1.2 MG/ML
15 SOLUTION DENTAL EVERY 12 HOURS SCHEDULED
Status: DISCONTINUED | OUTPATIENT
Start: 2024-10-06 | End: 2024-10-07

## 2024-10-06 RX ORDER — LEVETIRACETAM 500 MG/5ML
1000 INJECTION, SOLUTION, CONCENTRATE INTRAVENOUS ONCE
Status: COMPLETED | OUTPATIENT
Start: 2024-10-06 | End: 2024-10-06

## 2024-10-06 RX ORDER — ASPIRIN 300 MG/1
300 SUPPOSITORY RECTAL DAILY
Status: DISCONTINUED | OUTPATIENT
Start: 2024-10-07 | End: 2024-10-08

## 2024-10-06 RX ORDER — BISACODYL 10 MG
10 SUPPOSITORY, RECTAL RECTAL DAILY PRN
Status: DISCONTINUED | OUTPATIENT
Start: 2024-10-06 | End: 2024-10-08

## 2024-10-06 RX ORDER — POLYETHYLENE GLYCOL 3350 17 G/17G
17 POWDER, FOR SOLUTION ORAL DAILY PRN
Status: DISCONTINUED | OUTPATIENT
Start: 2024-10-06 | End: 2024-10-06

## 2024-10-06 RX ORDER — DEXAMETHASONE SODIUM PHOSPHATE 4 MG/ML
4 INJECTION, SOLUTION INTRA-ARTICULAR; INTRALESIONAL; INTRAMUSCULAR; INTRAVENOUS; SOFT TISSUE EVERY 6 HOURS
Status: DISCONTINUED | OUTPATIENT
Start: 2024-10-06 | End: 2024-10-08

## 2024-10-06 RX ORDER — FAMOTIDINE 10 MG/ML
20 INJECTION, SOLUTION INTRAVENOUS 2 TIMES DAILY
Status: DISCONTINUED | OUTPATIENT
Start: 2024-10-06 | End: 2024-10-08

## 2024-10-06 RX ORDER — ASPIRIN 81 MG/1
81 TABLET, CHEWABLE ORAL DAILY
Status: DISCONTINUED | OUTPATIENT
Start: 2024-10-07 | End: 2024-10-08

## 2024-10-06 RX ORDER — SODIUM CHLORIDE 0.9 % (FLUSH) 0.9 %
10 SYRINGE (ML) INJECTION AS NEEDED
Status: DISCONTINUED | OUTPATIENT
Start: 2024-10-06 | End: 2024-10-10

## 2024-10-06 RX ORDER — ROCURONIUM BROMIDE 10 MG/ML
INJECTION, SOLUTION INTRAVENOUS AS NEEDED
Status: DISCONTINUED | OUTPATIENT
Start: 2024-10-06 | End: 2024-10-06 | Stop reason: SURG

## 2024-10-06 RX ORDER — POLYETHYLENE GLYCOL 3350 17 G/17G
17 POWDER, FOR SOLUTION ORAL DAILY PRN
Status: DISCONTINUED | OUTPATIENT
Start: 2024-10-06 | End: 2024-10-08

## 2024-10-06 RX ORDER — SODIUM CHLORIDE 9 MG/ML
INJECTION, SOLUTION INTRAVENOUS CONTINUOUS PRN
Status: DISCONTINUED | OUTPATIENT
Start: 2024-10-06 | End: 2024-10-06 | Stop reason: SURG

## 2024-10-06 RX ORDER — SODIUM CHLORIDE 0.9 % (FLUSH) 0.9 %
10 SYRINGE (ML) INJECTION AS NEEDED
Status: DISCONTINUED | OUTPATIENT
Start: 2024-10-06 | End: 2024-10-06

## 2024-10-06 RX ORDER — ATORVASTATIN CALCIUM 40 MG/1
80 TABLET, FILM COATED ORAL NIGHTLY
Status: DISCONTINUED | OUTPATIENT
Start: 2024-10-06 | End: 2024-10-08

## 2024-10-06 RX ORDER — CEFUROXIME AXETIL 500 MG/1
500 TABLET ORAL 2 TIMES DAILY
COMMUNITY
End: 2024-10-11 | Stop reason: HOSPADM

## 2024-10-06 RX ORDER — SODIUM CHLORIDE 0.9 % (FLUSH) 0.9 %
10 SYRINGE (ML) INJECTION EVERY 12 HOURS SCHEDULED
Status: DISCONTINUED | OUTPATIENT
Start: 2024-10-06 | End: 2024-10-10

## 2024-10-06 RX ORDER — DEXAMETHASONE SODIUM PHOSPHATE 4 MG/ML
INJECTION, SOLUTION INTRA-ARTICULAR; INTRALESIONAL; INTRAMUSCULAR; INTRAVENOUS; SOFT TISSUE AS NEEDED
Status: DISCONTINUED | OUTPATIENT
Start: 2024-10-06 | End: 2024-10-06 | Stop reason: SURG

## 2024-10-06 RX ORDER — CLOPIDOGREL BISULFATE 75 MG/1
75 TABLET ORAL DAILY
Status: ON HOLD | COMMUNITY
End: 2024-10-06

## 2024-10-06 RX ORDER — MAGNESIUM SULFATE 1 G/100ML
1 INJECTION INTRAVENOUS ONCE
Status: COMPLETED | OUTPATIENT
Start: 2024-10-06 | End: 2024-10-06

## 2024-10-06 RX ORDER — LIDOCAINE HYDROCHLORIDE 10 MG/ML
INJECTION, SOLUTION EPIDURAL; INFILTRATION; INTRACAUDAL; PERINEURAL AS NEEDED
Status: DISCONTINUED | OUTPATIENT
Start: 2024-10-06 | End: 2024-10-06 | Stop reason: SURG

## 2024-10-06 RX ORDER — BISACODYL 5 MG/1
5 TABLET, DELAYED RELEASE ORAL DAILY PRN
Status: DISCONTINUED | OUTPATIENT
Start: 2024-10-06 | End: 2024-10-06

## 2024-10-06 RX ORDER — NITROGLYCERIN 0.4 MG/1
0.4 TABLET SUBLINGUAL
Status: DISCONTINUED | OUTPATIENT
Start: 2024-10-06 | End: 2024-10-11 | Stop reason: HOSPADM

## 2024-10-06 RX ORDER — IODIXANOL 320 MG/ML
INJECTION, SOLUTION INTRAVASCULAR
Status: DISCONTINUED | OUTPATIENT
Start: 2024-10-06 | End: 2024-10-06 | Stop reason: HOSPADM

## 2024-10-06 RX ORDER — AMOXICILLIN 250 MG
2 CAPSULE ORAL 2 TIMES DAILY PRN
Status: DISCONTINUED | OUTPATIENT
Start: 2024-10-06 | End: 2024-10-08

## 2024-10-06 RX ORDER — EPTIFIBATIDE 2 MG/ML
INJECTION, SOLUTION INTRAVENOUS
Status: DISCONTINUED | OUTPATIENT
Start: 2024-10-06 | End: 2024-10-06 | Stop reason: HOSPADM

## 2024-10-06 RX ORDER — BISACODYL 10 MG
10 SUPPOSITORY, RECTAL RECTAL DAILY PRN
Status: DISCONTINUED | OUTPATIENT
Start: 2024-10-06 | End: 2024-10-06

## 2024-10-06 RX ORDER — SODIUM CHLORIDE 0.9 % (FLUSH) 0.9 %
10 SYRINGE (ML) INJECTION EVERY 12 HOURS SCHEDULED
Status: DISCONTINUED | OUTPATIENT
Start: 2024-10-06 | End: 2024-10-06

## 2024-10-06 RX ORDER — ACETAMINOPHEN 160 MG/5ML
650 SOLUTION ORAL EVERY 6 HOURS PRN
Status: DISCONTINUED | OUTPATIENT
Start: 2024-10-06 | End: 2024-10-08

## 2024-10-06 RX ORDER — ATORVASTATIN CALCIUM 40 MG/1
80 TABLET, FILM COATED ORAL NIGHTLY
Status: DISCONTINUED | OUTPATIENT
Start: 2024-10-06 | End: 2024-10-06

## 2024-10-06 RX ORDER — ASPIRIN 81 MG/1
81 TABLET, CHEWABLE ORAL DAILY
Status: DISCONTINUED | OUTPATIENT
Start: 2024-10-07 | End: 2024-10-06

## 2024-10-06 RX ORDER — SODIUM CHLORIDE, SODIUM LACTATE, POTASSIUM CHLORIDE, CALCIUM CHLORIDE 600; 310; 30; 20 MG/100ML; MG/100ML; MG/100ML; MG/100ML
INJECTION, SOLUTION INTRAVENOUS CONTINUOUS PRN
Status: DISCONTINUED | OUTPATIENT
Start: 2024-10-06 | End: 2024-10-06

## 2024-10-06 RX ORDER — EPTIFIBATIDE 0.75 MG/ML
INJECTION, SOLUTION INTRAVENOUS
Status: COMPLETED | OUTPATIENT
Start: 2024-10-06 | End: 2024-10-06

## 2024-10-06 RX ORDER — PROPOFOL 10 MG/ML
VIAL (ML) INTRAVENOUS AS NEEDED
Status: DISCONTINUED | OUTPATIENT
Start: 2024-10-06 | End: 2024-10-06 | Stop reason: SURG

## 2024-10-06 RX ORDER — AMOXICILLIN 250 MG
2 CAPSULE ORAL 2 TIMES DAILY PRN
Status: DISCONTINUED | OUTPATIENT
Start: 2024-10-06 | End: 2024-10-06

## 2024-10-06 RX ORDER — ASPIRIN 300 MG/1
300 SUPPOSITORY RECTAL DAILY
Status: DISCONTINUED | OUTPATIENT
Start: 2024-10-07 | End: 2024-10-06

## 2024-10-06 RX ORDER — HEPARIN SODIUM 1000 [USP'U]/ML
INJECTION, SOLUTION INTRAVENOUS; SUBCUTANEOUS AS NEEDED
Status: DISCONTINUED | OUTPATIENT
Start: 2024-10-06 | End: 2024-10-06 | Stop reason: SURG

## 2024-10-06 RX ADMIN — ROCURONIUM BROMIDE 20 MG: 10 SOLUTION INTRAVENOUS at 11:52

## 2024-10-06 RX ADMIN — DEXAMETHASONE SODIUM PHOSPHATE 4 MG: 4 INJECTION, SOLUTION INTRA-ARTICULAR; INTRALESIONAL; INTRAMUSCULAR; INTRAVENOUS; SOFT TISSUE at 20:11

## 2024-10-06 RX ADMIN — HEPARIN SODIUM 5000 UNITS: 1000 INJECTION INTRAVENOUS; SUBCUTANEOUS at 11:59

## 2024-10-06 RX ADMIN — MAGNESIUM SULFATE HEPTAHYDRATE 1 G: 10 INJECTION, SOLUTION INTRAVENOUS at 20:11

## 2024-10-06 RX ADMIN — PROCHLORPERAZINE EDISYLATE 5 MG: 5 INJECTION INTRAMUSCULAR; INTRAVENOUS at 18:08

## 2024-10-06 RX ADMIN — ROCURONIUM BROMIDE 100 MG: 10 SOLUTION INTRAVENOUS at 11:00

## 2024-10-06 RX ADMIN — Medication 10 ML: at 21:36

## 2024-10-06 RX ADMIN — PROPOFOL 25 MCG/KG/MIN: 10 INJECTION, EMULSION INTRAVENOUS at 12:20

## 2024-10-06 RX ADMIN — ROCURONIUM BROMIDE 30 MG: 10 SOLUTION INTRAVENOUS at 12:20

## 2024-10-06 RX ADMIN — SODIUM CHLORIDE 1000 MG: 900 INJECTION INTRAVENOUS at 14:48

## 2024-10-06 RX ADMIN — IOPAMIDOL 125 ML: 755 INJECTION, SOLUTION INTRAVENOUS at 10:05

## 2024-10-06 RX ADMIN — SODIUM CHLORIDE: 9 INJECTION, SOLUTION INTRAVENOUS at 10:55

## 2024-10-06 RX ADMIN — LIDOCAINE HYDROCHLORIDE 50 MG: 10 INJECTION, SOLUTION EPIDURAL; INFILTRATION; INTRACAUDAL; PERINEURAL at 11:00

## 2024-10-06 RX ADMIN — PROPOFOL 150 MG: 10 INJECTION, EMULSION INTRAVENOUS at 11:00

## 2024-10-06 RX ADMIN — TICAGRELOR 90 MG: 90 TABLET ORAL at 21:35

## 2024-10-06 RX ADMIN — NICARDIPINE HYDROCHLORIDE 5 MG/HR: 25 INJECTION, SOLUTION INTRAVENOUS at 13:15

## 2024-10-06 RX ADMIN — CHLORHEXIDINE GLUCONATE ORAL RINSE 15 ML: 1.2 SOLUTION DENTAL at 14:49

## 2024-10-06 RX ADMIN — Medication 1 PATCH: at 14:23

## 2024-10-06 RX ADMIN — CHLORHEXIDINE GLUCONATE ORAL RINSE 15 ML: 1.2 SOLUTION DENTAL at 21:35

## 2024-10-06 RX ADMIN — Medication 10 ML: at 14:49

## 2024-10-06 RX ADMIN — ACETAMINOPHEN 650 MG: 325 TABLET ORAL at 17:48

## 2024-10-06 RX ADMIN — ATORVASTATIN CALCIUM 80 MG: 40 TABLET, FILM COATED ORAL at 21:35

## 2024-10-06 RX ADMIN — FAMOTIDINE 20 MG: 10 INJECTION, SOLUTION INTRAVENOUS at 21:35

## 2024-10-06 RX ADMIN — PHENYLEPHRINE HYDROCHLORIDE 0.2 MCG/KG/MIN: 10 INJECTION INTRAVENOUS at 11:38

## 2024-10-06 RX ADMIN — LEVETIRACETAM 1000 MG: 100 INJECTION, SOLUTION INTRAVENOUS at 10:30

## 2024-10-06 RX ADMIN — DEXAMETHASONE SODIUM PHOSPHATE 8 MG: 4 INJECTION, SOLUTION INTRAMUSCULAR; INTRAVENOUS at 11:05

## 2024-10-06 NOTE — ANESTHESIA PROCEDURE NOTES
Airway  Urgency: elective    Date/Time: 10/6/2024 11:02 AM  Airway not difficult    General Information and Staff    Patient location during procedure: OR  CRNA/CAA: Yousuf Chan CRNA    Indications and Patient Condition  Indications for airway management: airway protection    Preoxygenated: yes  MILS not maintained throughout  Mask difficulty assessment: 1 - vent by mask    Final Airway Details  Final airway type: endotracheal airway      Successful airway: ETT  Cuffed: yes   Successful intubation technique: direct laryngoscopy  Facilitating devices/methods: intubating stylet  Endotracheal tube insertion site: oral  Blade: [place]  Blade size: 2  ETT size (mm): 7.0  Cormack-Lehane Classification: grade I - full view of glottis  Placement verified by: chest auscultation and capnometry   Measured from: lips  ETT/EBT  to lips (cm): 20  Number of attempts at approach: 1  Assessment: lips, teeth, and gum same as pre-op and atraumatic intubation    Additional Comments  Negative epigastric sounds, Breath sound equal bilaterally with symmetric chest rise and fall

## 2024-10-06 NOTE — SIGNIFICANT NOTE
Stroke Neurology    Contacted by patient's nurse at 1740 as patient is complaining of headache which she rates 10/10.  SBP is in the 120-140s and her neurological exam is remained unchanged.  STAT Dual-energy CT was ordered.    Shortly after speaking with the patient's nurse the patient developed nausea with vomiting and required Compazine.  I did meet the patient down in the CT scanner and reexamined her.  She is alert and oriented.  EOMI with no evidence of visual field deficits.  She does seem slightly weaker in her left upper extremity compared to my initial exam however does have antigravity strength.  Sensation is intact bilaterally.  She continues to have left facial droop with mild dysarthria and moderate aphasia.  She tells me that her headache is similar to her typical migraines.    Discussed with Dr. Mendez.  He has reviewed the dual-energy CT which is stable.  No evidence of hemorrhage.  Will give patient Decadron 4 mg IV every 6 hours and magnesium 1 g IV now.    Nursing to update patient's spouse.    Ashley Acharya MSN, APRN, AGACNP-BC, ANVC-BC  Stroke Neurology    1928: Updated patient's spouse, Chris via telephone, regarding results of CT scan and plan of care.  All questions answered.

## 2024-10-06 NOTE — Clinical Note
A 8 fr sheath was successfully inserted using micropuncture technique into the right femoral artery.

## 2024-10-06 NOTE — ED PROVIDER NOTES
Subjective   History of Present Illness  63-year-old female presents for evaluation of altered mental status.  Of note, the patient has a history of a prior cerebral aneurysm.  She has a history of stroke as well.  She is followed by Dr. Savage of neurosurgery.  According to her , she is typically conversant and ambulatory at baseline.  Sometimes, she uses a walker.  Her last known well was at approximately 8:30 PM last night.  This morning, the patient's  found her slumped over at around 8 AM with left-sided facial droop, global weakness, and significant dysarthria/aphasia that is much different than her baseline.  As a result, he drove her here from [place], [place] to be evaluated.  She is a somewhat limited historian at this time.  Her history is obtained primarily from her .      Review of Systems   Unable to perform ROS: Mental status change   Neurological:  Positive for facial asymmetry, speech difficulty and weakness.   Psychiatric/Behavioral:  Positive for confusion.        Past Medical History:   Diagnosis Date   • Aneurysm 11/26/2022   • Cancer 05/2024    Basal Cell Carcinoma removed from scalp by dermatology   • Cluster headache 2022    Had headaches for several months before stroke and mass was found   • Difficulty walking 11/26/22    After stroke   • Headache    • Headache, tension-type 2022   • Hyperlipidemia    • Hypertension    • Memory loss 11/26/22   • Migraine 2022   • Stroke 11/26/2022   • Vision loss 11/26/22    When dizzy or headache       Allergies   Allergen Reactions   • Tramadol Other (See Comments)     Flushng and passing out       Past Surgical History:   Procedure Laterality Date   • ARTERIAL ANEURYSM REPAIR     • BREAST LUMPECTOMY  2012   • CYST REMOVAL Left 05/2023   • EMBOLIZATION CEREBRAL N/A 11/29/2022    Procedure: CV EMBOLIZATION CEREBRAL IR;  Surgeon: Enoch Savage MD;  Location: [facility] CATH INVASIVE LOCATION;  Service: Interventional Radiology;   Laterality: N/A;   • HIATAL HERNIA REPAIR  2015   • INTERVENTIONAL RADIOLOGY PROCEDURE N/A 09/15/2023    Procedure: Embolization;  Surgeon: Enoch Savage MD;  Location: [facility] INVASIVE LOCATION;  Service: Interventional Radiology;  Laterality: N/A;   • SINUS SURGERY      x4   • SKIN CANCER EXCISION      cancer removed off top of head       Family History   Problem Relation Age of Onset   • No Known Problems Mother    • Heart attack Father 42   • Heart attack Paternal Aunt    • Heart attack Paternal Uncle    • No Known Problems Sister    • Cancer Maternal Grandmother    • Heart failure Maternal Grandmother    • No Known Problems Maternal Grandfather    • No Known Problems Paternal Grandmother    • No Known Problems Paternal Grandfather    • Diabetes Half-Brother        Social History     Socioeconomic History   • Marital status:    Tobacco Use   • Smoking status: Former     Current packs/day: 0.00     Average packs/day: 1 pack/day for 15.0 years (15.0 ttl pk-yrs)     Types: Cigarettes     Start date: 10/4/1989     Quit date: 10/4/2004     Years since quittin.0     Passive exposure: Past   • Smokeless tobacco: Never   Vaping Use   • Vaping status: Never Used   Substance and Sexual Activity   • Alcohol use: No   • Drug use: Never   • Sexual activity: Not Currently     Partners: Male     Birth control/protection: Tubal ligation           Objective   Physical Exam  Vitals and nursing note reviewed.   Constitutional:       Appearance: She is well-developed. She is not diaphoretic.      Comments: Confused appearing female   HENT:      Head: Normocephalic and atraumatic.   Eyes:      Pupils: Pupils are equal, round, and reactive to light.   Neck:      Vascular: No carotid bruit.   Cardiovascular:      Rate and Rhythm: Normal rate and regular rhythm.      Heart sounds: Normal heart sounds. No murmur heard.     No friction rub. No gallop.   Pulmonary:      Effort: Pulmonary effort is normal. No respiratory  distress.      Breath sounds: Normal breath sounds. No wheezing or rales.   Abdominal:      General: Bowel sounds are normal. There is no distension.      Palpations: Abdomen is soft. There is no mass.      Tenderness: There is no abdominal tenderness. There is no guarding or rebound.   Musculoskeletal:         General: No signs of injury.   Skin:     General: Skin is warm and dry.      Findings: No erythema or rash.   Neurological:      Mental Status: She is oriented to person, place, and time.      Comments: Appears globally weak, appears somewhat confused, left-sided facial droop noted, significant dysarthria and aphasia noted, 3-5 strength noted with strength testing of both lower extremities, 5 out of 5 strength noted to both upper extremities   Psychiatric:      Comments: Unable to adequately evaluate         Critical Care    Performed by: Chris Boyer MD  Authorized by: Chris Boyer MD    Critical care provider statement:     Critical care time (minutes):  40    Critical care was necessary to treat or prevent imminent or life-threatening deterioration of the following conditions:  CNS failure or compromise    Critical care was time spent personally by me on the following activities:  Development of treatment plan with patient or surrogate, discussions with consultants, evaluation of patient's response to treatment, examination of patient, obtaining history from patient or surrogate, ordering and performing treatments and interventions, ordering and review of laboratory studies, ordering and review of radiographic studies, pulse oximetry, re-evaluation of patient's condition and review of old charts             ED Course  ED Course as of 10/06/24 1244   Sun Oct 06, 2024   1008 63-year-old female presents for evaluation of altered mental status.  Of note, the patient has a history of a prior cerebral aneurysm.  She has a history of a prior stroke as well.  She is followed by Dr. Savage of  "neurosurgery.  According to her , she is typically conversant and ambulatory at baseline.  Sometimes, she uses a walker.  The patient's last known well was last night at approximately 8:30 PM.  This morning, the patient's  found her slumped over at 8 AM with left-sided facial droop, global weakness, and dysarthria.  He drove her here himself from [place], [place].  On arrival, the patient was taken from triage to CT as a \"code stroke.\"  I was met in the CT scanner by our stroke navigator.  On exam, the patient has left-sided facial droop and significant dysarthria/aphasia.  She appears globally weak. [DD]   1009 I personally and independently reviewed the patient's CT images and findings, and I am in agreement with the radiologist regarding CT interpretation--particularly there is no intracranial hemorrhage noted.  The patient is clearly outside of window for TNK. [DD]   1009 Labs are bland/unrevealing.  Differential diagnosis is quite broad.  We will obtain labs and advanced imaging and will reassess following initial interventions. [DD]   1036 After reviewing the patient's advanced imaging results, Ashley, our stroke navigator, spoke with Dr. Savage of neurointervention regarding the patient's clotted basilar aneurysm stent.  He will take the patient to the OR for surgical management. [DD]   1037 I discussed the patient's case with our intensivist, Dr. Hager, and the patient will be admitted to the ICU under his care  following her trip to the OR. [DD]   1243 I personally and independently viewed the patient's x-ray images myself, and I am in agreement with the radiologist's reading for final interpretation, particularly there is no pneumonia noted. [DD]   1243 NIHSS of 10. [DD]      ED Course User Index  [DD] Chris Boyer MD                          Total (NIH Stroke Scale): 10             Recent Results (from the past 24 hour(s))   aPTT    Collection Time: 10/06/24  9:59 AM    Specimen: " Blood   Result Value Ref Range    PTT 27.1 22.0 - 39.0 seconds   Green Top (Gel)    Collection Time: 10/06/24  9:59 AM   Result Value Ref Range    Extra Tube Hold for add-ons.    Lavender Top    Collection Time: 10/06/24  9:59 AM   Result Value Ref Range    Extra Tube hold for add-on    Gold Top - SST    Collection Time: 10/06/24  9:59 AM   Result Value Ref Range    Extra Tube Hold for add-ons.    Gray Top    Collection Time: 10/06/24  9:59 AM   Result Value Ref Range    Extra Tube Hold for add-ons.    Light Blue Top    Collection Time: 10/06/24  9:59 AM   Result Value Ref Range    Extra Tube Hold for add-ons.    CBC Auto Differential    Collection Time: 10/06/24  9:59 AM    Specimen: Blood   Result Value Ref Range    WBC 7.19 3.40 - 10.80 10*3/mm3    RBC 4.68 3.77 - 5.28 10*6/mm3    Hemoglobin 14.6 12.0 - 15.9 g/dL    Hematocrit 46.0 34.0 - 46.6 %    MCV 98.3 (H) 79.0 - 97.0 fL    MCH 31.2 26.6 - 33.0 pg    MCHC 31.7 31.5 - 35.7 g/dL    RDW 13.1 12.3 - 15.4 %    RDW-SD 47.6 37.0 - 54.0 fl    MPV 10.7 6.0 - 12.0 fL    Platelets 242 140 - 450 10*3/mm3    Neutrophil % 72.6 42.7 - 76.0 %    Lymphocyte % 18.9 (L) 19.6 - 45.3 %    Monocyte % 6.4 5.0 - 12.0 %    Eosinophil % 1.1 0.3 - 6.2 %    Basophil % 0.7 0.0 - 1.5 %    Immature Grans % 0.3 0.0 - 0.5 %    Neutrophils, Absolute 5.22 1.70 - 7.00 10*3/mm3    Lymphocytes, Absolute 1.36 0.70 - 3.10 10*3/mm3    Monocytes, Absolute 0.46 0.10 - 0.90 10*3/mm3    Eosinophils, Absolute 0.08 0.00 - 0.40 10*3/mm3    Basophils, Absolute 0.05 0.00 - 0.20 10*3/mm3    Immature Grans, Absolute 0.02 0.00 - 0.05 10*3/mm3    nRBC 0.0 0.0 - 0.2 /100 WBC   POC Protime / INR    Collection Time: 10/06/24  9:59 AM    Specimen: Blood   Result Value Ref Range    Protime 13.5 12.8 - 15.2 seconds    INR 1.1 0.8 - 1.2   POC CHEM 8    Collection Time: 10/06/24  9:59 AM    Specimen: Blood   Result Value Ref Range    Glucose 101 70 - 130 mg/dL    BUN 26 8 - 26 mg/dL    Creatinine 0.40 (L) 0.60 - 1.30  mg/dL    Sodium 141 138 - 146 mmol/L    POC Potassium 5.1 (H) 3.5 - 4.9 mmol/L    Chloride 110 (H) 98 - 109 mmol/L    Total CO2 23 (L) 24 - 29 mmol/L    Hemoglobin 14.6 12.0 - 17.0 g/dL    Hematocrit 43 38 - 51 %    Ionized Calcium 1.16 (L) 1.20 - 1.32 mmol/L    eGFR 112.1 >60.0 mL/min/1.73   Lactic Acid, Plasma    Collection Time: 10/06/24  9:59 AM    Specimen: Blood   Result Value Ref Range    Lactate 1.0 0.5 - 2.0 mmol/L   ECG 12 Lead ED Triage Standing Order; Acute Stroke (Onset <24 hrs)    Collection Time: 10/06/24 10:16 AM   Result Value Ref Range    QT Interval 378 ms    QTC Interval 405 ms   Single High Sensitivity Troponin T    Collection Time: 10/06/24 10:38 AM    Specimen: Blood   Result Value Ref Range    HS Troponin T <6 <14 ng/L   Comprehensive Metabolic Panel    Collection Time: 10/06/24 10:38 AM    Specimen: Blood   Result Value Ref Range    Glucose 82 65 - 99 mg/dL    BUN 22 8 - 23 mg/dL    Creatinine 0.69 0.57 - 1.00 mg/dL    Sodium 141 136 - 145 mmol/L    Potassium 4.3 3.5 - 5.2 mmol/L    Chloride 110 (H) 98 - 107 mmol/L    CO2 19.0 (L) 22.0 - 29.0 mmol/L    Calcium 8.6 8.6 - 10.5 mg/dL    Total Protein 6.4 6.0 - 8.5 g/dL    Albumin 4.2 3.5 - 5.2 g/dL    ALT (SGPT) 65 (H) 1 - 33 U/L    AST (SGOT) 34 (H) 1 - 32 U/L    Alkaline Phosphatase 124 (H) 39 - 117 U/L    Total Bilirubin 0.3 0.0 - 1.2 mg/dL    Globulin 2.2 gm/dL    A/G Ratio 1.9 g/dL    BUN/Creatinine Ratio 31.9 (H) 7.0 - 25.0    Anion Gap 12.0 5.0 - 15.0 mmol/L    eGFR 98.3 >60.0 mL/min/1.73   Magnesium    Collection Time: 10/06/24 10:38 AM    Specimen: Blood   Result Value Ref Range    Magnesium 2.4 1.6 - 2.4 mg/dL   CK    Collection Time: 10/06/24 10:38 AM    Specimen: Blood   Result Value Ref Range    Creatine Kinase 56 20 - 180 U/L   Ammonia    Collection Time: 10/06/24 10:38 AM    Specimen: Blood   Result Value Ref Range    Ammonia 28 11 - 51 umol/L   Salicylate Level    Collection Time: 10/06/24 10:38 AM    Specimen: Blood   Result  Value Ref Range    Salicylate <0.3 <=30.0 mg/dL   Ethanol    Collection Time: 10/06/24 10:38 AM    Specimen: Blood   Result Value Ref Range    Ethanol <10 0 - 10 mg/dL   Acetaminophen Level    Collection Time: 10/06/24 10:38 AM    Specimen: Blood   Result Value Ref Range    Acetaminophen <5.0 0.0 - 30.0 mcg/mL     Note: In addition to lab results from this visit, the labs listed above may include labs taken at another facility or during a different encounter within the last 24 hours. Please correlate lab times with ED admission and discharge times for further clarification of the services performed during this visit.    XR Chest 1 View   Final Result   Impression:   No active pulmonary process.         Electronically Signed: Bryon Yoon MD     10/6/2024 11:28 AM EDT     Workstation ID: [id number]      CT Angiogram Head w AI Analysis of LVO   Final Result   1.Focal 3 mm nonopacification of the basilar artery just distal to the vascular stent suspicious for high-grade stenoses or occlusion possibly related to thrombus. Asymmetric decreased opacification in the distal right vertebral artery likely related to    changes in flow dynamics without additional area of focal high-grade stenoses or occlusion. Findings likely explains cerebellar abnormalities on CT perfusion.   2.Other major intracranial arterial vasculature widely patent.   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      Findings communicated over the phone with ordering provider Dr. Boyer at 10:48 a.m. 10/6/2024. Patient in route to conventional angiogram with neurosurgery at time of phone call.               Electronically Signed: Bryon Yoon MD     10/6/2024 10:44 AM EDT     Workstation ID: [id number]      CT Angiogram Neck   Final Result   1.Focal 3 mm nonopacification of the basilar artery just distal to the vascular stent suspicious for  high-grade stenoses or occlusion possibly related to thrombus. Asymmetric decreased opacification in the distal right vertebral artery likely related to    changes in flow dynamics without additional area of focal high-grade stenoses or occlusion. Findings likely explains cerebellar abnormalities on CT perfusion.   2.Other major intracranial arterial vasculature widely patent.   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      Findings communicated over the phone with ordering provider Dr. Boyer at 10:48 a.m. 10/6/2024. Patient in route to conventional angiogram with neurosurgery at time of phone call.               Electronically Signed: Bryon Yoon MD     10/6/2024 10:44 AM EDT     Workstation ID: [id number]      CT CEREBRAL PERFUSION WITH & WITHOUT CONTRAST   Final Result   Negative for completed infarct. 14 mL region of Tmax greater than 6 seconds in the right cerebellum.               Electronically Signed: Bryon Yoon MD     10/6/2024 10:28 AM EDT     Workstation ID: [id number]      CT Head Without Contrast Stroke Protocol   Final Result   Impression:   1. New periventricular hypodensities at right frontal parietal junction and left parietal lobe which could reflect recent (acute or subacute) infarcts. No associated acute hemorrhage or significant mass effect. Findings could be further assessed by    dedicated MRI.   2. Slight increasing dilation of third and lateral ventricles with more conspicuous sulcal and gyral crowding at the vertex. Findings could reflect progressive mass effect at the level of the fourth ventricle related to known treated aneurysm, although    size of the excluded aneurysm has not significantly changed measuring 2.5 x 2.6 cm (previously 2.4 x 2.6 cm). Consider neurosurgical consultation.   3. Underlying periventricular hypodensity suggesting chronic microvascular ischemic change  "similar to prior. Transependymal edema would be considered less likely given stability.            Electronically Signed: Bryon Yoon MD     10/6/2024 10:03 AM EDT     Workstation ID: [id number]        Vitals:    10/06/24 0939 10/06/24 0959 10/06/24 1016 10/06/24 1031   BP: 148/89 (!) 147/106 160/81 160/84   BP Location:  Left arm     Pulse: 74  68 68   Resp: 16      Temp: 98.5 °F (36.9 °C)      SpO2: 100%      Weight: 60 kg (132 lb 4.4 oz)      Height: 154.9 cm (61\")        Medications   sodium chloride 0.9 % flush 10 mL ( Intravenous MAR Hold 10/6/24 1059)   iopamidol (ISOVUE-370) 76 % injection 125 mL (125 mL Intravenous Given 10/6/24 1005)   levETIRAcetam (KEPPRA) injection 1,000 mg (1,000 mg Intravenous Given 10/6/24 1030)   eptifibatide (INTEGRILIN) 75 mg in 100 mL solution (2 mcg/kg/min × 60 kg Intravenous New Bag 10/6/24 1202)     ECG/EMG Results (last 24 hours)       Procedure Component Value Units Date/Time    ECG 12 Lead ED Triage Standing Order; Acute Stroke (Onset <24 hrs) [[id number]] Collected: 10/06/24 1016     Updated: 10/06/24 1228     QT Interval 378 ms      QTC Interval 405 ms     Narrative:      Test Reason : ED Triage Standing Order~  Blood Pressure :   */*   mmHG  Vent. Rate :  69 BPM     Atrial Rate :  69 BPM     P-R Int : 124 ms          QRS Dur :  88 ms      QT Int : 378 ms       P-R-T Axes :   5 -11  35 degrees     QTc Int : 405 ms    Normal sinus rhythm  Septal infarct , age undetermined  Abnormal ECG  When compared with ECG of 21-JUL-2024 18:35,  No significant change was found  Confirmed by MD Merlin, Demarcus ([id number]) on 10/6/2024 12:27:40 PM    Referred By: ED MD           Confirmed By: Demarcus Boyer MD          ECG 12 Lead ED Triage Standing Order; Acute Stroke (Onset <24 hrs)   Final Result   Test Reason : ED Triage Standing Order~   Blood Pressure :   */*   mmHG   Vent. Rate :  69 BPM     Atrial Rate :  69 BPM      P-R Int : 124 ms          QRS Dur :  88 ms       QT Int : 378 ms       " P-R-T Axes :   5 -11  35 degrees      QTc Int : 405 ms      Normal sinus rhythm   Septal infarct , age undetermined   Abnormal ECG   When compared with ECG of 21-JUL-2024 18:35,   No significant change was found   Confirmed by MD Boyer Michael ([id number]) on 10/6/2024 12:27:40 PM      Referred By: ED MD           Confirmed By: Demarcus Boyer MD                 Medical Decision Making  Amount and/or Complexity of Data Reviewed  Labs: ordered.  Radiology: ordered.  ECG/medicine tests: ordered.    Risk  Prescription drug management.  Decision regarding hospitalization.        Final diagnoses:   Basilar artery thrombosis   Cerebral aneurysm   History of stroke       ED Disposition  ED Disposition       ED Disposition   Decision to Admit    Condition   --    Comment   Level of Care: Critical Care [6]   Admitting Physician: CATHIE HERNANDEZ [[id number]]   Attending Physician: CATHIE HERNANDEZ [[id number]]                 No follow-up provider specified.       Medication List      No changes were made to your prescriptions during this visit.            Chris Boyer MD  10/06/24 1245

## 2024-10-06 NOTE — CONSULTS
Stroke Consult Note    Patient Name: Lauryn Romo   MRN: [id number]  Age: 63 y.o.  Sex: female  : 1960    Primary Care Physician: Melissa Lazo APRN  Referring Physician:  Dr. Demarcus MELISSA STROKE TEAM CALLED: 0938 EST     TIME PATIENT SEEN: 0945 EST    Handedness: Right  Race:     Chief Complaint/Reason for Consultation: Left-sided weakness and facial droop    HPI: Mrs. Romo is a 63-year-old female with known medical diagnoses present hypertension, hyperlipidemia, migraines (follows with CAILIN Parra), history of stroke (right occipital and left cerebellar, , residual dizziness and ataxic gait), and giant basilar aneurysm s/p pipeline embolization (Dr. Savage,  and ) who presents to the emergency department via private vehicle for further evaluation of left-sided weakness and facial droop which was present upon awakening this morning.  The patient states that she went to bed around  last evening as she had a migraine headache.  She states that prior to going to bed she took a Ubrelvy which did help relieve her headache somewhat.  Upon awakening this morning she tells me she was able to get out of bed and ambulate to the bathroom however had extreme difficulty secondary to left sided weakness.  She does tell me that she was recently diagnosed with a UTI and is currently on oral antibiotics.    After speaking with the patient's spouse, he states that he heard a thud this morning and went to check on her and found her lying on the bathroom floor possibly having a seizure.    She recently followed up with Dr. Savage on  and was taken off of her Plavix however was instructed to remain on her ASA 81 mg daily, which she reports compliance with.  She is not currently on any anticoagulation medications.  She has remote history of smoking, denies EtOH use, denies illicit drug use.    Last Known Normal Date/Time: 10/5/2024 2030  EST     Review of Systems    Constitutional:  Positive for activity change.   HENT:  Negative for trouble swallowing.    Eyes:  Negative for photophobia and visual disturbance.   Respiratory: Negative.     Cardiovascular: Negative.    Gastrointestinal: Negative.  Negative for blood in stool.   Genitourinary:  Positive for difficulty urinating, dysuria and hematuria.   Musculoskeletal:  Positive for arthralgias and gait problem.   Neurological:  Positive for speech difficulty, weakness and headaches. Negative for seizures, syncope and numbness.      Past Medical History:   Diagnosis Date    Aneurysm 11/26/2022    Cancer 05/2024    Basal Cell Carcinoma removed from scalp by dermatology    Cluster headache 2022    Had headaches for several months before stroke and mass was found    Difficulty walking 11/26/22    After stroke    Headache     Headache, tension-type 2022    Hyperlipidemia     Hypertension     Memory loss 11/26/22    Migraine 2022    Stroke 11/26/2022    Vision loss 11/26/22    When dizzy or headache     Past Surgical History:   Procedure Laterality Date    ARTERIAL ANEURYSM REPAIR      BREAST LUMPECTOMY  2012    CYST REMOVAL Left 05/2023    EMBOLIZATION CEREBRAL N/A 11/29/2022    Procedure: CV EMBOLIZATION CEREBRAL IR;  Surgeon: Enoch Savage MD;  Location:  [facility];  Service: Interventional Radiology;  Laterality: N/A;    HIATAL HERNIA REPAIR  2015    INTERVENTIONAL RADIOLOGY PROCEDURE N/A 09/15/2023    Procedure: Embolization;  Surgeon: Enoch Savage MD;  Location:  [facility];  Service: Interventional Radiology;  Laterality: N/A;    SINUS SURGERY      x4    SKIN CANCER EXCISION      cancer removed off top of head     Family History   Problem Relation Age of Onset    No Known Problems Mother     Heart attack Father 42    Heart attack Paternal Aunt     Heart attack Paternal Uncle     No Known Problems Sister     Cancer Maternal Grandmother     Heart failure Maternal Grandmother     No  Known Problems Maternal Grandfather     No Known Problems Paternal Grandmother     No Known Problems Paternal Grandfather     Diabetes Half-Brother      Social History     Socioeconomic History    Marital status:    Tobacco Use    Smoking status: Former     Current packs/day: 0.00     Average packs/day: 1 pack/day for 15.0 years (15.0 ttl pk-yrs)     Types: Cigarettes     Start date: 10/4/1989     Quit date: 10/4/2004     Years since quittin.0     Passive exposure: Past    Smokeless tobacco: Never   Vaping Use    Vaping status: Never Used   Substance and Sexual Activity    Alcohol use: No    Drug use: Never    Sexual activity: Not Currently     Partners: Male     Birth control/protection: Tubal ligation     Allergies   Allergen Reactions    Tramadol Other (See Comments)     Flushng and passing out     Prior to Admission medications    Medication Sig Start Date End Date Taking? Authorizing Provider   aspirin 81 MG chewable tablet Chew 1 tablet Every Night.    Ghulam Harvey MD   atorvastatin (LIPITOR) 80 MG tablet TAKE 1 TABLET BY MOUTH EVERY NIGHT 23   Jan Matos PA-C   bethanechol (URECHOLINE) 25 MG tablet Take 1 tablet twice a day by oral route for 30 days. 7/15/24   Ghulam Harvey MD   Diclofenac Sodium (VOLTAREN) 1 % gel gel APPLY 2 GRAMS TO THE AFFECTED AREA(S) BY TOPICAL ROUTE 4 TIMES PER DAY 24   Ghulam Harvey MD   fluticasone (FLONASE) 50 MCG/ACT nasal spray 2 sprays into the nostril(s) as directed by provider Daily. 19   Kwesi Montanez MD   galcanezumab-gnlm (EMGALITY) 120 MG/ML auto-injector pen Inject 1 mL under the skin into the appropriate area as directed Every 28 (Twenty-Eight) Days. 24   Ruth Burris APRN   LORazepam (ATIVAN) 0.5 MG tablet Take 1 tablet by mouth Every 8 (Eight) Hours As Needed for Anxiety.    Ghulam Harvey MD   lubiprostone (AMITIZA) 8 MCG capsule Take 1 capsule by mouth 2 (Two) Times a Day.     Ghulam Harvey MD   methylPREDNISolone (MEDROL) 4 MG dose pack Take as directed on package instructions. 9/24/24   Ruth Burris APRN   nortriptyline (PAMELOR) 10 MG capsule TAKE 2 CAPSULES BY MOUTH ONCE DAILY EVERY EVENING 10/1/24   Ruth Burris APRN   olopatadine (Pataday) 0.2 % solution ophthalmic solution INSTILL 1 DROP INTO AFFECTED EYE(S) BY OPHTHALMIC ROUTE ONCE DAILY 5/26/23   Ghulam Harvey MD   ondansetron ODT (ZOFRAN-ODT) 4 MG disintegrating tablet Place 1 tablet twice a day by translingual route as needed.    Ghulam Harvey MD   oxybutynin XL (DITROPAN-XL) 5 MG 24 hr tablet oxybutynin chloride ER 5 mg tablet,extended release 24 hr    Ghulam Harvey MD   pantoprazole (PROTONIX) 20 MG EC tablet     Ghulam Harvey MD   sertraline (ZOLOFT) 100 MG tablet Take 1 tablet by mouth Daily. 8/13/24   Erika Whatley MD   sertraline (Zoloft) 50 MG tablet Take 1 tablet by mouth Daily. 8/13/24   Erika Whatley MD   topiramate (TOPAMAX) 100 MG tablet Take 1 tablet by mouth Daily. 3/9/24   Ghulam Harvey MD   ubrogepant (Ubrelvy) 100 MG tablet Take 1 tablet by mouth Daily As Needed (migraines). Take at onset of headache - if symptoms persist or return, may repeat dose in 2 hours. Maximum: 200 mg per 24 hours 3/19/24   Ruth Burris APRN         Temp:  [98.5 °F (36.9 °C)] 98.5 °F (36.9 °C)  Heart Rate:  [68-74] 68  Resp:  [16] 16  BP: (147-160)/() 160/84  Neurological Exam  Mental Status  Alert. Oriented only to person and place. Oriented to person, place, and time. Mild dysarthria present. Expressive aphasia present. Loss of fluency, stuttering speech. Able to perform serial calculations.    Cranial Nerves  CN II: Visual fields full to confrontation.  CN III, IV, VI: Extraocular movements intact bilaterally. Pupils equal round and reactive to light bilaterally.  CN V: Facial sensation is normal.  CN VIII: Hearing appears to be intact  bilateral.    Motor  Decreased muscle bulk throughout. Decreased muscle tone.  BLE with drift, 3/5 strength R>L  RUE with no drift 5/5 strength  LUE with slight drift 4/5 strength.    Sensory  Light touch abnormality: Tip of nose and right finger tips.     Coordination  Right: Finger-to-nose normal.Left: Finger-to-nose normal.    Gait    Not observed.      Physical Exam  Vitals and nursing note reviewed.   Constitutional:       General: She is not in acute distress.     Appearance: Normal appearance. She is ill-appearing.   HENT:      Head: Normocephalic and atraumatic.      Mouth/Throat:      Mouth: Mucous membranes are moist.   Eyes:      Extraocular Movements: Extraocular movements intact.      Pupils: Pupils are equal, round, and reactive to light.   Cardiovascular:      Rate and Rhythm: Normal rate.   Pulmonary:      Effort: Pulmonary effort is normal. No respiratory distress.      Comments: On room air  Musculoskeletal:      Right lower leg: No edema.      Left lower leg: No edema.   Skin:     General: Skin is warm and dry.   Neurological:      Mental Status: She is alert and oriented to person, place, and time.      Cranial Nerves: Cranial nerve deficit and dysarthria present.      Sensory: Sensory deficit present.      Motor: Weakness present.      Coordination: Coordination normal.   Psychiatric:         Mood and Affect: Mood normal.         Behavior: Behavior normal.         Acute Stroke Data    Thrombolytic Inclusion / Exclusion Criteria    Time: 11:16 EDT  Person Administering Scale: CAILIN Figueroa    Inclusion Criteria  [x]   18 years of age or greater   []   Onset of symptoms < 4.5 hours before beginning treatment (stroke onset = time patient was last seen well or without symptoms).   []   Diagnosis of acute ischemic stroke causing measurable disabling deficit (Complete Hemianopia, Any Aphasia, Visual or Sensory Extinction, Any weakness limiting sustained effort against gravity)   []    Any remaining deficit considered potentially disabling in view of patient and practitioner   Exclusion criteria (Do not proceed with Alteplase if any are checked under exclusion criteria)  [x]   Onset unknown or GREATER than 4.5 hours   []   ICH on CT/MRI   []   CT demonstrates hypodensity representing acute or subacute infarct   []   Significant head trauma or prior stroke in the previous 3 months   []   Symptoms suggestive of subarachnoid hemorrhage   []   History of un-ruptured intracranial aneurysm GREATER than 10 mm   []   Recent intracranial or intraspinal surgery within the last 3 months   []   Arterial puncture at a non-compressible site in the previous 7 days   []   Active internal bleeding   []   Acute bleeding tendency   []   Platelet count LESS than 100,000 for known hematological diseases such as leukemia, thrombocytopenia or chronic cirrhosis   []   Current use of anticoagulant with INR GREATER than 1.7 or PT GREATER than 15 seconds, aPTT GREATER than 40 seconds   []   Heparin received within 48 hours, resulting in abnormally elevated aPTT GREATER than upper limit of normal   []   Current use of direct thrombin inhibitors or direct factor Xa inhibitors in the past 48 hours   []   Elevated blood pressure refractory to treatment (systolic GREATER than 185 mm/Hg or diastolic  GREATER than 110 mm/Hg   []   Suspected infective endocarditis and aortic arch dissection   []   Current use of therapeutic treatment dose of low-molecular-weight heparin (LMWH) within the previous 24 hours   []   Structural GI malignancy or bleed   Relative exclusion for all patients  []   Only minor non-disabling symptoms   []   Pregnancy   []   Seizure at onset with postictal residual neurological impairments   []   Major surgery or previous trauma within past 14 days   []   History of previous spontaneous ICH, intracranial neoplasm, or AV malformation   []   Postpartum (within previous 14 days)   []   Recent GI or urinary tract  hemorrhage (within previous 21 days)   []   Recent acute MI (within previous 3 months)   []   History of un-ruptured intracranial aneurysm LESS than 10 mm   []   History of ruptured intracranial aneurysm   []   Blood glucose LESS than 50 mg/dL (2.7 mmol/L)   []   Dural puncture within the last 7 days   []   Known GREATER than 10 cerebral microbleeds   Additional exclusions for patients with symptoms onset between 3 and 4.5 hours.  []   Age > 80.   []   On any anticoagulants regardless of INR  >>> Warfarin (Coumadin), Heparin, Enoxaparin (Lovenox), fondaparinux (Arixtra), bivalirudin (Angiomax), Argatroban, dabigatran (Pradaxa), rivaroxaban (Xarelto), or apixaban (Eliquis)   []   Severe stroke (NIHSS > 25).   []   History of BOTH diabetes and previous ischemic stroke.   []   The risks and benefits have been discussed with the patient or family related to the administration of IV thrombolytic therapy for stroke symptoms.   []   I have discussed and reviewed the patient's case and imaging with the attending prior to IV thrombolytic therapy.   N/A Time IV thrombolytic administered       Hospital Meds:  Scheduled-    Infusions-     PRNs-   [Transfer Hold] sodium chloride    Functional Status Prior to Current Stroke/Jose Roberto Score: 1-2; patient uses walker to ambulate    NIH Stroke Scale  Time: 11:16 EDT  Person Administering Scale: CAILIN Figueroa    Interval: 24 hrs post onset of symptoms +/- 20 mins  1a. Level of Consciousness: 0-->Alert, keenly responsive  1b. LOC Questions: 0-->Answers both questions correctly  1c. LOC Commands: 0-->Performs both tasks correctly  2. Best Gaze: 0-->Normal  3. Visual: 0-->No visual loss  4. Facial Palsy: 1-->Minor paralysis (flattened nasolabial fold, asymmetry on smiling)  5a. Motor Arm, Left: 0-->No drift, limb holds 90 (or 45) degrees for full 10 secs  5b. Motor Arm, Right: 0-->No drift, limb holds 90 (or 45) degrees for full 10 secs  6a. Motor Leg, Left: 2-->Some effort  against gravity, leg falls to bed by 5 secs, but has some effort against gravity  6b. Motor Leg, Right: 3-->No effort against gravity, leg falls to bed immediately  7. Limb Ataxia: 0-->Absent  8. Sensory: 1-->Mild-to-moderate sensory loss, patient feels pinprick is less sharp or is dull on the affected side, or there is a loss of superficial pain with pinprick, but patient is aware of being touched  9. Best Language: 2-->Severe aphasia, all communication is through fragmentary expression, great need for inference, questioning, and guessing by the listener. Range of information that can be exchanged is limited, listener carries burden of. . . (see row details)  10. Dysarthria: 2-->Severe dysarthria, patients speech is so slurred as to be unintelligible in the absence of or out of proportion to any dysphasia, or is mute/anarthric  11. Extinction and Inattention (formerly Neglect): 0-->No abnormality    Total (NIH Stroke Scale): 10        Results Reviewed:  I have personally reviewed current lab, radiology, and data and agree with results.    CT Angiogram Head w AI Analysis of LVO    Result Date: 10/6/2024  1.Focal 3 mm nonopacification of the basilar artery just distal to the vascular stent suspicious for high-grade stenoses or occlusion possibly related to thrombus. Asymmetric decreased opacification in the distal right vertebral artery likely related to changes in flow dynamics without additional area of focal high-grade stenoses or occlusion. Findings likely explains cerebellar abnormalities on CT perfusion. 2.Other major intracranial arterial vasculature widely patent. 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 Findings communicated over the phone with ordering provider Dr. Boyer at 10:48 a.m. 10/6/2024. Patient in route to conventional angiogram with neurosurgery at time of phone call. Electronically Signed:  Bryon Yoon MD  10/6/2024 10:44 AM EDT  Workstation ID: [id number]    CT Angiogram Neck    Result Date: 10/6/2024  1.Focal 3 mm nonopacification of the basilar artery just distal to the vascular stent suspicious for high-grade stenoses or occlusion possibly related to thrombus. Asymmetric decreased opacification in the distal right vertebral artery likely related to changes in flow dynamics without additional area of focal high-grade stenoses or occlusion. Findings likely explains cerebellar abnormalities on CT perfusion. 2.Other major intracranial arterial vasculature widely patent. 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 Findings communicated over the phone with ordering provider Dr. Boyer at 10:48 a.m. 10/6/2024. Patient in route to conventional angiogram with neurosurgery at time of phone call. Electronically Signed: Bryon Yoon MD  10/6/2024 10:44 AM EDT  Workstation ID: [id number]    CT CEREBRAL PERFUSION WITH & WITHOUT CONTRAST    Result Date: 10/6/2024  Negative for completed infarct. 14 mL region of Tmax greater than 6 seconds in the right cerebellum. Electronically Signed: Bryon Yoon MD  10/6/2024 10:28 AM EDT  Workstation ID: [id number]    CT Head Without Contrast Stroke Protocol    Result Date: 10/6/2024  Impression: 1. New periventricular hypodensities at right frontal parietal junction and left parietal lobe which could reflect recent (acute or subacute) infarcts. No associated acute hemorrhage or significant mass effect. Findings could be further assessed by dedicated MRI. 2. Slight increasing dilation of third and lateral ventricles with more conspicuous sulcal and gyral crowding at the vertex. Findings could reflect progressive mass effect at the level of the fourth ventricle related to known treated aneurysm, although size of the excluded aneurysm has not significantly changed  measuring 2.5 x 2.6 cm (previously 2.4 x 2.6 cm). Consider neurosurgical consultation. 3. Underlying periventricular hypodensity suggesting chronic microvascular ischemic change similar to prior. Transependymal edema would be considered less likely given stability. Electronically Signed: Bryon Yoon MD  10/6/2024 10:03 AM EDT  Workstation ID: [id number]       Results for orders placed during the hospital encounter of 12/15/22    Adult Transthoracic Echo Complete W/ Cont if Necessary Per Protocol    Interpretation Summary    Left ventricular systolic function is normal. Left ventricular ejection fraction appears to be 56 - 60%.    Mild aortic valve regurgitation is present.     WBC   Date Value Ref Range Status   10/06/2024 7.19 3.40 - 10.80 10*3/mm3 Final     Hemoglobin   Date Value Ref Range Status   10/06/2024 14.6 12.0 - 15.9 g/dL Final   10/06/2024 14.6 12.0 - 17.0 g/dL Final     Comment:     Serial Number: [id number]:  [id number]     Hematocrit   Date Value Ref Range Status   10/06/2024 46.0 34.0 - 46.6 % Final   10/06/2024 43 38 - 51 % Final     Platelets   Date Value Ref Range Status   10/06/2024 242 140 - 450 10*3/mm3 Final     Lab Results   Component Value Date    GLUCOSE 82 10/06/2024    BUN 22 10/06/2024    CREATININE 0.69 10/06/2024     10/06/2024    K 4.3 10/06/2024     (H) 10/06/2024    CALCIUM 8.6 10/06/2024    PROTEINTOT 6.4 10/06/2024    ALBUMIN 4.2 10/06/2024    ALT 65 (H) 10/06/2024    AST 34 (H) 10/06/2024    ALKPHOS 124 (H) 10/06/2024    BILITOT 0.3 10/06/2024    GLOB 2.2 10/06/2024    AGRATIO 1.9 10/06/2024    BCR 31.9 (H) 10/06/2024    ANIONGAP 12.0 10/06/2024    EGFR 98.3 10/06/2024       Assessment/Plan:    This is a 63-year-old female with known medical diagnoses present hypertension, hyperlipidemia, migraines (follows with Ruth Burris, APRN), history of stroke (right occipital and left cerebellar, 2023, residual dizziness and ataxic gait), and giant basilar aneurysm  s/p pipeline embolization (Dr. Savage, 2022 and 2023) who presents to the emergency department via private vehicle for further evaluation of left-sided weakness and facial droop which was present upon awakening this morning.  She is admitted for IV thrombolytic therapy given LK > 4.5-hours.  CTA head/neck reveals thrombosed pipeline stent.  Images were reviewed by Dr. Savage who is elected to take her to the Cath Lab for mechanical thrombectomy.  She will be admitted to the neurological ICU s/p procedure.    Antiplatelet PTA: ASA 81 mg  Anticoagulant PTA: None        Left-sided weakness, facial droop, and aphasia; acute onset        History of stroke, right occipital and left cerebellar; residual balance difficulties and dizziness        History of giant basilar aneurysm s/p pipeline embolization, Dr. Savage  -TIA/CVA order set without thrombolytic therapies been initiated  -Postprocedure radiology order set has been initiated  -Strict n.p.o.; patient will need SLP evaluation prior to oral intake s/p procedure and extubation given aphasia and dysarthria  -CTA head/neck reveals thrombosed pipeline stent; reviewed with Dr. Savage.  Janette patient's debilitating exam is recommended that she go to the Cath Lab for neurointervention.  Discussed with patient and spouse who seem agreeable to proceed, however patient would like to speak with Dr. Savage prior to procedure.  [facility] informed to call and Cath Lab team at 1024.  Anesthesia notified at 1025.  -MRI brain without contrast on 10/7 to evaluate stroke burden  -Patient will need to be admitted to the neurological ICU s/p procedure  -Continue ASA 81mg for now; further medication recommendations to be given post-procedure    Essential hypertension  -Blood pressure admission 148/89  -Okay to allow autoregulation of blood pressure until patient goes to Cath Lab.  S/p procedure patient will need to have SBP <140  -Nicardipine for SBP >140 postprocedure    Hyperlipidemia  -Lipid  panel in a.m.  -Continue atorvastatin 80mg nightly; monitor LFTs as they are slightly elevated    Plan of care was discussed with the patient, her spouse at bedside, Dr. Savage (neurointerventional), and Dr. Boyer (emergency department physician).  Stroke neurology will continue to follow.  Please call with any questions or concerns.  Thank you for this consult.    Ashley Acharya, CAILIN  October 6, 2024  10:00 EDT

## 2024-10-06 NOTE — H&P
"Intensive Care Admission Note     <principal problem not specified>    History of Present Illness   Ms. Lauryn Romo is a 63-year-old female with known medical diagnoses present hypertension, hyperlipidemia, migraines (follows with CAILIN Parra), history of stroke (right occipital and left cerebellar, 2023, residual dizziness and ataxic gait), and giant basilar aneurysm s/p pipeline embolization (Dr. Savage, 2022 and 2023) who presents to the emergency department via private vehicle for further evaluation of left-sided weakness and facial droop which was present upon awakening this morning.      The patient states that she went to bed around 2030 last evening as she had a migraine headache. She states that prior to going to bed she took her home dose Ubrelvy which did help relieve her headache somewhat. Upon awakening this morning, she had difficulty ambulating to the bathroom d/t left-sided weakness. She has recently been diagnosed with a UTI and is currently taking cefuroxime (day 3/7). Her  is present with her at bedside and states that he heard a \"thud\" this morning. He went to check on her and found her lying on the bathroom floor, possibly having a seizure.     CT head revealed new periventricular hypodensities at right frontal parietal junction and left parietal lobe which could reflect recent (acute or subacute) infarcts. No associated acute hemorrhage or significant mass effect. Findings could be further assessed by dedicated MRI. Slight increasing dilation of third and lateral ventricles with more conspicuous sulcal and gyral crowding at the vertex. Findings could reflect progressive mass effect at the level of the fourth ventricle related to known treated aneurysm, although size of the excluded aneurysm has not significantly changed measuring 2.5 x 2.6 cm (previously 2.4 x 2.6 cm).     Of note, she has a recent follow-up appointment with Dr. Savage on 9/23/34 where she was taken off " of her Plavix. However, she was instructed to remain on ASA 81mg daily, which she reports that she is compliance with.     Stroke navigator has already seen the patient and determined her last known well is 2030 on 10/5/24. NIH on arrival to [facility] was 10. She is out of the administration window for TNK.    The patient was taken to the cath lab for a mechanical thrombectomy by Dr. Mendez and was successful. She is currently intubated and sedated with propofol.      She was transferred to the ICU for post operative care.      Time Spent: 9 minutes  Electronically signed by CAILIN Ambriz, 10/06/24, 12:41 PM EDT.        Problem List, Surgical History, Family, Social History, and ROS     Past Medical History:   Diagnosis Date    Aneurysm 11/26/2022    Cancer 05/2024    Basal Cell Carcinoma removed from scalp by dermatology    Cluster headache 2022    Had headaches for several months before stroke and mass was found    Difficulty walking 11/26/22    After stroke    Headache     Headache, tension-type 2022    Hyperlipidemia     Hypertension     Memory loss 11/26/22    Migraine 2022    Stroke 11/26/2022    Vision loss 11/26/22    When dizzy or headache      Past Surgical History:   Procedure Laterality Date    ARTERIAL ANEURYSM REPAIR      BREAST LUMPECTOMY  2012    CYST REMOVAL Left 05/2023    EMBOLIZATION CEREBRAL N/A 11/29/2022    Procedure: CV EMBOLIZATION CEREBRAL IR;  Surgeon: Enoch Savage MD;  Location:  [facility] CATH INVASIVE LOCATION;  Service: Interventional Radiology;  Laterality: N/A;    HIATAL HERNIA REPAIR  2015    INTERVENTIONAL RADIOLOGY PROCEDURE N/A 09/15/2023    Procedure: Embolization;  Surgeon: Enoch Savage MD;  Location:  [facility] CATH INVASIVE LOCATION;  Service: Interventional Radiology;  Laterality: N/A;    SINUS SURGERY      x4    SKIN CANCER EXCISION      cancer removed off top of head       Allergies   Allergen Reactions    Tramadol Other (See Comments)     Flushng and passing out     No  current facility-administered medications on file prior to encounter.     Current Outpatient Medications on File Prior to Encounter   Medication Sig    aspirin 81 MG chewable tablet Chew 1 tablet Every Night.    atorvastatin (LIPITOR) 80 MG tablet TAKE 1 TABLET BY MOUTH EVERY NIGHT    bethanechol (URECHOLINE) 25 MG tablet Take 1 tablet twice a day by oral route for 30 days.    Cariprazine HCl (VRAYLAR) 1.5 MG capsule capsule Take 1 capsule by mouth Daily.    cefuroxime (CEFTIN) 500 MG tablet Take 1 tablet by mouth 2 (Two) Times a Day.    clopidogrel (PLAVIX) 75 MG tablet Take 1 tablet by mouth Daily.    galcanezumab-gnlm (EMGALITY) 120 MG/ML auto-injector pen Inject 1 mL under the skin into the appropriate area as directed Every 28 (Twenty-Eight) Days.    lubiprostone (AMITIZA) 8 MCG capsule Take 1 capsule by mouth 2 (Two) Times a Day.    nortriptyline (PAMELOR) 10 MG capsule TAKE 2 CAPSULES BY MOUTH ONCE DAILY EVERY EVENING    oxybutynin XL (DITROPAN-XL) 5 MG 24 hr tablet Take 1 tablet by mouth Daily.    pantoprazole (PROTONIX) 20 MG EC tablet Take 1 tablet by mouth Daily.    sertraline (ZOLOFT) 100 MG tablet Take 1 tablet by mouth Daily.    sertraline (Zoloft) 50 MG tablet Take 1 tablet by mouth Daily.    topiramate (TOPAMAX) 100 MG tablet Take 1 tablet by mouth Daily.    ubrogepant (Ubrelvy) 100 MG tablet Take 1 tablet by mouth Daily As Needed (migraines). Take at onset of headache - if symptoms persist or return, may repeat dose in 2 hours. Maximum: 200 mg per 24 hours    Diclofenac Sodium (VOLTAREN) 1 % gel gel APPLY 2 GRAMS TO THE AFFECTED AREA(S) BY TOPICAL ROUTE 4 TIMES PER DAY    fluticasone (FLONASE) 50 MCG/ACT nasal spray 2 sprays into the nostril(s) as directed by provider Daily.    LORazepam (ATIVAN) 0.5 MG tablet Take 1 tablet by mouth Every 8 (Eight) Hours As Needed for Anxiety.    ondansetron ODT (ZOFRAN-ODT) 4 MG disintegrating tablet Place 1 tablet twice a day by translingual route as needed.     "[DISCONTINUED] methylPREDNISolone (MEDROL) 4 MG dose pack Take as directed on package instructions.    [DISCONTINUED] olopatadine (Pataday) 0.2 % solution ophthalmic solution INSTILL 1 DROP INTO AFFECTED EYE(S) BY OPHTHALMIC ROUTE ONCE DAILY     MEDICATION LIST AND ALLERGIES REVIEWED.    Family History   Problem Relation Age of Onset    No Known Problems Mother     Heart attack Father 42    Heart attack Paternal Aunt     Heart attack Paternal Uncle     No Known Problems Sister     Cancer Maternal Grandmother     Heart failure Maternal Grandmother     No Known Problems Maternal Grandfather     No Known Problems Paternal Grandmother     No Known Problems Paternal Grandfather     Diabetes Half-Brother      Social History     Tobacco Use    Smoking status: Former     Current packs/day: 0.00     Average packs/day: 1 pack/day for 15.0 years (15.0 ttl pk-yrs)     Types: Cigarettes     Start date: 10/4/1989     Quit date: 10/4/2004     Years since quittin.0     Passive exposure: Past    Smokeless tobacco: Never   Vaping Use    Vaping status: Never Used   Substance Use Topics    Alcohol use: No    Drug use: Never     Social History     Social History Narrative    Caffeine: 6-7 cups daily     FAMILY AND SOCIAL HISTORY REVIEWED.    Review of Systems  12 point review of system as in HPI but limited by patient's dysarthria    Physical Exam and Clinical Information   /84   Pulse 68   Temp 98.5 °F (36.9 °C)   Resp 16   Ht 154.9 cm (61\")   Wt 60 kg (132 lb 4.4 oz)   SpO2 100%   BMI 24.99 kg/m²   Physical Exam    General:      HEENT: Pupil equal and reactive to light     Respiratory: Air entry is diminished bilaterally    Cardiovascular: First and Second heart sounds heared     Neurological: Awake, alert and fully oriented.  Dysarthric with left hemiparesis    Gastrointestinal: Abdomen is soft, non-distended. No palpable organomegally    Extremities: No pedal edema     Skin: intact  Results from last 7 days   Lab " Units 10/06/24  0959   WBC 10*3/mm3 7.19   HEMOGLOBIN g/dL 14.6   HEMOGLOBIN, POC g/dL 14.6   PLATELETS 10*3/mm3 242     Results from last 7 days   Lab Units 10/06/24  1038 10/06/24  0959   SODIUM mmol/L 141  --    POTASSIUM mmol/L 4.3  --    CO2 mmol/L 19.0*  --    BUN mg/dL 22  --    CREATININE mg/dL 0.69 0.40*   MAGNESIUM mg/dL 2.4  --    GLUCOSE mg/dL 82  --      Estimated Creatinine Clearance: 70.3 mL/min (by C-G formula based on SCr of 0.69 mg/dL).          Lab Results   Component Value Date    LACTATE 1.0 10/06/2024        Images: Reviewed    I reviewed the patient's results and images.     Impression   Acute CVA secondary to thrombosed basilar artery stent  Basilar artery aneurysm status post embolization/stent placement  Acute respiratory failure s/p intubation  Urinary tract infection  Dyslipidemia  Hypertension  Medical Problems      Plan/Recommendations     Maintain patient on current vent setting.  Titrate FiO2 to a sat of 95% and above  After about an hour(waiting for rocuronium to wear off), will start waking patient up.  Spontaneous breathing trial wean to extubate as tolerated  Patient is status post stent placement in the basilar artery.  Started on Brilinta 90 mg p.o. twice daily  Aspirin 81 mg p.o. daily  Lipitor 80 mg p.o. nightly  Start Rocephin 1 g IV piggyback daily  Protonix 20 mg p.o. daily  Reconcile and restart the rest of the home meds  Nicardipine drip maintaining systolic blood pressure less than 140    Time spent Critical care 46 min (exclusive of procedure time)  including high complexity decision making to assess, manipulate, and support vital organ system failure in this individual who has impairment of one or more vital organ systems such that there is a high probability of imminent or life threatening deterioration in the patient’s condition.      Zafar Hager MD, [facility]  Pulmonary and Critical Care Medicine  10/06/24 12:05 EDT

## 2024-10-06 NOTE — ANESTHESIA PREPROCEDURE EVALUATION
Anesthesia Evaluation     Patient summary reviewed and Nursing notes reviewed   no history of anesthetic complications:   NPO Solid Status: > 8 hours  NPO Liquid Status: > 2 hours           Airway   Mallampati: III  TM distance: >3 FB  Neck ROM: full  No difficulty expected and Possible difficult intubation  Comment: Prior DL without difficulty  Dental - normal exam     Pulmonary - negative pulmonary ROS   Cardiovascular     (+) hypertension, hyperlipidemia      Neuro/Psych  (+) CVA residual symptoms, headaches    ROS Comment: Acute on chronic stroke.Prior basilar stent which is now thrombosed   GI/Hepatic/Renal/Endo    (+) hiatal hernia    Musculoskeletal     Abdominal    Substance History      OB/GYN          Other                    Anesthesia Plan    ASA 3 - emergent     general     intravenous induction   Postoperative Plan: Expected vent after surgery  Anesthetic plan, risks, benefits, and alternatives have been provided, discussed and informed consent has been obtained with: patient.  Pre-procedure education provided  Plan discussed with CRNA.    CODE STATUS:

## 2024-10-06 NOTE — Clinical Note
Hemostasis started on the right radial artery. Angio-Seal was used in achieving hemostasis. Closure device deployed in the vessel. Hemostasis achieved successfully.

## 2024-10-06 NOTE — SIGNIFICANT NOTE
10/06/24 1317   SLP Deferred Reason   SLP Deferred Reason   (SLP consult received. Pt intubated, will place on hold. Please re-consult when appropriate.)

## 2024-10-06 NOTE — Clinical Note
NG auscultated at bedside, Dr. CHRIS Savage visualized NG placement under flouro and verified placement.

## 2024-10-06 NOTE — PLAN OF CARE
Neurointerventional Metrics    Last Known Well:  2030 prior day     [facility] Arrival (ED/transfer):  0945    Code Stroke Initiated (Inpatient):  n/a    Initial NIHSS: 10    Baseline MRS:  1    IV thrombolytic:  No    CT Head: 0945    CT Perfusion: 1000    Arrival to Cath Lab:  1055    Groin Access: 1117    Initial Thrombectomy/Intervention (stent retriever deployment/aspiration/IA tPA):  1125    Reperfusion:  1150    Final Angiogram:  1208    End of Procedure:  1215    Pre-Procedure TICI flow:  0    Post-Procedure TICI flow:  3    Emboli to New Vascular Territory: No

## 2024-10-07 ENCOUNTER — ANCILLARY PROCEDURE (OUTPATIENT)
Dept: SPEECH THERAPY | Facility: HOSPITAL | Age: 64
DRG: 023 | End: 2024-10-07
Payer: COMMERCIAL

## 2024-10-07 ENCOUNTER — APPOINTMENT (OUTPATIENT)
Dept: CARDIOLOGY | Facility: HOSPITAL | Age: 64
DRG: 023 | End: 2024-10-07
Payer: COMMERCIAL

## 2024-10-07 ENCOUNTER — APPOINTMENT (OUTPATIENT)
Dept: MRI IMAGING | Facility: HOSPITAL | Age: 64
DRG: 023 | End: 2024-10-07
Payer: COMMERCIAL

## 2024-10-07 PROBLEM — N30.00 ACUTE CYSTITIS WITHOUT HEMATURIA: Status: ACTIVE | Noted: 2024-10-07

## 2024-10-07 LAB
ALBUMIN SERPL-MCNC: 4 G/DL (ref 3.5–5.2)
ALBUMIN/GLOB SERPL: 1.9 G/DL
ALP SERPL-CCNC: 98 U/L (ref 39–117)
ALT SERPL W P-5'-P-CCNC: 49 U/L (ref 1–33)
ANION GAP SERPL CALCULATED.3IONS-SCNC: 11 MMOL/L (ref 5–15)
ASCENDING AORTA: 3.2 CM
AST SERPL-CCNC: 22 U/L (ref 1–32)
BASOPHILS # BLD AUTO: 0.01 10*3/MM3 (ref 0–0.2)
BASOPHILS NFR BLD AUTO: 0.1 % (ref 0–1.5)
BH CV ECHO MEAS - AI P1/2T: 590.1 MSEC
BH CV ECHO MEAS - AO MAX PG: 7.8 MMHG
BH CV ECHO MEAS - AO MEAN PG: 4 MMHG
BH CV ECHO MEAS - AO ROOT DIAM: 2.9 CM
BH CV ECHO MEAS - AO V2 MAX: 140 CM/SEC
BH CV ECHO MEAS - AO V2 VTI: 29.2 CM
BH CV ECHO MEAS - AVA(I,D): 1.9 CM2
BH CV ECHO MEAS - EDV(CUBED): 103.8 ML
BH CV ECHO MEAS - EDV(MOD-SP2): 59.5 ML
BH CV ECHO MEAS - EDV(MOD-SP4): 58.3 ML
BH CV ECHO MEAS - EF(MOD-BP): 63.9 %
BH CV ECHO MEAS - EF(MOD-SP2): 57.3 %
BH CV ECHO MEAS - EF(MOD-SP4): 67.1 %
BH CV ECHO MEAS - ESV(CUBED): 17.6 ML
BH CV ECHO MEAS - ESV(MOD-SP2): 25.4 ML
BH CV ECHO MEAS - ESV(MOD-SP4): 19.2 ML
BH CV ECHO MEAS - FS: 44.7 %
BH CV ECHO MEAS - IVS/LVPW: 1 CM
BH CV ECHO MEAS - IVSD: 0.7 CM
BH CV ECHO MEAS - LA DIMENSION: 3.2 CM
BH CV ECHO MEAS - LAT PEAK E' VEL: 12.5 CM/SEC
BH CV ECHO MEAS - LV MASS(C)D: 103.1 GRAMS
BH CV ECHO MEAS - LV MAX PG: 4.1 MMHG
BH CV ECHO MEAS - LV MEAN PG: 2 MMHG
BH CV ECHO MEAS - LV V1 MAX: 101 CM/SEC
BH CV ECHO MEAS - LV V1 VTI: 19.6 CM
BH CV ECHO MEAS - LVIDD: 4.7 CM
BH CV ECHO MEAS - LVIDS: 2.6 CM
BH CV ECHO MEAS - LVOT AREA: 2.8 CM2
BH CV ECHO MEAS - LVOT DIAM: 1.9 CM
BH CV ECHO MEAS - LVPWD: 0.7 CM
BH CV ECHO MEAS - MED PEAK E' VEL: 8.7 CM/SEC
BH CV ECHO MEAS - MV A MAX VEL: 90.7 CM/SEC
BH CV ECHO MEAS - MV DEC SLOPE: 404 CM/SEC2
BH CV ECHO MEAS - MV DEC TIME: 0.22 SEC
BH CV ECHO MEAS - MV E MAX VEL: 85.9 CM/SEC
BH CV ECHO MEAS - MV E/A: 0.95
BH CV ECHO MEAS - MV MAX PG: 4.8 MMHG
BH CV ECHO MEAS - MV MEAN PG: 2 MMHG
BH CV ECHO MEAS - MV P1/2T: 73.2 MSEC
BH CV ECHO MEAS - MV V2 VTI: 31.3 CM
BH CV ECHO MEAS - MVA(P1/2T): 3 CM2
BH CV ECHO MEAS - MVA(VTI): 1.78 CM2
BH CV ECHO MEAS - PA ACC TIME: 0.16 SEC
BH CV ECHO MEAS - RAP SYSTOLE: 3 MMHG
BH CV ECHO MEAS - RVSP: 14 MMHG
BH CV ECHO MEAS - SV(LVOT): 55.6 ML
BH CV ECHO MEAS - SV(MOD-SP2): 34.1 ML
BH CV ECHO MEAS - SV(MOD-SP4): 39.1 ML
BH CV ECHO MEAS - TAPSE (>1.6): 1.97 CM
BH CV ECHO MEAS - TR MAX PG: 11 MMHG
BH CV ECHO MEAS - TR MAX VEL: 164.3 CM/SEC
BH CV ECHO MEASUREMENTS AVERAGE E/E' RATIO: 8.1
BH CV ECHO SHUNT ASSESSMENT PERFORMED (HIDDEN SCRIPTING): 1
BH CV XLRA - RV BASE: 3.5 CM
BH CV XLRA - RV LENGTH: 6.1 CM
BH CV XLRA - RV MID: 2.4 CM
BH CV XLRA - TDI S': 16.2 CM/SEC
BILIRUB SERPL-MCNC: 0.3 MG/DL (ref 0–1.2)
BUN SERPL-MCNC: 21 MG/DL (ref 8–23)
BUN/CREAT SERPL: 48.8 (ref 7–25)
CALCIUM SPEC-SCNC: 8.7 MG/DL (ref 8.6–10.5)
CHLORIDE SERPL-SCNC: 112 MMOL/L (ref 98–107)
CHOLEST SERPL-MCNC: 132 MG/DL (ref 0–200)
CO2 SERPL-SCNC: 20 MMOL/L (ref 22–29)
CREAT SERPL-MCNC: 0.43 MG/DL (ref 0.57–1)
DEPRECATED RDW RBC AUTO: 46.3 FL (ref 37–54)
EGFRCR SERPLBLD CKD-EPI 2021: 110.1 ML/MIN/1.73
EOSINOPHIL # BLD AUTO: 0 10*3/MM3 (ref 0–0.4)
EOSINOPHIL NFR BLD AUTO: 0 % (ref 0.3–6.2)
ERYTHROCYTE [DISTWIDTH] IN BLOOD BY AUTOMATED COUNT: 13.2 % (ref 12.3–15.4)
GLOBULIN UR ELPH-MCNC: 2.1 GM/DL
GLUCOSE BLDC GLUCOMTR-MCNC: 112 MG/DL (ref 70–130)
GLUCOSE BLDC GLUCOMTR-MCNC: 93 MG/DL (ref 70–130)
GLUCOSE SERPL-MCNC: 126 MG/DL (ref 65–99)
HBA1C MFR BLD: 5.6 % (ref 4.8–5.6)
HCT VFR BLD AUTO: 41.7 % (ref 34–46.6)
HDLC SERPL-MCNC: 61 MG/DL (ref 40–60)
HGB BLD-MCNC: 13.8 G/DL (ref 12–15.9)
IMM GRANULOCYTES # BLD AUTO: 0.06 10*3/MM3 (ref 0–0.05)
IMM GRANULOCYTES NFR BLD AUTO: 0.4 % (ref 0–0.5)
LDLC SERPL CALC-MCNC: 58 MG/DL (ref 0–100)
LDLC/HDLC SERPL: 0.96 {RATIO}
LEFT ATRIUM VOLUME INDEX: 18.8 ML/M2
LV EF 2D ECHO EST: 65 %
LYMPHOCYTES # BLD AUTO: 0.85 10*3/MM3 (ref 0.7–3.1)
LYMPHOCYTES NFR BLD AUTO: 5.8 % (ref 19.6–45.3)
MCH RBC QN AUTO: 31.5 PG (ref 26.6–33)
MCHC RBC AUTO-ENTMCNC: 33.1 G/DL (ref 31.5–35.7)
MCV RBC AUTO: 95.2 FL (ref 79–97)
MONOCYTES # BLD AUTO: 0.84 10*3/MM3 (ref 0.1–0.9)
MONOCYTES NFR BLD AUTO: 5.7 % (ref 5–12)
NEUTROPHILS NFR BLD AUTO: 12.91 10*3/MM3 (ref 1.7–7)
NEUTROPHILS NFR BLD AUTO: 88 % (ref 42.7–76)
NRBC BLD AUTO-RTO: 0 /100 WBC (ref 0–0.2)
PHOSPHATE SERPL-MCNC: 4.1 MG/DL (ref 2.5–4.5)
PLATELET # BLD AUTO: 252 10*3/MM3 (ref 140–450)
PMV BLD AUTO: 10.9 FL (ref 6–12)
POTASSIUM SERPL-SCNC: 4 MMOL/L (ref 3.5–5.2)
PROT SERPL-MCNC: 6.1 G/DL (ref 6–8.5)
RBC # BLD AUTO: 4.38 10*6/MM3 (ref 3.77–5.28)
SODIUM SERPL-SCNC: 143 MMOL/L (ref 136–145)
TRIGL SERPL-MCNC: 62 MG/DL (ref 0–150)
VLDLC SERPL-MCNC: 13 MG/DL (ref 5–40)
WBC NRBC COR # BLD AUTO: 14.67 10*3/MM3 (ref 3.4–10.8)

## 2024-10-07 PROCEDURE — 83036 HEMOGLOBIN GLYCOSYLATED A1C: CPT

## 2024-10-07 PROCEDURE — 85025 COMPLETE CBC W/AUTO DIFF WBC: CPT

## 2024-10-07 PROCEDURE — 25010000002 CEFTRIAXONE PER 250 MG

## 2024-10-07 PROCEDURE — 97166 OT EVAL MOD COMPLEX 45 MIN: CPT

## 2024-10-07 PROCEDURE — 82948 REAGENT STRIP/BLOOD GLUCOSE: CPT

## 2024-10-07 PROCEDURE — 93306 TTE W/DOPPLER COMPLETE: CPT | Performed by: INTERNAL MEDICINE

## 2024-10-07 PROCEDURE — 97162 PT EVAL MOD COMPLEX 30 MIN: CPT

## 2024-10-07 PROCEDURE — 92523 SPEECH SOUND LANG COMPREHEN: CPT

## 2024-10-07 PROCEDURE — 70551 MRI BRAIN STEM W/O DYE: CPT

## 2024-10-07 PROCEDURE — 92612 ENDOSCOPY SWALLOW (FEES) VID: CPT

## 2024-10-07 PROCEDURE — 92610 EVALUATE SWALLOWING FUNCTION: CPT

## 2024-10-07 PROCEDURE — 80061 LIPID PANEL: CPT

## 2024-10-07 PROCEDURE — 25010000002 DEXAMETHASONE PER 1 MG

## 2024-10-07 PROCEDURE — 84100 ASSAY OF PHOSPHORUS: CPT

## 2024-10-07 PROCEDURE — 93306 TTE W/DOPPLER COMPLETE: CPT

## 2024-10-07 PROCEDURE — 99233 SBSQ HOSP IP/OBS HIGH 50: CPT | Performed by: STUDENT IN AN ORGANIZED HEALTH CARE EDUCATION/TRAINING PROGRAM

## 2024-10-07 PROCEDURE — 97530 THERAPEUTIC ACTIVITIES: CPT

## 2024-10-07 PROCEDURE — 99232 SBSQ HOSP IP/OBS MODERATE 35: CPT | Performed by: INTERNAL MEDICINE

## 2024-10-07 PROCEDURE — 80053 COMPREHEN METABOLIC PANEL: CPT

## 2024-10-07 PROCEDURE — 99232 SBSQ HOSP IP/OBS MODERATE 35: CPT | Performed by: NEUROLOGICAL SURGERY

## 2024-10-07 RX ADMIN — DEXAMETHASONE SODIUM PHOSPHATE 4 MG: 4 INJECTION, SOLUTION INTRA-ARTICULAR; INTRALESIONAL; INTRAMUSCULAR; INTRAVENOUS; SOFT TISSUE at 01:48

## 2024-10-07 RX ADMIN — Medication 10 ML: at 20:16

## 2024-10-07 RX ADMIN — FAMOTIDINE 20 MG: 10 INJECTION, SOLUTION INTRAVENOUS at 08:13

## 2024-10-07 RX ADMIN — ASPIRIN 81 MG 81 MG: 81 TABLET ORAL at 08:13

## 2024-10-07 RX ADMIN — Medication 10 ML: at 08:14

## 2024-10-07 RX ADMIN — SODIUM CHLORIDE 1000 MG: 900 INJECTION INTRAVENOUS at 14:33

## 2024-10-07 RX ADMIN — FAMOTIDINE 20 MG: 10 INJECTION, SOLUTION INTRAVENOUS at 20:15

## 2024-10-07 RX ADMIN — ATORVASTATIN CALCIUM 80 MG: 40 TABLET, FILM COATED ORAL at 20:16

## 2024-10-07 RX ADMIN — DEXAMETHASONE SODIUM PHOSPHATE 4 MG: 4 INJECTION, SOLUTION INTRA-ARTICULAR; INTRALESIONAL; INTRAMUSCULAR; INTRAVENOUS; SOFT TISSUE at 14:33

## 2024-10-07 RX ADMIN — DEXAMETHASONE SODIUM PHOSPHATE 4 MG: 4 INJECTION, SOLUTION INTRA-ARTICULAR; INTRALESIONAL; INTRAMUSCULAR; INTRAVENOUS; SOFT TISSUE at 07:08

## 2024-10-07 RX ADMIN — DEXAMETHASONE SODIUM PHOSPHATE 4 MG: 4 INJECTION, SOLUTION INTRA-ARTICULAR; INTRALESIONAL; INTRAMUSCULAR; INTRAVENOUS; SOFT TISSUE at 20:15

## 2024-10-07 RX ADMIN — TICAGRELOR 90 MG: 90 TABLET ORAL at 20:15

## 2024-10-07 RX ADMIN — TICAGRELOR 90 MG: 90 TABLET ORAL at 08:13

## 2024-10-07 NOTE — PAYOR COMM NOTE
"            SOPHIA Bah, RN  Utilization Review  Phone [phone]  Fax [phone]    [facility]  [address] [place] [address]         Medhat Pal (63 y.o. Female)       Date of Birth   1960    Social Security Number       Address   [address] [id number]    Home Phone   [phone]    MRN   [id number]       Caodaism   Confucianist    Marital Status                               Admission Date   10/6/24    Admission Type   Emergency    Admitting Provider   Zafar Hager MD    Attending Provider   Zafar Hager MD    Department, Room/Bed   [facility] 2B ICU, N229/1       Discharge Date       Discharge Disposition       Discharge Destination                                 Attending Provider: Zafar Hager MD    Allergies: Tramadol    Isolation: None   Infection: None   Code Status: CPR    Ht: 154.9 cm (60.98\")   Wt: 58.1 kg (128 lb 1.4 oz)    Admission Cmt: None   Principal Problem: None                  Active Insurance as of 10/6/2024       Primary Coverage       Payor Plan Insurance Group Employer/Plan Group    ANTHEM BLUE CROSS ANTHEM BLUE CROSS BLUE SHIELD PPO [id number]       Payor Plan Address Payor Plan Phone Number Payor Plan Fax Number Effective Dates    [address] [phone]  1/1/2019 - None Entered    [facility] [id number]         Subscriber Name Subscriber Birth Date Member ID       MEDHAT PAL 1960 [id number]                     Emergency Contacts        (Rel.) Home Phone Work Phone Mobile Phone    AZAR PAL (Spouse) [phone] -- [phone]    Elba Miramontes (Relative) -- -- [phone]              [place]: NPI [id number] Tax ID [id number]  Insurance Information                  ANTHEM BLUE CROSS/ANTHEM BLUE CROSS BLUE SHIELD PPO Phone: [phone]    Subscriber: Medhat Pal Subscriber#: [id number]    Group#: [id number] Precert#: --           " "  History & Physical        Zafar Hager MD at 10/06/24 1201            Intensive Care Admission Note     <principal problem not specified>    History of Present Illness   Ms. Lauryn Romo is a 63-year-old female with known medical diagnoses present hypertension, hyperlipidemia, migraines (follows with CAILIN Parra), history of stroke (right occipital and left cerebellar, 2023, residual dizziness and ataxic gait), and giant basilar aneurysm s/p pipeline embolization (Dr. Savage, 2022 and 2023) who presents to the emergency department via private vehicle for further evaluation of left-sided weakness and facial droop which was present upon awakening this morning.      The patient states that she went to bed around 2030 last evening as she had a migraine headache. She states that prior to going to bed she took her home dose Ubrelvy which did help relieve her headache somewhat. Upon awakening this morning, she had difficulty ambulating to the bathroom d/t left-sided weakness. She has recently been diagnosed with a UTI and is currently taking cefuroxime (day 3/7). Her  is present with her at bedside and states that he heard a \"thud\" this morning. He went to check on her and found her lying on the bathroom floor, possibly having a seizure.     CT head revealed new periventricular hypodensities at right frontal parietal junction and left parietal lobe which could reflect recent (acute or subacute) infarcts. No associated acute hemorrhage or significant mass effect. Findings could be further assessed by dedicated MRI. Slight increasing dilation of third and lateral ventricles with more conspicuous sulcal and gyral crowding at the vertex. Findings could reflect progressive mass effect at the level of the fourth ventricle related to known treated aneurysm, although size of the excluded aneurysm has not significantly changed measuring 2.5 x 2.6 cm (previously 2.4 x 2.6 cm).     Of note, she has a " recent follow-up appointment with Dr. Savage on 9/23/34 where she was taken off of her Plavix. However, she was instructed to remain on ASA 81mg daily, which she reports that she is compliance with.     Stroke navigator has already seen the patient and determined her last known well is 2030 on 10/5/24. NIH on arrival to [facility] was 10. She is out of the administration window for TNK.    The patient was taken to the cath lab for a mechanical thrombectomy by Dr. Mendez and was successful. She is currently intubated and sedated with propofol.      She was transferred to the ICU for post operative care.      Time Spent: 9 minutes  Electronically signed by CAILIN Ambriz, 10/06/24, 12:41 PM EDT.        Problem List, Surgical History, Family, Social History, and ROS     Past Medical History:   Diagnosis Date    Aneurysm 11/26/2022    Cancer 05/2024    Basal Cell Carcinoma removed from scalp by dermatology    Cluster headache 2022    Had headaches for several months before stroke and mass was found    Difficulty walking 11/26/22    After stroke    Headache     Headache, tension-type 2022    Hyperlipidemia     Hypertension     Memory loss 11/26/22    Migraine 2022    Stroke 11/26/2022    Vision loss 11/26/22    When dizzy or headache      Past Surgical History:   Procedure Laterality Date    ARTERIAL ANEURYSM REPAIR      BREAST LUMPECTOMY  2012    CYST REMOVAL Left 05/2023    EMBOLIZATION CEREBRAL N/A 11/29/2022    Procedure: CV EMBOLIZATION CEREBRAL IR;  Surgeon: Enoch Savage MD;  Location: [facility];  Service: Interventional Radiology;  Laterality: N/A;    HIATAL HERNIA REPAIR  2015    INTERVENTIONAL RADIOLOGY PROCEDURE N/A 09/15/2023    Procedure: Embolization;  Surgeon: Enoch Savage MD;  Location: [facility];  Service: Interventional Radiology;  Laterality: N/A;    SINUS SURGERY      x4    SKIN CANCER EXCISION      cancer removed off top of head       Allergies    Allergen Reactions    Tramadol Other (See Comments)     Flushng and passing out     No current facility-administered medications on file prior to encounter.     Current Outpatient Medications on File Prior to Encounter   Medication Sig    aspirin 81 MG chewable tablet Chew 1 tablet Every Night.    atorvastatin (LIPITOR) 80 MG tablet TAKE 1 TABLET BY MOUTH EVERY NIGHT    bethanechol (URECHOLINE) 25 MG tablet Take 1 tablet twice a day by oral route for 30 days.    Cariprazine HCl (VRAYLAR) 1.5 MG capsule capsule Take 1 capsule by mouth Daily.    cefuroxime (CEFTIN) 500 MG tablet Take 1 tablet by mouth 2 (Two) Times a Day.    clopidogrel (PLAVIX) 75 MG tablet Take 1 tablet by mouth Daily.    galcanezumab-gnlm (EMGALITY) 120 MG/ML auto-injector pen Inject 1 mL under the skin into the appropriate area as directed Every 28 (Twenty-Eight) Days.    lubiprostone (AMITIZA) 8 MCG capsule Take 1 capsule by mouth 2 (Two) Times a Day.    nortriptyline (PAMELOR) 10 MG capsule TAKE 2 CAPSULES BY MOUTH ONCE DAILY EVERY EVENING    oxybutynin XL (DITROPAN-XL) 5 MG 24 hr tablet Take 1 tablet by mouth Daily.    pantoprazole (PROTONIX) 20 MG EC tablet Take 1 tablet by mouth Daily.    sertraline (ZOLOFT) 100 MG tablet Take 1 tablet by mouth Daily.    sertraline (Zoloft) 50 MG tablet Take 1 tablet by mouth Daily.    topiramate (TOPAMAX) 100 MG tablet Take 1 tablet by mouth Daily.    ubrogepant (Ubrelvy) 100 MG tablet Take 1 tablet by mouth Daily As Needed (migraines). Take at onset of headache - if symptoms persist or return, may repeat dose in 2 hours. Maximum: 200 mg per 24 hours    Diclofenac Sodium (VOLTAREN) 1 % gel gel APPLY 2 GRAMS TO THE AFFECTED AREA(S) BY TOPICAL ROUTE 4 TIMES PER DAY    fluticasone (FLONASE) 50 MCG/ACT nasal spray 2 sprays into the nostril(s) as directed by provider Daily.    LORazepam (ATIVAN) 0.5 MG tablet Take 1 tablet by mouth Every 8 (Eight) Hours As Needed for Anxiety.    ondansetron ODT (ZOFRAN-ODT) 4  "MG disintegrating tablet Place 1 tablet twice a day by translingual route as needed.    [DISCONTINUED] methylPREDNISolone (MEDROL) 4 MG dose pack Take as directed on package instructions.    [DISCONTINUED] olopatadine (Pataday) 0.2 % solution ophthalmic solution INSTILL 1 DROP INTO AFFECTED EYE(S) BY OPHTHALMIC ROUTE ONCE DAILY     MEDICATION LIST AND ALLERGIES REVIEWED.    Family History   Problem Relation Age of Onset    No Known Problems Mother     Heart attack Father 42    Heart attack Paternal Aunt     Heart attack Paternal Uncle     No Known Problems Sister     Cancer Maternal Grandmother     Heart failure Maternal Grandmother     No Known Problems Maternal Grandfather     No Known Problems Paternal Grandmother     No Known Problems Paternal Grandfather     Diabetes Half-Brother      Social History     Tobacco Use    Smoking status: Former     Current packs/day: 0.00     Average packs/day: 1 pack/day for 15.0 years (15.0 ttl pk-yrs)     Types: Cigarettes     Start date: 10/4/1989     Quit date: 10/4/2004     Years since quittin.0     Passive exposure: Past    Smokeless tobacco: Never   Vaping Use    Vaping status: Never Used   Substance Use Topics    Alcohol use: No    Drug use: Never     Social History     Social History Narrative    Caffeine: 6-7 cups daily     FAMILY AND SOCIAL HISTORY REVIEWED.    Review of Systems  12 point review of system as in HPI but limited by patient's dysarthria    Physical Exam and Clinical Information   /84   Pulse 68   Temp 98.5 °F (36.9 °C)   Resp 16   Ht 154.9 cm (61\")   Wt 60 kg (132 lb 4.4 oz)   SpO2 100%   BMI 24.99 kg/m²   Physical Exam    General:      HEENT: Pupil equal and reactive to light     Respiratory: Air entry is diminished bilaterally    Cardiovascular: First and Second heart sounds heared     Neurological: Awake, alert and fully oriented.  Dysarthric with left hemiparesis    Gastrointestinal: Abdomen is soft, non-distended. No palpable " organomegally    Extremities: No pedal edema     Skin: intact  Results from last 7 days   Lab Units 10/06/24  0959   WBC 10*3/mm3 7.19   HEMOGLOBIN g/dL 14.6   HEMOGLOBIN, POC g/dL 14.6   PLATELETS 10*3/mm3 242     Results from last 7 days   Lab Units 10/06/24  1038 10/06/24  0959   SODIUM mmol/L 141  --    POTASSIUM mmol/L 4.3  --    CO2 mmol/L 19.0*  --    BUN mg/dL 22  --    CREATININE mg/dL 0.69 0.40*   MAGNESIUM mg/dL 2.4  --    GLUCOSE mg/dL 82  --      Estimated Creatinine Clearance: 70.3 mL/min (by C-G formula based on SCr of 0.69 mg/dL).          Lab Results   Component Value Date    LACTATE 1.0 10/06/2024        Images: Reviewed    I reviewed the patient's results and images.     Impression   Acute CVA secondary to thrombosed basilar artery stent  Basilar artery aneurysm status post embolization/stent placement  Acute respiratory failure s/p intubation  Urinary tract infection  Dyslipidemia  Hypertension  Medical Problems      Plan/Recommendations     Maintain patient on current vent setting.  Titrate FiO2 to a sat of 95% and above  After about an hour(waiting for rocuronium to wear off), will start waking patient up.  Spontaneous breathing trial wean to extubate as tolerated  Patient is status post stent placement in the basilar artery.  Started on Brilinta 90 mg p.o. twice daily  Aspirin 81 mg p.o. daily  Lipitor 80 mg p.o. nightly  Start Rocephin 1 g IV piggyback daily  Protonix 20 mg p.o. daily  Reconcile and restart the rest of the home meds  Nicardipine drip maintaining systolic blood pressure less than 140    Time spent Critical care 46 min (exclusive of procedure time)  including high complexity decision making to assess, manipulate, and support vital organ system failure in this individual who has impairment of one or more vital organ systems such that there is a high probability of imminent or life threatening deterioration in the patient’s condition.      Zafar Hager MD, FCCP  Pulmonary and  Critical Care Medicine  10/06/24 12:05 EDT       Electronically signed by Zafar Hager MD at 10/06/24 1309          Emergency Department Notes        Chris Boyer MD at 10/06/24 1240        Procedure Orders    1. Critical Care [[id number]] ordered by Chris Boyer MD                 Subjective  History of Present Illness  63-year-old female presents for evaluation of altered mental status.  Of note, the patient has a history of a prior cerebral aneurysm.  She has a history of stroke as well.  She is followed by Dr. Savage of neurosurgery.  According to her , she is typically conversant and ambulatory at baseline.  Sometimes, she uses a walker.  Her last known well was at approximately 8:30 PM last night.  This morning, the patient's  found her slumped over at around 8 AM with left-sided facial droop, global weakness, and significant dysarthria/aphasia that is much different than her baseline.  As a result, he drove her here from [place], [place] to be evaluated.  She is a somewhat limited historian at this time.  Her history is obtained primarily from her .      Review of Systems   Unable to perform ROS: Mental status change   Neurological:  Positive for facial asymmetry, speech difficulty and weakness.   Psychiatric/Behavioral:  Positive for confusion.        Past Medical History:   Diagnosis Date    Aneurysm 11/26/2022    Cancer 05/2024    Basal Cell Carcinoma removed from scalp by dermatology    Cluster headache 2022    Had headaches for several months before stroke and mass was found    Difficulty walking 11/26/22    After stroke    Headache     Headache, tension-type 2022    Hyperlipidemia     Hypertension     Memory loss 11/26/22    Migraine 2022    Stroke 11/26/2022    Vision loss 11/26/22    When dizzy or headache       Allergies   Allergen Reactions    Tramadol Other (See Comments)     Flushng and passing out       Past Surgical History:   Procedure Laterality Date     ARTERIAL ANEURYSM REPAIR      BREAST LUMPECTOMY  2012    CYST REMOVAL Left 2023    EMBOLIZATION CEREBRAL N/A 2022    Procedure: CV EMBOLIZATION CEREBRAL IR;  Surgeon: Enoch Savage MD;  Location:  [facility];  Service: Interventional Radiology;  Laterality: N/A;    HIATAL HERNIA REPAIR  2015    INTERVENTIONAL RADIOLOGY PROCEDURE N/A 09/15/2023    Procedure: Embolization;  Surgeon: Enoch Savage MD;  Location:  [facility];  Service: Interventional Radiology;  Laterality: N/A;    SINUS SURGERY      x4    SKIN CANCER EXCISION      cancer removed off top of head       Family History   Problem Relation Age of Onset    No Known Problems Mother     Heart attack Father 42    Heart attack Paternal Aunt     Heart attack Paternal Uncle     No Known Problems Sister     Cancer Maternal Grandmother     Heart failure Maternal Grandmother     No Known Problems Maternal Grandfather     No Known Problems Paternal Grandmother     No Known Problems Paternal Grandfather     Diabetes Half-Brother        Social History     Socioeconomic History    Marital status:    Tobacco Use    Smoking status: Former     Current packs/day: 0.00     Average packs/day: 1 pack/day for 15.0 years (15.0 ttl pk-yrs)     Types: Cigarettes     Start date: 10/4/1989     Quit date: 10/4/2004     Years since quittin.0     Passive exposure: Past    Smokeless tobacco: Never   Vaping Use    Vaping status: Never Used   Substance and Sexual Activity    Alcohol use: No    Drug use: Never    Sexual activity: Not Currently     Partners: Male     Birth control/protection: Tubal ligation           Objective  Physical Exam  Vitals and nursing note reviewed.   Constitutional:       Appearance: She is well-developed. She is not diaphoretic.      Comments: Confused appearing female   HENT:      Head: Normocephalic and atraumatic.   Eyes:      Pupils: Pupils are equal, round, and reactive to light.   Neck:       Vascular: No carotid bruit.   Cardiovascular:      Rate and Rhythm: Normal rate and regular rhythm.      Heart sounds: Normal heart sounds. No murmur heard.     No friction rub. No gallop.   Pulmonary:      Effort: Pulmonary effort is normal. No respiratory distress.      Breath sounds: Normal breath sounds. No wheezing or rales.   Abdominal:      General: Bowel sounds are normal. There is no distension.      Palpations: Abdomen is soft. There is no mass.      Tenderness: There is no abdominal tenderness. There is no guarding or rebound.   Musculoskeletal:         General: No signs of injury.   Skin:     General: Skin is warm and dry.      Findings: No erythema or rash.   Neurological:      Mental Status: She is oriented to person, place, and time.      Comments: Appears globally weak, appears somewhat confused, left-sided facial droop noted, significant dysarthria and aphasia noted, 3-5 strength noted with strength testing of both lower extremities, 5 out of 5 strength noted to both upper extremities   Psychiatric:      Comments: Unable to adequately evaluate         Critical Care    Performed by: Chris Boyer MD  Authorized by: Chris Boyer MD    Critical care provider statement:     Critical care time (minutes):  40    Critical care was necessary to treat or prevent imminent or life-threatening deterioration of the following conditions:  CNS failure or compromise    Critical care was time spent personally by me on the following activities:  Development of treatment plan with patient or surrogate, discussions with consultants, evaluation of patient's response to treatment, examination of patient, obtaining history from patient or surrogate, ordering and performing treatments and interventions, ordering and review of laboratory studies, ordering and review of radiographic studies, pulse oximetry, re-evaluation of patient's condition and review of old charts            ED Course  ED Course as of  "10/06/24 1244   Sun Oct 06, 2024   1008 63-year-old female presents for evaluation of altered mental status.  Of note, the patient has a history of a prior cerebral aneurysm.  She has a history of a prior stroke as well.  She is followed by Dr. Savage of neurosurgery.  According to her , she is typically conversant and ambulatory at baseline.  Sometimes, she uses a walker.  The patient's last known well was last night at approximately 8:30 PM.  This morning, the patient's  found her slumped over at 8 AM with left-sided facial droop, global weakness, and dysarthria.  He drove her here himself from [place], [place].  On arrival, the patient was taken from triage to CT as a \"code stroke.\"  I was met in the CT scanner by our stroke navigator.  On exam, the patient has left-sided facial droop and significant dysarthria/aphasia.  She appears globally weak. [DD]   1009 I personally and independently reviewed the patient's CT images and findings, and I am in agreement with the radiologist regarding CT interpretation--particularly there is no intracranial hemorrhage noted.  The patient is clearly outside of window for TNK. [DD]   1009 Labs are bland/unrevealing.  Differential diagnosis is quite broad.  We will obtain labs and advanced imaging and will reassess following initial interventions. [DD]   1036 After reviewing the patient's advanced imaging results, Ashley, our stroke navigator, spoke with Dr. Savage of neurointervention regarding the patient's clotted basilar aneurysm stent.  He will take the patient to the OR for surgical management. [DD]   1037 I discussed the patient's case with our intensivist, Dr. Hager, and the patient will be admitted to the ICU under his care  following her trip to the OR. [DD]   1243 I personally and independently viewed the patient's x-ray images myself, and I am in agreement with the radiologist's reading for final interpretation, particularly there is no pneumonia " noted. [DD]   1243 NIHSS of 10. [DD]      ED Course User Index  [DD] Chris Boyer MD                          Total (NIH Stroke Scale): 10             Recent Results (from the past 24 hour(s))   aPTT    Collection Time: 10/06/24  9:59 AM    Specimen: Blood   Result Value Ref Range    PTT 27.1 22.0 - 39.0 seconds   Green Top (Gel)    Collection Time: 10/06/24  9:59 AM   Result Value Ref Range    Extra Tube Hold for add-ons.    Lavender Top    Collection Time: 10/06/24  9:59 AM   Result Value Ref Range    Extra Tube hold for add-on    Gold Top - SST    Collection Time: 10/06/24  9:59 AM   Result Value Ref Range    Extra Tube Hold for add-ons.    Gray Top    Collection Time: 10/06/24  9:59 AM   Result Value Ref Range    Extra Tube Hold for add-ons.    Light Blue Top    Collection Time: 10/06/24  9:59 AM   Result Value Ref Range    Extra Tube Hold for add-ons.    CBC Auto Differential    Collection Time: 10/06/24  9:59 AM    Specimen: Blood   Result Value Ref Range    WBC 7.19 3.40 - 10.80 10*3/mm3    RBC 4.68 3.77 - 5.28 10*6/mm3    Hemoglobin 14.6 12.0 - 15.9 g/dL    Hematocrit 46.0 34.0 - 46.6 %    MCV 98.3 (H) 79.0 - 97.0 fL    MCH 31.2 26.6 - 33.0 pg    MCHC 31.7 31.5 - 35.7 g/dL    RDW 13.1 12.3 - 15.4 %    RDW-SD 47.6 37.0 - 54.0 fl    MPV 10.7 6.0 - 12.0 fL    Platelets 242 140 - 450 10*3/mm3    Neutrophil % 72.6 42.7 - 76.0 %    Lymphocyte % 18.9 (L) 19.6 - 45.3 %    Monocyte % 6.4 5.0 - 12.0 %    Eosinophil % 1.1 0.3 - 6.2 %    Basophil % 0.7 0.0 - 1.5 %    Immature Grans % 0.3 0.0 - 0.5 %    Neutrophils, Absolute 5.22 1.70 - 7.00 10*3/mm3    Lymphocytes, Absolute 1.36 0.70 - 3.10 10*3/mm3    Monocytes, Absolute 0.46 0.10 - 0.90 10*3/mm3    Eosinophils, Absolute 0.08 0.00 - 0.40 10*3/mm3    Basophils, Absolute 0.05 0.00 - 0.20 10*3/mm3    Immature Grans, Absolute 0.02 0.00 - 0.05 10*3/mm3    nRBC 0.0 0.0 - 0.2 /100 WBC   POC Protime / INR    Collection Time: 10/06/24  9:59 AM    Specimen: Blood    Result Value Ref Range    Protime 13.5 12.8 - 15.2 seconds    INR 1.1 0.8 - 1.2   POC CHEM 8    Collection Time: 10/06/24  9:59 AM    Specimen: Blood   Result Value Ref Range    Glucose 101 70 - 130 mg/dL    BUN 26 8 - 26 mg/dL    Creatinine 0.40 (L) 0.60 - 1.30 mg/dL    Sodium 141 138 - 146 mmol/L    POC Potassium 5.1 (H) 3.5 - 4.9 mmol/L    Chloride 110 (H) 98 - 109 mmol/L    Total CO2 23 (L) 24 - 29 mmol/L    Hemoglobin 14.6 12.0 - 17.0 g/dL    Hematocrit 43 38 - 51 %    Ionized Calcium 1.16 (L) 1.20 - 1.32 mmol/L    eGFR 112.1 >60.0 mL/min/1.73   Lactic Acid, Plasma    Collection Time: 10/06/24  9:59 AM    Specimen: Blood   Result Value Ref Range    Lactate 1.0 0.5 - 2.0 mmol/L   ECG 12 Lead ED Triage Standing Order; Acute Stroke (Onset <24 hrs)    Collection Time: 10/06/24 10:16 AM   Result Value Ref Range    QT Interval 378 ms    QTC Interval 405 ms   Single High Sensitivity Troponin T    Collection Time: 10/06/24 10:38 AM    Specimen: Blood   Result Value Ref Range    HS Troponin T <6 <14 ng/L   Comprehensive Metabolic Panel    Collection Time: 10/06/24 10:38 AM    Specimen: Blood   Result Value Ref Range    Glucose 82 65 - 99 mg/dL    BUN 22 8 - 23 mg/dL    Creatinine 0.69 0.57 - 1.00 mg/dL    Sodium 141 136 - 145 mmol/L    Potassium 4.3 3.5 - 5.2 mmol/L    Chloride 110 (H) 98 - 107 mmol/L    CO2 19.0 (L) 22.0 - 29.0 mmol/L    Calcium 8.6 8.6 - 10.5 mg/dL    Total Protein 6.4 6.0 - 8.5 g/dL    Albumin 4.2 3.5 - 5.2 g/dL    ALT (SGPT) 65 (H) 1 - 33 U/L    AST (SGOT) 34 (H) 1 - 32 U/L    Alkaline Phosphatase 124 (H) 39 - 117 U/L    Total Bilirubin 0.3 0.0 - 1.2 mg/dL    Globulin 2.2 gm/dL    A/G Ratio 1.9 g/dL    BUN/Creatinine Ratio 31.9 (H) 7.0 - 25.0    Anion Gap 12.0 5.0 - 15.0 mmol/L    eGFR 98.3 >60.0 mL/min/1.73   Magnesium    Collection Time: 10/06/24 10:38 AM    Specimen: Blood   Result Value Ref Range    Magnesium 2.4 1.6 - 2.4 mg/dL   CK    Collection Time: 10/06/24 10:38 AM    Specimen: Blood    Result Value Ref Range    Creatine Kinase 56 20 - 180 U/L   Ammonia    Collection Time: 10/06/24 10:38 AM    Specimen: Blood   Result Value Ref Range    Ammonia 28 11 - 51 umol/L   Salicylate Level    Collection Time: 10/06/24 10:38 AM    Specimen: Blood   Result Value Ref Range    Salicylate <0.3 <=30.0 mg/dL   Ethanol    Collection Time: 10/06/24 10:38 AM    Specimen: Blood   Result Value Ref Range    Ethanol <10 0 - 10 mg/dL   Acetaminophen Level    Collection Time: 10/06/24 10:38 AM    Specimen: Blood   Result Value Ref Range    Acetaminophen <5.0 0.0 - 30.0 mcg/mL     Note: In addition to lab results from this visit, the labs listed above may include labs taken at another facility or during a different encounter within the last 24 hours. Please correlate lab times with ED admission and discharge times for further clarification of the services performed during this visit.    XR Chest 1 View   Final Result   Impression:   No active pulmonary process.         Electronically Signed: Bryon Yoon MD     10/6/2024 11:28 AM EDT     Workstation ID: [id number]      CT Angiogram Head w AI Analysis of LVO   Final Result   1.Focal 3 mm nonopacification of the basilar artery just distal to the vascular stent suspicious for high-grade stenoses or occlusion possibly related to thrombus. Asymmetric decreased opacification in the distal right vertebral artery likely related to    changes in flow dynamics without additional area of focal high-grade stenoses or occlusion. Findings likely explains cerebellar abnormalities on CT perfusion.   2.Other major intracranial arterial vasculature widely patent.   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      Findings communicated over the phone with ordering provider Dr. Boyer at 10:48 a.m. 10/6/2024. Patient in route to conventional angiogram with neurosurgery at time of phone  call.               Electronically Signed: Bryon Yoon MD     10/6/2024 10:44 AM EDT     Workstation ID: [id number]      CT Angiogram Neck   Final Result   1.Focal 3 mm nonopacification of the basilar artery just distal to the vascular stent suspicious for high-grade stenoses or occlusion possibly related to thrombus. Asymmetric decreased opacification in the distal right vertebral artery likely related to    changes in flow dynamics without additional area of focal high-grade stenoses or occlusion. Findings likely explains cerebellar abnormalities on CT perfusion.   2.Other major intracranial arterial vasculature widely patent.   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      Findings communicated over the phone with ordering provider Dr. Boyer at 10:48 a.m. 10/6/2024. Patient in route to conventional angiogram with neurosurgery at time of phone call.               Electronically Signed: Bryon Yoon MD     10/6/2024 10:44 AM EDT     Workstation ID: [id number]      CT CEREBRAL PERFUSION WITH & WITHOUT CONTRAST   Final Result   Negative for completed infarct. 14 mL region of Tmax greater than 6 seconds in the right cerebellum.               Electronically Signed: Bryon Yoon MD     10/6/2024 10:28 AM EDT     Workstation ID: [id number]      CT Head Without Contrast Stroke Protocol   Final Result   Impression:   1. New periventricular hypodensities at right frontal parietal junction and left parietal lobe which could reflect recent (acute or subacute) infarcts. No associated acute hemorrhage or significant mass effect. Findings could be further assessed by    dedicated MRI.   2. Slight increasing dilation of third and lateral ventricles with more conspicuous sulcal and gyral crowding at the vertex. Findings could reflect progressive mass effect at the level of the fourth ventricle related to known treated  "aneurysm, although    size of the excluded aneurysm has not significantly changed measuring 2.5 x 2.6 cm (previously 2.4 x 2.6 cm). Consider neurosurgical consultation.   3. Underlying periventricular hypodensity suggesting chronic microvascular ischemic change similar to prior. Transependymal edema would be considered less likely given stability.            Electronically Signed: Bryon Yoon MD     10/6/2024 10:03 AM EDT     Workstation ID: [id number]        Vitals:    10/06/24 0939 10/06/24 0959 10/06/24 1016 10/06/24 1031   BP: 148/89 (!) 147/106 160/81 160/84   BP Location:  Left arm     Pulse: 74  68 68   Resp: 16      Temp: 98.5 °F (36.9 °C)      SpO2: 100%      Weight: 60 kg (132 lb 4.4 oz)      Height: 154.9 cm (61\")        Medications   sodium chloride 0.9 % flush 10 mL ( Intravenous MAR Hold 10/6/24 1059)   iopamidol (ISOVUE-370) 76 % injection 125 mL (125 mL Intravenous Given 10/6/24 1005)   levETIRAcetam (KEPPRA) injection 1,000 mg (1,000 mg Intravenous Given 10/6/24 1030)   eptifibatide (INTEGRILIN) 75 mg in 100 mL solution (2 mcg/kg/min × 60 kg Intravenous New Bag 10/6/24 1202)     ECG/EMG Results (last 24 hours)       Procedure Component Value Units Date/Time    ECG 12 Lead ED Triage Standing Order; Acute Stroke (Onset <24 hrs) [[id number]] Collected: 10/06/24 1016     Updated: 10/06/24 1228     QT Interval 378 ms      QTC Interval 405 ms     Narrative:      Test Reason : ED Triage Standing Order~  Blood Pressure :   */*   mmHG  Vent. Rate :  69 BPM     Atrial Rate :  69 BPM     P-R Int : 124 ms          QRS Dur :  88 ms      QT Int : 378 ms       P-R-T Axes :   5 -11  35 degrees     QTc Int : 405 ms    Normal sinus rhythm  Septal infarct , age undetermined  Abnormal ECG  When compared with ECG of 21-JUL-2024 18:35,  No significant change was found  Confirmed by MD Boyer Michael ([id number]) on 10/6/2024 12:27:40 PM    Referred By: ED MD           Confirmed By: Demarcus Boyer MD          ECG 12 Lead ED " Triage Standing Order; Acute Stroke (Onset <24 hrs)   Final Result   Test Reason : ED Triage Standing Order~   Blood Pressure :   */*   mmHG   Vent. Rate :  69 BPM     Atrial Rate :  69 BPM      P-R Int : 124 ms          QRS Dur :  88 ms       QT Int : 378 ms       P-R-T Axes :   5 -11  35 degrees      QTc Int : 405 ms      Normal sinus rhythm   Septal infarct , age undetermined   Abnormal ECG   When compared with ECG of 21-JUL-2024 18:35,   No significant change was found   Confirmed by MD Boyer Michael ([id number]) on 10/6/2024 12:27:40 PM      Referred By: ED MD           Confirmed By: Demarcus Boyer MD                 Medical Decision Making  Amount and/or Complexity of Data Reviewed  Labs: ordered.  Radiology: ordered.  ECG/medicine tests: ordered.    Risk  Prescription drug management.  Decision regarding hospitalization.        Final diagnoses:   Basilar artery thrombosis   Cerebral aneurysm   History of stroke       ED Disposition  ED Disposition       ED Disposition   Decision to Admit    Condition   --    Comment   Level of Care: Critical Care [6]   Admitting Physician: CATHIE HERNANDEZ [[id number]]   Attending Physician: CATHIE HERNANDEZ [[id number]]                 No follow-up provider specified.       Medication List      No changes were made to your prescriptions during this visit.            Chris Boyer MD  10/06/24 1244      Electronically signed by Chris Boyer MD at 10/06/24 1244       Oxygen Therapy (since admission)       Date/Time SpO2 Device (Oxygen Therapy) Flow (L/min) Oxygen Concentration (%) ETCO2 (mmHg)    10/07/24 1500 100 -- -- -- --    10/07/24 1400 100 room air -- -- --    10/07/24 1300 100 -- -- -- --    10/07/24 1215 100 room air -- -- --    10/07/24 1000 98 room air -- -- --    10/07/24 0900 96 -- -- -- --    10/07/24 0800 100 humidified;nasal cannula 1 -- --    10/07/24 0700 100 humidified;nasal cannula 3 -- --    10/07/24 0600 100 humidified;nasal cannula 3 -- --    10/07/24 0500  100 -- -- -- --    10/07/24 0400 100 humidified;nasal cannula 3 -- --    10/07/24 0300 100 -- -- -- --    10/07/24 0200 100 humidified;nasal cannula 3 -- --    10/07/24 0100 100 -- -- -- --    10/07/24 0000 100 nasal cannula;humidified 3 -- --    10/06/24 2300 100 -- -- -- --    10/06/24 2200 100 heated;nasal cannula 3 -- --    10/06/24 2100 100 -- -- -- --    10/06/24 2000 100 humidified;nasal cannula 3 -- --    10/06/24 1900 100 -- -- -- --    10/06/24 1845 100 nasal cannula;humidified 3 -- --    10/06/24 1745 100 -- -- -- --    10/06/24 1730 100 -- -- -- --    10/06/24 1630 100 humidified;nasal cannula 5 -- 28    10/06/24 1629 -- -- -- -- 29    10/06/24 1615 100 -- -- -- 20    10/06/24 1600 100 -- -- -- 23    10/06/24 1557 -- -- -- -- 26    10/06/24 1545 69 -- -- -- 27    10/06/24 1515 -- -- -- -- 27    10/06/24 1500 87 -- -- -- 25    10/06/24 1445 -- -- -- -- 26    10/06/24 1442 98 -- -- -- 24    10/06/24 1430 100 -- -- -- 26    10/06/24 1415 100 -- -- -- 26    10/06/24 1400 100 -- -- -- 26    10/06/24 1345 100 -- -- -- 26    10/06/24 1330 100 -- -- -- 28    10/06/24 1315 100 -- -- -- 27    10/06/24 1300 -- -- -- -- 26    10/06/24 1247 -- -- -- -- 24    10/06/24 1244 -- -- -- -- 22    10/06/24 0939 100 room air -- -- --          Facility-Administered Medications as of 10/7/2024   Medication Dose Route Frequency Provider Last Rate Last Admin    acetaminophen (TYLENOL) 160 MG/5ML oral solution 650 mg  650 mg Nasogastric Q6H PRN Ashley Acharya APRN        aspirin chewable tablet 81 mg  81 mg Nasogastric Daily Melissa Chavez, PharmD   81 mg at 10/07/24 0813    Or    aspirin suppository 300 mg  300 mg Rectal Daily Melissa Chavez, PharmD        atorvastatin (LIPITOR) tablet 80 mg  80 mg Nasogastric Nightly Melissa Chavez, PharmD   80 mg at 10/06/24 2135    sennosides-docusate (PERICOLACE) 8.6-50 MG per tablet 2 tablet  2 tablet Nasogastric BID PRN Melissa Chavez, PharmD        And    polyethylene glycol (MIRALAX)  packet 17 g  17 g Nasogastric Daily PRN Melissa Chavez, PharmD        And    bisacodyl (DULCOLAX) suppository 10 mg  10 mg Rectal Daily PRN Melissa Chavez, PharmD        Calcium Replacement - Follow Nurse / BPA Driven Protocol   Does not apply PRN Lety Matthews APRN        cefTRIAXone (ROCEPHIN) 1,000 mg in sodium chloride 0.9 % 100 mL MBP  1,000 mg Intravenous Q24H Lety Matthews APRN 200 mL/hr at 10/07/24 1433 1,000 mg at 10/07/24 1433    dexAMETHasone (DECADRON) injection 4 mg  4 mg Intravenous Q6H Ashley Acharya APRN   4 mg at 10/07/24 1433    [COMPLETED] eptifibatide (INTEGRILIN) 75 mg in 100 mL solution    Code / Trauma / Sedation Continuous Med MendezJoe MD   Stopped at 10/06/24 1430    famotidine (PEPCID) injection 20 mg  20 mg Intravenous BID Lety Matthews APRN   20 mg at 10/07/24 0813    [COMPLETED] iopamidol (ISOVUE-370) 76 % injection 125 mL  125 mL Intravenous Once in imaging BoyerChris MD   125 mL at 10/06/24 1005    [COMPLETED] levETIRAcetam (KEPPRA) injection 1,000 mg  1,000 mg Intravenous Once Ashley Acharya APRN   1,000 mg at 10/06/24 1030    Magnesium Cardiology Dose Replacement - Follow Nurse / BPA Driven Protocol   Does not apply PRN Lety Matthews APRN        [COMPLETED] magnesium sulfate in D5W 1g/100mL (PREMIX)  1 g Intravenous Once Ashley Acharya APRN   1 g at 10/06/24 2011    [COMPLETED] Kinza patch 1 patch  1 patch Topical Once Lety Matthews APRN   1 patch at 10/06/24 1423    niCARdipine (CARDENE) 25mg in 250mL NS infusion  5-15 mg/hr Intravenous Titrated Ashley Acharya APRN 100 mL/hr at 10/06/24 1320 10 mg/hr at 10/06/24 1320    nitroglycerin (NITROSTAT) SL tablet 0.4 mg  0.4 mg Sublingual Q5 Min PRN Ashley Acharya APRN        Phosphorus Replacement - Follow Nurse / BPA Driven Protocol   Does not apply Lety Barnhart APRN        Potassium Replacement - Follow Nurse / BPA Driven  Protocol   Does not apply PRN Lety Matthews APRN        [COMPLETED] prochlorperazine (COMPAZINE) injection 5 mg  5 mg Intravenous Once Carolynn Delacruz APRN   5 mg at 10/06/24 1808    sodium chloride 0.9 % flush 10 mL  10 mL Intravenous Q12H Lety Matthews APRN   10 mL at 10/07/24 0814    sodium chloride 0.9 % flush 10 mL  10 mL Intravenous PRN Lety Matthews APRN        ticagrelor (BRILINTA) tablet 90 mg  90 mg Nasogastric BID Ashley Acharya APRN   90 mg at 10/07/24 0813     Lab Results (all)       Procedure Component Value Units Date/Time    POC Glucose Once [[id number]]  (Normal) Collected: 10/07/24 1218    Specimen: Blood Updated: 10/07/24 1222     Glucose 93 mg/dL     Lipid Panel [[id number]]  (Abnormal) Collected: 10/07/24 0555    Specimen: Blood Updated: 10/07/24 0704     Total Cholesterol 132 mg/dL      Triglycerides 62 mg/dL      HDL Cholesterol 61 mg/dL      LDL Cholesterol  58 mg/dL      VLDL Cholesterol 13 mg/dL      LDL/HDL Ratio 0.96    Narrative:      Cholesterol Reference Ranges  ([place] Department of Health and Human Services ATP III Classifications)    Desirable          <200 mg/dL  Borderline High    200-239 mg/dL  High Risk          >240 mg/dL      Triglyceride Reference Ranges  (U.S. Department of Health and Human Services ATP III Classifications)    Normal           <150 mg/dL  Borderline High  150-199 mg/dL  High             200-499 mg/dL  Very High        >500 mg/dL    HDL Reference Ranges  (U.S. Department of Health and Human Services ATP III Classifications)    Low     <40 mg/dl (major risk factor for CHD)  High    >60 mg/dl ('negative' risk factor for CHD)        LDL Reference Ranges  (U.S. Department of Health and Human Services ATP III Classifications)    Optimal          <100 mg/dL  Near Optimal     100-129 mg/dL  Borderline High  130-159 mg/dL  High             160-189 mg/dL  Very High        >189 mg/dL    Comprehensive Metabolic Panel [[id number]]  (Abnormal)  Collected: 10/07/24 0555    Specimen: Blood Updated: 10/07/24 0704     Glucose 126 mg/dL      BUN 21 mg/dL      Creatinine 0.43 mg/dL      Sodium 143 mmol/L      Potassium 4.0 mmol/L      Chloride 112 mmol/L      CO2 20.0 mmol/L      Calcium 8.7 mg/dL      Total Protein 6.1 g/dL      Albumin 4.0 g/dL      ALT (SGPT) 49 U/L      AST (SGOT) 22 U/L      Alkaline Phosphatase 98 U/L      Total Bilirubin 0.3 mg/dL      Globulin 2.1 gm/dL      Comment: Calculated Result        A/G Ratio 1.9 g/dL      BUN/Creatinine Ratio 48.8     Anion Gap 11.0 mmol/L      eGFR 110.1 mL/min/1.73     Narrative:      GFR Normal >60  Chronic Kidney Disease <60  Kidney Failure <15      Phosphorus [[id number]]  (Normal) Collected: 10/07/24 0555    Specimen: Blood Updated: 10/07/24 0704     Phosphorus 4.1 mg/dL     Hemoglobin A1c [[id number]]  (Normal) Collected: 10/07/24 0555    Specimen: Blood Updated: 10/07/24 0703     Hemoglobin A1C 5.60 %     Narrative:      Hemoglobin A1C Ranges:    Increased Risk for Diabetes  5.7% to 6.4%  Diabetes                     >= 6.5%  Diabetic Goal                < 7.0%    CBC Auto Differential [[id number]]  (Abnormal) Collected: 10/07/24 0555    Specimen: Blood Updated: 10/07/24 0633     WBC 14.67 10*3/mm3      RBC 4.38 10*6/mm3      Hemoglobin 13.8 g/dL      Hematocrit 41.7 %      MCV 95.2 fL      MCH 31.5 pg      MCHC 33.1 g/dL      RDW 13.2 %      RDW-SD 46.3 fl      MPV 10.9 fL      Platelets 252 10*3/mm3      Neutrophil % 88.0 %      Lymphocyte % 5.8 %      Monocyte % 5.7 %      Eosinophil % 0.0 %      Basophil % 0.1 %      Immature Grans % 0.4 %      Neutrophils, Absolute 12.91 10*3/mm3      Lymphocytes, Absolute 0.85 10*3/mm3      Monocytes, Absolute 0.84 10*3/mm3      Eosinophils, Absolute 0.00 10*3/mm3      Basophils, Absolute 0.01 10*3/mm3      Immature Grans, Absolute 0.06 10*3/mm3      nRBC 0.0 /100 WBC     POC Glucose Once [[id number]]  (Normal) Collected: 10/07/24 0512    Specimen: Blood Updated:  10/07/24 0516     Glucose 112 mg/dL     POC Glucose Once [[id number]]  (Abnormal) Collected: 10/06/24 2310    Specimen: Blood Updated: 10/06/24 2314     Glucose 134 mg/dL     Blood Gas, Arterial With Co-Ox [[id number]]  (Abnormal) Collected: 10/06/24 1549    Specimen: Arterial Blood Updated: 10/06/24 1549     Site Left Radial     Rafael's Test N/A     pH, Arterial 7.373 pH units      pCO2, Arterial 28.9 mm Hg      Comment: 84 Value below reference range        pO2, Arterial 195.0 mm Hg      Comment: 83 Value above reference range        HCO3, Arterial 16.8 mmol/L      Base Excess, Arterial -7.0 mmol/L      Hemoglobin, Blood Gas 14.9 g/dL      Hematocrit, Blood Gas 45.7 %      Oxyhemoglobin 98.5 %      Methemoglobin 0.50 %      Carboxyhemoglobin 0.7 %      CO2 Content 17.7 mmol/L      Temperature 37.0     Barometric Pressure for Blood Gas --     Comment: N/A        Modality Ventilator     FIO2 45 %      Ventilator Mode PS     Rate 0 Breaths/minute      PIP 0 cmH2O      Comment: Meter: [id number]     :  [id number]        IPAP 0     EPAP 0     pH, Temp Corrected 7.373 pH Units      pCO2, Temperature Corrected 28.9 mm Hg      pO2, Temperature Corrected 195 mm Hg     Fentanyl, Urine - Urine, Clean Catch [[id number]]  (Normal) Collected: 10/06/24 1327    Specimen: Urine, Clean Catch Updated: 10/06/24 1352     Fentanyl, Urine Negative    Narrative:      Negative Threshold:      Fentanyl 5 ng/mL     The normal value for the drug tested is negative. This report includes final unconfirmed screening results to be used for medical treatment purposes only. Unconfirmed results must not be used for non-medical purposes such as employment or legal testing. Clinical consideration should be applied to any drug of abuse test, particularly when unconfirmed results are used.           Urine Drug Screen - Urine, Clean Catch [[id number]]  (Abnormal) Collected: 10/06/24 1327    Specimen: Urine, Clean Catch Updated: 10/06/24 1340      THC, Screen, Urine Negative     Phencyclidine (PCP), Urine Negative     Cocaine Screen, Urine Negative     Methamphetamine, Ur Negative     Opiate Screen Negative     Amphetamine Screen, Urine Negative     Benzodiazepine Screen, Urine Negative     Tricyclic Antidepressants Screen Positive     Methadone Screen, Urine Negative     Barbiturates Screen, Urine Negative     Oxycodone Screen, Urine Negative     Buprenorphine, Screen, Urine Negative    Narrative:      Cutoff For Drugs Screened:    Amphetamines               500 ng/ml  Barbiturates               200 ng/ml  Benzodiazepines            150 ng/ml  Cocaine                    150 ng/ml  Methadone                  200 ng/ml  Opiates                    100 ng/ml  Phencyclidine               25 ng/ml  THC                         50 ng/ml  Methamphetamine            500 ng/ml  Tricyclic Antidepressants  300 ng/ml  Oxycodone                  100 ng/ml  Buprenorphine               10 ng/ml    The normal value for all drugs tested is negative. This report includes unconfirmed screening results, with the cutoff values listed, to be used for medical treatment purposes only.  Unconfirmed results must not be used for non-medical purposes such as employment or legal testing.  Clinical consideration should be applied to any drug of abuse test, particularly when unconfirmed results are used.      Blood Gas, Arterial With Co-Ox [[id number]]  (Abnormal) Collected: 10/06/24 1334    Specimen: Arterial Blood Updated: 10/06/24 1334     Site Right Radial     Rafael's Test N/A     pH, Arterial 7.323 pH units      Comment: 84 Value below reference range        pCO2, Arterial 36.7 mm Hg      pO2, Arterial 502.0 mm Hg      Comment: 83 Value above reference range        HCO3, Arterial 19.0 mmol/L      Base Excess, Arterial -6.4 mmol/L      Hemoglobin, Blood Gas 15.9 g/dL      Hematocrit, Blood Gas 48.7 %      Oxyhemoglobin 99.1 %      Comment: 83 Value above reference range         Methemoglobin 0.40 %      Carboxyhemoglobin 0.6 %      CO2 Content 20.1 mmol/L      Temperature 37.0     Barometric Pressure for Blood Gas --     Comment: N/A        Modality Ventilator     FIO2 100 %      Ventilator Mode VC+/AC     Set Tidal Volume 0.35     Rate 0 Breaths/minute      PEEP 5.0     PIP 0 cmH2O      Comment: Meter: [id number]     :  [id number]        IPAP 0     EPAP 0     pH, Temp Corrected 7.323 pH Units      pCO2, Temperature Corrected 36.7 mm Hg      pO2, Temperature Corrected 502 mm Hg     Salicylate Level [[id number]]  (Normal) Collected: 10/06/24 1038    Specimen: Blood Updated: 10/06/24 1116     Salicylate <0.3 mg/dL     Acetaminophen Level [[id number]]  (Normal) Collected: 10/06/24 1038    Specimen: Blood Updated: 10/06/24 1116     Acetaminophen <5.0 mcg/mL     Magnesium [[id number]]  (Normal) Collected: 10/06/24 1038    Specimen: Blood Updated: 10/06/24 1113     Magnesium 2.4 mg/dL     Comprehensive Metabolic Panel [[id number]]  (Abnormal) Collected: 10/06/24 1038    Specimen: Blood Updated: 10/06/24 1113     Glucose 82 mg/dL      BUN 22 mg/dL      Creatinine 0.69 mg/dL      Sodium 141 mmol/L      Potassium 4.3 mmol/L      Chloride 110 mmol/L      CO2 19.0 mmol/L      Calcium 8.6 mg/dL      Total Protein 6.4 g/dL      Albumin 4.2 g/dL      ALT (SGPT) 65 U/L      AST (SGOT) 34 U/L      Alkaline Phosphatase 124 U/L      Total Bilirubin 0.3 mg/dL      Globulin 2.2 gm/dL      Comment: Calculated Result        A/G Ratio 1.9 g/dL      BUN/Creatinine Ratio 31.9     Anion Gap 12.0 mmol/L      eGFR 98.3 mL/min/1.73     Narrative:      GFR Normal >60  Chronic Kidney Disease <60  Kidney Failure <15      CK [[id number]]  (Normal) Collected: 10/06/24 1038    Specimen: Blood Updated: 10/06/24 1113     Creatine Kinase 56 U/L     Ethanol [[id number]]  (Normal) Collected: 10/06/24 1038    Specimen: Blood Updated: 10/06/24 1113     Ethanol <10 mg/dL     Narrative:      Elevated lactic acid concentration  and lactate dehydrogenase(LD) activity may falsely elevate enzymatically determined ethanol levels. Not for legal purposes.     Single High Sensitivity Troponin T [[id number]]  (Normal) Collected: 10/06/24 1038    Specimen: Blood Updated: 10/06/24 1113     HS Troponin T <6 ng/L     Narrative:      High Sensitive Troponin T Reference Range:  <14.0 ng/L- Negative Female for AMI  <22.0 ng/L- Negative Male for AMI  >=14 - Abnormal Female indicating possible myocardial injury.  >=22 - Abnormal Male indicating possible myocardial injury.   Clinicians would have to utilize clinical acumen, EKG, Troponin, and serial changes to determine if it is an Acute Myocardial Infarction or myocardial injury due to an underlying chronic condition.         Ammonia [[id number]]  (Normal) Collected: 10/06/24 1038    Specimen: Blood Updated: 10/06/24 1110     Ammonia 28 umol/L     Lactic Acid, Plasma [[id number]]  (Normal) Collected: 10/06/24 0959    Specimen: Blood Updated: 10/06/24 1029     Lactate 1.0 mmol/L      Comment: Falsely depressed results may occur on samples drawn from patients receiving N-Acetylcysteine (NAC) or Metamizole.       aPTT [[id number]]  (Normal) Collected: 10/06/24 0959    Specimen: Blood Updated: 10/06/24 1022     PTT 27.1 seconds     Narrative:      PTT = The equivalent PTT values for the therapeutic range of heparin levels at 0.3 to 0.5 U/ml are 60 to 70 seconds.    CBC & Differential [[id number]]  (Abnormal) Collected: 10/06/24 0959    Specimen: Blood Updated: 10/06/24 1007    Narrative:      The following orders were created for panel order CBC & Differential.  Procedure                               Abnormality         Status                     ---------                               -----------         ------                     CBC Auto Differential[[id number]]        Abnormal            Final result                 Please view results for these tests on the individual orders.    CBC Auto Differential  [[id number]]  (Abnormal) Collected: 10/06/24 0959    Specimen: Blood Updated: 10/06/24 1007     WBC 7.19 10*3/mm3      RBC 4.68 10*6/mm3      Hemoglobin 14.6 g/dL      Hematocrit 46.0 %      MCV 98.3 fL      MCH 31.2 pg      MCHC 31.7 g/dL      RDW 13.1 %      RDW-SD 47.6 fl      MPV 10.7 fL      Platelets 242 10*3/mm3      Neutrophil % 72.6 %      Lymphocyte % 18.9 %      Monocyte % 6.4 %      Eosinophil % 1.1 %      Basophil % 0.7 %      Immature Grans % 0.3 %      Neutrophils, Absolute 5.22 10*3/mm3      Lymphocytes, Absolute 1.36 10*3/mm3      Monocytes, Absolute 0.46 10*3/mm3      Eosinophils, Absolute 0.08 10*3/mm3      Basophils, Absolute 0.05 10*3/mm3      Immature Grans, Absolute 0.02 10*3/mm3      nRBC 0.0 /100 WBC     POC CHEM 8 [[id number]]  (Abnormal) Collected: 10/06/24 0959    Specimen: Blood Updated: 10/06/24 1004     Glucose 101 mg/dL      BUN 26 mg/dL      Creatinine 0.40 mg/dL      Sodium 141 mmol/L      POC Potassium 5.1 mmol/L      Chloride 110 mmol/L      Total CO2 23 mmol/L      Hemoglobin 14.6 g/dL      Comment: Serial Number: [id number]:  [id number]        Hematocrit 43 %      Ionized Calcium 1.16 mmol/L      eGFR 112.1 mL/min/1.73     POC Protime / INR [[id number]]  (Normal) Collected: 10/06/24 0959    Specimen: Blood Updated: 10/06/24 1003     Protime 13.5 seconds      INR 1.1     Comment: Serial Number: [id number]:  [id number]       [facility] [[id number]] Collected: 10/06/24 0959    Specimen: Blood Updated: 10/06/24 1000    Narrative:      The following orders were created for panel order [facility].  Procedure                               Abnormality         Status                     ---------                               -----------         ------                     Green Top (Gel)[[id number]]                                  Final result               Lavender Top[[id number]]                                     Final result               Gold Top - SST[[id number]]                                    Final result               Gray Top[[id number]]                                         Final result               Light Blue Top[[id number]]                                   Final result                 Please view results for these tests on the individual orders.    Green Top (Gel) [[id number]] Collected: 10/06/24 0959    Specimen: Blood Updated: 10/06/24 1000     Extra Tube Hold for add-ons.     Comment: Auto resulted.       Lavender Top [[id number]] Collected: 10/06/24 0959    Specimen: Blood Updated: 10/06/24 1000     Extra Tube hold for add-on     Comment: Auto resulted       Gold Top - SST [[id number]] Collected: 10/06/24 0959    Specimen: Blood Updated: 10/06/24 1000     Extra Tube Hold for add-ons.     Comment: Auto resulted.       Gray Top [[id number]] Collected: 10/06/24 0959    Specimen: Blood Updated: 10/06/24 1000     Extra Tube Hold for add-ons.     Comment: Auto resulted.       Light Blue Top [[id number]] Collected: 10/06/24 0959    Specimen: Blood Updated: 10/06/24 1000     Extra Tube Hold for add-ons.     Comment: Auto resulted             Imaging Results (All)       Procedure Component Value Units Date/Time    SLP FEES - Fiberoptic Endo Eval Swallow [[id number]] Resulted: 10/07/24 1408     Updated: 10/07/24 1408    Narrative:      This procedure was auto-finalized with no dictation required.    MRI Brain Without Contrast [[id number]] Collected: 10/07/24 1219     Updated: 10/07/24 1233    Narrative:      MRI BRAIN WO CONTRAST    Date of Exam: 10/7/2024 11:55 AM EDT    Indication: Stroke, follow up  left sided weakness.     Comparison: CT head from October 6, 2024 and MRI brain from July 21, 2024    Technique:  Routine multiplanar/multisequence sequence images of the brain were obtained without contrast administration.      Findings:  There is a small likely subacute infarct within the periventricular left occipital lobe. There is no hemorrhagic transformation. The ventricles are stable in  caliber, with no midline shift. A 2.4 cm basilar artery aneurysm appears unchanged, with mass   effect on the adjacent brainstem. Degenerative changes appear stable. The globes and orbits appear intact. There is moderate paranasal sinus mucosal disease with an air-fluid level in the left maxillary sinus. There is a partial left mastoid effusion.      Impression:      Impression:  1.Small likely subacute infarct involving periventricular left occipital lobe. No hemorrhagic transformation.  2.Stable prominent basilar artery aneurysm with mass effect on adjacent brainstem.  3.Moderate paranasal sinus mucosal disease with air-fluid level in left maxillary sinus. Correlate for acute sinusitis  4.Partial left mastoid effusion.        Electronically Signed: Chris Acuña MD    10/7/2024 12:30 PM EDT    Workstation ID: [id number]    CT Head Without Contrast [[id number]] Collected: 10/06/24 1825     Updated: 10/06/24 1837    Narrative:      CT HEAD WO CONTRAST    Date of Exam: 10/6/2024 6:21 PM EDT    Indication: headache post-procedure.    Comparison: Previous examinations performed on 7/6/2024 and an MRI of the brain obtained on 3/25/2024.    Technique: Axial CT images were obtained of the head without contrast administration.  Automated exposure control and iterative construction methods were used.    Findings: No intracranial hemorrhage. Gray-white matter differentiation is maintained without evidence of an acute infarction. Multiple foci of decreased attenuation are present within the subcortical, deep cerebral, and periventricular white matter   consistent with chronic small vessel/microangiopathic ischemic changes. No extra-axial mass or collection. The ventricles and sulci are prominent commensurate with involutional changes. Stable 2.4 cm x 2.4 cm basilar artery aneurysm compared with   examinations dating back to 3/25/2024. This has been previously transversed by stent. Sellar and suprasellar structures are  normal.    Orbital and periorbital soft tissues are normal. Previous right maxillary antrectomy. Complete opacification of the left maxillary sinus with mucoperiosteal reactive changes. Mucosal thickening of the sphenoid sinuses.. The bony calvarium is intact.      Impression:      Impression: No acute intracranial pathology. Sinusitis.          Electronically Signed: Hal Davila MD    10/6/2024 6:34 PM EDT    Workstation ID: [id number]    XR Abdomen KUB [[id number]] Collected: 10/06/24 1339     Updated: 10/06/24 1343    Narrative:      XR ABDOMEN KUB    Date of Exam: 10/6/2024 1:31 PM EDT    Indication: kub advanvcement    Comparison: Abdominal radiograph 10/6/2024    Findings:  See impression      Impression:      Impression:  1. Advanced nasogastric tube now oriented antegrade over distal stomach.  2. Similar large volume formed stool without evidence of obstruction suggesting constipation.      Electronically Signed: Bryon Yoon MD    10/6/2024 1:40 PM EDT    Workstation ID: [id number]    XR Abdomen KUB [[id number]] Collected: 10/06/24 1334     Updated: 10/06/24 1338    Narrative:      XR ABDOMEN KUB    Date of Exam: 10/6/2024 1:14 PM EDT    Indication: ng    Comparison: Abdominal radiograph 9/29/2023    Findings:  See impression      Impression:      Impression:  1. Antegrade oriented nasogastric tube terminates over mid to distal stomach.  2. Large volume formed stool without dilated loops of bowel to suggest obstruction. Findings suggest constipation.      Electronically Signed: Bryon Yoon MD    10/6/2024 1:35 PM EDT    Workstation ID: [id number]    XR Chest 1 View [[id number]] Collected: 10/06/24 1332     Updated: 10/06/24 1337    Narrative:      XR CHEST 1 VW    Date of Exam: 10/6/2024 1:02 PM EDT    Indication: intubated    Comparison: Chest radiograph 10/6/2024    Findings:  ET tube 2.7 cm above spenser. Antegrade oriented gastric tube terminates over mid to distal stomach. Heart size normal. Aortic  atherosclerotic disease. No new lobar consolidation, edema, large effusion or pneumothorax. Degenerative related osseous   changes.      Impression:      Impression:  Medical support devices appear in standard position. No new airspace process or pneumothorax.      Electronically Signed: Bryon Yoon MD    10/6/2024 1:34 PM EDT    Workstation ID: [id number]    XR Chest 1 View [[id number]] Collected: 10/06/24 1126     Updated: 10/06/24 1131    Narrative:      XR CHEST 1 VW    Date of Exam: 10/6/2024 10:29 AM EDT    Indication: Acute Stroke Protocol (onset < 12 hrs)    Comparison: Chest radiograph 7/21/2024    Findings:  Heart size normal. Aortic atherosclerotic disease. Negative for lobar consolidation, edema, effusion or pneumothorax. Surgical clips in the upper abdomen. Degenerative elated osseous changes with minimal mid thoracic dextrocurvature.      Impression:      Impression:  No active pulmonary process.      Electronically Signed: Bryon Yoon MD    10/6/2024 11:28 AM EDT    Workstation ID: [id number]    CT Angiogram Head w AI Analysis of LVO [[id number]] Collected: 10/06/24 1028     Updated: 10/06/24 1047    Narrative:      CT ANGIOGRAM NECK, CT ANGIOGRAM HEAD W AI ANALYSIS OF LVO    Date of Exam: 10/6/2024 9:48 AM EDT    Indication: Neuro Deficit, acute, Stroke suspected  Neuro deficit, acute stroke suspected    Comparison: CTA head 1/6/2024    Technique: CTA of the head and neck was performed after the uneventful intravenous administration of 125 mL Isovue-370.  Reconstructed coronal and sagittal images were also obtained. In addition, a 3-D volume rendered image was created for   interpretation. Automated exposure control and iterative reconstruction methods were used.      Findings:    CTA NECK:  *Aortic arch: No aneurysm. No significant stenosis or occlusion of the major arch vessels.  *Left carotid system: No aneurysm, significant stenosis or occlusion. Nonflow-limiting plaque carotid  terminus.  *Right carotid system: No aneurysm, significant stenosis or occlusion. Nonflow-limiting plaque at carotid terminus.  *Vertebrobasilar system: Redemonstrated vascular stent traversing the large treated basilar artery aneurysm. Curvilinear enhancement along the left lateral aspect of the graft stable over multiple prior comparisons. There is a focal area of   nonopacification within the downstream basilar artery just distal to the stent graft measuring 3 mm with distal reconstitution. Asymmetrically decreased opacification diffusely throughout right V3 and V4 segment without focal occlusion. Superior   cerebellar arteries are patent. Patency of the stent grafts difficult to assess.    CTA HEAD:  *Anterior circulation: No aneurysm, significant stenosis or occlusion.  *Posterior circulation: No aneurysm, significant stenosis or occlusion.  *Anatomic variants: None of significance.  *Venous sinuses: Patent.      Impression:      1.Focal 3 mm nonopacification of the basilar artery just distal to the vascular stent suspicious for high-grade stenoses or occlusion possibly related to thrombus. Asymmetric decreased opacification in the distal right vertebral artery likely related to   changes in flow dynamics without additional area of focal high-grade stenoses or occlusion. Findings likely explains cerebellar abnormalities on CT perfusion.  2.Other major intracranial arterial vasculature widely patent.  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    Findings communicated over the phone with ordering provider Dr. Boyer at 10:48 a.m. 10/6/2024. Patient in route to conventional angiogram with neurosurgery at time of phone call.          Electronically Signed: Bryon Yoon MD    10/6/2024 10:44 AM EDT    Workstation ID: [id number]    CT Angiogram Neck [[id number]] Collected: 10/06/24 1028     Updated: 10/06/24 1047     Narrative:      CT ANGIOGRAM NECK, CT ANGIOGRAM HEAD W AI ANALYSIS OF LVO    Date of Exam: 10/6/2024 9:48 AM EDT    Indication: Neuro Deficit, acute, Stroke suspected  Neuro deficit, acute stroke suspected    Comparison: CTA head 1/6/2024    Technique: CTA of the head and neck was performed after the uneventful intravenous administration of 125 mL Isovue-370.  Reconstructed coronal and sagittal images were also obtained. In addition, a 3-D volume rendered image was created for   interpretation. Automated exposure control and iterative reconstruction methods were used.      Findings:    CTA NECK:  *Aortic arch: No aneurysm. No significant stenosis or occlusion of the major arch vessels.  *Left carotid system: No aneurysm, significant stenosis or occlusion. Nonflow-limiting plaque carotid terminus.  *Right carotid system: No aneurysm, significant stenosis or occlusion. Nonflow-limiting plaque at carotid terminus.  *Vertebrobasilar system: Redemonstrated vascular stent traversing the large treated basilar artery aneurysm. Curvilinear enhancement along the left lateral aspect of the graft stable over multiple prior comparisons. There is a focal area of   nonopacification within the downstream basilar artery just distal to the stent graft measuring 3 mm with distal reconstitution. Asymmetrically decreased opacification diffusely throughout right V3 and V4 segment without focal occlusion. Superior   cerebellar arteries are patent. Patency of the stent grafts difficult to assess.    CTA HEAD:  *Anterior circulation: No aneurysm, significant stenosis or occlusion.  *Posterior circulation: No aneurysm, significant stenosis or occlusion.  *Anatomic variants: None of significance.  *Venous sinuses: Patent.      Impression:      1.Focal 3 mm nonopacification of the basilar artery just distal to the vascular stent suspicious for high-grade stenoses or occlusion possibly related to thrombus. Asymmetric decreased opacification  in the distal right vertebral artery likely related to   changes in flow dynamics without additional area of focal high-grade stenoses or occlusion. Findings likely explains cerebellar abnormalities on CT perfusion.  2.Other major intracranial arterial vasculature widely patent.  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    Findings communicated over the phone with ordering provider Dr. Boyer at 10:48 a.m. 10/6/2024. Patient in route to conventional angiogram with neurosurgery at time of phone call.          Electronically Signed: Bryon Yoon MD    10/6/2024 10:44 AM EDT    Workstation ID: [id number]    CT CEREBRAL PERFUSION WITH & WITHOUT CONTRAST [[id number]] Collected: 10/06/24 1024     Updated: 10/06/24 1031    Narrative:      CT CEREBRAL PERFUSION W WO CONTRAST    Date of Exam: 10/6/2024 9:48 AM EDT    Indication: Neuro Deficit, acute, Stroke suspected  Neuro deficit, acute stroke suspected.     Comparison: CT head 10/6/2024    Technique: Axial CT images of the brain were obtained prior to and after the administration of 125 mL Isovue-370. Core blood volume, core blood flow, mean transit time, and Tmax images were obtained utilizing the Rapid software protocol. A limited CT   angiogram of the head was also performed to measure the blood vessel density.    The radiation dose reduction device was turned on for each scan per the ALARA (As Low as Reasonably Achievable) protocol.      Findings:    CT Perfusion:  CBF (<30%) volume: 0 mL  Tmax (>6.0s) volume: 14 mL  Mismatch volume: 14 mL  Mismatch ratio: Infinite    Redemonstrated hypodensities in the right frontoparietal and left parietal regions. Hypoperfusion of 25 mL Tmax greater than 4 seconds noted in the both cerebellar hemispheres, right greater than left.      Impression:      Negative for completed infarct. 14 mL region of Tmax greater than 6 seconds  in the right cerebellum.          Electronically Signed: Bryon Yoon MD    10/6/2024 10:28 AM EDT    Workstation ID: [id number]    CT Head Without Contrast Stroke Protocol [[id number]] Collected: 10/06/24 0949     Updated: 10/06/24 1014    Narrative:      CT HEAD WO CONTRAST STROKE PROTOCOL    Date of Exam: 10/6/2024 9:45 AM EDT    Indication: Neuro deficit, acute, stroke suspected  Neuro Deficit, acute, Stroke suspected.    Comparison: CT head 1/6/2024    Technique: Axial CT images were obtained of the head without contrast administration.  Reconstructed coronal images were also obtained. Automated exposure control and iterative construction methods were used.    Scan Time: 9:46 a.m. 10/6/2024  Results discussed with  Dr. Boyer   at 9:55 a.m. 10/6/2024.      Findings:  New periventricular hypodensities involving right frontoparietal junction and left parietal lobe new from prior comparison. No evidence of acute intracranial hemorrhage or midline shift. Large rounded density in the region of the basilar artery in   keeping with known aneurysm without significant change from prior measuring up to 2.5 x 2.6 cm previously measuring 2.4 x 2.6 cm.. Heterogeneous mixed density noted within the excluded aneurysm. Stable stent noted in the region of the distal right   vertebral artery and proximal basilar artery. Slight increasing dilation of the third and lateral ventricles. Largest diameter at the occipital horns measures 7.3 cm previously 6.9 cm. Largest diameter at the third ventricle measures 1 cm previously 0.9   cm. Largest diameter at the frontal horns measures 3.2 cm previously 2.9 cm. Increasing conspicuity of the mass effect upon fourth ventricle since prior exam. Underlying periventricular hypodensity most significant along the occipital and frontal horns   similar to prior suggesting chronic microvascular ischemic change. Slight increasing gyral and sulcal crowding at the vertex. No large extra-axial fluid  collection. Orbits symmetric. Distal carotid atherosclerotic disease. No large extra-axial   collection. No obstructive sinus disease or layering fluid. Surgical changes noted in the sinuses bilaterally. No large mastoid effusion. Negative for depressed skull fracture.      Impression:      Impression:  1. New periventricular hypodensities at right frontal parietal junction and left parietal lobe which could reflect recent (acute or subacute) infarcts. No associated acute hemorrhage or significant mass effect. Findings could be further assessed by   dedicated MRI.  2. Slight increasing dilation of third and lateral ventricles with more conspicuous sulcal and gyral crowding at the vertex. Findings could reflect progressive mass effect at the level of the fourth ventricle related to known treated aneurysm, although   size of the excluded aneurysm has not significantly changed measuring 2.5 x 2.6 cm (previously 2.4 x 2.6 cm). Consider neurosurgical consultation.  3. Underlying periventricular hypodensity suggesting chronic microvascular ischemic change similar to prior. Transependymal edema would be considered less likely given stability.        Electronically Signed: Bryon Yoon MD    10/6/2024 10:03 AM EDT    Workstation ID: [id number]          ECG/EMG Results (all)       Procedure Component Value Units Date/Time    ECG 12 Lead ED Triage Standing Order; Acute Stroke (Onset <24 hrs) [[id number]] Collected: 10/06/24 1016     Updated: 10/06/24 1228     QT Interval 378 ms      QTC Interval 405 ms     Narrative:      Test Reason : ED Triage Standing Order~  Blood Pressure :   */*   mmHG  Vent. Rate :  69 BPM     Atrial Rate :  69 BPM     P-R Int : 124 ms          QRS Dur :  88 ms      QT Int : 378 ms       P-R-T Axes :   5 -11  35 degrees     QTc Int : 405 ms    Normal sinus rhythm  Septal infarct , age undetermined  Abnormal ECG  When compared with ECG of 21-JUL-2024 18:35,  No significant change was found  Confirmed by  MD Merlin, Demarcus ([id number]) on 10/6/2024 12:27:40 PM    Referred By: LUCI DOMINGUEZ           Confirmed By: Demarcus Boyer MD    Adult Transthoracic Echo Complete W/ Cont if Necessary Per Protocol [[id number]] Resulted: 10/07/24 1435     Updated: 10/07/24 1437     EF(MOD-bp) 63.9 %      LVIDd 4.7 cm      LVIDs 2.6 cm      IVSd 0.70 cm      LVPWd 0.70 cm      FS 44.7 %      IVS/LVPW 1.00 cm      ESV(cubed) 17.6 ml      EDV(cubed) 103.8 ml      LV mass(C)d 103.1 grams      LVOT area 2.8 cm2      LVOT diam 1.90 cm      EDV(MOD-sp2) 59.5 ml      EDV(MOD-sp4) 58.3 ml      ESV(MOD-sp2) 25.4 ml      ESV(MOD-sp4) 19.2 ml      SV(MOD-sp2) 34.1 ml      SV(MOD-sp4) 39.1 ml      EF(MOD-sp2) 57.3 %      EF(MOD-sp4) 67.1 %      MV E max kevin 85.9 cm/sec      MV A max kevin 90.7 cm/sec      MV dec time 0.22 sec      MV E/A 0.95     LA ESV Index (BP) 18.8 ml/m2      Med Peak E' Kevin 8.7 cm/sec      Lat Peak E' Kevin 12.5 cm/sec      TR max kevin 164.3 cm/sec      Avg E/e' ratio 8.10     SV(LVOT) 55.6 ml      RV Base 3.5 cm      RV Mid 2.40 cm      RV Length 6.1 cm      TAPSE (>1.6) 1.97 cm      RV S' 16.2 cm/sec      LA dimension (2D)  3.2 cm      LV V1 max 101.0 cm/sec      LV V1 max PG 4.1 mmHg      LV V1 mean PG 2.00 mmHg      LV V1 VTI 19.6 cm      Ao pk kevin 140.0 cm/sec      Ao max PG 7.8 mmHg      Ao mean PG 4.0 mmHg      Ao V2 VTI 29.2 cm      MARY ANN(I,D) 1.90 cm2      AI P1/2t 590.1 msec      MV max PG 4.8 mmHg      MV mean PG 2.00 mmHg      MV V2 VTI 31.3 cm      MV P1/2t 73.2 msec      MVA(P1/2t) 3.0 cm2      MVA(VTI) 1.78 cm2      MV dec slope 404.0 cm/sec2      TR max PG 11.0 mmHg      PA acc time 0.16 sec      Ao root diam 2.9 cm      BH CV ECHO SHUNT ASSESSMENT PERFORMED (HIDDEN SCRIPTING) 1     RVSP(TR) 14 mmHg      RAP systole 3 mmHg      Ascending aorta 3.2 cm      Echo EF Estimated 65.0 %     Narrative:        Left ventricular systolic function is normal. Estimated left   ventricular EF = 65%    Left ventricular diastolic function is  consistent with (grade I)   impaired relaxation.    The cardiac valves are anatomically and functionally normal.    Estimated right ventricular systolic pressure from tricuspid   regurgitation is normal (<35 mmHg).    Saline test results are negative.            Operative/Procedure Notes (all)    No notes of this type exist for this encounter.          Physician Progress Notes (all)        Hugo Laureano MD at 10/07/24 1231          Stroke Progress Note       Chief Complaint: Left-sided weakness left-sided facial droop and severe dysarthria    Subjective    Subjective     Subjective:  The patient is sitting in a chair in NAD. Family were at the bedside. The patient stated that she is doing better than yesterday.  Denies having any new stroke or strokelike symptoms.  I discussed with the patient and her family the imaging findings, management, and expectation moving forward.  Specifically we talked about the importance of medication compliance in this case dual antiplatelet.    No other acute complains at this time    Review of Systems   Constitutional: No fatigue      Objective      Temp:  [97.6 °F (36.4 °C)-98.6 °F (37 °C)] 98.6 °F (37 °C)  Heart Rate:  [60-83] 62  Resp:  [10-16] 12  BP: (109-153)/(48-98) 125/69  FiO2 (%):  [45 %-100 %] 45 %    Objective    GEN: lying in bed; in NAD  HENT: normocephalic, non-erythematous oropharynx  CV: no LE edema    NEURO:  Mental Status: A&O x 3, interactive, able to follow commands  Speech: Mild dysarthria  CN 2-12:  II - PERRLA  III, IV, VI - EOMI  V - Facial sensation intact  VII -no gross facial asymmetry  VIII - Auditory acuity intact  XII - Tongue protrudes midline    Motor: The patient can move all 4 extremities against gravity with no visible drift appreciated  Sensory: intact light touch throughout  Reflexes: negative Gardiner's sign BL  Coordination: no ataxia with finger-to-nose testing  Gait/Station: deferred   Cortical: No Extinction    Results Review:    I  reviewed the patient's new clinical results.    WBC   Date Value Ref Range Status   10/07/2024 14.67 (H) 3.40 - 10.80 10*3/mm3 Final     RBC   Date Value Ref Range Status   10/07/2024 4.38 3.77 - 5.28 10*6/mm3 Final     Hemoglobin   Date Value Ref Range Status   10/07/2024 13.8 12.0 - 15.9 g/dL Final     Hematocrit   Date Value Ref Range Status   10/07/2024 41.7 34.0 - 46.6 % Final     MCV   Date Value Ref Range Status   10/07/2024 95.2 79.0 - 97.0 fL Final     MCH   Date Value Ref Range Status   10/07/2024 31.5 26.6 - 33.0 pg Final     MCHC   Date Value Ref Range Status   10/07/2024 33.1 31.5 - 35.7 g/dL Final     RDW   Date Value Ref Range Status   10/07/2024 13.2 12.3 - 15.4 % Final     RDW-SD   Date Value Ref Range Status   10/07/2024 46.3 37.0 - 54.0 fl Final     MPV   Date Value Ref Range Status   10/07/2024 10.9 6.0 - 12.0 fL Final     Platelets   Date Value Ref Range Status   10/07/2024 252 140 - 450 10*3/mm3 Final     Neutrophil %   Date Value Ref Range Status   10/07/2024 88.0 (H) 42.7 - 76.0 % Final     Lymphocyte %   Date Value Ref Range Status   10/07/2024 5.8 (L) 19.6 - 45.3 % Final     Monocyte %   Date Value Ref Range Status   10/07/2024 5.7 5.0 - 12.0 % Final     Eosinophil %   Date Value Ref Range Status   10/07/2024 0.0 (L) 0.3 - 6.2 % Final     Basophil %   Date Value Ref Range Status   10/07/2024 0.1 0.0 - 1.5 % Final     Immature Grans %   Date Value Ref Range Status   10/07/2024 0.4 0.0 - 0.5 % Final     Neutrophils, Absolute   Date Value Ref Range Status   10/07/2024 12.91 (H) 1.70 - 7.00 10*3/mm3 Final     Lymphocytes, Absolute   Date Value Ref Range Status   10/07/2024 0.85 0.70 - 3.10 10*3/mm3 Final     Monocytes, Absolute   Date Value Ref Range Status   10/07/2024 0.84 0.10 - 0.90 10*3/mm3 Final     Eosinophils, Absolute   Date Value Ref Range Status   10/07/2024 0.00 0.00 - 0.40 10*3/mm3 Final     Basophils, Absolute   Date Value Ref Range Status   10/07/2024 0.01 0.00 - 0.20 10*3/mm3  Final     Immature Grans, Absolute   Date Value Ref Range Status   10/07/2024 0.06 (H) 0.00 - 0.05 10*3/mm3 Final     nRBC   Date Value Ref Range Status   10/07/2024 0.0 0.0 - 0.2 /100 WBC Final       Lab Results   Component Value Date    GLUCOSE 126 (H) 10/07/2024    BUN 21 10/07/2024    CREATININE 0.43 (L) 10/07/2024     10/07/2024    K 4.0 10/07/2024     (H) 10/07/2024    CALCIUM 8.7 10/07/2024    PROTEINTOT 6.1 10/07/2024    ALBUMIN 4.0 10/07/2024    ALT 49 (H) 10/07/2024    AST 22 10/07/2024    ALKPHOS 98 10/07/2024    BILITOT 0.3 10/07/2024    GLOB 2.1 10/07/2024    AGRATIO 1.9 10/07/2024    BCR 48.8 (H) 10/07/2024    ANIONGAP 11.0 10/07/2024    EGFR 110.1 10/07/2024     CT Head Without Contrast    Result Date: 10/6/2024  Impression: No acute intracranial pathology. Sinusitis. Electronically Signed: Hal Davila MD  10/6/2024 6:34 PM EDT  Workstation ID: [id number]    XR Abdomen KUB    Result Date: 10/6/2024  Impression: 1. Advanced nasogastric tube now oriented antegrade over distal stomach. 2. Similar large volume formed stool without evidence of obstruction suggesting constipation. Electronically Signed: Bryon Yoon MD  10/6/2024 1:40 PM EDT  Workstation ID: [id number]    XR Abdomen KUB    Result Date: 10/6/2024  Impression: 1. Antegrade oriented nasogastric tube terminates over mid to distal stomach. 2. Large volume formed stool without dilated loops of bowel to suggest obstruction. Findings suggest constipation. Electronically Signed: Bryon Yoon MD  10/6/2024 1:35 PM EDT  Workstation ID: [id number]    XR Chest 1 View    Result Date: 10/6/2024  Impression: Medical support devices appear in standard position. No new airspace process or pneumothorax. Electronically Signed: Bryon Yoon MD  10/6/2024 1:34 PM EDT  Workstation ID: [id number]    XR Chest 1 View    Result Date: 10/6/2024  Impression: No active pulmonary process. Electronically Signed: Bryon Yoon MD  10/6/2024 11:28 AM EDT   Workstation ID: [id number]    CT Angiogram Head w AI Analysis of LVO    Result Date: 10/6/2024  1.Focal 3 mm nonopacification of the basilar artery just distal to the vascular stent suspicious for high-grade stenoses or occlusion possibly related to thrombus. Asymmetric decreased opacification in the distal right vertebral artery likely related to changes in flow dynamics without additional area of focal high-grade stenoses or occlusion. Findings likely explains cerebellar abnormalities on CT perfusion. 2.Other major intracranial arterial vasculature widely patent. 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 Findings communicated over the phone with ordering provider Dr. Boyer at 10:48 a.m. 10/6/2024. Patient in route to conventional angiogram with neurosurgery at time of phone call. Electronically Signed: Bryon Yoon MD  10/6/2024 10:44 AM EDT  Workstation ID: [id number]    CT Angiogram Neck    Result Date: 10/6/2024  1.Focal 3 mm nonopacification of the basilar artery just distal to the vascular stent suspicious for high-grade stenoses or occlusion possibly related to thrombus. Asymmetric decreased opacification in the distal right vertebral artery likely related to changes in flow dynamics without additional area of focal high-grade stenoses or occlusion. Findings likely explains cerebellar abnormalities on CT perfusion. 2.Other major intracranial arterial vasculature widely patent. 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 Findings communicated over the phone with ordering provider Dr. Boyer at 10:48 a.m. 10/6/2024. Patient in route to conventional angiogram with neurosurgery at time of phone call. Electronically Signed: Bryon Yoon MD  10/6/2024 10:44 AM EDT  Workstation ID: [id number]    CT CEREBRAL  PERFUSION WITH & WITHOUT CONTRAST    Result Date: 10/6/2024  Negative for completed infarct. 14 mL region of Tmax greater than 6 seconds in the right cerebellum. Electronically Signed: Bryon Yoon MD  10/6/2024 10:28 AM EDT  Workstation ID: [id number]    CT Head Without Contrast Stroke Protocol    Result Date: 10/6/2024  Impression: 1. New periventricular hypodensities at right frontal parietal junction and left parietal lobe which could reflect recent (acute or subacute) infarcts. No associated acute hemorrhage or significant mass effect. Findings could be further assessed by dedicated MRI. 2. Slight increasing dilation of third and lateral ventricles with more conspicuous sulcal and gyral crowding at the vertex. Findings could reflect progressive mass effect at the level of the fourth ventricle related to known treated aneurysm, although size of the excluded aneurysm has not significantly changed measuring 2.5 x 2.6 cm (previously 2.4 x 2.6 cm). Consider neurosurgical consultation. 3. Underlying periventricular hypodensity suggesting chronic microvascular ischemic change similar to prior. Transependymal edema would be considered less likely given stability. Electronically Signed: Bryon Yoon MD  10/6/2024 10:03 AM EDT  Workstation ID: [id number]   Results for orders placed during the hospital encounter of 12/15/22    Adult Transthoracic Echo Complete W/ Cont if Necessary Per Protocol    Interpretation Summary    Left ventricular systolic function is normal. Left ventricular ejection fraction appears to be 56 - 60%.    Mild aortic valve regurgitation is present.      -CT of the head and neck from 10/6/2024 showed mid basilar occlusion  -MRI of the brain images from 10/7/2024 images were personally reviewed and showed evidence of  punctate acute ischemic infarct of the left occipital lobe and the right cerebellum.  -Echocardiogram on 10/7/2024 showed left ventricular ejection fraction of 63.9% with normal left  atrial size and negative bubble study  -LDL on 10/7/2024 was 58  -A1c from 10/7/2024 was 5.6%      Assessment/Plan   This is a 63-year-old female with known medical diagnoses present hypertension, hyperlipidemia, migraines (follows with CAILIN Parra), history of stroke (right occipital and left cerebellar, 2023, residual dizziness and ataxic gait), and giant basilar aneurysm s/p pipeline embolization (Dr. Savage, 2022 and 2023) who presents to the emergency department via private vehicle for further evaluation of left-sided weakness and facial droop which was present upon awakening this morning.  She is admitted for IV thrombolytic therapy given LK > 4.5-hours.  CTA head/neck reveals thrombosed pipeline stent.  Images were reviewed by Dr. Savage who is elected to take her to the Cath Lab for mechanical thrombectomy.  She will be admitted to the neurological ICU s/p procedure.     Antiplatelet PTA: ASA 81 mg  Anticoagulant PTA: None           #Left-sided weakness, facial droop, and aphasia; acute onset  #History of stroke, right occipital and left cerebellar; residual balance difficulties and dizziness  #History of giant basilar aneurysm s/p pipeline embolization and s/p TICI3 and stent placement on 10/6/2024  # Punctate acute ischemic stroke of the left occipital lobe  # Punctate acute ischemic stroke of the right cerebellum  -Mechanism is cryptogenic.  Likely large vessel athero versus less likely ESUS versus less likely cardioembolic.  -CTA head/neck reveals thrombosed pipeline stent; reviewed with Dr. Savage.  Janette patient's debilitating exam is recommended that she go to the Cath Lab for neurointervention.  Discussed with patient and spouse who seem agreeable to proceed, however patient would like to speak with Dr. Savage prior to procedure.  [facility] informed to call and Cath Lab team at 1024.  Anesthesia notified at 1025.  -CT of the head and neck from 10/6/2024 showed mid basilar occlusion  -MRI of the  brain images from 10/7/2024 images were personally reviewed and showed evidence of  punctate acute ischemic infarct of the left occipital lobe and the right cerebellum.  -Echocardiogram on 10/7/2024 showed left ventricular ejection fraction of 63.9% with normal left atrial size and negative bubble study  -LDL on 10/7/2024 was 58  -A1c from 10/7/2024 was 5.6%    Recommendations  -Continue dual antiplatelet with aspirin 81 mg daily and ticagrelor 90 mg twice daily for secondary stroke prevention in the setting of new stent and basilar artery  -Continue atorvastatin 80 mg nightly for secondary stroke prevention Target LDL less than 55  -PT/OT/SLP  -NIH stroke scale every 2 hours.  Low threshold for repeated CT head without contrast plus CTA head and neck for any neurological changes.  -Target blood pressure goals of normotension     #Essential hypertension  -Target blood pressure goals of normotension     #Hyperlipidemia  -Continue atorvastatin 80mg nightly; monitor LFTs as they are slightly elevated      Stroke will continue to follow. Please call for any further questions or concerns  =================================  Hugo Laureano MD, Msc, PhD  Vascular Neurologist  [facility]          Electronically signed by Hugo Laureano MD at 10/07/24 1251       Joe Mendez MD at 10/07/24 0846          Chief complaint: Acute basilar occlusion    Admit Diagnosis:   Acute CVA (cerebrovascular accident) [I63.9]     Subjective: No events overnight.  Speech is improved per family.  Swallow eval failed yesterday.    Objective:    Vitals:    10/07/24 0800   BP: 120/66   Pulse: 63   Resp: 12   Temp: 98.6 °F (37 °C)   SpO2: 100%     Pulse  Av.9  Min: 60  Max: 83  Systolic (24hrs), Av , Min:109 , Max:160     Diastolic (24hrs), Av, Min:48, Max:106    Temp (24hrs), Av.2 °F (36.8 °C), Min:97.6 °F (36.4 °C), Max:98.6 °F (37 °C)      She is lying in bed.  Her speech is dysarthric but  intelligible.  She is diffusely weak and appears to be slightly more weak on the left arm as compared to the right, however she moves all 4 extremities to command.  She is oriented and appropriate.    Right inguinal region is soft with no hematoma.  Posterior tibial is 1+, dorsalis pedis is absent        Lab Results   Component Value Date     10/07/2024       A/P:   Admit Diagnosis:   Acute CVA (cerebrovascular accident) [I63.9]     Continue aspirin and Brilinta    Swallow eval today    MRI pending          Electronically signed by Joe Mendez MD at 10/07/24 0848          Consult Notes (all)        Ashley Acharya APRN at 10/06/24 1000        Consult Orders    1. Inpatient Neurology Consult Stroke [[id number]] ordered by Chris Boyer MD at 10/06/24 0941                 Stroke Consult Note    Patient Name: Lauryn Romo   MRN: [id number]  Age: 63 y.o.  Sex: female  : 1960    Primary Care Physician: Melissa Lazo APRN  Referring Physician:  Dr. Demarcus Boyer    TIME STROKE TEAM CALLED: 0938 EST     TIME PATIENT SEEN: 0945 EST    Handedness: Right  Race:     Chief Complaint/Reason for Consultation: Left-sided weakness and facial droop    HPI: Mrs. Romo is a 63-year-old female with known medical diagnoses present hypertension, hyperlipidemia, migraines (follows with CAILIN Parra), history of stroke (right occipital and left cerebellar, , residual dizziness and ataxic gait), and giant basilar aneurysm s/p pipeline embolization (Dr. Savage,  and ) who presents to the emergency department via private vehicle for further evaluation of left-sided weakness and facial droop which was present upon awakening this morning.  The patient states that she went to bed around  last evening as she had a migraine headache.  She states that prior to going to bed she took a Ubrelvy which did help relieve her headache somewhat.  Upon awakening this  morning she tells me she was able to get out of bed and ambulate to the bathroom however had extreme difficulty secondary to left sided weakness.  She does tell me that she was recently diagnosed with a UTI and is currently on oral antibiotics.    After speaking with the patient's spouse, he states that he heard a thud this morning and went to check on her and found her lying on the bathroom floor possibly having a seizure.    She recently followed up with Dr. Savage on 9/23 and was taken off of her Plavix however was instructed to remain on her ASA 81 mg daily, which she reports compliance with.  She is not currently on any anticoagulation medications.  She has remote history of smoking, denies EtOH use, denies illicit drug use.    Last Known Normal Date/Time: 10/5/2024 2030  EST     Review of Systems   Constitutional:  Positive for activity change.   HENT:  Negative for trouble swallowing.    Eyes:  Negative for photophobia and visual disturbance.   Respiratory: Negative.     Cardiovascular: Negative.    Gastrointestinal: Negative.  Negative for blood in stool.   Genitourinary:  Positive for difficulty urinating, dysuria and hematuria.   Musculoskeletal:  Positive for arthralgias and gait problem.   Neurological:  Positive for speech difficulty, weakness and headaches. Negative for seizures, syncope and numbness.      Past Medical History:   Diagnosis Date    Aneurysm 11/26/2022    Cancer 05/2024    Basal Cell Carcinoma removed from scalp by dermatology    Cluster headache 2022    Had headaches for several months before stroke and mass was found    Difficulty walking 11/26/22    After stroke    Headache     Headache, tension-type 2022    Hyperlipidemia     Hypertension     Memory loss 11/26/22    Migraine 2022    Stroke 11/26/2022    Vision loss 11/26/22    When dizzy or headache     Past Surgical History:   Procedure Laterality Date    ARTERIAL ANEURYSM REPAIR      BREAST LUMPECTOMY  2012    CYST REMOVAL Left  2023    EMBOLIZATION CEREBRAL N/A 2022    Procedure: CV EMBOLIZATION CEREBRAL IR;  Surgeon: Enoch Savage MD;  Location:  [facility];  Service: Interventional Radiology;  Laterality: N/A;    HIATAL HERNIA REPAIR  2015    INTERVENTIONAL RADIOLOGY PROCEDURE N/A 09/15/2023    Procedure: Embolization;  Surgeon: Enoch Savage MD;  Location:  [facility];  Service: Interventional Radiology;  Laterality: N/A;    SINUS SURGERY      x4    SKIN CANCER EXCISION      cancer removed off top of head     Family History   Problem Relation Age of Onset    No Known Problems Mother     Heart attack Father 42    Heart attack Paternal Aunt     Heart attack Paternal Uncle     No Known Problems Sister     Cancer Maternal Grandmother     Heart failure Maternal Grandmother     No Known Problems Maternal Grandfather     No Known Problems Paternal Grandmother     No Known Problems Paternal Grandfather     Diabetes Half-Brother      Social History     Socioeconomic History    Marital status:    Tobacco Use    Smoking status: Former     Current packs/day: 0.00     Average packs/day: 1 pack/day for 15.0 years (15.0 ttl pk-yrs)     Types: Cigarettes     Start date: 10/4/1989     Quit date: 10/4/2004     Years since quittin.0     Passive exposure: Past    Smokeless tobacco: Never   Vaping Use    Vaping status: Never Used   Substance and Sexual Activity    Alcohol use: No    Drug use: Never    Sexual activity: Not Currently     Partners: Male     Birth control/protection: Tubal ligation     Allergies   Allergen Reactions    Tramadol Other (See Comments)     Flushng and passing out     Prior to Admission medications    Medication Sig Start Date End Date Taking? Authorizing Provider   aspirin 81 MG chewable tablet Chew 1 tablet Every Night.    Provider, MD Ghulam   atorvastatin (LIPITOR) 80 MG tablet TAKE 1 TABLET BY MOUTH EVERY NIGHT 23   Jan Matos PA-C    bethanechol (URECHOLINE) 25 MG tablet Take 1 tablet twice a day by oral route for 30 days. 7/15/24   Ghulam Harvey MD   Diclofenac Sodium (VOLTAREN) 1 % gel gel APPLY 2 GRAMS TO THE AFFECTED AREA(S) BY TOPICAL ROUTE 4 TIMES PER DAY 6/6/24   Ghulam Harvey MD   fluticasone (FLONASE) 50 MCG/ACT nasal spray 2 sprays into the nostril(s) as directed by provider Daily. 9/29/19   Kwesi Montanez MD   galcanezumab-gnlm (EMGALITY) 120 MG/ML auto-injector pen Inject 1 mL under the skin into the appropriate area as directed Every 28 (Twenty-Eight) Days. 7/18/24   Ruth Burris APRN   LORazepam (ATIVAN) 0.5 MG tablet Take 1 tablet by mouth Every 8 (Eight) Hours As Needed for Anxiety.    Ghulam Harvey MD   lubiprostone (AMITIZA) 8 MCG capsule Take 1 capsule by mouth 2 (Two) Times a Day.    Ghulam Harvey MD   methylPREDNISolone (MEDROL) 4 MG dose pack Take as directed on package instructions. 9/24/24   Ruth Burris APRN   nortriptyline (PAMELOR) 10 MG capsule TAKE 2 CAPSULES BY MOUTH ONCE DAILY EVERY EVENING 10/1/24   Ruth Burris APRN   olopatadine (Pataday) 0.2 % solution ophthalmic solution INSTILL 1 DROP INTO AFFECTED EYE(S) BY OPHTHALMIC ROUTE ONCE DAILY 5/26/23   Ghulam Harvey MD   ondansetron ODT (ZOFRAN-ODT) 4 MG disintegrating tablet Place 1 tablet twice a day by translingual route as needed.    Ghulam Harvey MD   oxybutynin XL (DITROPAN-XL) 5 MG 24 hr tablet oxybutynin chloride ER 5 mg tablet,extended release 24 hr    Ghulam Harvey MD   pantoprazole (PROTONIX) 20 MG EC tablet     Ghulam Harvey MD   sertraline (ZOLOFT) 100 MG tablet Take 1 tablet by mouth Daily. 8/13/24   Erika Whatley MD   sertraline (Zoloft) 50 MG tablet Take 1 tablet by mouth Daily. 8/13/24   Erika Whatley MD   topiramate (TOPAMAX) 100 MG tablet Take 1 tablet by mouth Daily. 3/9/24   Provider, MD Ghulam   ubrogepant (Ubrelvy) 100 MG  tablet Take 1 tablet by mouth Daily As Needed (migraines). Take at onset of headache - if symptoms persist or return, may repeat dose in 2 hours. Maximum: 200 mg per 24 hours 3/19/24   Ruth Burris APRN         Temp:  [98.5 °F (36.9 °C)] 98.5 °F (36.9 °C)  Heart Rate:  [68-74] 68  Resp:  [16] 16  BP: (147-160)/() 160/84  Neurological Exam  Mental Status  Alert. Oriented only to person and place. Oriented to person, place, and time. Mild dysarthria present. Expressive aphasia present. Loss of fluency, stuttering speech. Able to perform serial calculations.    Cranial Nerves  CN II: Visual fields full to confrontation.  CN III, IV, VI: Extraocular movements intact bilaterally. Pupils equal round and reactive to light bilaterally.  CN V: Facial sensation is normal.  CN VIII: Hearing appears to be intact bilateral.    Motor  Decreased muscle bulk throughout. Decreased muscle tone.  BLE with drift, 3/5 strength R>L  RUE with no drift 5/5 strength  LUE with slight drift 4/5 strength.    Sensory  Light touch abnormality: Tip of nose and right finger tips.     Coordination  Right: Finger-to-nose normal.Left: Finger-to-nose normal.    Gait    Not observed.      Physical Exam  Vitals and nursing note reviewed.   Constitutional:       General: She is not in acute distress.     Appearance: Normal appearance. She is ill-appearing.   HENT:      Head: Normocephalic and atraumatic.      Mouth/Throat:      Mouth: Mucous membranes are moist.   Eyes:      Extraocular Movements: Extraocular movements intact.      Pupils: Pupils are equal, round, and reactive to light.   Cardiovascular:      Rate and Rhythm: Normal rate.   Pulmonary:      Effort: Pulmonary effort is normal. No respiratory distress.      Comments: On room air  Musculoskeletal:      Right lower leg: No edema.      Left lower leg: No edema.   Skin:     General: Skin is warm and dry.   Neurological:      Mental Status: She is alert and oriented to person,  place, and time.      Cranial Nerves: Cranial nerve deficit and dysarthria present.      Sensory: Sensory deficit present.      Motor: Weakness present.      Coordination: Coordination normal.   Psychiatric:         Mood and Affect: Mood normal.         Behavior: Behavior normal.         Acute Stroke Data    Thrombolytic Inclusion / Exclusion Criteria    Time: 11:16 EDT  Person Administering Scale: CAILIN Figueroa    Inclusion Criteria  [x]   18 years of age or greater   []   Onset of symptoms < 4.5 hours before beginning treatment (stroke onset = time patient was last seen well or without symptoms).   []   Diagnosis of acute ischemic stroke causing measurable disabling deficit (Complete Hemianopia, Any Aphasia, Visual or Sensory Extinction, Any weakness limiting sustained effort against gravity)   []   Any remaining deficit considered potentially disabling in view of patient and practitioner   Exclusion criteria (Do not proceed with Alteplase if any are checked under exclusion criteria)  [x]   Onset unknown or GREATER than 4.5 hours   []   ICH on CT/MRI   []   CT demonstrates hypodensity representing acute or subacute infarct   []   Significant head trauma or prior stroke in the previous 3 months   []   Symptoms suggestive of subarachnoid hemorrhage   []   History of un-ruptured intracranial aneurysm GREATER than 10 mm   []   Recent intracranial or intraspinal surgery within the last 3 months   []   Arterial puncture at a non-compressible site in the previous 7 days   []   Active internal bleeding   []   Acute bleeding tendency   []   Platelet count LESS than 100,000 for known hematological diseases such as leukemia, thrombocytopenia or chronic cirrhosis   []   Current use of anticoagulant with INR GREATER than 1.7 or PT GREATER than 15 seconds, aPTT GREATER than 40 seconds   []   Heparin received within 48 hours, resulting in abnormally elevated aPTT GREATER than upper limit of normal   []   Current  use of direct thrombin inhibitors or direct factor Xa inhibitors in the past 48 hours   []   Elevated blood pressure refractory to treatment (systolic GREATER than 185 mm/Hg or diastolic  GREATER than 110 mm/Hg   []   Suspected infective endocarditis and aortic arch dissection   []   Current use of therapeutic treatment dose of low-molecular-weight heparin (LMWH) within the previous 24 hours   []   Structural GI malignancy or bleed   Relative exclusion for all patients  []   Only minor non-disabling symptoms   []   Pregnancy   []   Seizure at onset with postictal residual neurological impairments   []   Major surgery or previous trauma within past 14 days   []   History of previous spontaneous ICH, intracranial neoplasm, or AV malformation   []   Postpartum (within previous 14 days)   []   Recent GI or urinary tract hemorrhage (within previous 21 days)   []   Recent acute MI (within previous 3 months)   []   History of un-ruptured intracranial aneurysm LESS than 10 mm   []   History of ruptured intracranial aneurysm   []   Blood glucose LESS than 50 mg/dL (2.7 mmol/L)   []   Dural puncture within the last 7 days   []   Known GREATER than 10 cerebral microbleeds   Additional exclusions for patients with symptoms onset between 3 and 4.5 hours.  []   Age > 80.   []   On any anticoagulants regardless of INR  >>> Warfarin (Coumadin), Heparin, Enoxaparin (Lovenox), fondaparinux (Arixtra), bivalirudin (Angiomax), Argatroban, dabigatran (Pradaxa), rivaroxaban (Xarelto), or apixaban (Eliquis)   []   Severe stroke (NIHSS > 25).   []   History of BOTH diabetes and previous ischemic stroke.   []   The risks and benefits have been discussed with the patient or family related to the administration of IV thrombolytic therapy for stroke symptoms.   []   I have discussed and reviewed the patient's case and imaging with the attending prior to IV thrombolytic therapy.   N/A Time IV thrombolytic administered       Hospital  Meds:  Scheduled-    Infusions-     PRNs-   [Transfer Hold] sodium chloride    Functional Status Prior to Current Stroke/Jose Roberto Score: 1-2; patient uses walker to ambulate    NIH Stroke Scale  Time: 11:16 EDT  Person Administering Scale: CAILIN Figueroa    Interval: 24 hrs post onset of symptoms +/- 20 mins  1a. Level of Consciousness: 0-->Alert, keenly responsive  1b. LOC Questions: 0-->Answers both questions correctly  1c. LOC Commands: 0-->Performs both tasks correctly  2. Best Gaze: 0-->Normal  3. Visual: 0-->No visual loss  4. Facial Palsy: 1-->Minor paralysis (flattened nasolabial fold, asymmetry on smiling)  5a. Motor Arm, Left: 0-->No drift, limb holds 90 (or 45) degrees for full 10 secs  5b. Motor Arm, Right: 0-->No drift, limb holds 90 (or 45) degrees for full 10 secs  6a. Motor Leg, Left: 2-->Some effort against gravity, leg falls to bed by 5 secs, but has some effort against gravity  6b. Motor Leg, Right: 3-->No effort against gravity, leg falls to bed immediately  7. Limb Ataxia: 0-->Absent  8. Sensory: 1-->Mild-to-moderate sensory loss, patient feels pinprick is less sharp or is dull on the affected side, or there is a loss of superficial pain with pinprick, but patient is aware of being touched  9. Best Language: 2-->Severe aphasia, all communication is through fragmentary expression, great need for inference, questioning, and guessing by the listener. Range of information that can be exchanged is limited, listener carries burden of. . . (see row details)  10. Dysarthria: 2-->Severe dysarthria, patients speech is so slurred as to be unintelligible in the absence of or out of proportion to any dysphasia, or is mute/anarthric  11. Extinction and Inattention (formerly Neglect): 0-->No abnormality    Total (NIH Stroke Scale): 10        Results Reviewed:  I have personally reviewed current lab, radiology, and data and agree with results.    CT Angiogram Head w AI Analysis of LVO    Result  Date: 10/6/2024  1.Focal 3 mm nonopacification of the basilar artery just distal to the vascular stent suspicious for high-grade stenoses or occlusion possibly related to thrombus. Asymmetric decreased opacification in the distal right vertebral artery likely related to changes in flow dynamics without additional area of focal high-grade stenoses or occlusion. Findings likely explains cerebellar abnormalities on CT perfusion. 2.Other major intracranial arterial vasculature widely patent. 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 Findings communicated over the phone with ordering provider Dr. Boyer at 10:48 a.m. 10/6/2024. Patient in route to conventional angiogram with neurosurgery at time of phone call. Electronically Signed: Bryon Yoon MD  10/6/2024 10:44 AM EDT  Workstation ID: [id number]    CT Angiogram Neck    Result Date: 10/6/2024  1.Focal 3 mm nonopacification of the basilar artery just distal to the vascular stent suspicious for high-grade stenoses or occlusion possibly related to thrombus. Asymmetric decreased opacification in the distal right vertebral artery likely related to changes in flow dynamics without additional area of focal high-grade stenoses or occlusion. Findings likely explains cerebellar abnormalities on CT perfusion. 2.Other major intracranial arterial vasculature widely patent. 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 Findings communicated over the phone with ordering provider Dr. Boyer at 10:48 a.m. 10/6/2024. Patient in route to conventional angiogram with neurosurgery at time of phone call. Electronically Signed: Bryon Yoon MD  10/6/2024 10:44 AM EDT  Workstation ID: [id number]    CT CEREBRAL PERFUSION WITH & WITHOUT CONTRAST    Result Date: 10/6/2024  Negative for  completed infarct. 14 mL region of Tmax greater than 6 seconds in the right cerebellum. Electronically Signed: Bryon Yoon MD  10/6/2024 10:28 AM EDT  Workstation ID: [id number]    CT Head Without Contrast Stroke Protocol    Result Date: 10/6/2024  Impression: 1. New periventricular hypodensities at right frontal parietal junction and left parietal lobe which could reflect recent (acute or subacute) infarcts. No associated acute hemorrhage or significant mass effect. Findings could be further assessed by dedicated MRI. 2. Slight increasing dilation of third and lateral ventricles with more conspicuous sulcal and gyral crowding at the vertex. Findings could reflect progressive mass effect at the level of the fourth ventricle related to known treated aneurysm, although size of the excluded aneurysm has not significantly changed measuring 2.5 x 2.6 cm (previously 2.4 x 2.6 cm). Consider neurosurgical consultation. 3. Underlying periventricular hypodensity suggesting chronic microvascular ischemic change similar to prior. Transependymal edema would be considered less likely given stability. Electronically Signed: Bryon Yoon MD  10/6/2024 10:03 AM EDT  Workstation ID: [id number]       Results for orders placed during the hospital encounter of 12/15/22    Adult Transthoracic Echo Complete W/ Cont if Necessary Per Protocol    Interpretation Summary    Left ventricular systolic function is normal. Left ventricular ejection fraction appears to be 56 - 60%.    Mild aortic valve regurgitation is present.     WBC   Date Value Ref Range Status   10/06/2024 7.19 3.40 - 10.80 10*3/mm3 Final     Hemoglobin   Date Value Ref Range Status   10/06/2024 14.6 12.0 - 15.9 g/dL Final   10/06/2024 14.6 12.0 - 17.0 g/dL Final     Comment:     Serial Number: [id number]:  [id number]     Hematocrit   Date Value Ref Range Status   10/06/2024 46.0 34.0 - 46.6 % Final   10/06/2024 43 38 - 51 % Final     Platelets   Date Value Ref Range Status    10/06/2024 242 140 - 450 10*3/mm3 Final     Lab Results   Component Value Date    GLUCOSE 82 10/06/2024    BUN 22 10/06/2024    CREATININE 0.69 10/06/2024     10/06/2024    K 4.3 10/06/2024     (H) 10/06/2024    CALCIUM 8.6 10/06/2024    PROTEINTOT 6.4 10/06/2024    ALBUMIN 4.2 10/06/2024    ALT 65 (H) 10/06/2024    AST 34 (H) 10/06/2024    ALKPHOS 124 (H) 10/06/2024    BILITOT 0.3 10/06/2024    GLOB 2.2 10/06/2024    AGRATIO 1.9 10/06/2024    BCR 31.9 (H) 10/06/2024    ANIONGAP 12.0 10/06/2024    EGFR 98.3 10/06/2024       Assessment/Plan:    This is a 63-year-old female with known medical diagnoses present hypertension, hyperlipidemia, migraines (follows with CAILIN Parra), history of stroke (right occipital and left cerebellar, 2023, residual dizziness and ataxic gait), and giant basilar aneurysm s/p pipeline embolization (Dr. Savage, 2022 and 2023) who presents to the emergency department via private vehicle for further evaluation of left-sided weakness and facial droop which was present upon awakening this morning.  She is admitted for IV thrombolytic therapy given LK > 4.5-hours.  CTA head/neck reveals thrombosed pipeline stent.  Images were reviewed by Dr. Savage who is elected to take her to the Cath Lab for mechanical thrombectomy.  She will be admitted to the neurological ICU s/p procedure.    Antiplatelet PTA: ASA 81 mg  Anticoagulant PTA: None        Left-sided weakness, facial droop, and aphasia; acute onset        History of stroke, right occipital and left cerebellar; residual balance difficulties and dizziness        History of giant basilar aneurysm s/p pipeline embolization, Dr. Savage  -TIA/CVA order set without thrombolytic therapies been initiated  -Postprocedure radiology order set has been initiated  -Strict n.p.o.; patient will need SLP evaluation prior to oral intake s/p procedure and extubation given aphasia and dysarthria  -CTA head/neck reveals thrombosed pipeline  stent; reviewed with Dr. Savage.  Janette patient's debilitating exam is recommended that she go to the Cath Lab for neurointervention.  Discussed with patient and spouse who seem agreeable to proceed, however patient would like to speak with Dr. Savage prior to procedure.  [facility] informed to call and Cath Lab team at 1024.  Anesthesia notified at 1025.  -MRI brain without contrast on 10/7 to evaluate stroke burden  -Patient will need to be admitted to the neurological ICU s/p procedure  -Continue ASA 81mg for now; further medication recommendations to be given post-procedure    Essential hypertension  -Blood pressure admission 148/89  -Okay to allow autoregulation of blood pressure until patient goes to Cath Lab.  S/p procedure patient will need to have SBP <140  -Nicardipine for SBP >140 postprocedure    Hyperlipidemia  -Lipid panel in a.m.  -Continue atorvastatin 80mg nightly; monitor LFTs as they are slightly elevated    Plan of care was discussed with the patient, her spouse at bedside, Dr. Savage (neurointerventional), and Dr. Boyer (emergency department physician).  Stroke neurology will continue to follow.  Please call with any questions or concerns.  Thank you for this consult.    CAILIN Figueroa  October 6, 2024  10:00 EDT      Electronically signed by Ashley Acharya APRN at 10/06/24 2441

## 2024-10-07 NOTE — THERAPY EVALUATION
Patient Name: Lauryn Romo  : 1960    MRN: [id number]                              Today's Date: 10/7/2024       Admit Date: 10/6/2024    Visit Dx:     ICD-10-CM ICD-9-CM   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 Cerebral aneurysm  I67.1 437.3   3. History of stroke  Z86.73 V12.54     Patient Active Problem List   Diagnosis    Acute CVA    Cerebral aneurysm, nonruptured    Paraesophageal hernia    Hyperlipidemia    HTN    Moderate malnutrition    History of CVA (cerebrovascular accident)    Other headache syndrome     Past Medical History:   Diagnosis Date    Aneurysm 2022    Cancer 2024    Basal Cell Carcinoma removed from scalp by dermatology    Cluster headache     Had headaches for several months before stroke and mass was found    Difficulty walking 22    After stroke    Headache     Headache, tension-type     Hyperlipidemia     Hypertension     Memory loss 22    Migraine     Stroke 2022    Vision loss 22    When dizzy or headache     Past Surgical History:   Procedure Laterality Date    ARTERIAL ANEURYSM REPAIR      BREAST LUMPECTOMY  2012    CYST REMOVAL Left 2023    EMBOLIZATION CEREBRAL N/A 2022    Procedure: CV EMBOLIZATION CEREBRAL IR;  Surgeon: Enoch Savage MD;  Location:  [facility];  Service: Interventional Radiology;  Laterality: N/A;    HIATAL HERNIA REPAIR      INTERVENTIONAL RADIOLOGY PROCEDURE N/A 09/15/2023    Procedure: Embolization;  Surgeon: Enoch Savage MD;  Location:  [facility];  Service: Interventional Radiology;  Laterality: N/A;    INTERVENTIONAL RADIOLOGY PROCEDURE N/A 10/6/2024    Procedure: IR mechanical thrombectomy;  Surgeon: Joe Mendez MD;  Location:  [facility];  Service: Interventional Radiology;  Laterality: N/A;    SINUS SURGERY      x4    SKIN CANCER EXCISION      cancer removed off top of head      General Information        Row Name 10/07/24 0912          Physical Therapy Time and Intention    Document Type evaluation  -ES     Mode of Treatment physical therapy  -ES       Row Name 10/07/24 0912          General Information    Patient Profile Reviewed yes  -ES     Prior Level of Function independent:;all household mobility;bed mobility;ADL's;transfer;dependent:;driving  Uses FWW and SPC at baseline. Endorses multiple recent falls. Hx R occipital/L cerebellar CVA w/ residual ataxia and weakness.  -ES     Existing Precautions/Restrictions fall;oxygen therapy device and L/min  -ES     Barriers to Rehab medically complex;previous functional deficit;visual deficit  -ES       Row Name 10/07/24 0912          Living Environment    People in Home spouse  -ES       Row Name 10/07/24 0912          Home Main Entrance    Number of Stairs, Main Entrance one  -ES       Row Name 10/07/24 0912          Stairs Within Home, Primary    Number of Stairs, Within Home, Primary other (see comments)  all needs met primary level  -ES       Row Name 10/07/24 0912          Cognition    Orientation Status (Cognition) oriented x 3  -ES       Row Name 10/07/24 0912          Safety Issues, Functional Mobility    Safety Issues Affecting Function (Mobility) awareness of need for assistance;insight into deficits/self-awareness;safety precaution awareness;safety precautions follow-through/compliance  -ES     Impairments Affecting Function (Mobility) balance;coordination;visual/perceptual;strength;range of motion (ROM)  -ES               User Key  (r) = Recorded By, (t) = Taken By, (c) = Cosigned By      Initials Name Provider Type    ES Estelita Coronado PT Physical Therapist                   Mobility       Row Name 10/07/24 0915          Bed Mobility    Bed Mobility supine-sit  -ES     Supine-Sit Story (Bed Mobility) minimum assist (75% patient effort);2 person assist;verbal cues  -ES     Assistive Device (Bed Mobility) bed rails;head of bed elevated  -ES      Comment, (Bed Mobility) Initially required Yudelka to maintain sitting EOB, but able to improve w/ cueing. Initially endorsed dizziness but improved w/ closing one eye  -ES       Row Name 10/07/24 0915          Bed-Chair Transfer    Bed-Chair [place] (Transfers) 2 person assist;moderate assist (50% patient effort);verbal cues  -ES     Assistive Device (Bed-Chair Transfers) other (see comments)  BUE support  -ES     Comment, (Bed-Chair Transfer) SPT from EOB>chair w/ modA x2 and BUE support. Demo'd narrow REBECCA w/ short, shuffling steps. Required v/c to improve posterior leans  -ES       Row Name 10/07/24 0915          Sit-Stand Transfer    Sit-Stand [place] (Transfers) minimum assist (75% patient effort);2 person assist;verbal cues  -ES     Assistive Device (Sit-Stand Transfers) other (see comments)  BUE support  -ES       Row Name 10/07/24 0915          Gait/Stairs (Locomotion)    [place] Level (Gait) unable to assess  -ES     Comment, (Gait/Stairs) Further mob limited by weakness and fatigue  -ES               User Key  (r) = Recorded By, (t) = Taken By, (c) = Cosigned By      Initials Name Provider Type    ES Estelita Coronado PT Physical Therapist                   Obj/Interventions       Row Name 10/07/24 0916          Range of Motion Comprehensive    General Range of Motion bilateral lower extremity ROM WFL  -ES       Row Name 10/07/24 0916          Strength Comprehensive (MMT)    General Manual Muscle Testing (MMT) Assessment lower extremity strength deficits identified  -ES     Comment, General Manual Muscle Testing (MMT) Assessment RLE 4-/5, LLE 3+/5. R DF 3+/5, L DF 3/5  -ES       Row Name 10/07/24 0916          Balance    Balance Assessment sitting static balance;sitting dynamic balance;sit to stand dynamic balance;standing static balance;standing dynamic balance  -ES     Static Sitting Balance contact guard  -ES     Dynamic Sitting Balance minimal assist  -ES     Position, Sitting Balance  supported;sitting edge of bed;sitting in chair  -ES     Sit to Stand Dynamic Balance minimal assist;2-person assist;verbal cues  -ES     Static Standing Balance minimal assist;2-person assist  -ES     Dynamic Standing Balance moderate assist;2-person assist  -ES     Position/Device Used, Standing Balance supported  -ES     Balance Interventions sitting;sit to stand;supported;standing;static;dynamic;occupation based/functional task  -ES       Row Name 10/07/24 0916          Sensory Assessment (Somatosensory)    Sensory Assessment (Somatosensory) LE sensation intact  -ES               User Key  (r) = Recorded By, (t) = Taken By, (c) = Cosigned By      Initials Name Provider Type    ES Estelita Coronado, PT Physical Therapist                   Goals/Plan       Row Name 10/07/24 0919          Bed Mobility Goal 1 (PT)    Activity/Assistive Device (Bed Mobility Goal 1, PT) sit to supine/supine to sit  -ES     [place] Level/Cues Needed (Bed Mobility Goal 1, PT) standby assist  -ES     Time Frame (Bed Mobility Goal 1, PT) short term goal (STG);5 days  -ES       Row Name 10/07/24 0919          Transfer Goal 1 (PT)    Activity/Assistive Device (Transfer Goal 1, PT) sit-to-stand/stand-to-sit;bed-to-chair/chair-to-bed;walker, rolling  -ES     [place] Level/Cues Needed (Transfer Goal 1, PT) standby assist  -ES     Time Frame (Transfer Goal 1, PT) long term goal (LTG);10 days  -ES       Row Name 10/07/24 0919          Gait Training Goal 1 (PT)    Activity/Assistive Device (Gait Training Goal 1, PT) gait (walking locomotion);assistive device use;decrease fall risk;increase endurance/gait distance;walker, rolling  -ES     [place] Level (Gait Training Goal 1, PT) standby assist  -ES     Distance (Gait Training Goal 1, PT) 100  -ES     Time Frame (Gait Training Goal 1, PT) 10 days;long term goal (LTG)  -ES       Row Name 10/07/24 0919          Therapy Assessment/Plan (PT)    Planned Therapy Interventions (PT) balance  training;bed mobility training;gait training;home exercise program;neuromuscular re-education;patient/family education;strengthening;stair training;postural re-education;transfer training  -ES               User Key  (r) = Recorded By, (t) = Taken By, (c) = Cosigned By      Initials Name Provider Type    ES Estelita Coronado, PT Physical Therapist                   Clinical Impression       Row Name 10/07/24 0917          Pain    Pretreatment Pain Rating 0/10 - no pain  -ES     Posttreatment Pain Rating 0/10 - no pain  -ES     Pre/Posttreatment Pain Comment tolerated  -ES     Pain Intervention(s) Repositioned;Ambulation/increased activity  -ES       Row Name 10/07/24 0917          Plan of Care Review    Plan of Care Reviewed With patient;friend  -ES     Progress no change  -ES     Outcome Evaluation PT eval complete. Pt presents with generalized weakness, ataxia/impaired coordination, and decreased activity tolerance warranting skilled IPPT service. Pt required min-modA x2 for mobility but overall demonstrated good effort. PT rec IRF at d/c.  -ES       Row Name 10/07/24 0917          Therapy Assessment/Plan (PT)    Rehab Potential (PT) good, to achieve stated therapy goals  -ES     Criteria for Skilled Interventions Met (PT) yes;meets criteria;skilled treatment is necessary  -ES     Therapy Frequency (PT) daily  -ES     Predicted Duration of Therapy Intervention (PT) 10 days  -ES       Row Name 10/07/24 0917          Vital Signs    Pre Systolic BP Rehab 120  -ES     Pre Treatment Diastolic BP 66  -ES     Post Systolic BP Rehab 119  -ES     Post Treatment Diastolic BP 64  -ES     Pretreatment Heart Rate (beats/min) 74  -ES     Posttreatment Heart Rate (beats/min) 65  -ES     Pre SpO2 (%) 100  -ES     O2 Delivery Pre Treatment nasal cannula  -ES     O2 Delivery Intra Treatment nasal cannula  -ES     Post SpO2 (%) 100  -ES     O2 Delivery Post Treatment nasal cannula  -ES     Pre Patient Position Supine  -ES     Intra  Patient Position Standing  -ES     Post Patient Position Sitting  -ES       Row Name 10/07/24 0917          Positioning and Restraints    Pre-Treatment Position in bed  -ES     Post Treatment Position chair  -ES     In Chair notified nsg;reclined;sitting;call light within reach;encouraged to call for assist;exit alarm on;with family/caregiver;waffle cushion;on mechanical lift sling;legs elevated;heels elevated  -ES               User Key  (r) = Recorded By, (t) = Taken By, (c) = Cosigned By      Initials Name Provider Type    Estelita Iraheta PT Physical Therapist                   Outcome Measures       Row Name 10/07/24 0920          How much help from another person do you currently need...    Turning from your back to your side while in flat bed without using bedrails? 3  -ES     Moving from lying on back to sitting on the side of a flat bed without bedrails? 2  -ES     Moving to and from a bed to a chair (including a wheelchair)? 2  -ES     Standing up from a chair using your arms (e.g., wheelchair, bedside chair)? 3  -ES     Climbing 3-5 steps with a railing? 2  -ES     To walk in hospital room? 2  -ES     AM-PAC 6 Clicks Score (PT) 14  -ES     Highest Level of Mobility Goal 4 --> Transfer to chair/commode  -ES       Row Name 10/07/24 0920          Modified Jose Roberto Scale    Pre-Stroke Modified [place] Scale 6 - Unable to determine (UTD) from the medical record documentation  -ES     Modified [place] Scale 4 - Moderately severe disability.  Unable to walk without assistance, and unable to attend to own bodily needs without assistance.  -ES       Row Name 10/07/24 0920          Functional Assessment    Outcome Measure Options AM-PAC 6 Clicks Basic Mobility (PT);Modified [place]  -ES               User Key  (r) = Recorded By, (t) = Taken By, (c) = Cosigned By      Initials Name Provider Type    Estelita Iraheta PT Physical Therapist                                 Physical Therapy Education       Title: PT OT  SLP Therapies (In Progress)       Topic: Physical Therapy (In Progress)       Point: Mobility training (Done)       Learning Progress Summary             Patient Acceptance, E,TB, VU by ES at 10/7/2024 0920   Other Acceptance, E,TB, VU by ES at 10/7/2024 0920                         Point: Home exercise program (Not Started)       Learner Progress:  Not documented in this visit.              Point: Body mechanics (Done)       Learning Progress Summary             Patient Acceptance, E,TB, VU by ES at 10/7/2024 0920   Other Acceptance, E,TB, VU by ES at 10/7/2024 0920                         Point: Precautions (Done)       Learning Progress Summary             Patient Acceptance, E,TB, VU by ES at 10/7/2024 0920   Other Acceptance, E,TB, VU by ES at 10/7/2024 0920                                         User Key       Initials Effective Dates Name Provider Type Discipline    CORRIE 08/11/22 -  Estelita Coronado, PT Physical Therapist PT                  PT Recommendation and Plan  Planned Therapy Interventions (PT): balance training, bed mobility training, gait training, home exercise program, neuromuscular re-education, patient/family education, strengthening, stair training, postural re-education, transfer training  Plan of Care Reviewed With: patient, friend  Progress: no change  Outcome Evaluation: PT eval complete. Pt presents with generalized weakness, ataxia/impaired coordination, and decreased activity tolerance warranting skilled IPPT service. Pt required min-modA x2 for mobility but overall demonstrated good effort. PT rec IRF at d/c.     Time Calculation:   PT Evaluation Complexity  History, PT Evaluation Complexity: 1-2 personal factors and/or comorbidities  Examination of Body Systems (PT Eval Complexity): total of 3 or more elements  Clinical Presentation (PT Evaluation Complexity): evolving  Clinical Decision Making (PT Evaluation Complexity): moderate complexity  Overall Complexity (PT Evaluation  Complexity): moderate complexity     PT Charges       Row Name 10/07/24 0921             Time Calculation    Start Time 0841  -ES      PT Received On 10/07/24  -ES      PT Goal Re-Cert Due Date 10/17/24  -ES         Time Calculation- PT    Total Timed Code Minutes- PT 9 minute(s)  -ES         Timed Charges    31530 - PT Therapeutic Activity Minutes 9  -ES         Untimed Charges    PT Eval/Re-eval Minutes 31  -ES         Total Minutes    Timed Charges Total Minutes 9  -ES      Untimed Charges Total Minutes 31  -ES       Total Minutes 40  -ES                User Key  (r) = Recorded By, (t) = Taken By, (c) = Cosigned By      Initials Name Provider Type    ES Estelita Coronado, PT Physical Therapist                  Therapy Charges for Today       Code Description Service Date Service Provider Modifiers Qty    [id number]  PT THERAPEUTIC ACT EA 15 MIN 10/7/2024 Estelita Coronado, PT GP 1    [id number]  PT EVAL MOD COMPLEXITY 3 10/7/2024 Estelita Coronado, PT GP 1            PT G-Codes  Outcome Measure Options: AM-PAC 6 Clicks Basic Mobility (PT), Modified [place] Roberto  AM-PAC 6 Clicks Score (PT): 14  Modified [place] Scale: 4 - Moderately severe disability.  Unable to walk without assistance, and unable to attend to own bodily needs without assistance.  PT Discharge Summary  Anticipated Discharge Disposition (PT): inpatient rehabilitation facility    Estelita Coronado PT  10/7/2024

## 2024-10-07 NOTE — CASE MANAGEMENT/SOCIAL WORK
Discharge Planning Assessment  [facility]     Patient Name: Lauryn Romo  MRN: [id number]  Today's Date: 10/7/2024    Admit Date: 10/6/2024    Plan: IRF/[facility]   Discharge Needs Assessment       Row Name 10/07/24 1100       Living Environment    People in Home spouse    Name(s) of People in Home Chris, khoi    Current Living Arrangements home    Potentially Unsafe Housing Conditions unable to assess    In the past 12 months has the electric, gas, oil, or water company threatened to shut off services in your home? No    Primary Care Provided by self    Provides Primary Care For no one    Family Caregiver if Needed spouse    Family Caregiver Names Chris, spouse    Quality of Family Relationships helpful;involved;supportive       Resource/Environmental Concerns    Resource/Environmental Concerns none    Transportation Concerns none       Transportation Needs    In the past 12 months, has lack of transportation kept you from meetings, work, or from getting things needed for daily living? No       Food Insecurity    Within the past 12 months, you worried that your food would run out before you got the money to buy more. Never true    Within the past 12 months, the food you bought just didn't last and you didn't have money to get more. Never true       Transition Planning    Patient/Family Anticipates Transition to inpatient rehabilitation facility    Patient/Family Anticipated Services at Transition     Transportation Anticipated family or friend will provide       Discharge Needs Assessment    Equipment Currently Used at Home rollator;walker, rolling;bath bench;grab bar    Concerns to be Addressed discharge planning                   Discharge Plan       Row Name 10/07/24 1102       Plan    Plan IRF/[facility]    Patient/Family in Agreement with Plan yes    Plan Comments Spoke with patient and spouse at bedside to initiate discharge planning.  Confirmed their residence in [place]; PCP is Melissa Lazo;  insurance is "[employer].  Patient states she is mostly independent with ADLs and mobility; patient does have a RW and rollator at home, if needed.  Patient states she will want [facility] for IRF; referral given to April at [facility] [place].  Patient reported being current with [facility] for PT; notified them of patient's hospital admission.  [facility] stated their records did not show patient is current with them. CM will continue to follow.                  Continued Care and Services - Admitted Since 10/6/2024       Destination       Service Provider Request Status Selected Services Address Phone Fax Patient Preferred    [facility] Considering  Need clinical review, Need insurance authorization, Need expected discharge date N/A [address] [facility] [id number] [phone] --                  Selected Continued Care - Episodes Includes continued care and service providers with selected services from the active episodes listed below      Chronic Migraine Episode start date: 3/18/2024   There are no active outsourced providers for this episode.                    Demographic Summary       Row Name 10/07/24 1053       General Information    Referral Source admission list    Reason for Consult discharge planning    Preferred Language English       Contact Information    Permission Granted to Share Info With                    Functional Status       Row Name 10/07/24 1100       Functional Status    Usual Activity Tolerance good       Physical Activity    On average, how many days per week do you engage in moderate to strenuous exercise (like a brisk walk)? Pt Declined    On average, how many minutes do you engage in exercise at this level? Pt Declined       Functional Status, IADL    Medications independent    Meal Preparation independent    Housekeeping independent    Laundry independent    Shopping independent                   Psychosocial     No documentation.                  Abuse/Neglect    No documentation.                  Legal    No documentation.                  Substance Abuse    No documentation.                  Patient Forms    No documentation.                     Jemma Machado RN

## 2024-10-07 NOTE — PROGRESS NOTES
Chief complaint: Acute basilar occlusion    Admit Diagnosis:   Acute CVA (cerebrovascular accident) [I63.9]     Subjective: No events overnight.  Speech is improved per family.  Swallow eval failed yesterday.    Objective:    Vitals:    10/07/24 0800   BP: 120/66   Pulse: 63   Resp: 12   Temp: 98.6 °F (37 °C)   SpO2: 100%     Pulse  Av.9  Min: 60  Max: 83  Systolic (24hrs), Av , Min:109 , Max:160     Diastolic (24hrs), Av, Min:48, Max:106    Temp (24hrs), Av.2 °F (36.8 °C), Min:97.6 °F (36.4 °C), Max:98.6 °F (37 °C)      She is lying in bed.  Her speech is dysarthric but intelligible.  She is diffusely weak and appears to be slightly more weak on the left arm as compared to the right, however she moves all 4 extremities to command.  She is oriented and appropriate.    Right inguinal region is soft with no hematoma.  Posterior tibial is 1+, dorsalis pedis is absent        Lab Results   Component Value Date     10/07/2024       A/P:   Admit Diagnosis:   Acute CVA (cerebrovascular accident) [I63.9]     Continue aspirin and Brilinta    Swallow eval today    MRI pending

## 2024-10-07 NOTE — MBS/VFSS/FEES
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 Cerebral aneurysm  I67.1 437.3   3. History of stroke  Z86.73 V12.54   4. Dysphagia, unspecified type  R13.10 787.20   5. Dysarthria  R47.1 784.51     Patient Active Problem List   Diagnosis    Acute CVA    Cerebral aneurysm, nonruptured    Paraesophageal hernia    Hyperlipidemia    HTN    Moderate malnutrition    History of CVA (cerebrovascular accident)    Other headache syndrome     Past Medical History:   Diagnosis Date    Aneurysm 2022    Cancer 2024    Basal Cell Carcinoma removed from scalp by dermatology    Cluster headache     Had headaches for several months before stroke and mass was found    Difficulty walking 22    After stroke    Headache     Headache, tension-type     Hyperlipidemia     Hypertension     Memory loss 22    Migraine     Stroke 2022    Vision loss 22    When dizzy or headache     Past Surgical History:   Procedure Laterality Date    ARTERIAL ANEURYSM REPAIR      BREAST LUMPECTOMY  2012    CYST REMOVAL Left 2023    EMBOLIZATION CEREBRAL N/A 2022    Procedure: CV EMBOLIZATION CEREBRAL IR;  Surgeon: Enoch Savage MD;  Location:  [facility];  Service: Interventional Radiology;  Laterality: N/A;    HIATAL HERNIA REPAIR      INTERVENTIONAL RADIOLOGY PROCEDURE N/A 09/15/2023    Procedure: Embolization;  Surgeon: Enoch Savage MD;  Location:  [facility] INVAS[facility] LOC[facility];  Service: Interventional Radiology;  Laterality: N/A;    INTERVENTIONAL RADIOLOGY PROCEDURE N/A 10/6/2024    Procedure: IR  "mechanical thrombectomy;  Surgeon: Joe Mendez MD;  Location: [facility] INVASIVE LOCATION;  Service: Interventional Radiology;  Laterality: N/A;    SINUS SURGERY      x4    SKIN CANCER EXCISION      cancer removed off top of head       SLP Recommendation and Plan  SLP Swallowing Diagnosis: functional oral phase, functional pharyngeal phase (10/07/24 1310)  SLP Diet Recommendation: regular textures, thin liquids (10/07/24 1310)  Recommended Precautions and Strategies: upright posture during/after eating, general aspiration precautions (10/07/24 1310)  SLP Rec. for Method of Medication Administration: as tolerated (10/07/24 1310)     Monitor for Signs of Aspiration: yes, notify SLP if any concerns (10/07/24 1310)  Recommended Diagnostics: other (see comments) (no repeat instrumental indicated unless decline in respiratory status) (10/07/24 1310)  Swallow Criteria for Skilled Therapeutic Interventions Met: demonstrates skilled criteria (10/07/24 1310)  Anticipated Discharge Disposition (SLP): inpatient rehabilitation facility (10/07/24 1310)  Rehab Potential/Prognosis, Swallowing: good, to achieve stated therapy goals (10/07/24 1310)  Therapy Frequency (Swallow): 5 days per week (10/07/24 1310)  Predicted Duration Therapy Intervention (Days): 1 week (10/07/24 1310)  Oral Care Recommendations: Oral Care BID/PRN, Toothbrush (10/07/24 1310)                                        Plan of Care Reviewed With: patient, spouse  Progress: improving      SWALLOW EVALUATION (Last 72 Hours)       SLP Adult Swallow Evaluation       Row Name 10/07/24 1310 10/07/24 0930                Rehab Evaluation    Document Type evaluation  -RS evaluation  -RS       Subjective Information no complaints  -RS no complaints  -RS       Patient Observations alert;cooperative  -RS alert;cooperative;agree to therapy  -RS       Patient/Family/Caregiver Comments/Observations   -RS Aunt (who refers to herself as pt's \"sister\") " present  -RS       Patient Effort good  -RS good  -RS       Symptoms Noted During/After Treatment none  -RS none  -RS       Oral Care -- oral rinse provided  -RS          General Information    Patient Profile Reviewed -- yes  -RS       Pertinent History Of Current Problem -- Pt is a 63 yoF w PMHx HTN, HLD, migraines, history of stroke (R occipital and L cerebellar, 2023), and giant basilar aneurysm s/p pipeline embolization (Dr. Savage, 2022 and 2023) who presents to [facility] ED for evaluation of left-sided weakness and facial droop which was present upon awakening. She is s/p mechanical thrombectomy after CTA head/neck revealed thrombosed pipeline stent. Pt was intubated for procedure. She has a very small nosebleed  2/2 large bore NGT  -RS       Current Method of Nutrition -- NPO;large-bore;nasogastric feedings  -RS       Precautions/Limitations, Vision -- WFL;for purposes of eval;difficult to assess  -RS       Precautions/Limitations, Hearing -- WFL;for purposes of eval  -RS       Prior Level of Function-Communication -- WFL  -RS       Prior Level of Function-Swallowing -- no diet consistency restrictions  -RS       Plans/Goals Discussed with -- patient and family;agreed upon  -RS          Pain    Additional Documentation Pain Scale: FACES Pre/Post-Treatment (Group)  -RS Pain Scale: FACES Pre/Post-Treatment (Group)  -RS          Pain Scale: FACES Pre/Post-Treatment    Pain: FACES Scale, Pretreatment 0-->no hurt  -RS 0-->no hurt  -RS       Posttreatment Pain Rating 0-->no hurt  -RS 0-->no hurt  -RS          Oral Motor Structure and Function    Dentition Assessment -- natural, present and adequate  -RS       Secretion Management -- WNL/WFL  -RS       Mucosal Quality -- moist, healthy  -RS       Volitional Swallow -- delayed  -RS       Volitional Cough -- weak  -RS          Oral Musculature and Cranial Nerve Assessment    Oral Motor General Assessment -- generalized oral motor weakness;other (see comments)  worse on L   -RS          General Eating/Swallowing Observations    Respiratory Support Currently in Use -- room air  -RS       Eating/Swallowing Skills -- fed by SLP  -RS       Positioning During Eating -- upright in chair  -RS       Utensils Used -- spoon;straw  -RS       Consistencies Trialed -- ice chips;thin liquids;pureed  -RS          Respiratory    Respiratory Status -- WFL  -RS          Clinical Swallow Eval    Oral Prep Phase -- WFL  -RS       Oral Transit -- WFL  -RS       Oral Residue -- WFL  -RS       Pharyngeal Phase -- suspected pharyngeal impairment  -RS       Esophageal Phase -- unremarkable  -RS          Pharyngeal Phase Concerns    Pharyngeal Phase Concerns -- multiple swallows  -RS       Multiple Swallows -- all consistencies  -RS       Pharyngeal Phase Concerns, Comment -- may be related to large bore NGT; however, pt high risk for silent aspiration so needs FEES  -RS          Fiberoptic Endoscopic Evaluation of Swallowing (FEES)    Risks/Benefits Reviewed risks/benefits explained;patient;family;agreed to eval  -RS --       Nasal Entry right:  -RS --       Scope serial number/identification [id number]  -RS --          Anatomy and Physiology    Anatomic Considerations edema;arytenoids  -RS --       Base of Tongue asymmetrical;range adequate  -RS --       Laryngeal Function Breathing decreased movement left  -RS --       Laryngeal Function Phonation decreased movement left  -RS --       Laryngeal Function to Breath Hold can't sustain closure;TVF contact  -RS --       Secretion Rating Scale (Sid et al. 1996) 1- secretions present around the laryngeal vestibule  -RS --       Secretion Description thin  -RS --       Ice Chips elicited swallow  -RS --       Spontaneous Swallow frequency adequate  -RS --       Sensory sensed scope  -RS --       Utensils Used Spoon;Cup;Straw  -RS --       Consistencies Trialed ice chips;thin liquids;nectar-thick liquids;pudding/puree;mixed consistency;regular textures  -RS --           FEES Interpretation    Oral Phase prespill of liquids into pharynx  -RS --          Initiation of Pharyngeal Swallow    Initiation of Pharyngeal Swallow bolus in pyriform sinuses  -RS --       Pharyngeal Phase functional pharyngeal phase of swallow;impaired pharyngeal phase of swallowing  -RS --       Pharyngeal Phase, Comment Consistent prespill of thins to the pyriforms prior to the swallow 2/2 reduced tongue back strength and mistiming of the swallow. No pen/asp observed t/o even with attempts to fatigue and with large continuous drinks. SLP will tx dysphagia x5 sessions to increase the safety of the swallow  -RS --          SLP Evaluation Clinical Impression    SLP Swallowing Diagnosis functional oral phase;functional pharyngeal phase  -RS R/O pharyngeal dysphagia  -RS       Functional Impact risk of aspiration/pneumonia  -RS risk of aspiration/pneumonia  -RS       Rehab Potential/Prognosis, Swallowing good, to achieve stated therapy goals  -RS adequate, monitor progress closely  -RS       Swallow Criteria for Skilled Therapeutic Interventions Met demonstrates skilled criteria  -RS --          Recommendations    Therapy Frequency (Swallow) 5 days per week  -RS --       Predicted Duration Therapy Intervention (Days) 1 week  -RS --       SLP Diet Recommendation regular textures;thin liquids  -RS NPO  -RS       Recommended Diagnostics other (see comments)  no repeat instrumental indicated unless decline in respiratory status  -RS FEES  -RS       Recommended Precautions and Strategies upright posture during/after eating;general aspiration precautions  -RS --       Oral Care Recommendations Oral Care BID/PRN;Toothbrush  -RS Oral Care BID/PRN;Suction toothbrush  -RS       SLP Rec. for Method of Medication Administration as tolerated  -RS meds via alternate route  -RS       Monitor for Signs of Aspiration yes;notify SLP if any concerns  -RS yes;notify SLP if any concerns  -RS       Anticipated Discharge Disposition  (SLP) inpatient rehabilitation facility  -RS inpatient rehabilitation facility  -RS                 User Key  (r) = Recorded By, (t) = Taken By, (c) = Cosigned By      Initials Name Effective Dates    RS Roberto Collins, MS CCC-SLP 09/14/23 -                     EDUCATION  The patient has been educated in the following areas:   Dysphagia (Swallowing Impairment).        SLP GOALS       Row Name 10/07/24 1310             (LTG) Patient will demonstrate functional swallow for    Diet Texture (Demonstrate functional swallow) regular textures  -RS      Liquid viscosity (Demonstrate functional swallow) thin liquids  -RS      [place] (Demonstrate functional swallow) independently (over 90% accuracy)  -RS      Time Frame (Demonstrate functional swallow) 1 week  -RS      Progress/Outcomes (Demonstrate functional swallow) new goal  -RS         (STG) Patient will tolerate trials of    Consistencies Trialed (Tolerate trials) regular textures;thin liquids  -RS      Desired Outcome (Tolerate trials) without signs/symptoms of aspiration;without signs of distress;with adequate oral prep/transit/clearance  -RS      [place] (Tolerate trials) independently (over 90% accuracy)  -RS      Time Frame (Tolerate trials) 1 week  -RS      Progress/Outcomes (Tolerate trials) new goal  -RS         (STG) Lingual Strengthening Goal 1 (SLP)    Activity (Lingual Strengthening Goal 1, SLP) increase tongue back strength  -RS      Increase Tongue Back Strength lingual resistance exercises  -RS      [place]/Accuracy (Lingual Strengthening Goal 1, SLP) with minimal cues (75-90% accuracy)  -RS      Time Frame (Lingual Strengthening Goal 1, SLP) 1 week  -RS      Progress/Outcomes (Lingual Strengthening Goal 1, SLP) new goal  -RS         (STG) Pharyngeal Strengthening Exercise Goal 1 (SLP)    Activity (Pharyngeal Strengthening Goal 1, SLP) increase timing  -RS      Increase Timing prepping - 3 second prep or suck swallow or 3-step swallow  -RS       [place]/Accuracy (Pharyngeal Strengthening Goal 1, SLP) with minimal cues (75-90% accuracy)  -RS      Time Frame (Pharyngeal Strengthening Goal 1, SLP) 1 week  -RS      Progress/Outcomes (Pharyngeal Strengthening Goal 1, SLP) new goal  -RS         Patient will demonstrate functional speech skills for return to discharge environment    [place] Independently  -RS      Time frame 1 week  -RS      Progress/Outcomes new goal  -RS         Respiratory Support Goal 1 (SLP)    Improve Respiratory Support Goal 1 (SLP) increasing length of exhalation;sustaining a vowel sound on exhalation;80%;with minimal cues (75-90%)  -RS      Time Frame (Respiratory Support Goal 1, SLP) 1 week  -RS      Progress/Outcomes (Respiratory Support Goal 1, SLP) new goal  -RS         Articulation Goal 1 (SLP)    Improve Articulation Goal 1 (SLP) by over-articulating at word level;by over-articulating at phrase level;80%;with minimal cues (75-90%)  -RS      Time Frame (Articulation Goal 1, SLP) 1 week  -RS      Progress/Outcomes (Articulation Goal 1, SLP) new goal  -RS                User Key  (r) = Recorded By, (t) = Taken By, (c) = Cosigned By      Initials Name Provider Type    Roberto Rosado MS CCC-SLP Speech and Language Pathologist                         Time Calculation:    Time Calculation- SLP       Row Name 10/07/24 1512 10/07/24 1031          Time Calculation- SLP    SLP Start Time 1310  -RS 0940  -RS     SLP Received On 10/07/24  -RS 10/07/24  -RS        Untimed Charges    78867-NP Eval Speech and Production w/ Language Minutes 25  -RS --     30280-MG Eval Oral Pharyng Swallow Minutes -- 38  -RS     16743-QO Fiberoptic Endo Eval Swallow Minutes 96  -RS --        Total Minutes    Untimed Charges Total Minutes 121  -RS 38  -RS      Total Minutes 121  -RS 38  -RS               User Key  (r) = Recorded By, (t) = Taken By, (c) = Cosigned By      Initials Name Provider Type    Roberto Rosado MS CCC-SLP Speech and Language  Pathologist                    Therapy Charges for Today       Code Description Service Date Service Provider Modifiers Qty    56100540285 HC ST EVAL ORAL PHARYNG SWALLOW 3 10/7/2024 Dennis Mejia, MS CCC-SLP GN 1    91129579338 HC ST EVAL SPEECH AND PROD W LANG  2 10/7/2024 DennisRoberto MS CCC-SLP GN 1    45226239699 HC ST FIBEROPTIC ENDO EVAL SWALL 6 10/7/2024 Roberto Collins MS CCC-SLP GN 1                 MS KIRBY KaufmanSLP  10/7/2024   and Acute Care - Speech Language Pathology Initial Evaluation  [facility]  Cognitive-Communication Evaluation       Patient Name: Lauryn Romo  : 1960  MRN: [id number]  Today's Date: 10/7/2024               Admit Date: 10/6/2024     Visit Dx:    ICD-10-CM ICD-9-CM   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 Cerebral aneurysm  I67.1 437.3   3. History of stroke  Z86.73 V12.54   4. Dysphagia, unspecified type  R13.10 787.20   5. Dysarthria  R47.1 784.51     Patient Active Problem List   Diagnosis    Acute CVA    Cerebral aneurysm, nonruptured    Paraesophageal hernia    Hyperlipidemia    HTN    Moderate malnutrition    History of CVA (cerebrovascular accident)    Other headache syndrome     Past Medical History:   Diagnosis Date    Aneurysm 2022    Cancer 2024    Basal Cell Carcinoma removed from scalp by dermatology    Cluster headache     Had headaches for several months before stroke and mass was found    Difficulty walking 22    After stroke    Headache     Headache, tension-type     Hyperlipidemia     Hypertension     Memory loss 22    Migraine     Stroke 2022    Vision loss 22    When dizzy or headache     Past Surgical History:   Procedure Laterality Date    ARTERIAL ANEURYSM REPAIR      BREAST LUMPECTOMY  2012    CYST REMOVAL Left 2023    EMBOLIZATION CEREBRAL N/A 2022    Procedure: CV EMBOLIZATION CEREBRAL IR;  Surgeon: Enoch Savage MD;  [facility]: [facility] INVASIVE [facility];  Service:  Interventional Radiology;  Laterality: N/A;    HIATAL HERNIA REPAIR  2015    INTERVENTIONAL RADIOLOGY PROCEDURE N/A 09/15/2023    Procedure: Embolization;  Surgeon: Enoch Savage MD;  Location:  [facility] INVAS[facility] LOCATION;  Service: Interventional Radiology;  Laterality: N/A;    INTERVENTIONAL RADIOLOGY PROCEDURE N/A 10/6/2024    Procedure: IR mechanical thrombectomy;  Surgeon: Joe Mendez MD;  Location:  [facility] INVAS[facility] LOCATION;  Service: Interventional Radiology;  Laterality: N/A;    SINUS SURGERY      x4    SKIN CANCER EXCISION      cancer removed off top of head       SLP Recommendation and Plan  SLP Diagnosis: mild, dysarthria (10/07/24 1310)        Monitor for Signs of Aspiration: yes, notify SLP if any concerns (10/07/24 1310)  Swallow Criteria for Skilled Therapeutic Interventions Met: demonstrates skilled criteria (10/07/24 1310)  SLC Criteria for Skilled Therapy Interventions Met: yes (10/07/24 1310)  Anticipated Discharge Disposition (SLP): inpatient rehabilitation facility (10/07/24 1310)     Therapy Frequency (Swallow): 5 days per week (10/07/24 1310)  Therapy Frequency (SLP SLC): 5 days per week (10/07/24 1310)  Predicted Duration Therapy Intervention (Days): 1 week (10/07/24 1310)  Oral Care Recommendations: Oral Care BID/PRN, Toothbrush (10/07/24 1310)                          Plan of Care Reviewed With: patient, spouse (10/07/24 1514)  Progress: improving (10/07/24 1514)      SLP EVALUATION (Last 72 Hours)       SLP SLC Evaluation       Row Name 10/07/24 1310                   Comprehension Assessment/Intervention    Comprehension Assessment/Intervention Auditory Comprehension  -RS           Auditory Comprehension Assessment/Intervention    Auditory Comprehension (Communication) WNL  -RS           Expression Assessment/Intervention    Expression Assessment/Intervention verbal expression  -RS           Verbal Expression Assessment/Intervention    Verbal Expression WNL  -RS            Motor Speech Assessment/Intervention    Characteristics Consistent with Dysarthria decreased intensity;slurred speech  -RS           Cognitive Assessment Intervention- SLP    Orientation Status (Cognition) WFL  -RS        Attention (Cognitive) WFL  -RS        Thought Organization (Cognitive) WFL  -RS        Pragmatics (Communication) WFL  -RS        Cognition, Comment  reports cognition is entirely at baseline  -RS           SLP Evaluation Clinical Impressions    SLP Diagnosis mild;dysarthria  -RS        Rehab Potential/Prognosis good  -RS        SLC Criteria for Skilled Therapy Interventions Met yes  -RS           Recommendations    Therapy Frequency (SLP SLC) 5 days per week  -RS                  User Key  (r) = Recorded By, (t) = Taken By, (c) = Cosigned By      Initials Name Effective Dates    RS Roberto Collins, MS CCC-SLP 09/14/23 -                        EDUCATION  The patient has been educated in the following areas:     Communication Impairment.           SLP GOALS       Row Name 10/07/24 1310             (LTG) Patient will demonstrate functional swallow for    Diet Texture (Demonstrate functional swallow) regular textures  -RS      Liquid viscosity (Demonstrate functional swallow) thin liquids  -RS      [place] (Demonstrate functional swallow) independently (over 90% accuracy)  -RS      Time Frame (Demonstrate functional swallow) 1 week  -RS      Progress/Outcomes (Demonstrate functional swallow) new goal  -RS         (STG) Patient will tolerate trials of    Consistencies Trialed (Tolerate trials) regular textures;thin liquids  -RS      Desired Outcome (Tolerate trials) without signs/symptoms of aspiration;without signs of distress;with adequate oral prep/transit/clearance  -RS      [place] (Tolerate trials) independently (over 90% accuracy)  -RS      Time Frame (Tolerate trials) 1 week  -RS      Progress/Outcomes (Tolerate trials) new goal  -RS         (STG) Lingual Strengthening Goal 1  (SLP)    Activity (Lingual Strengthening Goal 1, SLP) increase tongue back strength  -RS      Increase Tongue Back Strength lingual resistance exercises  -RS      [place]/Accuracy (Lingual Strengthening Goal 1, SLP) with minimal cues (75-90% accuracy)  -RS      Time Frame (Lingual Strengthening Goal 1, SLP) 1 week  -RS      Progress/Outcomes (Lingual Strengthening Goal 1, SLP) new goal  -RS         (STG) Pharyngeal Strengthening Exercise Goal 1 (SLP)    Activity (Pharyngeal Strengthening Goal 1, SLP) increase timing  -RS      Increase Timing prepping - 3 second prep or suck swallow or 3-step swallow  -RS      [place]/Accuracy (Pharyngeal Strengthening Goal 1, SLP) with minimal cues (75-90% accuracy)  -RS      Time Frame (Pharyngeal Strengthening Goal 1, SLP) 1 week  -RS      Progress/Outcomes (Pharyngeal Strengthening Goal 1, SLP) new goal  -RS         Patient will demonstrate functional speech skills for return to discharge environment    [place] Independently  -RS      Time frame 1 week  -RS      Progress/Outcomes new goal  -RS         Respiratory Support Goal 1 (SLP)    Improve Respiratory Support Goal 1 (SLP) increasing length of exhalation;sustaining a vowel sound on exhalation;80%;with minimal cues (75-90%)  -RS      Time Frame (Respiratory Support Goal 1, SLP) 1 week  -RS      Progress/Outcomes (Respiratory Support Goal 1, SLP) new goal  -RS         Articulation Goal 1 (SLP)    Improve Articulation Goal 1 (SLP) by over-articulating at word level;by over-articulating at phrase level;80%;with minimal cues (75-90%)  -RS      Time Frame (Articulation Goal 1, SLP) 1 week  -RS      Progress/Outcomes (Articulation Goal 1, SLP) new goal  -RS                User Key  (r) = Recorded By, (t) = Taken By, (c) = Cosigned By      Initials Name Provider Type    Roberto Rosado MS CCC-SLP Speech and Language Pathologist                              Time Calculation:      Time Calculation- SLP       Row Name  10/07/24 1512 10/07/24 1031          Time Calculation- SLP    SLP Start Time 1310  -RS 0940  -RS     SLP Received On 10/07/24  -RS 10/07/24  -RS        Untimed Charges    12121-[place] Eval Speech and Production w/ Language Minutes 25  -RS --     57824-VQ Eval Oral Pharyng Swallow Minutes -- 38  -RS     73391-GO Fiberoptic Endo Eval Swallow Minutes 96  -RS --        Total Minutes    Untimed Charges Total Minutes 121  -RS 38  -RS      Total Minutes 121  -RS 38  -RS               User Key  (r) = Recorded By, (t) = Taken By, (c) = Cosigned By      Initials Name Provider Type    RS Roberto Collins MS CCC-SLP Speech and Language Pathologist                    Therapy Charges for Today       Code Description Service Date Service Provider Modifiers Qty    20804189559 HC ST EVAL ORAL PHARYNG SWALLOW 3 10/7/2024 Roberto Collins MS CCC-SLP GN 1    [id number] HC ST EVAL SPEECH AND PROD W LANG  2 10/7/2024 Roberto Collins MS CCC-SLP GN 1    [id number] HC ST FIBEROPTIC ENDO EVAL SWALL 6 10/7/2024 Roberto Collins MS CCC-DAGMAR GN 1                       MS ELIOT Kaufman  10/7/2024

## 2024-10-07 NOTE — NURSING NOTE
MRI complete.    NIHSS 7. No complaints of HA.     VSS. Afebrile. Room air. R Fem site C/D/I. Pedal pulses dopplered.      Cason removed. Bladder scan showed 350 mL at 1800. Awaiting spontaneous void.    FEES complete. Diet ordered. NG removed.

## 2024-10-07 NOTE — CONSULTS
Order noted for diabetes education per stroke protocol. A1c 5.6%. No history of diabetes per chart review. Does not qualify for OP diabetes/stroke class

## 2024-10-07 NOTE — PLAN OF CARE
Goal Outcome Evaluation:  Plan of Care Reviewed With: patient, spouse        Progress: improving         Anticipated Discharge Disposition (SLP): inpatient rehabilitation facility    SLP Diagnosis: mild, dysarthria (10/07/24 1310)     SLP Swallowing Diagnosis: functional oral phase, functional pharyngeal phase (10/07/24 1310)

## 2024-10-07 NOTE — THERAPY EVALUATION
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 Cerebral aneurysm  I67.1 437.3   3. History of stroke  Z86.73 V12.54   4. Dysphagia, unspecified type  R13.10 787.20     Patient Active Problem List   Diagnosis    Acute CVA    Cerebral aneurysm, nonruptured    Paraesophageal hernia    Hyperlipidemia    HTN    Moderate malnutrition    History of CVA (cerebrovascular accident)    Other headache syndrome     Past Medical History:   Diagnosis Date    Aneurysm 2022    Cancer 2024    Basal Cell Carcinoma removed from scalp by dermatology    Cluster headache     Had headaches for several months before stroke and mass was found    Difficulty walking 22    After stroke    Headache     Headache, tension-type     Hyperlipidemia     Hypertension     Memory loss 22    Migraine     Stroke 2022    Vision loss 22    When dizzy or headache     Past Surgical History:   Procedure Laterality Date    ARTERIAL ANEURYSM REPAIR      BREAST LUMPECTOMY  2012    CYST REMOVAL Left 2023    EMBOLIZATION CEREBRAL N/A 2022    Procedure: CV EMBOLIZATION CEREBRAL IR;  Surgeon: Enoch Savage MD;  Location:  [facility];  Service: Interventional Radiology;  Laterality: N/A;    HIATAL HERNIA REPAIR  2015    INTERVENTIONAL RADIOLOGY PROCEDURE N/A 09/15/2023    Procedure: Embolization;  Surgeon: Enoch Savage MD;  Location:  [facility];  Service: Interventional Radiology;  Laterality: N/A;    INTERVENTIONAL RADIOLOGY PROCEDURE N/A 10/6/2024    Procedure: IR mechanical thrombectomy;  Surgeon: Joe Mendez MD;  Location:  [facility];  Service: Interventional Radiology;  Laterality: N/A;    SINUS SURGERY      x4    SKIN CANCER EXCISION      cancer  removed off top of head      General Information       Row Name 10/07/24 1309          OT Time and Intention    Document Type evaluation  -CS     Mode of Treatment occupational therapy  -CS       Row Name 10/07/24 1309          General Information    Patient Profile Reviewed yes  -CS     Prior Level of Function independent:;all household mobility;ADL's  -CS     Existing Precautions/Restrictions fall;oxygen therapy device and L/min;other (see comments)  L sided weakness, coordination deficits  -CS     Barriers to Rehab medically complex  -CS       Row Name 10/07/24 1309          Living Environment    People in Home spouse  -CS       Row Name 10/07/24 1309          Home Main Entrance    Number of Stairs, Main Entrance one  -CS       Row Name 10/07/24 1309          Cognition    Orientation Status (Cognition) oriented x 3  -CS       Row Name 10/07/24 1309          Safety Issues, Functional Mobility    Impairments Affecting Function (Mobility) balance;coordination;visual/perceptual;strength;range of motion (ROM)  -CS               User Key  (r) = Recorded By, (t) = Taken By, (c) = Cosigned By      Initials Name Provider Type    CS Raeann Oseguera OT Occupational Therapist                     Mobility/ADL's       Row Name 10/07/24 1310          Bed Mobility    Bed Mobility supine-sit  -CS     Supine-Sit [place] (Bed Mobility) minimum assist (75% patient effort);2 person assist;verbal cues  -CS     Assistive Device (Bed Mobility) bed rails;draw sheet;head of bed elevated  -CS       Row Name 10/07/24 1310          Transfers    Transfers sit-stand transfer;stand-sit transfer  -CS     Comment, (Transfers) BUE support w/ B knees blocked, noted frequent leg crossing  -CS       Row Name 10/07/24 1310          Bed-Chair Transfer    Bed-Chair [place] (Transfers) 2 person assist;moderate assist (50% patient effort);verbal cues  -CS       Row Name 10/07/24 1310          Sit-Stand Transfer    Sit-Stand [place]  (Transfers) 2 person assist;moderate assist (50% patient effort);verbal cues  -       Row Name 10/07/24 1310          Stand-Sit Transfer    Stand-Sit [place] (Transfers) moderate assist (50% patient effort);2 person assist;verbal cues  -       Row Name 10/07/24 1310          Activities of Daily Living    BADL Assessment/Intervention lower body dressing;grooming  -       Row Name 10/07/24 1310          Lower Body Dressing Assessment/Training    [place] Level (Lower Body Dressing) don;socks;dependent (less than 25% patient effort)  -     Position (Lower Body Dressing) supine  -       Row Name 10/07/24 1310          Grooming Assessment/Training    [place] Level (Grooming) wash face, hands;minimum assist (75% patient effort)  -CS     Position (Grooming) sitting up in bed  -               User Key  (r) = Recorded By, (t) = Taken By, (c) = Cosigned By      Initials Name Provider Type    Reaann Soares OT Occupational Therapist                   Obj/Interventions       Row Name 10/07/24 1311          Sensory Assessment (Somatosensory)    Sensory Assessment (Somatosensory) UE sensation intact  -       Row Name 10/07/24 1311          Vision Assessment/Intervention    Visual Motor Impairment visual tracking, left  -     Visual Processing Deficit visual attention, left  -       Row Name 10/07/24 1311          Range of Motion Comprehensive    General Range of Motion bilateral upper extremity ROM WNL  -       Row Name 10/07/24 1311          Strength Comprehensive (MMT)    Comment, General Manual Muscle Testing (MMT) Assessment LUE grossly 3+/5, RUE grossly 4-/5  -       Row Name 10/07/24 1311          Motor Skills    Motor Skills coordination  -     Coordination gross motor deficit;upper extremity;left;moderate impairment;right;minimal impairment  -       Row Name 10/07/24 1311          Balance    Balance Assessment sitting static balance;sitting dynamic balance;standing static  balance;standing dynamic balance  -CS     Static Sitting Balance minimal assist  -CS     Dynamic Sitting Balance minimal assist  -CS     Position, Sitting Balance sitting in chair  -CS     Static Standing Balance minimal assist;2-person assist  -CS     Dynamic Standing Balance moderate assist;2-person assist  -CS     Position/Device Used, Standing Balance supported  -CS     Balance Interventions sitting;standing;static;dynamic;dynamic reaching;occupation based/functional task;weight shifting activity  -CS               User Key  (r) = Recorded By, (t) = Taken By, (c) = Cosigned By      Initials Name Provider Type    CS Raeann Oseguera, OT Occupational Therapist                   Goals/Plan       Row Name 10/07/24 1313          Transfer Goal 1 (OT)    Activity/Assistive Device (Transfer Goal 1, OT) sit-to-stand/stand-to-sit;toilet  -CS     [place] Level/Cues Needed (Transfer Goal 1, OT) contact guard required  -CS     Time Frame (Transfer Goal 1, OT) long term goal (LTG);10 days  -CS     Progress/Outcome (Transfer Goal 1, OT) goal ongoing  -CS       Row Name 10/07/24 1313          Dressing Goal 1 (OT)    Activity/Device (Dressing Goal 1, OT) lower body dressing  -CS     [place]/Cues Needed (Dressing Goal 1, OT) minimum assist (75% or more patient effort)  -CS     Time Frame (Dressing Goal 1, OT) long term goal (LTG);10 days  -CS     Strategies/Barriers (Dressing Goal 1, OT) don/doff socks w/AAD  -CS     Progress/Outcome (Dressing Goal 1, OT) goal ongoing  -CS       Row Name 10/07/24 1313          Grooming Goal 1 (OT)    Activity/Device (Grooming Goal 1, OT) hair care  -CS     [place] (Grooming Goal 1, OT) minimum assist (75% or more patient effort)  -CS     Time Frame (Grooming Goal 1, OT) short term goal (STG);5 days  -CS     Strategies/Barriers (Grooming Goal 1, OT) unsupported sitting  -CS     Progress/Outcome (Grooming Goal 1, OT) goal ongoing  -CS       Row Name 10/07/24 1313          Therapy  Assessment/Plan (OT)    Planned Therapy Interventions (OT) activity tolerance training;adaptive equipment training;BADL retraining;functional balance retraining;occupation/activity based interventions;ROM/therapeutic exercise;strengthening exercise;transfer/mobility retraining;cognitive/visual perception retraining;neuromuscular control/coordination retraining;patient/caregiver education/training  -CS               User Key  (r) = Recorded By, (t) = Taken By, (c) = Cosigned By      Initials Name Provider Type    CS Raeann Oseguera, TOMI Occupational Therapist                   Clinical Impression       Row Name 10/07/24 1312          Pain Assessment    Pretreatment Pain Rating 0/10 - no pain  -CS     Posttreatment Pain Rating 0/10 - no pain  -CS       Row Name 10/07/24 1312          Plan of Care Review    Plan of Care Reviewed With patient;friend  -CS     Outcome Evaluation OT eval complete. Pt presents w/ L sided weakness, coordination deficits, and balance deficits warranting cont skilled IPOT POC to promote return to PLOF. Recommend pt DC to IP rehab.  -CS       Row Name 10/07/24 1312          Therapy Assessment/Plan (OT)    Patient/Family Therapy Goal Statement (OT) Return to PLOF  -CS     Rehab Potential (OT) good, to achieve stated therapy goals  -CS     Criteria for Skilled Therapeutic Interventions Met (OT) yes;skilled treatment is necessary  -CS     Therapy Frequency (OT) daily  -CS     Predicted Duration of Therapy Intervention (OT) 10 days  -CS       Row Name 10/07/24 1312          Therapy Plan Review/Discharge Plan (OT)    Anticipated Discharge Disposition (OT) inpatient rehabilitation facility  -       Row Name 10/07/24 1312          Vital Signs    Pre Systolic BP Rehab 120  -CS     Pre Treatment Diastolic BP 66  -CS     Post Systolic BP Rehab 119  -CS     Post Treatment Diastolic BP 64  -CS     Pretreatment Heart Rate (beats/min) 74  -CS     Posttreatment Heart Rate (beats/min) 65  -CS     Pre SpO2 (%)  100  -CS     O2 Delivery Pre Treatment nasal cannula  -CS     Post SpO2 (%) 100  -CS     O2 Delivery Post Treatment nasal cannula  -CS     Pre Patient Position Supine  -CS     Intra Patient Position Standing  -CS     Post Patient Position Sitting  -CS       Row Name 10/07/24 1312          Positioning and Restraints    Pre-Treatment Position in bed  -CS     Post Treatment Position chair  -CS     In Chair notified nsg;reclined;call light within reach;RUE elevated;exit alarm on;encouraged to call for assist;LUE elevated;with family/caregiver;with PT;waffle cushion;on mechanical lift sling;legs elevated;heels elevated  -CS               User Key  (r) = Recorded By, (t) = Taken By, (c) = Cosigned By      Initials Name Provider Type    CS Raeann Oseguera, TOMI Occupational Therapist                   Outcome Measures       Row Name 10/07/24 1314          How much help from another is currently needed...    Putting on and taking off regular lower body clothing? 2  -CS     Bathing (including washing, rinsing, and drying) 2  -CS     Toileting (which includes using toilet bed pan or urinal) 2  -CS     Putting on and taking off regular upper body clothing 2  -CS     Taking care of personal grooming (such as brushing teeth) 2  -CS     Eating meals 2  -CS     AM-PAC 6 Clicks Score (OT) 12  -CS       Row Name 10/07/24 0920 10/07/24 0800       How much help from another person do you currently need...    Turning from your back to your side while in flat bed without using bedrails? 3  -ES 3  -QH    Moving from lying on back to sitting on the side of a flat bed without bedrails? 2  -ES 2  -QH    Moving to and from a bed to a chair (including a wheelchair)? 2  -ES 2  -QH    Standing up from a chair using your arms (e.g., wheelchair, bedside chair)? 3  -ES 3  -QH    Climbing 3-5 steps with a railing? 2  -ES 2  -QH    To walk in hospital room? 2  -ES 2  -QH    AM-PAC 6 Clicks Score (PT) 14  -ES 14  -QH    Highest Level of Mobility Goal 4  --> Transfer to chair/commode  -ES 4 --> Transfer to chair/commode  -      Row Name 10/07/24 1314 10/07/24 0920       Modified [place] Scale    Pre-Stroke Modified [place] Scale 6 - Unable to determine (UTD) from the medical record documentation  - 6 - Unable to determine (UTD) from the medical record documentation  -ES    Modified [place] Scale 4 - Moderately severe disability.  Unable to walk without assistance, and unable to attend to own bodily needs without assistance.  -CS 4 - Moderately severe disability.  Unable to walk without assistance, and unable to attend to own bodily needs without assistance.  -ES      Row Name 10/07/24 1314 10/07/24 0920       Functional Assessment    Outcome Measure Options AM-PAC 6 Clicks Daily Activity (OT);Modified [place]  -CS AM-PAC 6 Clicks Basic Mobility (PT);Modified [place]  -ES              User Key  (r) = Recorded By, (t) = Taken By, (c) = Cosigned By      Initials Name Provider Type    Raeann Soares OT Occupational Therapist    Estelita Iraheta, PT Physical Therapist     Segundo Tiwari, RN Registered Nurse                    Occupational Therapy Education       Title: PT OT SLP Therapies (In Progress)       Topic: Occupational Therapy (In Progress)       Point: ADL training (In Progress)       Description:   Instruct learner(s) on proper safety adaptation and remediation techniques during self care or transfers.   Instruct in proper use of assistive devices.                  Learning Progress Summary             Patient Acceptance, E, NR by  at 10/7/2024 1315                         Point: Home exercise program (Not Started)       Description:   Instruct learner(s) on appropriate technique for monitoring, assisting and/or progressing therapeutic exercises/activities.                  Learner Progress:  Not documented in this visit.              Point: Precautions (In Progress)       Description:   Instruct learner(s) on prescribed precautions during  self-care and functional transfers.                  Learning Progress Summary             Patient Acceptance, E, NR by  at 10/7/2024 1315                         Point: Body mechanics (In Progress)       Description:   Instruct learner(s) on proper positioning and spine alignment during self-care, functional mobility activities and/or exercises.                  Learning Progress Summary             Patient Acceptance, E, NR by  at 10/7/2024 1315                                         User Key       Initials Effective Dates Name Provider Type Discipline     09/02/21 -  Raeann Osgeuera, OT Occupational Therapist OT                  OT Recommendation and Plan  Planned Therapy Interventions (OT): activity tolerance training, adaptive equipment training, BADL retraining, functional balance retraining, occupation/activity based interventions, ROM/therapeutic exercise, strengthening exercise, transfer/mobility retraining, cognitive/visual perception retraining, neuromuscular control/coordination retraining, patient/caregiver education/training  Therapy Frequency (OT): daily  Plan of Care Review  Plan of Care Reviewed With: patient, friend  Outcome Evaluation: OT eval complete. Pt presents w/ L sided weakness, coordination deficits, and balance deficits warranting cont skilled IPOT POC to promote return to PLOF. Recommend pt DC to IP rehab.     Time Calculation:   Evaluation Complexity (OT)  Review Occupational Profile/Medical/Therapy History Complexity: expanded/moderate complexity  Assessment, Occupational Performance/Identification of Deficit Complexity: 5 or more performance deficits  Clinical Decision Making Complexity (OT): detailed assessment/moderate complexity  Overall Complexity of Evaluation (OT): moderate complexity     Time Calculation- OT       Row Name 10/07/24 1316             Time Calculation- OT    OT Start Time 0830  -      OT Received On 10/07/24  -      OT Goal Re-Cert Due Date 10/17/24  -          Untimed Charges    OT Eval/Re-eval Minutes 46  -CS         Total Minutes    Untimed Charges Total Minutes 46  -CS       Total Minutes 46  -CS                User Key  (r) = Recorded By, (t) = Taken By, (c) = Cosigned By      Initials Name Provider Type    CS Raeann Oseguera OT Occupational Therapist                  Therapy Charges for Today       Code Description Service Date Service Provider Modifiers Qty    52735293646 HC OT EVAL MOD COMPLEXITY 4 10/7/2024 Raeann Oseguera OT GO 1                 Raeann Oseguera OT  10/7/2024

## 2024-10-07 NOTE — PROGRESS NOTES
Time: 20min  Patient Name: Lauryn Romo  Date of Encounter: 10/07/24 19:17 EDT  MRN: [id number]  Admission date: 10/6/2024      Reason for visit: MDR, difficulty chewing/swallowing    Additional information obtained during MDR: Patient passed FEES today and has regular texture/TL diet ordered, tolerated some PO intake.     Current diet: Diet: Cardiac; Healthy Heart (2-3 Na+); Texture: Regular (IDDSI 7); Fluid Consistency: Thin (IDDSI 0)      Intervention:  Follow treatment plan  Care plan reviewed  Monitor PO intake progress    Follow up:   Per protocol      Leila Aguila, GALE  19:17 EDT

## 2024-10-07 NOTE — PLAN OF CARE
Goal Outcome Evaluation:  Plan of Care Reviewed With: patient, friend           Outcome Evaluation: OT eval complete. Pt presents w/ L sided weakness, coordination deficits, and balance deficits warranting cont skilled IPOT POC to promote return to PLOF. Recommend pt DC to IP rehab.      Anticipated Discharge Disposition (OT): inpatient rehabilitation facility

## 2024-10-07 NOTE — PLAN OF CARE
Goal Outcome Evaluation:  Plan of Care Reviewed With: patient, friend        Progress: no change  Outcome Evaluation: PT eval complete. Pt presents with generalized weakness, ataxia/impaired coordination, and decreased activity tolerance warranting skilled IPPT service. Pt required min-modA x2 for mobility but overall demonstrated good effort. PT rec IRF at d/c.      Anticipated Discharge Disposition (PT): inpatient rehabilitation facility

## 2024-10-07 NOTE — PLAN OF CARE
Goal Outcome Evaluation:  Patient neuro exam stable throughout shift. NIH of 14 originally given at the start of the shift. Patient dysphagia is improving this morning after patient was able to get more rest. MRI screening sheet sent down. Bedside swallow failed, speech consult in for today.

## 2024-10-07 NOTE — PROGRESS NOTES
Pulmonary/Critical Care Follow-up     LOS: 1 day   Patient Care Team:  Melissa Lazo APRN as PCP - General (Nurse Practitioner)  Erika Whatley MD as Consulting Physician (Psychiatry)        Chief Complaint   Patient presents with    Stroke     Subjective     History reviewed and updated in EMR as indicated.    Interval History:     Patient is awake and alert.  No current complaint.  Denies headache.  No fevers or chills.  On her way for MRI.  Family at bedside.    History taken from: Patient    PMH/FH/Social History were reviewed and updated appropriately in the electronic medical record.     Review of Systems:    Review of 14 systems was completed with positives and pertinent negatives noted in the subjective section.  All other systems reviewed and are negative.       Objective     Vital Signs  Temp:  [97.6 °F (36.4 °C)-98.6 °F (37 °C)] 98.6 °F (37 °C)  Heart Rate:  [60-83] 63  Resp:  [10-16] 12  BP: (109-160)/() 120/66  FiO2 (%):  [45 %-100 %] 45 %  10/06 0701 - 10/07 0700  In: 600 [I.V.:600]  Out: 1830 [Urine:1830]  Body mass index is 24.21 kg/m².  Mode: PS  FiO2 (%):  [45 %-100 %] 45 %  S RR:  [10-14] 14  S VT:  [350 mL] 350 mL  PEEP/CPAP (cm H2O):  [5 cm H20] 5 cm H20  MT SUP:  [10 cm H20] 10 cm H20  MAP (cm H2O):  [7.4-8.5] 8.5  IV drips:  niCARdipine, Last Rate: 10 mg/hr (10/06/24 1320)  propofol, Last Rate: Stopped (10/06/24 1330)       Physical Exam:     Constitutional:   Alert, in no acute distress   Head:   Normocephalic, atraumatic   Eyes:           Lids and lashes normal, conjunctivae and sclerae normal.  PER   ENMT:  Ears appear intact with no abnormalities noted     Lips normal.     Neck:  Trachea midline, no JVD   Lungs/Resp:    Normal effort, symmetric chest rise, no crepitus, clear to      auscultation bilaterally.               Heart/CV:   Regular rhythm and normal rate, no murmur   Abdomen/GI:    Soft, nontender, nondistended   :    Deferred   Extremities/MSK:  No clubbing or  cyanosis.  No edema.     Pulses:  Pulses palpable and equal bilaterally   Skin:  No bleeding, bruising or rash   Heme/Lymph:  No cervical or supraclavicular adenopathy.   Neurologic:    Psychiatric:    Moves all extremities with no obvious focal motor deficit.  Cranial nerves 2 - 12 grossly intact  Non-agitated, normal affect.    The above physical exam findings were reviewed and reflect my exam findings as of today's exam.   Electronically signed by:  Bryon Garner MD  10/07/24  08:35 EDT      Results Review:     I reviewed the patient's new clinical results.   Results from last 7 days   Lab Units 10/07/24  0555 10/06/24  1038 10/06/24  0959   SODIUM mmol/L 143 141  --    POTASSIUM mmol/L 4.0 4.3  --    CHLORIDE mmol/L 112* 110*  --    CO2 mmol/L 20.0* 19.0*  --    BUN mg/dL 21 22  --    CREATININE mg/dL 0.43* 0.69 0.40*   CALCIUM mg/dL 8.7 8.6  --    BILIRUBIN mg/dL 0.3 0.3  --    ALK PHOS U/L 98 124*  --    ALT (SGPT) U/L 49* 65*  --    AST (SGOT) U/L 22 34*  --    GLUCOSE mg/dL 126* 82  --      Results from last 7 days   Lab Units 10/07/24  0555 10/06/24  0959   WBC 10*3/mm3 14.67* 7.19   HEMOGLOBIN g/dL 13.8 14.6   HEMOGLOBIN, POC g/dL  --  14.6   HEMATOCRIT % 41.7 46.0   HEMATOCRIT POC %  --  43   PLATELETS 10*3/mm3 252 242     Results from last 7 days   Lab Units 10/06/24  1549   PH, ARTERIAL pH units 7.373   PO2 ART mm Hg 195.0*   PCO2, ARTERIAL mm Hg 28.9*   HCO3 ART mmol/L 16.8*     Results from last 7 days   Lab Units 10/07/24  0555 10/06/24  1038   MAGNESIUM mg/dL  --  2.4   PHOSPHORUS mg/dL 4.1  --        I reviewed the patient's new imaging including images and reports.    MRI brain 10/7/2024:    Impression:  1.Small likely subacute infarct involving periventricular left occipital lobe. No hemorrhagic transformation.  2.Stable prominent basilar artery aneurysm with mass effect on adjacent brainstem.  3.Moderate paranasal sinus mucosal disease with air-fluid level in left maxillary sinus. Correlate  for acute sinusitis  4.Partial left mastoid effusion.    Medication Review:   aspirin, 81 mg, Nasogastric, Daily   Or  aspirin, 300 mg, Rectal, Daily  atorvastatin, 80 mg, Nasogastric, Nightly  cefTRIAXone, 1,000 mg, Intravenous, Q24H  chlorhexidine, 15 mL, Mouth/Throat, Q12H  dexAMETHasone, 4 mg, Intravenous, Q6H  famotidine, 20 mg, Intravenous, BID  sodium chloride, 10 mL, Intravenous, Q12H  ticagrelor, 90 mg, Nasogastric, BID      niCARdipine, 5-15 mg/hr, Last Rate: 10 mg/hr (10/06/24 1320)  propofol, 5-50 mcg/kg/min, Last Rate: Stopped (10/06/24 1330)        Assessment & Plan         Acute CVA    Cerebral aneurysm, nonruptured    63 y.o. 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 Savage 2022 and 2023), who presented to the emergency department via private vehicle for evaluation of left-sided weakness and facial droop present on awakening on 10/6/2024.    She apparently had a migraine headache and took a home dose of Ubrelvy which helped her headache a bit.  She had difficulty ambulating on awakening with left side weakness.  She also was treated for a UTI with cefuroxime (day 3/7).    CT head showed periventricular hypodensities right frontal parietal junction and left parietal lobe felt to reflect possible acute or subacute infarcts.  Patient has recently been taken off of her Plavix on follow-up with Dr. Savage.  She was taking aspirin 81 mg daily.    Patient was taken to the Cath Lab for mechanical thrombectomy by Dr. Mendez which was successful with placement of stent for in-stent stenosis.    Patient is currently doing well.    Plan:  1.  For acute CVA: Secondary to thrombosis of basilar artery aneurysm stent status post thrombectomy and stent placement for in-stent stenosis.  Doing well.  Continue aspirin, statin, Brilinta.  Blood pressure  management for goal of normal blood pressure.  Stroke neurology following.  Continue PT/OT/speech therapy.  2.  For acute respiratory failure status postintubation: Successfully extubated.  Doing well on room air currently.  3.  For hypertension: Cardene drip as needed.  4.  For hyperlipidemia: Continue statin.  5.  DVT prophylaxis: SCDs  6.  For UTI present on admission: Continue Rocephin.  Try to get outside urine cultures.    Okay to telemetry/hospitalist.    Electronically signed by:    Bryon Garner MD  10/07/24  08:35 EDT      *. Please note that portions of this note were completed with [employer] - a voice recognition program.

## 2024-10-07 NOTE — PROGRESS NOTES
Stroke Progress Note       Chief Complaint: Left-sided weakness left-sided facial droop and severe dysarthria    Subjective    Subjective     Subjective:  The patient is sitting in a chair in NAD. Family were at the bedside. The patient stated that she is doing better than yesterday.  Denies having any new stroke or strokelike symptoms.  I discussed with the patient and her family the imaging findings, management, and expectation moving forward.  Specifically we talked about the importance of medication compliance in this case dual antiplatelet.    No other acute complains at this time    Review of Systems   Constitutional: No fatigue      Objective      Temp:  [97.6 °F (36.4 °C)-98.6 °F (37 °C)] 98.6 °F (37 °C)  Heart Rate:  [60-83] 62  Resp:  [10-16] 12  BP: (109-153)/(48-98) 125/69  FiO2 (%):  [45 %-100 %] 45 %    Objective    GEN: lying in bed; in NAD  HENT: normocephalic, non-erythematous oropharynx  CV: no LE edema    NEURO:  Mental Status: A&O x 3, interactive, able to follow commands  Speech: Mild dysarthria  CN 2-12:  II - PERRLA  III, IV, VI - EOMI  V - Facial sensation intact  VII -no gross facial asymmetry  VIII - Auditory acuity intact  XII - Tongue protrudes midline    Motor: The patient can move all 4 extremities against gravity with no visible drift appreciated  Sensory: intact light touch throughout  Reflexes: negative Gardiner's sign BL  Coordination: no ataxia with finger-to-nose testing  Gait/Station: deferred   Cortical: No Extinction    Results Review:    I reviewed the patient's new clinical results.    WBC   Date Value Ref Range Status   10/07/2024 14.67 (H) 3.40 - 10.80 10*3/mm3 Final     RBC   Date Value Ref Range Status   10/07/2024 4.38 3.77 - 5.28 10*6/mm3 Final     Hemoglobin   Date Value Ref Range Status   10/07/2024 13.8 12.0 - 15.9 g/dL Final     Hematocrit   Date Value Ref Range Status   10/07/2024 41.7 34.0 - 46.6 % Final     MCV   Date Value Ref Range Status   10/07/2024 95.2 79.0  - 97.0 fL Final     MCH   Date Value Ref Range Status   10/07/2024 31.5 26.6 - 33.0 pg Final     MCHC   Date Value Ref Range Status   10/07/2024 33.1 31.5 - 35.7 g/dL Final     RDW   Date Value Ref Range Status   10/07/2024 13.2 12.3 - 15.4 % Final     RDW-SD   Date Value Ref Range Status   10/07/2024 46.3 37.0 - 54.0 fl Final     MPV   Date Value Ref Range Status   10/07/2024 10.9 6.0 - 12.0 fL Final     Platelets   Date Value Ref Range Status   10/07/2024 252 140 - 450 10*3/mm3 Final     Neutrophil %   Date Value Ref Range Status   10/07/2024 88.0 (H) 42.7 - 76.0 % Final     Lymphocyte %   Date Value Ref Range Status   10/07/2024 5.8 (L) 19.6 - 45.3 % Final     Monocyte %   Date Value Ref Range Status   10/07/2024 5.7 5.0 - 12.0 % Final     Eosinophil %   Date Value Ref Range Status   10/07/2024 0.0 (L) 0.3 - 6.2 % Final     Basophil %   Date Value Ref Range Status   10/07/2024 0.1 0.0 - 1.5 % Final     Immature Grans %   Date Value Ref Range Status   10/07/2024 0.4 0.0 - 0.5 % Final     Neutrophils, Absolute   Date Value Ref Range Status   10/07/2024 12.91 (H) 1.70 - 7.00 10*3/mm3 Final     Lymphocytes, Absolute   Date Value Ref Range Status   10/07/2024 0.85 0.70 - 3.10 10*3/mm3 Final     Monocytes, Absolute   Date Value Ref Range Status   10/07/2024 0.84 0.10 - 0.90 10*3/mm3 Final     Eosinophils, Absolute   Date Value Ref Range Status   10/07/2024 0.00 0.00 - 0.40 10*3/mm3 Final     Basophils, Absolute   Date Value Ref Range Status   10/07/2024 0.01 0.00 - 0.20 10*3/mm3 Final     Immature Grans, Absolute   Date Value Ref Range Status   10/07/2024 0.06 (H) 0.00 - 0.05 10*3/mm3 Final     nRBC   Date Value Ref Range Status   10/07/2024 0.0 0.0 - 0.2 /100 WBC Final       Lab Results   Component Value Date    GLUCOSE 126 (H) 10/07/2024    BUN 21 10/07/2024    CREATININE 0.43 (L) 10/07/2024     10/07/2024    K 4.0 10/07/2024     (H) 10/07/2024    CALCIUM 8.7 10/07/2024    PROTEINTOT 6.1 10/07/2024     ALBUMIN 4.0 10/07/2024    ALT 49 (H) 10/07/2024    AST 22 10/07/2024    ALKPHOS 98 10/07/2024    BILITOT 0.3 10/07/2024    GLOB 2.1 10/07/2024    AGRATIO 1.9 10/07/2024    BCR 48.8 (H) 10/07/2024    ANIONGAP 11.0 10/07/2024    EGFR 110.1 10/07/2024     CT Head Without Contrast    Result Date: 10/6/2024  Impression: No acute intracranial pathology. Sinusitis. Electronically Signed: Hal Davila MD  10/6/2024 6:34 PM EDT  Workstation ID: [id number]    XR Abdomen KUB    Result Date: 10/6/2024  Impression: 1. Advanced nasogastric tube now oriented antegrade over distal stomach. 2. Similar large volume formed stool without evidence of obstruction suggesting constipation. Electronically Signed: Bryon Yoon MD  10/6/2024 1:40 PM EDT  Workstation ID: [id number]    XR Abdomen KUB    Result Date: 10/6/2024  Impression: 1. Antegrade oriented nasogastric tube terminates over mid to distal stomach. 2. Large volume formed stool without dilated loops of bowel to suggest obstruction. Findings suggest constipation. Electronically Signed: Bryon Yoon MD  10/6/2024 1:35 PM EDT  Workstation ID: [id number]    XR Chest 1 View    Result Date: 10/6/2024  Impression: Medical support devices appear in standard position. No new airspace process or pneumothorax. Electronically Signed: Bryon Yoon MD  10/6/2024 1:34 PM EDT  Workstation ID: [id number]    XR Chest 1 View    Result Date: 10/6/2024  Impression: No active pulmonary process. Electronically Signed: Bryon Yoon MD  10/6/2024 11:28 AM EDT  Workstation ID: [id number]    CT Angiogram Head w AI Analysis of LVO    Result Date: 10/6/2024  1.Focal 3 mm nonopacification of the basilar artery just distal to the vascular stent suspicious for high-grade stenoses or occlusion possibly related to thrombus. Asymmetric decreased opacification in the distal right vertebral artery likely related to changes in flow dynamics without additional area of focal high-grade stenoses or occlusion.  Findings likely explains cerebellar abnormalities on CT perfusion. 2.Other major intracranial arterial vasculature widely patent. 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 Findings communicated over the phone with ordering provider Dr. Boyer at 10:48 a.m. 10/6/2024. Patient in route to conventional angiogram with neurosurgery at time of phone call. Electronically Signed: Bryon Yoon MD  10/6/2024 10:44 AM EDT  Workstation ID: [id number]    CT Angiogram Neck    Result Date: 10/6/2024  1.Focal 3 mm nonopacification of the basilar artery just distal to the vascular stent suspicious for high-grade stenoses or occlusion possibly related to thrombus. Asymmetric decreased opacification in the distal right vertebral artery likely related to changes in flow dynamics without additional area of focal high-grade stenoses or occlusion. Findings likely explains cerebellar abnormalities on CT perfusion. 2.Other major intracranial arterial vasculature widely patent. 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 Findings communicated over the phone with ordering provider Dr. Boyer at 10:48 a.m. 10/6/2024. Patient in route to conventional angiogram with neurosurgery at time of phone call. Electronically Signed: Bryon Yoon MD  10/6/2024 10:44 AM EDT  Workstation ID: [id number]    CT CEREBRAL PERFUSION WITH & WITHOUT CONTRAST    Result Date: 10/6/2024  Negative for completed infarct. 14 mL region of Tmax greater than 6 seconds in the right cerebellum. Electronically Signed: Bryon Yoon MD  10/6/2024 10:28 AM EDT  Workstation ID: [id number]    CT Head Without Contrast Stroke Protocol    Result Date: 10/6/2024  Impression: 1. New periventricular hypodensities at right frontal parietal junction and left parietal lobe which  could reflect recent (acute or subacute) infarcts. No associated acute hemorrhage or significant mass effect. Findings could be further assessed by dedicated MRI. 2. Slight increasing dilation of third and lateral ventricles with more conspicuous sulcal and gyral crowding at the vertex. Findings could reflect progressive mass effect at the level of the fourth ventricle related to known treated aneurysm, although size of the excluded aneurysm has not significantly changed measuring 2.5 x 2.6 cm (previously 2.4 x 2.6 cm). Consider neurosurgical consultation. 3. Underlying periventricular hypodensity suggesting chronic microvascular ischemic change similar to prior. Transependymal edema would be considered less likely given stability. Electronically Signed: Bryon Yoon MD  10/6/2024 10:03 AM EDT  Workstation ID: [id number]   Results for orders placed during the hospital encounter of 12/15/22    Adult Transthoracic Echo Complete W/ Cont if Necessary Per Protocol    Interpretation Summary    Left ventricular systolic function is normal. Left ventricular ejection fraction appears to be 56 - 60%.    Mild aortic valve regurgitation is present.      -CT of the head and neck from 10/6/2024 showed mid basilar occlusion  -MRI of the brain images from 10/7/2024 images were personally reviewed and showed evidence of  punctate acute ischemic infarct of the left occipital lobe and the right cerebellum.  -Echocardiogram on 10/7/2024 showed left ventricular ejection fraction of 63.9% with normal left atrial size and negative bubble study  -LDL on 10/7/2024 was 58  -A1c from 10/7/2024 was 5.6%      Assessment/Plan   This is a 63-year-old female with known medical diagnoses present hypertension, hyperlipidemia, migraines (follows with CAILIN Parra), history of stroke (right occipital and left cerebellar, 2023, residual dizziness and ataxic gait), and giant basilar aneurysm s/p pipeline embolization (Dr. Savgae, 2022 and  2023) who presents to the emergency department via private vehicle for further evaluation of left-sided weakness and facial droop which was present upon awakening this morning.  She is admitted for IV thrombolytic therapy given LK > 4.5-hours.  CTA head/neck reveals thrombosed pipeline stent.  Images were reviewed by Dr. Savage who is elected to take her to the Cath Lab for mechanical thrombectomy.  She will be admitted to the neurological ICU s/p procedure.     Antiplatelet PTA: ASA 81 mg  Anticoagulant PTA: None           #Left-sided weakness, facial droop, and aphasia; acute onset  #History of stroke, right occipital and left cerebellar; residual balance difficulties and dizziness  #History of giant basilar aneurysm s/p pipeline embolization and s/p TICI3 and stent placement on 10/6/2024  # Punctate acute ischemic stroke of the left occipital lobe  # Punctate acute ischemic stroke of the right cerebellum  -Mechanism is cryptogenic.  Likely large vessel athero versus less likely ESUS versus less likely cardioembolic.  -CTA head/neck reveals thrombosed pipeline stent; reviewed with Dr. Savage.  Janette patient's debilitating exam is recommended that she go to the Cath Lab for neurointervention.  Discussed with patient and spouse who seem agreeable to proceed, however patient would like to speak with Dr. Savage prior to procedure.  [facility] informed to call and Cath Lab team at 1024.  Anesthesia notified at 1025.  -CT of the head and neck from 10/6/2024 showed mid basilar occlusion  -MRI of the brain images from 10/7/2024 images were personally reviewed and showed evidence of  punctate acute ischemic infarct of the left occipital lobe and the right cerebellum.  -Echocardiogram on 10/7/2024 showed left ventricular ejection fraction of 63.9% with normal left atrial size and negative bubble study  -LDL on 10/7/2024 was 58  -A1c from 10/7/2024 was 5.6%    Recommendations  -Continue dual antiplatelet with aspirin 81 mg daily and  ticagrelor 90 mg twice daily for secondary stroke prevention in the setting of new stent and basilar artery  -Continue atorvastatin 80 mg nightly for secondary stroke prevention Target LDL less than 55  -PT/OT/SLP  -NIH stroke scale every 2 hours.  Low threshold for repeated CT head without contrast plus CTA head and neck for any neurological changes.  -Target blood pressure goals of normotension     #Essential hypertension  -Target blood pressure goals of normotension     #Hyperlipidemia  -Continue atorvastatin 80mg nightly; monitor LFTs as they are slightly elevated      Stroke will continue to follow. Please call for any further questions or concerns  =================================  Hugo Laureano MD, Msc, PhD  Vascular Neurologist  [facility]

## 2024-10-08 LAB
ABO GROUP BLD: NORMAL
ABO GROUP BLD: NORMAL
ANION GAP SERPL CALCULATED.3IONS-SCNC: 11 MMOL/L (ref 5–15)
BLD GP AB SCN SERPL QL: NEGATIVE
BUN SERPL-MCNC: 25 MG/DL (ref 8–23)
BUN/CREAT SERPL: 49 (ref 7–25)
CALCIUM SPEC-SCNC: 8.7 MG/DL (ref 8.6–10.5)
CHLORIDE SERPL-SCNC: 111 MMOL/L (ref 98–107)
CO2 SERPL-SCNC: 20 MMOL/L (ref 22–29)
CREAT SERPL-MCNC: 0.51 MG/DL (ref 0.57–1)
DEPRECATED RDW RBC AUTO: 45.6 FL (ref 37–54)
EGFRCR SERPLBLD CKD-EPI 2021: 105.7 ML/MIN/1.73
ERYTHROCYTE [DISTWIDTH] IN BLOOD BY AUTOMATED COUNT: 13.2 % (ref 12.3–15.4)
GLUCOSE SERPL-MCNC: 149 MG/DL (ref 65–99)
HCT VFR BLD AUTO: 38.8 % (ref 34–46.6)
HCT VFR BLD AUTO: 39 % (ref 34–46.6)
HGB BLD-MCNC: 12.4 G/DL (ref 12–15.9)
HGB BLD-MCNC: 13.1 G/DL (ref 12–15.9)
INR PPP: 0.97 (ref 0.89–1.12)
MCH RBC QN AUTO: 31.9 PG (ref 26.6–33)
MCHC RBC AUTO-ENTMCNC: 33.8 G/DL (ref 31.5–35.7)
MCV RBC AUTO: 94.4 FL (ref 79–97)
PLATELET # BLD AUTO: 248 10*3/MM3 (ref 140–450)
PMV BLD AUTO: 10.8 FL (ref 6–12)
POTASSIUM SERPL-SCNC: 4 MMOL/L (ref 3.5–5.2)
PROTHROMBIN TIME: 13 SECONDS (ref 12.2–14.5)
RBC # BLD AUTO: 4.11 10*6/MM3 (ref 3.77–5.28)
RH BLD: POSITIVE
RH BLD: POSITIVE
SODIUM SERPL-SCNC: 142 MMOL/L (ref 136–145)
T&S EXPIRATION DATE: NORMAL
WBC NRBC COR # BLD AUTO: 18.18 10*3/MM3 (ref 3.4–10.8)

## 2024-10-08 PROCEDURE — 86900 BLOOD TYPING SEROLOGIC ABO: CPT

## 2024-10-08 PROCEDURE — 86900 BLOOD TYPING SEROLOGIC ABO: CPT | Performed by: STUDENT IN AN ORGANIZED HEALTH CARE EDUCATION/TRAINING PROGRAM

## 2024-10-08 PROCEDURE — 63710000001 DEXAMETHASONE PER 0.25 MG: Performed by: INTERNAL MEDICINE

## 2024-10-08 PROCEDURE — 99233 SBSQ HOSP IP/OBS HIGH 50: CPT | Performed by: STUDENT IN AN ORGANIZED HEALTH CARE EDUCATION/TRAINING PROGRAM

## 2024-10-08 PROCEDURE — P9016 RBC LEUKOCYTES REDUCED: HCPCS

## 2024-10-08 PROCEDURE — 86850 RBC ANTIBODY SCREEN: CPT | Performed by: STUDENT IN AN ORGANIZED HEALTH CARE EDUCATION/TRAINING PROGRAM

## 2024-10-08 PROCEDURE — 86901 BLOOD TYPING SEROLOGIC RH(D): CPT | Performed by: STUDENT IN AN ORGANIZED HEALTH CARE EDUCATION/TRAINING PROGRAM

## 2024-10-08 PROCEDURE — 85610 PROTHROMBIN TIME: CPT

## 2024-10-08 PROCEDURE — 99232 SBSQ HOSP IP/OBS MODERATE 35: CPT | Performed by: INTERNAL MEDICINE

## 2024-10-08 PROCEDURE — 86920 COMPATIBILITY TEST SPIN: CPT

## 2024-10-08 PROCEDURE — 85018 HEMOGLOBIN: CPT | Performed by: INTERNAL MEDICINE

## 2024-10-08 PROCEDURE — 92526 ORAL FUNCTION THERAPY: CPT

## 2024-10-08 PROCEDURE — 85014 HEMATOCRIT: CPT | Performed by: INTERNAL MEDICINE

## 2024-10-08 PROCEDURE — 36430 TRANSFUSION BLD/BLD COMPNT: CPT

## 2024-10-08 PROCEDURE — 85027 COMPLETE CBC AUTOMATED: CPT | Performed by: INTERNAL MEDICINE

## 2024-10-08 PROCEDURE — 86901 BLOOD TYPING SEROLOGIC RH(D): CPT

## 2024-10-08 PROCEDURE — 25010000002 ATROPINE SULFATE

## 2024-10-08 PROCEDURE — 25010000002 DEXAMETHASONE PER 1 MG

## 2024-10-08 PROCEDURE — 99231 SBSQ HOSP IP/OBS SF/LOW 25: CPT | Performed by: RADIOLOGY

## 2024-10-08 PROCEDURE — 80048 BASIC METABOLIC PNL TOTAL CA: CPT | Performed by: INTERNAL MEDICINE

## 2024-10-08 RX ORDER — ATORVASTATIN CALCIUM 40 MG/1
80 TABLET, FILM COATED ORAL NIGHTLY
Status: DISCONTINUED | OUTPATIENT
Start: 2024-10-08 | End: 2024-10-11 | Stop reason: HOSPADM

## 2024-10-08 RX ORDER — BISACODYL 10 MG
10 SUPPOSITORY, RECTAL RECTAL DAILY PRN
Status: DISCONTINUED | OUTPATIENT
Start: 2024-10-08 | End: 2024-10-11 | Stop reason: HOSPADM

## 2024-10-08 RX ORDER — CEFUROXIME AXETIL 250 MG/1
250 TABLET ORAL EVERY 12 HOURS SCHEDULED
Status: COMPLETED | OUTPATIENT
Start: 2024-10-08 | End: 2024-10-08

## 2024-10-08 RX ORDER — AMOXICILLIN 250 MG
2 CAPSULE ORAL 2 TIMES DAILY PRN
Status: DISCONTINUED | OUTPATIENT
Start: 2024-10-08 | End: 2024-10-11 | Stop reason: HOSPADM

## 2024-10-08 RX ORDER — ASPIRIN 81 MG/1
81 TABLET, CHEWABLE ORAL DAILY
Status: DISCONTINUED | OUTPATIENT
Start: 2024-10-09 | End: 2024-10-11 | Stop reason: HOSPADM

## 2024-10-08 RX ORDER — LABETALOL HYDROCHLORIDE 5 MG/ML
10 INJECTION, SOLUTION INTRAVENOUS EVERY 4 HOURS PRN
Status: DISCONTINUED | OUTPATIENT
Start: 2024-10-08 | End: 2024-10-11 | Stop reason: HOSPADM

## 2024-10-08 RX ORDER — ASPIRIN 300 MG/1
300 SUPPOSITORY RECTAL DAILY
Status: DISCONTINUED | OUTPATIENT
Start: 2024-10-09 | End: 2024-10-11 | Stop reason: HOSPADM

## 2024-10-08 RX ORDER — FAMOTIDINE 20 MG/1
20 TABLET, FILM COATED ORAL
Status: DISCONTINUED | OUTPATIENT
Start: 2024-10-08 | End: 2024-10-11 | Stop reason: HOSPADM

## 2024-10-08 RX ORDER — POLYETHYLENE GLYCOL 3350 17 G/17G
17 POWDER, FOR SOLUTION ORAL DAILY PRN
Status: DISCONTINUED | OUTPATIENT
Start: 2024-10-08 | End: 2024-10-11 | Stop reason: HOSPADM

## 2024-10-08 RX ORDER — DEXAMETHASONE 4 MG/1
4 TABLET ORAL EVERY 6 HOURS SCHEDULED
Status: DISCONTINUED | OUTPATIENT
Start: 2024-10-08 | End: 2024-10-08

## 2024-10-08 RX ORDER — ACETAMINOPHEN 325 MG/1
650 TABLET ORAL EVERY 6 HOURS PRN
Status: DISCONTINUED | OUTPATIENT
Start: 2024-10-08 | End: 2024-10-11 | Stop reason: HOSPADM

## 2024-10-08 RX ADMIN — FAMOTIDINE 20 MG: 20 TABLET, FILM COATED ORAL at 18:33

## 2024-10-08 RX ADMIN — ASPIRIN 81 MG 81 MG: 81 TABLET ORAL at 08:06

## 2024-10-08 RX ADMIN — TICAGRELOR 90 MG: 90 TABLET ORAL at 21:29

## 2024-10-08 RX ADMIN — FAMOTIDINE 20 MG: 20 TABLET, FILM COATED ORAL at 08:20

## 2024-10-08 RX ADMIN — DEXAMETHASONE SODIUM PHOSPHATE 4 MG: 4 INJECTION, SOLUTION INTRA-ARTICULAR; INTRALESIONAL; INTRAMUSCULAR; INTRAVENOUS; SOFT TISSUE at 01:47

## 2024-10-08 RX ADMIN — TICAGRELOR 90 MG: 90 TABLET ORAL at 08:06

## 2024-10-08 RX ADMIN — CEFUROXIME AXETIL 250 MG: 250 TABLET, FILM COATED ORAL at 21:34

## 2024-10-08 RX ADMIN — ATROPINE SULFATE: 0.1 INJECTION INTRAVENOUS at 19:30

## 2024-10-08 RX ADMIN — ACETAMINOPHEN 650 MG: 325 TABLET ORAL at 08:20

## 2024-10-08 RX ADMIN — Medication 10 ML: at 21:30

## 2024-10-08 RX ADMIN — DEXAMETHASONE 4 MG: 4 TABLET ORAL at 12:39

## 2024-10-08 RX ADMIN — DEXAMETHASONE SODIUM PHOSPHATE 4 MG: 4 INJECTION, SOLUTION INTRA-ARTICULAR; INTRALESIONAL; INTRAMUSCULAR; INTRAVENOUS; SOFT TISSUE at 08:06

## 2024-10-08 RX ADMIN — CEFUROXIME AXETIL 250 MG: 250 TABLET, FILM COATED ORAL at 12:39

## 2024-10-08 RX ADMIN — ATORVASTATIN CALCIUM 80 MG: 40 TABLET, FILM COATED ORAL at 21:29

## 2024-10-08 NOTE — PLAN OF CARE
Goal Outcome Evaluation:  Patient vitals and neuro exam stable overnight. Orders to the floor, waiting for bed to be assigned. Patient was able to void spontaneously after bladder scan, 300 urine output total.

## 2024-10-08 NOTE — PROGRESS NOTES
Stroke Progress Note       Chief Complaint: headache      Subjective    Subjective     Subjective:   No headache   No new issues     Review of Systems   Constitutional: No fatigue  HENT: Negative for nosebleeds and rhinorrhea.    Eyes: Negative for redness.   Respiratory: Negative for cough.    Gastrointestinal: Negative for anal bleeding.   Endocrine: Negative for polydipsia.   Genitourinary: Negative for enuresis and urgency.   Musculoskeletal: Negative for joint swelling.   Neurological: Negative for tremors.   Psychiatric/Behavioral: Negative for hallucinations.      Objective      Temp:  [97.5 °F (36.4 °C)-98.6 °F (37 °C)] 97.6 °F (36.4 °C)  Heart Rate:  [53-72] 58  Resp:  [12-16] 14  BP: (106-142)/(63-81) 114/70          NEURO    MENTAL STATUS: AAOx3, memory intact, fund of knowledge appropriate    LANG/SPEECH: Naming and repetition intact, fluent, follows 3-step commands    CRANIAL NERVES:    Pupils equal and reactive, EOMI intact, no gaze deviation, no nystagmus  No facial droop, cough and gag intact, shoulder shrug intact, tongue midline     MOTOR:  Moves all extremities equally    SENSORY: Normal to light touch all throughout     COORDINATION: Normal finger to nose and heel to shin, no tremor, no dysmetria    STATION: Not assessed due to patient condition    GAIT: Not assessed due to patient condition       Results Review:    I reviewed the patient's new clinical results.    Lab Results (last 24 hours)       Procedure Component Value Units Date/Time    Basic Metabolic Panel [[id number]]  (Abnormal) Collected: 10/08/24 0404    Specimen: Blood Updated: 10/08/24 0454     Glucose 149 mg/dL      BUN 25 mg/dL      Creatinine 0.51 mg/dL      Sodium 142 mmol/L      Potassium 4.0 mmol/L      Chloride 111 mmol/L      CO2 20.0 mmol/L      Calcium 8.7 mg/dL      BUN/Creatinine Ratio 49.0     Anion Gap 11.0 mmol/L      eGFR 105.7 mL/min/1.73     Narrative:      GFR Normal >60  Chronic Kidney Disease <60  Kidney Failure  <15      CBC (No Diff) [[id number]]  (Abnormal) Collected: 10/08/24 0404    Specimen: Blood Updated: 10/08/24 0425     WBC 18.18 10*3/mm3      RBC 4.11 10*6/mm3      Hemoglobin 13.1 g/dL      Hematocrit 38.8 %      MCV 94.4 fL      MCH 31.9 pg      MCHC 33.8 g/dL      RDW 13.2 %      RDW-SD 45.6 fl      MPV 10.8 fL      Platelets 248 10*3/mm3     POC Glucose Once [[id number]]  (Normal) Collected: 10/07/24 1218    Specimen: Blood Updated: 10/07/24 1222     Glucose 93 mg/dL           MRI Brain Without Contrast    Result Date: 10/7/2024  Impression: 1.Small likely subacute infarct involving periventricular left occipital lobe. No hemorrhagic transformation. 2.Stable prominent basilar artery aneurysm with mass effect on adjacent brainstem. 3.Moderate paranasal sinus mucosal disease with air-fluid level in left maxillary sinus. Correlate for acute sinusitis 4.Partial left mastoid effusion. Electronically Signed: Chris Acuña MD  10/7/2024 12:30 PM EDT  Workstation ID: [id number]    CT Head Without Contrast    Result Date: 10/6/2024  Impression: No acute intracranial pathology. Sinusitis. Electronically Signed: Hal Davila MD  10/6/2024 6:34 PM EDT  Workstation ID: [id number]    XR Abdomen KUB    Result Date: 10/6/2024  Impression: 1. Advanced nasogastric tube now oriented antegrade over distal stomach. 2. Similar large volume formed stool without evidence of obstruction suggesting constipation. Electronically Signed: Bryon Yoon MD  10/6/2024 1:40 PM EDT  Workstation ID: [id number]    XR Abdomen KUB    Result Date: 10/6/2024  Impression: 1. Antegrade oriented nasogastric tube terminates over mid to distal stomach. 2. Large volume formed stool without dilated loops of bowel to suggest obstruction. Findings suggest constipation. Electronically Signed: Bryon Yoon MD  10/6/2024 1:35 PM EDT  Workstation ID: [id number]    XR Chest 1 View    Result Date: 10/6/2024  Impression: Medical support devices appear in standard  position. No new airspace process or pneumothorax. Electronically Signed: Bryon Yoon MD  10/6/2024 1:34 PM EDT  Workstation ID: [id number]    XR Chest 1 View    Result Date: 10/6/2024  Impression: No active pulmonary process. Electronically Signed: Bryon Yoon MD  10/6/2024 11:28 AM EDT  Workstation ID: [id number]    CT Angiogram Head w AI Analysis of LVO    Result Date: 10/6/2024  1.Focal 3 mm nonopacification of the basilar artery just distal to the vascular stent suspicious for high-grade stenoses or occlusion possibly related to thrombus. Asymmetric decreased opacification in the distal right vertebral artery likely related to changes in flow dynamics without additional area of focal high-grade stenoses or occlusion. Findings likely explains cerebellar abnormalities on CT perfusion. 2.Other major intracranial arterial vasculature widely patent. 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 Findings communicated over the phone with ordering provider Dr. Boyer at 10:48 a.m. 10/6/2024. Patient in route to conventional angiogram with neurosurgery at time of phone call. Electronically Signed: Bryon Yoon MD  10/6/2024 10:44 AM EDT  Workstation ID: [id number]    CT Angiogram Neck    Result Date: 10/6/2024  1.Focal 3 mm nonopacification of the basilar artery just distal to the vascular stent suspicious for high-grade stenoses or occlusion possibly related to thrombus. Asymmetric decreased opacification in the distal right vertebral artery likely related to changes in flow dynamics without additional area of focal high-grade stenoses or occlusion. Findings likely explains cerebellar abnormalities on CT perfusion. 2.Other major intracranial arterial vasculature widely patent. 3.Stable CTA appearance of the treated basilar artery aneurysm with curvilinear area of contrast opacification along the lateral aspect of  the stent. Findings communicated over the phone with ordering provider Dr. Boyer at 10:48 a.m. 10/6/2024. Patient in route to conventional angiogram with neurosurgery at time of phone call. Electronically Signed: Bryon Yoon MD  10/6/2024 10:44 AM EDT  Workstation ID: [id number]    CT CEREBRAL PERFUSION WITH & WITHOUT CONTRAST    Result Date: 10/6/2024  Negative for completed infarct. 14 mL region of Tmax greater than 6 seconds in the right cerebellum. Electronically Signed: Bryon Yoon MD  10/6/2024 10:28 AM EDT  Workstation ID: [id number]    CT Head Without Contrast Stroke Protocol    Result Date: 10/6/2024  Impression: 1. New periventricular hypodensities at right frontal parietal junction and left parietal lobe which could reflect recent (acute or subacute) infarcts. No associated acute hemorrhage or significant mass effect. Findings could be further assessed by dedicated MRI. 2. Slight increasing dilation of third and lateral ventricles with more conspicuous sulcal and gyral crowding at the vertex. Findings could reflect progressive mass effect at the level of the fourth ventricle related to known treated aneurysm, although size of the excluded aneurysm has not significantly changed measuring 2.5 x 2.6 cm (previously 2.4 x 2.6 cm). Consider neurosurgical consultation. 3. Underlying periventricular hypodensity suggesting chronic microvascular ischemic change similar to prior. Transependymal edema would be considered less likely given stability. Electronically Signed: Bryon Yoon MD  10/6/2024 10:03 AM EDT  Workstation ID: [id number]   Results for orders placed during the hospital encounter of 10/06/24    Adult Transthoracic Echo Complete W/ Cont if Necessary Per Protocol    Interpretation Summary    Left ventricular systolic function is normal. Estimated left ventricular EF = 65%    Left ventricular diastolic function is consistent with (grade I) impaired relaxation.    The cardiac valves are anatomically  and functionally normal.    Estimated right ventricular systolic pressure from tricuspid regurgitation is normal (<35 mmHg).    Saline test results are negative.            Assessment/Plan     Assessment/Plan:  Acute basilar occlusion s/p MT (  thrombosis of her pipeline stent and basilar artery. )  -prior to the event, patient was transitioned to monotherapy with aspirin   -After the MT, transitioned to aspirin and brillinta for now   -clinically doing very, well, excellent candidate for acute inpatient rehab  -normal blood pressure goals   -okay for the floor/ rehab      Stroke team will continue to follow the patient.         Shay Purvis MD  10/08/24  08:59 EDT

## 2024-10-08 NOTE — PROGRESS NOTES
Pulmonary/Critical Care Follow-up     LOS: 2 days   Patient Care Team:  Melissa Lazo APRN as PCP - General (Nurse Practitioner)  Erika Whatley MD as Consulting Physician (Psychiatry)        Chief Complaint   Patient presents with    Stroke     Subjective     History reviewed and updated in EMR as indicated.    Interval History:     Patient is awake and alert.  No current complaint.  Does not have much appetite.  Denies headache.  No fevers or chills.      History taken from: Patient    PMH/FH/Social History were reviewed and updated appropriately in the electronic medical record.     Review of Systems:    Review of 14 systems was completed with positives and pertinent negatives noted in the subjective section.  All other systems reviewed and are negative.       Objective     Vital Signs  Temp:  [97.5 °F (36.4 °C)-99.4 °F (37.4 °C)] 98.4 °F (36.9 °C)  Heart Rate:  [53-72] 70  Resp:  [14-16] 16  BP: (106-142)/(63-81) 131/79  10/07 0701 - 10/08 0700  In: 260 [P.O.:200]  Out: 1000 [Urine:1000]  Body mass index is 24.21 kg/m².     IV drips:  niCARdipine, Last Rate: 10 mg/hr (10/06/24 1320)       Physical Exam:     Constitutional:   Alert, in no acute distress   Head:   Normocephalic, atraumatic   Eyes:           Lids and lashes normal, conjunctivae and sclerae normal.  PER   ENMT:  Ears appear intact with no abnormalities noted     Lips normal.     Neck:  Trachea midline, no JVD   Lungs/Resp:    Normal effort, symmetric chest rise, no crepitus, clear to      auscultation bilaterally.               Heart/CV:   Regular rhythm and normal rate, no murmur   Abdomen/GI:    Soft, nontender, nondistended   :    Deferred   Extremities/MSK:  No clubbing or cyanosis.  No edema.     Pulses:  Pulses palpable and equal bilaterally   Skin:  No bleeding, bruising or rash   Heme/Lymph:  No cervical or supraclavicular adenopathy.   Neurologic:    Psychiatric:    Moves all extremities with no obvious focal motor deficit.  Cranial  nerves 2 - 12 grossly intact  Non-agitated, normal affect.    The above physical exam findings were reviewed and reflect my exam findings as of today's exam.   Electronically signed by:  Bryon Garner MD  10/08/24  14:17 EDT      Results Review:     I reviewed the patient's new clinical results.   Results from last 7 days   Lab Units 10/08/24  0404 10/07/24  0555 10/06/24  1038   SODIUM mmol/L 142 143 141   POTASSIUM mmol/L 4.0 4.0 4.3   CHLORIDE mmol/L 111* 112* 110*   CO2 mmol/L 20.0* 20.0* 19.0*   BUN mg/dL 25* 21 22   CREATININE mg/dL 0.51* 0.43* 0.69   CALCIUM mg/dL 8.7 8.7 8.6   BILIRUBIN mg/dL  --  0.3 0.3   ALK PHOS U/L  --  98 124*   ALT (SGPT) U/L  --  49* 65*   AST (SGOT) U/L  --  22 34*   GLUCOSE mg/dL 149* 126* 82     Results from last 7 days   Lab Units 10/08/24  0404 10/07/24  0555 10/06/24  0959   WBC 10*3/mm3 18.18* 14.67* 7.19   HEMOGLOBIN g/dL 13.1 13.8 14.6   HEMOGLOBIN, POC g/dL  --   --  14.6   HEMATOCRIT % 38.8 41.7 46.0   HEMATOCRIT POC %  --   --  43   PLATELETS 10*3/mm3 248 252 242     Results from last 7 days   Lab Units 10/06/24  1549   PH, ARTERIAL pH units 7.373   PO2 ART mm Hg 195.0*   PCO2, ARTERIAL mm Hg 28.9*   HCO3 ART mmol/L 16.8*     Results from last 7 days   Lab Units 10/07/24  0555 10/06/24  1038   MAGNESIUM mg/dL  --  2.4   PHOSPHORUS mg/dL 4.1  --        I reviewed the patient's new imaging including images and reports.    MRI brain 10/7/2024:    Impression:  1.Small likely subacute infarct involving periventricular left occipital lobe. No hemorrhagic transformation.  2.Stable prominent basilar artery aneurysm with mass effect on adjacent brainstem.  3.Moderate paranasal sinus mucosal disease with air-fluid level in left maxillary sinus. Correlate for acute sinusitis  4.Partial left mastoid effusion.    Medication Review:   [START ON 10/9/2024] aspirin, 81 mg, Oral, Daily   Or  [START ON 10/9/2024] aspirin, 300 mg, Rectal, Daily  atorvastatin, 80 mg, Oral,  Nightly  cefuroxime, 250 mg, Oral, Q12H  dexAMETHasone, 4 mg, Oral, Q6H  famotidine, 20 mg, Oral, BID AC  sodium chloride, 10 mL, Intravenous, Q12H  ticagrelor, 90 mg, Oral, BID      niCARdipine, 5-15 mg/hr, Last Rate: 10 mg/hr (10/06/24 1320)        Assessment & Plan         Acute CVA    Cerebral aneurysm, nonruptured    Acute cystitis without hematuria    63 y.o. 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 Savage 2022 and 2023), who presented to the emergency department via private vehicle for evaluation of left-sided weakness and facial droop present on awakening on 10/6/2024.    She apparently had a migraine headache and took a home dose of Ubrelvy which helped her headache a bit.  She had difficulty ambulating on awakening with left side weakness.  She also was treated for a UTI with cefuroxime (day 3/7).    CT head showed periventricular hypodensities right frontal parietal junction and left parietal lobe felt to reflect possible acute or subacute infarcts.  Patient has recently been taken off of her Plavix on follow-up with Dr. Savage.  She was taking aspirin 81 mg daily.    Patient was taken to the Cath Lab for mechanical thrombectomy by Dr. Mendez which was successful with placement of stent for in-stent stenosis.    Patient is currently doing well.  Awaiting telemetry    Plan:  1.  For acute CVA: Secondary to thrombosis of basilar artery aneurysm stent status post thrombectomy and stent placement for in-stent stenosis.  Doing well.  Continue aspirin, statin, Brilinta.  Plan to follow-up in neurointerventional clinic in 1 month.  Blood pressure management for goal of normal blood pressure.  Stroke neurology following.  Continue PT/OT/speech therapy.  I will go ahead and discontinue Decadron at this point and I have asked the neurology service  about this.  2.  For acute respiratory failure status postintubation: Successfully extubated.  Doing well on room air currently.  3.  For hypertension: Labetalol as needed.  4.  For hyperlipidemia: Continue statin.  5.  DVT prophylaxis: SCDs  6.  For UTI present on admission: Continue Rocephin.  Try to get outside urine cultures.    Inpatient rehab facility recommended.    Okay to telemetry/hospitalist.    Electronically signed by:    Bryon Garner MD  10/08/24  14:17 EDT      *. Please note that portions of this note were completed with [employer] - a voice recognition program.

## 2024-10-08 NOTE — PROGRESS NOTES
NAME: MEDHAT PAL  : 1960  PCP: Melissa Lazo APRN  ADMITTING PHYSICIAN: Zafar Hager MD    DATE OF ADMISSION:  10/6/2024  DATE OF SERVICE: 10/8/2024    HOSPITAL DAY:  2 days    HISTORY OF PRESENT ILLNESS:  63 y.o. 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  She was seen in the neurointerventional clinic on 2024, and her Plavix was stopped, but she remained on low-dose aspirin.    Ms. Pal presented to the emergency department on 10/6/2024 with acute thrombosis of her pipeline stent and basilar artery.  She underwent successful mechanical thrombectomy by my partner Dr. Mendez, restoring normal (TICI 3) flow.  This did unmask a stenosis within the mid portions of the Pipeline stent construct, and this was treated with a 2.5 mm coronary MICHELLE, restoring robust flow within the vertebrobasilar system.    REVIEW OF IMAGING:  MRI on 10/7/2024 demonstrates punctate area of infarct within the right cerebellum and the left occipital lobe, but there are no appreciable brainstem infarcts.  Mass effect on the adjacent brainstem from the giant basilar aneurysm is unchanged.  There is stable ventriculomegaly.    LABS:  Lab Results   Component Value Date    WBC 18.18 (H) 10/08/2024    HGB 13.1 10/08/2024    HCT 38.8 10/08/2024    MCV 94.4 10/08/2024     10/08/2024     Lab Results   Component Value Date    GLUCOSE 149 (H) 10/08/2024    CALCIUM 8.7 10/08/2024     10/08/2024    K 4.0 10/08/2024    CO2 20.0 (L) 10/08/2024     (H) 10/08/2024    BUN 25 (H) 10/08/2024    CREATININE 0.51 (L) 10/08/2024    EGFRIFNONA 103 2019    BCR 49.0 (H) 10/08/2024    ANIONGAP 11.0 10/08/2024       CURRENT MEDS:  Current Facility-Administered Medications   Medication Dose Route Frequency Provider Last Rate Last Admin     acetaminophen (TYLENOL) tablet 650 mg  650 mg Oral Q6H PRN Yasemin Srivastava APRN   650 mg at 10/08/24 0820    [START ON 10/9/2024] aspirin chewable tablet 81 mg  81 mg Oral Daily Prebble, Fahad, RPH        Or    [START ON 10/9/2024] aspirin suppository 300 mg  300 mg Rectal Daily Prebble, Fahad, RPH        atorvastatin (LIPITOR) tablet 80 mg  80 mg Oral Nightly Prebble, Fahad, RPH        sennosides-docusate (PERICOLACE) 8.6-50 MG per tablet 2 tablet  2 tablet Oral BID PRN Prebble, Fahad, RPH        And    polyethylene glycol (MIRALAX) packet 17 g  17 g Oral Daily PRN Prebble, Fahad, RPH        And    bisacodyl (DULCOLAX) suppository 10 mg  10 mg Rectal Daily PRN Prebble, Fahad, RPH        Calcium Replacement - Follow Nurse / BPA Driven Protocol   Does not apply PRN Lety Matthews APRN        cefuroxime (CEFTIN) tablet 250 mg  250 mg Oral Q12H Bryon Garner MD        dexAMETHasone (DECADRON) tablet 4 mg  4 mg Oral Q6H Bryon Garner MD        famotidine (PEPCID) tablet 20 mg  20 mg Oral BID AC Zafar Hager MD   20 mg at 10/08/24 0820    Magnesium Cardiology Dose Replacement - Follow Nurse / BPA Driven Protocol   Does not apply PRN Lety Matthews APRN        niCARdipine (CARDENE) 25mg in 250mL NS infusion  5-15 mg/hr Intravenous Titrated Ashley Acharya APRN 100 mL/hr at 10/06/24 1320 10 mg/hr at 10/06/24 1320    nitroglycerin (NITROSTAT) SL tablet 0.4 mg  0.4 mg Sublingual Q5 Min PRN Ashley Acharya APRN        Phosphorus Replacement - Follow Nurse / BPA Driven Protocol   Does not apply PRN Lety Matthews APRN        Potassium Replacement - Follow Nurse / BPA Driven Protocol   Does not apply PRN Lety Matthews APRN        sodium chloride 0.9 % flush 10 mL  10 mL Intravenous Q12H Lety Matthews APRN   10 mL at 10/07/24 2016    sodium chloride 0.9 % flush 10 mL  10 mL Intravenous PRN Lety Matthews APRN        ticagrelor (BRILINTA) tablet 90 mg  90 mg  Oral BID Prebble, Fahad, [facility]           PHYSICAL EXAM:  Vitals:    10/08/24 1000   BP: 132/69   Pulse: 69   Resp:    Temp:    SpO2: 99%      Alert and oriented x 3.  Her speech is clear.  I do not appreciate a facial droop.  She has antigravity strength in the bilateral upper and lower extremities.  She is feeding herself, and tolerating p.o. intake very well.  I did not attempt to ambulate her.    ASSESSMENT/PLAN:  Ms. Romo is a 63 y.o. 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  She was taken off of Plavix in September 2024 (remained on low-dose aspirin), and presented on 10/6/2024 with acute thrombosis of her basilar artery and Pipeline flow diverter.    Ms. Romo underwent successful mechanical thrombectomy along with placement of a 2.5 mm coronary MICHELLE within the mid portions of the Pipeline stent construct (at side for symptomatic stenosis), restoring normal (TICI 3) flow to the intracranial vertebrobasilar system.    Given the severity of her presenting stroke symptoms, Ms. Romo has made an exceptional recovery to this point, and has only a few punctate areas of infarct on her MRI (not in the brainstem).  I am very optimistic for her making a meaningful recovery, albeit she will require inpatient physical therapy, and she is okay to transfer to [facility] from a neurointerventional standpoint.    She will need to remain on her Brilinta/aspirin dual antiplatelet regimen for the next month or so, at which point I will likely transition her to a Plavix/aspirin regimen.    She will follow-up with me in the neurointerventional clinic in 1 month's time.

## 2024-10-08 NOTE — PLAN OF CARE
Problem: Adult Inpatient Plan of Care  Goal: Plan of Care Review  Outcome: Progressing  Flowsheets  Taken 10/8/2024 1712 by Jessica Chin RN  Plan of Care Reviewed With:   patient   spouse  Taken 10/8/2024 1238 by Leidy Villaseñor MS CCC-SLP  Progress: improving   Goal Outcome Evaluation:  Plan of Care Reviewed With: patient, spouse      Patient is alert and oriented. HAWK, with weakness in LLE. PERRTL. Speak with slight slur. On RA, sat 98%, SB-NSR with -140. Afebrile. Up to chair with 1-2 assists. Going to [facility] tomorrow at approximately 2:30 pm.

## 2024-10-08 NOTE — PLAN OF CARE
Goal Outcome Evaluation:  Plan of Care Reviewed With: patient, spouse        Progress: improving         Anticipated Discharge Disposition (SLP): inpatient rehabilitation facility             Treatment Assessment (SLP): continued, toleration of diet, improved, dysarthria (10/08/24 1030)  Treatment Assessment Comments (SLP): Continued mild oropharyngeal deficits suspected. Also suspect baseline esophageal dysphagia based on pt's complaints and review of history. Dysarthria resolved. (10/08/24 1030)  Plan for Continued Treatment (SLP): goals adjusted to reflect functional improvements demonstrated (10/08/24 1030)

## 2024-10-08 NOTE — THERAPY TREATMENT NOTE
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 Cerebral aneurysm  I67.1 437.3   3. History of stroke  Z86.73 V12.54   4. Oropharyngeal dysphagia  R13.12 787.22     Patient Active Problem List   Diagnosis    Acute CVA    Cerebral aneurysm, nonruptured    Paraesophageal hernia    Hyperlipidemia    HTN    Moderate malnutrition    History of CVA (cerebrovascular accident)    Other headache syndrome    Acute cystitis without hematuria     Past Medical History:   Diagnosis Date    Aneurysm 2022    Cancer 2024    Basal Cell Carcinoma removed from scalp by dermatology    Cluster headache     Had headaches for several months before stroke and mass was found    Difficulty walking 22    After stroke    Headache     Headache, tension-type     Hyperlipidemia     Hypertension     Memory loss 22    Migraine     Stroke 2022    Vision loss 22    When dizzy or headache     Past Surgical History:   Procedure Laterality Date    ARTERIAL ANEURYSM REPAIR      BREAST LUMPECTOMY  2012    CYST REMOVAL Left 2023    EMBOLIZATION CEREBRAL N/A 2022    Procedure: CV EMBOLIZATION CEREBRAL IR;  Surgeon: Enoch Savage MD;  Location: [facility] CATH INVASIVE LOCATION;  Service: Interventional Radiology;  Laterality: N/A;    HIATAL HERNIA REPAIR      INTERVENTIONAL RADIOLOGY PROCEDURE N/A 09/15/2023    Procedure: Embolization;  Surgeon: Enoch Savage MD;  Location:  [facility] CATH INVASIVE LOCATION;  Service: Interventional Radiology;  Laterality: N/A;    INTERVENTIONAL RADIOLOGY PROCEDURE N/A 10/6/2024    Procedure: IR mechanical thrombectomy;  Surgeon: Joe Mendez MD;  Location:  [facility] CATH INVASIVE LOCATION;  Service: Interventional  "Radiology;  Laterality: N/A;    SINUS SURGERY      x4    SKIN CANCER EXCISION      cancer removed off top of head       SLP Recommendation and Plan     SLP Diet Recommendation: regular textures, thin liquids (10/08/24 1030)                    Anticipated Discharge Disposition (SLP): inpatient rehabilitation facility (10/08/24 1030)                    Daily Summary of Progress (SLP): progress toward functional goals as expected (10/08/24 1030)               Treatment Assessment (SLP): continued, toleration of diet, improved, dysarthria (10/08/24 1030)  Treatment Assessment Comments (SLP): Continued mild oropharyngeal deficits suspected. Also suspect baseline esophageal dysphagia based on pt's complaints and review of history. Dysarthria resolved. (10/08/24 1030)  Plan for Continued Treatment (SLP): goals adjusted to reflect functional improvements demonstrated (10/08/24 1030)         Plan of Care Reviewed With: patient, spouse  Progress: improving      SWALLOW EVALUATION (Last 72 Hours)       SLP Adult Swallow Evaluation       Row Name 10/08/24 1030 10/07/24 1310 10/07/24 0930             Rehab Evaluation    Document Type therapy note (daily note)  -AC evaluation  -RS evaluation  -RS      Subjective Information no complaints  -AC no complaints  -RS no complaints  -RS      Patient Observations alert;cooperative  -AC alert;cooperative  -RS alert;cooperative;agree to therapy  -RS      Patient/Family/Caregiver Comments/Observations Spouse present.  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 Savage, 2022 and 2023) who presents to [facility] ED for evaluation of left-sided weakness and facial droop which was present upon awakening. She is s/p mechanical thrombectomy after CTA head/neck revealed thrombosed pipeline stent. Pt was intubated for procedure. She has a very small nosebleed  2/2 large bore NGT  -RS      Current Method of Nutrition -- -- NPO;large-bore;nasogastric feedings  -RS      Precautions/Limitations, Vision -- -- WFL;for purposes of eval;difficult to assess  -RS      Precautions/Limitations, Hearing -- -- WFL;for purposes of eval  -RS      Prior Level of Function-Communication -- -- WFL  -RS      Prior Level of Function-Swallowing -- -- no diet consistency restrictions  -RS      Plans/Goals Discussed with -- -- patient and family;agreed upon  -RS         Pain    Additional Documentation -- Pain Scale: FACES Pre/Post-Treatment (Group)  -RS Pain Scale: FACES Pre/Post-Treatment (Group)  -RS         Pain Scale: Numbers Pre/Post-Treatment    Pretreatment Pain Rating 0/10 - no pain  -AC -- --      Posttreatment Pain Rating 0/10 - no pain  -AC -- --      Pre/Posttreatment Pain Comment Pt reported her headache is subsiding since recently given medication.  -AC -- --         Pain Scale: FACES Pre/Post-Treatment    Pain: FACES Scale, Pretreatment -- 0-->no hurt  -RS 0-->no hurt  -RS      Posttreatment Pain Rating -- 0-->no hurt  -RS 0-->no hurt  -RS         Oral Motor Structure and Function    Dentition Assessment -- -- natural, present and adequate  -RS      Secretion Management -- -- WNL/WFL  -RS      Mucosal Quality -- -- moist, healthy  -RS      Volitional Swallow -- -- delayed  -RS      Volitional Cough -- -- weak  -RS         Oral Musculature and Cranial Nerve Assessment    Oral Motor General Assessment -- -- generalized oral motor weakness;other (see comments)  worse on L  -RS         General Eating/Swallowing Observations    Respiratory Support Currently in  Use -- -- room air  -RS      Eating/Swallowing Skills -- -- fed by SLP  -RS      Positioning During Eating -- -- upright in chair  -RS      Utensils Used -- -- spoon;straw  -RS      Consistencies Trialed -- -- ice chips;thin liquids;pureed  -RS         Respiratory    Respiratory Status -- -- WFL  -RS         Clinical Swallow Eval    Oral Prep Phase -- -- WFL  -RS      Oral Transit -- -- WFL  -RS      Oral Residue -- -- WFL  -RS      Pharyngeal Phase -- -- suspected pharyngeal impairment  -RS      Esophageal Phase -- -- unremarkable  -RS         Pharyngeal Phase Concerns    Pharyngeal Phase Concerns -- -- multiple swallows  -RS      Multiple Swallows -- -- all consistencies  -RS      Pharyngeal Phase Concerns, Comment -- -- may be related to large bore NGT; however, pt high risk for silent aspiration so needs FEES  -RS         Fiberoptic Endoscopic Evaluation of Swallowing (FEES)    Risks/Benefits Reviewed -- risks/benefits explained;patient;family;agreed to eval  -RS --      Nasal Entry -- right:  -RS --      Scope serial number/identification -- [id number]  -RS --         Anatomy and Physiology    Anatomic Considerations -- edema;arytenoids  -RS --      Base of Tongue -- asymmetrical;range adequate  -RS --      Laryngeal Function Breathing -- decreased movement left  -RS --      Laryngeal Function Phonation -- decreased movement left  -RS --      Laryngeal Function to Breath Hold -- can't sustain closure;TVF contact  -RS --      Secretion Rating Scale (Sid et al. 1996) -- 1- secretions present around the laryngeal vestibule  -RS --      Secretion Description -- thin  -RS --      Ice Chips -- elicited swallow  -RS --      Spontaneous Swallow -- frequency adequate  -RS --      Sensory -- sensed scope  -RS --      Utensils Used -- Spoon;Cup;Straw  -RS --      Consistencies Trialed -- ice chips;thin liquids;nectar-thick liquids;pudding/puree;mixed consistency;regular textures  -RS --         FEES Interpretation    Oral  Phase -- prespill of liquids into pharynx  -RS --         Initiation of Pharyngeal Swallow    Initiation of Pharyngeal Swallow -- bolus in pyriform sinuses  -RS --      Pharyngeal Phase -- functional pharyngeal phase of swallow;impaired pharyngeal phase of swallowing  -RS --      Pharyngeal Phase, Comment -- Consistent prespill of thins to the pyriforms prior to the swallow 2/2 reduced tongue back strength and mistiming of the swallow. No pen/asp observed t/o even with attempts to fatigue and with large continuous drinks. SLP will tx dysphagia x5 sessions to increase the safety of the swallow  -RS --         Swallowing Quality of Life Assessment    Education and counseling provided Aspiration precautions  reflux precautions  -AC -- --         SLP Evaluation Clinical Impression    SLP Swallowing Diagnosis -- functional oral phase;functional pharyngeal phase  -RS R/O pharyngeal dysphagia  -RS      Functional Impact -- risk of aspiration/pneumonia  -RS risk of aspiration/pneumonia  -RS      Rehab Potential/Prognosis, Swallowing -- good, to achieve stated therapy goals  -RS adequate, monitor progress closely  -RS      Swallow Criteria for Skilled Therapeutic Interventions Met -- demonstrates skilled criteria  -RS --         SLP Treatment Clinical Impressions    Treatment Assessment (SLP) continued;toleration of diet;improved;dysarthria  -AC -- --      Treatment Assessment Comments (SLP) Continued mild oropharyngeal deficits suspected. Also suspect baseline esophageal dysphagia based on pt's complaints and review of history. Dysarthria resolved.  -AC -- --      Daily Summary of Progress (SLP) progress toward functional goals as expected  -AC -- --      Plan for Continued Treatment (SLP) goals adjusted to reflect functional improvements demonstrated  -AC -- --      Care Plan Review evaluation/treatment results reviewed;care plan/treatment goals reviewed;risks/benefits reviewed;current/potential barriers  reviewed;patient/other agree to care plan  - -- --      Care Plan Review, Other Participant(s) spouse  - -- --         Recommendations    Therapy Frequency (Swallow) -- 5 days per week  -RS --      Predicted Duration Therapy Intervention (Days) -- 1 week  -RS --      SLP Diet Recommendation regular textures;thin liquids  -AC regular textures;thin liquids  -RS NPO  -RS      Recommended Diagnostics -- other (see comments)  no repeat instrumental indicated unless decline in respiratory status  -RS FEES  -RS      Recommended Precautions and Strategies -- upright posture during/after eating;general aspiration precautions  -RS --      Oral Care Recommendations -- Oral Care BID/PRN;Toothbrush  -RS Oral Care BID/PRN;Suction toothbrush  -RS      SLP Rec. for Method of Medication Administration -- as tolerated  -RS meds via alternate route  -RS      Monitor for Signs of Aspiration -- yes;notify SLP if any concerns  -RS yes;notify SLP if any concerns  -RS      Anticipated Discharge Disposition (SLP) inpatient rehabilitation facility  -AC inpatient rehabilitation facility  -RS inpatient rehabilitation facility  -RS                User Key  (r) = Recorded By, (t) = Taken By, (c) = Cosigned By      Initials Name Effective Dates    AC Leidy Villaseñor MS CCC-SLP 02/03/23 -     RS Roberto Collins MS CCC-SLP 09/14/23 -                     EDUCATION  The patient has been educated in the following areas:   Dysphagia (Swallowing Impairment).        SLP GOALS       Row Name 10/08/24 1030 10/07/24 1310          (LTG) Patient will demonstrate functional swallow for    Diet Texture (Demonstrate functional swallow) regular textures  - regular textures  -RS     Liquid viscosity (Demonstrate functional swallow) thin liquids  -AC thin liquids  -RS     [place] (Demonstrate functional swallow) independently (over 90% accuracy)  -AC independently (over 90% accuracy)  -RS     Time Frame (Demonstrate functional swallow) 1 week  -AC 1 week   -RS     Progress/Outcomes (Demonstrate functional swallow) good progress toward goal  -AC new goal  -RS        (STG) Patient will tolerate trials of    Consistencies Trialed (Tolerate trials) regular textures;thin liquids  -AC regular textures;thin liquids  -RS     Desired Outcome (Tolerate trials) without signs/symptoms of aspiration;without signs of distress;with adequate oral prep/transit/clearance  -AC without signs/symptoms of aspiration;without signs of distress;with adequate oral prep/transit/clearance  -RS     Mobile (Tolerate trials) independently (over 90% accuracy)  -AC independently (over 90% accuracy)  -RS     Time Frame (Tolerate trials) 1 week  -AC 1 week  -RS     Progress/Outcomes (Tolerate trials) good progress toward goal  -AC new goal  -RS     Comment (Tolerate trials) Pt tolerated drinks of thin liquid via straw w/o overt clinical s/sxs aspiration during tx.  -AC --        (STG) Lingual Strengthening Goal 1 (SLP)    Activity (Lingual Strengthening Goal 1, SLP) -- increase tongue back strength  -RS     Increase Tongue Back Strength lingual resistance exercises  -AC lingual resistance exercises  -RS     Mobile/Accuracy (Lingual Strengthening Goal 1, SLP) with minimal cues (75-90% accuracy)  -AC with minimal cues (75-90% accuracy)  -RS     Time Frame (Lingual Strengthening Goal 1, SLP) 1 week  -AC 1 week  -RS     Progress/Outcomes (Lingual Strengthening Goal 1, SLP) good progress toward goal  -AC new goal  -RS     Comment (Lingual Strengthening Goal 1, SLP) Pt able to demonstrate w/ min cues/use of handout.  -AC --        (STG) Pharyngeal Strengthening Exercise Goal 1 (SLP)    Activity (Pharyngeal Strengthening Goal 1, SLP) -- increase timing  -RS     Increase Timing prepping - 3 second prep or suck swallow or 3-step swallow  -AC prepping - 3 second prep or suck swallow or 3-step swallow  -RS     Mobile/Accuracy (Pharyngeal Strengthening Goal 1, SLP) with minimal cues (75-90%  accuracy)  -AC with minimal cues (75-90% accuracy)  -RS     Time Frame (Pharyngeal Strengthening Goal 1, SLP) 1 week  -AC 1 week  -RS     Progress/Outcomes (Pharyngeal Strengthening Goal 1, SLP) good progress toward goal  -AC new goal  -RS     Comment (Pharyngeal Strengthening Goal 1, SLP) Pt able to demonstrate w/ min cues/use of handout.  -AC --        Patient will demonstrate functional speech skills for return to discharge environment    [place] Independently  -AC Independently  -RS     Time frame 1 week  -AC 1 week  -RS     Progress/Outcomes goal no longer appropriate  -AC new goal  -RS     Comments Dysarthria resolved. Pt 100% intelligible to unfamiliar listener in connected speech. Pt/spouse reported speech has returned to baseline.  -AC --        Respiratory Support Goal 1 (SLP)    Improve Respiratory Support Goal 1 (SLP) increasing length of exhalation;sustaining a vowel sound on exhalation;80%;with minimal cues (75-90%)  -AC increasing length of exhalation;sustaining a vowel sound on exhalation;80%;with minimal cues (75-90%)  -RS     Time Frame (Respiratory Support Goal 1, SLP) 1 week  -AC 1 week  -RS     Progress/Outcomes (Respiratory Support Goal 1, SLP) goal no longer appropriate  -AC new goal  -RS        Articulation Goal 1 (SLP)    Improve Articulation Goal 1 (SLP) by over-articulating at word level;by over-articulating at phrase level;80%;with minimal cues (75-90%)  -AC by over-articulating at word level;by over-articulating at phrase level;80%;with minimal cues (75-90%)  -RS     Time Frame (Articulation Goal 1, SLP) 1 week  -AC 1 week  -RS     Progress/Outcomes (Articulation Goal 1, SLP) goal no longer appropriate  -AC new goal  -RS               User Key  (r) = Recorded By, (t) = Taken By, (c) = Cosigned By      Initials Name Provider Type    Leidy Hardwick MS CCC-SLP Speech and Language Pathologist    RS Roberto Collins MS CCC-SLP Speech and Language Pathologist                         Time  Calculation:    Time Calculation- SLP       Row Name 10/08/24 1238             Time Calculation- SLP    SLP Start Time 1030  -AC      SLP Received On 10/08/24  -AC         Untimed Charges    38915-IR Treatment Swallow Minutes 34  -AC         Total Minutes    Untimed Charges Total Minutes 34  -AC       Total Minutes 34  -AC                User Key  (r) = Recorded By, (t) = Taken By, (c) = Cosigned By      Initials Name Provider Type     Leidy Villaseñor MS CCC-SLP Speech and Language Pathologist                    Therapy Charges for Today       Code Description Service Date Service Provider Modifiers Qty    [id number]  ST TREATMENT SWALLOW 2 10/8/2024 Leidy Villaseñor MS CCC-SLP GN 1                 Leidy Villaseñor MS CCC-SLP  10/8/2024

## 2024-10-08 NOTE — CASE MANAGEMENT/SOCIAL WORK
Continued Stay Note  [facility]     Patient Name: Lauryn Romo  MRN: [id number]  Today's Date: 10/8/2024    Admit Date: 10/6/2024    Plan: [facility]   Discharge Plan       Row Name 10/08/24 1519       Plan    Plan [facility]    Patient/Family in Agreement with Plan yes  Patient & spouse    Plan Comments Received insurance approval for [facility] this afternoon.  I met with patient/spouse at bedside, remain agreeable with plan.  Anticipate transfer to [facility]/stroke unit tomorrow, 10/9/24.  Arrangements in place for [facility] medical van to transport at 2;30pm.  RN may call report to number [phone]; Case Management to fax discharge summary to fax# [phone].  Updated [facility], (#[phone]), regarding plan-will resume care once home from [facility].  Discussed in unit multidisciplinary rounds this morning.  Philomena Rhodes, Ext. [phone]    Final Discharge Disposition Code 62 - inpatient rehab facility                   Discharge Codes    No documentation.                 Expected Discharge Date and Time       Expected Discharge Date Expected Discharge Time    Oct 11, 2024               VELMA Bal

## 2024-10-09 ENCOUNTER — APPOINTMENT (OUTPATIENT)
Dept: CARDIOLOGY | Facility: HOSPITAL | Age: 64
DRG: 023 | End: 2024-10-09
Payer: COMMERCIAL

## 2024-10-09 LAB
BH BB BLOOD EXPIRATION DATE: NORMAL
BH BB BLOOD TYPE BARCODE: 9500
BH BB DISPENSE STATUS: NORMAL
BH BB PRODUCT CODE: NORMAL
BH BB UNIT NUMBER: NORMAL
BH CV GROIN HEMATOMA RIGHT TRANS LENGTH: 3.4 CM
BH CV GROIN HEMATOMA RIGHT TRANS WIDTH: 1 CM
BH CV RIGHT GROIN PSA PROCEDURE SCRIPTING LRR: 1
BH CV XLRA MEAS EXT ILIAC A PSV RIGHT: 93 CM/SEC
CROSSMATCH INTERPRETATION: NORMAL
DEPRECATED RDW RBC AUTO: 50.9 FL (ref 37–54)
ERYTHROCYTE [DISTWIDTH] IN BLOOD BY AUTOMATED COUNT: 15.1 % (ref 12.3–15.4)
HCT VFR BLD AUTO: 39.4 % (ref 34–46.6)
HCT VFR BLD AUTO: 40.1 % (ref 34–46.6)
HGB BLD-MCNC: 13.2 G/DL (ref 12–15.9)
HGB BLD-MCNC: 13.5 G/DL (ref 12–15.9)
MCH RBC QN AUTO: 30.8 PG (ref 26.6–33)
MCHC RBC AUTO-ENTMCNC: 33.7 G/DL (ref 31.5–35.7)
MCV RBC AUTO: 91.6 FL (ref 79–97)
PLATELET # BLD AUTO: 204 10*3/MM3 (ref 140–450)
PMV BLD AUTO: 10.6 FL (ref 6–12)
PROX PFA PSV RIGHT: 109 CM/SEC
PROX SFA PSV RIGHT: 83.8 CM/SEC
RBC # BLD AUTO: 4.38 10*6/MM3 (ref 3.77–5.28)
RIGHT GROIN CFA SYS: 88.6 CM/SEC
UNIT  ABO: NORMAL
UNIT  RH: NORMAL
WBC NRBC COR # BLD AUTO: 20.99 10*3/MM3 (ref 3.4–10.8)

## 2024-10-09 PROCEDURE — 99232 SBSQ HOSP IP/OBS MODERATE 35: CPT

## 2024-10-09 PROCEDURE — 92526 ORAL FUNCTION THERAPY: CPT

## 2024-10-09 PROCEDURE — 99231 SBSQ HOSP IP/OBS SF/LOW 25: CPT | Performed by: RADIOLOGY

## 2024-10-09 PROCEDURE — 85018 HEMOGLOBIN: CPT | Performed by: PHYSICIAN ASSISTANT

## 2024-10-09 PROCEDURE — 97535 SELF CARE MNGMENT TRAINING: CPT

## 2024-10-09 PROCEDURE — 99232 SBSQ HOSP IP/OBS MODERATE 35: CPT | Performed by: INTERNAL MEDICINE

## 2024-10-09 PROCEDURE — 93926 LOWER EXTREMITY STUDY: CPT | Performed by: INTERNAL MEDICINE

## 2024-10-09 PROCEDURE — 85014 HEMATOCRIT: CPT | Performed by: PHYSICIAN ASSISTANT

## 2024-10-09 PROCEDURE — 85027 COMPLETE CBC AUTOMATED: CPT | Performed by: STUDENT IN AN ORGANIZED HEALTH CARE EDUCATION/TRAINING PROGRAM

## 2024-10-09 PROCEDURE — 93926 LOWER EXTREMITY STUDY: CPT

## 2024-10-09 PROCEDURE — 97530 THERAPEUTIC ACTIVITIES: CPT

## 2024-10-09 RX ORDER — DEXAMETHASONE 2 MG/1
2 TABLET ORAL EVERY 8 HOURS SCHEDULED
Status: DISCONTINUED | OUTPATIENT
Start: 2024-10-10 | End: 2024-10-09

## 2024-10-09 RX ORDER — DEXAMETHASONE 2 MG/1
1 TABLET ORAL EVERY 12 HOURS SCHEDULED
Status: DISCONTINUED | OUTPATIENT
Start: 2024-10-16 | End: 2024-10-09

## 2024-10-09 RX ORDER — METOPROLOL TARTRATE 25 MG/1
25 TABLET, FILM COATED ORAL EVERY 12 HOURS SCHEDULED
Status: DISCONTINUED | OUTPATIENT
Start: 2024-10-09 | End: 2024-10-09

## 2024-10-09 RX ORDER — DEXAMETHASONE 2 MG/1
2 TABLET ORAL EVERY 12 HOURS SCHEDULED
Status: DISCONTINUED | OUTPATIENT
Start: 2024-10-13 | End: 2024-10-09

## 2024-10-09 RX ORDER — DEXAMETHASONE 4 MG/1
4 TABLET ORAL EVERY 6 HOURS SCHEDULED
Status: DISCONTINUED | OUTPATIENT
Start: 2024-10-09 | End: 2024-10-09

## 2024-10-09 RX ADMIN — Medication 10 ML: at 20:59

## 2024-10-09 RX ADMIN — TICAGRELOR 90 MG: 90 TABLET ORAL at 09:13

## 2024-10-09 RX ADMIN — FAMOTIDINE 20 MG: 20 TABLET, FILM COATED ORAL at 16:54

## 2024-10-09 RX ADMIN — ATORVASTATIN CALCIUM 80 MG: 40 TABLET, FILM COATED ORAL at 20:58

## 2024-10-09 RX ADMIN — FAMOTIDINE 20 MG: 20 TABLET, FILM COATED ORAL at 09:13

## 2024-10-09 RX ADMIN — ACETAMINOPHEN 650 MG: 325 TABLET ORAL at 12:09

## 2024-10-09 RX ADMIN — ASPIRIN 81 MG 81 MG: 81 TABLET ORAL at 09:13

## 2024-10-09 RX ADMIN — TICAGRELOR 90 MG: 90 TABLET ORAL at 20:58

## 2024-10-09 NOTE — THERAPY TREATMENT NOTE
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 Cerebral aneurysm  I67.1 437.3   3. History of stroke  Z86.73 V12.54   4. Oropharyngeal dysphagia  R13.12 787.22     Patient Active Problem List   Diagnosis    Acute CVA    Cerebral aneurysm, nonruptured    Paraesophageal hernia    Hyperlipidemia    HTN    Moderate malnutrition    History of CVA (cerebrovascular accident)    Other headache syndrome    Acute cystitis without hematuria     Past Medical History:   Diagnosis Date    Aneurysm 2022    Cancer 2024    Basal Cell Carcinoma removed from scalp by dermatology    Cluster headache     Had headaches for several months before stroke and mass was found    Difficulty walking 22    After stroke    Headache     Headache, tension-type     Hyperlipidemia     Hypertension     Memory loss 22    Migraine     Stroke 2022    Vision loss 22    When dizzy or headache     Past Surgical History:   Procedure Laterality Date    ARTERIAL ANEURYSM REPAIR      BREAST LUMPECTOMY  2012    CYST REMOVAL Left 2023    EMBOLIZATION CEREBRAL N/A 2022    Procedure: CV EMBOLIZATION CEREBRAL IR;  Surgeon: Enoch Savage MD;  Location:  [facility];  Service: Interventional Radiology;  Laterality: N/A;    HIATAL HERNIA REPAIR      INTERVENTIONAL RADIOLOGY PROCEDURE N/A 09/15/2023    Procedure: Embolization;  Surgeon: Enoch Savage MD;  Location:  [facility];  Service: Interventional Radiology;  Laterality: N/A;    INTERVENTIONAL RADIOLOGY PROCEDURE N/A 10/6/2024    Procedure: IR mechanical thrombectomy;  Surgeon: Joe Mendez MD;  Location:  [facility];  Service: Interventional Radiology;  Laterality: N/A;    SINUS SURGERY      x4    SKIN  CANCER EXCISION      cancer removed off top of head      General Information       Row Name 10/09/24 1330          OT Time and Intention    Document Type therapy note (daily note)  -CS     Mode of Treatment occupational therapy  -       Row Name 10/09/24 1330          General Information    Existing Precautions/Restrictions fall;oxygen therapy device and L/min;other (see comments)  L sided weakness, coordination deficits  -CS     Barriers to Rehab medically complex  -CS       Row Name 10/09/24 1330          Cognition    Orientation Status (Cognition) oriented x 3  -CS       Row Name 10/09/24 1330          Safety Issues, Functional Mobility    Impairments Affecting Function (Mobility) balance;coordination;visual/perceptual;strength;range of motion (ROM)  -CS               User Key  (r) = Recorded By, (t) = Taken By, (c) = Cosigned By      Initials Name Provider Type    CS Raeann Oseguera, OT Occupational Therapist                     Mobility/ADL's       Row Name 10/09/24 1333          Transfers    Transfers sit-stand transfer;stand-sit transfer  -       Row Name 10/09/24 1333          Sit-Stand Transfer    Sit-Stand [place] (Transfers) minimum assist (75% patient effort);verbal cues  -CS     Assistive Device (Sit-Stand Transfers) walker, front-wheeled  -       Row Name 10/09/24 1333          Stand-Sit Transfer    Stand-Sit [place] (Transfers) minimum assist (75% patient effort);verbal cues  -CS     Assistive Device (Stand-Sit Transfers) walker, front-wheeled  -       Row Name 10/09/24 1333          Functional Mobility    Functional Mobility- Ind. Level minimum assist (75% patient effort);2 person assist required;verbal cues required  -     Functional Mobility- Device walker, front-wheeled  -CS     Functional Mobility-Distance (Feet) --  household distance  -       Row Name 10/09/24 1333          Activities of Daily Living    BADL Assessment/Intervention feeding  -       Row Name 10/09/24 1333           Grooming Assessment/Training    [place] Level (Grooming) wash face, hands;set up  -CS     Position (Grooming) supported sitting  -CS       Row Name 10/09/24 1333          Self-Feeding Assessment/Training    [place] Level (Feeding) liquids to mouth;set up  -CS     Position (Self-Feeding) supported sitting  -CS               User Key  (r) = Recorded By, (t) = Taken By, (c) = Cosigned By      Initials Name Provider Type    Raeann Soares OT Occupational Therapist                   Obj/Interventions       Row Name 10/09/24 1342          Balance    Balance Assessment sitting static balance;sitting dynamic balance;standing static balance;standing dynamic balance  -CS     Static Sitting Balance standby assist  -CS     Dynamic Sitting Balance contact guard  -CS     Position, Sitting Balance sitting in chair  -CS     Static Standing Balance minimal assist  -CS     Dynamic Standing Balance minimal assist  -CS     Position/Device Used, Standing Balance supported;walker, rolling  -CS     Balance Interventions sitting;standing;static;dynamic;dynamic reaching;occupation based/functional task;weight shifting activity  -CS               User Key  (r) = Recorded By, (t) = Taken By, (c) = Cosigned By      Initials Name Provider Type    CS Raaenn Oseguera OT Occupational Therapist                   Goals/Plan    No documentation.                  Clinical Impression       Row Name 10/09/24 1342          Pain Assessment    Pretreatment Pain Rating 4/10  -CS     Posttreatment Pain Rating 4/10  -CS     Pain Location - Side/Orientation Right  -CS     Pain Location - groin  -CS     Pre/Posttreatment Pain Comment tolerated  -CS     Pain Intervention(s) Repositioned;Ambulation/increased activity  -       Row Name 10/09/24 1342          Plan of Care Review    Plan of Care Reviewed With patient;spouse  -CS     Progress improving  -     Outcome Evaluation Pt demo improved independence by performing t/fs w/ Min A w/ RW  and functional mobility w/ Min Ax2 w/ RW. Pt conts to require increased assist from baseline w/ ADL performance d/t L sided weakness and balance deficits warranting cont skilled IPOT POC. Recommend pt DC to IP rehab.  -CS       Row Name 10/09/24 1342          Therapy Plan Review/Discharge Plan (OT)    Anticipated Discharge Disposition (OT) inpatient rehabilitation facility  -CS       Row Name 10/09/24 1342          Vital Signs    Pre Systolic BP Rehab 113  -CS     Pre Treatment Diastolic BP 89  -CS     Post Systolic BP Rehab 130  -CS     Post Treatment Diastolic BP 77  -CS     Pretreatment Heart Rate (beats/min) 62  -CS     Posttreatment Heart Rate (beats/min) 62  -CS     Pre SpO2 (%) 95  -CS     O2 Delivery Pre Treatment room air  -CS     Post SpO2 (%) 99  -CS     O2 Delivery Post Treatment room air  -CS     Pre Patient Position Sitting  -CS     Intra Patient Position Standing  -CS     Post Patient Position Sitting  -CS       Row Name 10/09/24 1342          Positioning and Restraints    Pre-Treatment Position sitting in chair/recliner  -CS     Post Treatment Position chair  -CS     In Chair notified nsg;reclined;call light within reach;encouraged to call for assist;exit alarm on;RUE elevated;LUE elevated;waffle cushion;on mechanical lift sling;legs elevated;heels elevated;with family/caregiver  -CS               User Key  (r) = Recorded By, (t) = Taken By, (c) = Cosigned By      Initials Name Provider Type    CS Raeann Oseguera, TOMI Occupational Therapist                   Outcome Measures       Row Name 10/09/24 134          How much help from another is currently needed...    Putting on and taking off regular lower body clothing? 2  -CS     Bathing (including washing, rinsing, and drying) 2  -CS     Toileting (which includes using toilet bed pan or urinal) 2  -CS     Putting on and taking off regular upper body clothing 3  -CS     Taking care of personal grooming (such as brushing teeth) 3  -CS     Eating meals 3   -     AM-PAC 6 Clicks Score (OT) 15  -       Row Name 10/09/24 1345          Modified Sanders Scale    Modified Jose Roberto Scale 4 - Moderately severe disability.  Unable to walk without assistance, and unable to attend to own bodily needs without assistance.  -       Row Name 10/09/24 1345          Functional Assessment    Outcome Measure Options AM-PAC 6 Clicks Daily Activity (OT)  -               User Key  (r) = Recorded By, (t) = Taken By, (c) = Cosigned By      Initials Name Provider Type    CS Raeann Oseguera OT Occupational Therapist                    Occupational Therapy Education       Title: PT OT SLP Therapies (In Progress)       Topic: Occupational Therapy (In Progress)       Point: ADL training (In Progress)       Description:   Instruct learner(s) on proper safety adaptation and remediation techniques during self care or transfers.   Instruct in proper use of assistive devices.                  Learning Progress Summary             Patient Acceptance, E, NR by CS at 10/9/2024 1345    Acceptance, E, NR by CS at 10/7/2024 1315                         Point: Home exercise program (Not Started)       Description:   Instruct learner(s) on appropriate technique for monitoring, assisting and/or progressing therapeutic exercises/activities.                  Learner Progress:  Not documented in this visit.              Point: Precautions (In Progress)       Description:   Instruct learner(s) on prescribed precautions during self-care and functional transfers.                  Learning Progress Summary             Patient Acceptance, E, NR by CS at 10/9/2024 1345    Acceptance, E, NR by CS at 10/7/2024 1315                         Point: Body mechanics (In Progress)       Description:   Instruct learner(s) on proper positioning and spine alignment during self-care, functional mobility activities and/or exercises.                  Learning Progress Summary             Patient Acceptance, E, NR by CS at  10/9/2024 1345    Acceptance, E, NR by  at 10/7/2024 1315                                         User Key       Initials Effective Dates Name Provider Type Discipline     09/02/21 -  Raeann Oseguera, TOMI Occupational Therapist OT                  OT Recommendation and Plan  Planned Therapy Interventions (OT): activity tolerance training, adaptive equipment training, BADL retraining, functional balance retraining, occupation/activity based interventions, ROM/therapeutic exercise, strengthening exercise, transfer/mobility retraining, cognitive/visual perception retraining, neuromuscular control/coordination retraining, patient/caregiver education/training  Therapy Frequency (OT): daily  Plan of Care Review  Plan of Care Reviewed With: patient, spouse  Progress: improving  Outcome Evaluation: Pt demo improved independence by performing t/fs w/ Min A w/ RW and functional mobility w/ Min Ax2 w/ RW. Pt conts to require increased assist from baseline w/ ADL performance d/t L sided weakness and balance deficits warranting cont skilled IPOT POC. Recommend pt DC to IP rehab.     Time Calculation:   Evaluation Complexity (OT)  Review Occupational Profile/Medical/Therapy History Complexity: expanded/moderate complexity  Assessment, Occupational Performance/Identification of Deficit Complexity: 5 or more performance deficits  Clinical Decision Making Complexity (OT): detailed assessment/moderate complexity  Overall Complexity of Evaluation (OT): moderate complexity     Time Calculation- OT       Row Name 10/09/24 1346             Time Calculation- OT    OT Start Time 1020  -CS      OT Received On 10/09/24  -CS      OT Goal Re-Cert Due Date 10/17/24  -CS         Timed Charges    99624 - OT Therapeutic Activity Minutes 16  -CS      21080 - OT Self Care/Mgmt Minutes 8  -CS         Total Minutes    Timed Charges Total Minutes 24  -CS       Total Minutes 24  -CS                User Key  (r) = Recorded By, (t) = Taken By, (c) =  Cosigned By      Initials Name Provider Type    CS Raeann Oseguera OT Occupational Therapist                  Therapy Charges for Today       Code Description Service Date Service Provider Modifiers Qty    99093616340 HC OT THERAPEUTIC ACT EA 15 MIN 10/9/2024 Raeann Oseguera OT GO 1    61455105102 HC OT SELF CARE/MGMT/TRAIN EA 15 MIN 10/9/2024 Raeann Oseguera OT GO 1    97539584520 HC OT THER SUPP EA 15 MIN 10/9/2024 Raeann Oseguera OT GO 2                 Raeann Oseguera OT  10/9/2024

## 2024-10-09 NOTE — PROGRESS NOTES
Pulmonary/Critical Care Follow-up     LOS: 3 days   Patient Care Team:  Melissa Lazo APRN as PCP - General (Nurse Practitioner)  Erika Whatley MD as Consulting Physician (Psychiatry)  nEoch Savage MD as Consulting Physician (Neurosurgery)        Chief Complaint   Patient presents with    Stroke     Subjective     History reviewed and updated in EMR as indicated.    Interval History:     Patient had event overnight where she developed a hematoma and had a syncopal or near syncopal event and received an empiric blood transfusion.  Ultimately this was felt by neuro interventionalists to be a vagal event.  Vascular duplex showed no pseudoaneurysm.  Patient is awake and alert.  No current complaint.  Still does not have much appetite.  Denies headache.  No fevers or chills.      History taken from: Patient    PMH/FH/Social History were reviewed and updated appropriately in the electronic medical record.     Review of Systems:    Review of 14 systems was completed with positives and pertinent negatives noted in the subjective section.  All other systems reviewed and are negative.       Objective     Vital Signs  Temp:  [98 °F (36.7 °C)-99.1 °F (37.3 °C)] 99.1 °F (37.3 °C)  Heart Rate:  [] 61  Resp:  [16-20] 18  BP: ()/() 119/67  10/08 0701 - 10/09 0700  In: 1160 [P.O.:800]  Out: 1650 [Urine:1650]  Body mass index is 24.21 kg/m².     IV drips:  niCARdipine, Last Rate: 10 mg/hr (10/06/24 1320)       Physical Exam:     Constitutional:   Alert, in no acute distress   Head:   Normocephalic, atraumatic   Eyes:           Lids and lashes normal, conjunctivae and sclerae normal.  PER   ENMT:  Ears appear intact with no abnormalities noted     Lips normal.     Neck:  Trachea midline, no JVD   Lungs/Resp:    Normal effort, symmetric chest rise, no crepitus, clear to      auscultation bilaterally.               Heart/CV:   Regular rhythm and normal rate, no murmur   Abdomen/GI:    Soft, nontender,  nondistended   :    Deferred   Extremities/MSK:  No clubbing or cyanosis.  No edema.     Pulses:  Pulses palpable and equal bilaterally   Skin:  No bleeding, bruising or rash   Heme/Lymph:  No cervical or supraclavicular adenopathy.   Neurologic:    Psychiatric:    Moves all extremities with no obvious focal motor deficit.  Cranial nerves 2 - 12 grossly intact  Non-agitated, normal affect.    The above physical exam findings were reviewed and reflect my exam findings as of today's exam.   Electronically signed by:  Bryon Garner MD  10/09/24  17:17 EDT      Results Review:     I reviewed the patient's new clinical results.   Results from last 7 days   Lab Units 10/08/24  0404 10/07/24  0555 10/06/24  1038   SODIUM mmol/L 142 143 141   POTASSIUM mmol/L 4.0 4.0 4.3   CHLORIDE mmol/L 111* 112* 110*   CO2 mmol/L 20.0* 20.0* 19.0*   BUN mg/dL 25* 21 22   CREATININE mg/dL 0.51* 0.43* 0.69   CALCIUM mg/dL 8.7 8.7 8.6   BILIRUBIN mg/dL  --  0.3 0.3   ALK PHOS U/L  --  98 124*   ALT (SGPT) U/L  --  49* 65*   AST (SGOT) U/L  --  22 34*   GLUCOSE mg/dL 149* 126* 82     Results from last 7 days   Lab Units 10/09/24  0352 10/09/24  0021 10/08/24  2006 10/08/24  0404 10/07/24  0555   WBC 10*3/mm3 20.99*  --   --  18.18* 14.67*   HEMOGLOBIN g/dL 13.5 13.2 12.4 13.1 13.8   HEMATOCRIT % 40.1 39.4 39.0 38.8 41.7   PLATELETS 10*3/mm3 204  --   --  248 252     Results from last 7 days   Lab Units 10/06/24  1549   PH, ARTERIAL pH units 7.373   PO2 ART mm Hg 195.0*   PCO2, ARTERIAL mm Hg 28.9*   HCO3 ART mmol/L 16.8*     Results from last 7 days   Lab Units 10/07/24  0555 10/06/24  1038   MAGNESIUM mg/dL  --  2.4   PHOSPHORUS mg/dL 4.1  --        I reviewed the patient's new imaging including images and reports.    Duplex right lower extremity pseudoaneurysm evaluation 10/9/2024:    Interpretation Summary         Hematoma present in the right groin measuring 3.4 x 1 cm    No evidence of AV fistula or pseudoaneurysm.    Patent right  femoral artery and vein    Monophasic flow of the right femoral artery.    MRI brain 10/7/2024:    Impression:  1.Small likely subacute infarct involving periventricular left occipital lobe. No hemorrhagic transformation.  2.Stable prominent basilar artery aneurysm with mass effect on adjacent brainstem.  3.Moderate paranasal sinus mucosal disease with air-fluid level in left maxillary sinus. Correlate for acute sinusitis  4.Partial left mastoid effusion.    Medication Review:   aspirin, 81 mg, Oral, Daily   Or  aspirin, 300 mg, Rectal, Daily  atorvastatin, 80 mg, Oral, Nightly  famotidine, 20 mg, Oral, BID AC  sodium chloride, 10 mL, Intravenous, Q12H  ticagrelor, 90 mg, Oral, BID      niCARdipine, 5-15 mg/hr, Last Rate: 10 mg/hr (10/06/24 1320)        Assessment & Plan         Acute CVA    Cerebral aneurysm, nonruptured    Acute cystitis without hematuria    63 y.o. 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 Savage 2022 and 2023), who presented to the emergency department via private vehicle for evaluation of left-sided weakness and facial droop present on awakening on 10/6/2024.    She apparently had a migraine headache and took a home dose of Ubrelvy which helped her headache a bit.  She had difficulty ambulating on awakening with left side weakness.  She also was treated for a UTI with cefuroxime (day 3/7).    CT head showed periventricular hypodensities right frontal parietal junction and left parietal lobe felt to reflect possible acute or subacute infarcts.  Patient has recently been taken off of her Plavix on follow-up with Dr. Savage.  She was taking aspirin 81 mg daily.    Patient was taken to the Cath Lab for mechanical thrombectomy by Dr. Mendez which was successful with placement of stent for in-stent stenosis.    Patient developed a right  groin hematoma the evening of 10/8/2024.  No pseudoaneurysm by duplex the morning of 10/9/2024.    Patient is currently doing well.  Awaiting telemetry.  Palliative rehabilitation in 2 days per neurointerventional service.    Plan:  1.  For acute CVA: Secondary to thrombosis of basilar artery aneurysm stent status post thrombectomy and stent placement for in-stent stenosis.  Doing well.  Continue aspirin, statin, Brilinta.  Plan to follow-up in neurointerventional clinic in 1 month.  Blood pressure management for goal of normal blood pressure.  Stroke neurology following.  Continue PT/OT/speech therapy.    2.  For acute respiratory failure status postintubation: Successfully extubated.  Doing well on room air currently.  3.  For hypertension: Labetalol as needed.  4.  For hyperlipidemia: Continue statin.  5.  DVT prophylaxis: SCDs  6.  For right groin hematoma: No pseudoaneurysm.  Monitor.  7.  For UTI present on admission: Completed Rocephin/cefuroxime.  Outside urine culture was susceptible.    Inpatient rehab facility recommended.    Okay to telemetry/hospitalist.    Electronically signed by:    Bryon Garner MD  10/09/24  17:17 EDT      *. Please note that portions of this note were completed with mPura - a voice recognition program.

## 2024-10-09 NOTE — CASE MANAGEMENT/SOCIAL WORK
Continued Stay Note  [facility]     Patient Name: Lauryn Room  MRN: [id number]  Today's Date: 10/9/2024    Admit Date: 10/6/2024    Plan: [facility] acute   Discharge Plan       Row Name 10/09/24 1105       Plan    Plan [facility] acute    Plan Comments Discharge today cancelled.  Anticipate patient to discharge to [facility] on Friday; April has been updated.  Will need to reschedule [facility] van for Friday.   will continue to follow.                   Discharge Codes    No documentation.                 Expected Discharge Date and Time       Expected Discharge Date Expected Discharge Time    Oct 11, 2024               Jemma Machado RN

## 2024-10-09 NOTE — PROGRESS NOTES
NAME: MEDHAT PAL  : 1960  PCP: Melissa Lazo APRN  ADMITTING PHYSICIAN: Zafar Hager MD    DATE OF ADMISSION:  10/6/2024  DATE OF SERVICE: 10/9/2024    HOSPITAL DAY:  3 days    HISTORY OF PRESENT ILLNESS:  63 y.o. 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  She was seen in the neurointerventional clinic on 2024, and her Plavix was stopped, but she remained on low-dose aspirin.     Ms. Pal presented to the emergency department on 10/6/2024 with acute thrombosis of her pipeline stent and basilar artery.  She underwent successful mechanical thrombectomy by my partner Dr. Mendez, restoring normal (TICI 3) flow.  This did unmask a stenosis within the mid portions of the Pipeline stent construct, and this was treated with a 2.5 mm coronary MICHELLE, restoring robust flow within the vertebrobasilar system.    REVIEW OF IMAGING:  Duplex of right groin on the morning of 10/9/2024 demonstrates a small superficial hematoma, but no pseudoaneurysm.    LABS:  Lab Results   Component Value Date    WBC 20.99 (H) 10/09/2024    HGB 13.5 10/09/2024    HCT 40.1 10/09/2024    MCV 91.6 10/09/2024     10/09/2024     Lab Results   Component Value Date    GLUCOSE 149 (H) 10/08/2024    CALCIUM 8.7 10/08/2024     10/08/2024    K 4.0 10/08/2024    CO2 20.0 (L) 10/08/2024     (H) 10/08/2024    BUN 25 (H) 10/08/2024    CREATININE 0.51 (L) 10/08/2024    EGFRIFNONA 103 2019    BCR 49.0 (H) 10/08/2024    ANIONGAP 11.0 10/08/2024     Lab Results   Component Value Date    HGBA1C 5.60 10/07/2024     Lab Results   Component Value Date    CHOL 132 10/07/2024    TRIG 62 10/07/2024    HDL 61 (H) 10/07/2024    LDL 58 10/07/2024       CURRENT MEDS:  Current Facility-Administered Medications   Medication Dose Route Frequency  Provider Last Rate Last Admin    acetaminophen (TYLENOL) tablet 650 mg  650 mg Oral Q6H PRN Yasemin Srivastava APRN   650 mg at 10/08/24 0820    aspirin chewable tablet 81 mg  81 mg Oral Daily Prebble, Fahad, RPH        Or    aspirin suppository 300 mg  300 mg Rectal Daily Prebble, Fahad, RPH        atorvastatin (LIPITOR) tablet 80 mg  80 mg Oral Nightly Prebble, Fahad, RPH   80 mg at 10/08/24 2129    sennosides-docusate (PERICOLACE) 8.6-50 MG per tablet 2 tablet  2 tablet Oral BID PRN Prebble, Fahad, RPH        And    polyethylene glycol (MIRALAX) packet 17 g  17 g Oral Daily PRN Prebble, Fahad, RPH        And    bisacodyl (DULCOLAX) suppository 10 mg  10 mg Rectal Daily PRN Prebble, Fahad, RPH        Calcium Replacement - Follow Nurse / BPA Driven Protocol   Does not apply PRN Lety Matthews APRN        famotidine (PEPCID) tablet 20 mg  20 mg Oral BID AC Zafar Hager MD   20 mg at 10/08/24 1833    labetalol (NORMODYNE,TRANDATE) injection 10 mg  10 mg Intravenous Q4H PRN Bryon Garner MD        Magnesium Cardiology Dose Replacement - Follow Nurse / BPA Driven Protocol   Does not apply PRN Lety Matthews APRN        niCARdipine (CARDENE) 25mg in 250mL NS infusion  5-15 mg/hr Intravenous Titrated Ashley Acharya APRN 100 mL/hr at 10/06/24 1320 10 mg/hr at 10/06/24 1320    nitroglycerin (NITROSTAT) SL tablet 0.4 mg  0.4 mg Sublingual Q5 Min PRN Ashley Acharya APRN        Phosphorus Replacement - Follow Nurse / BPA Driven Protocol   Does not apply PRN Lety Matthews APRN        Potassium Replacement - Follow Nurse / BPA Driven Protocol   Does not apply PRN Lety Matthews APRN        sodium chloride 0.9 % flush 10 mL  10 mL Intravenous Q12H Lety Matthews APRN   10 mL at 10/08/24 2130    sodium chloride 0.9 % flush 10 mL  10 mL Intravenous PRN Lety Matthews, CAILIN        ticagrelor (BRILINTA) tablet 90 mg  90 mg Oral BID Fahad Shankar RPH   90 mg at 10/08/24  "2129       PHYSICAL EXAM:  Vitals:    10/09/24 0700   BP: 136/85   Pulse: 63   Resp:    Temp:    SpO2: 99%      Alert and oriented x 3.  Speech is clear.  She is resting comfortably in bed.    Right groin has a small-moderate superficial, soft hematoma.  Mild tenderness to touch.    ASSESSMENT/PLAN:  Ms. Romo is a 63 y.o. 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  She presented on 10/6/2024 with acute thrombosis of her basilar artery and Pipeline flow diverter.  Ms. Rmoo underwent successful mechanical thrombectomy along with placement of a 2.5 mm coronary MICHELLE within the mid portions of the Pipeline stent construct (at side for symptomatic stenosis), restoring normal (TICI 3) flow to the intracranial vertebrobasilar system.     Given the severity of her presenting stroke symptoms, Ms. Romo has made an exceptional recovery to this point, and has only a few punctate areas of infarct on her MRI (not in the brainstem).  I am very optimistic for her making a meaningful recovery, albeit she will require inpatient physical therapy.    Ms. Benz was scheduled to transfer to [facility] on 10/9/2024, but she developed a small right groin hematoma on the evening of 10/8/2024.  She did have a \"vasovagal\" response to groin pressure, and was given a unit of PRBCs for fear of symptomatic bleeding.  She did not experience a significant drop in her H/H, and again this is deemed to have been a \"vasovagal\" event.  Duplex of her groin on 10/9/2024 demonstrates small hematoma, but no pseudoaneurysm.    We will hold off on transferring to [facility] for another day or so, just to ensure that there are no issues with her groin or repeated vasovagal episodes.  She is okay to continue to work with PT/OT.    She will need to remain on her Brilinta/aspirin dual antiplatelet regimen for the next month or so, at which point " I will likely transition her to a Plavix/aspirin regimen.  We will begin to taper her steroids today.     She will follow-up with me in the neurointerventional clinic in 1 month's time.    Copied text and portions of the note have been reviewed and are accurate as of 10/9/24.

## 2024-10-09 NOTE — PROGRESS NOTES
Multidisciplinary Rounds    Time: 20min  Patient Name: Lauryn Romo  Date of Encounter: 10/09/24 14:48 EDT  MRN: [id number]  Admission date: 10/6/2024  Reason for visit: follow up    Additional information obtained: RD visited with patient to follow up, she is eating okay. She had requested some written education for managing nutrition with mild difficulty swallowing, specifically difficulty with proteins so wants tips on managing protein intakes. RD provided verbal education at bedside 10/7 and f/u today for written education. Pt states she is doing okay has no dietary needs at this time, appreciative of visit/education.     Current diet: Diet: Cardiac; Healthy Heart (2-3 Na+); Texture: Regular (IDDSI 7); Fluid Consistency: Thin (IDDSI 0)    Intervention:  Follow treatment plan  Care plan reviewed  Education provided per pt request for managing energy/protein needs     Follow up:   Per protocol    Leila Aguila RD  14:48 EDT

## 2024-10-09 NOTE — PROGRESS NOTES
Stroke Progress Note       Chief Complaint: headache      Subjective    Subjective     Subjective: Patient is sitting in recliner.  No acute events overnight.  She did develop a hematoma in the right groin yesterday followed by a vasovagal response to groin compression.  Per Dr. Savage patient discharged to [facility] will be postponed a day or two to monitor.  Patient denies pain and states she was feeling much better today.    Review of Systems   Constitutional: Negative.    HENT: Negative.     Eyes: Negative.    Respiratory: Negative.     Cardiovascular: Negative.    Gastrointestinal: Negative.    Genitourinary: Negative.    Musculoskeletal: Negative.    Skin: Negative.    Allergic/Immunologic: Negative.    Neurological: Negative.    Psychiatric/Behavioral: Negative.            Objective      Temp:  [98.1 °F (36.7 °C)-98.7 °F (37.1 °C)] 98.1 °F (36.7 °C)  Heart Rate:  [] 63  Resp:  [16-18] 16  BP: ()/() 136/85          Neurological Exam  Mental Status  Alert. Recent and remote memory are intact. Mild dysarthria present. Language is fluent with no aphasia. Attention and concentration are normal.    Cranial Nerves  CN II: Visual fields full to confrontation.  CN III, IV, VI: Extraocular movements intact bilaterally. Pupils equal round and reactive to light bilaterally.  CN V:  Right: Facial sensation is normal.  Left: Facial sensation is normal on the left.  CN VII:  Right: There is no facial weakness.  Left: There is no facial weakness.  CN VIII: Hearing appears intact.    Motor  Normal muscle bulk throughout. No abnormal involuntary movements.  All extremities 5/5.    Sensory  Light touch is normal in upper and lower extremities.     Coordination    No dysmetria.    Gait    Not observed..     Physical Exam  Vitals and nursing note reviewed.   Constitutional:       General: She is not in acute distress.     Appearance: She is not ill-appearing or toxic-appearing.   HENT:      Head:  Normocephalic and atraumatic.   Eyes:      Extraocular Movements: Extraocular movements intact.      Pupils: Pupils are equal, round, and reactive to light.   Cardiovascular:      Rate and Rhythm: Normal rate and regular rhythm.   Pulmonary:      Effort: Pulmonary effort is normal. No respiratory distress.   Skin:     General: Skin is warm and dry.      Coloration: Skin is not pale.   Neurological:      Mental Status: She is alert.      Cranial Nerves: Cranial nerve deficit and dysarthria present.      Sensory: No sensory deficit.      Motor: No weakness.   Psychiatric:         Mood and Affect: Mood normal.         Behavior: Behavior normal.        Interval: baseline (return from MRI)  1a. Level of Consciousness: 0-->Alert, keenly responsive  1b. LOC Questions: 0-->Answers both questions correctly  1c. LOC Commands: 0-->Performs both tasks correctly  2. Best Gaze: 0-->Normal  3. Visual: 0-->No visual loss  4. Facial Palsy: 0-->Normal symmetrical movements  5a. Motor Arm, Left: 0-->No drift, limb holds 90 (or 45) degrees for full 10 secs  5b. Motor Arm, Right: 0-->No drift, limb holds 90 (or 45) degrees for full 10 secs  6a. Motor Leg, Left: 0-->No drift, leg holds 30 degree position for full 5 secs  6b. Motor Leg, Right: 0-->No drift, leg holds 30 degree position for full 5 secs  7. Limb Ataxia: 0-->Absent  8. Sensory: 0-->Normal, no sensory loss  9. Best Language: 0-->No aphasia, normal  10. Dysarthria: 1-->Mild-to-moderate dysarthria, patient slurs at least some words and, at worst, can be understood with some difficulty  11. Extinction and Inattention (formerly Neglect): 0-->No abnormality    Total (NIH Stroke Scale): 1    Results Review:    I reviewed the patient's new clinical results.    Lab Results (last 24 hours)       Procedure Component Value Units Date/Time    CBC (No Diff) [[id number]]  (Abnormal) Collected: 10/09/24 0352    Specimen: Blood Updated: 10/09/24 0417     WBC 20.99 10*3/mm3      RBC 4.38  10*6/mm3      Hemoglobin 13.5 g/dL      Hematocrit 40.1 %      MCV 91.6 fL      MCH 30.8 pg      MCHC 33.7 g/dL      RDW 15.1 %      RDW-SD 50.9 fl      MPV 10.6 fL      Platelets 204 10*3/mm3     Hemoglobin & Hematocrit, Blood [[id number]]  (Normal) Collected: 10/09/24 0021    Specimen: Blood Updated: 10/09/24 0035     Hemoglobin 13.2 g/dL      Hematocrit 39.4 %     Protime-INR [[id number]]  (Normal) Collected: 10/08/24 2101    Specimen: Blood Updated: 10/08/24 2127     Protime 13.0 Seconds      INR 0.97    Hemoglobin & Hematocrit, Blood [[id number]]  (Normal) Collected: 10/08/24 2006    Specimen: Blood Updated: 10/08/24 2017     Hemoglobin 12.4 g/dL      Hematocrit 39.0 %           Lab Results   Component Value Date    GLUCOSE 149 (H) 10/08/2024    BUN 25 (H) 10/08/2024    CREATININE 0.51 (L) 10/08/2024     10/08/2024    K 4.0 10/08/2024     (H) 10/08/2024    CALCIUM 8.7 10/08/2024    PROTEINTOT 6.1 10/07/2024    ALBUMIN 4.0 10/07/2024    ALT 49 (H) 10/07/2024    AST 22 10/07/2024    ALKPHOS 98 10/07/2024    BILITOT 0.3 10/07/2024    GLOB 2.1 10/07/2024    AGRATIO 1.9 10/07/2024    BCR 49.0 (H) 10/08/2024    ANIONGAP 11.0 10/08/2024    EGFR 105.7 10/08/2024       MRI Brain Without Contrast    Result Date: 10/7/2024  Impression: 1.Small likely subacute infarct involving periventricular left occipital lobe. No hemorrhagic transformation. 2.Stable prominent basilar artery aneurysm with mass effect on adjacent brainstem. 3.Moderate paranasal sinus mucosal disease with air-fluid level in left maxillary sinus. Correlate for acute sinusitis 4.Partial left mastoid effusion. Electronically Signed: Chris Acuña MD  10/7/2024 12:30 PM EDT  Workstation ID: [id number]   Results for orders placed during the hospital encounter of 10/06/24    Adult Transthoracic Echo Complete W/ Cont if Necessary Per Protocol    Interpretation Summary    Left ventricular systolic function is normal. Estimated left ventricular EF =  65%    Left ventricular diastolic function is consistent with (grade I) impaired relaxation.    The cardiac valves are anatomically and functionally normal.    Estimated right ventricular systolic pressure from tricuspid regurgitation is normal (<35 mmHg).    Saline test results are negative.            Assessment/Plan     Assessment/Plan:This is a 63-year-old female with known medical diagnoses present hypertension, hyperlipidemia, migraines (follows with CAILIN Parra), history of stroke (right occipital and left cerebellar, 2023, residual dizziness and ataxic gait), and giant basilar aneurysm s/p pipeline embolization (Dr. Savage, 2022 and 2023) who presents to the emergency department via private vehicle for further evaluation of left-sided weakness and facial droop which was present upon awakening this morning.  She is admitted for IV thrombolytic therapy given LK > 4.5-hours.  CTA head/neck reveals thrombosed pipeline stent.  Images were reviewed by Dr. Savage who is elected to take her to the Cath Lab for mechanical thrombectomy.  She was be admitted to the neurological ICU s/p procedure. She developed a hematoma in the right groin on 10/8/24, which is being monitored closely in the ICU.     # Acute basilar occlusion s/p MT (  thrombosis of her pipeline stent and basilar artery. )  -prior to the event, patient was transitioned to monotherapy with aspirin   -After the MT, transitioned to aspirin and brillinta for now   -Autoregulation of blood pressure, goals SBP <140   -Okay for the floor/ rehab  -Case management following for [facility] placement   -SLP recommends regular texture, thin liquids    Stroke team will continue to follow the patient.         CAILIN Crespo  10/09/24  10:04 EDT

## 2024-10-09 NOTE — PROGRESS NOTES
Ms. Romo developed a right groin hematoma, and become hypotensive and bradycardic with groin compression.  She responded to atropine, and it appears that she had a vasovagal response to groin compression.  Her H/H was 12.4/39.0, but she was given a transfusion of PRBC's for fear of symptomatic bleeding from her groin.    She currently is hemodynamically stable and back to her neuro baseline, and is tolerating groin compression with a fem-stop device.    We will keep her in bed tonight, check an H/H 1-2 hrs after transfusion.  If her vitals and labs are stable, we will get an U/S in the am to assess for pseudoaneurysm.  However, if she has a significant drop in H/H, pain becomes severe, or she becomes hemodynamically unstable ---> then call me (Dr. Savage) and get a STAT CTA abdomen/pelvis, with anticipation of going to cath lab for emergent intervention. Also, if there are concerns, please call me, and I will come in to further assess the patient.    She does have a recently placed coronary MICHELLE stent in her basilar artery, and thus she must continue her Brilinta/ASA dual antiplatelet regimen.

## 2024-10-09 NOTE — PLAN OF CARE
Goal Outcome Evaluation:  Plan of Care Reviewed With: patient, spouse        Progress: improving  Outcome Evaluation: Pt demo improved independence by performing t/fs w/ Min A w/ RW and functional mobility w/ Min Ax2 w/ RW. Pt conts to require increased assist from baseline w/ ADL performance d/t L sided weakness and balance deficits warranting cont skilled IPOT POC. Recommend pt DC to IP rehab.      Anticipated Discharge Disposition (OT): inpatient rehabilitation facility

## 2024-10-09 NOTE — PLAN OF CARE
Goal Outcome Evaluation:  Plan of Care Reviewed With: patient, spouse        Progress: improving         Anticipated Discharge Disposition (SLP): inpatient rehabilitation facility             Treatment Assessment (SLP): continued, toleration of diet (10/09/24 1100)  Treatment Assessment Comments (SLP): Excellent participation (10/09/24 1100)  Plan for Continued Treatment (SLP): continue treatment per plan of care (10/09/24 1100)

## 2024-10-09 NOTE — THERAPY TREATMENT NOTE
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 Cerebral aneurysm  I67.1 437.3   3. History of stroke  Z86.73 V12.54   4. Oropharyngeal dysphagia  R13.12 787.22     Patient Active Problem List   Diagnosis    Acute CVA    Cerebral aneurysm, nonruptured    Paraesophageal hernia    Hyperlipidemia    HTN    Moderate malnutrition    History of CVA (cerebrovascular accident)    Other headache syndrome    Acute cystitis without hematuria     Past Medical History:   Diagnosis Date    Aneurysm 2022    Cancer 2024    Basal Cell Carcinoma removed from scalp by dermatology    Cluster headache     Had headaches for several months before stroke and mass was found    Difficulty walking 22    After stroke    Headache     Headache, tension-type     Hyperlipidemia     Hypertension     Memory loss 22    Migraine     Stroke 2022    Vision loss 22    When dizzy or headache     Past Surgical History:   Procedure Laterality Date    ARTERIAL ANEURYSM REPAIR      BREAST LUMPECTOMY  2012    CYST REMOVAL Left 2023    EMBOLIZATION CEREBRAL N/A 2022    Procedure: CV EMBOLIZATION CEREBRAL IR;  Surgeon: Enoch Savage MD;  Location: [facility] CATH INVASIVE LOCATION;  Service: Interventional Radiology;  Laterality: N/A;    HIATAL HERNIA REPAIR      INTERVENTIONAL RADIOLOGY PROCEDURE N/A 09/15/2023    Procedure: Embolization;  Surgeon: Enoch Savage MD;  Location:  [facility] CATH INVASIVE LOCATION;  Service: Interventional Radiology;  Laterality: N/A;    INTERVENTIONAL RADIOLOGY PROCEDURE N/A 10/6/2024    Procedure: IR mechanical thrombectomy;  Surgeon: Joe Mendez MD;  Location:  [facility] CATH INVASIVE LOCATION;  Service: Interventional  "Radiology;  Laterality: N/A;    SINUS SURGERY      x4    SKIN CANCER EXCISION      cancer removed off top of head       SLP Recommendation and Plan     SLP Diet Recommendation: regular textures, thin liquids (10/09/24 1100)  Recommended Precautions and Strategies: upright posture during/after eating, general aspiration precautions (10/09/24 1100)  SLP Rec. for Method of Medication Administration: as tolerated (10/09/24 1100)     Monitor for Signs of Aspiration: yes, notify SLP if any concerns (10/09/24 1100)        Anticipated Discharge Disposition (SLP): inpatient rehabilitation facility (10/09/24 1100)     Therapy Frequency (Swallow): 5 days per week (10/09/24 1100)  Predicted Duration Therapy Intervention (Days): 1 week (10/09/24 1100)  Oral Care Recommendations: Oral Care BID/PRN, Toothbrush (10/09/24 1100)        Daily Summary of Progress (SLP): progress toward functional goals as expected (10/09/24 1100)               Treatment Assessment (SLP): continued, toleration of diet (10/09/24 1100)  Treatment Assessment Comments (SLP): Excellent participation (10/09/24 1100)  Plan for Continued Treatment (SLP): continue treatment per plan of care (10/09/24 1100)         Plan of Care Reviewed With: patient, spouse  Progress: improving      SWALLOW EVALUATION (Last 72 Hours)       SLP Adult Swallow Evaluation       Row Name 10/09/24 1100 10/08/24 1030 10/07/24 1310 10/07/24 0930          Rehab Evaluation    Document Type therapy note (daily note)  -RS therapy note (daily note)  -AC evaluation  -RS evaluation  -RS     Subjective Information no complaints  -RS no complaints  -AC no complaints  -RS no complaints  -RS     Patient Observations alert;cooperative;agree to therapy  -RS alert;cooperative  -AC alert;cooperative  -RS alert;cooperative;agree to therapy  -RS     Patient/Family/Caregiver Comments/Observations spouse present  -RS Spouse present.  -AC   -RS Aunt (who refers to herself as pt's \"sister\") present  " -RS     Patient Effort excellent  -RS excellent  -AC good  -RS good  -RS     Symptoms Noted During/After Treatment none  -RS -- none  -RS none  -RS     Oral Care -- -- -- oral rinse provided  -RS        General Information    Patient Profile Reviewed -- -- -- yes  -RS     Pertinent History Of Current Problem -- -- -- Pt is a 63 yoF w PMHx HTN, HLD, migraines, history of stroke (R occipital and L cerebellar, 2023), and giant basilar aneurysm s/p pipeline embolization (Dr. Savage, 2022 and 2023) who presents to [facility] ED for evaluation of left-sided weakness and facial droop which was present upon awakening. She is s/p mechanical thrombectomy after CTA head/neck revealed thrombosed pipeline stent. Pt was intubated for procedure. She has a very small nosebleed  2/2 large bore NGT  -RS     Current Method of Nutrition -- -- -- NPO;large-bore;nasogastric feedings  -RS     Precautions/Limitations, Vision -- -- -- WFL;for purposes of eval;difficult to assess  -RS     Precautions/Limitations, Hearing -- -- -- WFL;for purposes of eval  -RS     Prior Level of Function-Communication -- -- -- WFL  -RS     Prior Level of Function-Swallowing -- -- -- no diet consistency restrictions  -RS     Plans/Goals Discussed with -- -- -- patient and family;agreed upon  -RS        Pain    Additional Documentation Pain Scale: FACES Pre/Post-Treatment (Group)  -RS -- Pain Scale: FACES Pre/Post-Treatment (Group)  -RS Pain Scale: FACES Pre/Post-Treatment (Group)  -RS        Pain Scale: Numbers Pre/Post-Treatment    Pretreatment Pain Rating -- 0/10 - no pain  -AC -- --     Posttreatment Pain Rating -- 0/10 - no pain  -AC -- --     Pre/Posttreatment Pain Comment -- Pt reported her headache is subsiding since recently given medication.  -AC -- --        Pain Scale: FACES Pre/Post-Treatment    Pain: FACES Scale, Pretreatment 2-->hurts little bit  -RS -- 0-->no hurt  -RS 0-->no hurt  -RS     Posttreatment Pain Rating 2-->hurts little bit  -RS -- 0-->no  hurt  -RS 0-->no hurt  -RS     Pre/Posttreatment Pain Comment no c/o pain, but does tell me that her back is uncomfortable. Would like to get back in bed after tx  -RS -- -- --        Oral Motor Structure and Function    Dentition Assessment -- -- -- natural, present and adequate  -RS     Secretion Management -- -- -- WNL/WFL  -RS     Mucosal Quality -- -- -- moist, healthy  -RS     Volitional Swallow -- -- -- delayed  -RS     Volitional Cough -- -- -- weak  -RS        Oral Musculature and Cranial Nerve Assessment    Oral Motor General Assessment -- -- -- generalized oral motor weakness;other (see comments)  worse on L  -RS        General Eating/Swallowing Observations    Respiratory Support Currently in Use -- -- -- room air  -RS     Eating/Swallowing Skills -- -- -- fed by SLP  -RS     Positioning During Eating -- -- -- upright in chair  -RS     Utensils Used -- -- -- spoon;straw  -RS     Consistencies Trialed -- -- -- ice chips;thin liquids;pureed  -RS        Respiratory    Respiratory Status -- -- -- WFL  -RS        Clinical Swallow Eval    Oral Prep Phase -- -- -- WFL  -RS     Oral Transit -- -- -- WFL  -RS     Oral Residue -- -- -- WFL  -RS     Pharyngeal Phase -- -- -- suspected pharyngeal impairment  -RS     Esophageal Phase -- -- -- unremarkable  -RS        Pharyngeal Phase Concerns    Pharyngeal Phase Concerns -- -- -- multiple swallows  -RS     Multiple Swallows -- -- -- all consistencies  -RS     Pharyngeal Phase Concerns, Comment -- -- -- may be related to large bore NGT; however, pt high risk for silent aspiration so needs FEES  -RS        Fiberoptic Endoscopic Evaluation of Swallowing (FEES)    Risks/Benefits Reviewed -- -- risks/benefits explained;patient;family;agreed to eval  -RS --     Nasal Entry -- -- right:  -RS --     Scope serial number/identification -- -- [id number]  -RS --        Anatomy and Physiology    Anatomic Considerations -- -- edema;arytenoids  -RS --     Base of Tongue -- --  asymmetrical;range adequate  -RS --     Laryngeal Function Breathing -- -- decreased movement left  -RS --     Laryngeal Function Phonation -- -- decreased movement left  -RS --     Laryngeal Function to Breath Hold -- -- can't sustain closure;TVF contact  -RS --     Secretion Rating Scale (Sid et alJaz 1996) -- -- 1- secretions present around the laryngeal vestibule  -RS --     Secretion Description -- -- thin  -RS --     Ice Chips -- -- elicited swallow  -RS --     Spontaneous Swallow -- -- frequency adequate  -RS --     Sensory -- -- sensed scope  -RS --     Utensils Used -- -- Spoon;Cup;Straw  -RS --     Consistencies Trialed -- -- ice chips;thin liquids;nectar-thick liquids;pudding/puree;mixed consistency;regular textures  -RS --        FEES Interpretation    Oral Phase -- -- prespill of liquids into pharynx  -RS --        Initiation of Pharyngeal Swallow    Initiation of Pharyngeal Swallow -- -- bolus in pyriform sinuses  -RS --     Pharyngeal Phase -- -- functional pharyngeal phase of swallow;impaired pharyngeal phase of swallowing  -RS --     Pharyngeal Phase, Comment -- -- Consistent prespill of thins to the pyriforms prior to the swallow 2/2 reduced tongue back strength and mistiming of the swallow. No pen/asp observed t/o even with attempts to fatigue and with large continuous drinks. SLP will tx dysphagia x5 sessions to increase the safety of the swallow  -RS --        Swallowing Quality of Life Assessment    Education and counseling provided -- Aspiration precautions  reflux precautions  -AC -- --        SLP Evaluation Clinical Impression    SLP Swallowing Diagnosis -- -- functional oral phase;functional pharyngeal phase  -RS R/O pharyngeal dysphagia  -RS     Functional Impact -- -- risk of aspiration/pneumonia  -RS risk of aspiration/pneumonia  -RS     Rehab Potential/Prognosis, Swallowing -- -- good, to achieve stated therapy goals  -RS adequate, monitor progress closely  -RS     Swallow Criteria for  Skilled Therapeutic Interventions Met -- -- demonstrates skilled criteria  -RS --        SLP Treatment Clinical Impressions    Treatment Assessment (SLP) continued;toleration of diet  -RS continued;toleration of diet;improved;dysarthria  -AC -- --     Treatment Assessment Comments (SLP) Excellent participation  -RS Continued mild oropharyngeal deficits suspected. Also suspect baseline esophageal dysphagia based on pt's complaints and review of history. Dysarthria resolved.  -AC -- --     Daily Summary of Progress (SLP) progress toward functional goals as expected  -RS progress toward functional goals as expected  -AC -- --     Plan for Continued Treatment (SLP) continue treatment per plan of care  -RS goals adjusted to reflect functional improvements demonstrated  -AC -- --     Care Plan Review care plan/treatment goals reviewed  -RS evaluation/treatment results reviewed;care plan/treatment goals reviewed;risks/benefits reviewed;current/potential barriers reviewed;patient/other agree to care plan  -AC -- --     Care Plan Review, Other Participant(s) spouse  -RS spouse  -AC -- --        Recommendations    Therapy Frequency (Swallow) 5 days per week  -RS -- 5 days per week  -RS --     Predicted Duration Therapy Intervention (Days) 1 week  -RS -- 1 week  -RS --     SLP Diet Recommendation regular textures;thin liquids  -RS regular textures;thin liquids  -AC regular textures;thin liquids  -RS NPO  -RS     Recommended Diagnostics -- -- other (see comments)  no repeat instrumental indicated unless decline in respiratory status  -RS FEES  -RS     Recommended Precautions and Strategies upright posture during/after eating;general aspiration precautions  -RS -- upright posture during/after eating;general aspiration precautions  -RS --     Oral Care Recommendations Oral Care BID/PRN;Toothbrush  -RS -- Oral Care BID/PRN;Toothbrush  -RS Oral Care BID/PRN;Suction toothbrush  -RS     SLP Rec. for Method of Medication  Administration as tolerated  -RS -- as tolerated  -RS meds via alternate route  -RS     Monitor for Signs of Aspiration yes;notify SLP if any concerns  -RS -- yes;notify SLP if any concerns  -RS yes;notify SLP if any concerns  -RS     Anticipated Discharge Disposition (SLP) inpatient rehabilitation facility  -RS inpatient rehabilitation facility  -AC inpatient rehabilitation facility  -RS inpatient rehabilitation facility  -RS               User Key  (r) = Recorded By, (t) = Taken By, (c) = Cosigned By      Initials Name Effective Dates    AC Leidy Villaseñor, MS CCC-SLP 02/03/23 -     RS Roberto Collins MS CCC-SLP 09/14/23 -                     EDUCATION  The patient has been educated in the following areas:   Dysphagia (Swallowing Impairment).        SLP GOALS       Row Name 10/09/24 1100 10/08/24 1030 10/07/24 1310       (LTG) Patient will demonstrate functional swallow for    Diet Texture (Demonstrate functional swallow) regular textures  -RS regular textures  -AC regular textures  -RS    Liquid viscosity (Demonstrate functional swallow) thin liquids  -RS thin liquids  -AC thin liquids  -RS    [place] (Demonstrate functional swallow) independently (over 90% accuracy)  -RS independently (over 90% accuracy)  -AC independently (over 90% accuracy)  -RS    Time Frame (Demonstrate functional swallow) 1 week  -RS 1 week  -AC 1 week  -RS    Progress/Outcomes (Demonstrate functional swallow) good progress toward goal  -RS good progress toward goal  -AC new goal  -RS       (STG) Patient will tolerate trials of    Consistencies Trialed (Tolerate trials) regular textures;thin liquids  -RS regular textures;thin liquids  -AC regular textures;thin liquids  -RS    Desired Outcome (Tolerate trials) without signs/symptoms of aspiration;without signs of distress;with adequate oral prep/transit/clearance  -RS without signs/symptoms of aspiration;without signs of distress;with adequate oral prep/transit/clearance  -AC without  signs/symptoms of aspiration;without signs of distress;with adequate oral prep/transit/clearance  -RS    [place] (Tolerate trials) independently (over 90% accuracy)  -RS independently (over 90% accuracy)  -AC independently (over 90% accuracy)  -RS    Time Frame (Tolerate trials) 1 week  -RS 1 week  -AC 1 week  -RS    Progress/Outcomes (Tolerate trials) good progress toward goal  -RS good progress toward goal  -AC new goal  -RS    Comment (Tolerate trials) no s/sx aspiration or distress w any tested consistency  -RS Pt tolerated drinks of thin liquid via straw w/o overt clinical s/sxs aspiration during tx.  -AC --       (STG) Lingual Strengthening Goal 1 (SLP)    Activity (Lingual Strengthening Goal 1, SLP) increase tongue back strength  -RS -- increase tongue back strength  -RS    Increase Tongue Back Strength lingual resistance exercises  -RS lingual resistance exercises  -AC lingual resistance exercises  -RS    [place]/Accuracy (Lingual Strengthening Goal 1, SLP) with minimal cues (75-90% accuracy)  -RS with minimal cues (75-90% accuracy)  -AC with minimal cues (75-90% accuracy)  -RS    Time Frame (Lingual Strengthening Goal 1, SLP) 1 week  -RS 1 week  -AC 1 week  -RS    Progress/Outcomes (Lingual Strengthening Goal 1, SLP) good progress toward goal  -RS good progress toward goal  -AC new goal  -RS    Comment (Lingual Strengthening Goal 1, SLP) Independent w exs  -RS Pt able to demonstrate w/ min cues/use of handout.  -AC --       (STG) Pharyngeal Strengthening Exercise Goal 1 (SLP)    Activity (Pharyngeal Strengthening Goal 1, SLP) increase timing  -RS -- increase timing  -RS    Increase Timing prepping - 3 second prep or suck swallow or 3-step swallow  -RS prepping - 3 second prep or suck swallow or 3-step swallow  -AC prepping - 3 second prep or suck swallow or 3-step swallow  -RS    [place]/Accuracy (Pharyngeal Strengthening Goal 1, SLP) with minimal cues (75-90% accuracy)  -RS with minimal cues  (75-90% accuracy)  -AC with minimal cues (75-90% accuracy)  -RS    Time Frame (Pharyngeal Strengthening Goal 1, SLP) 1 week  -RS 1 week  -AC 1 week  -RS    Progress/Outcomes (Pharyngeal Strengthening Goal 1, SLP) good progress toward goal  -RS good progress toward goal  -AC new goal  -RS    Comment (Pharyngeal Strengthening Goal 1, SLP) Independent w exs  -RS Pt able to demonstrate w/ min cues/use of handout.  -AC --       Patient will demonstrate functional speech skills for return to discharge environment    [place] -- Independently  -AC Independently  -RS    Time frame -- 1 week  -AC 1 week  -RS    Progress/Outcomes -- goal no longer appropriate  -AC new goal  -RS    Comments -- Dysarthria resolved. Pt 100% intelligible to unfamiliar listener in connected speech. Pt/spouse reported speech has returned to baseline.  -AC --       Respiratory Support Goal 1 (SLP)    Improve Respiratory Support Goal 1 (SLP) -- increasing length of exhalation;sustaining a vowel sound on exhalation;80%;with minimal cues (75-90%)  -AC increasing length of exhalation;sustaining a vowel sound on exhalation;80%;with minimal cues (75-90%)  -RS    Time Frame (Respiratory Support Goal 1, SLP) -- 1 week  -AC 1 week  -RS    Progress/Outcomes (Respiratory Support Goal 1, SLP) -- goal no longer appropriate  -AC new goal  -RS       Articulation Goal 1 (SLP)    Improve Articulation Goal 1 (SLP) -- by over-articulating at word level;by over-articulating at phrase level;80%;with minimal cues (75-90%)  -AC by over-articulating at word level;by over-articulating at phrase level;80%;with minimal cues (75-90%)  -RS    Time Frame (Articulation Goal 1, SLP) -- 1 week  -AC 1 week  -RS    Progress/Outcomes (Articulation Goal 1, SLP) -- goal no longer appropriate  -AC new goal  -RS              User Key  (r) = Recorded By, (t) = Taken By, (c) = Cosigned By      Initials Name Provider Type    Leidy Hardwick MS CCC-SLP Speech and Language Pathologist     Roberto Rosado MS CCC-SLP Speech and Language Pathologist                         Time Calculation:    Time Calculation- SLP       Row Name 10/09/24 1128             Time Calculation- SLP    SLP Start Time 1100  -RS      SLP Received On 10/09/24  -RS         Untimed Charges    66214-KM Treatment Swallow Minutes 39  -RS         Total Minutes    Untimed Charges Total Minutes 39  -RS       Total Minutes 39  -RS                User Key  (r) = Recorded By, (t) = Taken By, (c) = Cosigned By      Initials Name Provider Type    Roberto Rosado MS CCC-SLP Speech and Language Pathologist                    Therapy Charges for Today       Code Description Service Date Service Provider Modifiers Qty    [id number]  ST TREATMENT SWALLOW 3 10/9/2024 Roberto Collins MS CCC-DAGMAR GN 1                 MS ELIOT Kaufman  10/9/2024

## 2024-10-10 LAB
ANION GAP SERPL CALCULATED.3IONS-SCNC: 8 MMOL/L (ref 5–15)
BUN SERPL-MCNC: 24 MG/DL (ref 8–23)
BUN/CREAT SERPL: 40.7 (ref 7–25)
CALCIUM SPEC-SCNC: 8.2 MG/DL (ref 8.6–10.5)
CHLORIDE SERPL-SCNC: 109 MMOL/L (ref 98–107)
CO2 SERPL-SCNC: 24 MMOL/L (ref 22–29)
CREAT SERPL-MCNC: 0.59 MG/DL (ref 0.57–1)
DEPRECATED RDW RBC AUTO: 49.9 FL (ref 37–54)
EGFRCR SERPLBLD CKD-EPI 2021: 102 ML/MIN/1.73
ERYTHROCYTE [DISTWIDTH] IN BLOOD BY AUTOMATED COUNT: 14.9 % (ref 12.3–15.4)
GLUCOSE SERPL-MCNC: 113 MG/DL (ref 65–99)
HCT VFR BLD AUTO: 37.4 % (ref 34–46.6)
HGB BLD-MCNC: 12.6 G/DL (ref 12–15.9)
MAGNESIUM SERPL-MCNC: 2.1 MG/DL (ref 1.6–2.4)
MCH RBC QN AUTO: 30.8 PG (ref 26.6–33)
MCHC RBC AUTO-ENTMCNC: 33.7 G/DL (ref 31.5–35.7)
MCV RBC AUTO: 91.4 FL (ref 79–97)
PHOSPHATE SERPL-MCNC: 3.4 MG/DL (ref 2.5–4.5)
PLATELET # BLD AUTO: 189 10*3/MM3 (ref 140–450)
PMV BLD AUTO: 10.9 FL (ref 6–12)
POTASSIUM SERPL-SCNC: 3.9 MMOL/L (ref 3.5–5.2)
RBC # BLD AUTO: 4.09 10*6/MM3 (ref 3.77–5.28)
SODIUM SERPL-SCNC: 141 MMOL/L (ref 136–145)
WBC NRBC COR # BLD AUTO: 13.14 10*3/MM3 (ref 3.4–10.8)

## 2024-10-10 PROCEDURE — 99231 SBSQ HOSP IP/OBS SF/LOW 25: CPT | Performed by: RADIOLOGY

## 2024-10-10 PROCEDURE — 97530 THERAPEUTIC ACTIVITIES: CPT

## 2024-10-10 PROCEDURE — 85027 COMPLETE CBC AUTOMATED: CPT | Performed by: INTERNAL MEDICINE

## 2024-10-10 PROCEDURE — 84100 ASSAY OF PHOSPHORUS: CPT | Performed by: INTERNAL MEDICINE

## 2024-10-10 PROCEDURE — 83735 ASSAY OF MAGNESIUM: CPT | Performed by: INTERNAL MEDICINE

## 2024-10-10 PROCEDURE — 99232 SBSQ HOSP IP/OBS MODERATE 35: CPT

## 2024-10-10 PROCEDURE — 63710000001 DEXAMETHASONE PER 0.25 MG

## 2024-10-10 PROCEDURE — 80048 BASIC METABOLIC PNL TOTAL CA: CPT | Performed by: INTERNAL MEDICINE

## 2024-10-10 PROCEDURE — 92526 ORAL FUNCTION THERAPY: CPT

## 2024-10-10 PROCEDURE — 25010000002 ONDANSETRON PER 1 MG: Performed by: NURSE PRACTITIONER

## 2024-10-10 PROCEDURE — 97535 SELF CARE MNGMENT TRAINING: CPT

## 2024-10-10 PROCEDURE — 25010000002 PROCHLORPERAZINE 10 MG/2ML SOLUTION: Performed by: NURSE PRACTITIONER

## 2024-10-10 PROCEDURE — 99232 SBSQ HOSP IP/OBS MODERATE 35: CPT | Performed by: INTERNAL MEDICINE

## 2024-10-10 RX ORDER — DEXAMETHASONE 4 MG/1
4 TABLET ORAL EVERY 6 HOURS SCHEDULED
Status: COMPLETED | OUTPATIENT
Start: 2024-10-10 | End: 2024-10-10

## 2024-10-10 RX ORDER — OXYBUTYNIN CHLORIDE 5 MG/1
5 TABLET, EXTENDED RELEASE ORAL DAILY
Status: DISCONTINUED | OUTPATIENT
Start: 2024-10-10 | End: 2024-10-11 | Stop reason: HOSPADM

## 2024-10-10 RX ORDER — TOPIRAMATE 100 MG/1
100 TABLET, FILM COATED ORAL DAILY
Status: DISCONTINUED | OUTPATIENT
Start: 2024-10-10 | End: 2024-10-11 | Stop reason: HOSPADM

## 2024-10-10 RX ORDER — LUBIPROSTONE 8 UG/1
8 CAPSULE ORAL 2 TIMES DAILY WITH MEALS
Status: DISCONTINUED | OUTPATIENT
Start: 2024-10-10 | End: 2024-10-11 | Stop reason: HOSPADM

## 2024-10-10 RX ORDER — DEXAMETHASONE 2 MG/1
2 TABLET ORAL EVERY 12 HOURS SCHEDULED
Status: DISCONTINUED | OUTPATIENT
Start: 2024-10-14 | End: 2024-10-11 | Stop reason: HOSPADM

## 2024-10-10 RX ORDER — DEXAMETHASONE 2 MG/1
1 TABLET ORAL EVERY 12 HOURS SCHEDULED
Status: DISCONTINUED | OUTPATIENT
Start: 2024-10-17 | End: 2024-10-11 | Stop reason: HOSPADM

## 2024-10-10 RX ORDER — ONDANSETRON 2 MG/ML
4 INJECTION INTRAMUSCULAR; INTRAVENOUS EVERY 6 HOURS PRN
Status: DISCONTINUED | OUTPATIENT
Start: 2024-10-10 | End: 2024-10-11 | Stop reason: HOSPADM

## 2024-10-10 RX ORDER — NORTRIPTYLINE HCL 10 MG
20 CAPSULE ORAL NIGHTLY
Status: DISCONTINUED | OUTPATIENT
Start: 2024-10-10 | End: 2024-10-11 | Stop reason: HOSPADM

## 2024-10-10 RX ORDER — DEXAMETHASONE 2 MG/1
2 TABLET ORAL EVERY 8 HOURS SCHEDULED
Status: DISCONTINUED | OUTPATIENT
Start: 2024-10-11 | End: 2024-10-11 | Stop reason: HOSPADM

## 2024-10-10 RX ORDER — PROCHLORPERAZINE EDISYLATE 5 MG/ML
5 INJECTION INTRAMUSCULAR; INTRAVENOUS ONCE
Status: COMPLETED | OUTPATIENT
Start: 2024-10-10 | End: 2024-10-10

## 2024-10-10 RX ADMIN — Medication: at 21:00

## 2024-10-10 RX ADMIN — LUBIPROSTONE 8 MCG: 8 CAPSULE, GELATIN COATED ORAL at 18:15

## 2024-10-10 RX ADMIN — FAMOTIDINE 20 MG: 20 TABLET, FILM COATED ORAL at 07:46

## 2024-10-10 RX ADMIN — ATORVASTATIN CALCIUM 80 MG: 40 TABLET, FILM COATED ORAL at 20:09

## 2024-10-10 RX ADMIN — SERTRALINE 50 MG: 50 TABLET, FILM COATED ORAL at 12:38

## 2024-10-10 RX ADMIN — NORTRIPTYLINE HYDROCHLORIDE 20 MG: 10 CAPSULE ORAL at 20:10

## 2024-10-10 RX ADMIN — FAMOTIDINE 20 MG: 20 TABLET, FILM COATED ORAL at 16:45

## 2024-10-10 RX ADMIN — ACETAMINOPHEN 650 MG: 325 TABLET ORAL at 07:46

## 2024-10-10 RX ADMIN — DEXAMETHASONE 4 MG: 4 TABLET ORAL at 18:15

## 2024-10-10 RX ADMIN — TICAGRELOR 90 MG: 90 TABLET ORAL at 07:47

## 2024-10-10 RX ADMIN — ONDANSETRON 4 MG: 2 INJECTION INTRAMUSCULAR; INTRAVENOUS at 16:40

## 2024-10-10 RX ADMIN — DEXAMETHASONE 4 MG: 4 TABLET ORAL at 12:37

## 2024-10-10 RX ADMIN — TOPIRAMATE 100 MG: 100 TABLET, FILM COATED ORAL at 12:37

## 2024-10-10 RX ADMIN — OXYBUTYNIN CHLORIDE 5 MG: 5 TABLET, EXTENDED RELEASE ORAL at 12:38

## 2024-10-10 RX ADMIN — TICAGRELOR 90 MG: 90 TABLET ORAL at 20:09

## 2024-10-10 RX ADMIN — MAGNESIUM OXIDE TAB 400 MG (241.3 MG ELEMENTAL MG) 400 MG: 400 (241.3 MG) TAB at 20:09

## 2024-10-10 RX ADMIN — ASPIRIN 81 MG 81 MG: 81 TABLET ORAL at 07:46

## 2024-10-10 RX ADMIN — DEXAMETHASONE 4 MG: 4 TABLET ORAL at 23:51

## 2024-10-10 RX ADMIN — PROCHLORPERAZINE EDISYLATE 5 MG: 5 INJECTION INTRAMUSCULAR; INTRAVENOUS at 11:36

## 2024-10-10 NOTE — PROGRESS NOTES
NAME: MEDHAT PAL  : 1960  PCP: Melissa Lazo APRN  ADMITTING PHYSICIAN: Zafar Hager MD    DATE OF ADMISSION:  10/6/2024  DATE OF SERVICE: 10/10/2024    HOSPITAL DAY:  4 days    HISTORY OF PRESENT ILLNESS:  63 y.o. 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  She was seen in the neurointerventional clinic on 2024, and her Plavix was stopped, but she remained on low-dose aspirin.     Ms. Pal presented to the emergency department on 10/6/2024 with acute thrombosis of her pipeline stent and basilar artery.  She underwent successful mechanical thrombectomy by my partner Dr. Mendez, restoring normal (TICI 3) flow.  This did unmask a stenosis within the mid portions of the Pipeline stent construct, and this was treated with a 2.5 mm coronary MICHELLE, restoring robust flow within the vertebrobasilar system.    REVIEW OF IMAGING:  No new imaging    LABS:  Lab Results   Component Value Date    WBC 13.14 (H) 10/10/2024    HGB 12.6 10/10/2024    HCT 37.4 10/10/2024    MCV 91.4 10/10/2024     10/10/2024     Lab Results   Component Value Date    GLUCOSE 113 (H) 10/10/2024    CALCIUM 8.2 (L) 10/10/2024     10/10/2024    K 3.9 10/10/2024    CO2 24.0 10/10/2024     (H) 10/10/2024    BUN 24 (H) 10/10/2024    CREATININE 0.59 10/10/2024    EGFRIFNONA 103 2019    BCR 40.7 (H) 10/10/2024    ANIONGAP 8.0 10/10/2024       CURRENT MEDS:  Current Facility-Administered Medications   Medication Dose Route Frequency Provider Last Rate Last Admin    acetaminophen (TYLENOL) tablet 650 mg  650 mg Oral Q6H PRN Yasemin Srivastava APRN   650 mg at 10/10/24 0746    aspirin chewable tablet 81 mg  81 mg Oral Daily Fahad Shankar RPH   81 mg at 10/10/24 0746    Or    aspirin suppository 300 mg  300 mg Rectal Daily Prebble,  Fahad, RPH        atorvastatin (LIPITOR) tablet 80 mg  80 mg Oral Nightly Prebble, Fahad, RPH   80 mg at 10/09/24 2058    sennosides-docusate (PERICOLACE) 8.6-50 MG per tablet 2 tablet  2 tablet Oral BID PRN Prebble, Fahad, RPH        And    polyethylene glycol (MIRALAX) packet 17 g  17 g Oral Daily PRN Prebble, Fahad, RPH        And    bisacodyl (DULCOLAX) suppository 10 mg  10 mg Rectal Daily PRN Prebble, Fahad, RPH        Calcium Replacement - Follow Nurse / BPA Driven Protocol   Does not apply PRN Lety Matthews APRN        famotidine (PEPCID) tablet 20 mg  20 mg Oral BID AC Zafar Hager MD   20 mg at 10/10/24 0746    labetalol (NORMODYNE,TRANDATE) injection 10 mg  10 mg Intravenous Q4H PRN Bryon Garner MD        Magnesium Cardiology Dose Replacement - Follow Nurse / BPA Driven Protocol   Does not apply PRN Lety Matthews APRN        magnesium oxide (MAG-OX) tablet 400 mg  400 mg Oral Nightly Jennifer Ann APRN        niCARdipine (CARDENE) 25mg in 250mL NS infusion  5-15 mg/hr Intravenous Titrated Ashley Acharya APRN 100 mL/hr at 10/06/24 1320 10 mg/hr at 10/06/24 1320    nitroglycerin (NITROSTAT) SL tablet 0.4 mg  0.4 mg Sublingual Q5 Min PRN Ashley Acharya APRN        Phosphorus Replacement - Follow Nurse / BPA Driven Protocol   Does not apply PRN Lety Matthews APRN        Potassium Replacement - Follow Nurse / BPA Driven Protocol   Does not apply PRN Lety Matthews APRN        ticagrelor (BRILINTA) tablet 90 mg  90 mg Oral BID Prebble, Fahad, RPH   90 mg at 10/10/24 0747       PHYSICAL EXAM:  Vitals:    10/10/24 0900   BP: 120/78   Pulse: 64   Resp:    Temp:    SpO2: 98%      Alert and oriented x 3.  Resting comfortably in bedside chair.  Tolerating p.o. intake, albeit does not have much of an appetite.  Antigravity strength in the upper and lower extremities.  Speech is clear.  She endorses a moderate headache, similar to her  "baseline.    ASSESSMENT/PLAN:  Ms. Romo is a 63 y.o. 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  She presented on 10/6/2024 with acute thrombosis of her basilar artery and Pipeline flow diverter.  Ms. Romo underwent successful mechanical thrombectomy along with placement of a 2.5 mm coronary MICHELLE within the mid portions of the Pipeline stent construct (at side for symptomatic stenosis), restoring normal (TICI 3) flow to the intracranial vertebrobasilar system.     Given the severity of her presenting stroke symptoms, Ms. Romo has made an exceptional recovery to this point, and has only a few punctate areas of infarct on her MRI (not in the brainstem).  I am very optimistic for her making a meaningful recovery, albeit she will require inpatient physical therapy.     Ms. Benz was scheduled to transfer to [facility] on 10/9/2024, but she developed a small right groin hematoma on the evening of 10/8/2024.  She did have a \"vasovagal\" response to groin pressure, and was given a unit of PRBCs for fear of symptomatic bleeding.  She did not experience any significant drop in her H/H, and again this is deemed to have been a \"vasovagal\" event.  Duplex of her groin on 10/9/2024 demonstrates small hematoma, but no pseudoaneurysm.  Her right groin hematoma and H/H are stable.     She is tentatively scheduled to transfer to [facility] on 10/11/2024.  She is okay to continue to work with PT/OT.     She will need to remain on her Brilinta/aspirin dual antiplatelet regimen for the next month or so, at which point I will likely transition her to a Plavix/aspirin regimen.  We are in the process of tapering her steroids.    She will follow-up with me in the neurointerventional clinic in 1 month's time.     Copied text and portions of the note have been reviewed and are accurate as of 10/10/24.                    "

## 2024-10-10 NOTE — PLAN OF CARE
Goal Outcome Evaluation:                     Anticipated Discharge Disposition (SLP): inpatient rehabilitation facility          SLP Swallowing Diagnosis: functional oral phase, functional pharyngeal phase (10/10/24 1030)  Treatment Assessment (SLP): continued, toleration of diet, no clinical signs of, aspiration (10/10/24 1030)     Plan for Continued Treatment (SLP): continue treatment per plan of care (10/10/24 1030)

## 2024-10-10 NOTE — THERAPY TREATMENT NOTE
Patient Name: Lauryn Romo  : 1960    MRN: [id number]                              Today's Date: 10/10/2024       Admit Date: 10/6/2024    Visit Dx:     ICD-10-CM ICD-9-CM   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 Cerebral aneurysm  I67.1 437.3   3. History of stroke  Z86.73 V12.54   4. Oropharyngeal dysphagia  R13.12 787.22     Patient Active Problem List   Diagnosis    Acute CVA    Cerebral aneurysm, nonruptured    Paraesophageal hernia    Hyperlipidemia    HTN    Moderate malnutrition    History of CVA (cerebrovascular accident)    Other headache syndrome    Acute cystitis without hematuria     Past Medical History:   Diagnosis Date    Aneurysm 2022    Cancer 2024    Basal Cell Carcinoma removed from scalp by dermatology    Cluster headache     Had headaches for several months before stroke and mass was found    Difficulty walking 23    After stroke    Headache     Headache, tension-type     Hyperlipidemia     Hypertension     Memory loss 22    Migraine     Stroke 2022    Vision loss 22    When dizzy or headache     Past Surgical History:   Procedure Laterality Date    ARTERIAL ANEURYSM REPAIR      BREAST LUMPECTOMY  2012    CYST REMOVAL Left 2023    EMBOLIZATION CEREBRAL N/A 2022    Procedure: CV EMBOLIZATION CEREBRAL IR;  Surgeon: Enoch Savage MD;  Location:  [facility];  Service: Interventional Radiology;  Laterality: N/A;    HIATAL HERNIA REPAIR  2015    INTERVENTIONAL RADIOLOGY PROCEDURE N/A 09/15/2023    Procedure: Embolization;  Surgeon: Enoch Savage MD;  Location:  [facility];  Service: Interventional Radiology;  Laterality: N/A;    INTERVENTIONAL RADIOLOGY PROCEDURE N/A 10/6/2024    Procedure: IR mechanical thrombectomy;  Surgeon: Joe Mendez MD;  Location:  [facility];  Service: Interventional Radiology;  Laterality: N/A;    SINUS SURGERY      x4     SKIN CANCER EXCISION      cancer removed off top of head      General Information       Row Name 10/10/24 1424          OT Time and Intention    Document Type therapy note (daily note)  -CS     Mode of Treatment occupational therapy  -CS       Row Name 10/10/24 1424          General Information    Existing Precautions/Restrictions fall;oxygen therapy device and L/min;other (see comments)  L sided weakness, coordination deficits  -CS     Barriers to Rehab medically complex  -CS       Row Name 10/10/24 1424          Cognition    Orientation Status (Cognition) oriented x 3  -CS       Row Name 10/10/24 1424          Safety Issues, Functional Mobility    Impairments Affecting Function (Mobility) balance;coordination;visual/perceptual;strength;range of motion (ROM)  -CS               User Key  (r) = Recorded By, (t) = Taken By, (c) = Cosigned By      Initials Name Provider Type    CS Raeann Oseguera, OT Occupational Therapist                     Mobility/ADL's       Row Name 10/10/24 1424          Transfers    Transfers sit-stand transfer;stand-sit transfer  -       Row Name 10/10/24 1424          Sit-Stand Transfer    Sit-Stand Rains (Transfers) minimum assist (75% patient effort);verbal cues  -CS     Assistive Device (Sit-Stand Transfers) walker, front-wheeled  -       Row Name 10/10/24 1424          Stand-Sit Transfer    Stand-Sit Rains (Transfers) minimum assist (75% patient effort);verbal cues  -CS     Assistive Device (Stand-Sit Transfers) walker, front-wheeled  -       Row Name 10/10/24 1424          Functional Mobility    Functional Mobility- Ind. Level minimum assist (75% patient effort);verbal cues required  -CS     Functional Mobility- Device walker, front-wheeled  -CS     Functional Mobility-Distance (Feet) --  household distance  -CS     Functional Mobility- Comment cueing for step length and REBECCA  -CS       Row Name 10/10/24 1424          Lower Body Dressing Assessment/Training     [place] Level (Lower Body Dressing) don;socks;maximum assist (25% patient effort)  -CS     Position (Lower Body Dressing) supported sitting  -       Row Name 10/10/24 1424          Grooming Assessment/Training    [place] Level (Grooming) wash face, hands;set up  -CS     Position (Grooming) supported sitting  -       Row Name 10/10/24 1424          Self-Feeding Assessment/Training    [place] Level (Feeding) liquids to mouth;set up  -CS     Position (Self-Feeding) supported sitting  -               User Key  (r) = Recorded By, (t) = Taken By, (c) = Cosigned By      Initials Name Provider Type    CS Raeann Oseguera OT Occupational Therapist                   Obj/Interventions       Row Name 10/10/24 1425          Balance    Balance Assessment sitting static balance;sitting dynamic balance;standing static balance;standing dynamic balance  -CS     Static Sitting Balance standby assist  -CS     Dynamic Sitting Balance standby assist  -CS     Position, Sitting Balance sitting in chair  -     Static Standing Balance contact guard  -     Dynamic Standing Balance minimal assist  -CS     Position/Device Used, Standing Balance supported;walker, rolling  -CS     Balance Interventions sitting;static;dynamic;dynamic reaching;occupation based/functional task;weight shifting activity  -               User Key  (r) = Recorded By, (t) = Taken By, (c) = Cosigned By      Initials Name Provider Type    CS Raeann Oseguera, TOMI Occupational Therapist                   Goals/Plan    No documentation.                  Clinical Impression       Row Name 10/10/24 1425          Pain Assessment    Pretreatment Pain Rating 7/10  -CS     Posttreatment Pain Rating 8/10  -CS     Pain Location - head  -CS     Pre/Posttreatment Pain Comment tolerated, RN notified  -     Pain Intervention(s) Repositioned;Ambulation/increased activity  -       Row Name 10/10/24 1425          Plan of Care Review    Plan of Care Reviewed With  patient  -CS     Progress improving  -CS     Outcome Evaluation Pt demo improved independence by performing functional mobility w/ Min A w/ RW and LBD tasks w/ Max A. Pt conts to be limited d/t deficits in balance, coordination, and R groin pain though demo excellent effort. Recommend cont skilled IPOT POC to promote return to PLOF. Recommend pt DC to IP rehab.  -       Row Name 10/10/24 1425          Therapy Plan Review/Discharge Plan (OT)    Anticipated Discharge Disposition (OT) inpatient rehabilitation facility  -CS       Row Name 10/10/24 1425          Vital Signs    Pre Systolic BP Rehab 145  -CS     Pre Treatment Diastolic BP 73  -CS     Post Systolic BP Rehab 128  -CS     Post Treatment Diastolic BP 82  -CS     Pretreatment Heart Rate (beats/min) 65  -CS     Posttreatment Heart Rate (beats/min) 66  -CS     Pre SpO2 (%) 100  -CS     O2 Delivery Pre Treatment room air  -CS     Post SpO2 (%) 100  -CS     O2 Delivery Post Treatment room air  -CS     Pre Patient Position Sitting  -CS     Intra Patient Position Standing  -CS     Post Patient Position Sitting  -CS       Row Name 10/10/24 142          Positioning and Restraints    Pre-Treatment Position sitting in chair/recliner  -CS     Post Treatment Position chair  -CS     In Chair notified nsg;reclined;call light within reach;encouraged to call for assist;exit alarm on;RUE elevated;LUE elevated;waffle cushion;legs elevated;heels elevated  -CS               User Key  (r) = Recorded By, (t) = Taken By, (c) = Cosigned By      Initials Name Provider Type    CS Raeann Oseguera, TOMI Occupational Therapist                   Outcome Measures       Row Name 10/10/24 7600          How much help from another is currently needed...    Putting on and taking off regular lower body clothing? 2  -CS     Bathing (including washing, rinsing, and drying) 2  -CS     Toileting (which includes using toilet bed pan or urinal) 2  -CS     Putting on and taking off regular upper body  clothing 3  -CS     Taking care of personal grooming (such as brushing teeth) 3  -CS     Eating meals 3  -CS     AM-PAC 6 Clicks Score (OT) 15  -CS       Row Name 10/10/24 1437          Modified [place] Scale    Pre-Stroke Modified [place] Scale 6 - Unable to determine (UTD) from the medical record documentation  -CS     Modified [place] Scale 3 - Moderate disability.  Requiring some help, but able to walk without assistance.  -CS       Row Name 10/10/24 1437          Functional Assessment    Outcome Measure Options AM-PAC 6 Clicks Daily Activity (OT)  -               User Key  (r) = Recorded By, (t) = Taken By, (c) = Cosigned By      Initials Name Provider Type    CS Raeann Oseguera OT Occupational Therapist                    Occupational Therapy Education       Title: PT OT SLP Therapies (In Progress)       Topic: Occupational Therapy (In Progress)       Point: ADL training (In Progress)       Description:   Instruct learner(s) on proper safety adaptation and remediation techniques during self care or transfers.   Instruct in proper use of assistive devices.                  Learning Progress Summary             Patient Acceptance, E, NR by CS at 10/10/2024 1438    Acceptance, E, NR by CS at 10/9/2024 1345    Acceptance, E, NR by CS at 10/7/2024 1315                         Point: Home exercise program (Not Started)       Description:   Instruct learner(s) on appropriate technique for monitoring, assisting and/or progressing therapeutic exercises/activities.                  Learner Progress:  Not documented in this visit.              Point: Precautions (In Progress)       Description:   Instruct learner(s) on prescribed precautions during self-care and functional transfers.                  Learning Progress Summary             Patient Acceptance, E, NR by CS at 10/10/2024 1438    Acceptance, E, NR by CS at 10/9/2024 1345    Acceptance, E, NR by CS at 10/7/2024 1315                         Point: Body mechanics  (In Progress)       Description:   Instruct learner(s) on proper positioning and spine alignment during self-care, functional mobility activities and/or exercises.                  Learning Progress Summary             Patient Acceptance, E, NR by  at 10/10/2024 1438    Acceptance, E, NR by CS at 10/9/2024 1345    Acceptance, E, NR by CS at 10/7/2024 1315                                         User Key       Initials Effective Dates Name Provider Type Discipline     09/02/21 -  Raeann Oseguera, OT Occupational Therapist OT                  OT Recommendation and Plan  Planned Therapy Interventions (OT): activity tolerance training, adaptive equipment training, BADL retraining, functional balance retraining, occupation/activity based interventions, ROM/therapeutic exercise, strengthening exercise, transfer/mobility retraining, cognitive/visual perception retraining, neuromuscular control/coordination retraining, patient/caregiver education/training  Therapy Frequency (OT): daily  Plan of Care Review  Plan of Care Reviewed With: patient  Progress: improving  Outcome Evaluation: Pt demo improved independence by performing functional mobility w/ Min A w/ RW and LBD tasks w/ Max A. Pt conts to be limited d/t deficits in balance, coordination, and R groin pain though demo excellent effort. Recommend cont skilled IPOT POC to promote return to PLOF. Recommend pt DC to IP rehab.     Time Calculation:   Evaluation Complexity (OT)  Review Occupational Profile/Medical/Therapy History Complexity: expanded/moderate complexity  Assessment, Occupational Performance/Identification of Deficit Complexity: 5 or more performance deficits  Clinical Decision Making Complexity (OT): detailed assessment/moderate complexity  Overall Complexity of Evaluation (OT): moderate complexity     Time Calculation- OT       Row Name 10/10/24 1439             Time Calculation- OT    OT Start Time 1040  -      OT Received On 10/10/24  -      OT  Goal Re-Cert Due Date 10/17/24  -CS         Timed Charges    26487 - OT Therapeutic Activity Minutes 16  -CS      59321 - OT Self Care/Mgmt Minutes 8  -CS         Total Minutes    Timed Charges Total Minutes 24  -CS       Total Minutes 24  -CS                User Key  (r) = Recorded By, (t) = Taken By, (c) = Cosigned By      Initials Name Provider Type    CS Raeann Oseguera OT Occupational Therapist                  Therapy Charges for Today       Code Description Service Date Service Provider Modifiers Qty    62719465982 HC OT THERAPEUTIC ACT EA 15 MIN 10/9/2024 Raeann Oseguera OT GO 1    98505226885 HC OT SELF CARE/MGMT/TRAIN EA 15 MIN 10/9/2024 Raeann Oseguera OT GO 1    17178237539 HC OT THER SUPP EA 15 MIN 10/9/2024 Raeann Oseguera OT GO 2    48298476095 HC OT THERAPEUTIC ACT EA 15 MIN 10/10/2024 Raeann Oseguera OT GO 1    39668845107 HC OT SELF CARE/MGMT/TRAIN EA 15 MIN 10/10/2024 Raeann Oseguera OT GO 1                 Raeann Oseguera OT  10/10/2024

## 2024-10-10 NOTE — THERAPY TREATMENT NOTE
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 Cerebral aneurysm  I67.1 437.3   3. History of stroke  Z86.73 V12.54   4. Oropharyngeal dysphagia  R13.12 787.22     Patient Active Problem List   Diagnosis    Acute CVA    Cerebral aneurysm, nonruptured    Paraesophageal hernia    Hyperlipidemia    HTN    Moderate malnutrition    History of CVA (cerebrovascular accident)    Other headache syndrome    Acute cystitis without hematuria     Past Medical History:   Diagnosis Date    Aneurysm 2022    Cancer 2024    Basal Cell Carcinoma removed from scalp by dermatology    Cluster headache     Had headaches for several months before stroke and mass was found    Difficulty walking 22    After stroke    Headache     Headache, tension-type     Hyperlipidemia     Hypertension     Memory loss 22    Migraine     Stroke 2022    Vision loss 22    When dizzy or headache     Past Surgical History:   Procedure Laterality Date    ARTERIAL ANEURYSM REPAIR      BREAST LUMPECTOMY  2012    CYST REMOVAL Left 2023    EMBOLIZATION CEREBRAL N/A 2022    Procedure: CV EMBOLIZATION CEREBRAL IR;  Surgeon: Enoch Savage MD;  Location[facility] CATH INVASIVE LOCATION;  Service: Interventional Radiology;  Laterality: N/A;    HIATAL HERNIA REPAIR      INTERVENTIONAL RADIOLOGY PROCEDURE N/A 09/15/2023    Procedure: Embolization;  Surgeon: Enoch Savage MD;  Location:  [facility] CATH INVASIVE LOCATION;  Service: Interventional Radiology;  Laterality: N/A;    INTERVENTIONAL RADIOLOGY PROCEDURE N/A 10/6/2024    Procedure: IR mechanical thrombectomy;  Surgeon: Joe Mendez MD;  Location:  [facility] CATH INVASIVE LOCATION;  Service: Interventional  Radiology;  Laterality: N/A;    SINUS SURGERY      x4    SKIN CANCER EXCISION      cancer removed off top of head       SLP Recommendation and Plan  SLP Swallowing Diagnosis: functional oral phase, functional pharyngeal phase (10/10/24 1030)  SLP Diet Recommendation: regular textures, thin liquids (10/10/24 1030)  Recommended Precautions and Strategies: upright posture during/after eating, general aspiration precautions (10/10/24 1030)  SLP Rec. for Method of Medication Administration: as tolerated (10/10/24 1030)     Monitor for Signs of Aspiration: yes, notify SLP if any concerns (10/10/24 1030)  Recommended Diagnostics: other (see comments) (no repeat instrumental indicated unless decline in respiratory status) (10/10/24 1030)  Swallow Criteria for Skilled Therapeutic Interventions Met: demonstrates skilled criteria (10/10/24 1030)  Anticipated Discharge Disposition (SLP): inpatient rehabilitation facility (10/10/24 1030)  Rehab Potential/Prognosis, Swallowing: good, to achieve stated therapy goals (10/10/24 1030)  Therapy Frequency (Swallow): 5 days per week (10/10/24 1030)  Predicted Duration Therapy Intervention (Days): 1 week (10/10/24 1030)  Oral Care Recommendations: Oral Care BID/PRN, Toothbrush (10/10/24 1030)        Daily Summary of Progress (SLP): progress toward functional goals as expected (10/10/24 1030)               Treatment Assessment (SLP): continued, toleration of diet, no clinical signs of, aspiration (10/10/24 1030)     Plan for Continued Treatment (SLP): continue treatment per plan of care (10/10/24 1030)                SWALLOW EVALUATION (Last 72 Hours)       SLP Adult Swallow Evaluation       Row Name 10/10/24 1030 10/09/24 1100 10/08/24 1030             Rehab Evaluation    Document Type therapy note (daily note)  -CH therapy note (daily note)  -RS therapy note (daily note)  -AC      Subjective Information no complaints  -CH no complaints  -RS no complaints  -AC      Patient Observations  alert;cooperative;agree to therapy  -CH alert;cooperative;agree to therapy  -RS alert;cooperative  -AC      Patient/Family/Caregiver Comments/Observations none present  -CH spouse present  -RS Spouse present.  -AC      Patient Effort excellent  -CH excellent  -RS excellent  -AC      Symptoms Noted During/After Treatment none  -CH none  -RS --         General Information    Patient Profile Reviewed yes  -CH -- --         Pain    Additional Documentation Pain Scale: FACES Pre/Post-Treatment (Group)  -CH Pain Scale: FACES Pre/Post-Treatment (Group)  -RS --         Pain Scale: Numbers Pre/Post-Treatment    Pretreatment Pain Rating -- -- 0/10 - no pain  -AC      Posttreatment Pain Rating -- -- 0/10 - no pain  -AC      Pre/Posttreatment Pain Comment -- -- Pt reported her headache is subsiding since recently given medication.  -AC         Pain Scale: FACES Pre/Post-Treatment    Pain: FACES Scale, Pretreatment 0-->no hurt  -CH 2-->hurts little bit  -RS --      Posttreatment Pain Rating 0-->no hurt  -CH 2-->hurts little bit  -RS --      Pre/Posttreatment Pain Comment -- no c/o pain, but does tell me that her back is uncomfortable. Would like to get back in bed after tx  -RS --         Swallowing Quality of Life Assessment    Education and counseling provided -- -- Aspiration precautions  reflux precautions  -AC         SLP Evaluation Clinical Impression    SLP Swallowing Diagnosis functional oral phase;functional pharyngeal phase  -CH -- --      Functional Impact risk of aspiration/pneumonia  - -- --      Rehab Potential/Prognosis, Swallowing good, to achieve stated therapy goals  -CH -- --      Swallow Criteria for Skilled Therapeutic Interventions Met demonstrates skilled criteria  -CH -- --         SLP Treatment Clinical Impressions    Treatment Assessment (SLP) continued;toleration of diet;no clinical signs of;aspiration  - continued;toleration of diet  -RS continued;toleration of diet;improved;dysarthria  -AC       Treatment Assessment Comments (SLP) -- Excellent participation  -RS Continued mild oropharyngeal deficits suspected. Also suspect baseline esophageal dysphagia based on pt's complaints and review of history. Dysarthria resolved.  -AC      Daily Summary of Progress (SLP) progress toward functional goals as expected  -CH progress toward functional goals as expected  -RS progress toward functional goals as expected  -AC      Plan for Continued Treatment (SLP) continue treatment per plan of care  -CH continue treatment per plan of care  -RS goals adjusted to reflect functional improvements demonstrated  -AC      Care Plan Review care plan/treatment goals reviewed  - care plan/treatment goals reviewed  -RS evaluation/treatment results reviewed;care plan/treatment goals reviewed;risks/benefits reviewed;current/potential barriers reviewed;patient/other agree to care plan  -AC      Care Plan Review, Other Participant(s) -- spouse  -RS spouse  -         Recommendations    Therapy Frequency (Swallow) 5 days per week  -CH 5 days per week  -RS --      Predicted Duration Therapy Intervention (Days) 1 week  -CH 1 week  -RS --      SLP Diet Recommendation regular textures;thin liquids  - regular textures;thin liquids  -RS regular textures;thin liquids  -      Recommended Diagnostics other (see comments)  no repeat instrumental indicated unless decline in respiratory status  - -- --      Recommended Precautions and Strategies upright posture during/after eating;general aspiration precautions  - upright posture during/after eating;general aspiration precautions  -RS --      Oral Care Recommendations Oral Care BID/PRN;Toothbrush  - Oral Care BID/PRN;Toothbrush  -RS --      SLP Rec. for Method of Medication Administration as tolerated  -CH as tolerated  -RS --      Monitor for Signs of Aspiration yes;notify SLP if any concerns  -CH yes;notify SLP if any concerns  -RS --      Anticipated Discharge Disposition (SLP)  inpatient rehabilitation facility  -CH inpatient rehabilitation facility  -RS inpatient rehabilitation facility  -AC                User Key  (r) = Recorded By, (t) = Taken By, (c) = Cosigned By      Initials Name Effective Dates    AC Villaseñor, Leidy S, MS CCC-SLP 02/03/23 -     CH Larisa Cao, MS CCC-SLP 06/16/21 -     RS Roberto Collins, MS CCC-SLP 09/14/23 -                     EDUCATION  The patient has been educated in the following areas:   Home Exercise Program (HEP) Dysphagia (Swallowing Impairment) Oral Care/Hydration.        SLP GOALS       Row Name 10/10/24 1030 10/09/24 1100 10/08/24 1030       (LTG) Patient will demonstrate functional swallow for    Diet Texture (Demonstrate functional swallow) regular textures  -CH regular textures  -RS regular textures  -AC    Liquid viscosity (Demonstrate functional swallow) thin liquids  -CH thin liquids  -RS thin liquids  -AC    Sac (Demonstrate functional swallow) independently (over 90% accuracy)  -CH independently (over 90% accuracy)  -RS independently (over 90% accuracy)  -AC    Time Frame (Demonstrate functional swallow) 1 week  -CH 1 week  -RS 1 week  -AC    Progress/Outcomes (Demonstrate functional swallow) good progress toward goal  -CH good progress toward goal  -RS good progress toward goal  -AC       (STG) Patient will tolerate trials of    Consistencies Trialed (Tolerate trials) regular textures;thin liquids  -CH regular textures;thin liquids  -RS regular textures;thin liquids  -AC    Desired Outcome (Tolerate trials) without signs/symptoms of aspiration;without signs of distress;with adequate oral prep/transit/clearance  -CH without signs/symptoms of aspiration;without signs of distress;with adequate oral prep/transit/clearance  -RS without signs/symptoms of aspiration;without signs of distress;with adequate oral prep/transit/clearance  -AC    Sac (Tolerate trials) independently (over 90% accuracy)  -CH independently (over 90%  accuracy)  -RS independently (over 90% accuracy)  -AC    Time Frame (Tolerate trials) 1 week  -CH 1 week  -RS 1 week  -AC    Progress/Outcomes (Tolerate trials) good progress toward goal  -CH good progress toward goal  -RS good progress toward goal  -AC    Comment (Tolerate trials) no s/sx aspiration or distress w any tested consistency  -CH no s/sx aspiration or distress w any tested consistency  -RS Pt tolerated drinks of thin liquid via straw w/o overt clinical s/sxs aspiration during tx.  -AC       (STG) Lingual Strengthening Goal 1 (SLP)    Activity (Lingual Strengthening Goal 1, SLP) increase tongue back strength  -CH increase tongue back strength  -RS --    Increase Tongue Back Strength lingual resistance exercises  -CH lingual resistance exercises  -RS lingual resistance exercises  -AC    Roper/Accuracy (Lingual Strengthening Goal 1, SLP) with minimal cues (75-90% accuracy)  -CH with minimal cues (75-90% accuracy)  -RS with minimal cues (75-90% accuracy)  -AC    Time Frame (Lingual Strengthening Goal 1, SLP) 1 week  -CH 1 week  -RS 1 week  -AC    Progress/Outcomes (Lingual Strengthening Goal 1, SLP) good progress toward goal  -CH good progress toward goal  -RS good progress toward goal  -AC    Comment (Lingual Strengthening Goal 1, SLP) Independent w exs  -CH Independent w exs  -RS Pt able to demonstrate w/ min cues/use of handout.  -AC       (STG) Pharyngeal Strengthening Exercise Goal 1 (SLP)    Activity (Pharyngeal Strengthening Goal 1, SLP) increase timing  -CH increase timing  -RS --    Increase Timing prepping - 3 second prep or suck swallow or 3-step swallow  -CH prepping - 3 second prep or suck swallow or 3-step swallow  -RS prepping - 3 second prep or suck swallow or 3-step swallow  -AC    Roper/Accuracy (Pharyngeal Strengthening Goal 1, SLP) with minimal cues (75-90% accuracy)  -CH with minimal cues (75-90% accuracy)  -RS with minimal cues (75-90% accuracy)  -AC    Time Frame  (Pharyngeal Strengthening Goal 1, SLP) 1 week  -CH 1 week  -RS 1 week  -AC    Progress/Outcomes (Pharyngeal Strengthening Goal 1, SLP) good progress toward goal  -CH good progress toward goal  -RS good progress toward goal  -AC    Comment (Pharyngeal Strengthening Goal 1, SLP) Independent w exs  -CH Independent w exs  -RS Pt able to demonstrate w/ min cues/use of handout.  -AC       Patient will demonstrate functional speech skills for return to discharge environment    [place] -- -- Independently  -AC    Time frame -- -- 1 week  -AC    Progress/Outcomes -- -- goal no longer appropriate  -AC    Comments -- -- Dysarthria resolved. Pt 100% intelligible to unfamiliar listener in connected speech. Pt/spouse reported speech has returned to baseline.  -AC       Respiratory Support Goal 1 (SLP)    Improve Respiratory Support Goal 1 (SLP) -- -- increasing length of exhalation;sustaining a vowel sound on exhalation;80%;with minimal cues (75-90%)  -AC    Time Frame (Respiratory Support Goal 1, SLP) -- -- 1 week  -AC    Progress/Outcomes (Respiratory Support Goal 1, SLP) -- -- goal no longer appropriate  -AC       Articulation Goal 1 (SLP)    Improve Articulation Goal 1 (SLP) -- -- by over-articulating at word level;by over-articulating at phrase level;80%;with minimal cues (75-90%)  -AC    Time Frame (Articulation Goal 1, SLP) -- -- 1 week  -AC    Progress/Outcomes (Articulation Goal 1, SLP) -- -- goal no longer appropriate  -AC              User Key  (r) = Recorded By, (t) = Taken By, (c) = Cosigned By      Initials Name Provider Type    AC Leidy Villaseñor, MS CCC-SLP Speech and Language Pathologist    CH Larisa Cao, MS CCC-SLP Speech and Language Pathologist    RS Roberto Collins, MS CCC-SLP Speech and Language Pathologist                         Time Calculation:    Time Calculation- SLP       Row Name 10/10/24 6486             Time Calculation- SLP    SLP Start Time 1030  -CH      SLP Received On 10/10/24  -          Untimed Charges    [id number]-[id number] Treatment Swallow Minutes 25  -CH         Total Minutes    Untimed Charges Total Minutes 25  -CH       Total Minutes 25  -CH                User Key  (r) = Recorded By, (t) = Taken By, (c) = Cosigned By      Initials Name Provider Type    Larisa Garcia MS CCC-SLP Speech and Language Pathologist                    Therapy Charges for Today       Code Description Service Date Service Provider Modifiers Qty    [id number]  ST TREATMENT SWALLOW 2 10/10/2024 Larisa Cao MS CCC-SLP GN 1                 Larisa Cao MS CCC-SLP  10/10/2024

## 2024-10-10 NOTE — PLAN OF CARE
Goal Outcome Evaluation:  Plan of Care Reviewed With: patient        Progress: improving  Outcome Evaluation: Pt demo improved independence by performing functional mobility w/ Min A w/ RW and LBD tasks w/ Max A. Pt conts to be limited d/t deficits in balance, coordination, and R groin pain though demo excellent effort. Recommend cont skilled IPOT POC to promote return to PLOF. Recommend pt DC to IP rehab.      Anticipated Discharge Disposition (OT): inpatient rehabilitation facility

## 2024-10-10 NOTE — CASE MANAGEMENT/SOCIAL WORK
Case Management Discharge Note      Final Note:     Patient expected to discharge to [facility] acute rehab on Friday October 11.  Nurse to call report to the stroke unit at [phone].  CM will fax DC summary to [phone].      [facility] van will transport at 1430; patient to be at the [address] by 1420.    10/11/2024  13:01 EDT  DC summary has been faxed to stroke unit.          Selected Continued Care - Admitted Since 10/6/2024       Destination Coordination complete.      Service Provider Selected Services Address Phone Fax Patient Preferred    [facility] [address] [id number] [phone] --              Durable Medical Equipment    No services have been selected for the patient.                Dialysis/Infusion    No services have been selected for the patient.                Home Medical Care    No services have been selected for the patient.                Therapy    No services have been selected for the patient.                Community Resources    No services have been selected for the patient.                Community & DME    No services have been selected for the patient.                    Selected Continued Care - Episodes Includes continued care and service providers with selected services from the active episodes listed below      Chronic Migraine Episode start date: 3/18/2024   There are no active outsourced providers for this episode.                 Transportation Services  Ambulance: [facility] ([facility] van)    Final Discharge Disposition Code: 62 - inpatient rehab facility

## 2024-10-10 NOTE — PROGRESS NOTES
Stroke Progress Note       Chief Complaint: headache      Subjective    Subjective     Subjective: Patient is sitting in recliner.  No acute events overnight.  She did develop a hematoma in the right groin two days ago followed by a vasovagal response to groin compression.  Groin hematoma does appear to be improving. Per Dr. Savage patient discharged to [facility] will be postponed until 10/11. Patient complains of 7/10 headache with mild nausea today. Migraine medications have been held in hospital.     Review of Systems   Constitutional: Negative.    Eyes: Negative.  Negative for photophobia.   Respiratory: Negative.     Cardiovascular: Negative.    Gastrointestinal:  Positive for nausea. Negative for vomiting.   Genitourinary: Negative.    Musculoskeletal: Negative.    Skin: Negative.    Allergic/Immunologic: Negative.    Neurological:  Positive for headaches. Negative for dizziness, tremors, seizures, syncope, facial asymmetry, speech difficulty, weakness, light-headedness and numbness.   Psychiatric/Behavioral: Negative.            Objective      Temp:  [98 °F (36.7 °C)-99.1 °F (37.3 °C)] 98.2 °F (36.8 °C)  Heart Rate:  [57-71] 66  Resp:  [16-20] 18  BP: ()/(59-98) 112/69          Neurological Exam  Mental Status  Alert. Recent and remote memory are intact. Mild dysarthria present. Language is fluent with no aphasia. Attention and concentration are normal.    Cranial Nerves  CN II: Visual fields full to confrontation.  CN III, IV, VI: Extraocular movements intact bilaterally. Pupils equal round and reactive to light bilaterally.  CN V:  Right: Facial sensation is normal.  Left: Facial sensation is normal on the left.  CN VII:  Right: There is no facial weakness.  Left: There is no facial weakness.  CN VIII: Hearing appears intact.    Motor  Normal muscle bulk throughout. No abnormal involuntary movements.  All extremities 5/5.    Sensory  Light touch is normal in upper and lower extremities.      Coordination    No dysmetria.    Gait    Not observed..     Physical Exam  Vitals and nursing note reviewed.   Constitutional:       General: She is not in acute distress.     Appearance: She is not ill-appearing or toxic-appearing.   HENT:      Head: Normocephalic and atraumatic.   Eyes:      Extraocular Movements: Extraocular movements intact.      Pupils: Pupils are equal, round, and reactive to light.   Cardiovascular:      Rate and Rhythm: Normal rate and regular rhythm.   Pulmonary:      Effort: Pulmonary effort is normal. No respiratory distress.   Skin:     General: Skin is warm and dry.      Coloration: Skin is not pale.      Findings: Bruising (to bilateral wrists, and right groin hematoma) present.   Neurological:      Mental Status: She is alert.      Cranial Nerves: Dysarthria present. No cranial nerve deficit.      Sensory: No sensory deficit.      Motor: No weakness.   Psychiatric:         Mood and Affect: Mood normal.         Behavior: Behavior normal.        Interval: baseline (return from MRI)  1a. Level of Consciousness: 0-->Alert, keenly responsive  1b. LOC Questions: 0-->Answers both questions correctly  1c. LOC Commands: 0-->Performs both tasks correctly  2. Best Gaze: 0-->Normal  3. Visual: 0-->No visual loss  4. Facial Palsy: 0-->Normal symmetrical movements  5a. Motor Arm, Left: 0-->No drift, limb holds 90 (or 45) degrees for full 10 secs  5b. Motor Arm, Right: 0-->No drift, limb holds 90 (or 45) degrees for full 10 secs  6a. Motor Leg, Left: 0-->No drift, leg holds 30 degree position for full 5 secs  6b. Motor Leg, Right: 0-->No drift, leg holds 30 degree position for full 5 secs  7. Limb Ataxia: 0-->Absent  8. Sensory: 0-->Normal, no sensory loss  9. Best Language: 0-->No aphasia, normal  10. Dysarthria: 1-->Mild-to-moderate dysarthria, patient slurs at least some words and, at worst, can be understood with some difficulty  11. Extinction and Inattention (formerly Neglect): 0-->No  abnormality    Total (NIH Stroke Scale): 1    Results Review:    I reviewed the patient's new clinical results.    Lab Results (last 24 hours)       Procedure Component Value Units Date/Time    Basic Metabolic Panel [[id number]]  (Abnormal) Collected: 10/10/24 0349    Specimen: Blood Updated: 10/10/24 0427     Glucose 113 mg/dL      BUN 24 mg/dL      Creatinine 0.59 mg/dL      Sodium 141 mmol/L      Potassium 3.9 mmol/L      Chloride 109 mmol/L      CO2 24.0 mmol/L      Calcium 8.2 mg/dL      BUN/Creatinine Ratio 40.7     Anion Gap 8.0 mmol/L      eGFR 102.0 mL/min/1.73     Narrative:      GFR Normal >60  Chronic Kidney Disease <60  Kidney Failure <15      Magnesium [[id number]]  (Normal) Collected: 10/10/24 0349    Specimen: Blood Updated: 10/10/24 0427     Magnesium 2.1 mg/dL     Phosphorus [[id number]]  (Normal) Collected: 10/10/24 0349    Specimen: Blood Updated: 10/10/24 0427     Phosphorus 3.4 mg/dL     CBC (No Diff) [[id number]]  (Abnormal) Collected: 10/10/24 0349    Specimen: Blood Updated: 10/10/24 0406     WBC 13.14 10*3/mm3      RBC 4.09 10*6/mm3      Hemoglobin 12.6 g/dL      Hematocrit 37.4 %      MCV 91.4 fL      MCH 30.8 pg      MCHC 33.7 g/dL      RDW 14.9 %      RDW-SD 49.9 fl      MPV 10.9 fL      Platelets 189 10*3/mm3           Lab Results   Component Value Date    GLUCOSE 113 (H) 10/10/2024    BUN 24 (H) 10/10/2024    CREATININE 0.59 10/10/2024     10/10/2024    K 3.9 10/10/2024     (H) 10/10/2024    CALCIUM 8.2 (L) 10/10/2024    PROTEINTOT 6.1 10/07/2024    ALBUMIN 4.0 10/07/2024    ALT 49 (H) 10/07/2024    AST 22 10/07/2024    ALKPHOS 98 10/07/2024    BILITOT 0.3 10/07/2024    GLOB 2.1 10/07/2024    AGRATIO 1.9 10/07/2024    BCR 40.7 (H) 10/10/2024    ANIONGAP 8.0 10/10/2024    EGFR 102.0 10/10/2024         Results for orders placed during the hospital encounter of 10/06/24    Adult Transthoracic Echo Complete W/ Cont if Necessary Per Protocol    Interpretation Summary    Left  ventricular systolic function is normal. Estimated left ventricular EF = 65%    Left ventricular diastolic function is consistent with (grade I) impaired relaxation.    The cardiac valves are anatomically and functionally normal.    Estimated right ventricular systolic pressure from tricuspid regurgitation is normal (<35 mmHg).    Saline test results are negative.            Assessment/Plan     Assessment/Plan:This is a 63-year-old female with known medical diagnoses present hypertension, hyperlipidemia, migraines (follows with CAILIN Parra), history of stroke (right occipital and left cerebellar, 2023, residual dizziness and ataxic gait), and giant basilar aneurysm s/p pipeline embolization (Dr. Savage, 2022 and 2023) who presents to the emergency department via private vehicle for further evaluation of left-sided weakness and facial droop which was present upon awakening this morning.  She is admitted for IV thrombolytic therapy given LK > 4.5-hours.  CTA head/neck reveals thrombosed pipeline stent.  Images were reviewed by Dr. Savage who is elected to take her to the Cath Lab for mechanical thrombectomy.  She was be admitted to the neurological ICU s/p procedure. She developed a hematoma in the right groin on 10/8/24, which is being monitored closely in the ICU.     # Acute basilar occlusion s/p MT (thrombosis of her pipeline stent and basilar artery.)  -Prior to the event, patient was transitioned to monotherapy with aspirin   -After the MT, transitioned to aspirin 81 mg daily and brillinta 90 mg BID for now   -Continue atorvastatin 80 mg nightly  -Magnesium oxide 400 mg nightly for migraine prevention  -Autoregulation of blood pressure, goals SBP <140   -Remain in ICU for monitoring of right groin hematoma. No pseudoaneurysm.  -Case management following for [facility] placement   -Plan for discharge to [facility] tomorrow  -SLP recommends regular texture, thin liquids  -Per Dr. Savage note, okay to  continue to work with PT/OT      Stroke team will continue to follow the patient. Please call with any questions or concerns.        Jennifer Ann, CAILIN  10/10/24  07:45 EDT

## 2024-10-11 VITALS
HEART RATE: 65 BPM | BODY MASS INDEX: 24.18 KG/M2 | SYSTOLIC BLOOD PRESSURE: 119 MMHG | OXYGEN SATURATION: 97 % | DIASTOLIC BLOOD PRESSURE: 81 MMHG | WEIGHT: 128.09 LBS | RESPIRATION RATE: 16 BRPM | HEIGHT: 61 IN | TEMPERATURE: 96.9 F

## 2024-10-11 PROCEDURE — 25010000002 LABETALOL 5 MG/ML SOLUTION: Performed by: INTERNAL MEDICINE

## 2024-10-11 PROCEDURE — 99231 SBSQ HOSP IP/OBS SF/LOW 25: CPT | Performed by: RADIOLOGY

## 2024-10-11 PROCEDURE — 99232 SBSQ HOSP IP/OBS MODERATE 35: CPT

## 2024-10-11 PROCEDURE — 99239 HOSP IP/OBS DSCHRG MGMT >30: CPT

## 2024-10-11 RX ORDER — DEXAMETHASONE 2 MG/1
2 TABLET ORAL EVERY 8 HOURS SCHEDULED
Start: 2024-10-11 | End: 2024-10-14

## 2024-10-11 RX ORDER — FAMOTIDINE 20 MG/1
20 TABLET, FILM COATED ORAL
Start: 2024-10-11

## 2024-10-11 RX ORDER — ASPIRIN 81 MG/1
81 TABLET, CHEWABLE ORAL DAILY
Start: 2024-10-12

## 2024-10-11 RX ORDER — NORTRIPTYLINE HCL 10 MG
20 CAPSULE ORAL NIGHTLY
Start: 2024-10-11

## 2024-10-11 RX ORDER — ACETAMINOPHEN 325 MG/1
650 TABLET ORAL EVERY 6 HOURS PRN
Start: 2024-10-11

## 2024-10-11 RX ORDER — MAGNESIUM OXIDE 400 MG/1
400 TABLET ORAL NIGHTLY
Qty: 30 TABLET | Refills: 2 | Status: SHIPPED | OUTPATIENT
Start: 2024-10-11 | End: 2024-10-11 | Stop reason: HOSPADM

## 2024-10-11 RX ORDER — DEXAMETHASONE 1 MG
1 TABLET ORAL EVERY 12 HOURS SCHEDULED
Start: 2024-10-17 | End: 2024-10-20

## 2024-10-11 RX ORDER — DEXAMETHASONE 2 MG/1
2 TABLET ORAL EVERY 12 HOURS SCHEDULED
Start: 2024-10-14 | End: 2024-10-17

## 2024-10-11 RX ADMIN — LUBIPROSTONE 8 MCG: 8 CAPSULE, GELATIN COATED ORAL at 08:50

## 2024-10-11 RX ADMIN — LABETALOL HYDROCHLORIDE 10 MG: 5 INJECTION, SOLUTION INTRAVENOUS at 02:22

## 2024-10-11 RX ADMIN — SERTRALINE 50 MG: 50 TABLET, FILM COATED ORAL at 08:50

## 2024-10-11 RX ADMIN — ASPIRIN 81 MG 81 MG: 81 TABLET ORAL at 08:49

## 2024-10-11 RX ADMIN — FAMOTIDINE 20 MG: 20 TABLET, FILM COATED ORAL at 08:50

## 2024-10-11 RX ADMIN — DEXAMETHASONE 2 MG: 2 TABLET ORAL at 14:23

## 2024-10-11 RX ADMIN — OXYBUTYNIN CHLORIDE 5 MG: 5 TABLET, EXTENDED RELEASE ORAL at 08:49

## 2024-10-11 RX ADMIN — TICAGRELOR 90 MG: 90 TABLET ORAL at 08:50

## 2024-10-11 RX ADMIN — TOPIRAMATE 100 MG: 100 TABLET, FILM COATED ORAL at 08:50

## 2024-10-11 RX ADMIN — DEXAMETHASONE 2 MG: 2 TABLET ORAL at 06:48

## 2024-10-11 NOTE — DISCHARGE INSTRUCTIONS
Continue aspirin 81 mg daily and Brilinta 90 mg twice daily for stroke prevention.  Continue atorvastatin 80 mg nightly for your cholesterol.  You can continue magnesium oxide nightly for migraine prevention.  Follow-up in stroke clinic in 2 months.  Keep all follow-up appointments.  If you develop unilateral weakness, unilateral numbness, visual disturbances, loss of balance, speech difficulties, and/or a sudden severe headache please call 911.

## 2024-10-11 NOTE — ANESTHESIA POSTPROCEDURE EVALUATION
Patient: Lauryn Romo    Procedure Summary       Date: 10/06/24 Room / Location: [facility] /  [facility] IN[facility] LOCATION    Anesthesia Start: 1055 Anesthesia Stop: 1247    Procedure: IR mechanical thrombectomy Diagnosis:     Providers: Joe Mendez MD Provider: Joe De Los Santos Jr., MD    Anesthesia Type: general ASA Status: 3 - Emergent            Anesthesia Type: general    Vitals  Vitals Value Taken Time   /70 10/10/24 1900   Temp 99.3 °F (37.4 °C) 10/10/24 1600   Pulse 66 10/10/24 1959   Resp 18 10/10/24 1800   SpO2 98 % 10/10/24 1959   Vitals shown include unfiled device data.        Post Anesthesia Care and Evaluation    Patient location during evaluation: PACU  Patient participation: complete - patient participated  Level of consciousness: awake and alert  Pain management: adequate    Airway patency: patent  Anesthetic complications: No anesthetic complications  PONV Status: none  Cardiovascular status: hemodynamically stable and acceptable  Respiratory status: nonlabored ventilation, acceptable and nasal cannula  Hydration status: acceptable

## 2024-10-11 NOTE — PROGRESS NOTES
NAME: MEDHAT PAL  : 1960  PCP: Melissa Lazo APRN  ADMITTING PHYSICIAN: Jeramie Shelley*    DATE OF ADMISSION:  10/6/2024  DATE OF SERVICE: 10/11/2024    HOSPITAL DAY:  5 days    HISTORY OF PRESENT ILLNESS:  63 y.o. 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  She was seen in the neurointerventional clinic on 2024, and her Plavix was stopped, but she remained on low-dose aspirin.     Ms. Pal presented to the emergency department on 10/6/2024 with acute thrombosis of her pipeline stent and basilar artery.  She underwent successful mechanical thrombectomy by my partner Dr. Mendez, restoring normal (TICI 3) flow.  This did unmask a stenosis within the mid portions of the Pipeline stent construct, and this was treated with a 2.5 mm coronary MICHELLE, restoring robust flow within the vertebrobasilar system.    REVIEW OF IMAGING:  No new imaging    LABS:  Lab Results   Component Value Date    WBC 13.14 (H) 10/10/2024    HGB 12.6 10/10/2024    HCT 37.4 10/10/2024    MCV 91.4 10/10/2024     10/10/2024     Lab Results   Component Value Date    GLUCOSE 113 (H) 10/10/2024    CALCIUM 8.2 (L) 10/10/2024     10/10/2024    K 3.9 10/10/2024    CO2 24.0 10/10/2024     (H) 10/10/2024    BUN 24 (H) 10/10/2024    CREATININE 0.59 10/10/2024    EGFRIFNONA 103 2019    BCR 40.7 (H) 10/10/2024    ANIONGAP 8.0 10/10/2024       CURRENT MEDS:  Current Facility-Administered Medications   Medication Dose Route Frequency Provider Last Rate Last Admin    Please return patient supplied medication at discharge   Does not apply BID Fahad Shankar RPH   Given at 10/10/24 2100    acetaminophen (TYLENOL) tablet 650 mg  650 mg Oral Q6H PRN Yasemin Srivastava APRN   650 mg at 10/10/24 0778    aspirin chewable tablet 81  mg  81 mg Oral Daily Prebble, Fahad, RPH   81 mg at 10/11/24 0849    Or    aspirin suppository 300 mg  300 mg Rectal Daily Prebble, Fahad, RPH        atorvastatin (LIPITOR) tablet 80 mg  80 mg Oral Nightly Prebble, Fahad, RPH   80 mg at 10/10/24 2009    sennosides-docusate (PERICOLACE) 8.6-50 MG per tablet 2 tablet  2 tablet Oral BID PRN Prebble, Fahad, RPH        And    polyethylene glycol (MIRALAX) packet 17 g  17 g Oral Daily PRN Prebble, Fahad, RPH        And    bisacodyl (DULCOLAX) suppository 10 mg  10 mg Rectal Daily PRN Prebble, Fahad, RPH        Calcium Replacement - Follow Nurse / BPA Driven Protocol   Does not apply PRN Lety Matthews APRN        [START ON 10/17/2024] dexAMETHasone (DECADRON) tablet 1 mg  1 mg Oral Q12H Nikki Carrero PA-C        dexAMETHasone (DECADRON) tablet 2 mg  2 mg Oral Q8H Nikki Carrero PA-C   2 mg at 10/11/24 0648    [START ON 10/14/2024] dexAMETHasone (DECADRON) tablet 2 mg  2 mg Oral Q12H Nikki Carrero PA-C        famotidine (PEPCID) tablet 20 mg  20 mg Oral BID AC Zafar Hager MD   20 mg at 10/11/24 0850    labetalol (NORMODYNE,TRANDATE) injection 10 mg  10 mg Intravenous Q4H PRN Bryon Garner MD   10 mg at 10/11/24 0222    lubiprostone (AMITIZA) capsule 8 mcg  8 mcg Oral BID With Meals Given, Enoch CORBETT MD   8 mcg at 10/11/24 0850    Magnesium Cardiology Dose Replacement - Follow Nurse / BPA Driven Protocol   Does not apply PRN Lety Matthews APRN        magnesium oxide (MAG-OX) tablet 400 mg  400 mg Oral Nightly Jennifer Ann APRN   400 mg at 10/10/24 2009    nitroglycerin (NITROSTAT) SL tablet 0.4 mg  0.4 mg Sublingual Q5 Min PRN Marck, Ashley Demetrius, APRN        nortriptyline (PAMELOR) capsule 20 mg  20 mg Oral Nightly Given, Enoch CORBETT MD   20 mg at 10/10/24 2010    ondansetron (ZOFRAN) injection 4 mg  4 mg Intravenous Q6H PRN Roxie Yancey, JUAN, APRN   4 mg at 10/10/24 [facility]    oxybutynin XL (DITROPAN-XL) 24 hr tablet 5 mg  5 mg Oral  Daily Given, Enoch CORBETT MD   5 mg at 10/11/24 0849    Phosphorus Replacement - Follow Nurse / BPA Driven Protocol   Does not apply PRN Lety Matthews APRN        Potassium Replacement - Follow Nurse / BPA Driven Protocol   Does not apply PRN Lety Matthews APRN        sertraline (ZOLOFT) tablet 50 mg  50 mg Oral Daily Given, Enoch CORBETT MD   50 mg at 10/11/24 0850    ticagrelor (BRILINTA) tablet 90 mg  90 mg Oral BID Prebble, Fahad, RPH   90 mg at 10/11/24 0850    topiramate (TOPAMAX) tablet 100 mg  100 mg Oral Daily Given, Enoch CORBETT MD   100 mg at 10/11/24 0850    ubrogepant (UBRELVY) tablet 100 mg  100 mg Oral PRN Prebble, Fahad, RPH   100 mg at 10/10/24 1636       PHYSICAL EXAM:  Vitals:    10/11/24 0800   BP: 130/71   Pulse: 69   Resp:    Temp:    SpO2: 99%      Alert and oriented x 3. Resting comfortably in bed.  She is in good spirits this morning.  Tolerating p.o. intake. Antigravity strength in the upper and lower extremities. Speech is clear. She endorses a mild-moderate headache, similar to her baseline.     ASSESSMENT/PLAN:  Ms. Romo is a 63 y.o. 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  She presented on 10/6/2024 with acute thrombosis of her basilar artery and Pipeline flow diverter.  Ms. Romo underwent successful mechanical thrombectomy along with placement of a 2.5 mm coronary MICHELLE within the mid portions of the Pipeline stent construct (at side for symptomatic stenosis), restoring normal (TICI 3) flow to the intracranial vertebrobasilar system.     Given the severity of her presenting stroke symptoms, Ms. Romo has made an exceptional recovery to this point, and has only a few punctate areas of infarct on her MRI (not in the brainstem).  I am very optimistic for her making a meaningful recovery, albeit she will require inpatient physical therapy.     Ms. Benz was scheduled to  "transfer to [facility] on 10/9/2024, but she developed a small right groin hematoma on the evening of 10/8/2024.  She did have a \"vasovagal\" response to groin pressure, and was given a unit of PRBCs for fear of symptomatic bleeding.  She did not experience any significant drop in her H/H, and again this is deemed to have been a \"vasovagal\" event.  Duplex of her groin on 10/9/2024 demonstrates small hematoma, but no pseudoaneurysm.  Her right groin hematoma and H/H are stable.     She is tentatively scheduled to transfer to [facility] on 10/11/2024.  She is okay to continue to work with PT/OT.     She will need to remain on her Brilinta/aspirin dual antiplatelet regimen for the next month or so, at which point I will likely transition her to a Plavix/aspirin regimen.  We are in the process of tapering her steroids.     She will follow-up with me in the neurointerventional clinic in 1 month's time.     Copied text and portions of the note have been reviewed and are accurate as of 10/11/24.                 "

## 2024-10-11 NOTE — PROGRESS NOTES
Stroke Progress Note       Chief Complaint: headache      Subjective    Subjective     Subjective: Patient is sitting up in bed.  at the bedside. No acute events overnight.  States she walked to the bathroom overnight with assistance by nursing staff x1. She did develop a hematoma in the right groin three days ago followed by a vasovagal response to groin compression.  Groin hematoma does appear to be improving. Per Dr. Savage patient discharged to [facility] was postponed until 10/11 due to groin hematoma. Case Management has arranged for her to be discharged to [facility] at 1430 today. Denies headache today.     Review of Systems   Constitutional: Negative.    Eyes: Negative.  Negative for photophobia.   Respiratory: Negative.     Cardiovascular: Negative.    Gastrointestinal:  Negative for nausea and vomiting.   Genitourinary: Negative.    Musculoskeletal: Negative.    Skin: Negative.    Allergic/Immunologic: Negative.    Neurological:  Negative for dizziness, tremors, seizures, syncope, facial asymmetry, speech difficulty, weakness, light-headedness, numbness and headaches.   Psychiatric/Behavioral: Negative.            Objective      Temp:  [96.9 °F (36.1 °C)-99.3 °F (37.4 °C)] 96.9 °F (36.1 °C)  Heart Rate:  [55-69] 69  Resp:  [14-18] 18  BP: (120-156)/(70-85) 132/85          Neurological Exam  Mental Status  Alert. Recent and remote memory are intact. Speech is normal. Language is fluent with no aphasia. Attention and concentration are normal.    Cranial Nerves  CN II: Visual fields full to confrontation.  CN III, IV, VI: Extraocular movements intact bilaterally. Pupils equal round and reactive to light bilaterally.  CN V:  Right: Facial sensation is normal.  Left: Facial sensation is normal on the left.  CN VII:  Right: There is no facial weakness.  Left: There is no facial weakness.  CN VIII: Hearing appears intact.    Motor  Normal muscle bulk throughout. No abnormal involuntary  movements.  All extremities 5/5.    Sensory  Light touch is normal in upper and lower extremities.     Coordination    No dysmetria.    Gait    Not observed..     Physical Exam  Vitals and nursing note reviewed.   Constitutional:       General: She is not in acute distress.     Appearance: She is not ill-appearing or toxic-appearing.   HENT:      Head: Normocephalic and atraumatic.   Eyes:      Extraocular Movements: Extraocular movements intact.      Pupils: Pupils are equal, round, and reactive to light.   Cardiovascular:      Rate and Rhythm: Normal rate and regular rhythm.   Pulmonary:      Effort: Pulmonary effort is normal. No respiratory distress.      Comments: On Room air  Skin:     General: Skin is warm and dry.      Coloration: Skin is not pale.      Findings: Bruising (to bilateral wrists, and right groin hematoma) present.   Neurological:      Mental Status: She is alert.      Cranial Nerves: No cranial nerve deficit.      Sensory: No sensory deficit.      Motor: No weakness.   Psychiatric:         Mood and Affect: Mood normal.         Speech: Speech normal.         Behavior: Behavior normal.        Interval: baseline (return from MRI)  1a. Level of Consciousness: 0-->Alert, keenly responsive  1b. LOC Questions: 0-->Answers both questions correctly  1c. LOC Commands: 0-->Performs both tasks correctly  2. Best Gaze: 0-->Normal  3. Visual: 0-->No visual loss  4. Facial Palsy: 0-->Normal symmetrical movements  5a. Motor Arm, Left: 0-->No drift, limb holds 90 (or 45) degrees for full 10 secs  5b. Motor Arm, Right: 0-->No drift, limb holds 90 (or 45) degrees for full 10 secs  6a. Motor Leg, Left: 0-->No drift, leg holds 30 degree position for full 5 secs  6b. Motor Leg, Right: 0-->No drift, leg holds 30 degree position for full 5 secs  7. Limb Ataxia: 0-->Absent  8. Sensory: 0-->Normal, no sensory loss  9. Best Language: 0-->No aphasia, normal  10. Dysarthria: 0-->Normal  11. Extinction and Inattention  (formerly Neglect): 0-->No abnormality    Total (NIH Stroke Scale): 0    Results Review:    I reviewed the patient's new clinical results.    Lab Results (last 24 hours)       ** No results found for the last 24 hours. **          Lab Results   Component Value Date    GLUCOSE 113 (H) 10/10/2024    BUN 24 (H) 10/10/2024    CREATININE 0.59 10/10/2024     10/10/2024    K 3.9 10/10/2024     (H) 10/10/2024    CALCIUM 8.2 (L) 10/10/2024    PROTEINTOT 6.1 10/07/2024    ALBUMIN 4.0 10/07/2024    ALT 49 (H) 10/07/2024    AST 22 10/07/2024    ALKPHOS 98 10/07/2024    BILITOT 0.3 10/07/2024    GLOB 2.1 10/07/2024    AGRATIO 1.9 10/07/2024    BCR 40.7 (H) 10/10/2024    ANIONGAP 8.0 10/10/2024    EGFR 102.0 10/10/2024         Results for orders placed during the hospital encounter of 10/06/24    Adult Transthoracic Echo Complete W/ Cont if Necessary Per Protocol    Interpretation Summary    Left ventricular systolic function is normal. Estimated left ventricular EF = 65%    Left ventricular diastolic function is consistent with (grade I) impaired relaxation.    The cardiac valves are anatomically and functionally normal.    Estimated right ventricular systolic pressure from tricuspid regurgitation is normal (<35 mmHg).    Saline test results are negative.            Assessment/Plan     Assessment/Plan:This is a 63-year-old female with known medical diagnoses present hypertension, hyperlipidemia, migraines (follows with Ruth Burris, APRN), history of stroke (right occipital and left cerebellar, 2023, residual dizziness and ataxic gait), and giant basilar aneurysm s/p pipeline embolization (Dr. Savage, 2022 and 2023) who presents to the emergency department via private vehicle for further evaluation of left-sided weakness and facial droop which was present upon awakening this morning.  She is admitted for IV thrombolytic therapy given LK > 4.5-hours.  CTA head/neck reveals thrombosed pipeline stent.  Images were  reviewed by Dr. Savage who is elected to take her to the Cath Lab for mechanical thrombectomy.  She was be admitted to the neurological ICU s/p procedure. She developed a hematoma in the right groin on 10/8/24, which is being monitored closely in the ICU. Planned discharge to [facility] 10/11.      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  -SLP recommends regular texture, thin liquids  -As patient is scheduled to be following up in neurosurgery clinic in 1 month, okay to follow-up in stroke clinic in 2 months.    Discussed the importance of medication compliance Aspirin 81mg daily, Brilinta 90mg twice daily, and Atorvastatin 80mg nightly and lifestyle modifications adequate control of blood pressure, adequate control of cholesterol (goal LDL <70), increased physical activity, and implementation of healthy diet to help reduce the risk of future cerebrovascular events.  Also discussed the signs symptoms that would warrant the patient return back to the emergency department including unilateral weakness, unilateral numbness, visual disturbances, loss of balance, speech difficulties, and/or a sudden severe headache.  Patient verbalized understanding.    Plan of care discussed with Dr. Purvis and patient. From a stroke standpoint patient is okay to be discharged to [facility] inpatient rehab facility.  Please call with any questions or concerns.        Jennifer Ann, CAILIN  10/11/24  10:31 EDT

## 2024-10-11 NOTE — DISCHARGE PLACEMENT REQUEST
"Medhat Pal (63 y.o. Female)       Date of Birth   1960    Social Security Number       Address   [address] [place] [address]    Home Phone   [phone]    MRN   [id number]       [facility]    Marital Status                               Admission Date   10/6/24    Admission Type   Emergency    Admitting Provider   Zafar Hager MD    Attending Provider   Jeramie Shelley DO    Department, Room/Bed   [facility] 2B ICU, N229/1       Discharge Date       Discharge Disposition   Rehab Facility or Unit (DC - External)    Discharge Destination                                 Attending Provider: Jeramie Shelley DO    Allergies: Tramadol    Isolation: None   Infection: None   Code Status: CPR    Ht: 154.9 cm (60.98\")   Wt: 58.1 kg (128 lb 1.4 oz)    Admission Cmt: None   Principal Problem: None                  Active Insurance as of 10/6/2024       Primary Coverage       Payor Plan Insurance Group Employer/Plan Group    [employer] BLUE CROSS [employer] BLUE CROSS BLUE [employer]IELD PPO [id number]       Payor Plan Address Payor Plan Phone Number Payor Plan Fax Number Effective Dates    [address] [phone]  2019 - None Entered    [facility] [id number]         Subscriber Name Subscriber Birth Date Member ID       MEDHAT PAL 1960 [id number]                     Emergency Contacts        (Rel.) Home Phone Work Phone Mobile Phone    PALAZAR WATSON (Spouse) [phone] -- [phone]    Elba Miramontes (Relative) -- -- [phone]                 Discharge Summary        Carolynn Delacruz, APRN at 10/11/24 1129          Discharge Summary    Patient name: Medhat Pal  CSN: [id number]  MRN: [id number]  : 1960  Today's date: 10/11/2024     Date of Admission: 10/6/2024  Date of Discharge:  10/11/2024    Admitting Physician:  Dr. Varun Shelley MD; Intensivist  Primary Care Provider: Melissa Lazo " APRN  Consultations:  Dr. Enoch Savage MD; Neurosurgery     Dr. Tomasz Mendez MD; Neurosurgery     Dr. Shay Purvis MD; Neurology    Admission Diagnosis: CVA     Discharge Diagnoses:   Active Hospital Problems    Diagnosis     Acute cystitis without hematuria     Cerebral aneurysm, nonruptured     Acute CVA      Allergies:  Tramadol    Code Status and Medical Interventions: CPR (Attempt to Resuscitate); Full Support   Ordered at: 10/06/24 1309     Code Status (Patient has no pulse and is not breathing):    CPR (Attempt to Resuscitate)     Medical Interventions (Patient has pulse or is breathing):    Full Support     Procedures/Testing:  Procedure(s):  IR mechanical thrombectomy, 10/06/2024     History of Present Illness:  Lauryn Romo is a 63 y.o. female with PMH HTN, HLD, migraine headaches, history of CVA, and giant basilar aneurysm s/p pipeline embolization per Dr. Savage (2022, 2023) who presented to [facility] ED on 10/06/2024 for evaluation of left-sided weakness and facial droop that was present when she awoke that morning. She had previously been on Plavix and ASA, but was recently changed to ASA monotherapy on 9/23/24.    On arrival to the ED, CT imaging showed  acute thrombosis of her pipeline stent and basilar artery. She underwent successful thrombectomy per Dr. Mendez, restoring normal (TICI 3) flow. Stenosis within the mid-portions fo the Pipeline stent was treated with a 2.5-mm coronary MICHELLE with flow restored within the vertebrobasilar system. She was admitted to the ICU post-procedure.      Hospital Course:  Patient was brought to the ICU post-procedure intubated and on mechanical ventilation, where she passed a spontaneous breathing trial hours later and was successfully extubated to nasal cannula oxygen. In the afternoon on 10/6/24, she had a sudden-onset 10/10 headache with associated nausea/vomiting, requiring Compazine. STAT dual-energy CT head without evidence of hemorrhage. She was  "treated with Decadron and magnesium.    On 10/08/2024, Ms. Romo developed a right groin hematoma and became hypotensive and bradycardic with groin compression. She responded to atropine and was given 1 unit PRBCs for symptomatic bleeding. Right groin duplex on 10/09/2024 showed a small superficial hematoma, but no pseudoaneurysm. She required no further intervention and was stable.    She was seen by SLP and underwent FEES on 10/07/24 and was cleared for a regular diet with thin liquids. PT/OT followed with recommendation for inpatient rehabilitation. On last evaluation on 10/10/24 with limited movement due to deficits in balance and coordination and right groin pain. She showed improved functional mobility with minimal assistance with rolling walker and LBD tasks with maximum assistance.     As of 10/11/24, Ms. Romo was found to be appropriate for discharge to [facility]. Follow up instructions below.    Vitals:  /81   Pulse 65   Temp 96.9 °F (36.1 °C) (Axillary)   Resp 16   Ht 154.9 cm (60.98\")   Wt 58.1 kg (128 lb 1.4 oz)   SpO2 97%   BMI 24.21 kg/m²     Physical Exam:  Constitutional:  Appears well-developed and well-nourished. No distress.   HEENT:  Normocephalic and atraumatic. PERRL  Neck:  Neck supple. No JVD present.   CV: Normal rate, regular rhythm,  intact distal pulses.  No gallop, murmur or rub.  Pulmonary/Chest: Effort normal and breath sounds normal. No respiratory distress. No wheezes, rhonchi or rales.   Abdominal: Soft. +BS. No distension and no mass. There is no tenderness.   Musculoskeletal: Normal muscle tone and strength  Neurological: Alert and oriented to person, place, and time.  No focal deficits  Skin: Skin is warm and dry. No rash noted.   Extremities:  No clubbing, edema or cyanosis. Generalized weakness.  Psychiatric: Normal mood and affect. Behavior is normal.     Adult Transthoracic Echo Complete W/ Cont if Necessary Per Protocol   "   Interpretation Summary    Left ventricular systolic function is normal. Estimated left ventricular EF = 65%    Left ventricular diastolic function is consistent with (grade I) impaired relaxation.    The cardiac valves are anatomically and functionally normal.    Estimated right ventricular systolic pressure from tricuspid regurgitation is normal (<35 mmHg).    Saline test results are negative.    Interval: baseline (return from MRI)  1a. Level of Consciousness: 0-->Alert, keenly responsive  1b. LOC Questions: 0-->Answers both questions correctly  1c. LOC Commands: 0-->Performs both tasks correctly  2. Best Gaze: 0-->Normal  3. Visual: 0-->No visual loss  4. Facial Palsy: 0-->Normal symmetrical movements  5a. Motor Arm, Left: 0-->No drift, limb holds 90 (or 45) degrees for full 10 secs  5b. Motor Arm, Right: 0-->No drift, limb holds 90 (or 45) degrees for full 10 secs  6a. Motor Leg, Left: 0-->No drift, leg holds 30 degree position for full 5 secs  6b. Motor Leg, Right: 0-->No drift, leg holds 30 degree position for full 5 secs  7. Limb Ataxia: 0-->Absent  8. Sensory: 0-->Normal, no sensory loss  9. Best Language: 0-->No aphasia, normal  10. Dysarthria: 0-->Normal  11. Extinction and Inattention (formerly Neglect): 0-->No abnormality    Total (NIH Stroke Scale): 0    Labs:  Results from last 7 days   Lab Units 10/10/24  0349   WBC 10*3/mm3 13.14*   HEMOGLOBIN g/dL 12.6   HEMATOCRIT % 37.4   PLATELETS 10*3/mm3 189     Results from last 7 days   Lab Units 10/10/24  0349 10/08/24  0404 10/07/24  0555   SODIUM mmol/L 141   < > 143   POTASSIUM mmol/L 3.9   < > 4.0   CHLORIDE mmol/L 109*   < > 112*   CO2 mmol/L 24.0   < > 20.0*   BUN mg/dL 24*   < > 21   CREATININE mg/dL 0.59   < > 0.43*   CALCIUM mg/dL 8.2*   < > 8.7   BILIRUBIN mg/dL  --   --  0.3   ALK PHOS U/L  --   --  98   ALT (SGPT) U/L  --   --  49*   AST (SGOT) U/L  --   --  22   GLUCOSE mg/dL 113*   < > 126*    < > = values in this interval not displayed.          Magnesium   Date Value Ref Range Status   10/10/2024 2.1 1.6 - 2.4 mg/dL Final     Phosphorus   Date Value Ref Range Status   10/10/2024 3.4 2.5 - 4.5 mg/dL Final                    Discharge Medications        New Medications        Instructions Start Date   acetaminophen 325 MG tablet  Commonly known as: TYLENOL   650 mg, Oral, Every 6 Hours PRN      dexAMETHasone 2 MG tablet  Commonly known as: DECADRON   2 mg, Oral, Every 8 Hours Scheduled      dexAMETHasone 2 MG tablet  Commonly known as: DECADRON   2 mg, Oral, Every 12 Hours Scheduled   Start Date: October 14, 2024     dexAMETHasone 1 MG tablet  Commonly known as: DECADRON   1 mg, Oral, Every 12 Hours Scheduled   Start Date: October 17, 2024     famotidine 20 MG tablet  Commonly known as: PEPCID   20 mg, Oral, 2 Times Daily Before Meals      magnesium oxide 400 tablet tablet  Commonly known as: MAG-OX   400 mg, Oral, Nightly      ticagrelor 90 MG tablet tablet  Commonly known as: BRILINTA   90 mg, Oral, 2 Times Daily             Changes to Medications        Instructions Start Date   aspirin 81 MG chewable tablet  What changed: when to take this   81 mg, Oral, Daily   Start Date: October 12, 2024     nortriptyline 10 MG capsule  Commonly known as: PAMELOR  What changed: See the new instructions.   20 mg, Oral, Nightly      sertraline 50 MG tablet  Commonly known as: Zoloft  What changed: Another medication with the same name was removed. Continue taking this medication, and follow the directions you see here.   50 mg, Oral, Daily             Continue These Medications        Instructions Start Date   atorvastatin 80 MG tablet  Commonly known as: LIPITOR   80 mg, Oral, Nightly      Emgality 120 MG/ML auto-injector pen  Generic drug: galcanezumab-gnlm   120 mg, Subcutaneous, Every 28 Days      fluticasone 50 MCG/ACT nasal spray  Commonly known as: FLONASE   2 sprays, Nasal, Daily      LORazepam 0.5 MG tablet  Commonly known as: ATIVAN   0.5 mg, Oral, Every  8 Hours PRN      lubiprostone 8 MCG capsule  Commonly known as: AMITIZA   Take 1 capsule by mouth 2 (Two) Times a Day.      topiramate 100 MG tablet  Commonly known as: TOPAMAX   100 mg, Oral, Daily      Ubrelvy 100 MG tablet  Generic drug: ubrogepant   100 mg, Oral, Daily PRN, Take at onset of headache - if symptoms persist or return, may repeat dose in 2 hours. Maximum: 200 mg per 24 hours             Stop These Medications      bethanechol 25 MG tablet  Commonly known as: URECHOLINE     Cariprazine HCl 1.5 MG capsule capsule  Commonly known as: VRAYLAR     cefuroxime 500 MG tablet  Commonly known as: CEFTIN     Diclofenac Sodium 1 % gel gel  Commonly known as: VOLTAREN     ondansetron ODT 4 MG disintegrating tablet  Commonly known as: ZOFRAN-ODT     oxybutynin XL 5 MG 24 hr tablet  Commonly known as: DITROPAN-XL     pantoprazole 20 MG EC tablet  Commonly known as: PROTONIX            Follow-up Appointments  Future Appointments   Date Time Provider Department Center   11/11/2024  1:00 PM Nikki Carrero PA-C [facility]   12/11/2024 10:00 AM Philomena Mott APRN [facility]   12/30/2024  9:30 AM Ruth Burris APRN [facility]   3/24/2025 11:00 AM [facility]   3/24/2025  1:30 PM Enoch Savage MD [facility]     Additional Instructions for the Follow-ups that You Need to Schedule       Ambulatory Referral to Neurology   As directed      In 2 months    Discharge Follow-up with Specified Provider: Enoch Savage; 1 Month   As directed      To: Enoch Savage   Follow Up: 1 Month              Discharge Instructions:  Discharge to [facility] today at 1430  Transportation per [facility] transport  Medications as above  Follow up with Dr. Savage in 1 month  Follow up in stroke clinic in 2 months  Return to the hospital or call 911 with recurrence of symptoms     Slime Delacruz, MSN, APRN, ACNPC-AG  Pulmonary and Critical Care Medicine  Electronically signed by  CAILIN Abbott, 10/11/24, 11:29 AM EDT.       Time: I spent 35 minutes on this discharge activity which included: face-to-face encounter with the patient, reviewing the data in the system, coordination of the care with the nursing staff as well as consultants, documentation, and entering orders.      CC: Melissa Lazo APRN          Electronically signed by Carolynn Delacruz APRN at 10/11/24 7548

## 2024-10-11 NOTE — DISCHARGE SUMMARY
Discharge Summary    Patient name: Lauryn Romo  CSN: [id number]  MRN: [id number]  : 1960  Today's date: 10/11/2024     Date of Admission: 10/6/2024  Date of Discharge:  10/11/2024    Admitting Physician:  Dr. Varun Shelley MD; Intensivist  Primary Care Provider: Melissa Lazo APRN  Consultations:  Dr. Enoch Savage MD; Neurosurgery     Dr. Tomasz Mendez MD; Neurosurgery     Dr. Shay Purvis MD; Neurology    Admission Diagnosis: CVA     Discharge Diagnoses:   Active Hospital Problems    Diagnosis     Acute cystitis without hematuria     Cerebral aneurysm, nonruptured     Acute CVA      Allergies:  Tramadol    Code Status and Medical Interventions: CPR (Attempt to Resuscitate); Full Support   Ordered at: 10/06/24 1309     Code Status (Patient has no pulse and is not breathing):    CPR (Attempt to Resuscitate)     Medical Interventions (Patient has pulse or is breathing):    Full Support     Procedures/Testing:  Procedure(s):  IR mechanical thrombectomy, 10/06/2024     History of Present Illness:  Lauryn Romo is a 63 y.o. female with PMH HTN, HLD, migraine headaches, history of CVA, and giant basilar aneurysm s/p pipeline embolization per Dr. Savage (, ) who presented to [facility] ED on 10/06/2024 for evaluation of left-sided weakness and facial droop that was present when she awoke that morning. She had previously been on Plavix and ASA, but was recently changed to ASA monotherapy on 24.    On arrival to the ED, CT imaging showed  acute thrombosis of her pipeline stent and basilar artery. She underwent successful thrombectomy per Dr. Mendez, restoring normal (TICI 3) flow. Stenosis within the mid-portions fo the Pipeline stent was treated with a 2.5-mm coronary MICHELLE with flow restored within the vertebrobasilar system. She was admitted to the ICU post-procedure.      Hospital Course:  Patient was brought to the ICU post-procedure intubated and on mechanical ventilation, where she  "passed a spontaneous breathing trial hours later and was successfully extubated to nasal cannula oxygen. In the afternoon on 10/6/24, she had a sudden-onset 10/10 headache with associated nausea/vomiting, requiring Compazine. STAT dual-energy CT head without evidence of hemorrhage. She was treated with Decadron and magnesium.    On 10/08/2024, Ms. Romo developed a right groin hematoma and became hypotensive and bradycardic with groin compression. She responded to atropine and was given 1 unit PRBCs for symptomatic bleeding. Right groin duplex on 10/09/2024 showed a small superficial hematoma, but no pseudoaneurysm. She required no further intervention and was stable.    She was seen by SLP and underwent FEES on 10/07/24 and was cleared for a regular diet with thin liquids. PT/OT followed with recommendation for inpatient rehabilitation. On last evaluation on 10/10/24 with limited movement due to deficits in balance and coordination and right groin pain. She showed improved functional mobility with minimal assistance with rolling walker and LBD tasks with maximum assistance.     As of 10/11/24, Ms. Romo was found to be appropriate for discharge to [facility]. Follow up instructions below.    Vitals:  /81   Pulse 65   Temp 96.9 °F (36.1 °C) (Axillary)   Resp 16   Ht 154.9 cm (60.98\")   Wt 58.1 kg (128 lb 1.4 oz)   SpO2 97%   BMI 24.21 kg/m²     Physical Exam:  Constitutional:  Appears well-developed and well-nourished. No distress.   HEENT:  Normocephalic and atraumatic. PERRL  Neck:  Neck supple. No JVD present.   CV: Normal rate, regular rhythm,  intact distal pulses.  No gallop, murmur or rub.  Pulmonary/Chest: Effort normal and breath sounds normal. No respiratory distress. No wheezes, rhonchi or rales.   Abdominal: Soft. +BS. No distension and no mass. There is no tenderness.   Musculoskeletal: Normal muscle tone and strength  Neurological: Alert and oriented to person, " place, and time.  No focal deficits  Skin: Skin is warm and dry. No rash noted.   Extremities:  No clubbing, edema or cyanosis. Generalized weakness.  Psychiatric: Normal mood and affect. Behavior is normal.     Adult Transthoracic Echo Complete W/ Cont if Necessary Per Protocol     Interpretation Summary    Left ventricular systolic function is normal. Estimated left ventricular EF = 65%    Left ventricular diastolic function is consistent with (grade I) impaired relaxation.    The cardiac valves are anatomically and functionally normal.    Estimated right ventricular systolic pressure from tricuspid regurgitation is normal (<35 mmHg).    Saline test results are negative.    Interval: baseline (return from MRI)  1a. Level of Consciousness: 0-->Alert, keenly responsive  1b. LOC Questions: 0-->Answers both questions correctly  1c. LOC Commands: 0-->Performs both tasks correctly  2. Best Gaze: 0-->Normal  3. Visual: 0-->No visual loss  4. Facial Palsy: 0-->Normal symmetrical movements  5a. Motor Arm, Left: 0-->No drift, limb holds 90 (or 45) degrees for full 10 secs  5b. Motor Arm, Right: 0-->No drift, limb holds 90 (or 45) degrees for full 10 secs  6a. Motor Leg, Left: 0-->No drift, leg holds 30 degree position for full 5 secs  6b. Motor Leg, Right: 0-->No drift, leg holds 30 degree position for full 5 secs  7. Limb Ataxia: 0-->Absent  8. Sensory: 0-->Normal, no sensory loss  9. Best Language: 0-->No aphasia, normal  10. Dysarthria: 0-->Normal  11. Extinction and Inattention (formerly Neglect): 0-->No abnormality    Total (NIH Stroke Scale): 0    Labs:  Results from last 7 days   Lab Units 10/10/24  0349   WBC 10*3/mm3 13.14*   HEMOGLOBIN g/dL 12.6   HEMATOCRIT % 37.4   PLATELETS 10*3/mm3 189     Results from last 7 days   Lab Units 10/10/24  0349 10/08/24  0404 10/07/24  0555   SODIUM mmol/L 141   < > 143   POTASSIUM mmol/L 3.9   < > 4.0   CHLORIDE mmol/L 109*   < > 112*   CO2 mmol/L 24.0   < > 20.0*   BUN mg/dL 24*    < > 21   CREATININE mg/dL 0.59   < > 0.43*   CALCIUM mg/dL 8.2*   < > 8.7   BILIRUBIN mg/dL  --   --  0.3   ALK PHOS U/L  --   --  98   ALT (SGPT) U/L  --   --  49*   AST (SGOT) U/L  --   --  22   GLUCOSE mg/dL 113*   < > 126*    < > = values in this interval not displayed.         Magnesium   Date Value Ref Range Status   10/10/2024 2.1 1.6 - 2.4 mg/dL Final     Phosphorus   Date Value Ref Range Status   10/10/2024 3.4 2.5 - 4.5 mg/dL Final                    Discharge Medications        New Medications        Instructions Start Date   acetaminophen 325 MG tablet  Commonly known as: TYLENOL   650 mg, Oral, Every 6 Hours PRN      dexAMETHasone 2 MG tablet  Commonly known as: DECADRON   2 mg, Oral, Every 8 Hours Scheduled      dexAMETHasone 2 MG tablet  Commonly known as: DECADRON   2 mg, Oral, Every 12 Hours Scheduled   Start Date: October 14, 2024     dexAMETHasone 1 MG tablet  Commonly known as: DECADRON   1 mg, Oral, Every 12 Hours Scheduled   Start Date: October 17, 2024     famotidine 20 MG tablet  Commonly known as: PEPCID   20 mg, Oral, 2 Times Daily Before Meals      magnesium oxide 400 tablet tablet  Commonly known as: MAG-OX   400 mg, Oral, Nightly      ticagrelor 90 MG tablet tablet  Commonly known as: BRILINTA   90 mg, Oral, 2 Times Daily             Changes to Medications        Instructions Start Date   aspirin 81 MG chewable tablet  What changed: when to take this   81 mg, Oral, Daily   Start Date: October 12, 2024     nortriptyline 10 MG capsule  Commonly known as: PAMELOR  What changed: See the new instructions.   20 mg, Oral, Nightly      sertraline 50 MG tablet  Commonly known as: Zoloft  What changed: Another medication with the same name was removed. Continue taking this medication, and follow the directions you see here.   50 mg, Oral, Daily             Continue These Medications        Instructions Start Date   atorvastatin 80 MG tablet  Commonly known as: LIPITOR   80 mg, Oral, Nightly       Emgality 120 MG/ML auto-injector pen  Generic drug: galcanezumab-gnlm   120 mg, Subcutaneous, Every 28 Days      fluticasone 50 MCG/ACT nasal spray  Commonly known as: FLONASE   2 sprays, Nasal, Daily      LORazepam 0.5 MG tablet  Commonly known as: ATIVAN   0.5 mg, Oral, Every 8 Hours PRN      lubiprostone 8 MCG capsule  Commonly known as: AMITIZA   Take 1 capsule by mouth 2 (Two) Times a Day.      topiramate 100 MG tablet  Commonly known as: TOPAMAX   100 mg, Oral, Daily      Ubrelvy 100 MG tablet  Generic drug: ubrogepant   100 mg, Oral, Daily PRN, Take at onset of headache - if symptoms persist or return, may repeat dose in 2 hours. Maximum: 200 mg per 24 hours             Stop These Medications      bethanechol 25 MG tablet  Commonly known as: URECHOLINE     Cariprazine HCl 1.5 MG capsule capsule  Commonly known as: VRAYLAR     cefuroxime 500 MG tablet  Commonly known as: CEFTIN     Diclofenac Sodium 1 % gel gel  Commonly known as: VOLTAREN     ondansetron ODT 4 MG disintegrating tablet  Commonly known as: ZOFRAN-ODT     oxybutynin XL 5 MG 24 hr tablet  Commonly known as: DITROPAN-XL     pantoprazole 20 MG EC tablet  Commonly known as: PROTONIX            Follow-up Appointments  Future Appointments   Date Time Provider Department Center   11/11/2024  1:00 PM Nikki Carrero PA-C [facility]   12/11/2024 10:00 AM Philomena Mott APRN [facility]   12/30/2024  9:30 AM Ruth Burris APRN [facility]   3/24/2025 11:00 AM [facility]   3/24/2025  1:30 PM Enoch Savage MD [facility]     Additional Instructions for the Follow-ups that You Need to Schedule       Ambulatory Referral to Neurology   As directed      In 2 months    Discharge Follow-up with Specified Provider: Enoch Savage; 1 Month   As directed      To: Enoch Savage   Follow Up: 1 Month              Discharge Instructions:  Discharge to [facility] today at 1430  Transportation per [facility]  van transport  Medications as above  Follow up with Dr. Savage in 1 month  Follow up in stroke clinic in 2 months  Return to the hospital or call 911 with recurrence of symptoms     Slime Delacruz, MSN, APRN, ACN-AG  Pulmonary and Critical Care Medicine  Electronically signed by CAILIN Abbott, 10/11/24, 11:29 AM EDT.       Time: I spent 35 minutes on this discharge activity which included: face-to-face encounter with the patient, reviewing the data in the system, coordination of the care with the nursing staff as well as consultants, documentation, and entering orders.      CC: Melissa Lazo, APRN

## 2024-10-14 NOTE — PROGRESS NOTES
"Enter Query Response Below      Query Response: Patient has known 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  Symptoms were likely exacerbated by her admitting basilar artery thrombosis, and her brainstem compression was being treated with steroids.             If applicable, please update the problem list.     Patient: Lauryn Romo        : 1960  Account: [id number]           Admit Date:         How to Respond to this query:       a. Click New Note     b. Answer query within the yellow box.                c. Update the Problem List, if applicable.      If you have any questions about this query contact me at: [email or web address]     ,    Patient presents with global weakness, confusion, left-sided facial droop, significant dysarthria and aphasia, and is diagnosed with acute thrombosis of her pipeline stent and basilar artery.  She underwent mechanical thrombectomy and stenosis within the vertebrobasilar Pipeline stent was treated with a 2.5 mm coronary MICHELLE.  Discharge summary documents \"In the afternoon on 10/6/24, she had a sudden-onset 10/10 headache with associated nausea/vomiting, requiring Compazine.\"  Imaging:  -Head CT (10/6)- No acute hemorrhage or significant mass effect...Slight increasing dilation of third and lateral ventricles with more conspicuous sulcal and gyral crowding at the vertex.Findings could reflect progressive mass effect at the level of the fourth ventricle related to known treated aneurysm...Transependymal edema would be considered less likely given stability.  -Repeat CT (10/6) - The ventricles and sulci are prominent commensurate with involutional changes.Stable 2.4 cm x 2.4 cm basilar artery aneurysm  -MRI (10/7) - ventricles are stable in caliber, with no midline shift.A 2.4 cm basilar artery aneurysm appears unchanged, with mass effect on the adjacent brainstem.  Treatment includes Decadron (10/6-10/8, 10/10-10/11), Magnesium (10/10) and " Keppra.    Please clarify if the patient being treated/monitored for one or more of the following:    Brain compression  Other- specify ___________    By submitting this query, we are merely seeking further clarification of documentation to accurately reflect all conditions that you are monitoring, evaluating, treating or that extend the hospitalization or utilize additional resources of care. Please utilize your independent clinical judgment when addressing the question(s) above.     This query and your response, once completed, will be entered into the legal medical record.    Sincerely,  Diane CORTES, RN  Clinical Documentation Integrity Program   [email or web address]

## 2024-10-17 NOTE — PAYOR COMM NOTE
"Auth# [id number]   10/6/24 Admission  10/11/24 Discharge     Request for additional Days    SOPHIA Bah, RN  Utilization Review  Phone [phone]  Fax [phone]    [facility]  [address] [id number]             Medhat Pal (63 y.o. Female)       Date of Birth   1960    Social Security Number       Address   [address] [id number]    Home Phone   [phone]    MRN   [id number]       [facility]    Marital Status                               Admission Date   10/6/24    Admission Type   Emergency    Admitting Provider   Zafar Hager MD    Attending Provider       Department, Room/Bed   [facility] 2B ICU, N229/1       Discharge Date   10/11/2024    Discharge Disposition   Rehab Facility or Unit (DC - External)    Discharge Destination                                 Attending Provider: (none)   Allergies: Tramadol    Isolation: None   Infection: None   Code Status: Prior    Ht: 154.9 cm (60.98\")   Wt: 58.1 kg (128 lb 1.4 oz)    Admission Cmt: None   Principal Problem: None                  Active Insurance as of 10/6/2024       Primary Coverage       Payor Plan Insurance Group Employer/Plan Group    ANTH BLUE CROSS ANTH BLUE CROSS BLUE SHIELD PPO [id number]       Payor Plan Address Payor Plan Phone Number Payor Plan Fax Number Effective Dates    [address] [phone]  1/1/2019 - None Entered    [place] [address]         Subscriber Name Subscriber Birth Date Member ID       MEDHAT PAL 1960 [id number]                     Emergency Contacts        (Rel.) Home Phone Work Phone Mobile Phone    AZAR PAL (Spouse) [phone] -- [phone]    Elba Miramontes (Relative) -- -- [phone]                 Physician Progress Notes (all)        Janette, Enoch CORBETT MD at 10/11/24 1430            Enter Query Response Below      Query Response: Patient has known " "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  Symptoms were likely exacerbated by her admitting basilar artery thrombosis, and her brainstem compression was being treated with steroids.             If applicable, please update the problem list.     Patient: Lauryn Romo        : 1960  Account: [id number]           Admit Date:         How to Respond to this query:       a. Click New Note     b. Answer query within the yellow box.                c. Update the Problem List, if applicable.      If you have any questions about this query contact me at: [email or web address]     ,    Patient presents with global weakness, confusion, left-sided facial droop, significant dysarthria and aphasia, and is diagnosed with acute thrombosis of her pipeline stent and basilar artery.  She underwent mechanical thrombectomy and stenosis within the vertebrobasilar Pipeline stent was treated with a 2.5 mm coronary MICHELLE.  Discharge summary documents \"In the afternoon on 10/6/24, she had a sudden-onset 10/10 headache with associated nausea/vomiting, requiring Compazine.\"  Imaging:  -Head CT (10/6)- No acute hemorrhage or significant mass effect...Slight increasing dilation of third and lateral ventricles with more conspicuous sulcal and gyral crowding at the vertex.Findings could reflect progressive mass effect at the level of the fourth ventricle related to known treated aneurysm...Transependymal edema would be considered less likely given stability.  -Repeat CT (10/6) - The ventricles and sulci are prominent commensurate with involutional changes.Stable 2.4 cm x 2.4 cm basilar artery aneurysm  -MRI (10/7) - ventricles are stable in caliber, with no midline shift.A 2.4 cm basilar artery aneurysm appears unchanged, with mass effect on the adjacent brainstem.  Treatment includes Decadron (10/6-10/8, 10/10-10/11), Magnesium (10/10) and Keppra.    Please clarify if the patient being treated/monitored for one or " more of the following:    Brain compression  Other- specify ___________    By submitting this query, we are merely seeking further clarification of documentation to accurately reflect all conditions that you are monitoring, evaluating, treating or that extend the hospitalization or utilize additional resources of care. Please utilize your independent clinical judgment when addressing the question(s) above.     This query and your response, once completed, will be entered into the legal medical record.    Sincerely,  Diane CORTES, RN  Clinical Documentation Integrity Program   [email or web address]    Electronically signed by Enoch Savage MD at 10/14/24 1010       Jennifer Ann APRN at 10/11/24 1031          Stroke Progress Note       Chief Complaint: headache      Subjective    Subjective     Subjective: Patient is sitting up in bed.  at the bedside. No acute events overnight.  States she walked to the bathroom overnight with assistance by nursing staff x1. She did develop a hematoma in the right groin three days ago followed by a vasovagal response to groin compression.  Groin hematoma does appear to be improving. Per Dr. Savage patient discharged to [facility] was postponed until 10/11 due to groin hematoma. Case Management has arranged for her to be discharged to [facility] at 1430 today. Denies headache today.     Review of Systems   Constitutional: Negative.    Eyes: Negative.  Negative for photophobia.   Respiratory: Negative.     Cardiovascular: Negative.    Gastrointestinal:  Negative for nausea and vomiting.   Genitourinary: Negative.    Musculoskeletal: Negative.    Skin: Negative.    Allergic/Immunologic: Negative.    Neurological:  Negative for dizziness, tremors, seizures, syncope, facial asymmetry, speech difficulty, weakness, light-headedness, numbness and headaches.   Psychiatric/Behavioral: Negative.            Objective      Temp:  [96.9 °F (36.1 °C)-99.3 °F (37.4 °C)] 96.9 °F  (36.1 °C)  Heart Rate:  [55-69] 69  Resp:  [14-18] 18  BP: (120-156)/(70-85) 132/85         Neurological Exam  Mental Status  Alert. Recent and remote memory are intact. Speech is normal. Language is fluent with no aphasia. Attention and concentration are normal.    Cranial Nerves  CN II: Visual fields full to confrontation.  CN III, IV, VI: Extraocular movements intact bilaterally. Pupils equal round and reactive to light bilaterally.  CN V:  Right: Facial sensation is normal.  Left: Facial sensation is normal on the left.  CN VII:  Right: There is no facial weakness.  Left: There is no facial weakness.  CN VIII: Hearing appears intact.    Motor  Normal muscle bulk throughout. No abnormal involuntary movements.  All extremities 5/5.    Sensory  Light touch is normal in upper and lower extremities.     Coordination    No dysmetria.    Gait    Not observed..     Physical Exam  Vitals and nursing note reviewed.   Constitutional:       General: She is not in acute distress.     Appearance: She is not ill-appearing or toxic-appearing.   HENT:      Head: Normocephalic and atraumatic.   Eyes:      Extraocular Movements: Extraocular movements intact.      Pupils: Pupils are equal, round, and reactive to light.   Cardiovascular:      Rate and Rhythm: Normal rate and regular rhythm.   Pulmonary:      Effort: Pulmonary effort is normal. No respiratory distress.      Comments: On Room air  Skin:     General: Skin is warm and dry.      Coloration: Skin is not pale.      Findings: Bruising (to bilateral wrists, and right groin hematoma) present.   Neurological:      Mental Status: She is alert.      Cranial Nerves: No cranial nerve deficit.      Sensory: No sensory deficit.      Motor: No weakness.   Psychiatric:         Mood and Affect: Mood normal.         Speech: Speech normal.         Behavior: Behavior normal.        Interval: baseline (return from MRI)  1a. Level of Consciousness: 0-->Alert, keenly responsive  1b. LOC  Questions: 0-->Answers both questions correctly  1c. LOC Commands: 0-->Performs both tasks correctly  2. Best Gaze: 0-->Normal  3. Visual: 0-->No visual loss  4. Facial Palsy: 0-->Normal symmetrical movements  5a. Motor Arm, Left: 0-->No drift, limb holds 90 (or 45) degrees for full 10 secs  5b. Motor Arm, Right: 0-->No drift, limb holds 90 (or 45) degrees for full 10 secs  6a. Motor Leg, Left: 0-->No drift, leg holds 30 degree position for full 5 secs  6b. Motor Leg, Right: 0-->No drift, leg holds 30 degree position for full 5 secs  7. Limb Ataxia: 0-->Absent  8. Sensory: 0-->Normal, no sensory loss  9. Best Language: 0-->No aphasia, normal  10. Dysarthria: 0-->Normal  11. Extinction and Inattention (formerly Neglect): 0-->No abnormality    Total (NIH Stroke Scale): 0    Results Review:    I reviewed the patient's new clinical results.    Lab Results (last 24 hours)       ** No results found for the last 24 hours. **          Lab Results   Component Value Date    GLUCOSE 113 (H) 10/10/2024    BUN 24 (H) 10/10/2024    CREATININE 0.59 10/10/2024     10/10/2024    K 3.9 10/10/2024     (H) 10/10/2024    CALCIUM 8.2 (L) 10/10/2024    PROTEINTOT 6.1 10/07/2024    ALBUMIN 4.0 10/07/2024    ALT 49 (H) 10/07/2024    AST 22 10/07/2024    ALKPHOS 98 10/07/2024    BILITOT 0.3 10/07/2024    GLOB 2.1 10/07/2024    AGRATIO 1.9 10/07/2024    BCR 40.7 (H) 10/10/2024    ANIONGAP 8.0 10/10/2024    EGFR 102.0 10/10/2024         Results for orders placed during the hospital encounter of 10/06/24    Adult Transthoracic Echo Complete W/ Cont if Necessary Per Protocol    Interpretation Summary    Left ventricular systolic function is normal. Estimated left ventricular EF = 65%    Left ventricular diastolic function is consistent with (grade I) impaired relaxation.    The cardiac valves are anatomically and functionally normal.    Estimated right ventricular systolic pressure from tricuspid regurgitation is normal (<35 mmHg).     Saline test results are negative.            Assessment/Plan     Assessment/Plan:This is a 63-year-old female with known medical diagnoses present hypertension, hyperlipidemia, migraines (follows with CAILIN Parra), history of stroke (right occipital and left cerebellar, 2023, residual dizziness and ataxic gait), and giant basilar aneurysm s/p pipeline embolization (Dr. Savage, 2022 and 2023) who presents to the emergency department via private vehicle for further evaluation of left-sided weakness and facial droop which was present upon awakening this morning.  She is admitted for IV thrombolytic therapy given LK > 4.5-hours.  CTA head/neck reveals thrombosed pipeline stent.  Images were reviewed by Dr. Savage who is elected to take her to the Cath Lab for mechanical thrombectomy.  She was be admitted to the neurological ICU s/p procedure. She developed a hematoma in the right groin on 10/8/24, which is being monitored closely in the ICU. Planned discharge to [facility] 10/11.      # Acute basilar occlusion s/p MT (thrombosis of her pipeline stent and basilar artery.)  -Prior to the event, patient was transitioned to monotherapy with aspirin   -After the MT, transitioned to aspirin 81 mg daily and brillinta 90 mg BID for now   -Continue atorvastatin 80 mg nightly  -Magnesium oxide 400 mg nightly for migraine prevention  -Autoregulation of blood pressure, goals SBP <140   -Case management arranged for transport to [facility] at 1430 today.  -SLP recommends regular texture, thin liquids  -As patient is scheduled to be following up in neurosurgery clinic in 1 month, okay to follow-up in stroke clinic in 2 months.    Discussed the importance of medication compliance Aspirin 81mg daily, Brilinta 90mg twice daily, and Atorvastatin 80mg nightly and lifestyle modifications adequate control of blood pressure, adequate control of cholesterol (goal LDL <70), increased physical activity, and implementation of  healthy diet to help reduce the risk of future cerebrovascular events.  Also discussed the signs symptoms that would warrant the patient return back to the emergency department including unilateral weakness, unilateral numbness, visual disturbances, loss of balance, speech difficulties, and/or a sudden severe headache.  Patient verbalized understanding.    Plan of care discussed with Dr. Purvis and patient. From a stroke standpoint patient is okay to be discharged to [facility] inpatient rehab facility.  Please call with any questions or concerns.        CAILIN Crespo  10/11/24  10:31 EDT       Electronically signed by Jennifer Ann APRN at 10/11/24 1049       Given, Enoch CORBETT MD at 10/11/24 0919          NAME: MEDHAT PAL  : 1960  PCP: Melissa Lazo APRN  ADMITTING PHYSICIAN: Jeramie Shelley    DATE OF ADMISSION:  10/6/2024  DATE OF SERVICE: 10/11/2024    HOSPITAL DAY:  5 days    HISTORY OF PRESENT ILLNESS:  63 y.o. 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  She was seen in the neurointerventional clinic on 2024, and her Plavix was stopped, but she remained on low-dose aspirin.     Ms. Pal presented to the emergency department on 10/6/2024 with acute thrombosis of her pipeline stent and basilar artery.  She underwent successful mechanical thrombectomy by my partner Dr. Mendez, restoring normal (TICI 3) flow.  This did unmask a stenosis within the mid portions of the Pipeline stent construct, and this was treated with a 2.5 mm coronary MICHELLE, restoring robust flow within the vertebrobasilar system.    REVIEW OF IMAGING:  No new imaging    LABS:  Lab Results   Component Value Date    WBC 13.14 (H) 10/10/2024    HGB 12.6 10/10/2024    HCT 37.4 10/10/2024    MCV 91.4 10/10/2024      10/10/2024     Lab Results   Component Value Date    GLUCOSE 113 (H) 10/10/2024    CALCIUM 8.2 (L) 10/10/2024     10/10/2024    K 3.9 10/10/2024    CO2 24.0 10/10/2024     (H) 10/10/2024    BUN 24 (H) 10/10/2024    CREATININE 0.59 10/10/2024    EGFRIFNONA 103 09/29/2019    BCR 40.7 (H) 10/10/2024    ANIONGAP 8.0 10/10/2024       CURRENT MEDS:  Current Facility-Administered Medications   Medication Dose Route Frequency Provider Last Rate Last Admin    Please return patient supplied medication at discharge   Does not apply BID Prebble, Fahad, RPH   Given at 10/10/24 2100    acetaminophen (TYLENOL) tablet 650 mg  650 mg Oral Q6H PRN Yasemin Srivastava APRN   650 mg at 10/10/24 0746    aspirin chewable tablet 81 mg  81 mg Oral Daily Prebble, Fahad, RPH   81 mg at 10/11/24 0849    Or    aspirin suppository 300 mg  300 mg Rectal Daily Prebble, Fahad, RPH        atorvastatin (LIPITOR) tablet 80 mg  80 mg Oral Nightly Prebble, Fahad, RPH   80 mg at 10/10/24 2009    sennosides-docusate (PERICOLACE) 8.6-50 MG per tablet 2 tablet  2 tablet Oral BID PRN Prebble, Fahad, RPH        And    polyethylene glycol (MIRALAX) packet 17 g  17 g Oral Daily PRN Prebble, Fahad, RPH        And    bisacodyl (DULCOLAX) suppository 10 mg  10 mg Rectal Daily PRN Prebble, Fahad, RPH        Calcium Replacement - Follow Nurse / BPA Driven Protocol   Does not apply PRN Lety Matthews APRN        [START ON 10/17/2024] dexAMETHasone (DECADRON) tablet 1 mg  1 mg Oral Q12H Nikki Carrero PA-C        dexAMETHasone (DECADRON) tablet 2 mg  2 mg Oral Q8H Nikki Carrero PA-C   2 mg at 10/11/24 0648    [START ON 10/14/2024] dexAMETHasone (DECADRON) tablet 2 mg  2 mg Oral Q12H Nikki Carrero PA-C        famotidine (PEPCID) tablet 20 mg  20 mg Oral BID AC Zafar Hager MD   20 mg at 10/11/24 0850    labetalol (NORMODYNE,TRANDATE) injection 10 mg  10 mg Intravenous Q4H PRN Bryon Garner MD   10 mg at 10/11/24 0222     lubiprostone (AMITIZA) capsule 8 mcg  8 mcg Oral BID With Meals Given, Enoch CORBETT MD   8 mcg at 10/11/24 0850    Magnesium Cardiology Dose Replacement - Follow Nurse / BPA Driven Protocol   Does not apply PRN Lety Matthews APRN        magnesium oxide (MAG-OX) tablet 400 mg  400 mg Oral Nightly Jennifer Ann APRN   400 mg at 10/10/24 2009    nitroglycerin (NITROSTAT) SL tablet 0.4 mg  0.4 mg Sublingual Q5 Min PRN Ashley Acharya APRN        nortriptyline (PAMELOR) capsule 20 mg  20 mg Oral Nightly Given, Enoch CORBETT MD   20 mg at 10/10/24 2010    ondansetron (ZOFRAN) injection 4 mg  4 mg Intravenous Q6H PRN Roxie Yancey DNP, CAILIN   4 mg at 10/10/24 1640    oxybutynin XL (DITROPAN-XL) 24 hr tablet 5 mg  5 mg Oral Daily Given, Enoch CORBETT MD   5 mg at 10/11/24 0849    Phosphorus Replacement - Follow Nurse / BPA Driven Protocol   Does not apply PRN Lety Matthews APRN        Potassium Replacement - Follow Nurse / BPA Driven Protocol   Does not apply PRN Lety Matthews APRN        sertraline (ZOLOFT) tablet 50 mg  50 mg Oral Daily Given, Enoch CORBETT MD   50 mg at 10/11/24 0850    ticagrelor (BRILINTA) tablet 90 mg  90 mg Oral BID PreFahad rae RPH   90 mg at 10/11/24 0850    topiramate (TOPAMAX) tablet 100 mg  100 mg Oral Daily Given, Enoch CORBETT MD   100 mg at 10/11/24 0850    ubrogepant (UBRELVY) tablet 100 mg  100 mg Oral PRN Fahad Shankar RPH   100 mg at 10/10/24 1636       PHYSICAL EXAM:  Vitals:    10/11/24 0800   BP: 130/71   Pulse: 69   Resp:    Temp:    SpO2: 99%      Alert and oriented x 3. Resting comfortably in bed.  She is in good spirits this morning.  Tolerating p.o. intake. Antigravity strength in the upper and lower extremities. Speech is clear. She endorses a mild-moderate headache, similar to her baseline.     ASSESSMENT/PLAN:  Ms. Romo is a 63 y.o. female status post prior treatment with Pipeline flow diverter therapy for a giant proximal basilar aneurysm, with her last  "treatment in September 2023.  She presented on 10/6/2024 with acute thrombosis of her basilar artery and Pipeline flow diverter.  Ms. Romo underwent successful mechanical thrombectomy along with placement of a 2.5 mm coronary MICHELLE within the mid portions of the Pipeline stent construct (at side for symptomatic stenosis), restoring normal (TICI 3) flow to the intracranial vertebrobasilar system.     Given the severity of her presenting stroke symptoms, Ms. Romo has made an exceptional recovery to this point, and has only a few punctate areas of infarct on her MRI (not in the brainstem).  I am very optimistic for her making a meaningful recovery, albeit she will require inpatient physical therapy.     Ms. Benz was scheduled to transfer to [facility] on 10/9/2024, but she developed a small right groin hematoma on the evening of 10/8/2024.  She did have a \"vasovagal\" response to groin pressure, and was given a unit of PRBCs for fear of symptomatic bleeding.  She did not experience any significant drop in her H/H, and again this is deemed to have been a \"vasovagal\" event.  Duplex of her groin on 10/9/2024 demonstrates small hematoma, but no pseudoaneurysm.  Her right groin hematoma and H/H are stable.     She is tentatively scheduled to transfer to [facility] on 10/11/2024.  She is okay to continue to work with PT/OT.     She will need to remain on her Brilinta/aspirin dual antiplatelet regimen for the next month or so, at which point I will likely transition her to a Plavix/aspirin regimen.  We are in the process of tapering her steroids.     She will follow-up with me in the neurointerventional clinic in 1 month's time.     Copied text and portions of the note have been reviewed and are accurate as of 10/11/24.                   Electronically signed by Enoch Savage MD at 10/11/24 9905       Bryon Garner MD at 10/10/24 7636          Pulmonary/Critical Care Follow-up     LOS: 4 days   Patient Care " Team:  Melissa Lazo APRN as PCP - General (Nurse Practitioner)  Erika Whatley MD as Consulting Physician (Psychiatry)  Enoch Savage MD as Consulting Physician (Neurosurgery)        Chief Complaint   Patient presents with    Stroke     Subjective     History reviewed and updated in EMR as indicated.    Interval History:     Patient reports a bit of a headache today.  No fevers or chills.  She does have some nausea and poor appetite.  No other new complaints.      History taken from: Patient    PMH/FH/Social History were reviewed and updated appropriately in the electronic medical record.     Review of Systems:    Review of 14 systems was completed with positives and pertinent negatives noted in the subjective section.  All other systems reviewed and are negative.       Objective     Vital Signs  Temp:  [98.2 °F (36.8 °C)-99.1 °F (37.3 °C)] 99 °F (37.2 °C)  Heart Rate:  [56-71] 66  Resp:  [14-18] 14  BP: ()/(59-83) 128/82  10/09 0701 - 10/10 0700  In: 20 [I.V.:20]  Out: 1000 [Urine:1000]  Body mass index is 24.21 kg/m².     IV drips:        Physical Exam:     Constitutional:   Alert, in no acute distress   Head:   Normocephalic, atraumatic   Eyes:           Lids and lashes normal, conjunctivae and sclerae normal.  PER   ENMT:  Ears appear intact with no abnormalities noted     Lips normal.     Neck:  Trachea midline, no JVD   Lungs/Resp:    Normal effort, symmetric chest rise, no crepitus, clear to      auscultation bilaterally.               Heart/CV:   Regular rhythm and normal rate, no murmur   Abdomen/GI:    Soft, nontender, nondistended   :    Deferred   Extremities/MSK:  No clubbing or cyanosis.  No edema.     Pulses:  Pulses palpable and equal bilaterally   Skin:  No bleeding, bruising or rash   Heme/Lymph:  No cervical or supraclavicular adenopathy.   Neurologic:    Psychiatric:    Moves all extremities with no obvious focal motor deficit.  Cranial nerves 2 - 12 grossly intact  Non-agitated,  normal affect.    The above physical exam findings were reviewed and reflect my exam findings as of today's exam.   Electronically signed by:  Bryon Garner MD  10/10/24  15:43 EDT      Results Review:     I reviewed the patient's new clinical results.   Results from last 7 days   Lab Units 10/10/24  0349 10/08/24  0404 10/07/24  0555 10/06/24  1038   SODIUM mmol/L 141 142 143 141   POTASSIUM mmol/L 3.9 4.0 4.0 4.3   CHLORIDE mmol/L 109* 111* 112* 110*   CO2 mmol/L 24.0 20.0* 20.0* 19.0*   BUN mg/dL 24* 25* 21 22   CREATININE mg/dL 0.59 0.51* 0.43* 0.69   CALCIUM mg/dL 8.2* 8.7 8.7 8.6   BILIRUBIN mg/dL  --   --  0.3 0.3   ALK PHOS U/L  --   --  98 124*   ALT (SGPT) U/L  --   --  49* 65*   AST (SGOT) U/L  --   --  22 34*   GLUCOSE mg/dL 113* 149* 126* 82     Results from last 7 days   Lab Units 10/10/24  0349 10/09/24  0352 10/09/24  0021 10/08/24  2006 10/08/24  0404   WBC 10*3/mm3 13.14* 20.99*  --   --  18.18*   HEMOGLOBIN g/dL 12.6 13.5 13.2   < > 13.1   HEMATOCRIT % 37.4 40.1 39.4   < > 38.8   PLATELETS 10*3/mm3 189 204  --   --  248    < > = values in this interval not displayed.     Results from last 7 days   Lab Units 10/06/24  1549   PH, ARTERIAL pH units 7.373   PO2 ART mm Hg 195.0*   PCO2, ARTERIAL mm Hg 28.9*   HCO3 ART mmol/L 16.8*     Results from last 7 days   Lab Units 10/10/24  0349 10/07/24  0555 10/06/24  1038   MAGNESIUM mg/dL 2.1  --  2.4   PHOSPHORUS mg/dL 3.4 4.1  --        I reviewed the patient's new imaging including images and reports.    Duplex right lower extremity pseudoaneurysm evaluation 10/9/2024:    Interpretation Summary         Hematoma present in the right groin measuring 3.4 x 1 cm    No evidence of AV fistula or pseudoaneurysm.    Patent right femoral artery and vein    Monophasic flow of the right femoral artery.    MRI brain 10/7/2024:    Impression:  1.Small likely subacute infarct involving periventricular left occipital lobe. No hemorrhagic transformation.  2.Stable  prominent basilar artery aneurysm with mass effect on adjacent brainstem.  3.Moderate paranasal sinus mucosal disease with air-fluid level in left maxillary sinus. Correlate for acute sinusitis  4.Partial left mastoid effusion.    Medication Review:   Pharmacy Consult, , Does not apply, BID  aspirin, 81 mg, Oral, Daily   Or  aspirin, 300 mg, Rectal, Daily  atorvastatin, 80 mg, Oral, Nightly  [START ON 10/17/2024] dexAMETHasone, 1 mg, Oral, Q12H  [START ON 10/11/2024] dexAMETHasone, 2 mg, Oral, Q8H  [START ON 10/14/2024] dexAMETHasone, 2 mg, Oral, Q12H  dexAMETHasone, 4 mg, Oral, Q6H  famotidine, 20 mg, Oral, BID AC  lubiprostone, 8 mcg, Oral, BID With Meals  magnesium oxide, 400 mg, Oral, Nightly  nortriptyline, 20 mg, Oral, Nightly  oxybutynin XL, 5 mg, Oral, Daily  sertraline, 50 mg, Oral, Daily  ticagrelor, 90 mg, Oral, BID  topiramate, 100 mg, Oral, Daily             Assessment & Plan         Acute CVA    Cerebral aneurysm, nonruptured    Acute cystitis without hematuria    63 y.o. 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 Savage 2022 and 2023), who presented to the emergency department via private vehicle for evaluation of left-sided weakness and facial droop present on awakening on 10/6/2024.    She apparently had a migraine headache and took a home dose of Ubrelvy which helped her headache a bit.  She had difficulty ambulating on awakening with left side weakness.  She also was treated for a UTI with cefuroxime (day 3/7).    CT head showed periventricular hypodensities right frontal parietal junction and left parietal lobe felt to reflect possible acute or subacute infarcts.  Patient has recently been taken off of her Plavix on follow-up with Dr. Savage.  She was taking aspirin 81 mg daily.    Patient was taken to the Cath Lab for mechanical  thrombectomy by Dr. Mendez which was successful with placement of stent for in-stent stenosis.    Patient developed a right groin hematoma the evening of 10/8/2024.  No pseudoaneurysm by duplex the morning of 10/9/2024.    Patient is currently doing well.  Essentially stable today though she does have a headache which is common for her.  Awaiting telemetry.  [facility] per neurointerventional service.    Plan:  1.  For acute CVA: Secondary to thrombosis of basilar artery aneurysm stent status post thrombectomy and stent placement for in-stent stenosis.  Doing well.  Continue aspirin, statin, Brilinta.  Plan to follow-up in neurointerventional clinic in 1 month.  Blood pressure management for goal of normal blood pressure.  Stroke neurology following.  Continue PT/OT/speech therapy.    2.  For acute respiratory failure status postintubation: Successfully extubated.  Doing well on room air currently.  3.  For hypertension: Labetalol as needed.  4.  For hyperlipidemia: Continue statin.  5.  DVT prophylaxis: SCDs  6.  For right groin hematoma: No pseudoaneurysm.  Monitor.  7.  For UTI present on admission: Completed Rocephin/cefuroxime.  Outside urine culture was susceptible.    Inpatient rehab facility recommended with plan to transferred to [facility] tomorrow.    Okay to telemetry/hospitalist.    Electronically signed by:    Bryon Garner MD  10/10/24  15:43 EDT      *. Please note that portions of this note were completed with [employer] - a voice recognition program.     Electronically signed by Bryon Garner MD at 10/10/24 8706       Enoch Savage MD at 10/10/24 1150          NAME: MEDHAT PAL  : 1960  PCP: Melissa Lazo APRN  ADMITTING PHYSICIAN: Zafar Hager MD    DATE OF ADMISSION:  10/6/2024  DATE OF SERVICE: 10/10/2024    HOSPITAL DAY:  4 days    HISTORY OF PRESENT ILLNESS:  63 y.o. female who is well-known to  the neurointerventional service, having undergone prior flow diverter embolization for a giant proximal basilar artery aneurysm, with her most recent embolization procedure being in September 2023.  She was seen in the neurointerventional clinic on 9/23/2024, and her Plavix was stopped, but she remained on low-dose aspirin.     Ms. Romo presented to the emergency department on 10/6/2024 with acute thrombosis of her pipeline stent and basilar artery.  She underwent successful mechanical thrombectomy by my partner Dr. Mendez, restoring normal (TICI 3) flow.  This did unmask a stenosis within the mid portions of the Pipeline stent construct, and this was treated with a 2.5 mm coronary MICHELLE, restoring robust flow within the vertebrobasilar system.    REVIEW OF IMAGING:  No new imaging    LABS:  Lab Results   Component Value Date    WBC 13.14 (H) 10/10/2024    HGB 12.6 10/10/2024    HCT 37.4 10/10/2024    MCV 91.4 10/10/2024     10/10/2024     Lab Results   Component Value Date    GLUCOSE 113 (H) 10/10/2024    CALCIUM 8.2 (L) 10/10/2024     10/10/2024    K 3.9 10/10/2024    CO2 24.0 10/10/2024     (H) 10/10/2024    BUN 24 (H) 10/10/2024    CREATININE 0.59 10/10/2024    EGFRIFNONA 103 09/29/2019    BCR 40.7 (H) 10/10/2024    ANIONGAP 8.0 10/10/2024       CURRENT MEDS:  Current Facility-Administered Medications   Medication Dose Route Frequency Provider Last Rate Last Admin    acetaminophen (TYLENOL) tablet 650 mg  650 mg Oral Q6H PRN Yasemin Srivastava APRN   650 mg at 10/10/24 0746    aspirin chewable tablet 81 mg  81 mg Oral Daily Prebble, GABINO VásquezH   81 mg at 10/10/24 0746    Or    aspirin suppository 300 mg  300 mg Rectal Daily Prebble, Fahad, RPH        atorvastatin (LIPITOR) tablet 80 mg  80 mg Oral Nightly PrebbleFahad RPH   80 mg at 10/09/24 2058    sennosides-docusate (PERICOLACE) 8.6-50 MG per tablet 2 tablet  2 tablet Oral BID PRN Prebble, Fahad, RPH        And    polyethylene  glycol (MIRALAX) packet 17 g  17 g Oral Daily PRN Prebble, Fahad, RPH        And    bisacodyl (DULCOLAX) suppository 10 mg  10 mg Rectal Daily PRN Prebble, Fahad, RPH        Calcium Replacement - Follow Nurse / BPA Driven Protocol   Does not apply PRN Lety Matthews, APRISHAN        famotidine (PEPCID) tablet 20 mg  20 mg Oral BID AC Zafar Hager MD   20 mg at 10/10/24 0746    labetalol (NORMODYNE,TRANDATE) injection 10 mg  10 mg Intravenous Q4H PRN Bryon Garner MD        Magnesium Cardiology Dose Replacement - Follow Nurse / BPA Driven Protocol   Does not apply PRN Lety Matthews APRN        magnesium oxide (MAG-OX) tablet 400 mg  400 mg Oral Nightly Jennifer Ann APRN        niCARdipine (CARDENE) 25mg in 250mL NS infusion  5-15 mg/hr Intravenous Titrated Ashley Acharya APRN 100 mL/hr at 10/06/24 1320 10 mg/hr at 10/06/24 1320    nitroglycerin (NITROSTAT) SL tablet 0.4 mg  0.4 mg Sublingual Q5 Min PRN Ashley Acharya APRN        Phosphorus Replacement - Follow Nurse / BPA Driven Protocol   Does not apply PRN Lety Matthews APRN        Potassium Replacement - Follow Nurse / BPA Driven Protocol   Does not apply PRN Lety Matthews APRN        ticagrelor (BRILINTA) tablet 90 mg  90 mg Oral BID Prebble, Fahad, RPH   90 mg at 10/10/24 0747       PHYSICAL EXAM:  Vitals:    10/10/24 0900   BP: 120/78   Pulse: 64   Resp:    Temp:    SpO2: 98%      Alert and oriented x 3.  Resting comfortably in bedside chair.  Tolerating p.o. intake, albeit does not have much of an appetite.  Antigravity strength in the upper and lower extremities.  Speech is clear.  She endorses a moderate headache, similar to her baseline.    ASSESSMENT/PLAN:  Ms. Romo is a 63 y.o. 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  She presented on 10/6/2024 with acute thrombosis of her basilar artery and  "Pipeline flow diverter.  Ms. Romo underwent successful mechanical thrombectomy along with placement of a 2.5 mm coronary MICHELLE within the mid portions of the Pipeline stent construct (at side for symptomatic stenosis), restoring normal (TICI 3) flow to the intracranial vertebrobasilar system.     Given the severity of her presenting stroke symptoms, Ms. Romo has made an exceptional recovery to this point, and has only a few punctate areas of infarct on her MRI (not in the brainstem).  I am very optimistic for her making a meaningful recovery, albeit she will require inpatient physical therapy.     Ms. Benz was scheduled to transfer to [facility] on 10/9/2024, but she developed a small right groin hematoma on the evening of 10/8/2024.  She did have a \"vasovagal\" response to groin pressure, and was given a unit of PRBCs for fear of symptomatic bleeding.  She did not experience any significant drop in her H/H, and again this is deemed to have been a \"vasovagal\" event.  Duplex of her groin on 10/9/2024 demonstrates small hematoma, but no pseudoaneurysm.  Her right groin hematoma and H/H are stable.     She is tentatively scheduled to transfer to [facility] on 10/11/2024.  She is okay to continue to work with PT/OT.     She will need to remain on her Brilinta/aspirin dual antiplatelet regimen for the next month or so, at which point I will likely transition her to a Plavix/aspirin regimen.  We are in the process of tapering her steroids.    She will follow-up with me in the neurointerventional clinic in 1 month's time.     Copied text and portions of the note have been reviewed and are accurate as of 10/10/24.                      Electronically signed by Enoch Savage MD at 10/10/24 0399       Jennifer Ann APRN at 10/10/24 4151          Stroke Progress Note       Chief Complaint: headache      Subjective    Subjective     Subjective: Patient is sitting in recliner.  No acute events overnight.  She did " develop a hematoma in the right groin two days ago followed by a vasovagal response to groin compression.  Groin hematoma does appear to be improving. Per Dr. Savage patient discharged to [facility] will be postponed until 10/11. Patient complains of 7/10 headache with mild nausea today. Migraine medications have been held in hospital.     Review of Systems   Constitutional: Negative.    Eyes: Negative.  Negative for photophobia.   Respiratory: Negative.     Cardiovascular: Negative.    Gastrointestinal:  Positive for nausea. Negative for vomiting.   Genitourinary: Negative.    Musculoskeletal: Negative.    Skin: Negative.    Allergic/Immunologic: Negative.    Neurological:  Positive for headaches. Negative for dizziness, tremors, seizures, syncope, facial asymmetry, speech difficulty, weakness, light-headedness and numbness.   Psychiatric/Behavioral: Negative.            Objective      Temp:  [98 °F (36.7 °C)-99.1 °F (37.3 °C)] 98.2 °F (36.8 °C)  Heart Rate:  [57-71] 66  Resp:  [16-20] 18  BP: ()/(59-98) 112/69         Neurological Exam  Mental Status  Alert. Recent and remote memory are intact. Mild dysarthria present. Language is fluent with no aphasia. Attention and concentration are normal.    Cranial Nerves  CN II: Visual fields full to confrontation.  CN III, IV, VI: Extraocular movements intact bilaterally. Pupils equal round and reactive to light bilaterally.  CN V:  Right: Facial sensation is normal.  Left: Facial sensation is normal on the left.  CN VII:  Right: There is no facial weakness.  Left: There is no facial weakness.  CN VIII: Hearing appears intact.    Motor  Normal muscle bulk throughout. No abnormal involuntary movements.  All extremities 5/5.    Sensory  Light touch is normal in upper and lower extremities.     Coordination    No dysmetria.    Gait    Not observed..     Physical Exam  Vitals and nursing note reviewed.   Constitutional:       General: She is not in acute distress.      Appearance: She is not ill-appearing or toxic-appearing.   HENT:      Head: Normocephalic and atraumatic.   Eyes:      Extraocular Movements: Extraocular movements intact.      Pupils: Pupils are equal, round, and reactive to light.   Cardiovascular:      Rate and Rhythm: Normal rate and regular rhythm.   Pulmonary:      Effort: Pulmonary effort is normal. No respiratory distress.   Skin:     General: Skin is warm and dry.      Coloration: Skin is not pale.      Findings: Bruising (to bilateral wrists, and right groin hematoma) present.   Neurological:      Mental Status: She is alert.      Cranial Nerves: Dysarthria present. No cranial nerve deficit.      Sensory: No sensory deficit.      Motor: No weakness.   Psychiatric:         Mood and Affect: Mood normal.         Behavior: Behavior normal.        Interval: baseline (return from MRI)  1a. Level of Consciousness: 0-->Alert, keenly responsive  1b. LOC Questions: 0-->Answers both questions correctly  1c. LOC Commands: 0-->Performs both tasks correctly  2. Best Gaze: 0-->Normal  3. Visual: 0-->No visual loss  4. Facial Palsy: 0-->Normal symmetrical movements  5a. Motor Arm, Left: 0-->No drift, limb holds 90 (or 45) degrees for full 10 secs  5b. Motor Arm, Right: 0-->No drift, limb holds 90 (or 45) degrees for full 10 secs  6a. Motor Leg, Left: 0-->No drift, leg holds 30 degree position for full 5 secs  6b. Motor Leg, Right: 0-->No drift, leg holds 30 degree position for full 5 secs  7. Limb Ataxia: 0-->Absent  8. Sensory: 0-->Normal, no sensory loss  9. Best Language: 0-->No aphasia, normal  10. Dysarthria: 1-->Mild-to-moderate dysarthria, patient slurs at least some words and, at worst, can be understood with some difficulty  11. Extinction and Inattention (formerly Neglect): 0-->No abnormality    Total (NIH Stroke Scale): 1    Results Review:    I reviewed the patient's new clinical results.    Lab Results (last 24 hours)       Procedure Component Value Units  Date/Time    Basic Metabolic Panel [[id number]]  (Abnormal) Collected: 10/10/24 0349    Specimen: Blood Updated: 10/10/24 0427     Glucose 113 mg/dL      BUN 24 mg/dL      Creatinine 0.59 mg/dL      Sodium 141 mmol/L      Potassium 3.9 mmol/L      Chloride 109 mmol/L      CO2 24.0 mmol/L      Calcium 8.2 mg/dL      BUN/Creatinine Ratio 40.7     Anion Gap 8.0 mmol/L      eGFR 102.0 mL/min/1.73     Narrative:      GFR Normal >60  Chronic Kidney Disease <60  Kidney Failure <15      Magnesium [[id number]]  (Normal) Collected: 10/10/24 0349    Specimen: Blood Updated: 10/10/24 0427     Magnesium 2.1 mg/dL     Phosphorus [[id number]]  (Normal) Collected: 10/10/24 0349    Specimen: Blood Updated: 10/10/24 0427     Phosphorus 3.4 mg/dL     CBC (No Diff) [[id number]]  (Abnormal) Collected: 10/10/24 0349    Specimen: Blood Updated: 10/10/24 0406     WBC 13.14 10*3/mm3      RBC 4.09 10*6/mm3      Hemoglobin 12.6 g/dL      Hematocrit 37.4 %      MCV 91.4 fL      MCH 30.8 pg      MCHC 33.7 g/dL      RDW 14.9 %      RDW-SD 49.9 fl      MPV 10.9 fL      Platelets 189 10*3/mm3           Lab Results   Component Value Date    GLUCOSE 113 (H) 10/10/2024    BUN 24 (H) 10/10/2024    CREATININE 0.59 10/10/2024     10/10/2024    K 3.9 10/10/2024     (H) 10/10/2024    CALCIUM 8.2 (L) 10/10/2024    PROTEINTOT 6.1 10/07/2024    ALBUMIN 4.0 10/07/2024    ALT 49 (H) 10/07/2024    AST 22 10/07/2024    ALKPHOS 98 10/07/2024    BILITOT 0.3 10/07/2024    GLOB 2.1 10/07/2024    AGRATIO 1.9 10/07/2024    BCR 40.7 (H) 10/10/2024    ANIONGAP 8.0 10/10/2024    EGFR 102.0 10/10/2024         Results for orders placed during the hospital encounter of 10/06/24    Adult Transthoracic Echo Complete W/ Cont if Necessary Per Protocol    Interpretation Summary    Left ventricular systolic function is normal. Estimated left ventricular EF = 65%    Left ventricular diastolic function is consistent with (grade I) impaired relaxation.    The cardiac valves  are anatomically and functionally normal.    Estimated right ventricular systolic pressure from tricuspid regurgitation is normal (<35 mmHg).    Saline test results are negative.            Assessment/Plan     Assessment/Plan:This is a 63-year-old female with known medical diagnoses present hypertension, hyperlipidemia, migraines (follows with CAILIN Parra), history of stroke (right occipital and left cerebellar, 2023, residual dizziness and ataxic gait), and giant basilar aneurysm s/p pipeline embolization (Dr. Savgae, 2022 and 2023) who presents to the emergency department via private vehicle for further evaluation of left-sided weakness and facial droop which was present upon awakening this morning.  She is admitted for IV thrombolytic therapy given LK > 4.5-hours.  CTA head/neck reveals thrombosed pipeline stent.  Images were reviewed by Dr. Savage who is elected to take her to the Cath Lab for mechanical thrombectomy.  She was be admitted to the neurological ICU s/p procedure. She developed a hematoma in the right groin on 10/8/24, which is being monitored closely in the ICU.     # Acute basilar occlusion s/p MT (thrombosis of her pipeline stent and basilar artery.)  -Prior to the event, patient was transitioned to monotherapy with aspirin   -After the MT, transitioned to aspirin 81 mg daily and brillinta 90 mg BID for now   -Continue atorvastatin 80 mg nightly  -Magnesium oxide 400 mg nightly for migraine prevention  -Autoregulation of blood pressure, goals SBP <140   -Remain in ICU for monitoring of right groin hematoma. No pseudoaneurysm.  -Case management following for [facility] placement   -Plan for discharge to [facility] tomorrow  -SLP recommends regular texture, thin liquids  -Per Dr. Savage note, okay to continue to work with PT/OT      Stroke team will continue to follow the patient. Please call with any questions or concerns.        CAILIN Crespo  10/10/24  07:45 EDT       Electronically  signed by Jennifer Ann APRN at 10/10/24 1117       Bryon Garner MD at 10/09/24 1717          Pulmonary/Critical Care Follow-up     LOS: 3 days   Patient Care Team:  Melissa Lazo APRN as PCP - General (Nurse Practitioner)  Erika Whatley MD as Consulting Physician (Psychiatry)  Enoch Savage MD as Consulting Physician (Neurosurgery)        Chief Complaint   Patient presents with    Stroke     Subjective     History reviewed and updated in EMR as indicated.    Interval History:     Patient had event overnight where she developed a hematoma and had a syncopal or near syncopal event and received an empiric blood transfusion.  Ultimately this was felt by neuro interventionalists to be a vagal event.  Vascular duplex showed no pseudoaneurysm.  Patient is awake and alert.  No current complaint.  Still does not have much appetite.  Denies headache.  No fevers or chills.      History taken from: Patient    PMH/FH/Social History were reviewed and updated appropriately in the electronic medical record.     Review of Systems:    Review of 14 systems was completed with positives and pertinent negatives noted in the subjective section.  All other systems reviewed and are negative.       Objective     Vital Signs  Temp:  [98 °F (36.7 °C)-99.1 °F (37.3 °C)] 99.1 °F (37.3 °C)  Heart Rate:  [] 61  Resp:  [16-20] 18  BP: ()/() 119/67  10/08 0701 - 10/09 0700  In: 1160 [P.O.:800]  Out: 1650 [Urine:1650]  Body mass index is 24.21 kg/m².     IV drips:  niCARdipine, Last Rate: 10 mg/hr (10/06/24 1320)       Physical Exam:     Constitutional:   Alert, in no acute distress   Head:   Normocephalic, atraumatic   Eyes:           Lids and lashes normal, conjunctivae and sclerae normal.  PER   ENMT:  Ears appear intact with no abnormalities noted     Lips normal.     Neck:  Trachea midline, no JVD   Lungs/Resp:    Normal effort, symmetric chest rise, no crepitus, clear to      auscultation bilaterally.                Heart/CV:   Regular rhythm and normal rate, no murmur   Abdomen/GI:    Soft, nontender, nondistended   :    Deferred   Extremities/MSK:  No clubbing or cyanosis.  No edema.     Pulses:  Pulses palpable and equal bilaterally   Skin:  No bleeding, bruising or rash   Heme/Lymph:  No cervical or supraclavicular adenopathy.   Neurologic:    Psychiatric:    Moves all extremities with no obvious focal motor deficit.  Cranial nerves 2 - 12 grossly intact  Non-agitated, normal affect.    The above physical exam findings were reviewed and reflect my exam findings as of today's exam.   Electronically signed by:  Bryon Garner MD  10/09/24  17:17 EDT      Results Review:     I reviewed the patient's new clinical results.   Results from last 7 days   Lab Units 10/08/24  0404 10/07/24  0555 10/06/24  1038   SODIUM mmol/L 142 143 141   POTASSIUM mmol/L 4.0 4.0 4.3   CHLORIDE mmol/L 111* 112* 110*   CO2 mmol/L 20.0* 20.0* 19.0*   BUN mg/dL 25* 21 22   CREATININE mg/dL 0.51* 0.43* 0.69   CALCIUM mg/dL 8.7 8.7 8.6   BILIRUBIN mg/dL  --  0.3 0.3   ALK PHOS U/L  --  98 124*   ALT (SGPT) U/L  --  49* 65*   AST (SGOT) U/L  --  22 34*   GLUCOSE mg/dL 149* 126* 82     Results from last 7 days   Lab Units 10/09/24  0352 10/09/24  0021 10/08/24  2006 10/08/24  0404 10/07/24  0555   WBC 10*3/mm3 20.99*  --   --  18.18* 14.67*   HEMOGLOBIN g/dL 13.5 13.2 12.4 13.1 13.8   HEMATOCRIT % 40.1 39.4 39.0 38.8 41.7   PLATELETS 10*3/mm3 204  --   --  248 252     Results from last 7 days   Lab Units 10/06/24  1549   PH, ARTERIAL pH units 7.373   PO2 ART mm Hg 195.0*   PCO2, ARTERIAL mm Hg 28.9*   HCO3 ART mmol/L 16.8*     Results from last 7 days   Lab Units 10/07/24  0555 10/06/24  1038   MAGNESIUM mg/dL  --  2.4   PHOSPHORUS mg/dL 4.1  --        I reviewed the patient's new imaging including images and reports.    Duplex right lower extremity pseudoaneurysm evaluation 10/9/2024:    Interpretation Summary         Hematoma present in the  right groin measuring 3.4 x 1 cm    No evidence of AV fistula or pseudoaneurysm.    Patent right femoral artery and vein    Monophasic flow of the right femoral artery.    MRI brain 10/7/2024:    Impression:  1.Small likely subacute infarct involving periventricular left occipital lobe. No hemorrhagic transformation.  2.Stable prominent basilar artery aneurysm with mass effect on adjacent brainstem.  3.Moderate paranasal sinus mucosal disease with air-fluid level in left maxillary sinus. Correlate for acute sinusitis  4.Partial left mastoid effusion.    Medication Review:   aspirin, 81 mg, Oral, Daily   Or  aspirin, 300 mg, Rectal, Daily  atorvastatin, 80 mg, Oral, Nightly  famotidine, 20 mg, Oral, BID AC  sodium chloride, 10 mL, Intravenous, Q12H  ticagrelor, 90 mg, Oral, BID      niCARdipine, 5-15 mg/hr, Last Rate: 10 mg/hr (10/06/24 1320)        Assessment & Plan         Acute CVA    Cerebral aneurysm, nonruptured    Acute cystitis without hematuria    63 y.o. 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 Savage 2022 and 2023), who presented to the emergency department via private vehicle for evaluation of left-sided weakness and facial droop present on awakening on 10/6/2024.    She apparently had a migraine headache and took a home dose of Ubrelvy which helped her headache a bit.  She had difficulty ambulating on awakening with left side weakness.  She also was treated for a UTI with cefuroxime (day 3/7).    CT head showed periventricular hypodensities right frontal parietal junction and left parietal lobe felt to reflect possible acute or subacute infarcts.  Patient has recently been taken off of her Plavix on follow-up with Dr. Savage.  She was taking aspirin 81 mg daily.    Patient was taken to the Cath Lab for mechanical thrombectomy by Dr. Mendez  which was successful with placement of stent for in-stent stenosis.    Patient developed a right groin hematoma the evening of 10/8/2024.  No pseudoaneurysm by duplex the morning of 10/9/2024.    Patient is currently doing well.  Awaiting telemetry.  Palliative rehabilitation in 2 days per neurointerventional service.    Plan:  1.  For acute CVA: Secondary to thrombosis of basilar artery aneurysm stent status post thrombectomy and stent placement for in-stent stenosis.  Doing well.  Continue aspirin, statin, Brilinta.  Plan to follow-up in neurointerventional clinic in 1 month.  Blood pressure management for goal of normal blood pressure.  Stroke neurology following.  Continue PT/OT/speech therapy.    2.  For acute respiratory failure status postintubation: Successfully extubated.  Doing well on room air currently.  3.  For hypertension: Labetalol as needed.  4.  For hyperlipidemia: Continue statin.  5.  DVT prophylaxis: SCDs  6.  For right groin hematoma: No pseudoaneurysm.  Monitor.  7.  For UTI present on admission: Completed Rocephin/cefuroxime.  Outside urine culture was susceptible.    Inpatient rehab facility recommended.    Okay to telemetry/hospitalist.    Electronically signed by:    Bryon Garner MD  10/09/24  17:17 EDT      *. Please note that portions of this note were completed with [employer] - a voice recognition program.     Electronically signed by Bryon Garner MD at 10/09/24 3698       Jennifer Ann APRN at 10/09/24 1004       Attestation signed by Shay Purvis MD at 10/10/24 0824    I have reviewed this documentation and agree.  Except for few changes below.    Patient suffered groin hematoma, and need to be watched in ICU for 1-2 days.                  Stroke Progress Note       Chief Complaint: headache      Subjective    Subjective     Subjective: Patient is sitting in recliner.  No acute events overnight.  She did develop a hematoma in the right groin  yesterday followed by a vasovagal response to groin compression.  Per Dr. Savage patient discharged to [facility] will be postponed a day or two to monitor.  Patient denies pain and states she was feeling much better today.    Review of Systems   Constitutional: Negative.    HENT: Negative.     Eyes: Negative.    Respiratory: Negative.     Cardiovascular: Negative.    Gastrointestinal: Negative.    Genitourinary: Negative.    Musculoskeletal: Negative.    Skin: Negative.    Allergic/Immunologic: Negative.    Neurological: Negative.    Psychiatric/Behavioral: Negative.            Objective      Temp:  [98.1 °F (36.7 °C)-98.7 °F (37.1 °C)] 98.1 °F (36.7 °C)  Heart Rate:  [] 63  Resp:  [16-18] 16  BP: ()/() 136/85         Neurological Exam  Mental Status  Alert. Recent and remote memory are intact. Mild dysarthria present. Language is fluent with no aphasia. Attention and concentration are normal.    Cranial Nerves  CN II: Visual fields full to confrontation.  CN III, IV, VI: Extraocular movements intact bilaterally. Pupils equal round and reactive to light bilaterally.  CN V:  Right: Facial sensation is normal.  Left: Facial sensation is normal on the left.  CN VII:  Right: There is no facial weakness.  Left: There is no facial weakness.  CN VIII: Hearing appears intact.    Motor  Normal muscle bulk throughout. No abnormal involuntary movements.  All extremities 5/5.    Sensory  Light touch is normal in upper and lower extremities.     Coordination    No dysmetria.    Gait    Not observed..     Physical Exam  Vitals and nursing note reviewed.   Constitutional:       General: She is not in acute distress.     Appearance: She is not ill-appearing or toxic-appearing.   HENT:      Head: Normocephalic and atraumatic.   Eyes:      Extraocular Movements: Extraocular movements intact.      Pupils: Pupils are equal, round, and reactive to light.   Cardiovascular:      Rate and Rhythm: Normal rate and  regular rhythm.   Pulmonary:      Effort: Pulmonary effort is normal. No respiratory distress.   Skin:     General: Skin is warm and dry.      Coloration: Skin is not pale.   Neurological:      Mental Status: She is alert.      Cranial Nerves: Cranial nerve deficit and dysarthria present.      Sensory: No sensory deficit.      Motor: No weakness.   Psychiatric:         Mood and Affect: Mood normal.         Behavior: Behavior normal.        Interval: baseline (return from MRI)  1a. Level of Consciousness: 0-->Alert, keenly responsive  1b. LOC Questions: 0-->Answers both questions correctly  1c. LOC Commands: 0-->Performs both tasks correctly  2. Best Gaze: 0-->Normal  3. Visual: 0-->No visual loss  4. Facial Palsy: 0-->Normal symmetrical movements  5a. Motor Arm, Left: 0-->No drift, limb holds 90 (or 45) degrees for full 10 secs  5b. Motor Arm, Right: 0-->No drift, limb holds 90 (or 45) degrees for full 10 secs  6a. Motor Leg, Left: 0-->No drift, leg holds 30 degree position for full 5 secs  6b. Motor Leg, Right: 0-->No drift, leg holds 30 degree position for full 5 secs  7. Limb Ataxia: 0-->Absent  8. Sensory: 0-->Normal, no sensory loss  9. Best Language: 0-->No aphasia, normal  10. Dysarthria: 1-->Mild-to-moderate dysarthria, patient slurs at least some words and, at worst, can be understood with some difficulty  11. Extinction and Inattention (formerly Neglect): 0-->No abnormality    Total (NIH Stroke Scale): 1    Results Review:    I reviewed the patient's new clinical results.    Lab Results (last 24 hours)       Procedure Component Value Units Date/Time    CBC (No Diff) [[id number]]  (Abnormal) Collected: 10/09/24 0352    Specimen: Blood Updated: 10/09/24 0417     WBC 20.99 10*3/mm3      RBC 4.38 10*6/mm3      Hemoglobin 13.5 g/dL      Hematocrit 40.1 %      MCV 91.6 fL      MCH 30.8 pg      MCHC 33.7 g/dL      RDW 15.1 %      RDW-SD 50.9 fl      MPV 10.6 fL      Platelets 204 10*3/mm3     Hemoglobin &  Hematocrit, Blood [[id number]]  (Normal) Collected: 10/09/24 0021    Specimen: Blood Updated: 10/09/24 0035     Hemoglobin 13.2 g/dL      Hematocrit 39.4 %     Protime-INR [[id number]]  (Normal) Collected: 10/08/24 2101    Specimen: Blood Updated: 10/08/24 2127     Protime 13.0 Seconds      INR 0.97    Hemoglobin & Hematocrit, Blood [[id number]]  (Normal) Collected: 10/08/24 2006    Specimen: Blood Updated: 10/08/24 2017     Hemoglobin 12.4 g/dL      Hematocrit 39.0 %           Lab Results   Component Value Date    GLUCOSE 149 (H) 10/08/2024    BUN 25 (H) 10/08/2024    CREATININE 0.51 (L) 10/08/2024     10/08/2024    K 4.0 10/08/2024     (H) 10/08/2024    CALCIUM 8.7 10/08/2024    PROTEINTOT 6.1 10/07/2024    ALBUMIN 4.0 10/07/2024    ALT 49 (H) 10/07/2024    AST 22 10/07/2024    ALKPHOS 98 10/07/2024    BILITOT 0.3 10/07/2024    GLOB 2.1 10/07/2024    AGRATIO 1.9 10/07/2024    BCR 49.0 (H) 10/08/2024    ANIONGAP 11.0 10/08/2024    EGFR 105.7 10/08/2024       MRI Brain Without Contrast    Result Date: 10/7/2024  Impression: 1.Small likely subacute infarct involving periventricular left occipital lobe. No hemorrhagic transformation. 2.Stable prominent basilar artery aneurysm with mass effect on adjacent brainstem. 3.Moderate paranasal sinus mucosal disease with air-fluid level in left maxillary sinus. Correlate for acute sinusitis 4.Partial left mastoid effusion. Electronically Signed: Chris Acuña MD  10/7/2024 12:30 PM EDT  Workstation ID: [id number]   Results for orders placed during the hospital encounter of 10/06/24    Adult Transthoracic Echo Complete W/ Cont if Necessary Per Protocol    Interpretation Summary    Left ventricular systolic function is normal. Estimated left ventricular EF = 65%    Left ventricular diastolic function is consistent with (grade I) impaired relaxation.    The cardiac valves are anatomically and functionally normal.    Estimated right ventricular systolic pressure from  tricuspid regurgitation is normal (<35 mmHg).    Saline test results are negative.            Assessment/Plan     Assessment/Plan:This is a 63-year-old female with known medical diagnoses present hypertension, hyperlipidemia, migraines (follows with CAILIN Parra), history of stroke (right occipital and left cerebellar, , residual dizziness and ataxic gait), and giant basilar aneurysm s/p pipeline embolization (Dr. Savage,  and ) who presents to the emergency department via private vehicle for further evaluation of left-sided weakness and facial droop which was present upon awakening this morning.  She is admitted for IV thrombolytic therapy given LK > 4.5-hours.  CTA head/neck reveals thrombosed pipeline stent.  Images were reviewed by Dr. Savage who is elected to take her to the Cath Lab for mechanical thrombectomy.  She was be admitted to the neurological ICU s/p procedure. She developed a hematoma in the right groin on 10/8/24, which is being monitored closely in the ICU.     # Acute basilar occlusion s/p MT (  thrombosis of her pipeline stent and basilar artery. )  -prior to the event, patient was transitioned to monotherapy with aspirin   -After the MT, transitioned to aspirin and brillinta for now   -Autoregulation of blood pressure, goals SBP <140   -Okay for the floor/ rehab  -Case management following for [facility] placement   -SLP recommends regular texture, thin liquids    Stroke team will continue to follow the patient.         CAILIN Crespo  10/09/24  10:04 EDT       Electronically signed by Shay Purvis MD at 10/10/24 0840       Enoch Savage MD at 10/09/24 0910          NAME: MEDHAT PAL  : 1960  PCP: Melissa Lazo APRN  ADMITTING PHYSICIAN: Zafar Hager MD    DATE OF ADMISSION:  10/6/2024  DATE OF SERVICE: 10/9/2024    HOSPITAL DAY:  3 days    HISTORY OF PRESENT ILLNESS:  63 y.o. female who is well-known to the neurointerventional service,  having undergone prior flow diverter embolization for a giant proximal basilar artery aneurysm, with her most recent embolization procedure being in September 2023.  She was seen in the neurointerventional clinic on 9/23/2024, and her Plavix was stopped, but she remained on low-dose aspirin.     Ms. Romo presented to the emergency department on 10/6/2024 with acute thrombosis of her pipeline stent and basilar artery.  She underwent successful mechanical thrombectomy by my partner Dr. Mendez, restoring normal (TICI 3) flow.  This did unmask a stenosis within the mid portions of the Pipeline stent construct, and this was treated with a 2.5 mm coronary MICHELLE, restoring robust flow within the vertebrobasilar system.    REVIEW OF IMAGING:  Duplex of right groin on the morning of 10/9/2024 demonstrates a small superficial hematoma, but no pseudoaneurysm.    LABS:  Lab Results   Component Value Date    WBC 20.99 (H) 10/09/2024    HGB 13.5 10/09/2024    HCT 40.1 10/09/2024    MCV 91.6 10/09/2024     10/09/2024     Lab Results   Component Value Date    GLUCOSE 149 (H) 10/08/2024    CALCIUM 8.7 10/08/2024     10/08/2024    K 4.0 10/08/2024    CO2 20.0 (L) 10/08/2024     (H) 10/08/2024    BUN 25 (H) 10/08/2024    CREATININE 0.51 (L) 10/08/2024    EGFRIFNONA 103 09/29/2019    BCR 49.0 (H) 10/08/2024    ANIONGAP 11.0 10/08/2024     Lab Results   Component Value Date    HGBA1C 5.60 10/07/2024     Lab Results   Component Value Date    CHOL 132 10/07/2024    TRIG 62 10/07/2024    HDL 61 (H) 10/07/2024    LDL 58 10/07/2024       CURRENT MEDS:  Current Facility-Administered Medications   Medication Dose Route Frequency Provider Last Rate Last Admin    acetaminophen (TYLENOL) tablet 650 mg  650 mg Oral Q6H PRN Yasemin Srivastava APRN   650 mg at 10/08/24 0820    aspirin chewable tablet 81 mg  81 mg Oral Daily Fahad Shankar RPH        Or    aspirin suppository 300 mg  300 mg Rectal Daily Vonnie, Fahad, RP         atorvastatin (LIPITOR) tablet 80 mg  80 mg Oral Nightly Prebble, Fahad, RPH   80 mg at 10/08/24 2129    sennosides-docusate (PERICOLACE) 8.6-50 MG per tablet 2 tablet  2 tablet Oral BID PRN Prebble, Fahad, RPH        And    polyethylene glycol (MIRALAX) packet 17 g  17 g Oral Daily PRN Prebble, Fahad, RPH        And    bisacodyl (DULCOLAX) suppository 10 mg  10 mg Rectal Daily PRN Prebble, Fahad, RPH        Calcium Replacement - Follow Nurse / BPA Driven Protocol   Does not apply PRN Lety Matthews APRISHAN        famotidine (PEPCID) tablet 20 mg  20 mg Oral BID AC Zafar Hager MD   20 mg at 10/08/24 1833    labetalol (NORMODYNE,TRANDATE) injection 10 mg  10 mg Intravenous Q4H PRN Bryon Garner MD        Magnesium Cardiology Dose Replacement - Follow Nurse / BPA Driven Protocol   Does not apply PRN Lety Matthews APRN        niCARdipine (CARDENE) 25mg in 250mL NS infusion  5-15 mg/hr Intravenous Titrated Ashley Acharya APRN 100 mL/hr at 10/06/24 1320 10 mg/hr at 10/06/24 1320    nitroglycerin (NITROSTAT) SL tablet 0.4 mg  0.4 mg Sublingual Q5 Min PRN Ashley Acharya APRN        Phosphorus Replacement - Follow Nurse / BPA Driven Protocol   Does not apply PRN Leyt Matthews APRN        Potassium Replacement - Follow Nurse / BPA Driven Protocol   Does not apply PRN Lety Matthews APRN        sodium chloride 0.9 % flush 10 mL  10 mL Intravenous Q12H Leyt Matthews APRN   10 mL at 10/08/24 2130    sodium chloride 0.9 % flush 10 mL  10 mL Intravenous PRN Lety Matthews APRISHAN        ticagrelor (BRILINTA) tablet 90 mg  90 mg Oral BID Prebble, Fahad, RPH   90 mg at 10/08/24 2129       PHYSICAL EXAM:  Vitals:    10/09/24 0700   BP: 136/85   Pulse: 63   Resp:    Temp:    SpO2: 99%      Alert and oriented x 3.  Speech is clear.  She is resting comfortably in bed.    Right groin has a small-moderate superficial, soft hematoma.  Mild tenderness to  "touch.    ASSESSMENT/PLAN:  Ms. Romo is a 63 y.o. 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  She presented on 10/6/2024 with acute thrombosis of her basilar artery and Pipeline flow diverter.  Ms. Romo underwent successful mechanical thrombectomy along with placement of a 2.5 mm coronary MICHELLE within the mid portions of the Pipeline stent construct (at side for symptomatic stenosis), restoring normal (TICI 3) flow to the intracranial vertebrobasilar system.     Given the severity of her presenting stroke symptoms, Ms. Romo has made an exceptional recovery to this point, and has only a few punctate areas of infarct on her MRI (not in the brainstem).  I am very optimistic for her making a meaningful recovery, albeit she will require inpatient physical therapy.    Ms. Benz was scheduled to transfer to [facility] on 10/9/2024, but she developed a small right groin hematoma on the evening of 10/8/2024.  She did have a \"vasovagal\" response to groin pressure, and was given a unit of PRBCs for fear of symptomatic bleeding.  She did not experience a significant drop in her H/H, and again this is deemed to have been a \"vasovagal\" event.  Duplex of her groin on 10/9/2024 demonstrates small hematoma, but no pseudoaneurysm.    We will hold off on transferring to [facility] for another day or so, just to ensure that there are no issues with her groin or repeated vasovagal episodes.  She is okay to continue to work with PT/OT.    She will need to remain on her Brilinta/aspirin dual antiplatelet regimen for the next month or so, at which point I will likely transition her to a Plavix/aspirin regimen.  We will begin to taper her steroids today.     She will follow-up with me in the neurointerventional clinic in 1 month's time.    Copied text and portions of the note have been reviewed and are accurate as of " 10/9/24.                   Electronically signed by Enoch Savage MD at 10/09/24 0919       Enoch Savage MD at 10/08/24 2122          Ms. Romo developed a right groin hematoma, and become hypotensive and bradycardic with groin compression.  She responded to atropine, and it appears that she had a vasovagal response to groin compression.  Her H/H was 12.4/39.0, but she was given a transfusion of PRBC's for fear of symptomatic bleeding from her groin.    She currently is hemodynamically stable and back to her neuro baseline, and is tolerating groin compression with a fem-stop device.    We will keep her in bed tonight, check an H/H 1-2 hrs after transfusion.  If her vitals and labs are stable, we will get an U/S in the am to assess for pseudoaneurysm.  However, if she has a significant drop in H/H, pain becomes severe, or she becomes hemodynamically unstable ---> then call me (Dr. Savage) and get a STAT CTA abdomen/pelvis, with anticipation of going to cath lab for emergent intervention. Also, if there are concerns, please call me, and I will come in to further assess the patient.    She does have a recently placed coronary MICHELLE stent in her basilar artery, and thus she must continue her Brilinta/ASA dual antiplatelet regimen.        Electronically signed by Enoch Savage MD at 10/08/24 2138       Bryon Garner MD at 10/08/24 1417          Pulmonary/Critical Care Follow-up     LOS: 2 days   Patient Care Team:  Melissa Lazo APRN as PCP - General (Nurse Practitioner)  Erika Whatley MD as Consulting Physician (Psychiatry)        Chief Complaint   Patient presents with    Stroke     Subjective     History reviewed and updated in EMR as indicated.    Interval History:     Patient is awake and alert.  No current complaint.  Does not have much appetite.  Denies headache.  No fevers or chills.      History taken from: Patient    PMH/FH/Social History were reviewed and updated appropriately in the  electronic medical record.     Review of Systems:    Review of 14 systems was completed with positives and pertinent negatives noted in the subjective section.  All other systems reviewed and are negative.       Objective     Vital Signs  Temp:  [97.5 °F (36.4 °C)-99.4 °F (37.4 °C)] 98.4 °F (36.9 °C)  Heart Rate:  [53-72] 70  Resp:  [14-16] 16  BP: (106-142)/(63-81) 131/79  10/07 0701 - 10/08 0700  In: 260 [P.O.:200]  Out: 1000 [Urine:1000]  Body mass index is 24.21 kg/m².     IV drips:  niCARdipine, Last Rate: 10 mg/hr (10/06/24 1320)       Physical Exam:     Constitutional:   Alert, in no acute distress   Head:   Normocephalic, atraumatic   Eyes:           Lids and lashes normal, conjunctivae and sclerae normal.  PER   ENMT:  Ears appear intact with no abnormalities noted     Lips normal.     Neck:  Trachea midline, no JVD   Lungs/Resp:    Normal effort, symmetric chest rise, no crepitus, clear to      auscultation bilaterally.               Heart/CV:   Regular rhythm and normal rate, no murmur   Abdomen/GI:    Soft, nontender, nondistended   :    Deferred   Extremities/MSK:  No clubbing or cyanosis.  No edema.     Pulses:  Pulses palpable and equal bilaterally   Skin:  No bleeding, bruising or rash   Heme/Lymph:  No cervical or supraclavicular adenopathy.   Neurologic:    Psychiatric:    Moves all extremities with no obvious focal motor deficit.  Cranial nerves 2 - 12 grossly intact  Non-agitated, normal affect.    The above physical exam findings were reviewed and reflect my exam findings as of today's exam.   Electronically signed by:  Bryon Garner MD  10/08/24  14:17 EDT      Results Review:     I reviewed the patient's new clinical results.   Results from last 7 days   Lab Units 10/08/24  0404 10/07/24  0555 10/06/24  1038   SODIUM mmol/L 142 143 141   POTASSIUM mmol/L 4.0 4.0 4.3   CHLORIDE mmol/L 111* 112* 110*   CO2 mmol/L 20.0* 20.0* 19.0*   BUN mg/dL 25* 21 22   CREATININE mg/dL 0.51* 0.43* 0.69    CALCIUM mg/dL 8.7 8.7 8.6   BILIRUBIN mg/dL  --  0.3 0.3   ALK PHOS U/L  --  98 124*   ALT (SGPT) U/L  --  49* 65*   AST (SGOT) U/L  --  22 34*   GLUCOSE mg/dL 149* 126* 82     Results from last 7 days   Lab Units 10/08/24  0404 10/07/24  0555 10/06/24  0959   WBC 10*3/mm3 18.18* 14.67* 7.19   HEMOGLOBIN g/dL 13.1 13.8 14.6   HEMOGLOBIN, POC g/dL  --   --  14.6   HEMATOCRIT % 38.8 41.7 46.0   HEMATOCRIT POC %  --   --  43   PLATELETS 10*3/mm3 248 252 242     Results from last 7 days   Lab Units 10/06/24  1549   PH, ARTERIAL pH units 7.373   PO2 ART mm Hg 195.0*   PCO2, ARTERIAL mm Hg 28.9*   HCO3 ART mmol/L 16.8*     Results from last 7 days   Lab Units 10/07/24  0555 10/06/24  1038   MAGNESIUM mg/dL  --  2.4   PHOSPHORUS mg/dL 4.1  --        I reviewed the patient's new imaging including images and reports.    MRI brain 10/7/2024:    Impression:  1.Small likely subacute infarct involving periventricular left occipital lobe. No hemorrhagic transformation.  2.Stable prominent basilar artery aneurysm with mass effect on adjacent brainstem.  3.Moderate paranasal sinus mucosal disease with air-fluid level in left maxillary sinus. Correlate for acute sinusitis  4.Partial left mastoid effusion.    Medication Review:   [START ON 10/9/2024] aspirin, 81 mg, Oral, Daily   Or  [START ON 10/9/2024] aspirin, 300 mg, Rectal, Daily  atorvastatin, 80 mg, Oral, Nightly  cefuroxime, 250 mg, Oral, Q12H  dexAMETHasone, 4 mg, Oral, Q6H  famotidine, 20 mg, Oral, BID AC  sodium chloride, 10 mL, Intravenous, Q12H  ticagrelor, 90 mg, Oral, BID      niCARdipine, 5-15 mg/hr, Last Rate: 10 mg/hr (10/06/24 1320)        Assessment & Plan         Acute CVA    Cerebral aneurysm, nonruptured    Acute cystitis without hematuria    63 y.o. former smoker with history of HTN, HLD, migraines, prior stroke (right occipital and left cerebellar 2023 with residual dizziness and ataxic gait), giant basilar aneurysm status post pipeline embolization (  Janette 2022 and 2023), who presented to the emergency department via private vehicle for evaluation of left-sided weakness and facial droop present on awakening on 10/6/2024.    She apparently had a migraine headache and took a home dose of Ubrelvy which helped her headache a bit.  She had difficulty ambulating on awakening with left side weakness.  She also was treated for a UTI with cefuroxime (day 3/7).    CT head showed periventricular hypodensities right frontal parietal junction and left parietal lobe felt to reflect possible acute or subacute infarcts.  Patient has recently been taken off of her Plavix on follow-up with Dr. Savage.  She was taking aspirin 81 mg daily.    Patient was taken to the Cath Lab for mechanical thrombectomy by Dr. Mendez which was successful with placement of stent for in-stent stenosis.    Patient is currently doing well.  Awaiting telemetry    Plan:  1.  For acute CVA: Secondary to thrombosis of basilar artery aneurysm stent status post thrombectomy and stent placement for in-stent stenosis.  Doing well.  Continue aspirin, statin, Brilinta.  Plan to follow-up in neurointerventional clinic in 1 month.  Blood pressure management for goal of normal blood pressure.  Stroke neurology following.  Continue PT/OT/speech therapy.  I will go ahead and discontinue Decadron at this point and I have asked the neurology service about this.  2.  For acute respiratory failure status postintubation: Successfully extubated.  Doing well on room air currently.  3.  For hypertension: Labetalol as needed.  4.  For hyperlipidemia: Continue statin.  5.  DVT prophylaxis: SCDs  6.  For UTI present on admission: Continue Rocephin.  Try to get outside urine cultures.    Inpatient rehab facility recommended.    Okay to telemetry/hospitalist.    Electronically signed by:    Bryon Garner MD  10/08/24  14:17 EDT      *. Please note that portions of this note were completed with [employer] a [employer]  recognition program.     Electronically signed by Bryon Garner MD at 10/08/24 1656       Enoch Savage MD at 10/08/24 1047          NAME: MEDHAT PAL  : 1960  PCP: Melissa Lazo APRN  ADMITTING PHYSICIAN: Zafar Hager MD    DATE OF ADMISSION:  10/6/2024  DATE OF SERVICE: 10/8/2024    HOSPITAL DAY:  2 days    HISTORY OF PRESENT ILLNESS:  63 y.o. 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  She was seen in the neurointerventional clinic on 2024, and her Plavix was stopped, but she remained on low-dose aspirin.    Ms. Pal presented to the emergency department on 10/6/2024 with acute thrombosis of her pipeline stent and basilar artery.  She underwent successful mechanical thrombectomy by my partner Dr. Mendez, restoring normal (TICI 3) flow.  This did unmask a stenosis within the mid portions of the Pipeline stent construct, and this was treated with a 2.5 mm coronary MICHELLE, restoring robust flow within the vertebrobasilar system.    REVIEW OF IMAGING:  MRI on 10/7/2024 demonstrates punctate area of infarct within the right cerebellum and the left occipital lobe, but there are no appreciable brainstem infarcts.  Mass effect on the adjacent brainstem from the giant basilar aneurysm is unchanged.  There is stable ventriculomegaly.    LABS:  Lab Results   Component Value Date    WBC 18.18 (H) 10/08/2024    HGB 13.1 10/08/2024    HCT 38.8 10/08/2024    MCV 94.4 10/08/2024     10/08/2024     Lab Results   Component Value Date    GLUCOSE 149 (H) 10/08/2024    CALCIUM 8.7 10/08/2024     10/08/2024    K 4.0 10/08/2024    CO2 20.0 (L) 10/08/2024     (H) 10/08/2024    BUN 25 (H) 10/08/2024    CREATININE 0.51 (L) 10/08/2024    EGFRIFNONA 103 2019    BCR 49.0 (H) 10/08/2024    ANIONGAP 11.0  10/08/2024       CURRENT MEDS:  Current Facility-Administered Medications   Medication Dose Route Frequency Provider Last Rate Last Admin    acetaminophen (TYLENOL) tablet 650 mg  650 mg Oral Q6H PRN Yasemin Srivastava APRN   650 mg at 10/08/24 0820    [START ON 10/9/2024] aspirin chewable tablet 81 mg  81 mg Oral Daily Prebble, Fahad, RPH        Or    [START ON 10/9/2024] aspirin suppository 300 mg  300 mg Rectal Daily Prebble, Fahad, RPH        atorvastatin (LIPITOR) tablet 80 mg  80 mg Oral Nightly Prebble, Fahad, RPH        sennosides-docusate (PERICOLACE) 8.6-50 MG per tablet 2 tablet  2 tablet Oral BID PRN Prebble, Fahad, RPH        And    polyethylene glycol (MIRALAX) packet 17 g  17 g Oral Daily PRN Prebble, Fahad, RPH        And    bisacodyl (DULCOLAX) suppository 10 mg  10 mg Rectal Daily PRN Prebble, Fahad, RPH        Calcium Replacement - Follow Nurse / BPA Driven Protocol   Does not apply PRN Lety Matthews APRN        cefuroxime (CEFTIN) tablet 250 mg  250 mg Oral Q12H Bryon Garner MD        dexAMETHasone (DECADRON) tablet 4 mg  4 mg Oral Q6H Bryon Garner MD        famotidine (PEPCID) tablet 20 mg  20 mg Oral BID AC Zafar Hager MD   20 mg at 10/08/24 0820    Magnesium Cardiology Dose Replacement - Follow Nurse / BPA Driven Protocol   Does not apply PRN Lety Matthews APRN        niCARdipine (CARDENE) 25mg in 250mL NS infusion  5-15 mg/hr Intravenous Titrated Ashley Acharya APRN 100 mL/hr at 10/06/24 1320 10 mg/hr at 10/06/24 1320    nitroglycerin (NITROSTAT) SL tablet 0.4 mg  0.4 mg Sublingual Q5 Min PRN Ashley Acharya APRN        Phosphorus Replacement - Follow Nurse / BPA Driven Protocol   Does not apply PRN Lety Matthews APRN        Potassium Replacement - Follow Nurse / BPA Driven Protocol   Does not apply PRN Lety Matthews APRN        sodium chloride 0.9 % flush 10 mL  10 mL Intravenous Q12H Lety Matthews APRN   10 mL at 10/07/24  2016    sodium chloride 0.9 % flush 10 mL  10 mL Intravenous PRN Lety Matthews R, APRN        ticagrelor (BRILINTA) tablet 90 mg  90 mg Oral BID Fahad Shankar formerly [facility]           PHYSICAL EXAM:  Vitals:    10/08/24 1000   BP: 132/69   Pulse: 69   Resp:    Temp:    SpO2: 99%      Alert and oriented x 3.  Her speech is clear.  I do not appreciate a facial droop.  She has antigravity strength in the bilateral upper and lower extremities.  She is feeding herself, and tolerating p.o. intake very well.  I did not attempt to ambulate her.    ASSESSMENT/PLAN:  Ms. Romo is a 63 y.o. 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  She was taken off of Plavix in September 2024 (remained on low-dose aspirin), and presented on 10/6/2024 with acute thrombosis of her basilar artery and Pipeline flow diverter.    Ms. Romo underwent successful mechanical thrombectomy along with placement of a 2.5 mm coronary MICHELLE within the mid portions of the Pipeline stent construct (at side for symptomatic stenosis), restoring normal (TICI 3) flow to the intracranial vertebrobasilar system.    Given the severity of her presenting stroke symptoms, Ms. Romo has made an exceptional recovery to this point, and has only a few punctate areas of infarct on her MRI (not in the brainstem).  I am very optimistic for her making a meaningful recovery, albeit she will require inpatient physical therapy, and she is okay to transfer to [facility] from a neurointerventional standpoint.    She will need to remain on her Brilinta/aspirin dual antiplatelet regimen for the next month or so, at which point I will likely transition her to a Plavix/aspirin regimen.    She will follow-up with me in the neurointerventional clinic in 1 month's time.                   Electronically signed by Enoch Savage MD at 10/08/24 5458       Shay Purvis  MD at 10/08/24 0859          Stroke Progress Note       Chief Complaint: headache      Subjective    Subjective     Subjective:   No headache   No new issues     Review of Systems   Constitutional: No fatigue  HENT: Negative for nosebleeds and rhinorrhea.    Eyes: Negative for redness.   Respiratory: Negative for cough.    Gastrointestinal: Negative for anal bleeding.   Endocrine: Negative for polydipsia.   Genitourinary: Negative for enuresis and urgency.   Musculoskeletal: Negative for joint swelling.   Neurological: Negative for tremors.   Psychiatric/Behavioral: Negative for hallucinations.      Objective      Temp:  [97.5 °F (36.4 °C)-98.6 °F (37 °C)] 97.6 °F (36.4 °C)  Heart Rate:  [53-72] 58  Resp:  [12-16] 14  BP: (106-142)/(63-81) 114/70         NEURO    MENTAL STATUS: AAOx3, memory intact, fund of knowledge appropriate    LANG/SPEECH: Naming and repetition intact, fluent, follows 3-step commands    CRANIAL NERVES:    Pupils equal and reactive, EOMI intact, no gaze deviation, no nystagmus  No facial droop, cough and gag intact, shoulder shrug intact, tongue midline     MOTOR:  Moves all extremities equally    SENSORY: Normal to light touch all throughout     COORDINATION: Normal finger to nose and heel to shin, no tremor, no dysmetria    STATION: Not assessed due to patient condition    GAIT: Not assessed due to patient condition       Results Review:    I reviewed the patient's new clinical results.    Lab Results (last 24 hours)       Procedure Component Value Units Date/Time    Basic Metabolic Panel [[id number]]  (Abnormal) Collected: 10/08/24 0404    Specimen: Blood Updated: 10/08/24 0454     Glucose 149 mg/dL      BUN 25 mg/dL      Creatinine 0.51 mg/dL      Sodium 142 mmol/L      Potassium 4.0 mmol/L      Chloride 111 mmol/L      CO2 20.0 mmol/L      Calcium 8.7 mg/dL      BUN/Creatinine Ratio 49.0     Anion Gap 11.0 mmol/L      eGFR 105.7 mL/min/1.73     Narrative:      GFR Normal >60  Chronic Kidney  Disease <60  Kidney Failure <15      CBC (No Diff) [[id number]]  (Abnormal) Collected: 10/08/24 0404    Specimen: Blood Updated: 10/08/24 0425     WBC 18.18 10*3/mm3      RBC 4.11 10*6/mm3      Hemoglobin 13.1 g/dL      Hematocrit 38.8 %      MCV 94.4 fL      MCH 31.9 pg      MCHC 33.8 g/dL      RDW 13.2 %      RDW-SD 45.6 fl      MPV 10.8 fL      Platelets 248 10*3/mm3     POC Glucose Once [[id number]]  (Normal) Collected: 10/07/24 1218    Specimen: Blood Updated: 10/07/24 1222     Glucose 93 mg/dL           MRI Brain Without Contrast    Result Date: 10/7/2024  Impression: 1.Small likely subacute infarct involving periventricular left occipital lobe. No hemorrhagic transformation. 2.Stable prominent basilar artery aneurysm with mass effect on adjacent brainstem. 3.Moderate paranasal sinus mucosal disease with air-fluid level in left maxillary sinus. Correlate for acute sinusitis 4.Partial left mastoid effusion. Electronically Signed: Chris Acuña MD  10/7/2024 12:30 PM EDT  Workstation ID: [id number]    CT Head Without Contrast    Result Date: 10/6/2024  Impression: No acute intracranial pathology. Sinusitis. Electronically Signed: Hal Davila MD  10/6/2024 6:34 PM EDT  Workstation ID: [id number]    XR Abdomen KUB    Result Date: 10/6/2024  Impression: 1. Advanced nasogastric tube now oriented antegrade over distal stomach. 2. Similar large volume formed stool without evidence of obstruction suggesting constipation. Electronically Signed: Bryon oYon MD  10/6/2024 1:40 PM EDT  Workstation ID: [id number]    XR Abdomen KUB    Result Date: 10/6/2024  Impression: 1. Antegrade oriented nasogastric tube terminates over mid to distal stomach. 2. Large volume formed stool without dilated loops of bowel to suggest obstruction. Findings suggest constipation. Electronically Signed: Bryon Yoon MD  10/6/2024 1:35 PM EDT  Workstation ID: [id number]    XR Chest 1 View    Result Date: 10/6/2024  Impression: Medical support  devices appear in standard position. No new airspace process or pneumothorax. Electronically Signed: Bryon Yoon MD  10/6/2024 1:34 PM EDT  Workstation ID: [id number]    XR Chest 1 View    Result Date: 10/6/2024  Impression: No active pulmonary process. Electronically Signed: Bryon Yoon MD  10/6/2024 11:28 AM EDT  Workstation ID: [id number]    CT Angiogram Head w AI Analysis of LVO    Result Date: 10/6/2024  1.Focal 3 mm nonopacification of the basilar artery just distal to the vascular stent suspicious for high-grade stenoses or occlusion possibly related to thrombus. Asymmetric decreased opacification in the distal right vertebral artery likely related to changes in flow dynamics without additional area of focal high-grade stenoses or occlusion. Findings likely explains cerebellar abnormalities on CT perfusion. 2.Other major intracranial arterial vasculature widely patent. 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 Findings communicated over the phone with ordering provider Dr. Boyer at 10:48 a.m. 10/6/2024. Patient in route to conventional angiogram with neurosurgery at time of phone call. Electronically Signed: Bryon Yoon MD  10/6/2024 10:44 AM EDT  Workstation ID: [id number]    CT Angiogram Neck    Result Date: 10/6/2024  1.Focal 3 mm nonopacification of the basilar artery just distal to the vascular stent suspicious for high-grade stenoses or occlusion possibly related to thrombus. Asymmetric decreased opacification in the distal right vertebral artery likely related to changes in flow dynamics without additional area of focal high-grade stenoses or occlusion. Findings likely explains cerebellar abnormalities on CT perfusion. 2.Other major intracranial arterial vasculature widely patent. 3.Stable CTA appearance of the treated basilar artery aneurysm with curvilinear area of contrast opacification  along the lateral aspect of the stent. Findings communicated over the phone with ordering provider Dr. Boyer at 10:48 a.m. 10/6/2024. Patient in route to conventional angiogram with neurosurgery at time of phone call. Electronically Signed: Bryon Yoon MD  10/6/2024 10:44 AM EDT  Workstation ID: [id number]    CT CEREBRAL PERFUSION WITH & WITHOUT CONTRAST    Result Date: 10/6/2024  Negative for completed infarct. 14 mL region of Tmax greater than 6 seconds in the right cerebellum. Electronically Signed: Bryon Yoon MD  10/6/2024 10:28 AM EDT  Workstation ID: [id number]    CT Head Without Contrast Stroke Protocol    Result Date: 10/6/2024  Impression: 1. New periventricular hypodensities at right frontal parietal junction and left parietal lobe which could reflect recent (acute or subacute) infarcts. No associated acute hemorrhage or significant mass effect. Findings could be further assessed by dedicated MRI. 2. Slight increasing dilation of third and lateral ventricles with more conspicuous sulcal and gyral crowding at the vertex. Findings could reflect progressive mass effect at the level of the fourth ventricle related to known treated aneurysm, although size of the excluded aneurysm has not significantly changed measuring 2.5 x 2.6 cm (previously 2.4 x 2.6 cm). Consider neurosurgical consultation. 3. Underlying periventricular hypodensity suggesting chronic microvascular ischemic change similar to prior. Transependymal edema would be considered less likely given stability. Electronically Signed: Bryon Yoon MD  10/6/2024 10:03 AM EDT  Workstation ID: [id number]   Results for orders placed during the hospital encounter of 10/06/24    Adult Transthoracic Echo Complete W/ Cont if Necessary Per Protocol    Interpretation Summary    Left ventricular systolic function is normal. Estimated left ventricular EF = 65%    Left ventricular diastolic function is consistent with (grade I) impaired relaxation.    The  cardiac valves are anatomically and functionally normal.    Estimated right ventricular systolic pressure from tricuspid regurgitation is normal (<35 mmHg).    Saline test results are negative.            Assessment/Plan     Assessment/Plan:  Acute basilar occlusion s/p MT (  thrombosis of her pipeline stent and basilar artery. )  -prior to the event, patient was transitioned to monotherapy with aspirin   -After the MT, transitioned to aspirin and brillinta for now   -clinically doing very, well, excellent candidate for acute inpatient rehab  -normal blood pressure goals   -okay for the floor/ rehab      Stroke team will continue to follow the patient.         Shay Purvis MD  10/08/24  08:59 EDT       Electronically signed by Shay Purvis MD at 10/08/24 1110       Hugo Laureano MD at 10/07/24 1231          Stroke Progress Note       Chief Complaint: Left-sided weakness left-sided facial droop and severe dysarthria    Subjective    Subjective     Subjective:  The patient is sitting in a chair in NAD. Family were at the bedside. The patient stated that she is doing better than yesterday.  Denies having any new stroke or strokelike symptoms.  I discussed with the patient and her family the imaging findings, management, and expectation moving forward.  Specifically we talked about the importance of medication compliance in this case dual antiplatelet.    No other acute complains at this time    Review of Systems   Constitutional: No fatigue      Objective      Temp:  [97.6 °F (36.4 °C)-98.6 °F (37 °C)] 98.6 °F (37 °C)  Heart Rate:  [60-83] 62  Resp:  [10-16] 12  BP: (109-153)/(48-98) 125/69  FiO2 (%):  [45 %-100 %] 45 %    Objective    GEN: lying in bed; in NAD  HENT: normocephalic, non-erythematous oropharynx  CV: no LE edema    NEURO:  Mental Status: A&O x 3, interactive, able to follow commands  Speech: Mild dysarthria  CN 2-12:  II - PERRLA  III, IV, VI - EOMI  V - Facial sensation  intact  VII -no gross facial asymmetry  VIII - Auditory acuity intact  XII - Tongue protrudes midline    Motor: The patient can move all 4 extremities against gravity with no visible drift appreciated  Sensory: intact light touch throughout  Reflexes: negative Gardiner's sign BL  Coordination: no ataxia with finger-to-nose testing  Gait/Station: deferred   Cortical: No Extinction    Results Review:    I reviewed the patient's new clinical results.    WBC   Date Value Ref Range Status   10/07/2024 14.67 (H) 3.40 - 10.80 10*3/mm3 Final     RBC   Date Value Ref Range Status   10/07/2024 4.38 3.77 - 5.28 10*6/mm3 Final     Hemoglobin   Date Value Ref Range Status   10/07/2024 13.8 12.0 - 15.9 g/dL Final     Hematocrit   Date Value Ref Range Status   10/07/2024 41.7 34.0 - 46.6 % Final     MCV   Date Value Ref Range Status   10/07/2024 95.2 79.0 - 97.0 fL Final     MCH   Date Value Ref Range Status   10/07/2024 31.5 26.6 - 33.0 pg Final     MCHC   Date Value Ref Range Status   10/07/2024 33.1 31.5 - 35.7 g/dL Final     RDW   Date Value Ref Range Status   10/07/2024 13.2 12.3 - 15.4 % Final     RDW-SD   Date Value Ref Range Status   10/07/2024 46.3 37.0 - 54.0 fl Final     MPV   Date Value Ref Range Status   10/07/2024 10.9 6.0 - 12.0 fL Final     Platelets   Date Value Ref Range Status   10/07/2024 252 140 - 450 10*3/mm3 Final     Neutrophil %   Date Value Ref Range Status   10/07/2024 88.0 (H) 42.7 - 76.0 % Final     Lymphocyte %   Date Value Ref Range Status   10/07/2024 5.8 (L) 19.6 - 45.3 % Final     Monocyte %   Date Value Ref Range Status   10/07/2024 5.7 5.0 - 12.0 % Final     Eosinophil %   Date Value Ref Range Status   10/07/2024 0.0 (L) 0.3 - 6.2 % Final     Basophil %   Date Value Ref Range Status   10/07/2024 0.1 0.0 - 1.5 % Final     Immature Grans %   Date Value Ref Range Status   10/07/2024 0.4 0.0 - 0.5 % Final     Neutrophils, Absolute   Date Value Ref Range Status   10/07/2024 12.91 (H) 1.70 - 7.00  10*3/mm3 Final     Lymphocytes, Absolute   Date Value Ref Range Status   10/07/2024 0.85 0.70 - 3.10 10*3/mm3 Final     Monocytes, Absolute   Date Value Ref Range Status   10/07/2024 0.84 0.10 - 0.90 10*3/mm3 Final     Eosinophils, Absolute   Date Value Ref Range Status   10/07/2024 0.00 0.00 - 0.40 10*3/mm3 Final     Basophils, Absolute   Date Value Ref Range Status   10/07/2024 0.01 0.00 - 0.20 10*3/mm3 Final     Immature Grans, Absolute   Date Value Ref Range Status   10/07/2024 0.06 (H) 0.00 - 0.05 10*3/mm3 Final     nRBC   Date Value Ref Range Status   10/07/2024 0.0 0.0 - 0.2 /100 WBC Final       Lab Results   Component Value Date    GLUCOSE 126 (H) 10/07/2024    BUN 21 10/07/2024    CREATININE 0.43 (L) 10/07/2024     10/07/2024    K 4.0 10/07/2024     (H) 10/07/2024    CALCIUM 8.7 10/07/2024    PROTEINTOT 6.1 10/07/2024    ALBUMIN 4.0 10/07/2024    ALT 49 (H) 10/07/2024    AST 22 10/07/2024    ALKPHOS 98 10/07/2024    BILITOT 0.3 10/07/2024    GLOB 2.1 10/07/2024    AGRATIO 1.9 10/07/2024    BCR 48.8 (H) 10/07/2024    ANIONGAP 11.0 10/07/2024    EGFR 110.1 10/07/2024     CT Head Without Contrast    Result Date: 10/6/2024  Impression: No acute intracranial pathology. Sinusitis. Electronically Signed: Hal Davila MD  10/6/2024 6:34 PM EDT  Workstation ID: [id number]    XR Abdomen KUB    Result Date: 10/6/2024  Impression: 1. Advanced nasogastric tube now oriented antegrade over distal stomach. 2. Similar large volume formed stool without evidence of obstruction suggesting constipation. Electronically Signed: Bryon Yoon MD  10/6/2024 1:40 PM EDT  Workstation ID: [id number]    XR Abdomen KUB    Result Date: 10/6/2024  Impression: 1. Antegrade oriented nasogastric tube terminates over mid to distal stomach. 2. Large volume formed stool without dilated loops of bowel to suggest obstruction. Findings suggest constipation. Electronically Signed: Bryon Yoon MD  10/6/2024 1:35 PM EDT  Workstation ID:  [id number]    XR Chest 1 View    Result Date: 10/6/2024  Impression: Medical support devices appear in standard position. No new airspace process or pneumothorax. Electronically Signed: Bryon Yoon MD  10/6/2024 1:34 PM EDT  Workstation ID: [id number]    XR Chest 1 View    Result Date: 10/6/2024  Impression: No active pulmonary process. Electronically Signed: Bryon Yoon MD  10/6/2024 11:28 AM EDT  Workstation ID: [id number]    CT Angiogram Head w AI Analysis of LVO    Result Date: 10/6/2024  1.Focal 3 mm nonopacification of the basilar artery just distal to the vascular stent suspicious for high-grade stenoses or occlusion possibly related to thrombus. Asymmetric decreased opacification in the distal right vertebral artery likely related to changes in flow dynamics without additional area of focal high-grade stenoses or occlusion. Findings likely explains cerebellar abnormalities on CT perfusion. 2.Other major intracranial arterial vasculature widely patent. 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 Findings communicated over the phone with ordering provider Dr. Boyer at 10:48 a.m. 10/6/2024. Patient in route to conventional angiogram with neurosurgery at time of phone call. Electronically Signed: Bryon Yoon MD  10/6/2024 10:44 AM EDT  Workstation ID: [id number]    CT Angiogram Neck    Result Date: 10/6/2024  1.Focal 3 mm nonopacification of the basilar artery just distal to the vascular stent suspicious for high-grade stenoses or occlusion possibly related to thrombus. Asymmetric decreased opacification in the distal right vertebral artery likely related to changes in flow dynamics without additional area of focal high-grade stenoses or occlusion. Findings likely explains cerebellar abnormalities on CT perfusion. 2.Other major intracranial arterial vasculature widely patent. 3.Stable CTA appearance of  the treated basilar artery aneurysm with curvilinear area of contrast opacification along the lateral aspect of the stent. Findings communicated over the phone with ordering provider Dr. Boyer at 10:48 a.m. 10/6/2024. Patient in route to conventional angiogram with neurosurgery at time of phone call. Electronically Signed: Bryon Yoon MD  10/6/2024 10:44 AM EDT  Workstation ID: [id number]    CT CEREBRAL PERFUSION WITH & WITHOUT CONTRAST    Result Date: 10/6/2024  Negative for completed infarct. 14 mL region of Tmax greater than 6 seconds in the right cerebellum. Electronically Signed: Bryon Yoon MD  10/6/2024 10:28 AM EDT  Workstation ID: [id number]    CT Head Without Contrast Stroke Protocol    Result Date: 10/6/2024  Impression: 1. New periventricular hypodensities at right frontal parietal junction and left parietal lobe which could reflect recent (acute or subacute) infarcts. No associated acute hemorrhage or significant mass effect. Findings could be further assessed by dedicated MRI. 2. Slight increasing dilation of third and lateral ventricles with more conspicuous sulcal and gyral crowding at the vertex. Findings could reflect progressive mass effect at the level of the fourth ventricle related to known treated aneurysm, although size of the excluded aneurysm has not significantly changed measuring 2.5 x 2.6 cm (previously 2.4 x 2.6 cm). Consider neurosurgical consultation. 3. Underlying periventricular hypodensity suggesting chronic microvascular ischemic change similar to prior. Transependymal edema would be considered less likely given stability. Electronically Signed: Bryon Yoon MD  10/6/2024 10:03 AM EDT  Workstation ID: [id number]   Results for orders placed during the hospital encounter of 12/15/22    Adult Transthoracic Echo Complete W/ Cont if Necessary Per Protocol    Interpretation Summary    Left ventricular systolic function is normal. Left ventricular ejection fraction appears to be 56 -  60%.    Mild aortic valve regurgitation is present.      -CT of the head and neck from 10/6/2024 showed mid basilar occlusion  -MRI of the brain images from 10/7/2024 images were personally reviewed and showed evidence of  punctate acute ischemic infarct of the left occipital lobe and the right cerebellum.  -Echocardiogram on 10/7/2024 showed left ventricular ejection fraction of 63.9% with normal left atrial size and negative bubble study  -LDL on 10/7/2024 was 58  -A1c from 10/7/2024 was 5.6%      Assessment/Plan   This is a 63-year-old female with known medical diagnoses present hypertension, hyperlipidemia, migraines (follows with Ruth Burris, CAILIN), history of stroke (right occipital and left cerebellar, 2023, residual dizziness and ataxic gait), and giant basilar aneurysm s/p pipeline embolization (Dr. Savage, 2022 and 2023) who presents to the emergency department via private vehicle for further evaluation of left-sided weakness and facial droop which was present upon awakening this morning.  She is admitted for IV thrombolytic therapy given LK > 4.5-hours.  CTA head/neck reveals thrombosed pipeline stent.  Images were reviewed by Dr. Savage who is elected to take her to the Cath Lab for mechanical thrombectomy.  She will be admitted to the neurological ICU s/p procedure.     Antiplatelet PTA: ASA 81 mg  Anticoagulant PTA: None           #Left-sided weakness, facial droop, and aphasia; acute onset  #History of stroke, right occipital and left cerebellar; residual balance difficulties and dizziness  #History of giant basilar aneurysm s/p pipeline embolization and s/p TICI3 and stent placement on 10/6/2024  # Punctate acute ischemic stroke of the left occipital lobe  # Punctate acute ischemic stroke of the right cerebellum  -Mechanism is cryptogenic.  Likely large vessel athero versus less likely ESUS versus less likely cardioembolic.  -CTA head/neck reveals thrombosed pipeline stent; reviewed with   Janette.  Given patient's debilitating exam is recommended that she go to the Cath Lab for neurointervention.  Discussed with patient and spouse who seem agreeable to proceed, however patient would like to speak with Dr. Savage prior to procedure.  [facility] informed to call and Cath Lab team at 1024.  Anesthesia notified at 1025.  -CT of the head and neck from 10/6/2024 showed mid basilar occlusion  -MRI of the brain images from 10/7/2024 images were personally reviewed and showed evidence of  punctate acute ischemic infarct of the left occipital lobe and the right cerebellum.  -Echocardiogram on 10/7/2024 showed left ventricular ejection fraction of 63.9% with normal left atrial size and negative bubble study  -LDL on 10/7/2024 was 58  -A1c from 10/7/2024 was 5.6%    Recommendations  -Continue dual antiplatelet with aspirin 81 mg daily and ticagrelor 90 mg twice daily for secondary stroke prevention in the setting of new stent and basilar artery  -Continue atorvastatin 80 mg nightly for secondary stroke prevention Target LDL less than 55  -PT/OT/SLP  -NIH stroke scale every 2 hours.  Low threshold for repeated CT head without contrast plus CTA head and neck for any neurological changes.  -Target blood pressure goals of normotension     #Essential hypertension  -Target blood pressure goals of normotension     #Hyperlipidemia  -Continue atorvastatin 80mg nightly; monitor LFTs as they are slightly elevated      Stroke will continue to follow. Please call for any further questions or concerns  =================================  Hugo Laureano MD, Msc, PhD  Vascular Neurologist  [facility]          Electronically signed by Hugo Laureano MD at 10/07/24 1251       Joe Mendez MD at 10/07/24 0806          Chief complaint: Acute basilar occlusion    Admit Diagnosis:   Acute CVA (cerebrovascular accident) [I63.9]     Subjective: No events overnight.  Speech is improved per family.  Swallow eval  failed yesterday.    Objective:    Vitals:    10/07/24 0800   BP: 120/66   Pulse: 63   Resp: 12   Temp: 98.6 °F (37 °C)   SpO2: 100%     Pulse  Av.9  Min: 60  Max: 83  Systolic (24hrs), Av , Min:109 , Max:160     Diastolic (24hrs), Av, Min:48, Max:106    Temp (24hrs), Av.2 °F (36.8 °C), Min:97.6 °F (36.4 °C), Max:98.6 °F (37 °C)      She is lying in bed.  Her speech is dysarthric but intelligible.  She is diffusely weak and appears to be slightly more weak on the left arm as compared to the right, however she moves all 4 extremities to command.  She is oriented and appropriate.    Right inguinal region is soft with no hematoma.  Posterior tibial is 1+, dorsalis pedis is absent        Lab Results   Component Value Date     10/07/2024       A/P:   Admit Diagnosis:   Acute CVA (cerebrovascular accident) [I63.9]     Continue aspirin and Brilinta    Swallow eval today    MRI pending          Electronically signed by Joe Mendez MD at 10/07/24 0848       Bryon Garner MD at 10/07/24 0835          Pulmonary/Critical Care Follow-up     LOS: 1 day   Patient Care Team:  Melissa Lazo APRN as PCP - General (Nurse Practitioner)  Erika Whatley MD as Consulting Physician (Psychiatry)        Chief Complaint   Patient presents with    Stroke     Subjective     History reviewed and updated in EMR as indicated.    Interval History:     Patient is awake and alert.  No current complaint.  Denies headache.  No fevers or chills.  On her way for MRI.  Family at bedside.    History taken from: Patient    PMH/FH/Social History were reviewed and updated appropriately in the electronic medical record.     Review of Systems:    Review of 14 systems was completed with positives and pertinent negatives noted in the subjective section.  All other systems reviewed and are negative.       Objective     Vital Signs  Temp:  [97.6 °F (36.4 °C)-98.6 °F (37 °C)] 98.6 °F (37 °C)  Heart Rate:  [60-83]  63  Resp:  [10-16] 12  BP: (109-160)/() 120/66  FiO2 (%):  [45 %-100 %] 45 %  10/06 0701 - 10/07 0700  In: 600 [I.V.:600]  Out: 1830 [Urine:1830]  Body mass index is 24.21 kg/m².  Mode: PS  FiO2 (%):  [45 %-100 %] 45 %  S RR:  [10-14] 14  S VT:  [350 mL] 350 mL  PEEP/CPAP (cm H2O):  [5 cm H20] 5 cm H20  VT SUP:  [10 cm H20] 10 cm H20  MAP (cm H2O):  [7.4-8.5] 8.5  IV drips:  niCARdipine, Last Rate: 10 mg/hr (10/06/24 1320)  propofol, Last Rate: Stopped (10/06/24 1330)       Physical Exam:     Constitutional:   Alert, in no acute distress   Head:   Normocephalic, atraumatic   Eyes:           Lids and lashes normal, conjunctivae and sclerae normal.  PER   ENMT:  Ears appear intact with no abnormalities noted     Lips normal.     Neck:  Trachea midline, no JVD   Lungs/Resp:    Normal effort, symmetric chest rise, no crepitus, clear to      auscultation bilaterally.               Heart/CV:   Regular rhythm and normal rate, no murmur   Abdomen/GI:    Soft, nontender, nondistended   :    Deferred   Extremities/MSK:  No clubbing or cyanosis.  No edema.     Pulses:  Pulses palpable and equal bilaterally   Skin:  No bleeding, bruising or rash   Heme/Lymph:  No cervical or supraclavicular adenopathy.   Neurologic:    Psychiatric:    Moves all extremities with no obvious focal motor deficit.  Cranial nerves 2 - 12 grossly intact  Non-agitated, normal affect.    The above physical exam findings were reviewed and reflect my exam findings as of today's exam.   Electronically signed by:  Bryon Garner MD  10/07/24  08:35 EDT      Results Review:     I reviewed the patient's new clinical results.   Results from last 7 days   Lab Units 10/07/24  0555 10/06/24  1038 10/06/24  0959   SODIUM mmol/L 143 141  --    POTASSIUM mmol/L 4.0 4.3  --    CHLORIDE mmol/L 112* 110*  --    CO2 mmol/L 20.0* 19.0*  --    BUN mg/dL 21 22  --    CREATININE mg/dL 0.43* 0.69 0.40*   CALCIUM mg/dL 8.7 8.6  --    BILIRUBIN mg/dL 0.3 0.3  --     ALK PHOS U/L 98 124*  --    ALT (SGPT) U/L 49* 65*  --    AST (SGOT) U/L 22 34*  --    GLUCOSE mg/dL 126* 82  --      Results from last 7 days   Lab Units 10/07/24  0555 10/06/24  0959   WBC 10*3/mm3 14.67* 7.19   HEMOGLOBIN g/dL 13.8 14.6   HEMOGLOBIN, POC g/dL  --  14.6   HEMATOCRIT % 41.7 46.0   HEMATOCRIT POC %  --  43   PLATELETS 10*3/mm3 252 242     Results from last 7 days   Lab Units 10/06/24  1549   PH, ARTERIAL pH units 7.373   PO2 ART mm Hg 195.0*   PCO2, ARTERIAL mm Hg 28.9*   HCO3 ART mmol/L 16.8*     Results from last 7 days   Lab Units 10/07/24  0555 10/06/24  1038   MAGNESIUM mg/dL  --  2.4   PHOSPHORUS mg/dL 4.1  --        I reviewed the patient's new imaging including images and reports.    MRI brain 10/7/2024:    Impression:  1.Small likely subacute infarct involving periventricular left occipital lobe. No hemorrhagic transformation.  2.Stable prominent basilar artery aneurysm with mass effect on adjacent brainstem.  3.Moderate paranasal sinus mucosal disease with air-fluid level in left maxillary sinus. Correlate for acute sinusitis  4.Partial left mastoid effusion.    Medication Review:   aspirin, 81 mg, Nasogastric, Daily   Or  aspirin, 300 mg, Rectal, Daily  atorvastatin, 80 mg, Nasogastric, Nightly  cefTRIAXone, 1,000 mg, Intravenous, Q24H  chlorhexidine, 15 mL, Mouth/Throat, Q12H  dexAMETHasone, 4 mg, Intravenous, Q6H  famotidine, 20 mg, Intravenous, BID  sodium chloride, 10 mL, Intravenous, Q12H  ticagrelor, 90 mg, Nasogastric, BID      niCARdipine, 5-15 mg/hr, Last Rate: 10 mg/hr (10/06/24 1320)  propofol, 5-50 mcg/kg/min, Last Rate: Stopped (10/06/24 1330)        Assessment & Plan         Acute CVA    Cerebral aneurysm, nonruptured    63 y.o. 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 Savage 2022 and 2023), who presented to  the emergency department via private vehicle for evaluation of left-sided weakness and facial droop present on awakening on 10/6/2024.    She apparently had a migraine headache and took a home dose of Ubrelvy which helped her headache a bit.  She had difficulty ambulating on awakening with left side weakness.  She also was treated for a UTI with cefuroxime (day 3/7).    CT head showed periventricular hypodensities right frontal parietal junction and left parietal lobe felt to reflect possible acute or subacute infarcts.  Patient has recently been taken off of her Plavix on follow-up with Dr. Savage.  She was taking aspirin 81 mg daily.    Patient was taken to the Cath Lab for mechanical thrombectomy by Dr. Mendez which was successful with placement of stent for in-stent stenosis.    Patient is currently doing well.    Plan:  1.  For acute CVA: Secondary to thrombosis of basilar artery aneurysm stent status post thrombectomy and stent placement for in-stent stenosis.  Doing well.  Continue aspirin, statin, Brilinta.  Blood pressure management for goal of normal blood pressure.  Stroke neurology following.  Continue PT/OT/speech therapy.  2.  For acute respiratory failure status postintubation: Successfully extubated.  Doing well on room air currently.  3.  For hypertension: Cardene drip as needed.  4.  For hyperlipidemia: Continue statin.  5.  DVT prophylaxis: SCDs  6.  For UTI present on admission: Continue Rocephin.  Try to get outside urine cultures.    Okay to telemetry/hospitalist.    Electronically signed by:    Bryon Garner MD  10/07/24  08:35 EDT      *. Please note that portions of this note were completed with [employer] - a voice recognition program.     Electronically signed by Bryon Garner MD at 10/08/24 4481          Consult Notes (all)        Ashley Acharya APRN at 10/06/24 1000        Consult Orders    1. Inpatient Neurology Consult Stroke [[id number]] ordered by Merlin  Chris Tracy MD at 10/06/24 0941                 Stroke Consult Note    Patient Name: Lauryn Romo   MRN: [id number]  Age: 63 y.o.  Sex: female  : 1960    Primary Care Physician: Melissa Lazo APRN  Referring Physician:  Dr. Demarcus MELISSA STROKE TEAM CALLED: 0938 EST     TIME PATIENT SEEN: 0945 EST    Handedness: Right  Race:     Chief Complaint/Reason for Consultation: Left-sided weakness and facial droop    HPI: Mrs. Romo is a 63-year-old female with known medical diagnoses present hypertension, hyperlipidemia, migraines (follows with CAILIN Parra), history of stroke (right occipital and left cerebellar, , residual dizziness and ataxic gait), and giant basilar aneurysm s/p pipeline embolization (Dr. Savage,  and ) who presents to the emergency department via private vehicle for further evaluation of left-sided weakness and facial droop which was present upon awakening this morning.  The patient states that she went to bed around  last evening as she had a migraine headache.  She states that prior to going to bed she took a Ubrelvy which did help relieve her headache somewhat.  Upon awakening this morning she tells me she was able to get out of bed and ambulate to the bathroom however had extreme difficulty secondary to left sided weakness.  She does tell me that she was recently diagnosed with a UTI and is currently on oral antibiotics.    After speaking with the patient's spouse, he states that he heard a thud this morning and went to check on her and found her lying on the bathroom floor possibly having a seizure.    She recently followed up with Dr. Savage on  and was taken off of her Plavix however was instructed to remain on her ASA 81 mg daily, which she reports compliance with.  She is not currently on any anticoagulation medications.  She has remote history of smoking, denies EtOH use, denies illicit drug use.    Last Known Normal Date/Time:  10/5/2024 2030  EST     Review of Systems   Constitutional:  Positive for activity change.   HENT:  Negative for trouble swallowing.    Eyes:  Negative for photophobia and visual disturbance.   Respiratory: Negative.     Cardiovascular: Negative.    Gastrointestinal: Negative.  Negative for blood in stool.   Genitourinary:  Positive for difficulty urinating, dysuria and hematuria.   Musculoskeletal:  Positive for arthralgias and gait problem.   Neurological:  Positive for speech difficulty, weakness and headaches. Negative for seizures, syncope and numbness.      Past Medical History:   Diagnosis Date    Aneurysm 11/26/2022    Cancer 05/2024    Basal Cell Carcinoma removed from scalp by dermatology    Cluster headache 2022    Had headaches for several months before stroke and mass was found    Difficulty walking 11/26/22    After stroke    Headache     Headache, tension-type 2022    Hyperlipidemia     Hypertension     Memory loss 11/26/22    Migraine 2022    Stroke 11/26/2022    Vision loss 11/26/22    When dizzy or headache     Past Surgical History:   Procedure Laterality Date    ARTERIAL ANEURYSM REPAIR      BREAST LUMPECTOMY  2012    CYST REMOVAL Left 05/2023    EMBOLIZATION CEREBRAL N/A 11/29/2022    Procedure: CV EMBOLIZATION CEREBRAL IR;  Surgeon: Enoch Savage MD;  Location:  [facility] CATH INVASIVE LOCATION;  Service: Interventional Radiology;  Laterality: N/A;    HIATAL HERNIA REPAIR  2015    INTERVENTIONAL RADIOLOGY PROCEDURE N/A 09/15/2023    Procedure: Embolization;  Surgeon: Enoch Savage MD;  Location:  [facility] CATH INVASIVE LOCATION;  Service: Interventional Radiology;  Laterality: N/A;    SINUS SURGERY      x4    SKIN CANCER EXCISION      cancer removed off top of head     Family History   Problem Relation Age of Onset    No Known Problems Mother     Heart attack Father 42    Heart attack Paternal Aunt     Heart attack Paternal Uncle     No Known Problems Sister     Cancer Maternal Grandmother      Heart failure Maternal Grandmother     No Known Problems Maternal Grandfather     No Known Problems Paternal Grandmother     No Known Problems Paternal Grandfather     Diabetes Half-Brother      Social History     Socioeconomic History    Marital status:    Tobacco Use    Smoking status: Former     Current packs/day: 0.00     Average packs/day: 1 pack/day for 15.0 years (15.0 ttl pk-yrs)     Types: Cigarettes     Start date: 10/4/1989     Quit date: 10/4/2004     Years since quittin.0     Passive exposure: Past    Smokeless tobacco: Never   Vaping Use    Vaping status: Never Used   Substance and Sexual Activity    Alcohol use: No    Drug use: Never    Sexual activity: Not Currently     Partners: Male     Birth control/protection: Tubal ligation     Allergies   Allergen Reactions    Tramadol Other (See Comments)     Flushng and passing out     Prior to Admission medications    Medication Sig Start Date End Date Taking? Authorizing Provider   aspirin 81 MG chewable tablet Chew 1 tablet Every Night.    Ghulam Harvey MD   atorvastatin (LIPITOR) 80 MG tablet TAKE 1 TABLET BY MOUTH EVERY NIGHT 23   Jan Matos PA-C   bethanechol (URECHOLINE) 25 MG tablet Take 1 tablet twice a day by oral route for 30 days. 7/15/24   Ghulam Havrey MD   Diclofenac Sodium (VOLTAREN) 1 % gel gel APPLY 2 GRAMS TO THE AFFECTED AREA(S) BY TOPICAL ROUTE 4 TIMES PER DAY 24   Ghulam Harvey MD   fluticasone (FLONASE) 50 MCG/ACT nasal spray 2 sprays into the nostril(s) as directed by provider Daily. 19   Kwesi Montanez MD   galcanezumab-gnlm (EMGALITY) 120 MG/ML auto-injector pen Inject 1 mL under the skin into the appropriate area as directed Every 28 (Twenty-Eight) Days. 24   Ruth Burris APRN   LORazepam (ATIVAN) 0.5 MG tablet Take 1 tablet by mouth Every 8 (Eight) Hours As Needed for Anxiety.    Ghulam Harvey MD   lubiprostone (AMITIZA) 8 MCG capsule Take  1 capsule by mouth 2 (Two) Times a Day.    Ghulam Harvey MD   methylPREDNISolone (MEDROL) 4 MG dose pack Take as directed on package instructions. 9/24/24   Ruth Burris APRN   nortriptyline (PAMELOR) 10 MG capsule TAKE 2 CAPSULES BY MOUTH ONCE DAILY EVERY EVENING 10/1/24   Ruth Burris APRN   olopatadine (Pataday) 0.2 % solution ophthalmic solution INSTILL 1 DROP INTO AFFECTED EYE(S) BY OPHTHALMIC ROUTE ONCE DAILY 5/26/23   Ghulam Harvey MD   ondansetron ODT (ZOFRAN-ODT) 4 MG disintegrating tablet Place 1 tablet twice a day by translingual route as needed.    Ghulam Harvey MD   oxybutynin XL (DITROPAN-XL) 5 MG 24 hr tablet oxybutynin chloride ER 5 mg tablet,extended release 24 hr    Ghulam Harvey MD   pantoprazole (PROTONIX) 20 MG EC tablet     Ghulam Harvey MD   sertraline (ZOLOFT) 100 MG tablet Take 1 tablet by mouth Daily. 8/13/24   Erika Whatley MD   sertraline (Zoloft) 50 MG tablet Take 1 tablet by mouth Daily. 8/13/24   Erika Whatley MD   topiramate (TOPAMAX) 100 MG tablet Take 1 tablet by mouth Daily. 3/9/24   Ghulam Harvey MD   ubrogepant (Ubrelvy) 100 MG tablet Take 1 tablet by mouth Daily As Needed (migraines). Take at onset of headache - if symptoms persist or return, may repeat dose in 2 hours. Maximum: 200 mg per 24 hours 3/19/24   Ruth Burris APRN         Temp:  [98.5 °F (36.9 °C)] 98.5 °F (36.9 °C)  Heart Rate:  [68-74] 68  Resp:  [16] 16  BP: (147-160)/() 160/84  Neurological Exam  Mental Status  Alert. Oriented only to person and place. Oriented to person, place, and time. Mild dysarthria present. Expressive aphasia present. Loss of fluency, stuttering speech. Able to perform serial calculations.    Cranial Nerves  CN II: Visual fields full to confrontation.  CN III, IV, VI: Extraocular movements intact bilaterally. Pupils equal round and reactive to light bilaterally.  CN V: Facial sensation is  normal.  CN VIII: Hearing appears to be intact bilateral.    Motor  Decreased muscle bulk throughout. Decreased muscle tone.  BLE with drift, 3/5 strength R>L  RUE with no drift 5/5 strength  LUE with slight drift 4/5 strength.    Sensory  Light touch abnormality: Tip of nose and right finger tips.     Coordination  Right: Finger-to-nose normal.Left: Finger-to-nose normal.    Gait    Not observed.      Physical Exam  Vitals and nursing note reviewed.   Constitutional:       General: She is not in acute distress.     Appearance: Normal appearance. She is ill-appearing.   HENT:      Head: Normocephalic and atraumatic.      Mouth/Throat:      Mouth: Mucous membranes are moist.   Eyes:      Extraocular Movements: Extraocular movements intact.      Pupils: Pupils are equal, round, and reactive to light.   Cardiovascular:      Rate and Rhythm: Normal rate.   Pulmonary:      Effort: Pulmonary effort is normal. No respiratory distress.      Comments: On room air  Musculoskeletal:      Right lower leg: No edema.      Left lower leg: No edema.   Skin:     General: Skin is warm and dry.   Neurological:      Mental Status: She is alert and oriented to person, place, and time.      Cranial Nerves: Cranial nerve deficit and dysarthria present.      Sensory: Sensory deficit present.      Motor: Weakness present.      Coordination: Coordination normal.   Psychiatric:         Mood and Affect: Mood normal.         Behavior: Behavior normal.         Acute Stroke Data    Thrombolytic Inclusion / Exclusion Criteria    Time: 11:16 EDT  Person Administering Scale: CAILIN Figueroa    Inclusion Criteria  [x]   18 years of age or greater   []   Onset of symptoms < 4.5 hours before beginning treatment (stroke onset = time patient was last seen well or without symptoms).   []   Diagnosis of acute ischemic stroke causing measurable disabling deficit (Complete Hemianopia, Any Aphasia, Visual or Sensory Extinction, Any weakness  limiting sustained effort against gravity)   []   Any remaining deficit considered potentially disabling in view of patient and practitioner   Exclusion criteria (Do not proceed with Alteplase if any are checked under exclusion criteria)  [x]   Onset unknown or GREATER than 4.5 hours   []   ICH on CT/MRI   []   CT demonstrates hypodensity representing acute or subacute infarct   []   Significant head trauma or prior stroke in the previous 3 months   []   Symptoms suggestive of subarachnoid hemorrhage   []   History of un-ruptured intracranial aneurysm GREATER than 10 mm   []   Recent intracranial or intraspinal surgery within the last 3 months   []   Arterial puncture at a non-compressible site in the previous 7 days   []   Active internal bleeding   []   Acute bleeding tendency   []   Platelet count LESS than 100,000 for known hematological diseases such as leukemia, thrombocytopenia or chronic cirrhosis   []   Current use of anticoagulant with INR GREATER than 1.7 or PT GREATER than 15 seconds, aPTT GREATER than 40 seconds   []   Heparin received within 48 hours, resulting in abnormally elevated aPTT GREATER than upper limit of normal   []   Current use of direct thrombin inhibitors or direct factor Xa inhibitors in the past 48 hours   []   Elevated blood pressure refractory to treatment (systolic GREATER than 185 mm/Hg or diastolic  GREATER than 110 mm/Hg   []   Suspected infective endocarditis and aortic arch dissection   []   Current use of therapeutic treatment dose of low-molecular-weight heparin (LMWH) within the previous 24 hours   []   Structural GI malignancy or bleed   Relative exclusion for all patients  []   Only minor non-disabling symptoms   []   Pregnancy   []   Seizure at onset with postictal residual neurological impairments   []   Major surgery or previous trauma within past 14 days   []   History of previous spontaneous ICH, intracranial neoplasm, or AV malformation   []   Postpartum (within  previous 14 days)   []   Recent GI or urinary tract hemorrhage (within previous 21 days)   []   Recent acute MI (within previous 3 months)   []   History of un-ruptured intracranial aneurysm LESS than 10 mm   []   History of ruptured intracranial aneurysm   []   Blood glucose LESS than 50 mg/dL (2.7 mmol/L)   []   Dural puncture within the last 7 days   []   Known GREATER than 10 cerebral microbleeds   Additional exclusions for patients with symptoms onset between 3 and 4.5 hours.  []   Age > 80.   []   On any anticoagulants regardless of INR  >>> Warfarin (Coumadin), Heparin, Enoxaparin (Lovenox), fondaparinux (Arixtra), bivalirudin (Angiomax), Argatroban, dabigatran (Pradaxa), rivaroxaban (Xarelto), or apixaban (Eliquis)   []   Severe stroke (NIHSS > 25).   []   History of BOTH diabetes and previous ischemic stroke.   []   The risks and benefits have been discussed with the patient or family related to the administration of IV thrombolytic therapy for stroke symptoms.   []   I have discussed and reviewed the patient's case and imaging with the attending prior to IV thrombolytic therapy.   N/A Time IV thrombolytic administered       Hospital Meds:  Scheduled-    Infusions-     PRNs-   [Transfer Hold] sodium chloride    Functional Status Prior to Current Stroke/Bergen Score: 1-2; patient uses walker to ambulate    NIH Stroke Scale  Time: 11:16 EDT  Person Administering Scale: CAILIN Figueroa    Interval: 24 hrs post onset of symptoms +/- 20 mins  1a. Level of Consciousness: 0-->Alert, keenly responsive  1b. LOC Questions: 0-->Answers both questions correctly  1c. LOC Commands: 0-->Performs both tasks correctly  2. Best Gaze: 0-->Normal  3. Visual: 0-->No visual loss  4. Facial Palsy: 1-->Minor paralysis (flattened nasolabial fold, asymmetry on smiling)  5a. Motor Arm, Left: 0-->No drift, limb holds 90 (or 45) degrees for full 10 secs  5b. Motor Arm, Right: 0-->No drift, limb holds 90 (or 45) degrees  for full 10 secs  6a. Motor Leg, Left: 2-->Some effort against gravity, leg falls to bed by 5 secs, but has some effort against gravity  6b. Motor Leg, Right: 3-->No effort against gravity, leg falls to bed immediately  7. Limb Ataxia: 0-->Absent  8. Sensory: 1-->Mild-to-moderate sensory loss, patient feels pinprick is less sharp or is dull on the affected side, or there is a loss of superficial pain with pinprick, but patient is aware of being touched  9. Best Language: 2-->Severe aphasia, all communication is through fragmentary expression, great need for inference, questioning, and guessing by the listener. Range of information that can be exchanged is limited, listener carries burden of. . . (see row details)  10. Dysarthria: 2-->Severe dysarthria, patients speech is so slurred as to be unintelligible in the absence of or out of proportion to any dysphasia, or is mute/anarthric  11. Extinction and Inattention (formerly Neglect): 0-->No abnormality    Total (NIH Stroke Scale): 10        Results Reviewed:  I have personally reviewed current lab, radiology, and data and agree with results.    CT Angiogram Head w AI Analysis of LVO    Result Date: 10/6/2024  1.Focal 3 mm nonopacification of the basilar artery just distal to the vascular stent suspicious for high-grade stenoses or occlusion possibly related to thrombus. Asymmetric decreased opacification in the distal right vertebral artery likely related to changes in flow dynamics without additional area of focal high-grade stenoses or occlusion. Findings likely explains cerebellar abnormalities on CT perfusion. 2.Other major intracranial arterial vasculature widely patent. 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 Findings communicated over the phone with ordering provider Dr. Boyer at 10:48 a.m. 10/6/2024. Patient in route to conventional angiogram with  neurosurgery at time of phone call. Electronically Signed: Bryon Yoon MD  10/6/2024 10:44 AM EDT  Workstation ID: [id number]    CT Angiogram Neck    Result Date: 10/6/2024  1.Focal 3 mm nonopacification of the basilar artery just distal to the vascular stent suspicious for high-grade stenoses or occlusion possibly related to thrombus. Asymmetric decreased opacification in the distal right vertebral artery likely related to changes in flow dynamics without additional area of focal high-grade stenoses or occlusion. Findings likely explains cerebellar abnormalities on CT perfusion. 2.Other major intracranial arterial vasculature widely patent. 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 Findings communicated over the phone with ordering provider Dr. Boyer at 10:48 a.m. 10/6/2024. Patient in route to conventional angiogram with neurosurgery at time of phone call. Electronically Signed: Bryon Yoon MD  10/6/2024 10:44 AM EDT  Workstation ID: [id number]    CT CEREBRAL PERFUSION WITH & WITHOUT CONTRAST    Result Date: 10/6/2024  Negative for completed infarct. 14 mL region of Tmax greater than 6 seconds in the right cerebellum. Electronically Signed: Bryon Yoon MD  10/6/2024 10:28 AM EDT  Workstation ID: [id number]    CT Head Without Contrast Stroke Protocol    Result Date: 10/6/2024  Impression: 1. New periventricular hypodensities at right frontal parietal junction and left parietal lobe which could reflect recent (acute or subacute) infarcts. No associated acute hemorrhage or significant mass effect. Findings could be further assessed by dedicated MRI. 2. Slight increasing dilation of third and lateral ventricles with more conspicuous sulcal and gyral crowding at the vertex. Findings could reflect progressive mass effect at the level of the fourth ventricle related to known treated aneurysm, although size of  the excluded aneurysm has not significantly changed measuring 2.5 x 2.6 cm (previously 2.4 x 2.6 cm). Consider neurosurgical consultation. 3. Underlying periventricular hypodensity suggesting chronic microvascular ischemic change similar to prior. Transependymal edema would be considered less likely given stability. Electronically Signed: Bryon Yoon MD  10/6/2024 10:03 AM EDT  Workstation ID: [id number]       Results for orders placed during the hospital encounter of 12/15/22    Adult Transthoracic Echo Complete W/ Cont if Necessary Per Protocol    Interpretation Summary    Left ventricular systolic function is normal. Left ventricular ejection fraction appears to be 56 - 60%.    Mild aortic valve regurgitation is present.     WBC   Date Value Ref Range Status   10/06/2024 7.19 3.40 - 10.80 10*3/mm3 Final     Hemoglobin   Date Value Ref Range Status   10/06/2024 14.6 12.0 - 15.9 g/dL Final   10/06/2024 14.6 12.0 - 17.0 g/dL Final     Comment:     Serial Number: [id number]:  [id number]     Hematocrit   Date Value Ref Range Status   10/06/2024 46.0 34.0 - 46.6 % Final   10/06/2024 43 38 - 51 % Final     Platelets   Date Value Ref Range Status   10/06/2024 242 140 - 450 10*3/mm3 Final     Lab Results   Component Value Date    GLUCOSE 82 10/06/2024    BUN 22 10/06/2024    CREATININE 0.69 10/06/2024     10/06/2024    K 4.3 10/06/2024     (H) 10/06/2024    CALCIUM 8.6 10/06/2024    PROTEINTOT 6.4 10/06/2024    ALBUMIN 4.2 10/06/2024    ALT 65 (H) 10/06/2024    AST 34 (H) 10/06/2024    ALKPHOS 124 (H) 10/06/2024    BILITOT 0.3 10/06/2024    GLOB 2.2 10/06/2024    AGRATIO 1.9 10/06/2024    BCR 31.9 (H) 10/06/2024    ANIONGAP 12.0 10/06/2024    EGFR 98.3 10/06/2024       Assessment/Plan:    This is a 63-year-old female with known medical diagnoses present hypertension, hyperlipidemia, migraines (follows with Ruth Burris, APRN), history of stroke (right occipital and left cerebellar, 2023, residual  dizziness and ataxic gait), and giant basilar aneurysm s/p pipeline embolization (Dr. Savage, 2022 and 2023) who presents to the emergency department via private vehicle for further evaluation of left-sided weakness and facial droop which was present upon awakening this morning.  She is admitted for IV thrombolytic therapy given LK > 4.5-hours.  CTA head/neck reveals thrombosed pipeline stent.  Images were reviewed by Dr. Savage who is elected to take her to the Cath Lab for mechanical thrombectomy.  She will be admitted to the neurological ICU s/p procedure.    Antiplatelet PTA: ASA 81 mg  Anticoagulant PTA: None        Left-sided weakness, facial droop, and aphasia; acute onset        History of stroke, right occipital and left cerebellar; residual balance difficulties and dizziness        History of giant basilar aneurysm s/p pipeline embolization, Dr. Savage  -TIA/CVA order set without thrombolytic therapies been initiated  -Postprocedure radiology order set has been initiated  -Strict n.p.o.; patient will need SLP evaluation prior to oral intake s/p procedure and extubation given aphasia and dysarthria  -CTA head/neck reveals thrombosed pipeline stent; reviewed with Dr. Savage.  Janette patient's debilitating exam is recommended that she go to the Cath Lab for neurointervention.  Discussed with patient and spouse who seem agreeable to proceed, however patient would like to speak with Dr. Savage prior to procedure.  [facility] informed to call and Cath Lab team at 1024.  Anesthesia notified at 1025.  -MRI brain without contrast on 10/7 to evaluate stroke burden  -Patient will need to be admitted to the neurological ICU s/p procedure  -Continue ASA 81mg for now; further medication recommendations to be given post-procedure    Essential hypertension  -Blood pressure admission 148/89  -Okay to allow autoregulation of blood pressure until patient goes to Cath Lab.  S/p procedure patient will need to have SBP <140  -Nicardipine  for SBP >140 postprocedure    Hyperlipidemia  -Lipid panel in a.m.  -Continue atorvastatin 80mg nightly; monitor LFTs as they are slightly elevated    Plan of care was discussed with the patient, her spouse at bedside, Dr. Savage (neurointerventional), and Dr. Boyer (emergency department physician).  Stroke neurology will continue to follow.  Please call with any questions or concerns.  Thank you for this consult.    CAILIN Figueroa  2024  10:00 EDT      Electronically signed by Ashley Acharya APRN at 10/06/24 1524          Discharge Summary        Carolynn Delacruz APRN at 10/11/24 1129       Attestation signed by Jeramei Shelley DO at 10/11/24 1324    I have reviewed this documentation and agree.                  Discharge Summary    Patient name: Lauryn Romo  CSN: [id number]  MRN: [id number]  : 1960  Today's date: 10/11/2024     Date of Admission: 10/6/2024  Date of Discharge:  10/11/2024    Admitting Physician:  Dr. Varun Shelley MD; Intensivist  Primary Care Provider: Melissa Lazo APRN  Consultations:  Dr. Enoch Savage MD; Neurosurgery     Dr. Tomasz Mendez MD; Neurosurgery     Dr. Shay Purvis MD; Neurology    Admission Diagnosis: CVA     Discharge Diagnoses:   Active Hospital Problems    Diagnosis     Acute cystitis without hematuria     Cerebral aneurysm, nonruptured     Acute CVA      Allergies:  Tramadol    Code Status and Medical Interventions: CPR (Attempt to Resuscitate); Full Support   Ordered at: 10/06/24 1309     Code Status (Patient has no pulse and is not breathing):    CPR (Attempt to Resuscitate)     Medical Interventions (Patient has pulse or is breathing):    Full Support     Procedures/Testing:  Procedure(s):  IR mechanical thrombectomy, 10/06/2024     History of Present Illness:  Lauryn Romo is a 63 y.o. female with PMH HTN, HLD, migraine headaches, history of CVA, and giant basilar aneurysm s/p pipeline  embolization per Dr. Savage (2022, 2023) who presented to [facility] ED on 10/06/2024 for evaluation of left-sided weakness and facial droop that was present when she awoke that morning. She had previously been on Plavix and ASA, but was recently changed to ASA monotherapy on 9/23/24.    On arrival to the ED, CT imaging showed  acute thrombosis of her pipeline stent and basilar artery. She underwent successful thrombectomy per Dr. Mendez, restoring normal (TICI 3) flow. Stenosis within the mid-portions fo the Pipeline stent was treated with a 2.5-mm coronary MICHELLE with flow restored within the vertebrobasilar system. She was admitted to the ICU post-procedure.      Hospital Course:  Patient was brought to the ICU post-procedure intubated and on mechanical ventilation, where she passed a spontaneous breathing trial hours later and was successfully extubated to nasal cannula oxygen. In the afternoon on 10/6/24, she had a sudden-onset 10/10 headache with associated nausea/vomiting, requiring Compazine. STAT dual-energy CT head without evidence of hemorrhage. She was treated with Decadron and magnesium.    On 10/08/2024, Ms. Romo developed a right groin hematoma and became hypotensive and bradycardic with groin compression. She responded to atropine and was given 1 unit PRBCs for symptomatic bleeding. Right groin duplex on 10/09/2024 showed a small superficial hematoma, but no pseudoaneurysm. She required no further intervention and was stable.    She was seen by SLP and underwent FEES on 10/07/24 and was cleared for a regular diet with thin liquids. PT/OT followed with recommendation for inpatient rehabilitation. On last evaluation on 10/10/24 with limited movement due to deficits in balance and coordination and right groin pain. She showed improved functional mobility with minimal assistance with rolling walker and LBD tasks with maximum assistance.     As of 10/11/24, Ms. Romo was found to be appropriate for discharge to  "[facility]. Follow up instructions below.    Vitals:  /81   Pulse 65   Temp 96.9 °F (36.1 °C) (Axillary)   Resp 16   Ht 154.9 cm (60.98\")   Wt 58.1 kg (128 lb 1.4 oz)   SpO2 97%   BMI 24.21 kg/m²     Physical Exam:  Constitutional:  Appears well-developed and well-nourished. No distress.   HEENT:  Normocephalic and atraumatic. PERRL  Neck:  Neck supple. No JVD present.   CV: Normal rate, regular rhythm,  intact distal pulses.  No gallop, murmur or rub.  Pulmonary/Chest: Effort normal and breath sounds normal. No respiratory distress. No wheezes, rhonchi or rales.   Abdominal: Soft. +BS. No distension and no mass. There is no tenderness.   Musculoskeletal: Normal muscle tone and strength  Neurological: Alert and oriented to person, place, and time.  No focal deficits  Skin: Skin is warm and dry. No rash noted.   Extremities:  No clubbing, edema or cyanosis. Generalized weakness.  Psychiatric: Normal mood and affect. Behavior is normal.     Adult Transthoracic Echo Complete W/ Cont if Necessary Per Protocol     Interpretation Summary    Left ventricular systolic function is normal. Estimated left ventricular EF = 65%    Left ventricular diastolic function is consistent with (grade I) impaired relaxation.    The cardiac valves are anatomically and functionally normal.    Estimated right ventricular systolic pressure from tricuspid regurgitation is normal (<35 mmHg).    Saline test results are negative.    Interval: baseline (return from MRI)  1a. Level of Consciousness: 0-->Alert, keenly responsive  1b. LOC Questions: 0-->Answers both questions correctly  1c. LOC Commands: 0-->Performs both tasks correctly  2. Best Gaze: 0-->Normal  3. Visual: 0-->No visual loss  4. Facial Palsy: 0-->Normal symmetrical movements  5a. Motor Arm, Left: 0-->No drift, limb holds 90 (or 45) degrees for full 10 secs  5b. Motor Arm, Right: 0-->No drift, limb holds 90 (or 45) degrees for full 10 " secs  6a. Motor Leg, Left: 0-->No drift, leg holds 30 degree position for full 5 secs  6b. Motor Leg, Right: 0-->No drift, leg holds 30 degree position for full 5 secs  7. Limb Ataxia: 0-->Absent  8. Sensory: 0-->Normal, no sensory loss  9. Best Language: 0-->No aphasia, normal  10. Dysarthria: 0-->Normal  11. Extinction and Inattention (formerly Neglect): 0-->No abnormality    Total (NIH Stroke Scale): 0    Labs:  Results from last 7 days   Lab Units 10/10/24  0349   WBC 10*3/mm3 13.14*   HEMOGLOBIN g/dL 12.6   HEMATOCRIT % 37.4   PLATELETS 10*3/mm3 189     Results from last 7 days   Lab Units 10/10/24  0349 10/08/24  0404 10/07/24  0555   SODIUM mmol/L 141   < > 143   POTASSIUM mmol/L 3.9   < > 4.0   CHLORIDE mmol/L 109*   < > 112*   CO2 mmol/L 24.0   < > 20.0*   BUN mg/dL 24*   < > 21   CREATININE mg/dL 0.59   < > 0.43*   CALCIUM mg/dL 8.2*   < > 8.7   BILIRUBIN mg/dL  --   --  0.3   ALK PHOS U/L  --   --  98   ALT (SGPT) U/L  --   --  49*   AST (SGOT) U/L  --   --  22   GLUCOSE mg/dL 113*   < > 126*    < > = values in this interval not displayed.         Magnesium   Date Value Ref Range Status   10/10/2024 2.1 1.6 - 2.4 mg/dL Final     Phosphorus   Date Value Ref Range Status   10/10/2024 3.4 2.5 - 4.5 mg/dL Final                    Discharge Medications        New Medications        Instructions Start Date   acetaminophen 325 MG tablet  Commonly known as: TYLENOL   650 mg, Oral, Every 6 Hours PRN      dexAMETHasone 2 MG tablet  Commonly known as: DECADRON   2 mg, Oral, Every 8 Hours Scheduled      dexAMETHasone 2 MG tablet  Commonly known as: DECADRON   2 mg, Oral, Every 12 Hours Scheduled   Start Date: October 14, 2024     dexAMETHasone 1 MG tablet  Commonly known as: DECADRON   1 mg, Oral, Every 12 Hours Scheduled   Start Date: October 17, 2024     famotidine 20 MG tablet  Commonly known as: PEPCID   20 mg, Oral, 2 Times Daily Before Meals      magnesium oxide 400 tablet tablet  Commonly known as: MAG-OX    400 mg, Oral, Nightly      ticagrelor 90 MG tablet tablet  Commonly known as: BRILINTA   90 mg, Oral, 2 Times Daily             Changes to Medications        Instructions Start Date   aspirin 81 MG chewable tablet  What changed: when to take this   81 mg, Oral, Daily   Start Date: October 12, 2024     nortriptyline 10 MG capsule  Commonly known as: PAMELOR  What changed: See the new instructions.   20 mg, Oral, Nightly      sertraline 50 MG tablet  Commonly known as: Zoloft  What changed: Another medication with the same name was removed. Continue taking this medication, and follow the directions you see here.   50 mg, Oral, Daily             Continue These Medications        Instructions Start Date   atorvastatin 80 MG tablet  Commonly known as: LIPITOR   80 mg, Oral, Nightly      Emgality 120 MG/ML auto-injector pen  Generic drug: galcanezumab-gnlm   120 mg, Subcutaneous, Every 28 Days      fluticasone 50 MCG/ACT nasal spray  Commonly known as: FLONASE   2 sprays, Nasal, Daily      LORazepam 0.5 MG tablet  Commonly known as: ATIVAN   0.5 mg, Oral, Every 8 Hours PRN      lubiprostone 8 MCG capsule  Commonly known as: AMITIZA   Take 1 capsule by mouth 2 (Two) Times a Day.      topiramate 100 MG tablet  Commonly known as: TOPAMAX   100 mg, Oral, Daily      Ubrelvy 100 MG tablet  Generic drug: ubrogepant   100 mg, Oral, Daily PRN, Take at onset of headache - if symptoms persist or return, may repeat dose in 2 hours. Maximum: 200 mg per 24 hours             Stop These Medications      bethanechol 25 MG tablet  Commonly known as: URECHOLINE     Cariprazine HCl 1.5 MG capsule capsule  Commonly known as: VRAYLAR     cefuroxime 500 MG tablet  Commonly known as: CEFTIN     Diclofenac Sodium 1 % gel gel  Commonly known as: VOLTAREN     ondansetron ODT 4 MG disintegrating tablet  Commonly known as: ZOFRAN-ODT     oxybutynin XL 5 MG 24 hr tablet  Commonly known as: DITROPAN-XL     pantoprazole 20 MG EC tablet  Commonly known  as: PROTONIX            Follow-up Appointments  Future Appointments   Date Time Provider Department Center   11/11/2024  1:00 PM Nikki Carrero PA-C [facility]   12/11/2024 10:00 AM Philomena Mott APRN [facility]   12/30/2024  9:30 AM Ruth Burris APRN [facility]   3/24/2025 11:00 AM [facility]   3/24/2025  1:30 PM Enoch Savage MD [facility]     Additional Instructions for the Follow-ups that You Need to Schedule       Ambulatory Referral to Neurology   As directed      In 2 months    Discharge Follow-up with Specified Provider: Enoch Savage; 1 Month   As directed      To: Enoch Savage   Follow Up: 1 Month              Discharge Instructions:  Discharge to [facility] today at 1430  Transportation per [facility] van transport  Medications as above  Follow up with Dr. Savage in 1 month  Follow up in stroke clinic in 2 months  Return to the hospital or call 911 with recurrence of symptoms     Slime Delacruz, MSN, APRN, ACNPC-AG  Pulmonary and Critical Care Medicine  Electronically signed by CAILIN Abbott, 10/11/24, 11:29 AM EDT.       Time: I spent 35 minutes on this discharge activity which included: face-to-face encounter with the patient, reviewing the data in the system, coordination of the care with the nursing staff as well as consultants, documentation, and entering orders.      CC: Melissa Lazo APRN          Electronically signed by Jeramie Shelley DO at 10/11/24 1324       Discharge Order (From admission, onward)       Start     Ordered    10/11/24 1221  Discharge patient  Once        Comments: [facility]   Expected Discharge Date: 10/11/24   Expected Discharge Time: Afternoon   Discharge Disposition: Rehab Facility or Unit (DC - External)   Physician of Record for Attribution - Please select from Treatment Team: JERAMIE SHELLEY [[id number]]   Review needed by CMO to determine Physician of  Record: No      Question Answer Comment   Physician of Record for Attribution - Please select from Treatment Team DEMARCUS CARRILLO    Review needed by CMO to determine Physician of Record No        10/11/24 0020

## 2024-10-24 ENCOUNTER — SPECIALTY PHARMACY (OUTPATIENT)
Dept: ONCOLOGY | Facility: HOSPITAL | Age: 64
End: 2024-10-24
Payer: COMMERCIAL

## 2024-10-24 NOTE — PROGRESS NOTES
Specialty Pharmacy Refill Coordination Note     Lauryn is a 63 y.o. female contacted today regarding refills of her specialty medication(s).    Specialty medication(s) and dose(s) confirmed: emgality 120 mg/mL  Changes to medications: yes, s/p CVA on DAPT again, noted below  Changes to insurance: no  Reviewed and verified with patient:       Allergies, med list, problem list    Refill Questions      Flowsheet Row Most Recent Value   Changes to allergies? No   Changes to medications? Yes  [asa only, now changed to brillinta/asa]   New conditions or infections since last clinic visit Yes  [repeat CVA, requiring mechanical thrombectomy and MICHELLE to basilar artery, changed back to DAPT with brillinta and ASA]   Unplanned office visit, urgent care, ED, or hospital admission in the last 4 weeks  Yes  [discharged from [facility], post acute stroke, continues on brillinta and asa81 mg.]   How does patient/caregiver feel medication is working? Good   Financial problems or insurance changes  No   Since the previous refill, were any specialty medication doses or scheduled injections missed or delayed?  No   Does this patient require a clinical escalation to a pharmacist? No            Delivery Questions      Flowsheet Row Most Recent Value   Delivery method [employer]   Delivery address verified with patient/caregiver? Yes   Delivery address Home   Number of medications in delivery 1   Medication(s) being filled and delivered Galcanezumab-gnlm (EMGALITY)   Doses left of specialty medications several ubrelvy doesn't need a refill for it. the emgality is due 11/15.   Copay verified? Yes   Copay amount 0$   Copay form of payment No copayment ($0)   Ship Date 10/24   Delivery Date 10/25   Signature Required No                 Follow-up: 3.5 week(s)     Demarcus Arrington, PharmD  10/24/2024   10:46 EDT

## 2024-11-11 ENCOUNTER — OFFICE VISIT (OUTPATIENT)
Dept: NEUROSURGERY | Facility: CLINIC | Age: 64
End: 2024-11-11
Payer: COMMERCIAL

## 2024-11-11 VITALS
SYSTOLIC BLOOD PRESSURE: 132 MMHG | TEMPERATURE: 96.8 F | WEIGHT: 130 LBS | BODY MASS INDEX: 24.55 KG/M2 | DIASTOLIC BLOOD PRESSURE: 86 MMHG | HEIGHT: 61 IN

## 2024-11-11 DIAGNOSIS — I67.1 CEREBRAL ANEURYSM, NONRUPTURED: ICD-10-CM

## 2024-11-11 DIAGNOSIS — Z86.73 HISTORY OF CVA (CEREBROVASCULAR ACCIDENT): Primary | ICD-10-CM

## 2024-11-11 PROCEDURE — 99214 OFFICE O/P EST MOD 30 MIN: CPT

## 2024-11-11 RX ORDER — CLOPIDOGREL BISULFATE 75 MG/1
75 TABLET ORAL DAILY
Qty: 30 TABLET | Refills: 3 | Status: SHIPPED | OUTPATIENT
Start: 2024-11-11

## 2024-11-11 RX ORDER — LACTULOSE 10 G/15ML
SOLUTION ORAL
COMMUNITY
Start: 2024-11-05

## 2024-11-11 RX ORDER — PANTOPRAZOLE SODIUM 20 MG/1
1 TABLET, DELAYED RELEASE ORAL DAILY
COMMUNITY

## 2024-11-11 NOTE — PROGRESS NOTES
Name: Lauryn Romo    : 1960     MRN: [id number]     Primary Care Provider: Melissa Lazo APRN    Chief Complaint  Follow-up (S/p embolization for cerebral aneurysm)      History of Present Illness:  Lauryn Romo is a 63 y.o. 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 She was seen in the neurointerventional clinic on 2024, and her Plavix was stopped, but she remained on low-dose aspirin.     Ms. Romo presented to the emergency department on 10/6/2024 with acute thrombosis of her pipeline stent and basilar artery.  She underwent successful mechanical thrombectomy, restoring normal (TICI 3) flow.  This did unmask a stenosis within the mid portions of the Pipeline stent construct, and this was treated with a 2.5 mm coronary MICHELLE, restoring robust flow within the vertebrobasilar system.     Given the severity of her presenting stroke symptoms, Ms. Romo has made an exceptional recovery.  MRI during her hospital stay demonstrated only a few punctate areas of infarct (not in the brainstem).  She spent 2 weeks at [facility] following discharge.  PT comes to her home twice a week and OT also comes weekly.  She is currently using a walker due to dizziness and unsteady gait (which was present prior to most recent stroke), but is working towards transitioning to a cane.  Her main complaint today is general fatigue.  Her and her  both report significant improvement in her speech. She has stable, chronic headaches, but gives no history of a singular headache to suggest a subarachnoid or intracranial hemorrhage. She is currently on an aspirin/Brilinta regimen and does report feeling short of breath. She presents today for follow-up.    PMHX  Allergies:  Allergies   Allergen Reactions    Tramadol Other (See  Comments)     Flushng and passing out     Medications    Current Outpatient Medications:     acetaminophen (TYLENOL) 325 MG tablet, Take 2 tablets by mouth Every 6 (Six) Hours As Needed for Mild Pain., Disp: , Rfl:     aspirin 81 MG chewable tablet, Chew 1 tablet Daily., Disp: , Rfl:     atorvastatin (LIPITOR) 80 MG tablet, TAKE 1 TABLET BY MOUTH EVERY NIGHT, Disp: 90 tablet, Rfl: 1    Cariprazine HCl (Vraylar) 1.5 MG capsule capsule, Take 1 capsule every other day by oral route., Disp: , Rfl:     famotidine (PEPCID) 20 MG tablet, Take 1 tablet by mouth 2 (Two) Times a Day Before Meals., Disp: , Rfl:     fluticasone (FLONASE) 50 MCG/ACT nasal spray, 2 sprays into the nostril(s) as directed by provider Daily., Disp: 1 bottle, Rfl: 0    galcanezumab-gnlm (EMGALITY) 120 MG/ML auto-injector pen, Inject 1 mL under the skin into the appropriate area as directed Every 28 (Twenty-Eight) Days., Disp: 1 mL, Rfl: 11    lactulose (CHRONULAC) 10 GM/15ML solution, , Disp: , Rfl:     LORazepam (ATIVAN) 0.5 MG tablet, Take 1 tablet by mouth Every 8 (Eight) Hours As Needed for Anxiety., Disp: , Rfl:     lubiprostone (AMITIZA) 8 MCG capsule, Take 1 capsule by mouth 2 (Two) Times a Day., Disp: , Rfl:     magnesium oxide (MAG-OX) 400 tablet tablet, Take 1 tablet by mouth Every Night., Disp: , Rfl:     nortriptyline (PAMELOR) 10 MG capsule, Take 2 capsules by mouth Every Night., Disp: , Rfl:     pantoprazole (PROTONIX) 20 MG EC tablet, Take 1 tablet by mouth Daily., Disp: , Rfl:     sertraline (Zoloft) 50 MG tablet, Take 1 tablet by mouth Daily., Disp: 90 tablet, Rfl: 1    topiramate (TOPAMAX) 100 MG tablet, Take 1 tablet by mouth Daily., Disp: , Rfl:     ubrogepant (Ubrelvy) 100 MG tablet, Take 1 tablet by mouth Daily As Needed (migraines). Take at onset of headache - if symptoms persist or return, may repeat dose in 2 hours. Maximum: 200 mg per 24 hours, Disp: 16 tablet, Rfl: 11    clopidogrel (Plavix) 75 MG tablet, Take 1 tablet by  mouth Daily., Disp: 30 tablet, Rfl: 3  Past Medical History:  Past Medical History:   Diagnosis Date    Abnormal ECG     Aneurysm 2022    Cancer 2024    Basal Cell Carcinoma removed from scalp by dermatology    Cluster headache     Had headaches for several months before stroke and mass was found    Difficulty walking 23    After stroke    Headache     Headache, tension-type     Hyperlipidemia     Hypertension     Memory loss 23    Migraine     Stroke 2022    Vision loss 23    When dizzy or headache     Past Surgical History:  Past Surgical History:   Procedure Laterality Date    ARTERIAL ANEURYSM REPAIR      BREAST LUMPECTOMY  2012    CYST REMOVAL Left 2023    EMBOLIZATION CEREBRAL N/A 2022    Procedure: CV EMBOLIZATION CEREBRAL IR;  Surgeon: Enoch Savage MD;  Location:  [facility];  Service: Interventional Radiology;  Laterality: N/A;    HIATAL HERNIA REPAIR      INTERVENTIONAL RADIOLOGY PROCEDURE N/A 09/15/2023    Procedure: Embolization;  Surgeon: Enoch Savage MD;  Location:  [facility];  Service: Interventional Radiology;  Laterality: N/A;    INTERVENTIONAL RADIOLOGY PROCEDURE N/A 10/6/2024    Procedure: IR mechanical thrombectomy;  Surgeon: Joe Mendez MD;  Location:  [facility];  Service: Interventional Radiology;  Laterality: N/A;    SINUS SURGERY      x4    SKIN CANCER EXCISION      cancer removed off top of head     Social Hx:  Social History     Tobacco Use    Smoking status: Former     Current packs/day: 0.00     Average packs/day: 1 pack/day for 15.0 years (15.0 ttl pk-yrs)     Types: Cigarettes     Start date: 10/4/1989     Quit date: 10/4/2004     Years since quittin.1     Passive exposure: Past    Smokeless tobacco: Never   Vaping Use    Vaping status: Never Used   Substance Use Topics    Alcohol use: No    Drug use: Never     Family Hx:  Family History   Problem  Relation Age of Onset    No Known Problems Mother     Heart attack Father 42    Early death Father         Heart attack    Heart disease Father     Hyperlipidemia Father     Heart attack Paternal Aunt     Heart attack Paternal Uncle     No Known Problems Sister     Cancer Maternal Grandmother     Heart failure Maternal Grandmother     No Known Problems Maternal Grandfather     No Known Problems Paternal Grandmother     No Known Problems Paternal Grandfather     Diabetes Half-Brother      Review of Systems:        Review of Systems   Constitutional:  Negative for activity change, appetite change, chills, diaphoresis, fatigue, fever and unexpected weight change.   HENT:  Negative for congestion, dental problem, drooling, ear discharge, ear pain, facial swelling, hearing loss, mouth sores, nosebleeds, postnasal drip, rhinorrhea, sinus pressure, sinus pain, sneezing, sore throat, tinnitus, trouble swallowing and voice change.    Eyes:  Negative for photophobia, pain, discharge, redness, itching and visual disturbance.   Respiratory:  Negative for apnea, cough, choking, chest tightness, shortness of breath, wheezing and stridor.    Cardiovascular:  Negative for chest pain, palpitations and leg swelling.   Gastrointestinal:  Negative for abdominal distention, abdominal pain, anal bleeding, blood in stool, constipation, diarrhea, nausea, rectal pain and vomiting.   Endocrine: Negative for cold intolerance, heat intolerance, polydipsia, polyphagia and polyuria.   Genitourinary:  Negative for decreased urine volume, difficulty urinating, dysuria, enuresis, flank pain, frequency, genital sores, hematuria and urgency.   Musculoskeletal:  Negative for arthralgias, back pain, gait problem, joint swelling, myalgias, neck pain and neck stiffness.   Skin:  Negative for color change, pallor, rash and wound.   Allergic/Immunologic: Negative for environmental allergies, food allergies and immunocompromised state.   Neurological:   Negative for dizziness, tremors, seizures, syncope, facial asymmetry, speech difficulty, weakness, light-headedness, numbness and headaches.   Hematological:  Negative for adenopathy. Does not bruise/bleed easily.   Psychiatric/Behavioral:  Negative for agitation, behavioral problems, confusion, decreased concentration, dysphoric mood, hallucinations, self-injury, sleep disturbance and suicidal ideas. The patient is not nervous/anxious and is not hyperactive.    All other systems reviewed and are negative.       Review of  Imaging: No new imaging    Vital Signs:   Vitals:    24 1232   BP: 132/86   Temp: 96.8 °F (36 °C)        Physical Exam  General: Well-developed, well-nourished, in no acute distress.    Cardiovascular: Regular rate and rhythm.  No carotid bruits.    Neuro: Alert and oriented x 3. Speech is clear. No facial droop or pronator drift.  I do not appreciate any gross visual field deficits.  EOMs intact bilaterally.  Strength is symmetric in upper and lower extremities. Ambulates with assistance of a walker.      Social History    Tobacco Use      Smoking status: Former        Packs/day: 0.00        Years: 1 pack/day for 15.0 years (15.0 ttl pk-yrs)        Types: Cigarettes        Start date: 10/4/1989        Quit date: 10/4/2004        Years since quittin.1        Passive exposure: Past      Smokeless tobacco: Never       Tobacco Use: Medium Risk (2024)    Patient History     Smoking Tobacco Use: Former     Smokeless Tobacco Use: Never     Passive Exposure: Past         STEADI Fall Risk Assessment was completed, and patient is at HIGH risk for falls. Assessment completed on:2024        Assessment/Plan    Diagnoses and all orders for this visit:    1. History of CVA (cerebrovascular accident) (Primary)  -     clopidogrel (Plavix) 75 MG tablet; Take 1 tablet by mouth Daily.  Dispense: 30 tablet; Refill: 3  -     MRI Brain With & Without Contrast; Future    2. Cerebral aneurysm,  "nonruptured  -     clopidogrel (Plavix) 75 MG tablet; Take 1 tablet by mouth Daily.  Dispense: 30 tablet; Refill: 3  -     MRI Brain With & Without Contrast; Future         Lauryn Romo is a 63 y.o. 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  The aneurysm is completely isolated from the right vertebral/basilar circulation (side of flow diverter's), but there is a small amount of residual aneurysm supplied via the left vertebral artery; however, the outflow of this residual aneurysm supplies the right PICA, and thus this has been managed conservatively (as occlusion would result in a high risk of stroke).     There was acute thrombosis of her pipeline stent and basilar artery, which necessitated emergent mechanical thrombectomy and a 2.5 mm coronary MICHELLE.  She has done exceptionally well following her hospitalization.  Ms. Romo does continue to struggle with dizziness and unsteady gait, as well as generalized fatigue and lack of stamina.  She is actively working on these issues with therapy.  At this time, we will transition her from Brilinta to Plavix.  She will take Brilinta as normal this evening.  Tomorrow morning she will take 300 mg (4 tablets) of Plavix along with aspirin 81 mg.  The following morning she will take Plavix 75 mg and aspirin 81 mg, and will continue this Plavix/aspirin regimen daily.  Dr. Savage and I both discussed this with the patient and her  and written instructions were given, and they expressed an understanding.    We will plan to follow-up with Ms. Romo in 3 months time with MRI brain.  In the interim, she will contact our office and/or 911 if she experiences any new stroke/TIA-like symptoms, or \"thunderclap\" headache.    Patient encouraged to contact us if she has any changes in her condition or any concerns.    Any copied data from " previous notes included in the (1) HPI, (2) PE, (3) MDM and/or Assessment and Plan has been reviewed and accurate as of 11/11/24.    I spent 32 minutes caring for Lauryn on this date of service. This time includes time spent by me in the following activities: preparing for the visit, performing a medically appropriate examination and/or evaluation, counseling and educating the patient/family/caregiver, referring and communicating with other health care professionals, documenting information in the medical record, ordering medications, and ordering test(s).      PETER EvansC  11/11/24

## 2024-11-25 ENCOUNTER — SPECIALTY PHARMACY (OUTPATIENT)
Dept: ONCOLOGY | Facility: HOSPITAL | Age: 64
End: 2024-11-25
Payer: COMMERCIAL

## 2024-11-25 NOTE — PROGRESS NOTES
Specialty Pharmacy Refill Coordination Note     Lauryn is a 64 y.o. female contacted today regarding refills of Emgality and Ubrelvy specialty medication(s).Patient is due for injection on 11/28.    Reviewed and verified with patient:       Specialty medication(s) and dose(s) confirmed: YES    Refill Questions      Flowsheet Row Most Recent Value   Changes to allergies? No   Changes to medications? No   New conditions or infections since last clinic visit No   Unplanned office visit, urgent care, ED, or hospital admission in the last 4 weeks  No   How does patient/caregiver feel medication is working? Good   Financial problems or insurance changes  No   Since the previous refill, were any specialty medication doses or scheduled injections missed or delayed?  No   If yes, please provide the amount N/A   Why were doses missed? N/A   Does this patient require a clinical escalation to a pharmacist? No            Delivery Questions      Flowsheet Row Most Recent Value   Delivery method UPS   Delivery address verified with patient/caregiver? Yes   Delivery address Home   Number of medications in delivery 2   Medication(s) being filled and delivered Galcanezumab-gnlm (EMGALITY), Ubrogepant (UBRELVY)   Doses left of specialty medications Ubrelvy 3 tablets left as of 11/25   Copay verified? Yes   Copay amount Emgality and Ubrelvy =$0   Copay form of payment No copayment ($0)   Ship Date 11/25   Delivery Date 11/26   Signature Required No                   Follow-up: 28 day(s)     Evon Robbins  Specialty Pharmacy Technician

## 2024-12-05 ENCOUNTER — TELEPHONE (OUTPATIENT)
Age: 64
End: 2024-12-05
Payer: COMMERCIAL

## 2024-12-05 NOTE — TELEPHONE ENCOUNTER
Lloyd called and said she thought she would have to stop taking Zoloft 150 MG (1 100 MG tablet + 1 50 MG tablet), since she has not been seen in a few months.  I advised her to continue taking it as directed unless Dr. Whatley says otherwise.  She expressed understanding and had no further questions.

## 2024-12-09 ENCOUNTER — TELEPHONE (OUTPATIENT)
Dept: NEUROLOGY | Facility: CLINIC | Age: 64
End: 2024-12-09
Payer: COMMERCIAL

## 2024-12-09 NOTE — TELEPHONE ENCOUNTER
Patient was called to change her appointment. Patients appointment has been changed to 1/13/25 at 1:00PM.  Patient verbalized understanding.

## 2024-12-17 ENCOUNTER — SPECIALTY PHARMACY (OUTPATIENT)
Dept: ONCOLOGY | Facility: HOSPITAL | Age: 64
End: 2024-12-17
Payer: COMMERCIAL

## 2024-12-17 NOTE — PROGRESS NOTES
Specialty Pharmacy Refill Coordination Note     Lauryn is a 64 y.o. female contacted today regarding refills of Emgality specialty medication(s).  Patient is due for injection on 12/28.  Reviewed and verified with patient:       Specialty medication(s) and dose(s) confirmed: yes    Refill Questions      Flowsheet Row Most Recent Value   Changes to allergies? No   Changes to medications? No   New conditions or infections since last clinic visit No   Unplanned office visit, urgent care, ED, or hospital admission in the last 4 weeks  No   How does patient/caregiver feel medication is working? Good   Financial problems or insurance changes  No   Since the previous refill, were any specialty medication doses or scheduled injections missed or delayed?  Yes   If yes, please provide the amount Ubrelvy have some extras   Why were doses missed? Ubrelvy prn   Does this patient require a clinical escalation to a pharmacist? No            Delivery Questions      Flowsheet Row Most Recent Value   Delivery method UPS   Delivery address verified with patient/caregiver? Yes   Delivery address Home   Number of medications in delivery 1   Medication(s) being filled and delivered Galcanezumab-gnlm (EMGALITY)   Doses left of specialty medications Ubrelvy have some extras   Copay verified? Yes   Copay amount Emgality =$0   Copay form of payment No copayment ($0)   Ship Date 12/17   Delivery Date 12/18   Signature Required No                   Follow-up: 28 day(s)     Evon Robbins  Specialty Pharmacy Technician

## 2024-12-26 RX ORDER — TOPIRAMATE 100 MG/1
100 TABLET, FILM COATED ORAL DAILY
Qty: 30 TABLET | Refills: 0 | OUTPATIENT
Start: 2024-12-26

## 2024-12-26 NOTE — TELEPHONE ENCOUNTER
Rx Refill Note  Requested Prescriptions     Pending Prescriptions Disp Refills    topiramate (TOPAMAX) 100 MG tablet [Pharmacy Med Name: TOPIRAMATE 100MG] 30 tablet 0     Sig: TAKE 1 TABLET BY MOUTH ONCE DAILY      Last filled:  Last office visit with prescribing clinician: 9/24/2024      Next office visit with prescribing clinician: 12/30/2024     Syl Chavez MA  12/26/24, 11:31 EST

## 2024-12-30 ENCOUNTER — OFFICE VISIT (OUTPATIENT)
Dept: NEUROLOGY | Facility: CLINIC | Age: 64
End: 2024-12-30
Payer: COMMERCIAL

## 2024-12-30 ENCOUNTER — TELEPHONE (OUTPATIENT)
Age: 64
End: 2024-12-30
Payer: COMMERCIAL

## 2024-12-30 VITALS
OXYGEN SATURATION: 98 % | WEIGHT: 133 LBS | SYSTOLIC BLOOD PRESSURE: 110 MMHG | HEIGHT: 61 IN | HEART RATE: 73 BPM | BODY MASS INDEX: 25.11 KG/M2 | DIASTOLIC BLOOD PRESSURE: 74 MMHG

## 2024-12-30 DIAGNOSIS — Z86.73 HISTORY OF CVA (CEREBROVASCULAR ACCIDENT): ICD-10-CM

## 2024-12-30 DIAGNOSIS — G44.89 OTHER HEADACHE SYNDROME: Primary | ICD-10-CM

## 2024-12-30 PROCEDURE — 99214 OFFICE O/P EST MOD 30 MIN: CPT | Performed by: NURSE PRACTITIONER

## 2024-12-30 RX ORDER — TOPIRAMATE 100 MG/1
100 TABLET, FILM COATED ORAL NIGHTLY
Qty: 30 TABLET | Refills: 6 | Status: SHIPPED | OUTPATIENT
Start: 2024-12-30

## 2024-12-30 RX ORDER — BETHANECHOL CHLORIDE 25 MG/1
25 TABLET ORAL 2 TIMES DAILY
COMMUNITY
Start: 2024-12-03

## 2024-12-30 RX ORDER — NORTRIPTYLINE HYDROCHLORIDE 10 MG/1
20 CAPSULE ORAL NIGHTLY
Qty: 60 CAPSULE | Refills: 6 | Status: SHIPPED | OUTPATIENT
Start: 2024-12-30

## 2024-12-30 RX ORDER — SERTRALINE HYDROCHLORIDE 100 MG/1
150 TABLET, FILM COATED ORAL DAILY
Qty: 45 TABLET | Refills: 2 | Status: SHIPPED | OUTPATIENT
Start: 2024-12-30

## 2024-12-30 RX ORDER — SERTRALINE HYDROCHLORIDE 100 MG/1
100 TABLET, FILM COATED ORAL DAILY
COMMUNITY
Start: 2024-12-06 | End: 2024-12-30 | Stop reason: SDUPTHER

## 2024-12-30 NOTE — PROGRESS NOTES
Neuro Office Visit      Encounter Date: 2024   Patient Name: Lauryn Romo  : 1960   MRN: [id number]   PCP:  Melissa Lazo APRN     Chief Complaint:    Chief Complaint   Patient presents with    Headache       History of Present Illness: Lauryn Romo is a 64 y.o. female who is here today in Neurology for migraine.    Last visit with me on 2024, continued Emgality, nortriptyline, Topamax, Medrol Dosepak given.    Hospitalized at [facility] from 10/6 - 10/11/2024 following onset of left sided weakness and facial droop that was present on awakening.  She was previously on a regimen of aspirin and Plavix but Plavix was discontinued in .    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  She underwent thrombectomy of the pipeline stent with a coronary MICHELLE which restored flow in the vertebral basilar system.    Following the procedure she was admitted to ICU, intubated and on mechanical ventilation.  She was able to be extubated later that day.    She developed a right groin hematoma associated with hypotension and bradycardia which responded to atropine.  She was given a unit of packed red blood cells for symptomatic bleeding.  Duplex was negative for pseudoaneurysm.    She underwent physical and Occupational Therapy and was transferred to [facility] for further therapy.    Since her hospitalization migraines have been worse.    Last dose of Emgality was December 15 but she feels like it has not been as effective as it was prior to her stroke.    Pain is described as a thumping sensation and some days are worse than others but she has noted daily headaches.    Nurtec was previously helpful but insurance would not pay for the prescription.              Subjective      Past Medical History:   Past Medical History:   Diagnosis Date    Abnormal ECG     Aneurysm 2022    Cancer 2024    Basal Cell Carcinoma removed from  scalp by dermatology    Cluster headache 2022    Had headaches for several months before stroke and mass was found    Difficulty walking 11/26/22    After stroke    Headache     Headache, tension-type 2022    Hyperlipidemia     Hypertension     Memory loss 11/26/22    Migraine 2022    Stroke 11/26/2022    Stroke 10/06/2024    Vision loss 11/26/22    When dizzy or headache       Past Surgical History:   Past Surgical History:   Procedure Laterality Date    ARTERIAL ANEURYSM REPAIR      BREAST LUMPECTOMY  2012    CYST REMOVAL Left 05/2023    EMBOLIZATION CEREBRAL N/A 11/29/2022    Procedure: CV EMBOLIZATION CEREBRAL IR;  Surgeon: Enoch Savage MD;  Location:  [facility];  Service: Interventional Radiology;  Laterality: N/A;    HIATAL HERNIA REPAIR  2015    INTERVENTIONAL RADIOLOGY PROCEDURE N/A 09/15/2023    Procedure: Embolization;  Surgeon: Enoch Savage MD;  Location: [facility];  Service: Interventional Radiology;  Laterality: N/A;    INTERVENTIONAL RADIOLOGY PROCEDURE N/A 10/6/2024    Procedure: IR mechanical thrombectomy;  Surgeon: Joe Mendez MD;  Location:  [facility];  Service: Interventional Radiology;  Laterality: N/A;    SINUS SURGERY      x4    SKIN CANCER EXCISION      cancer removed off top of head       Family History:   Family History   Problem Relation Age of Onset    No Known Problems Mother     Heart attack Father 42    Early death Father         Heart attack    Heart disease Father     Hyperlipidemia Father     Heart attack Paternal Aunt     Heart attack Paternal Uncle     No Known Problems Sister     Cancer Maternal Grandmother     Heart failure Maternal Grandmother     No Known Problems Maternal Grandfather     No Known Problems Paternal Grandmother     No Known Problems Paternal Grandfather     Diabetes Half-Brother        Social History:   Social History     Socioeconomic History    Marital status:    Tobacco Use     Smoking status: Former     Current packs/day: 0.00     Average packs/day: 1 pack/day for 15.0 years (15.0 ttl pk-yrs)     Types: Cigarettes     Start date: 10/4/1989     Quit date: 10/4/2004     Years since quittin.2     Passive exposure: Past    Smokeless tobacco: Never   Vaping Use    Vaping status: Never Used   Substance and Sexual Activity    Alcohol use: No    Drug use: Never    Sexual activity: Not Currently     Partners: Male     Birth control/protection: Tubal ligation       Medications:     Current Outpatient Medications:     acetaminophen (TYLENOL) 325 MG tablet, Take 2 tablets by mouth Every 6 (Six) Hours As Needed for Mild Pain., Disp: , Rfl:     aspirin 81 MG chewable tablet, Chew 1 tablet Daily., Disp: , Rfl:     atorvastatin (LIPITOR) 80 MG tablet, TAKE 1 TABLET BY MOUTH EVERY NIGHT, Disp: 90 tablet, Rfl: 1    bethanechol (URECHOLINE) 25 MG tablet, Take 1 tablet by mouth 2 (Two) Times a Day., Disp: , Rfl:     Cariprazine HCl (Vraylar) 1.5 MG capsule capsule, Take 1 capsule every other day by oral route., Disp: , Rfl:     clopidogrel (Plavix) 75 MG tablet, Take 1 tablet by mouth Daily., Disp: 30 tablet, Rfl: 3    famotidine (PEPCID) 20 MG tablet, Take 1 tablet by mouth 2 (Two) Times a Day Before Meals., Disp: , Rfl:     fluticasone (FLONASE) 50 MCG/ACT nasal spray, 2 sprays into the nostril(s) as directed by provider Daily., Disp: 1 bottle, Rfl: 0    galcanezumab-gnlm (EMGALITY) 120 MG/ML auto-injector pen, Inject 1 mL under the skin into the appropriate area as directed Every 28 (Twenty-Eight) Days., Disp: 1 mL, Rfl: 11    lactulose (CHRONULAC) 10 GM/15ML solution, , Disp: , Rfl:     LORazepam (ATIVAN) 0.5 MG tablet, Take 1 tablet by mouth Every 8 (Eight) Hours As Needed for Anxiety., Disp: , Rfl:     lubiprostone (AMITIZA) 8 MCG capsule, Take 1 capsule by mouth 2 (Two) Times a Day., Disp: , Rfl:     magnesium oxide (MAG-OX) 400 tablet tablet, Take 1 tablet by mouth Every Night., Disp: , Rfl:      nortriptyline (PAMELOR) 10 MG capsule, Take 2 capsules by mouth Every Night., Disp: 60 capsule, Rfl: 6    pantoprazole (PROTONIX) 20 MG EC tablet, Take 1 tablet by mouth Daily., Disp: , Rfl:     sertraline (ZOLOFT) 100 MG tablet, Take 1 tablet by mouth Daily., Disp: , Rfl:     sertraline (Zoloft) 50 MG tablet, Take 1 tablet by mouth Daily., Disp: 90 tablet, Rfl: 1    topiramate (TOPAMAX) 100 MG tablet, Take 1 tablet by mouth Every Night., Disp: 30 tablet, Rfl: 6    ubrogepant (Ubrelvy) 100 MG tablet, Take 1 tablet by mouth Daily As Needed (migraines). Take at onset of headache - if symptoms persist or return, may repeat dose in 2 hours. Maximum: 200 mg per 24 hours, Disp: 16 tablet, Rfl: 11    rimegepant sulfate (Nurtec) 75 MG tablet, Take 1 tablet by mouth Every Other Day., Disp: 8 tablet, Rfl: 0    Allergies:   Allergies   Allergen Reactions    Tramadol Other (See Comments)     Flushng and passing out       PHQ-9 Total Score:     STEADI Fall Risk Assessment was completed, and patient is at HIGH risk for falls. Assessment completed on:5/20/2024    Objective     Physical Exam:     Neurological Exam  Mental Status  Awake, alert and oriented to person, place and time. Recent and remote memory are intact. Speech is normal. Language is fluent with no aphasia. Attention and concentration are normal. Fund of knowledge is appropriate for level of education.    Cranial Nerves  CN II: Visual acuity is normal.  CN III, IV, VI: Extraocular movements intact bilaterally. Pupils equal round and reactive to light bilaterally.  CN V: Facial sensation is normal.  CN VII:  Left: There is peripheral facial weakness.  CN IX, X: Palate elevates symmetrically  CN XI: Shoulder shrug strength is normal.  CN XII: Tongue midline without atrophy or fasciculations.    Motor  Normal muscle bulk throughout. No fasciculations present. Normal muscle tone. Strength is 5/5 in all four extremities except as noted.                                   "           Right                     Left   Shoulder abduction                                        3+   Shoulder adduction                                        3+   Shoulder internal rotation                               3+  Elbow flexion                                                  3+  Elbow extension                                             3+  Hip abduction                                                  3+  Hip adduction                                                  3+  Knee flexion                                                    3+  Knee extension                                               3+    Sensory  Sensation is intact to light touch, pinprick, vibration and proprioception in all four extremities.    Reflexes                                            Right                      Left  Brachioradialis                    2+                         2+  Biceps                                 2+                         2+  Triceps                                2+                         2+  Finger flex                           2+                         2+  Hamstring                            2+                         2+  Patellar                                2+                         2+  Achilles                                2+                         2+    Coordination    Finger-to-nose, rapid alternating movements and heel-to-shin normal bilaterally without dysmetria.    Gait  Casual gait:  Using rollator for ambulation.        Vital Signs:   Vitals:    12/30/24 0925   BP: 110/74   Pulse: 73   SpO2: 98%   Weight: 60.3 kg (133 lb)   Height: 154.9 cm (60.98\")     Body mass index is 25.15 kg/m².     Results:   Results       Imaging:   MRI Brain Without Contrast    Result Date: 10/7/2024  Impression: 1.Small likely subacute infarct involving periventricular left occipital lobe. No hemorrhagic transformation. 2.Stable prominent basilar artery aneurysm with mass effect on adjacent " brainstem. 3.Moderate paranasal sinus mucosal disease with air-fluid level in left maxillary sinus. Correlate for acute sinusitis 4.Partial left mastoid effusion. Electronically Signed: Chris Acuña MD  10/7/2024 12:30 PM EDT  Workstation ID: [id number]    CT Head Without Contrast    Result Date: 10/6/2024  Impression: No acute intracranial pathology. Sinusitis. Electronically Signed: Hal Davila MD  10/6/2024 6:34 PM EDT  Workstation ID: [id number]    XR Abdomen KUB    Result Date: 10/6/2024  Impression: 1. Advanced nasogastric tube now oriented antegrade over distal stomach. 2. Similar large volume formed stool without evidence of obstruction suggesting constipation. Electronically Signed: Bryon Yoon MD  10/6/2024 1:40 PM EDT  Workstation ID: [id number]    XR Abdomen KUB    Result Date: 10/6/2024  Impression: 1. Antegrade oriented nasogastric tube terminates over mid to distal stomach. 2. Large volume formed stool without dilated loops of bowel to suggest obstruction. Findings suggest constipation. Electronically Signed: Bryon Yoon MD  10/6/2024 1:35 PM EDT  Workstation ID: [id number]    XR Chest 1 View    Result Date: 10/6/2024  Impression: Medical support devices appear in standard position. No new airspace process or pneumothorax. Electronically Signed: Bryon Yoon MD  10/6/2024 1:34 PM EDT  Workstation ID: [id number]    XR Chest 1 View    Result Date: 10/6/2024  Impression: No active pulmonary process. Electronically Signed: Bryon Yoon MD  10/6/2024 11:28 AM EDT  Workstation ID: [id number]    CT Angiogram Head w AI Analysis of LVO    Result Date: 10/6/2024  1.Focal 3 mm nonopacification of the basilar artery just distal to the vascular stent suspicious for high-grade stenoses or occlusion possibly related to thrombus. Asymmetric decreased opacification in the distal right vertebral artery likely related to changes in flow dynamics without additional area of focal high-grade stenoses or occlusion.  Findings likely explains cerebellar abnormalities on CT perfusion. 2.Other major intracranial arterial vasculature widely patent. 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 Findings communicated over the phone with ordering provider Dr. Boyer at 10:48 a.m. 10/6/2024. Patient in route to conventional angiogram with neurosurgery at time of phone call. Electronically Signed: Bryon Yoon MD  10/6/2024 10:44 AM EDT  Workstation ID: [id number]    CT Angiogram Neck    Result Date: 10/6/2024  1.Focal 3 mm nonopacification of the basilar artery just distal to the vascular stent suspicious for high-grade stenoses or occlusion possibly related to thrombus. Asymmetric decreased opacification in the distal right vertebral artery likely related to changes in flow dynamics without additional area of focal high-grade stenoses or occlusion. Findings likely explains cerebellar abnormalities on CT perfusion. 2.Other major intracranial arterial vasculature widely patent. 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 Findings communicated over the phone with ordering provider Dr. Boyer at 10:48 a.m. 10/6/2024. Patient in route to conventional angiogram with neurosurgery at time of phone call. Electronically Signed: Bryon Yoon MD  10/6/2024 10:44 AM EDT  Workstation ID: [id number]    CT CEREBRAL PERFUSION WITH & WITHOUT CONTRAST    Result Date: 10/6/2024  Negative for completed infarct. 14 mL region of Tmax greater than 6 seconds in the right cerebellum. Electronically Signed: Bryon Yoon MD  10/6/2024 10:28 AM EDT  Workstation ID: [id number]    CT Head Without Contrast Stroke Protocol    Result Date: 10/6/2024  Impression: 1. New periventricular hypodensities at right frontal parietal junction and left parietal lobe which  "could reflect recent (acute or subacute) infarcts. No associated acute hemorrhage or significant mass effect. Findings could be further assessed by dedicated MRI. 2. Slight increasing dilation of third and lateral ventricles with more conspicuous sulcal and gyral crowding at the vertex. Findings could reflect progressive mass effect at the level of the fourth ventricle related to known treated aneurysm, although size of the excluded aneurysm has not significantly changed measuring 2.5 x 2.6 cm (previously 2.4 x 2.6 cm). Consider neurosurgical consultation. 3. Underlying periventricular hypodensity suggesting chronic microvascular ischemic change similar to prior. Transependymal edema would be considered less likely given stability. Electronically Signed: Bryon Yoon MD  10/6/2024 10:03 AM EDT  Workstation ID: [id number]       Labs:   No results found for: \"CMP\", \"PROTEIN\", \"ANTIMOGAB\", \"IAMFEY1OTVE\", \"JCVRESULT\", \"QUANTTBGOLD\", \"CBCDIF\", \"IGGALBSER\"     Assessment / Plan      Assessment/Plan:   Diagnoses and all orders for this visit:    1. Other headache syndrome (Primary)  Comments:  Nurtec every other day  Consider Botox    2. History of CVA (cerebrovascular accident)  Comments:  Continue aspirin, atorvastatin, Plavix    Other orders  -     topiramate (TOPAMAX) 100 MG tablet; Take 1 tablet by mouth Every Night.  Dispense: 30 tablet; Refill: 6  -     nortriptyline (PAMELOR) 10 MG capsule; Take 2 capsules by mouth Every Night.  Dispense: 60 capsule; Refill: 6  -     rimegepant sulfate (Nurtec) 75 MG tablet; Take 1 tablet by mouth Every Other Day.  Dispense: 8 tablet; Refill: 0         Assessment & Plan      Patient Education:     Reviewed medications, potential side effects and signs and symptoms to report. Discussed risk versus benefits of treatment plan with patient and/or family-including medications, labs and radiology that may be ordered. Addressed questions and concerns during visit. Patient and/or family " verbalized understanding and agree with plan. Instructed to call the office with any questions and report to ER with any life-threatening symptoms.     Follow Up:   Return in about 3 months (around 3/30/2025).    I spent 40 minutes caring for Lauryn on this date of service. This time includes time spent by me in the following activities: preparing for the visit, performing a medically appropriate examination and/or evaluation, counseling and educating the patient/family/caregiver, documenting information in the medical record, and ordering medications.        During this visit the following were done:  Labs Reviewed [x]    Labs Ordered []    Radiology Reports Reviewed [x]    Radiology Ordered []    PCP Records Reviewed []    Referring Provider Records Reviewed []    ER Records Reviewed []    Hospital Records Reviewed [x]    History Obtained From Family []    Radiology Images Reviewed [x]    Other Reviewed []    Records Requested []      Patient or patient representative verbalized consent for the use of Ambient Listening during the visit with  CAILIN Parra for chart documentation. 12/30/2024  12:37 EST    CAILIN Parra   [facility] [place] [place] [facility]  [address] [place] [address]  [facility] [id number] [phone]

## 2024-12-30 NOTE — TELEPHONE ENCOUNTER
Patient is requesting a refill on Zoloft (completely out) 150 MG to be sent to [facility] pharmacy in [place].  Please advise.

## 2024-12-31 NOTE — TELEPHONE ENCOUNTER
Advised patient Rx refills have been sent in by provider to pharmacy on file. Patient verbalized understanding. No additional quesitons or concerns at this time.

## 2025-01-02 ENCOUNTER — SPECIALTY PHARMACY (OUTPATIENT)
Dept: ONCOLOGY | Facility: HOSPITAL | Age: 65
End: 2025-01-02
Payer: COMMERCIAL

## 2025-01-02 NOTE — PROGRESS NOTES
Specialty Pharmacy Patient Management Program  [facility]      Lauryn is a 64 y.o. female contacted today regarding refills of her medication(s).    Specialty medication(s) and dose(s) confirmed: Nurtec  Other medications being refilled: none        Delivery Questions      Flowsheet Row Most Recent Value   Delivery method UPS   Delivery address verified with patient/caregiver? Yes   Number of medications in delivery 1   Medication(s) being filled and delivered Rimegepant Sulfate (NURTEC)   Doses left of specialty medications new start   Copay verified? Yes   Copay amount Nurtec $0   Copay form of payment No copayment ($0)   Ship Date 1/6   Delivery Date 1/7   Signature Required No                   Follow-up: 25 days     Sydney Dobbs, PharmD  Specialty Pharmacist  1/2/2025  14:02 EST

## 2025-01-02 NOTE — PROGRESS NOTES
Specialty Pharmacy Patient Management Program  Neurology Medication Addition Assessment     Lauryn Romo is a 64 y.o. female with migraines seen by a Neurology provider and enrolled in the Neurology Patient Management program offered by [facility].  This assessment was conducted as a result of a specialty medication addition or substitution. The patient was previously introduced to services offered by [facility], including: regular assessments, refill coordination, curbside pick-up or mail order delivery options, prior authorization maintenance, and financial assistance programs as applicable. The patient was also provided with contact information for the pharmacy team.     An initial outreach was conducted, including assessment of therapy appropriateness and specialty medication education for Nurtec.    A follow-up outreach was conducted, including assessment of continued therapy appropriateness, medication adherence, and side effect incidence and management for Emgality and Ubrelvy.    Insurance Coverage & Financial Support  [employer], Nurtec, Emgality and Ubrelvy co-pay cards.    Relevant Past Medical History and Comorbidities  Relevant medical history and concomitant health conditions were discussed with the patient. The patient's chart has been reviewed for relevant past medical history and comorbid health conditions and updated as necessary.   Past Medical History:   Diagnosis Date    Abnormal ECG     Aneurysm 11/26/2022    Cancer 05/2024    Basal Cell Carcinoma removed from scalp by dermatology    Cluster headache 2022    Had headaches for several months before stroke and mass was found    Difficulty walking 11/26/22    After stroke    Headache     Headache, tension-type 2022    Hyperlipidemia     Hypertension     Memory loss 11/26/22    Migraine 2022    Stroke 11/26/2022    Stroke 10/06/2024    Vision loss 11/26/22    When dizzy or headache     Social  History     Socioeconomic History    Marital status:    Tobacco Use    Smoking status: Former     Current packs/day: 0.00     Average packs/day: 1 pack/day for 15.0 years (15.0 ttl pk-yrs)     Types: Cigarettes     Start date: 10/4/1989     Quit date: 10/4/2004     Years since quittin.2     Passive exposure: Past    Smokeless tobacco: Never   Vaping Use    Vaping status: Never Used   Substance and Sexual Activity    Alcohol use: No    Drug use: Never    Sexual activity: Not Currently     Partners: Male     Birth control/protection: Tubal ligation     Problem list reviewed by Sydney Dobbs, PharmD on 2025 at  2:01 PM    Hospitalizations and Urgent Care Since Last Assessment  ED Visits, Admissions, or Hospitalizations: none   Urgent Office Visits: none     Allergies  Known allergies and reactions were discussed with the patient. The patient's chart has been reviewed for  allergy information and updated as necessary.   Allergies   Allergen Reactions    Tramadol Other (See Comments)     Flushng and passing out     Allergies reviewed by Sydney Dobbs PharmD on 2025 at  2:01 PM    Relevant Laboratory Values  Common labs          10/8/2024    04:04 10/8/2024    20:06 10/9/2024    00:21 10/9/2024    03:52 10/10/2024    03:49   Common Labs   Glucose 149     113    BUN 25     24    Creatinine 0.51     0.59    Sodium 142     141    Potassium 4.0     3.9    Chloride 111     109    Calcium 8.7     8.2    WBC 18.18    20.99  13.14    Hemoglobin 13.1  12.4  13.2  13.5  12.6    Hematocrit 38.8  39.0  39.4  40.1  37.4    Platelets 248    204  189        Lab Assessment  The above labs have been reviewed. No dose adjustments are needed for the specialty medication(s) based on the labs.     Current Medication List  This medication list has been reviewed with the patient and evaluated for any interactions or necessary modifications/recommendations, and updated to include all prescription medications, OTC  medications, and supplements the patient is currently taking.  This list reflects what is contained in the patient's profile, which has also been marked as reviewed to communicate to other providers it is the most up to date version of the patient's current medication therapy.     Current Outpatient Medications:     acetaminophen (TYLENOL) 325 MG tablet, Take 2 tablets by mouth Every 6 (Six) Hours As Needed for Mild Pain., Disp: , Rfl:     aspirin 81 MG chewable tablet, Chew 1 tablet Daily., Disp: , Rfl:     atorvastatin (LIPITOR) 80 MG tablet, TAKE 1 TABLET BY MOUTH EVERY NIGHT, Disp: 90 tablet, Rfl: 1    bethanechol (URECHOLINE) 25 MG tablet, Take 1 tablet by mouth 2 (Two) Times a Day., Disp: , Rfl:     Cariprazine HCl (Vraylar) 1.5 MG capsule capsule, Take 1 capsule every other day by oral route., Disp: , Rfl:     clopidogrel (Plavix) 75 MG tablet, Take 1 tablet by mouth Daily., Disp: 30 tablet, Rfl: 3    famotidine (PEPCID) 20 MG tablet, Take 1 tablet by mouth 2 (Two) Times a Day Before Meals., Disp: , Rfl:     fluticasone (FLONASE) 50 MCG/ACT nasal spray, 2 sprays into the nostril(s) as directed by provider Daily., Disp: 1 bottle, Rfl: 0    galcanezumab-gnlm (EMGALITY) 120 MG/ML auto-injector pen, Inject 1 mL under the skin into the appropriate area as directed Every 28 (Twenty-Eight) Days., Disp: 1 mL, Rfl: 11    lactulose (CHRONULAC) 10 GM/15ML solution, , Disp: , Rfl:     LORazepam (ATIVAN) 0.5 MG tablet, Take 1 tablet by mouth Every 8 (Eight) Hours As Needed for Anxiety., Disp: , Rfl:     lubiprostone (AMITIZA) 8 MCG capsule, Take 1 capsule by mouth 2 (Two) Times a Day., Disp: , Rfl:     magnesium oxide (MAG-OX) 400 tablet tablet, Take 1 tablet by mouth Every Night., Disp: , Rfl:     nortriptyline (PAMELOR) 10 MG capsule, Take 2 capsules by mouth Every Night., Disp: 60 capsule, Rfl: 6    pantoprazole (PROTONIX) 20 MG EC tablet, Take 1 tablet by mouth Daily., Disp: , Rfl:     rimegepant sulfate (Nurtec) 75  MG tablet, Take 1 tablet by mouth Every Other Day., Disp: 16 tablet, Rfl: 11    sertraline (ZOLOFT) 100 MG tablet, Take 1.5 tablets by mouth Daily., Disp: 45 tablet, Rfl: 2    sertraline (Zoloft) 50 MG tablet, Take 1 tablet by mouth Daily., Disp: 90 tablet, Rfl: 1    topiramate (TOPAMAX) 100 MG tablet, Take 1 tablet by mouth Every Night., Disp: 30 tablet, Rfl: 6    ubrogepant (Ubrelvy) 100 MG tablet, Take 1 tablet by mouth Daily As Needed (migraines). Take at onset of headache - if symptoms persist or return, may repeat dose in 2 hours. Maximum: 200 mg per 24 hours, Disp: 16 tablet, Rfl: 11    Medicines reviewed by Sydney Dobbs, PharmD on 1/2/2025 at  2:01 PM    Drug Interactions  No relevant drug-drug interactions with specialty medication(s):  Nurtec, Emgality and Ubrelvy.        Initial Education Provided for Specialty Medication  The patient has been provided with the following education and any applicable administration techniques (i.e. self-injection) have been demonstrated for the therapies indicated. All questions and concerns have been addressed prior to the patient receiving the medication, and the patient has verbalized comprehension of the education and any materials provided.  Additional patient education shall be provided and documented upon request by the patient, provider or payer.      Nurtec (rimegepant)  Medication Expectations   Why am I taking this medication? You are taking this medication for migraine prophylaxis or to treat an acute migraine.   What should I expect while on this medication? You should expect to see a decrease in the frequency and severity of your migraines.   How does the medication work? Nurtec is a monoclonal antibody that binds to calcitonin gene-related peptide (CGRP) and blocks its binding to the receptor decreasing the severity of migraines.   How long will I be on this medication for? The amount of time you will be on this medication will be determined by your  doctor and your response to the medication.    How do I take this medication? Take as directed on your prescription label.   What are some possible side effects? Potential side effects including, but not limited to nausea. Pt verbalized understanding.   What happens if I miss a dose? Take the missed dose as soon as possible, and resume the every other day timed from the last dose..     Medication Safety   What are things I should warn my doctor immediately about? Hypersensitivity reactions - trouble breathing or swallowing.   What are things that I should be cautious of? Hypersensitivity reactions (eg, dyspnea, rash), including delayed serious reactions, have occurred; discontinue use if suspected    What are some medications that can interact with this one? Avoid concomitant administration of Nurtec ODT with strong inhibitors of CY, strong or moderate inducers of CYP3A or inhibitors of P-gp or BCRP. Avoid another dose of Nurtec ODT within 48 hours when it is administered with moderate inhibitors of CY.  Ask your pharmacist or health care provider before starting new medications     Medication Storage/Handling   How should I handle this medication? Keep this medication out of reach of pets/children in original container. Ensure hands are dry before opening blister pack.   How does this medication need to be stored? Store at room temperature away from heat/cold, sunlight or moisture   How should I dispose of this medication? There should not be a need to dispose of this medication unless your provider decides to change the dose or therapy. If that is the case, take to your local police station for proper disposal. Some pharmacies also have take-back bins for medication drop-off.      Resources/Support   How can I remind myself to take this medication? You can download reminder apps to help you manage your refills. You may also set an alarm on your phone to remind you. The pharmacy carries pill boxes that you  can place next to an area you pass everyday (such as where you place your car keys or where you charge your phone)   Is financial support available?  Yes, [employer] can provide co-pay cards if you have commercial insurance or patient assistance if you have Medicare or no insurance.    Which vaccines are recommended for me? Talk to your doctor about these vaccines: Flu, Coronavirus (COVID-19), Pneumococcal (pneumonia), Tdap, Hepatitis B, Zoster (shingles)        Adherence, Self-Administration, and Current Therapy Problems  Adherence related to the patient's specialty therapy was discussed with the patient. The Adherence segment of this outreach has been reviewed and updated.     Is there a concern with patient's ability to self administer the medication correctly and without issue?: No  Were any potential barriers to adherence identified during the initial assessment or patient education?: No  Are there any concerns regarding the patient's understanding of the importance of medication adherence?: No    Adherence Questions  Linked Medication(s) Assessed: Galcanezumab-gnlm (EMGALITY), Ubrogepant (UBRELVY)  On average, how many doses/injections does the patient miss per month?: 0  What are the identified reasons for non-adherence or missed doses? : no problems identified  What is the estimated medication adherence level?: %  Based on the patient/caregiver response and refill history, does this patient require an MTP to track adherence improvements?: no    Additional Barriers to Patient Self-Administration: n/a  Methods for Supporting Patient Self-Administration: pt adept with Emgality self-injection.    Recently Close Medication Therapy Problems  No medication therapy recommendations to display  Open Medication Therapy Problems  No medication therapy recommendations to display     Adverse Drug Reactions  Medication tolerability: Tolerating with no to minimal ADRs          Medication plan: Continue  therapy with normal follow-up  Plan for ADR Management: not required    Goals of Therapy  Goals related to the patient's specialty therapy were discussed with the patient. The Patient Goals segment of this outreach has been reviewed and updated.   Goals Addressed Today        Specialty Pharmacy General Goal      Decrease frequency, severity and duration of acute migraine attacks from baseline.  9/24/24 dw- migraines improved on Emgality therapy, but was late on dose last month, and migraines have worsened.  Ubrelvy provides some relief for breakthrough headaches, but does not alleviate all symptom. Medrol dose pack prescribed today to help beak cycle.  1/2/25 dw - pt states she has had an increase in migraines since stroke in October 2024.  Adding Nurtec QOD.                 Quality of Life Assessment   Quality of Life related to the patient's enrollment in the patient management program and services provided was discussed with the patient. The QOL segment of this outreach has been reviewed and updated.   Quality of Life Improvement Scale: 8-Moderately better    Reassessment Plan & Follow-Up  Medication Therapy Changes: Start Nurtec 75 mg po every other day, continue Emgality 120mg subq monthly and Ubrelvy 100 mg po once daily as needed for migraines - Take at onset of headache - May repeat x 1 in 2 hours if needed (max of 200 mg/ 24 hrs)  Related Plans, Therapy Recommendations, or Issues to Be Addressed: none   Pharmacist to perform regular reassessments no more than (6) months from the previous assessment.  Care Coordinator to set up future refill outreaches, coordinate prescription delivery, and escalate clinical questions to pharmacist.     Attestation  Therapeutic appropriateness: Appropriate  I attest the patient was actively involved in and has agreed to the above plan of care. If the prescribed therapy is at any point deemed not appropriate based on the current or future assessments, a consultation will be  initiated with the patient's specialty care provider to determine the best course of action. The revised plan of therapy will be documented along with any additional patient education provided. Discussed aforementioned material with patient via telemedicine.    Stephanie VernonD, [facility]  Clinic Specialty Pharmacist, Neurology  1/2/2025  14:03 EST

## 2025-01-13 ENCOUNTER — OFFICE VISIT (OUTPATIENT)
Dept: NEUROLOGY | Facility: CLINIC | Age: 65
End: 2025-01-13
Payer: COMMERCIAL

## 2025-01-13 ENCOUNTER — LAB (OUTPATIENT)
Dept: LAB | Facility: HOSPITAL | Age: 65
End: 2025-01-13
Payer: COMMERCIAL

## 2025-01-13 VITALS
TEMPERATURE: 97.7 F | SYSTOLIC BLOOD PRESSURE: 126 MMHG | HEART RATE: 70 BPM | OXYGEN SATURATION: 100 % | BODY MASS INDEX: 25.11 KG/M2 | DIASTOLIC BLOOD PRESSURE: 76 MMHG | WEIGHT: 133 LBS | HEIGHT: 61 IN

## 2025-01-13 DIAGNOSIS — I67.1 CEREBRAL ANEURYSM, NONRUPTURED: ICD-10-CM

## 2025-01-13 DIAGNOSIS — R53.83 FATIGUE, UNSPECIFIED TYPE: ICD-10-CM

## 2025-01-13 DIAGNOSIS — E78.5 HYPERLIPIDEMIA LDL GOAL <70: ICD-10-CM

## 2025-01-13 DIAGNOSIS — I10 ESSENTIAL HYPERTENSION: ICD-10-CM

## 2025-01-13 DIAGNOSIS — Z86.73 HISTORY OF CVA (CEREBROVASCULAR ACCIDENT): Primary | ICD-10-CM

## 2025-01-13 LAB — 25(OH)D3 SERPL-MCNC: 49.3 NG/ML (ref 30–100)

## 2025-01-13 PROCEDURE — 82306 VITAMIN D 25 HYDROXY: CPT

## 2025-01-13 PROCEDURE — 36415 COLL VENOUS BLD VENIPUNCTURE: CPT

## 2025-01-13 NOTE — PROGRESS NOTES
New Patient Office Visit      Encounter Date: 2025   Patient Name: Lauryn Romo  : 1960   MRN: [id number]   PCP: Melissa Lazo APRN    Referring Provider: CAILIN Benito     Chief Complaint:    Chief Complaint   Patient presents with    Follow-up       History of Present Illness: Lauryn Romo is a 64 y.o. female with known medical diagnoses of HLD, HTN, regular aneurysm, migraines, cerebellar stroke stroke (residual dizziness, ataxia), and giant basilar aneurysm s/p pipeline embolization (Dr. Savage, ),  who presents to clinic today for follow-up.  She presented to [facility] on 10/6/2024 with complaints of left-sided weakness and facial droop that she noted that morning upon waking.  Initial NIH 10.  CT head was negative for any acute process.  She was not a candidate for TNK due to LKW greater than 4.5 hours.  Patient was negative for any acute finding.  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  She was taken to the Cath Lab with Dr. Savage for mechanical thrombectomy with TICI 3 recanalization. MRI brain revealed 2 small punctate infarcts in the cerebellum. Her hospital course was complicated by developing a hematoma in her right groin on 10/8/2024.  Prior to this event, the patient had been transitioned to aspirin monotherapy.  After MT, she was transitioned to aspirin 81 mg daily and Brilinta 90 mg twice daily.  She was discharged to [facility] on 10/11/2024.      Clinic visit 2025: Mrs. Romo presents to the clinic today with her .  She continues with intermittent dizziness with abrupt position changes and intermittent gait instability. She also reports an episode while she was in the bathtub in which she had an onset of generalized weakness and disorientation while sitting in very hot bath water.  She had no loss of consciousness.  Patient reports that her blood pressure at home typically runs less than 120.  Dizziness typically occurs  with abrupt changes in position.  The patient reports that she typically drinks coffee all day long and does not drink as much water.  She reports a continual headache.  Discussed with the patient that excessive caffeine and dehydration can contribute to headaches.  She follows with DEVAN SIMEON for migraine management.  Discussed with the patient that I am concerned the dizziness with position changes could be related to her blood pressure.  Requested that she change positions slowly, stay well-hydrated, decrease caffeine, and check her blood pressure when these episodes occur.  Etiology of episode of generalized weakness and disorientation while in the bathtub is unclear. Patient's  reports that she likes her bathwater extremely hot.Discussed with the patient and her  that it could be due to vasodilation.  Discussed that this could also be seizure activity given the accompanied staring spell.  Discussed ordering an EEG.  Patient would like to defer at this time.  I requested that she check her blood pressure before immersing herself in hot water. Discussed with the patient and her  that I would recommend that she not take tub baths without supervision.  Both the patient and her  verbalized understanding.  She also complains of concerns for decreased circulation in her hands and feet when it is cold.  This is not present on exam today.  Request that she follow-up with her PCP for further evaluation.  She reports significant fatigue as well.  She reports a history of borderline vitamin D levels in the past.  We will recheck today.     Stroke Risk Factors: hyperlipidemia and hypertension      Subjective      Review of Systems:   Review of Systems   Constitutional:  Positive for fatigue. Negative for chills and fever.   HENT:  Negative for congestion and trouble swallowing.    Eyes:  Negative for photophobia and visual disturbance.   Musculoskeletal:  Positive for gait problem.    Neurological:  Positive for dizziness.       Past Medical History:   Past Medical History:   Diagnosis Date    Abnormal ECG     Aneurysm 11/26/2022    Cancer 05/2024    Basal Cell Carcinoma removed from scalp by dermatology    Cluster headache 2022    Had headaches for several months before stroke and mass was found    Difficulty walking 11/26/22    After stroke    Headache     Headache, tension-type 2022    Hyperlipidemia     Hypertension     Memory loss 11/26/22    Migraine 2022    Stroke 11/26/2022    Stroke 10/06/2024    Vision loss 11/26/22    When dizzy or headache       Past Surgical History:   Past Surgical History:   Procedure Laterality Date    ARTERIAL ANEURYSM REPAIR      BREAST LUMPECTOMY  2012    CYST REMOVAL Left 05/2023    EMBOLIZATION CEREBRAL N/A 11/29/2022    Procedure: CV EMBOLIZATION CEREBRAL IR;  Surgeon: Enoch Savage MD;  Location:  [facility];  Service: Interventional Radiology;  Laterality: N/A;    HIATAL HERNIA REPAIR  2015    INTERVENTIONAL RADIOLOGY PROCEDURE N/A 09/15/2023    Procedure: Embolization;  Surgeon: Enoch Savage MD;  Location:  [facility];  Service: Interventional Radiology;  Laterality: N/A;    INTERVENTIONAL RADIOLOGY PROCEDURE N/A 10/6/2024    Procedure: IR mechanical thrombectomy;  Surgeon: Joe Mendez MD;  Location:  [facility];  Service: Interventional Radiology;  Laterality: N/A;    SINUS SURGERY      x4    SKIN CANCER EXCISION      cancer removed off top of head       Family History:   Family History   Problem Relation Age of Onset    No Known Problems Mother     Heart attack Father 42    Early death Father         Heart attack    Heart disease Father     Hyperlipidemia Father     Heart attack Paternal Aunt     Heart attack Paternal Uncle     No Known Problems Sister     Cancer Maternal Grandmother     Heart failure Maternal Grandmother     No Known Problems Maternal Grandfather     No Known  Problems Paternal Grandmother     No Known Problems Paternal Grandfather     Diabetes Half-Brother        Social History:   Social History     Socioeconomic History    Marital status:    Tobacco Use    Smoking status: Former     Current packs/day: 0.00     Average packs/day: 1 pack/day for 15.0 years (15.0 ttl pk-yrs)     Types: Cigarettes     Start date: 10/4/1989     Quit date: 10/4/2004     Years since quittin.3     Passive exposure: Past    Smokeless tobacco: Never   Vaping Use    Vaping status: Never Used   Substance and Sexual Activity    Alcohol use: No    Drug use: Never    Sexual activity: Not Currently     Partners: Male     Birth control/protection: Tubal ligation       Medications:     Current Outpatient Medications:     aspirin 81 MG chewable tablet, Chew 1 tablet Daily., Disp: , Rfl:     atorvastatin (LIPITOR) 80 MG tablet, TAKE 1 TABLET BY MOUTH EVERY NIGHT, Disp: 90 tablet, Rfl: 1    bethanechol (URECHOLINE) 25 MG tablet, Take 1 tablet by mouth 2 (Two) Times a Day., Disp: , Rfl:     clopidogrel (Plavix) 75 MG tablet, Take 1 tablet by mouth Daily., Disp: 30 tablet, Rfl: 3    famotidine (PEPCID) 20 MG tablet, Take 1 tablet by mouth 2 (Two) Times a Day Before Meals., Disp: , Rfl:     fluticasone (FLONASE) 50 MCG/ACT nasal spray, 2 sprays into the nostril(s) as directed by provider Daily., Disp: 1 bottle, Rfl: 0    galcanezumab-gnlm (EMGALITY) 120 MG/ML auto-injector pen, Inject 1 mL under the skin into the appropriate area as directed Every 28 (Twenty-Eight) Days., Disp: 1 mL, Rfl: 11    lactulose (CHRONULAC) 10 GM/15ML solution, , Disp: , Rfl:     LORazepam (ATIVAN) 0.5 MG tablet, Take 1 tablet by mouth Every 8 (Eight) Hours As Needed for Anxiety., Disp: , Rfl:     magnesium oxide (MAG-OX) 400 tablet tablet, Take 1 tablet by mouth Every Night., Disp: , Rfl:     nortriptyline (PAMELOR) 10 MG capsule, Take 2 capsules by mouth Every Night., Disp: 60 capsule, Rfl: 6    pantoprazole (PROTONIX)  "20 MG EC tablet, Take 1 tablet by mouth Daily., Disp: , Rfl:     rimegepant sulfate (Nurtec) 75 MG tablet, Take 1 tablet by mouth Every Other Day., Disp: 16 tablet, Rfl: 11    sertraline (ZOLOFT) 100 MG tablet, Take 1.5 tablets by mouth Daily., Disp: 45 tablet, Rfl: 2    sertraline (Zoloft) 50 MG tablet, Take 1 tablet by mouth Daily., Disp: 90 tablet, Rfl: 1    topiramate (TOPAMAX) 100 MG tablet, Take 1 tablet by mouth Every Night., Disp: 30 tablet, Rfl: 6    ubrogepant (Ubrelvy) 100 MG tablet, Take 1 tablet by mouth Daily As Needed (migraines). Take at onset of headache - if symptoms persist or return, may repeat dose in 2 hours. Maximum: 200 mg per 24 hours, Disp: 16 tablet, Rfl: 11    acetaminophen (TYLENOL) 325 MG tablet, Take 2 tablets by mouth Every 6 (Six) Hours As Needed for Mild Pain. (Patient not taking: Reported on 1/13/2025), Disp: , Rfl:     Cariprazine HCl (Vraylar) 1.5 MG capsule capsule, Take 1 capsule every other day by oral route., Disp: , Rfl:     lubiprostone (AMITIZA) 8 MCG capsule, Take 1 capsule by mouth 2 (Two) Times a Day., Disp: , Rfl:     Allergies:   Allergies   Allergen Reactions    Tramadol Other (See Comments)     Flushng and passing out       Objective     Physical Exam:  Vital Signs:   Vitals:    01/13/25 1254   BP: 126/76   Pulse: 70   Temp: 97.7 °F (36.5 °C)   SpO2: 100%   Weight: 60.3 kg (133 lb)   Height: 154.9 cm (60.98\")     Body mass index is 25.14 kg/m².     Physical Exam  Constitutional:       General: She is not in acute distress.  HENT:      Head: Normocephalic and atraumatic.   Eyes:      Extraocular Movements: Extraocular movements intact.      Pupils: Pupils are equal, round, and reactive to light.   Cardiovascular:      Rate and Rhythm: Normal rate and regular rhythm.   Pulmonary:      Effort: Pulmonary effort is normal. No respiratory distress.   Abdominal:      General: There is no distension.      Palpations: Abdomen is soft.   Musculoskeletal:         General: " Normal range of motion.      Cervical back: Normal range of motion and neck supple.   Skin:     General: Skin is warm and dry.      Capillary Refill: Capillary refill takes less than 2 seconds.   Neurological:      Mental Status: She is alert and oriented to person, place, and time.      Cranial Nerves: Cranial nerve deficit present.      Sensory: No sensory deficit.      Motor: No weakness.      Coordination: Coordination normal.   Psychiatric:         Speech: Speech normal.       Neurological Exam  Mental Status  Alert. Oriented to person, place, time and situation. Oriented to person, place, and time. Speech is normal. Language is fluent with no aphasia.    Cranial Nerves  CN II: Tested with correction. Visual acuity is normal. Visual fields full to confrontation.  CN III, IV, VI: Extraocular movements intact bilaterally. Pupils equal round and reactive to light bilaterally.  CN V: Facial sensation is normal.  CN VII:  Left: There is central facial weakness.  CN IX, X: Palate elevates symmetrically  CN XI: Shoulder shrug strength is normal.  CN XII: Tongue midline without atrophy or fasciculations.    Motor   No pronator drift.    Sensory  Light touch is normal in upper and lower extremities.     Coordination  Right: Finger-to-nose normal. Rapid alternating movement normal.Left: Finger-to-nose normal. Rapid alternating movement abnormality:    Gait    Intermittent gait instability.       NIH Stroke Scale    Person Administering Scale: CAILIN Patterson    1a  Level of consciousness: 0=alert; keenly responsive   1b. LOC questions:  0=Answers both questions correctly   1c. LOC commands: 0=Performs both tasks correctly   2.  Best Gaze: 0=normal   3.  Visual: 0=No visual loss   4. Facial Palsy: 1=Minor paralysis (flattened nasolabial fold, asymmetric on smiling)   5a.  Motor left arm: 0=No drift, limb holds 90 (or 45) degrees for full 10 seconds   5b.  Motor right arm: 0=No drift, limb holds 90 (or 45)  degrees for full 10 seconds   6a. motor left le=No drift, limb holds 90 (or 45) degrees for full 10 seconds   6b  Motor right le=No drift, limb holds 90 (or 45) degrees for full 10 seconds   7. Limb Ataxia: 0=Absent   8.  Sensory: 0=Normal; no sensory loss   9. Best Language:  0=No aphasia, normal   10. Dysarthria: 0=Normal   11. Extinction and Inattention: 0=No abnormality    Total:   1       This was an audio and video enabled telemedicine encounter.     Modified [place] Score: 3        0  No Symptoms    1 No significant disability. Able to carry out all usual activities, despite some symptoms.    2 Slight disability. Able to look after own affairs without assistance, but unable to carry out all previous activities.    3 Moderate disability. Requires some help, but able to walk unassisted.    4 Moderately severe disability. Unable to attend to own bodily needs without assistance, and unable to walk unassisted.    5 Severe disability. Requires constant nursing care and attention, bedridden, incontinent.    6 Dead        PHQ-9 Depression Screening  Little interest or pleasure in doing things? Not at all   Feeling down, depressed, or hopeless? Not at all   PHQ-2 Total Score 0   Trouble falling or staying asleep, or sleeping too much?     Feeling tired or having little energy?     Poor appetite or overeating?     Feeling bad about yourself - or that you are a failure or have let yourself or your family down?     Trouble concentrating on things, such as reading the newspaper or watching television?     Moving or speaking so slowly that other people could have noticed? Or the opposite - being so fidgety or restless that you have been moving around a lot more than usual?     Thoughts that you would be better off dead, or of hurting yourself in some way?     PHQ-9 Total Score     If you checked off any problems, how difficult have these problems made it for you to do your work, take care of things at home, or get  along with other people?              Imaging Reviewed:   CT Angiogram Head w AI Analysis of LVO     Result Date: 10/6/2024  1.Focal 3 mm nonopacification of the basilar artery just distal to the vascular stent suspicious for high-grade stenoses or occlusion possibly related to thrombus. Asymmetric decreased opacification in the distal right vertebral artery likely related to changes in flow dynamics without additional area of focal high-grade stenoses or occlusion. Findings likely explains cerebellar abnormalities on CT perfusion. 2.Other major intracranial arterial vasculature widely patent. 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 Findings communicated over the phone with ordering provider Dr. Boyer at 10:48 a.m. 10/6/2024. Patient in route to conventional angiogram with neurosurgery at time of phone call. Electronically Signed: Bryon Yoon MD  10/6/2024 10:44 AM EDT  Workstation ID: [id number]     CT Angiogram Neck     Result Date: 10/6/2024  1.Focal 3 mm nonopacification of the basilar artery just distal to the vascular stent suspicious for high-grade stenoses or occlusion possibly related to thrombus. Asymmetric decreased opacification in the distal right vertebral artery likely related to changes in flow dynamics without additional area of focal high-grade stenoses or occlusion. Findings likely explains cerebellar abnormalities on CT perfusion. 2.Other major intracranial arterial vasculature widely patent. 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 Findings communicated over the phone with ordering provider Dr. Boyer at 10:48 a.m. 10/6/2024. Patient in route to conventional angiogram with neurosurgery at time of phone call. Electronically Signed: Bryon Yoon MD  10/6/2024 10:44 AM EDT   Workstation ID: [id number]     CT CEREBRAL PERFUSION WITH & WITHOUT CONTRAST     Result Date: 10/6/2024  Negative for completed infarct. 14 mL region of Tmax greater than 6 seconds in the right cerebellum. Electronically Signed: Bryon Yoon MD  10/6/2024 10:28 AM EDT  Workstation ID: [id number]     CT Head Without Contrast Stroke Protocol     Result Date: 10/6/2024  Impression: 1. New periventricular hypodensities at right frontal parietal junction and left parietal lobe which could reflect recent (acute or subacute) infarcts. No associated acute hemorrhage or significant mass effect. Findings could be further assessed by dedicated MRI. 2. Slight increasing dilation of third and lateral ventricles with more conspicuous sulcal and gyral crowding at the vertex. Findings could reflect progressive mass effect at the level of the fourth ventricle related to known treated aneurysm, although size of the excluded aneurysm has not significantly changed measuring 2.5 x 2.6 cm (previously 2.4 x 2.6 cm). Consider neurosurgical consultation. 3. Underlying periventricular hypodensity suggesting chronic microvascular ischemic change similar to prior. Transependymal edema would be considered less likely given stability. Electronically Signed: Bryon Yoon MD  10/6/2024 10:03 AM EDT  Workstation ID: [id number]       Results for orders placed during the hospital encounter of 10/06/24    Adult Transthoracic Echo Complete W/ Cont if Necessary Per Protocol    Interpretation Summary    Left ventricular systolic function is normal. Estimated left ventricular EF = 65%    Left ventricular diastolic function is consistent with (grade I) impaired relaxation.    The cardiac valves are anatomically and functionally normal.    Estimated right ventricular systolic pressure from tricuspid regurgitation is normal (<35 mmHg).    Saline test results are negative.        Laboratory Results:   Lipid Panel          10/7/2024    05:55   Lipid Panel    Total Cholesterol 132    Triglycerides 62    HDL Cholesterol 61    VLDL Cholesterol 13    LDL Cholesterol  58    LDL/HDL Ratio 0.96       Hemoglobin A1c 10/7/24: 5.6    Assessment / Plan      Assessment/Plan:   Diagnoses and all orders for this visit:    1. History of CVA (cerebrovascular accident) (Primary)  --Continue Aspirin 81mg daily  --Continue Plavix 75mg daily  --Continue atorvastatin 80mg daily  --BP goals <130/80. Check daily and keep a log  --Stay well hydrated  --Decrease caffeine intake  --Follow up with primary care provider regarding concerns with circulation in hands and feet and fatigue  --Change positions slowly, have walker nearby for stability  --Check BP when dizziness occurs with changes in position  --Heart Healthy Diet   --Return to the ED with any additional stroke symptoms    2. Cerebral aneurysm, nonruptured  -Follow-up with Dr. Savage as recommended    3. Hyperlipidemia LDL goal <70  - LDL 58 on 10/7/2024.  At goal of less than 70 for secondary stroke prevention  - Continue atorvastatin 80 mg daily  4. HTN  - BP goals less than 130/80.  Check daily and keep a log for primary care provider  -- Change positions slowly, have walker nearby for stability  -- Check BP when dizziness occurs with changes in position    5. Fatigue, unspecified type  -     Vitamin D 25 Hydroxy; Future    Follow Up:   Return in about 3 months (around 4/13/2025).    Discussed the importance of medication compliance Plavix 75mg daily, Aspirin 81mg daily, and Atorvastatin 80mg nightly and lifestyle modifications adequate control of blood pressure, adequate control of cholesterol (goal LDL <70), adequate control of glucose (<140, A1c goal <7), increased physical activity, and implementation of healthy diet to help reduce the risk of future cerebrovascular events.  Also discussed the signs symptoms that would warrant the patient return back to the emergency department including unilateral weakness, unilateral numbness,  visual disturbances, loss of balance, speech difficulties, and/or a sudden severe headache.  Patient and her  verbalized understanding.    CAILIN Stark, [facility] Neuro Stroke

## 2025-01-13 NOTE — PATIENT INSTRUCTIONS
--Continue Aspirin 81mg daily  --Continue Plavix 75mg daily  --Continue atorvastatin 80mg daily  --BP goals <130/80. Check daily and keep a log  --Stay well hydrated  --Decrease caffeine intake  --Follow up with primary care provider regarding concerns with circulation in hands and feet and fatigue  --Change positions slowly, have walker nearby for stability  --Check BP when dizziness occurs with changes in position  --Heart Healthy Diet   --Return to the ED with any additional stroke symptoms  --Avoid taking tub baths without supervision

## 2025-01-15 ENCOUNTER — CALL CENTER PROGRAMS (OUTPATIENT)
Dept: CALL CENTER | Facility: HOSPITAL | Age: 65
End: 2025-01-15
Payer: COMMERCIAL

## 2025-01-15 NOTE — OUTREACH NOTE
Stroke Jose Roberto Survey      Flowsheet Row Responses   Facility patient discharged from? [facility]   Attempt successful Yes   [place] score call completed Yes  [No call needed. Patient with office visit during scoring period.]   Comments Patient was scored mRS of 3 with stroke clinic appt on 1/13.            OSWALDO JAMES - Registered Nurse

## 2025-01-20 ENCOUNTER — OFFICE VISIT (OUTPATIENT)
Age: 65
End: 2025-01-20
Payer: COMMERCIAL

## 2025-01-20 VITALS
DIASTOLIC BLOOD PRESSURE: 88 MMHG | HEIGHT: 61 IN | WEIGHT: 129 LBS | BODY MASS INDEX: 24.35 KG/M2 | OXYGEN SATURATION: 100 % | SYSTOLIC BLOOD PRESSURE: 130 MMHG | HEART RATE: 72 BPM

## 2025-01-20 DIAGNOSIS — F41.1 GENERALIZED ANXIETY DISORDER: Chronic | ICD-10-CM

## 2025-01-20 DIAGNOSIS — F33.1 MAJOR DEPRESSIVE DISORDER, RECURRENT EPISODE, MODERATE DEGREE: Primary | Chronic | ICD-10-CM

## 2025-01-20 PROCEDURE — 99214 OFFICE O/P EST MOD 30 MIN: CPT | Performed by: STUDENT IN AN ORGANIZED HEALTH CARE EDUCATION/TRAINING PROGRAM

## 2025-01-20 PROCEDURE — 96127 BRIEF EMOTIONAL/BEHAV ASSMT: CPT | Performed by: STUDENT IN AN ORGANIZED HEALTH CARE EDUCATION/TRAINING PROGRAM

## 2025-01-20 RX ORDER — SERTRALINE HYDROCHLORIDE 100 MG/1
150 TABLET, FILM COATED ORAL DAILY
Qty: 135 TABLET | Refills: 1 | Status: SHIPPED | OUTPATIENT
Start: 2025-01-20

## 2025-01-20 NOTE — PROGRESS NOTES
"      [facility]             Follow Up Office Visit      Patient Name: Lauryn Romo  : 1960   MRN: [id number]     Referring Provider: Melissa Lazo APRN    Chief Complaint:      ICD-10-CM ICD-9-CM   1. Major depressive disorder, recurrent episode, moderate degree  F33.1 296.32   2. Generalized anxiety disorder  F41.1 300.02      History of Present Illness:   Lauryn Romo is a 64 y.o. female   History of Present Illness  Patient is seen and evaluated in the office with her  present. She appears to be in no acute distress at this time. She is calm and cooperative with the evaluation, and exhibits a linear and goal directed thought process. She reports an improvement in her depressive symptoms, with a noted decrease in anxiety levels as well. She continues to engage in social activities such as attending [employer] and has been able to manage her condition effectively. Despite the recent inclement weather, she has maintained a positive outlook. Her  corroborates this, noting that she has been more active around the house, engaging in cleaning and other tasks. Her sleep pattern is regular, and she maintains a healthy appetite. She expresses satisfaction with her current medication regimen and does not believe any adjustments are necessary at this time. Her  concurs, stating that he believes her condition has improved to the best possible extent. She acknowledges occasional bouts of crying and low mood, which she attributes to the recent passing of a Jehov[employer]'s witness member due to a stroke. She is not currently undergoing therapy and does not express interest in resuming it. Her  reports that they have been able to enjoy meals out together and attend their grandson's basketball games. He also notes that she experiences occasional dizziness and balance issues, which she describes as feeling \"drunk.\" He expresses concern about her tendency to rush from a " seated to standing position, fearing she may fall. She had another stroke and was in the hospital. She had surgery because 2 stents had closed themselves off. She has been healing well from this with no residual effects from the stroke. Overall, she feels as though she is in a good place. We will keep medications the same today (no longer using Ativan). She denies any SI, intent, or plan. We will follow up in 6 mo, or sooner if needed.      Previous Medication Trials:  WellNettie whiteaylar     Subjective      Review of Systems:   Review of Systems   Constitutional:  Negative for chills, fatigue and fever.   HENT:  Negative for congestion, hearing loss, sore throat and trouble swallowing.    Eyes:  Negative for blurred vision and double vision.   Respiratory:  Negative for cough, chest tightness and shortness of breath.    Cardiovascular:  Negative for chest pain and palpitations.   Gastrointestinal:  Negative for abdominal pain, constipation, diarrhea, nausea and vomiting.   Endocrine: Negative for polydipsia and polyuria.   Genitourinary:  Negative for hematuria and urgency.   Musculoskeletal:  Negative for arthralgias.   Skin:  Negative for skin lesions and bruise.   Neurological:  Negative for dizziness, tremors, seizures, light-headedness and confusion.   Hematological:  Negative for adenopathy.     Screening Scores:   PHQ-9 : 0  FOREST-7 : 0    Medications:     Current Outpatient Medications:     acetaminophen (TYLENOL) 325 MG tablet, Take 2 tablets by mouth Every 6 (Six) Hours As Needed for Mild Pain., Disp: , Rfl:     aspirin 81 MG chewable tablet, Chew 1 tablet Daily., Disp: , Rfl:     atorvastatin (LIPITOR) 80 MG tablet, TAKE 1 TABLET BY MOUTH EVERY NIGHT, Disp: 90 tablet, Rfl: 1    bethanechol (URECHOLINE) 25 MG tablet, Take 1 tablet by mouth 2 (Two) Times a Day., Disp: , Rfl:     clopidogrel (Plavix) 75 MG tablet, Take 1 tablet by mouth Daily., Disp: 30 tablet, Rfl: 3    famotidine (PEPCID) 20 MG tablet, Take  "1 tablet by mouth 2 (Two) Times a Day Before Meals., Disp: , Rfl:     fluticasone (FLONASE) 50 MCG/ACT nasal spray, 2 sprays into the nostril(s) as directed by provider Daily., Disp: 1 bottle, Rfl: 0    galcanezumab-gnlm (EMGALITY) 120 MG/ML auto-injector pen, Inject 1 mL under the skin into the appropriate area as directed Every 28 (Twenty-Eight) Days., Disp: 1 mL, Rfl: 11    lactulose (CHRONULAC) 10 GM/15ML solution, , Disp: , Rfl:     magnesium oxide (MAG-OX) 400 tablet tablet, Take 1 tablet by mouth Every Night., Disp: , Rfl:     nortriptyline (PAMELOR) 10 MG capsule, Take 2 capsules by mouth Every Night., Disp: 60 capsule, Rfl: 6    pantoprazole (PROTONIX) 20 MG EC tablet, Take 1 tablet by mouth Daily., Disp: , Rfl:     rimegepant sulfate (Nurtec) 75 MG tablet, Take 1 tablet by mouth Every Other Day., Disp: 16 tablet, Rfl: 11    sertraline (ZOLOFT) 100 MG tablet, Take 1.5 tablets by mouth Daily., Disp: 135 tablet, Rfl: 1    topiramate (TOPAMAX) 100 MG tablet, Take 1 tablet by mouth Every Night., Disp: 30 tablet, Rfl: 6    ubrogepant (Ubrelvy) 100 MG tablet, Take 1 tablet by mouth Daily As Needed (migraines). Take at onset of headache - if symptoms persist or return, may repeat dose in 2 hours. Maximum: 200 mg per 24 hours, Disp: 16 tablet, Rfl: 11    Medication Considerations:  GORGE reviewed and appropriate.     Allergies:   Allergies   Allergen Reactions    Tramadol Other (See Comments)     Flushng and passing out     Objective     Vital Signs:   Vitals:    01/20/25 1138   BP: 130/88   Pulse: 72   SpO2: 100%   Weight: 58.5 kg (129 lb)   Height: 154.9 cm (60.98\")     Body mass index is 24.39 kg/m².     Mental Status Exam:   MENTAL STATUS EXAM   General Appearance:  Cleanly groomed and dressed  Eye Contact:  Good eye contact  Attitude:  Cooperative  Motor Activity:  Normal gait, posture  Muscle Strength:  Normal  Speech:  Normal rate, tone, volume  Language:  Spontaneous  Mood and affect:  Normal, pleasant and " appropriate  Hopelessness:  Denies  Thought Process:  Logical and goal-directed  Associations/ Thought Content:  No delusions  Hallucinations:  None  Suicidal Ideations:  Not present  Homicidal Ideation:  Not present  Sensorium:  Alert  Orientation:  Person, place, time and situation  Immediate Recall, Recent, and Remote Memory:  Intact  Attention Span/ Concentration:  Good  Fund of Knowledge:  Appropriate for age and educational level  Intellectual Functioning:  Average range  Insight:  Fair  Judgement:  Fair  Reliability:  Fair  Impulse Control:  Fair      SUICIDE RISK ASSESSMENT/CSSRS:  1. Does patient have thoughts of suicide? denies  2. Does patient have intent for suicide? denies  3. Does patient have a current plan for suicide? denies  4. History of suicide attempts: denies  5. Family history of suicide or attempts: denies  6. History of violent behaviors towards others or property or thoughts of committing suicide: denies  7. History of sexual aggression toward others: denies  8. Access to firearms or weapons: denies    Assessment / Plan      Visit Diagnosis/Orders Placed This Visit:  Diagnoses and all orders for this visit:  Assessment & Plan  1. Depression.  Her condition appears to be stable at present. She is advised to continue her current medication regimen without any changes. The potential increase in Zoloft dosage to 200 mg was discussed as an option if needed in the future. She is encouraged to maintain her social activities, including [employer] attendance, and to gradually expose herself to situations that may initially cause discomfort. She is also advised to monitor her body's signals and avoid overexertion. If she experiences an increase in anxiety or a resurgence of depressive symptoms, she is instructed to contact the clinic for a potential adjustment in her medication dosage.    2. Anxiety.  Her anxiety symptoms have shown improvement. She is advised to continue her current medication regimen  without any changes. The potential increase in Zoloft dosage to 200 mg was discussed as an option if needed in the future. She is encouraged to maintain her social activities, including [employer] attendance, and to gradually expose herself to situations that may initially cause discomfort. She is also advised to monitor her body's signals and avoid overexertion. If she experiences an increase in anxiety or a resurgence of depressive symptoms, she is instructed to contact the clinic for a potential adjustment in her medication dosage.    Follow-up  The patient will follow up in July 2025, or earlier if necessary.     Major depressive disorder, recurrent episode, moderate degree (Primary)  Generalized Anxiety Disorder  - Continue Zoloft 150 mg po daily  - No longer participating in therapy  - Follow up with writer in 6 mo  Labs Reviewed : labs from 9/29/23 reviewed  EKG Reviewed : EKG from 9/15/23 reviewed:   Chart Reviewed      Functional Status: Mild impairment      Prognosis: Fair with Ongoing Treatment     Impression/Formulation:  Patient appeared alert and oriented. Patient is receptive to assistance with maintaining a stable lifestyle.  Patient presents with history of     ICD-10-CM ICD-9-CM   1. Major depressive disorder, recurrent episode, moderate degree  F33.1 296.32   2. Generalized anxiety disorder  F41.1 300.02     Treatment Plan:     Patient will continue supportive psychotherapy efforts and medications as indicated. Clinic will obtain release of information for current treatment team for continuity of care as needed. Patient will contact this office, call 911 or present to the nearest emergency room should suicidal or homicidal ideations occur.  Discussed medication options and treatment plan of prescribed medication(s) as well as the risks, benefits, and potential side effects. Patient ackowledged and verbally consented to continue with current treatment plan and was educated on the importance of  compliance with treatment and follow-up appointments.     Quality Measures:  Tobacco: Lauryn Romo  reports that she quit smoking about 20 years ago. Her smoking use included cigarettes. She started smoking about 35 years ago. She has a 15 pack-year smoking history. She has been exposed to tobacco smoke. She has never used smokeless tobacco. I have educated her on the risk of diseases from using tobacco products such as cancer, COPD, and heart disease.     Depression (PHQ >11): Patient screened positive for depression based on a PHQ-9 score of 0 on 1/20/2025. Follow-up recommendations include:  follow up with writer in 1 mo, continue medications as prescribed, continue therapy .     Follow Up:   Return in about 6 months (around 7/20/2025) for Medication Management.    Short-term goals: Patient will adhere to medication regimen and experience continued improvement in symptoms over the next 3 months.   Long-term goals: Patient will adhere to medication treatment plan and report improvement in symptoms over the next 6 months    Erika Whatley MD  [facility]     This is electronically signed by Erika Whatley MD     Patient or patient representative verbalized consent for the use of Ambient Listening during the visit with  Erika Whatley MD for chart documentation. 1/20/2025  11:53 EST

## 2025-01-29 ENCOUNTER — TELEPHONE (OUTPATIENT)
Dept: NEUROLOGY | Facility: CLINIC | Age: 65
End: 2025-01-29
Payer: COMMERCIAL

## 2025-01-29 NOTE — TELEPHONE ENCOUNTER
Caller: MEDHAT     Rj call back number: [phone]       What was the call regarding: PT WAS TOLD TO CALL BACK IF BP WAS OVER 130/80  READINGS FROM   01-19.25 TO 01-25.-25  116/90   130/88  124/91    129/86  128/84      Is it okay if the provider responds through MyChart: CALL TO DISCUSS WHAT NEEDS TO DO ?    PLEASE ADVISE

## 2025-02-11 ENCOUNTER — SPECIALTY PHARMACY (OUTPATIENT)
Dept: ONCOLOGY | Facility: HOSPITAL | Age: 65
End: 2025-02-11
Payer: COMMERCIAL

## 2025-02-13 ENCOUNTER — TELEPHONE (OUTPATIENT)
Dept: NEUROSURGERY | Facility: CLINIC | Age: 65
End: 2025-02-13
Payer: COMMERCIAL

## 2025-02-13 NOTE — TELEPHONE ENCOUNTER
Caller: Lauryn Romo    Relationship to patient: Self    Best call back number: [phone]    Chief complaint: CANCELLED APPT.    Type of visit: FOLLOW UP EXTENDED    Requested date: MAY 12TH    If rescheduling, when is the original appointment: 03/24/25    Additional notes:   PATIENT CALLED AND STATES THAT SHE NEEDED TO CHANGE HER APPTS. FOR FEB AND CANCEL HER APPT. IN MARCH-PATIENT STATES THAT SHE CURRENTLY DOES NOT HAVE INSURANCE AND HER INSURANCE WILL NOT BE REINSTATED UNTIL 05/01/25-RESCHEDULED THE FEB APPT. FOR MAY 12TH-AND PATIENT ASKED TO CANCEL THE 03/24/25 IMAGING AND APPT. -PATIENT STATES THAT THE MARCH APPT. WAS NOT NEEDED SINCE SHE WAS HAVING AN APPT. IN FEB. SENDING TO OFFICE TO MAKE SURE THE APPT. WAS OK TO CANCEL AND TO ADVISE RESCHEDULED FOR MAY THANK YOU

## 2025-02-17 ENCOUNTER — APPOINTMENT (OUTPATIENT)
Dept: MRI IMAGING | Facility: HOSPITAL | Age: 65
End: 2025-02-17
Payer: MEDICARE

## 2025-02-28 ENCOUNTER — TELEPHONE (OUTPATIENT)
Dept: NEUROLOGY | Facility: CLINIC | Age: 65
End: 2025-02-28

## 2025-02-28 NOTE — TELEPHONE ENCOUNTER
Provider: DIMITRI    Caller: Lauryn Romo    Relationship to Patient: Self    Phone Number: [phone]    Reason for Call: PATIENT IS REQUESTING A CALL BACK FROM FROM THE OFFICE TO DISCUSS HER MIGRAINE. UNABLE TO PROVIDE FURTHER DETAILS.      PLEASE REVIEW

## 2025-02-28 NOTE — TELEPHONE ENCOUNTER
Returned Pt call.    Pt stated they Have been having really bad migraines.      Pt stated the Pharmacy was not able to get [facility]    They will cost almost $1,000 to fill.    Can the Provider try a different Rx to help that will hopefully be covered.     Pt verbalized understanding.

## 2025-03-03 ENCOUNTER — SPECIALTY PHARMACY (OUTPATIENT)
Dept: ONCOLOGY | Facility: HOSPITAL | Age: 65
End: 2025-03-03
Payer: COMMERCIAL

## 2025-03-10 ENCOUNTER — SPECIALTY PHARMACY (OUTPATIENT)
Dept: ONCOLOGY | Facility: HOSPITAL | Age: 65
End: 2025-03-10
Payer: COMMERCIAL

## 2025-03-10 ENCOUNTER — TELEPHONE (OUTPATIENT)
Dept: NEUROLOGY | Facility: CLINIC | Age: 65
End: 2025-03-10

## 2025-03-10 NOTE — TELEPHONE ENCOUNTER
Please let Lauryn know that I have contacted specialty pharmacy to see if we can try another medication that will be covered.

## 2025-03-10 NOTE — TELEPHONE ENCOUNTER
Provider: RODRIGO SIMEON    Caller: Lauryn Romo    Relationship to Patient: Self    [facility] [phone] [facility] [phone]       Phone Number: [phone] (home)       Reason for Call: THE PATIENT STATED SHE SPOKE TO THE SPECIALTY PHARMACY AND THAT SHE MAKES TOO MUCH MONEY TO QUALIFY FOR ASSISTANCE FOR SOME MEDS. THE PATIENT WANTS TO KNOW IF THERE IS ANYTHING THAT CAN BE PRESCRIBED TO HER THAT SHE CAN USE FOR HER MIGRAINES.     SHE HAS AN APPT AT THE END OF THE MONTH AND DOES NOT KNOW IF THEY WANT HER TO TRY SOMETHING NOW OR WAIT UNTIL THEN AND TRY SOMETHING,     PLEASE REVIEW AND ADVISE  THANK YOU

## 2025-03-11 ENCOUNTER — HOSPITAL ENCOUNTER (INPATIENT)
Facility: HOSPITAL | Age: 65
LOS: 7 days | Discharge: HOME | End: 2025-03-20
Attending: EMERGENCY MEDICINE | Admitting: INTERNAL MEDICINE
Payer: COMMERCIAL

## 2025-03-11 ENCOUNTER — APPOINTMENT (OUTPATIENT)
Dept: GENERAL RADIOLOGY | Facility: HOSPITAL | Age: 65
End: 2025-03-11
Payer: COMMERCIAL

## 2025-03-11 ENCOUNTER — APPOINTMENT (OUTPATIENT)
Dept: CT IMAGING | Facility: HOSPITAL | Age: 65
End: 2025-03-11
Payer: COMMERCIAL

## 2025-03-11 DIAGNOSIS — R53.1 ACUTE LEFT-SIDED WEAKNESS: ICD-10-CM

## 2025-03-11 DIAGNOSIS — Z86.73 HISTORY OF ISCHEMIC STROKE: ICD-10-CM

## 2025-03-11 DIAGNOSIS — G93.89 CEREBRAL VENTRICULOMEGALY: Primary | ICD-10-CM

## 2025-03-11 DIAGNOSIS — R47.01 APHASIA: ICD-10-CM

## 2025-03-11 LAB
ALT SERPL W P-5'-P-CCNC: 44 U/L (ref 1–33)
APTT PPP: 26.5 SECONDS (ref 22–39)
AST SERPL-CCNC: 34 U/L (ref 1–32)
BACTERIA UR QL AUTO: NORMAL /HPF
BASOPHILS # BLD AUTO: 0.04 10*3/MM3 (ref 0–0.2)
BASOPHILS NFR BLD AUTO: 0.5 % (ref 0–1.5)
BILIRUB UR QL STRIP: NEGATIVE
BUN BLDA-MCNC: 21 MG/DL (ref 8–26)
CA-I BLDA-SCNC: 1.17 MMOL/L (ref 1.2–1.32)
CHLORIDE BLDA-SCNC: 108 MMOL/L (ref 98–109)
CK SERPL-CCNC: 83 U/L (ref 20–180)
CLARITY UR: CLEAR
CO2 BLDA-SCNC: 22 MMOL/L (ref 24–29)
COLOR UR: YELLOW
CREAT BLDA-MCNC: 0.7 MG/DL (ref 0.6–1.3)
D-LACTATE SERPL-SCNC: 1.2 MMOL/L (ref 0.5–2)
DEPRECATED RDW RBC AUTO: 45.4 FL (ref 37–54)
EGFRCR SERPLBLD CKD-EPI 2021: 97.3 ML/MIN/1.73
EOSINOPHIL # BLD AUTO: 0.16 10*3/MM3 (ref 0–0.4)
EOSINOPHIL NFR BLD AUTO: 2.2 % (ref 0.3–6.2)
ERYTHROCYTE [DISTWIDTH] IN BLOOD BY AUTOMATED COUNT: 13.2 % (ref 12.3–15.4)
GLUCOSE BLDC GLUCOMTR-MCNC: 90 MG/DL (ref 70–130)
GLUCOSE UR STRIP-MCNC: NEGATIVE MG/DL
HCT VFR BLD AUTO: 42.1 % (ref 34–46.6)
HCT VFR BLDA CALC: 42 % (ref 38–51)
HGB BLD-MCNC: 13.8 G/DL (ref 12–15.9)
HGB BLDA-MCNC: 14.3 G/DL (ref 12–17)
HGB UR QL STRIP.AUTO: NEGATIVE
HOLD SPECIMEN: NORMAL
HYALINE CASTS UR QL AUTO: NORMAL /LPF
IMM GRANULOCYTES # BLD AUTO: 0.02 10*3/MM3 (ref 0–0.05)
IMM GRANULOCYTES NFR BLD AUTO: 0.3 % (ref 0–0.5)
INR PPP: 0.96 (ref 0.89–1.12)
KETONES UR QL STRIP: ABNORMAL
LEUKOCYTE ESTERASE UR QL STRIP.AUTO: ABNORMAL
LYMPHOCYTES # BLD AUTO: 1.54 10*3/MM3 (ref 0.7–3.1)
LYMPHOCYTES NFR BLD AUTO: 20.7 % (ref 19.6–45.3)
MCH RBC QN AUTO: 30.6 PG (ref 26.6–33)
MCHC RBC AUTO-ENTMCNC: 32.8 G/DL (ref 31.5–35.7)
MCV RBC AUTO: 93.3 FL (ref 79–97)
MONOCYTES # BLD AUTO: 0.43 10*3/MM3 (ref 0.1–0.9)
MONOCYTES NFR BLD AUTO: 5.8 % (ref 5–12)
NEUTROPHILS NFR BLD AUTO: 5.24 10*3/MM3 (ref 1.7–7)
NEUTROPHILS NFR BLD AUTO: 70.5 % (ref 42.7–76)
NITRITE UR QL STRIP: NEGATIVE
NRBC BLD AUTO-RTO: 0 /100 WBC (ref 0–0.2)
PH UR STRIP.AUTO: 7 [PH] (ref 5–8)
PLATELET # BLD AUTO: 240 10*3/MM3 (ref 140–450)
PMV BLD AUTO: 10.9 FL (ref 6–12)
POTASSIUM BLDA-SCNC: 4.3 MMOL/L (ref 3.5–4.9)
PROT UR QL STRIP: NEGATIVE
PROTHROMBIN TIME: 12.9 SECONDS (ref 12.2–14.5)
RBC # BLD AUTO: 4.51 10*6/MM3 (ref 3.77–5.28)
RBC # UR STRIP: NORMAL /HPF
REF LAB TEST METHOD: NORMAL
SODIUM BLD-SCNC: 141 MMOL/L (ref 138–146)
SP GR UR STRIP: 1.02 (ref 1–1.03)
SQUAMOUS #/AREA URNS HPF: NORMAL /HPF
TSH SERPL DL<=0.05 MIU/L-ACNC: 4.09 UIU/ML (ref 0.27–4.2)
UROBILINOGEN UR QL STRIP: ABNORMAL
WBC # UR STRIP: NORMAL /HPF
WBC NRBC COR # BLD AUTO: 7.43 10*3/MM3 (ref 3.4–10.8)
WHOLE BLOOD HOLD COAG: NORMAL
WHOLE BLOOD HOLD SPECIMEN: NORMAL

## 2025-03-11 PROCEDURE — 85610 PROTHROMBIN TIME: CPT | Performed by: EMERGENCY MEDICINE

## 2025-03-11 PROCEDURE — 85730 THROMBOPLASTIN TIME PARTIAL: CPT | Performed by: EMERGENCY MEDICINE

## 2025-03-11 PROCEDURE — 85025 COMPLETE CBC W/AUTO DIFF WBC: CPT | Performed by: EMERGENCY MEDICINE

## 2025-03-11 PROCEDURE — 99285 EMERGENCY DEPT VISIT HI MDM: CPT

## 2025-03-11 PROCEDURE — G0378 HOSPITAL OBSERVATION PER HR: HCPCS

## 2025-03-11 PROCEDURE — P9612 CATHETERIZE FOR URINE SPEC: HCPCS

## 2025-03-11 PROCEDURE — 93005 ELECTROCARDIOGRAM TRACING: CPT | Performed by: EMERGENCY MEDICINE

## 2025-03-11 PROCEDURE — 25510000001 IOPAMIDOL PER 1 ML: Performed by: EMERGENCY MEDICINE

## 2025-03-11 PROCEDURE — 0042T HC CT CEREBRAL PERFUSION W/WO CONTRAST: CPT

## 2025-03-11 PROCEDURE — 84460 ALANINE AMINO (ALT) (SGPT): CPT | Performed by: EMERGENCY MEDICINE

## 2025-03-11 PROCEDURE — 85014 HEMATOCRIT: CPT

## 2025-03-11 PROCEDURE — 83605 ASSAY OF LACTIC ACID: CPT | Performed by: NURSE PRACTITIONER

## 2025-03-11 PROCEDURE — 84450 TRANSFERASE (AST) (SGOT): CPT | Performed by: EMERGENCY MEDICINE

## 2025-03-11 PROCEDURE — 70496 CT ANGIOGRAPHY HEAD: CPT

## 2025-03-11 PROCEDURE — 70450 CT HEAD/BRAIN W/O DYE: CPT

## 2025-03-11 PROCEDURE — 82550 ASSAY OF CK (CPK): CPT | Performed by: NURSE PRACTITIONER

## 2025-03-11 PROCEDURE — 99222 1ST HOSP IP/OBS MODERATE 55: CPT | Performed by: NURSE PRACTITIONER

## 2025-03-11 PROCEDURE — 70498 CT ANGIOGRAPHY NECK: CPT

## 2025-03-11 PROCEDURE — 71045 X-RAY EXAM CHEST 1 VIEW: CPT

## 2025-03-11 PROCEDURE — 81001 URINALYSIS AUTO W/SCOPE: CPT | Performed by: EMERGENCY MEDICINE

## 2025-03-11 PROCEDURE — 4A03X5D MEASUREMENT OF ARTERIAL FLOW, INTRACRANIAL, EXTERNAL APPROACH: ICD-10-PCS | Performed by: RADIOLOGY

## 2025-03-11 PROCEDURE — 84443 ASSAY THYROID STIM HORMONE: CPT | Performed by: INTERNAL MEDICINE

## 2025-03-11 PROCEDURE — 80047 BASIC METABLC PNL IONIZED CA: CPT

## 2025-03-11 RX ORDER — SODIUM CHLORIDE 0.9 % (FLUSH) 0.9 %
10 SYRINGE (ML) INJECTION EVERY 12 HOURS SCHEDULED
Status: DISCONTINUED | OUTPATIENT
Start: 2025-03-11 | End: 2025-03-18

## 2025-03-11 RX ORDER — ACETAMINOPHEN 325 MG/1
650 TABLET ORAL EVERY 4 HOURS PRN
Status: DISCONTINUED | OUTPATIENT
Start: 2025-03-11 | End: 2025-03-20 | Stop reason: HOSPADM

## 2025-03-11 RX ORDER — SODIUM CHLORIDE 0.9 % (FLUSH) 0.9 %
10 SYRINGE (ML) INJECTION AS NEEDED
Status: DISCONTINUED | OUTPATIENT
Start: 2025-03-11 | End: 2025-03-18

## 2025-03-11 RX ORDER — SODIUM CHLORIDE 0.9 % (FLUSH) 0.9 %
10 SYRINGE (ML) INJECTION AS NEEDED
Status: DISCONTINUED | OUTPATIENT
Start: 2025-03-11 | End: 2025-03-20 | Stop reason: HOSPADM

## 2025-03-11 RX ORDER — NITROGLYCERIN 0.4 MG/1
0.4 TABLET SUBLINGUAL
Status: DISCONTINUED | OUTPATIENT
Start: 2025-03-11 | End: 2025-03-20 | Stop reason: HOSPADM

## 2025-03-11 RX ORDER — ASPIRIN 300 MG/1
300 SUPPOSITORY RECTAL DAILY
Status: DISCONTINUED | OUTPATIENT
Start: 2025-03-12 | End: 2025-03-20 | Stop reason: HOSPADM

## 2025-03-11 RX ORDER — ACETAMINOPHEN 650 MG/1
650 SUPPOSITORY RECTAL EVERY 4 HOURS PRN
Status: DISCONTINUED | OUTPATIENT
Start: 2025-03-11 | End: 2025-03-18

## 2025-03-11 RX ORDER — HYDROCODONE BITARTRATE AND ACETAMINOPHEN 5; 325 MG/1; MG/1
1 TABLET ORAL EVERY 6 HOURS PRN
Refills: 0 | Status: DISPENSED | OUTPATIENT
Start: 2025-03-11 | End: 2025-03-16

## 2025-03-11 RX ORDER — ACETAMINOPHEN 160 MG/5ML
650 SOLUTION ORAL EVERY 4 HOURS PRN
Status: DISCONTINUED | OUTPATIENT
Start: 2025-03-11 | End: 2025-03-20 | Stop reason: HOSPADM

## 2025-03-11 RX ORDER — SODIUM CHLORIDE 9 MG/ML
40 INJECTION, SOLUTION INTRAVENOUS AS NEEDED
Status: DISCONTINUED | OUTPATIENT
Start: 2025-03-11 | End: 2025-03-19

## 2025-03-11 RX ORDER — ASPIRIN 81 MG/1
81 TABLET, CHEWABLE ORAL DAILY
Status: DISCONTINUED | OUTPATIENT
Start: 2025-03-12 | End: 2025-03-20 | Stop reason: HOSPADM

## 2025-03-11 RX ORDER — IOPAMIDOL 755 MG/ML
115 INJECTION, SOLUTION INTRAVASCULAR
Status: COMPLETED | OUTPATIENT
Start: 2025-03-11 | End: 2025-03-11

## 2025-03-11 RX ORDER — CLOPIDOGREL BISULFATE 75 MG/1
75 TABLET ORAL DAILY
Status: DISCONTINUED | OUTPATIENT
Start: 2025-03-12 | End: 2025-03-12

## 2025-03-11 RX ORDER — ONDANSETRON 2 MG/ML
4 INJECTION INTRAMUSCULAR; INTRAVENOUS EVERY 6 HOURS PRN
Status: DISCONTINUED | OUTPATIENT
Start: 2025-03-11 | End: 2025-03-18

## 2025-03-11 RX ADMIN — IOPAMIDOL 115 ML: 755 INJECTION, SOLUTION INTRAVENOUS at 21:06

## 2025-03-11 RX ADMIN — Medication 10 ML: at 23:07

## 2025-03-11 RX ADMIN — Medication 10 ML: at 23:08

## 2025-03-12 ENCOUNTER — APPOINTMENT (OUTPATIENT)
Dept: NEUROLOGY | Facility: HOSPITAL | Age: 65
End: 2025-03-12
Payer: COMMERCIAL

## 2025-03-12 ENCOUNTER — APPOINTMENT (OUTPATIENT)
Dept: MRI IMAGING | Facility: HOSPITAL | Age: 65
End: 2025-03-12
Payer: COMMERCIAL

## 2025-03-12 LAB
ALBUMIN SERPL-MCNC: 4 G/DL (ref 3.5–5.2)
ALBUMIN/GLOB SERPL: 1.6 G/DL
ALP SERPL-CCNC: 115 U/L (ref 39–117)
ALT SERPL W P-5'-P-CCNC: 36 U/L (ref 1–33)
ANION GAP SERPL CALCULATED.3IONS-SCNC: 10 MMOL/L (ref 5–15)
AST SERPL-CCNC: 26 U/L (ref 1–32)
BASOPHILS # BLD AUTO: 0.05 10*3/MM3 (ref 0–0.2)
BASOPHILS NFR BLD AUTO: 0.7 % (ref 0–1.5)
BILIRUB SERPL-MCNC: 0.3 MG/DL (ref 0–1.2)
BUN SERPL-MCNC: 16 MG/DL (ref 8–23)
BUN/CREAT SERPL: 29.1 (ref 7–25)
CALCIUM SPEC-SCNC: 8.6 MG/DL (ref 8.6–10.5)
CHLORIDE SERPL-SCNC: 109 MMOL/L (ref 98–107)
CHOLEST SERPL-MCNC: 114 MG/DL (ref 0–200)
CO2 SERPL-SCNC: 23 MMOL/L (ref 22–29)
CREAT SERPL-MCNC: 0.55 MG/DL (ref 0.57–1)
DEPRECATED RDW RBC AUTO: 45.9 FL (ref 37–54)
EGFRCR SERPLBLD CKD-EPI 2021: 103.1 ML/MIN/1.73
EOSINOPHIL # BLD AUTO: 0.19 10*3/MM3 (ref 0–0.4)
EOSINOPHIL NFR BLD AUTO: 2.7 % (ref 0.3–6.2)
ERYTHROCYTE [DISTWIDTH] IN BLOOD BY AUTOMATED COUNT: 13.3 % (ref 12.3–15.4)
GLOBULIN UR ELPH-MCNC: 2.5 GM/DL
GLUCOSE BLDC GLUCOMTR-MCNC: 88 MG/DL (ref 70–130)
GLUCOSE SERPL-MCNC: 86 MG/DL (ref 65–99)
HBA1C MFR BLD: 5.8 % (ref 4.8–5.6)
HCT VFR BLD AUTO: 40.2 % (ref 34–46.6)
HDLC SERPL-MCNC: 60 MG/DL (ref 40–60)
HGB BLD-MCNC: 12.9 G/DL (ref 12–15.9)
HOLD SPECIMEN: NORMAL
HOLD SPECIMEN: NORMAL
IMM GRANULOCYTES # BLD AUTO: 0.02 10*3/MM3 (ref 0–0.05)
IMM GRANULOCYTES NFR BLD AUTO: 0.3 % (ref 0–0.5)
LDLC SERPL CALC-MCNC: 42 MG/DL (ref 0–100)
LDLC/HDLC SERPL: 0.73 {RATIO}
LYMPHOCYTES # BLD AUTO: 2.02 10*3/MM3 (ref 0.7–3.1)
LYMPHOCYTES NFR BLD AUTO: 29.2 % (ref 19.6–45.3)
MAGNESIUM SERPL-MCNC: 3.1 MG/DL (ref 1.6–2.4)
MCH RBC QN AUTO: 30.2 PG (ref 26.6–33)
MCHC RBC AUTO-ENTMCNC: 32.1 G/DL (ref 31.5–35.7)
MCV RBC AUTO: 94.1 FL (ref 79–97)
MONOCYTES # BLD AUTO: 0.52 10*3/MM3 (ref 0.1–0.9)
MONOCYTES NFR BLD AUTO: 7.5 % (ref 5–12)
NEUTROPHILS NFR BLD AUTO: 4.12 10*3/MM3 (ref 1.7–7)
NEUTROPHILS NFR BLD AUTO: 59.6 % (ref 42.7–76)
NRBC BLD AUTO-RTO: 0 /100 WBC (ref 0–0.2)
PA ADP PRP-ACNC: 57 PRU
PLATELET # BLD AUTO: 232 10*3/MM3 (ref 140–450)
PMV BLD AUTO: 11.1 FL (ref 6–12)
POTASSIUM SERPL-SCNC: 3.8 MMOL/L (ref 3.5–5.2)
PROT SERPL-MCNC: 6.5 G/DL (ref 6–8.5)
RBC # BLD AUTO: 4.27 10*6/MM3 (ref 3.77–5.28)
SODIUM SERPL-SCNC: 142 MMOL/L (ref 136–145)
TRIGL SERPL-MCNC: 50 MG/DL (ref 0–150)
VLDLC SERPL-MCNC: 12 MG/DL (ref 5–40)
WBC NRBC COR # BLD AUTO: 6.92 10*3/MM3 (ref 3.4–10.8)

## 2025-03-12 PROCEDURE — 92523 SPEECH SOUND LANG COMPREHEN: CPT

## 2025-03-12 PROCEDURE — 85576 BLOOD PLATELET AGGREGATION: CPT | Performed by: NURSE PRACTITIONER

## 2025-03-12 PROCEDURE — 99223 1ST HOSP IP/OBS HIGH 75: CPT | Performed by: INTERNAL MEDICINE

## 2025-03-12 PROCEDURE — G0378 HOSPITAL OBSERVATION PER HR: HCPCS

## 2025-03-12 PROCEDURE — 85025 COMPLETE CBC W/AUTO DIFF WBC: CPT | Performed by: INTERNAL MEDICINE

## 2025-03-12 PROCEDURE — 70551 MRI BRAIN STEM W/O DYE: CPT

## 2025-03-12 PROCEDURE — 97166 OT EVAL MOD COMPLEX 45 MIN: CPT

## 2025-03-12 PROCEDURE — 83036 HEMOGLOBIN GLYCOSYLATED A1C: CPT | Performed by: NURSE PRACTITIONER

## 2025-03-12 PROCEDURE — 25010000002 MAGNESIUM SULFATE 2 GM/50ML SOLUTION: Performed by: NURSE PRACTITIONER

## 2025-03-12 PROCEDURE — 80053 COMPREHEN METABOLIC PANEL: CPT | Performed by: INTERNAL MEDICINE

## 2025-03-12 PROCEDURE — 80061 LIPID PANEL: CPT | Performed by: NURSE PRACTITIONER

## 2025-03-12 PROCEDURE — 95816 EEG AWAKE AND DROWSY: CPT

## 2025-03-12 PROCEDURE — 82948 REAGENT STRIP/BLOOD GLUCOSE: CPT

## 2025-03-12 PROCEDURE — 83735 ASSAY OF MAGNESIUM: CPT | Performed by: INTERNAL MEDICINE

## 2025-03-12 PROCEDURE — 92610 EVALUATE SWALLOWING FUNCTION: CPT

## 2025-03-12 PROCEDURE — 99231 SBSQ HOSP IP/OBS SF/LOW 25: CPT | Performed by: RADIOLOGY

## 2025-03-12 PROCEDURE — 25010000002 EPTIFIBATIDE PER 5 MG

## 2025-03-12 PROCEDURE — 97162 PT EVAL MOD COMPLEX 30 MIN: CPT

## 2025-03-12 PROCEDURE — 95816 EEG AWAKE AND DROWSY: CPT | Performed by: PSYCHIATRY & NEUROLOGY

## 2025-03-12 RX ORDER — BETHANECHOL CHLORIDE 10 MG/1
25 TABLET ORAL 2 TIMES DAILY
Status: DISCONTINUED | OUTPATIENT
Start: 2025-03-12 | End: 2025-03-20 | Stop reason: HOSPADM

## 2025-03-12 RX ORDER — ATORVASTATIN CALCIUM 40 MG/1
80 TABLET, FILM COATED ORAL NIGHTLY
Status: DISCONTINUED | OUTPATIENT
Start: 2025-03-12 | End: 2025-03-20 | Stop reason: HOSPADM

## 2025-03-12 RX ORDER — TOPIRAMATE 100 MG/1
100 TABLET, FILM COATED ORAL NIGHTLY
Status: DISCONTINUED | OUTPATIENT
Start: 2025-03-12 | End: 2025-03-20 | Stop reason: HOSPADM

## 2025-03-12 RX ORDER — SODIUM CHLORIDE 0.9 % (FLUSH) 0.9 %
10 SYRINGE (ML) INJECTION EVERY 12 HOURS SCHEDULED
Status: DISCONTINUED | OUTPATIENT
Start: 2025-03-12 | End: 2025-03-19

## 2025-03-12 RX ORDER — SODIUM CHLORIDE 0.9 % (FLUSH) 0.9 %
10 SYRINGE (ML) INJECTION AS NEEDED
Status: DISCONTINUED | OUTPATIENT
Start: 2025-03-12 | End: 2025-03-18

## 2025-03-12 RX ORDER — PANTOPRAZOLE SODIUM 40 MG/1
40 TABLET, DELAYED RELEASE ORAL
Status: DISCONTINUED | OUTPATIENT
Start: 2025-03-12 | End: 2025-03-20 | Stop reason: HOSPADM

## 2025-03-12 RX ORDER — SODIUM CHLORIDE 9 MG/ML
40 INJECTION, SOLUTION INTRAVENOUS AS NEEDED
Status: DISCONTINUED | OUTPATIENT
Start: 2025-03-12 | End: 2025-03-18

## 2025-03-12 RX ORDER — EPTIFIBATIDE 0.75 MG/ML
1 INJECTION, SOLUTION INTRAVENOUS CONTINUOUS
Status: DISCONTINUED | OUTPATIENT
Start: 2025-03-12 | End: 2025-03-17

## 2025-03-12 RX ORDER — NORTRIPTYLINE HYDROCHLORIDE 10 MG/1
20 CAPSULE ORAL NIGHTLY
Status: DISCONTINUED | OUTPATIENT
Start: 2025-03-12 | End: 2025-03-20 | Stop reason: HOSPADM

## 2025-03-12 RX ORDER — FAMOTIDINE 20 MG/1
20 TABLET, FILM COATED ORAL
Status: DISCONTINUED | OUTPATIENT
Start: 2025-03-12 | End: 2025-03-20 | Stop reason: HOSPADM

## 2025-03-12 RX ORDER — MAGNESIUM SULFATE HEPTAHYDRATE 40 MG/ML
2 INJECTION, SOLUTION INTRAVENOUS ONCE
Status: COMPLETED | OUTPATIENT
Start: 2025-03-12 | End: 2025-03-12

## 2025-03-12 RX ADMIN — SERTRALINE HYDROCHLORIDE 150 MG: 50 TABLET ORAL at 09:45

## 2025-03-12 RX ADMIN — BETHANECHOL CHLORIDE 25 MG: 25 TABLET ORAL at 09:46

## 2025-03-12 RX ADMIN — CLOPIDOGREL BISULFATE 75 MG: 75 TABLET ORAL at 09:46

## 2025-03-12 RX ADMIN — FAMOTIDINE 20 MG: 20 TABLET, FILM COATED ORAL at 17:11

## 2025-03-12 RX ADMIN — MAGNESIUM SULFATE HEPTAHYDRATE 2 G: 40 INJECTION, SOLUTION INTRAVENOUS at 03:17

## 2025-03-12 RX ADMIN — TOPIRAMATE 100 MG: 100 TABLET, FILM COATED ORAL at 21:02

## 2025-03-12 RX ADMIN — PANTOPRAZOLE SODIUM 40 MG: 40 TABLET, DELAYED RELEASE ORAL at 09:45

## 2025-03-12 RX ADMIN — EPTIFIBATIDE 1 MCG/KG/MIN: 0.75 INJECTION, SOLUTION INTRAVENOUS at 17:12

## 2025-03-12 RX ADMIN — FAMOTIDINE 20 MG: 20 TABLET, FILM COATED ORAL at 09:45

## 2025-03-12 RX ADMIN — Medication 10 ML: at 09:47

## 2025-03-12 RX ADMIN — HYDROCODONE BITARTRATE AND ACETAMINOPHEN 1 TABLET: 5; 325 TABLET ORAL at 17:58

## 2025-03-12 RX ADMIN — BETHANECHOL CHLORIDE 25 MG: 25 TABLET ORAL at 21:03

## 2025-03-12 RX ADMIN — ASPIRIN 81 MG CHEWABLE TABLET 81 MG: 81 TABLET CHEWABLE at 09:45

## 2025-03-12 RX ADMIN — NORTRIPTYLINE HYDROCHLORIDE 20 MG: 10 CAPSULE ORAL at 21:02

## 2025-03-12 RX ADMIN — Medication 10 ML: at 03:21

## 2025-03-12 RX ADMIN — ACETAMINOPHEN 650 MG: 650 SOLUTION ORAL at 17:11

## 2025-03-12 RX ADMIN — ATORVASTATIN CALCIUM 80 MG: 40 TABLET, FILM COATED ORAL at 21:03

## 2025-03-12 NOTE — PLAN OF CARE
Goal Outcome Evaluation:  Plan of Care Reviewed With: patient, spouse           Outcome Evaluation: Pt presents with generalized weakness, as well as balance and endurance below baseline contributing to transfer, ambulation, and stair navigation deficits. Pt will benefit from PT to address aforementioned deficits and return to PLOF. PT rec IRF upon dc.    Anticipated Discharge Disposition (PT): inpatient rehabilitation facility

## 2025-03-12 NOTE — THERAPY EVALUATION
Patient Name: Lauryn Romo  : 1960    MRN: [id number]                              Today's Date: 3/12/2025       Admit Date: 3/11/2025    Visit Dx:     ICD-10-CM ICD-9-CM   1. Aphasia  R47.01 784.3   2. Acute left-sided weakness  R53.1 728.87   3. History of ischemic stroke  Z86.73 V12.54     Patient Active Problem List   Diagnosis    Acute CVA    Cerebral aneurysm, nonruptured    Paraesophageal hernia    Hyperlipidemia    HTN    Moderate malnutrition    History of CVA (cerebrovascular accident)    Other headache syndrome    Acute cystitis without hematuria    Left-sided weakness     Past Medical History:   Diagnosis Date    Abnormal ECG     Aneurysm 2022    Cancer 2024    Basal Cell Carcinoma removed from scalp by dermatology    Cluster headache     Had headaches for several months before stroke and mass was found    Difficulty walking 22    After stroke    Headache     Headache, tension-type     Hyperlipidemia     Hypertension     Memory loss 22    Migraine     Stroke 2022    Stroke 10/06/2024    Vision loss 22    When dizzy or headache     Past Surgical History:   Procedure Laterality Date    ARTERIAL ANEURYSM REPAIR      BREAST LUMPECTOMY  2012    CYST REMOVAL Left 2023    EMBOLIZATION CEREBRAL N/A 2022    Procedure: CV EMBOLIZATION CEREBRAL IR;  Surgeon: Enoch Savage MD;  Location:  [facility];  Service: Interventional Radiology;  Laterality: N/A;    HIATAL HERNIA REPAIR      INTERVENTIONAL RADIOLOGY PROCEDURE N/A 09/15/2023    Procedure: Embolization;  Surgeon: Enoch Savage MD;  Location:  [facility];  Service: Interventional Radiology;  Laterality: N/A;    INTERVENTIONAL RADIOLOGY PROCEDURE N/A 10/6/2024    Procedure: IR mechanical thrombectomy;  Surgeon: Joe Mendez MD;  Location:  [facility];  Service: Interventional Radiology;  Laterality: N/A;    SINUS  "SURGERY      x4    SKIN CANCER EXCISION      cancer removed off top of head      General Information       Row Name 03/12/25 0949          OT Time and Intention    Document Type evaluation  -CS     Mode of Treatment occupational therapy  -CS       Row Name 03/12/25 0949          General Information    Patient Profile Reviewed yes  -CS     Prior Level of Function independent:;all household mobility;ADL's;dependent:;driving  Pt's spouse assist with tub entry (baths) and assists with dressing prn, however reports gross Ind with ADLs and use of RW for mobility on \"off days\". Reports good effort/frequency with home exercise program  -CS     Existing Precautions/Restrictions fall;other (see comments)  L-sided weakness (LLE > LUE), low vision  -CS     Barriers to Rehab medically complex;previous functional deficit  -CS       Row Name 03/12/25 0949          Occupational Profile    Reason for Services/Referral (Occupational Profile) stroke eval  -CS       Row Name 03/12/25 0949          Living Environment    Current Living Arrangements home  -CS     People in Home spouse  -CS       Row Name 03/12/25 0949          Home Main Entrance    Number of Stairs, Main Entrance two  -CS       Row Name 03/12/25 0949          Stairs Within Home, Primary    Stairs, Within Home, Primary 2 level home over basement, primary bedroom on main floor, Pt access basement stairs  -CS     Number of Stairs, Within Home, Primary twelve;other (see comments)  -CS       Row Name 03/12/25 0949          Cognition    Orientation Status (Cognition) oriented x 3  -CS       Row Name 03/12/25 0949          Safety Issues/Impairments Affecting Functional Mobility    Safety Issues Affecting Function (Mobility) safety precaution awareness;safety precautions follow-through/compliance  -CS     Impairments Affecting Function (Mobility) balance;endurance/activity tolerance;strength;visual/perceptual;motor control  -CS               User Key  (r) = Recorded By, (t) = " Taken By, (c) = Cosigned By      Initials Name Provider Type    CS Josh Oliver, OT Occupational Therapist                     Mobility/ADL's       Row Name 03/12/25 0947          Bed Mobility    Bed Mobility supine-sit;scooting/bridging  -CS     Scooting/Bridging [place] (Bed Mobility) supervision  -CS     Supine-Sit [place] (Bed Mobility) standby assist  -CS     Assistive Device (Bed Mobility) bed rails;head of bed elevated  -CS     Comment, (Bed Mobility) appropriate sequencing intact, mild dizziness reported, resolved with time  -CS       Row Name 03/12/25 0947          Transfers    Transfers bed-chair transfer  -CS     Comment, (Transfers) increased assist required for forward mobility with RW  -CS       Row Name 03/12/25 0947          Bed-Chair Transfer    Bed-Chair [place] (Transfers) contact guard;verbal cues  -       Row Name 03/12/25 0947          Functional Mobility    Functional Mobility- Device walker, front-wheeled  -CS     Patient was able to Ambulate yes  -       Row Name 03/12/25 0947          Activities of Daily Living    BADL Assessment/Intervention lower body dressing;grooming;feeding  -       Row Name 03/12/25 0947          Lower Body Dressing Assessment/Training    [place] Level (Lower Body Dressing) don;socks;contact guard assist;pants/bottoms;moderate assist (50% patient effort)  -     Position (Lower Body Dressing) edge of bed sitting;supported standing  -       Row Name 03/12/25 0947          Grooming Assessment/Training    [place] Level (Grooming) hair care, combing/brushing;wash face, hands;set up  -       Row Name 03/12/25 0947          Self-Feeding Assessment/Training    [place] Level (Feeding) feeding skills;other (see comments)  -CS     Comment, (Feeding) NPO this date  -               User Key  (r) = Recorded By, (t) = Taken By, (c) = Cosigned By      Initials Name Provider Type    CS Josh Oliver, TOMI Occupational Therapist                    Obj/Interventions       [facility] Name 03/12/25 0944          Sensory Assessment (Somatosensory)    Sensory Assessment (Somatosensory) bilateral UE  -CS     Bilateral UE Sensory Assessment general sensation;light touch awareness;light touch localization;intact  -CS       Row Name 03/12/25 0944          Vision Assessment/Intervention    Visual Impairment/Limitations visual/perceptual impairments present  -     Vision Assessment Comment LLQ field deficit appreciate during confrontation testing  -[facility] Name 03/12/25 0944          Range of Motion Comprehensive    General Range of Motion bilateral upper extremity ROM WFL  -CS       Row Name 03/12/25 0944          Motor Skills    Motor Skills coordination;functional endurance  -     Coordination upper extremity;bilateral;WFL;bimanual skills  -CS       Row Name 03/12/25 0944          Balance    Balance Assessment sitting static balance;sitting dynamic balance;standing static balance;standing dynamic balance  -     Static Sitting Balance supervision  -     Dynamic Sitting Balance standby assist  -CS     Position, Sitting Balance unsupported;sitting edge of bed  -     Static Standing Balance contact guard  -     Dynamic Standing Balance minimal assist  -CS     Position/Device Used, Standing Balance supported;walker, front-wheeled  -     Balance Interventions sitting;standing;occupation based/functional task;sit to stand  -               User Key  (r) = Recorded By, (t) = Taken By, (c) = Cosigned By      Initials Name Provider Type    CS Josh Oliver OT Occupational Therapist                   Goals/Plan       Row Name 03/12/25 0943          Transfer Goal 1 (OT)    Activity/Assistive Device (Transfer Goal 1, OT) bed-to-chair/chair-to-bed;toilet  -     [place] Level/Cues Needed (Transfer Goal 1, OT) modified independence  -CS     Time Frame (Transfer Goal 1, OT) long term goal (LTG);1 week  -     Strategies/Barriers (Transfers Goal 1,  OT) AD recs per PT  -CS     Progress/Outcome (Transfer Goal 1, OT) new goal  -CS       Row Name 03/12/25 0943          Dressing Goal 1 (OT)    Activity/Device (Dressing Goal 1, OT) lower body dressing  -CS     [place]/Cues Needed (Dressing Goal 1, OT) standby assist  -CS     Time Frame (Dressing Goal 1, OT) short term goal (STG);5 days  -CS     Strategies/Barriers (Dressing Goal 1, OT) improved sit-stand and safe sequencing w/ DME  -CS     Progress/Outcome (Dressing Goal 1, OT) new goal  -CS       Row Name 03/12/25 0943          ROM Goal 1 (OT)    Progress/Outcome (ROM Goal 1, OT) new goal  -CS       Row Name 03/12/25 0943          Strength Goal 1 (OT)    Strength Goal 1 (OT) Increase gross BUE strength 1/2 muscle grade to promote safety/Ind with ADLs and related transfers  -CS     Time Frame (Strength Goal 1, OT) 1 week;long term goal (LTG)  -CS       Row Name 03/12/25 0943          Therapy Assessment/Plan (OT)    Planned Therapy Interventions (OT) activity tolerance training;functional balance retraining;ROM/therapeutic exercise;transfer/mobility retraining;occupation/activity based interventions;strengthening exercise;patient/caregiver education/training;neuromuscular control/coordination retraining;cognitive/visual perception retraining;adaptive equipment training;BADL retraining  -               User Key  (r) = Recorded By, (t) = Taken By, (c) = Cosigned By      Initials Name Provider Type    CS Josh Oliver, OT Occupational Therapist                   Clinical Impression       Row Name 03/12/25 0901          Pain Assessment    Additional Documentation Pain Scale: FACES Pre/Post-Treatment (Group)  -CS       [facility] Name 03/12/25 0901          Pain Scale: FACES Pre/Post-Treatment    Pain: FACES Scale, Pretreatment 0-->no hurt  -CS     Posttreatment Pain Rating 0-->no hurt  -CS       Row Name 03/12/25 0901          Plan of Care Review    Plan of Care Reviewed With patient;spouse  -CS     Progress no change   -CS     Outcome Evaluation Baseline ADL completion limited by increased L-sided weakness (LLE > LUE), balance impairment, and visual-perceptual deficit warranting IP OT services to promote return to PLOF. LLQ visual field deficit observed, grossly Min-ModA for ADLs and related mobility this date. Rec d/c to IRF for best functional deficits, if not then home with 24/7 increased assist and HHOT/PT.  -CS       Row Name 03/12/25 0901          Therapy Assessment/Plan (OT)    Patient/Family Therapy Goal Statement (OT) return to home  -CS     Rehab Potential (OT) fair  -CS     Criteria for Skilled Therapeutic Interventions Met (OT) yes;meets criteria  -CS     Therapy Frequency (OT) daily  -CS       Row Name 03/12/25 0901          Therapy Plan Review/Discharge Plan (OT)    Anticipated Discharge Disposition (OT) inpatient rehabilitation facility  -CS       Row Name 03/12/25 0901          Vital Signs    Pre Systolic BP Rehab 129  RN cleared for eval  -CS     Pre Treatment Diastolic BP 76  -CS     Intra Systolic BP Rehab 125  -CS     Intra Treatment Diastolic BP 71  -CS     Post Systolic BP Rehab 131  -CS     Post Treatment Diastolic BP 82  -CS     O2 Delivery Pre Treatment room air  -CS     O2 Delivery Intra Treatment room air  -CS     O2 Delivery Post Treatment room air  -CS     Pre Patient Position Supine  -CS     Intra Patient Position Standing  -CS     Post Patient Position Sitting  -CS       Row Name 03/12/25 0901          Positioning and Restraints    Pre-Treatment Position in bed  -CS     Post Treatment Position bed  -CS     In Bed sitting;with PT  -CS               User Key  (r) = Recorded By, (t) = Taken By, (c) = Cosigned By      Initials Name Provider Type    Josh King, OT Occupational Therapist                   Outcome Measures       Row Name 03/12/25 0862          How much help from another is currently needed...    Putting on and taking off regular lower body clothing? 2  -CS     Bathing (including  washing, rinsing, and drying) 2  -CS     Toileting (which includes using toilet bed pan or urinal) 3  -CS     Putting on and taking off regular upper body clothing 3  -CS     Taking care of personal grooming (such as brushing teeth) 4  -CS     Eating meals 4  -CS     AM-PAC 6 Clicks Score (OT) 18  -CS       Row Name 03/12/25 0949 03/12/25 0116       How much help from another person do you currently need...    Turning from your back to your side while in flat bed without using bedrails? 4  -KR 4  -TC    Moving from lying on back to sitting on the side of a flat bed without bedrails? 3  -KR 4  -TC    Moving to and from a bed to a chair (including a wheelchair)? 3  -KR 3  -TC    Standing up from a chair using your arms (e.g., wheelchair, bedside chair)? 3  -KR 3  -TC    Climbing 3-5 steps with a railing? 3  -KR 3  -TC    To walk in hospital room? 3  -KR 3  -TC    AM-PAC 6 Clicks Score (PT) 19  -KR 20  -TC    Highest Level of Mobility Goal 6 --> Walk 10 steps or more  -KR 6 --> Walk 10 steps or more  -TC      Row Name 03/12/25 0114          How much help from another person do you currently need...    Turning from your back to your side while in flat bed without using bedrails? 4  -TC     Moving from lying on back to sitting on the side of a flat bed without bedrails? 4  -TC     Moving to and from a bed to a chair (including a wheelchair)? 3  -TC     Standing up from a chair using your arms (e.g., wheelchair, bedside chair)? 3  -TC     Climbing 3-5 steps with a railing? 3  -TC     To walk in hospital room? 3  -TC     AM-PAC 6 Clicks Score (PT) 20  -TC     Highest Level of Mobility Goal 6 --> Walk 10 steps or more  -TC       Row Name 03/12/25 0949 03/12/25 0850       Modified Jose Roberto Scale    Pre-Stroke Modified Jose Roberto Scale 6 - Unable to determine (UTD) from the medical record documentation  -KR 4 - Moderately severe disability.  Unable to walk without assistance, and unable to attend to own bodily needs without  assistance.  -    Modified [place] Roberto Scale 3 - Moderate disability.  Requiring some help, but able to walk without assistance.  -KR --      Row Name 03/12/25 0949 03/12/25 0850       Functional Assessment    Outcome Measure Options AM-PAC 6 Clicks Basic Mobility (PT);Modified [place]  -KR AM-PAC 6 Clicks Daily Activity (OT);Modified [place]  -CS              User Key  (r) = Recorded By, (t) = Taken By, (c) = Cosigned By      Initials Name Provider Type    CS Josh Oliver OT Occupational Therapist    Syl Carmichael, PT Physical Therapist    Osiel Gaxiola, RN Registered Nurse                    Occupational Therapy Education       Title: PT OT SLP Therapies (Done)       Topic: Occupational Therapy (Done)       Point: ADL training (Done)       Learning Progress Summary            Patient Acceptance, TB, DU,VU by  at 3/12/2025 0849                      Point: Home exercise program (Done)       Learning Progress Summary            Patient Acceptance, TB, DU,VU by  at 3/12/2025 0849                      Point: Precautions (Done)       Learning Progress Summary            Patient Acceptance, TB, DU,VU by  at 3/12/2025 0849                      Point: Body mechanics (Done)       Learning Progress Summary            Patient Acceptance, TB, DU,VU by  at 3/12/2025 0849                                      User Key       Initials Effective Dates Name Provider Type [facility] 06/16/21 -  Josh Oliver OT Occupational Therapist OT                  OT Recommendation and Plan  Planned Therapy Interventions (OT): activity tolerance training, functional balance retraining, ROM/therapeutic exercise, transfer/mobility retraining, occupation/activity based interventions, strengthening exercise, patient/caregiver education/training, neuromuscular control/coordination retraining, cognitive/visual perception retraining, adaptive equipment training, BADL retraining  Therapy Frequency (OT): daily  Plan of Care  Review  Plan of Care Reviewed With: patient, spouse  Progress: no change  Outcome Evaluation: Baseline ADL completion limited by increased L-sided weakness (LLE > LUE), balance impairment, and visual-perceptual deficit warranting IP OT services to promote return to PLOF. LLQ visual field deficit observed, grossly Min-ModA for ADLs and related mobility this date. Rec d/c to IRF for best functional deficits, if not then home with 24/7 increased assist and HHOT/PT.     Time Calculation:   Evaluation Complexity (OT)  Review Occupational Profile/Medical/Therapy History Complexity: expanded/moderate complexity  Assessment, Occupational Performance/Identification of Deficit Complexity: 3-5 performance deficits  Clinical Decision Making Complexity (OT): detailed assessment/moderate complexity  Overall Complexity of Evaluation (OT): moderate complexity     Time Calculation- OT       Row Name 03/12/25 0848             Time Calculation- OT    OT Start Time 0810  -CS      OT Received On 03/12/25  -CS      OT Goal Re-Cert Due Date 03/22/25  -CS         Untimed Charges    OT Eval/Re-eval Minutes 47  -CS         Total Minutes    Untimed Charges Total Minutes 47  -CS       Total Minutes 47  -CS                User Key  (r) = Recorded By, (t) = Taken By, (c) = Cosigned By      Initials Name Provider Type    CS Josh Oliver, OT Occupational Therapist                  Therapy Charges for Today       Code Description Service Date Service Provider Modifiers Qty    [id number] HC OT EVAL MOD COMPLEXITY 4 3/12/2025 Josh Oliver OT GO 1                 Josh Oliver OT  3/12/2025

## 2025-03-12 NOTE — ED PROVIDER NOTES
Subjective   History of Present Illness  Patient is a pleasant 64-year-old female presents to the emergency department with strokelike symptoms.  She has a history of CVA and some mild aphasic residual symptoms from the previous CVA.  Tonight she was in her normal state of health.   states that they have been outside working.  They came and the patient was standing beside him.  She was somewhat and odd position as she had 1 leg raised up and resting either on a stair or an elevated surface as the  was helping her address a minor leg wound.  He states that he had turned around briefly and the patient fell backwards.  He believes that she was not conscious when she fell as she did not protect her self at all, falling backwards and hitting her buttocks and head on the ground.  States that following the fall patient has had increased deficits including worsened aphasia.  Again she had some aphasia at baseline but is now worse in addition to this immediately after the fall he believes that she had left-sided weakness.  The left-sided weakness has fully resolved and route to the hospital.  The aphasia has improved but is still worse than baseline.  Denies recent illness.  Denies fever, chills, chest pain, shortness of breath, abdominal pain, vomiting, diarrhea, or other acute complaints.        Review of Systems   All other systems reviewed and are negative.      Past Medical History:   Diagnosis Date    Abnormal ECG     Aneurysm 11/26/2022    Cancer 05/2024    Basal Cell Carcinoma removed from scalp by dermatology    Cluster headache 2022    Had headaches for several months before stroke and mass was found    Difficulty walking 11/26/22    After stroke    Headache     Headache, tension-type 2022    Hyperlipidemia     Hypertension     Memory loss 11/26/22    Migraine 2022    Stroke 11/26/2022    Stroke 10/06/2024    Vision loss 11/26/22    When dizzy or headache       Allergies   Allergen Reactions     Tramadol Other (See Comments)     Flushng and passing out       Past Surgical History:   Procedure Laterality Date    ARTERIAL ANEURYSM REPAIR      BREAST LUMPECTOMY  2012    CYST REMOVAL Left 2023    EMBOLIZATION CEREBRAL N/A 2022    Procedure: CV EMBOLIZATION CEREBRAL IR;  Surgeon: Enoch Savage MD;  Location:  [facility];  Service: Interventional Radiology;  Laterality: N/A;    HIATAL HERNIA REPAIR      INTERVENTIONAL RADIOLOGY PROCEDURE N/A 09/15/2023    Procedure: Embolization;  Surgeon: Enoch Savage MD;  Location:  [facility];  Service: Interventional Radiology;  Laterality: N/A;    INTERVENTIONAL RADIOLOGY PROCEDURE N/A 10/6/2024    Procedure: IR mechanical thrombectomy;  Surgeon: Joe Mendez MD;  Location:  [facility];  Service: Interventional Radiology;  Laterality: N/A;    SINUS SURGERY      x4    SKIN CANCER EXCISION      cancer removed off top of head       Family History   Problem Relation Age of Onset    No Known Problems Mother     Heart attack Father 42    Early death Father         Heart attack    Heart disease Father     Hyperlipidemia Father     Heart attack Paternal Aunt     Heart attack Paternal Uncle     No Known Problems Sister     Cancer Maternal Grandmother     Heart failure Maternal Grandmother     No Known Problems Maternal Grandfather     No Known Problems Paternal Grandmother     No Known Problems Paternal Grandfather     Diabetes Half-Brother        Social History     Socioeconomic History    Marital status:    Tobacco Use    Smoking status: Former     Current packs/day: 0.00     Average packs/day: 1 pack/day for 15.0 years (15.0 ttl pk-yrs)     Types: Cigarettes     Start date: 10/4/1989     Quit date: 10/4/2004     Years since quittin.4     Passive exposure: Past    Smokeless tobacco: Never   Vaping Use    Vaping status: Never Used   Substance and Sexual Activity    Alcohol use: No     Drug use: Never    Sexual activity: Not Currently     Partners: Male     Birth control/protection: Tubal ligation           Objective   Physical Exam  Vitals and nursing note reviewed.   Constitutional:       Appearance: Normal appearance.   HENT:      Head: Normocephalic and atraumatic.      Nose: Nose normal.      Mouth/Throat:      Mouth: Mucous membranes are dry.   Eyes:      Extraocular Movements: Extraocular movements intact.      Pupils: Pupils are equal, round, and reactive to light.   Cardiovascular:      Rate and Rhythm: Normal rate and regular rhythm.      Pulses: Normal pulses.      Heart sounds: Normal heart sounds. No murmur heard.  Pulmonary:      Effort: Pulmonary effort is normal. No respiratory distress.      Breath sounds: Normal breath sounds. No wheezing or rhonchi.   Abdominal:      General: Bowel sounds are normal. There is no distension.      Palpations: Abdomen is soft.      Tenderness: There is no abdominal tenderness. There is no guarding or rebound.   Musculoskeletal:         General: No swelling, deformity or signs of injury. Normal range of motion.      Cervical back: Normal range of motion.   Skin:     General: Skin is warm and dry.      Capillary Refill: Capillary refill takes less than 2 seconds.   Neurological:      Mental Status: She is alert and oriented to person, place, and time.      Comments: Speech is delayed patient is able to answer simple 1-2 word answers 1 question.  Answers, including orientation questions and basic history are correct         Procedures           ED Course  ED Course as of 03/12/25 1827   Tue Mar 11, 2025   2208 Pleasant 64-year-old stroke rule out.  History of CVA and still has some baseline aphasia.  Is able to communicate but simply is delayed in her speech.  She today was at home.   states that she fell backwards, did not catch her self and believes she might of had altered consciousness at the time of the actual fall.  Following the fall she  had left-sided weakness and worsened, pronounced, aphasia.  Both of these symptoms have improved since the initial event.  Weakness is fully resolved and aphasia although still present and worse from baseline, is improving.  Given his a COVID-19/code stroke.  Was seen by the stroke team.  They plan to do an EEG in the morning along with hheir typical stroke testing.  Given her improved symptoms, current aspirin Plavix, and associated trauma, she was not a candidate for TNK. [CP]      ED Course User Index  [CP] Haresh Valiente, DO                                Total (NIH Stroke Scale): 3          Recent Results (from the past 24 hours)   POC CHEM 8    Collection Time: 03/11/25  7:49 PM    Specimen: Blood   Result Value Ref Range    Glucose 90 70 - 130 mg/dL    BUN 21 8 - 26 mg/dL    Creatinine 0.70 0.60 - 1.30 mg/dL    Sodium 141 138 - 146 mmol/L    POC Potassium 4.3 3.5 - 4.9 mmol/L    Chloride 108 98 - 109 mmol/L    Total CO2 22 (L) 24 - 29 mmol/L    Hemoglobin 14.3 12.0 - 17.0 g/dL    Hematocrit 42 38 - 51 %    Ionized Calcium 1.17 (L) 1.20 - 1.32 mmol/L    eGFR 97.3 >60.0 mL/min/1.73   Protime-INR    Collection Time: 03/11/25  8:47 PM    Specimen: Blood   Result Value Ref Range    Protime 12.9 12.2 - 14.5 Seconds    INR 0.96 0.89 - 1.12   aPTT    Collection Time: 03/11/25  8:47 PM    Specimen: Blood   Result Value Ref Range    PTT 26.5 22.0 - 39.0 seconds   AST    Collection Time: 03/11/25  8:47 PM    Specimen: Blood   Result Value Ref Range    AST (SGOT) 34 (H) 1 - 32 U/L   ALT    Collection Time: 03/11/25  8:47 PM    Specimen: Blood   Result Value Ref Range    ALT (SGPT) 44 (H) 1 - 33 U/L   Green Top (Gel)    Collection Time: 03/11/25  8:47 PM   Result Value Ref Range    Extra Tube Hold for add-ons.    Lavender Top    Collection Time: 03/11/25  8:47 PM   Result Value Ref Range    Extra Tube hold for add-on    Gold Top - SST    Collection Time: 03/11/25  8:47 PM   Result Value Ref Range    Extra Tube Hold for  add-ons.    Gray Top    Collection Time: 03/11/25  8:47 PM   Result Value Ref Range    Extra Tube Hold for add-ons.    Light Blue Top    Collection Time: 03/11/25  8:47 PM   Result Value Ref Range    Extra Tube Hold for add-ons.    CBC Auto Differential    Collection Time: 03/11/25  8:47 PM    Specimen: Blood   Result Value Ref Range    WBC 7.43 3.40 - 10.80 10*3/mm3    RBC 4.51 3.77 - 5.28 10*6/mm3    Hemoglobin 13.8 12.0 - 15.9 g/dL    Hematocrit 42.1 34.0 - 46.6 %    MCV 93.3 79.0 - 97.0 fL    MCH 30.6 26.6 - 33.0 pg    MCHC 32.8 31.5 - 35.7 g/dL    RDW 13.2 12.3 - 15.4 %    RDW-SD 45.4 37.0 - 54.0 fl    MPV 10.9 6.0 - 12.0 fL    Platelets 240 140 - 450 10*3/mm3    Neutrophil % 70.5 42.7 - 76.0 %    Lymphocyte % 20.7 19.6 - 45.3 %    Monocyte % 5.8 5.0 - 12.0 %    Eosinophil % 2.2 0.3 - 6.2 %    Basophil % 0.5 0.0 - 1.5 %    Immature Grans % 0.3 0.0 - 0.5 %    Neutrophils, Absolute 5.24 1.70 - 7.00 10*3/mm3    Lymphocytes, Absolute 1.54 0.70 - 3.10 10*3/mm3    Monocytes, Absolute 0.43 0.10 - 0.90 10*3/mm3    Eosinophils, Absolute 0.16 0.00 - 0.40 10*3/mm3    Basophils, Absolute 0.04 0.00 - 0.20 10*3/mm3    Immature Grans, Absolute 0.02 0.00 - 0.05 10*3/mm3    nRBC 0.0 0.0 - 0.2 /100 WBC   CK    Collection Time: 03/11/25  8:47 PM    Specimen: Blood   Result Value Ref Range    Creatine Kinase 83 20 - 180 U/L   Lactic Acid, Plasma    Collection Time: 03/11/25  8:47 PM    Specimen: Blood   Result Value Ref Range    Lactate 1.2 0.5 - 2.0 mmol/L   TSH Rfx On Abnormal To Free T4    Collection Time: 03/11/25  8:47 PM    Specimen: Blood   Result Value Ref Range    TSH 4.090 0.270 - 4.200 uIU/mL   ECG 12 Lead ED Triage Standing Order; Acute Stroke (Onset <24 hrs)    Collection Time: 03/11/25  9:29 PM   Result Value Ref Range    QT Interval 402 ms    QTC Interval 436 ms   Urinalysis With Culture If Indicated - Straight Cath    Collection Time: 03/11/25 10:13 PM    Specimen: Straight Cath; Urine   Result Value Ref Range     Color, UA Yellow Yellow, Straw    Appearance, UA Clear Clear    pH, UA 7.0 5.0 - 8.0    Specific Gravity, UA 1.019 1.001 - 1.030    Glucose, UA Negative Negative    Ketones, UA Trace (A) Negative    Bilirubin, UA Negative Negative    Blood, UA Negative Negative    Protein, UA Negative Negative    Leuk Esterase, UA Trace (A) Negative    Nitrite, UA Negative Negative    Urobilinogen, UA 0.2 E.U./dL 0.2 - 1.0 E.U./dL   Urinalysis, Microscopic Only - Straight Cath    Collection Time: 03/11/25 10:13 PM    Specimen: Straight Cath; Urine   Result Value Ref Range    RBC, UA 0-2 None Seen, 0-2 /HPF    WBC, UA 0-2 None Seen, 0-2 /HPF    Bacteria, UA None Seen None Seen, Trace /HPF    Squamous Epithelial Cells, UA None Seen None Seen, 0-2 /HPF    Hyaline Casts, UA None Seen 0 - 6 /LPF    Methodology Automated Microscopy    P2Y12 Platelet Inhibition    Collection Time: 03/12/25  5:25 AM    Specimen: Blood   Result Value Ref Range    P2Y12 Reactivity Unit 57 PRU   Hemoglobin A1c    Collection Time: 03/12/25  5:25 AM    Specimen: Blood   Result Value Ref Range    Hemoglobin A1C 5.80 (H) 4.80 - 5.60 %   Lipid Panel    Collection Time: 03/12/25  5:25 AM    Specimen: Blood   Result Value Ref Range    Total Cholesterol 114 0 - 200 mg/dL    Triglycerides 50 0 - 150 mg/dL    HDL Cholesterol 60 40 - 60 mg/dL    LDL Cholesterol  42 0 - 100 mg/dL    VLDL Cholesterol 12 5 - 40 mg/dL    LDL/HDL Ratio 0.73    CBC Auto Differential    Collection Time: 03/12/25  5:25 AM    Specimen: Blood   Result Value Ref Range    WBC 6.92 3.40 - 10.80 10*3/mm3    RBC 4.27 3.77 - 5.28 10*6/mm3    Hemoglobin 12.9 12.0 - 15.9 g/dL    Hematocrit 40.2 34.0 - 46.6 %    MCV 94.1 79.0 - 97.0 fL    MCH 30.2 26.6 - 33.0 pg    MCHC 32.1 31.5 - 35.7 g/dL    RDW 13.3 12.3 - 15.4 %    RDW-SD 45.9 37.0 - 54.0 fl    MPV 11.1 6.0 - 12.0 fL    Platelets 232 140 - 450 10*3/mm3    Neutrophil % 59.6 42.7 - 76.0 %    Lymphocyte % 29.2 19.6 - 45.3 %    Monocyte % 7.5 5.0 - 12.0 %     Eosinophil % 2.7 0.3 - 6.2 %    Basophil % 0.7 0.0 - 1.5 %    Immature Grans % 0.3 0.0 - 0.5 %    Neutrophils, Absolute 4.12 1.70 - 7.00 10*3/mm3    Lymphocytes, Absolute 2.02 0.70 - 3.10 10*3/mm3    Monocytes, Absolute 0.52 0.10 - 0.90 10*3/mm3    Eosinophils, Absolute 0.19 0.00 - 0.40 10*3/mm3    Basophils, Absolute 0.05 0.00 - 0.20 10*3/mm3    Immature Grans, Absolute 0.02 0.00 - 0.05 10*3/mm3    nRBC 0.0 0.0 - 0.2 /100 WBC   Comprehensive Metabolic Panel    Collection Time: 03/12/25  5:25 AM    Specimen: Blood   Result Value Ref Range    Glucose 86 65 - 99 mg/dL    BUN 16 8 - 23 mg/dL    Creatinine 0.55 (L) 0.57 - 1.00 mg/dL    Sodium 142 136 - 145 mmol/L    Potassium 3.8 3.5 - 5.2 mmol/L    Chloride 109 (H) 98 - 107 mmol/L    CO2 23.0 22.0 - 29.0 mmol/L    Calcium 8.6 8.6 - 10.5 mg/dL    Total Protein 6.5 6.0 - 8.5 g/dL    Albumin 4.0 3.5 - 5.2 g/dL    ALT (SGPT) 36 (H) 1 - 33 U/L    AST (SGOT) 26 1 - 32 U/L    Alkaline Phosphatase 115 39 - 117 U/L    Total Bilirubin 0.3 0.0 - 1.2 mg/dL    Globulin 2.5 gm/dL    A/G Ratio 1.6 g/dL    BUN/Creatinine Ratio 29.1 (H) 7.0 - 25.0    Anion Gap 10.0 5.0 - 15.0 mmol/L    eGFR 103.1 >60.0 mL/min/1.73   Magnesium    Collection Time: 03/12/25  5:25 AM    Specimen: Blood   Result Value Ref Range    Magnesium 3.1 (H) 1.6 - 2.4 mg/dL   POC Glucose Once    Collection Time: 03/12/25 11:44 AM    Specimen: Blood   Result Value Ref Range    Glucose 88 70 - 130 mg/dL     Note: In addition to lab results from this visit, the labs listed above may include labs taken at another facility or during a different encounter within the last 24 hours. Please correlate lab times with ED admission and discharge times for further clarification of the services performed during this visit.    MRI Brain Without Contrast   Final Result   Impression:   No acute intracranial findings. No acute infarct.         Similar basilar artery aneurysm exerting mass effect on the brainstem with posterior  displacement of the jong and medulla. Please refer to yesterday's CTA exam for more complete details of this finding.            Electronically Signed: Kwesi Guan MD     3/12/2025 8:19 AM EDT     Workstation ID: [id number]      XR Chest 1 View   Final Result   Impression:   An acute pulmonary process is not apparent.            Electronically Signed: Akash Lux MD     3/11/2025 10:21 PM EDT     Workstation ID: [id number]      CT Angiogram Head w AI Analysis of LVO   Final Result   Impression:      No evidence of large vessel occlusion or hemodynamically significant stenosis.      Post stenting of large basilar artery aneurysm. The stent is visualized in the distal vertebral artery and extending into the left basal artery. There is some contrast leakage to the left of the stent. This may be secondary to stent porosity or leakage    from the distal aspect of the stent. This appears unchanged from prior CTA performed on October 6, 2024. Correlate with diagnostic cerebral angiography as clinically warranted.      No focal area of decreased cerebral blood flow (CBF) is seen to suggest an acute infarct in a large vessel territory.  No defects are seen to suggest a core infarct or an area of reversible ischemia.            Electronically Signed: Ben Ziegler MD     3/11/2025 9:50 PM EDT     Workstation ID: [id number]      CT Angiogram Neck   Final Result   Impression:      No evidence of large vessel occlusion or hemodynamically significant stenosis.      Post stenting of large basilar artery aneurysm. The stent is visualized in the distal vertebral artery and extending into the left basal artery. There is some contrast leakage to the left of the stent. This may be secondary to stent porosity or leakage    from the distal aspect of the stent. This appears unchanged from prior CTA performed on October 6, 2024. Correlate with diagnostic cerebral angiography as clinically warranted.      No focal area of decreased cerebral  blood flow (CBF) is seen to suggest an acute infarct in a large vessel territory.  No defects are seen to suggest a core infarct or an area of reversible ischemia.            Electronically Signed: Ben Ziegler MD     3/11/2025 9:50 PM EDT     Workstation ID: [id number]      CT CEREBRAL PERFUSION WITH & WITHOUT CONTRAST   Final Result   Impression:      No evidence of large vessel occlusion or hemodynamically significant stenosis.      Post stenting of large basilar artery aneurysm. The stent is visualized in the distal vertebral artery and extending into the left basal artery. There is some contrast leakage to the left of the stent. This may be secondary to stent porosity or leakage    from the distal aspect of the stent. This appears unchanged from prior CTA performed on October 6, 2024. Correlate with diagnostic cerebral angiography as clinically warranted.      No focal area of decreased cerebral blood flow (CBF) is seen to suggest an acute infarct in a large vessel territory.  No defects are seen to suggest a core infarct or an area of reversible ischemia.            Electronically Signed: Ben Ziegler MD     3/11/2025 9:50 PM EDT     Workstation ID: [id number]      CT Head Without Contrast Stroke Protocol   Final Result   Impression:   1.Basilar artery aneurysm which could be thrombosed. There is a radiopaque object along the more superior right aspect of the aneurysm that could reflect pipeline flow diverter.   2.There are white matter changes involving the cerebral hemispheres which could reflect sequela to small vessel ischemic change.   3.There is dilatation of the lateral ventricles which has been noted.            Electronically Signed: Akahs Lux MD     3/11/2025 9:03 PM EDT     Workstation ID: [id number]        Vitals:    03/12/25 0900 03/12/25 1100 03/12/25 1132 03/12/25 1450   BP:   113/97 130/73   BP Location:   Right arm Right arm   Patient Position:   Lying Lying   Pulse: 67 76 67 69   Resp:   18  18   Temp:   98.3 °F (36.8 °C) 98.1 °F (36.7 °C)   TempSrc:   Oral Oral   SpO2: 100% 95% 100% 100%   Weight:       Height:         Medications   sodium chloride 0.9 % flush 10 mL (10 mL Intravenous Given 3/11/25 2307)   sodium chloride 0.9 % flush 10 mL (10 mL Intravenous Given 3/12/25 0947)   sodium chloride 0.9 % flush 10 mL (has no administration in time range)   sodium chloride 0.9 % infusion 40 mL (has no administration in time range)   atorvastatin (LIPITOR) tablet 80 mg (80 mg Oral Not Given 3/12/25 0321)   aspirin chewable tablet 81 mg (81 mg Oral Given 3/12/25 0945)     Or   aspirin suppository 300 mg ( Rectal Not Given:  See Alt 3/12/25 0945)   sodium chloride 0.9 % flush 10 mL (10 mL Intravenous Given 3/12/25 0947)   sodium chloride 0.9 % flush 10 mL (has no administration in time range)   sodium chloride 0.9 % infusion 40 mL (has no administration in time range)   nitroglycerin (NITROSTAT) SL tablet 0.4 mg (has no administration in time range)   Potassium Replacement - Follow Nurse / BPA Driven Protocol (has no administration in time range)   Magnesium Standard Dose Replacement - Follow Nurse / BPA Driven Protocol (has no administration in time range)   Phosphorus Replacement - Follow Nurse / BPA Driven Protocol (has no administration in time range)   Calcium Replacement - Follow Nurse / BPA Driven Protocol (has no administration in time range)   acetaminophen (TYLENOL) tablet 650 mg ( Oral Not Given:  See Alt 3/12/25 1711)     Or   acetaminophen (TYLENOL) 160 MG/5ML oral solution 650 mg (650 mg Oral Given 3/12/25 1711)     Or   acetaminophen (TYLENOL) suppository 650 mg ( Rectal Not Given:  See Alt 3/12/25 1711)   HYDROcodone-acetaminophen (NORCO) 5-325 MG per tablet 1 tablet (1 tablet Oral Given 3/12/25 1758)   melatonin tablet 5 mg (has no administration in time range)   ondansetron (ZOFRAN) injection 4 mg (has no administration in time range)   bethanechol (URECHOLINE) tablet 25 mg (25 mg Oral Given  3/12/25 0946)   famotidine (PEPCID) tablet 20 mg (20 mg Oral Given 3/12/25 1711)   nortriptyline (PAMELOR) capsule 20 mg (20 mg Oral Not Given 3/12/25 0321)   pantoprazole (PROTONIX) EC tablet 40 mg (40 mg Oral Given 3/12/25 0945)   sertraline (ZOLOFT) tablet 150 mg (150 mg Oral Given 3/12/25 0945)   topiramate (TOPAMAX) tablet 100 mg (100 mg Oral Not Given 3/12/25 0321)   eptifibatide (INTEGRILIN) 75 mg in 100 mL solution (1 mcg/kg/min × 59 kg Intravenous New Bag 3/12/25 1712)   iopamidol (ISOVUE-370) 76 % injection 115 mL (115 mL Intravenous Given 3/11/25 2106)   magnesium sulfate 2g/50 mL (PREMIX) infusion (0 g Intravenous Stopped 3/12/25 0552)     ECG/EMG Results (last 24 hours)       Procedure Component Value Units Date/Time    ECG 12 Lead ED Triage Standing Order; Acute Stroke (Onset <24 hrs) [[id number]] Collected: 03/11/25 2129     Updated: 03/12/25 0702     QT Interval 402 ms      QTC Interval 436 ms     Narrative:      Test Reason : ED Triage Standing Order~  Blood Pressure :   */*   mmHG  Vent. Rate :  71 BPM     Atrial Rate :  71 BPM     P-R Int : 138 ms          QRS Dur :  92 ms      QT Int : 402 ms       P-R-T Axes :  15 -21  29 degrees    QTcB Int : 436 ms    Normal sinus rhythm  Normal ECG  When compared with ECG of 06-Oct-2024 10:16,  No significant change was found    Referred By: EDMD           Confirmed By:     Telemetry Scan [[id number]] Resulted: 03/11/25     Updated: 03/12/25 1217          ECG 12 Lead ED Triage Standing Order; Acute Stroke (Onset <24 hrs)   Preliminary Result   Test Reason : ED Triage Standing Order~   Blood Pressure :   */*   mmHG   Vent. Rate :  71 BPM     Atrial Rate :  71 BPM      P-R Int : 138 ms          QRS Dur :  92 ms       QT Int : 402 ms       P-R-T Axes :  15 -21  29 degrees     QTcB Int : 436 ms      Normal sinus rhythm   Normal ECG   When compared with ECG of 06-Oct-2024 10:16,   No significant change was found      Referred By: EDMD           Confirmed By:        Telemetry Scan   Final Result                      Medical Decision Making  The patient was evaluated during a capacity surge environment. This includes evaluation in non-traditional EM settings as well as potential care, result, disposition delays. I have made every effort to evaluate and care for this patient as efficiently and thoroughly as possible.      Problems Addressed:  Acute left-sided weakness: complicated acute illness or injury  Aphasia: complicated acute illness or injury  History of ischemic stroke: complicated acute illness or injury    Amount and/or Complexity of Data Reviewed  External Data Reviewed: notes.  Labs: ordered. Decision-making details documented in ED Course.  Radiology: ordered and independent interpretation performed. Decision-making details documented in ED Course.  ECG/medicine tests: ordered and independent interpretation performed. Decision-making details documented in ED Course.    Risk  Prescription drug management.  Decision regarding hospitalization.        Final diagnoses:   Aphasia   Acute left-sided weakness   History of ischemic stroke       ED Disposition  ED Disposition       ED Disposition   Decision to Admit    Condition   --    Comment   Level of Care: Telemetry [5]   Diagnosis: Left-sided weakness [889588]   Admitting Physician: CHE ALANIZ III [[id number]]   Attending Physician: CHE ALANIZ III [[id number]]   Is patient appropriate for Inpatient Observation Unit?: No [0]   Bed Request Comments: tele obs                 No follow-up provider specified.       Medication List      No changes were made to your prescriptions during this visit.            Haresh Valiente,   03/12/25 1820

## 2025-03-12 NOTE — THERAPY EVALUATION
Patient Name: Lauryn Romo  : 1960    MRN: [id number]                              Today's Date: 3/12/2025       Admit Date: 3/11/2025    Visit Dx:     ICD-10-CM ICD-9-CM   1. Aphasia  R47.01 784.3   2. Acute left-sided weakness  R53.1 728.87   3. History of ischemic stroke  Z86.73 V12.54     Patient Active Problem List   Diagnosis    Acute CVA    Cerebral aneurysm, nonruptured    Paraesophageal hernia    Hyperlipidemia    HTN    Moderate malnutrition    History of CVA (cerebrovascular accident)    Other headache syndrome    Acute cystitis without hematuria    Left-sided weakness     Past Medical History:   Diagnosis Date    Abnormal ECG     Aneurysm 2022    Cancer 2024    Basal Cell Carcinoma removed from scalp by dermatology    Cluster headache     Had headaches for several months before stroke and mass was found    Difficulty walking 22    After stroke    Headache     Headache, tension-type     Hyperlipidemia     Hypertension     Memory loss 22    Migraine     Stroke 2022    Stroke 10/06/2024    Vision loss 22    When dizzy or headache     Past Surgical History:   Procedure Laterality Date    ARTERIAL ANEURYSM REPAIR      BREAST LUMPECTOMY  2012    CYST REMOVAL Left 2023    EMBOLIZATION CEREBRAL N/A 2022    Procedure: CV EMBOLIZATION CEREBRAL IR;  Surgeon: Enoch Savage MD;  Location:  [facility];  Service: Interventional Radiology;  Laterality: N/A;    HIATAL HERNIA REPAIR      INTERVENTIONAL RADIOLOGY PROCEDURE N/A 09/15/2023    Procedure: Embolization;  Surgeon: Enoch Savage MD;  Location:  [facility];  Service: Interventional Radiology;  Laterality: N/A;    INTERVENTIONAL RADIOLOGY PROCEDURE N/A 10/6/2024    Procedure: IR mechanical thrombectomy;  Surgeon: Joe Mendez MD;  Location:  [facility];  Service: Interventional Radiology;  Laterality: N/A;    SINUS  "SURGERY      x4    SKIN CANCER EXCISION      cancer removed off top of head      General Information       Row Name 03/12/25 0941          Physical Therapy Time and Intention    Document Type evaluation  -KR     Mode of Treatment physical therapy  -KR       Row Name 03/12/25 0941          General Information    Patient Profile Reviewed yes  -KR     Prior Level of Function independent:;gait;transfer;min assist:;ADL's;dependent:;driving  FWW or SPC PRN in community if having a \"bad day.\" Spv assist for stair navigation. 2 falls in the past 6 months.  -KR     Existing Precautions/Restrictions fall;other (see comments)  remote L occipital CVA  -KR     Barriers to Rehab medically complex;previous functional deficit  -KR       Row Name 03/12/25 0941          Living Environment    Current Living Arrangements home  -KR     People in Home spouse  -KR       Row Name 03/12/25 0941          Home Main Entrance    Number of Stairs, Main Entrance two;other (see comments)  1 + 1  -KR     Stair Railings, Main Entrance none  -KR       Row Name 03/12/25 0941          Stairs Within Home, Primary    Stairs, Within Home, Primary into basement which patient does access  -KR     Number of Stairs, Within Home, Primary twelve  -KR     Stair Railings, Within Home, Primary railing on right side (ascending)  -KR       Row Name 03/12/25 0941          Cognition    Orientation Status (Cognition) oriented x 3  -KR       Row Name 03/12/25 0941          Safety Issues/Impairments Affecting Functional Mobility    Safety Issues Affecting Function (Mobility) safety precaution awareness;safety precautions follow-through/compliance  -KR     Impairments Affecting Function (Mobility) balance;endurance/activity tolerance;strength;motor planning;visual/perceptual  -KR     Comment, Safety Issues/Impairments (Mobility) LLQ visual field deficits  -KR               User Key  (r) = Recorded By, (t) = Taken By, (c) = Cosigned By      Initials Name Provider Type    " KR Syl Mcgee, PT Physical Therapist                   Mobility       Row Name 03/12/25 0945          Sit-Stand Transfer    Sit-Stand [place] (Transfers) minimum assist (75% patient effort);1 person assist  -KR     Assistive Device (Sit-Stand Transfers) walker, front-wheeled  -KR     Comment, (Sit-Stand Transfer) 1x from bed  -KR       Row Name 03/12/25 0945          Gait/Stairs (Locomotion)    [place] Level (Gait) minimum assist (75% patient effort);1 person assist  -KR     Assistive Device (Gait) walker, front-wheeled  -KR     Distance in Feet (Gait) 12  -KR     Deviations/Abnormal Patterns (Gait) torrie decreased;gait speed decreased;stride length decreased;weight shifting decreased  -KR     Comment, (Gait/Stairs) decreased LLE clearance during swing, improving as gait progressed. Narrow REBECCA. Farther distance limited by fatigue.  -KR               User Key  (r) = Recorded By, (t) = Taken By, (c) = Cosigned By      Initials Name Provider Type    Syl Carmichael, PT Physical Therapist                   Obj/Interventions       Row Name 03/12/25 0946          Range of Motion Comprehensive    General Range of Motion no range of motion deficits identified  -KR       Row Name 03/12/25 0946          Strength Comprehensive (MMT)    General Manual Muscle Testing (MMT) Assessment lower extremity strength deficits identified  -KR     Comment, General Manual Muscle Testing (MMT) Assessment BLEs grossly 4/5  -KR       Row Name 03/12/25 0946          Motor Skills    Motor Skills coordination  -KR     Coordination other (see comments)  rapid alt. toe taps WFL B  -KR       Row Name 03/12/25 0946          Balance    Balance Assessment sitting static balance;sitting dynamic balance;standing static balance;standing dynamic balance  -KR     Static Sitting Balance standby assist  -KR     Dynamic Sitting Balance standby assist  -KR     Position, Sitting Balance unsupported;sitting in chair  -KR     Static Standing  Balance contact guard  -KR     Dynamic Standing Balance minimal assist;1-person assist;verbal cues;non-verbal cues (demo/gesture)  -KR     Position/Device Used, Standing Balance supported;walker, front-wheeled  -KR     Balance Interventions sitting;standing;sit to stand;supported;static;dynamic  -KR       Row Name 03/12/25 0946          Sensory Assessment (Somatosensory)    Sensory Assessment (Somatosensory) LE sensation intact  -KR               User Key  (r) = Recorded By, (t) = Taken By, (c) = Cosigned By      Initials Name Provider Type    KR Syl Mcgee, PT Physical Therapist                   Goals/Plan       Row Name 03/12/25 0948          Bed Mobility Goal 1 (PT)    Activity/Assistive Device (Bed Mobility Goal 1, PT) bed mobility activities, all  -KR     [place] Level/Cues Needed (Bed Mobility Goal 1, PT) independent  -KR     Time Frame (Bed Mobility Goal 1, PT) short term goal (STG);4 days  -KR       Row Name 03/12/25 0948          Transfer Goal 1 (PT)    Activity/Assistive Device (Transfer Goal 1, PT) sit-to-stand/stand-to-sit;bed-to-chair/chair-to-bed;walker, rolling  -KR     [place] Level/Cues Needed (Transfer Goal 1, PT) standby assist  -KR     Time Frame (Transfer Goal 1, PT) long term goal (LTG);1 week  -KR       Row Name 03/12/25 0948          Gait Training Goal 1 (PT)    Activity/Assistive Device (Gait Training Goal 1, PT) gait (walking locomotion);improve balance and speed;increase endurance/gait distance;assistive device use;walker, rolling  -KR     [place] Level (Gait Training Goal 1, PT) standby assist  -KR     Distance (Gait Training Goal 1, PT) 150  -KR     Time Frame (Gait Training Goal 1, PT) long term goal (LTG);1 week  -KR       Row Name 03/12/25 0948          Stairs Goal 1 (PT)    Activity/Assistive Device (Stairs Goal 1, PT) stairs, all skills;using handrail, right  -KR     [place] Level/Cues Needed (Stairs Goal 1, PT) standby assist  -KR     Number of Stairs  (Stairs Goal 1, PT) 12  -KR     Time Frame (Stairs Goal 1, PT) long term goal (LTG);1 week  -KR       Row Name 03/12/25 0948          Therapy Assessment/Plan (PT)    Planned Therapy Interventions (PT) balance training;bed mobility training;gait training;home exercise program;neuromuscular re-education;patient/family education;postural re-education;transfer training;stretching;strengthening;stair training;ROM (range of motion)  -KR               User Key  (r) = Recorded By, (t) = Taken By, (c) = Cosigned By      Initials Name Provider Type    KR Syl Mcgee, PT Physical Therapist                   Clinical Impression       Row Name 03/12/25 0947          Pain    Pretreatment Pain Rating 0/10 - no pain  -KR     Posttreatment Pain Rating 0/10 - no pain  -KR       Row Name 03/12/25 0947          Plan of Care Review    Plan of Care Reviewed With patient;spouse  -KR     Outcome Evaluation Pt presents with generalized weakness, as well as balance and endurance below baseline contributing to transfer, ambulation, and stair navigation deficits. Pt will benefit from PT to address aforementioned deficits and return to PLOF. PT rec IRF upon dc.  -KR       Row Name 03/12/25 0963          Therapy Assessment/Plan (PT)    Rehab Potential (PT) good  -KR     Criteria for Skilled Interventions Met (PT) yes;skilled treatment is necessary  -KR     Therapy Frequency (PT) daily  -KR     Predicted Duration of Therapy Intervention (PT) 1 week  -KR       Row Name 03/12/25 0947          Vital Signs    Pre Systolic BP Rehab 129  -KR     Pre Treatment Diastolic BP 76  -KR     Intra Systolic BP Rehab 125  -KR     Intra Treatment Diastolic BP 71  -KR     Post Systolic BP Rehab 131  -KR     Post Treatment Diastolic BP 82  -KR     Pre Patient Position Supine  -KR     Intra Patient Position Standing  -KR     Post Patient Position Sitting  -KR       Row Name 03/12/25 0947          Positioning and Restraints    Pre-Treatment Position in bed  -KR      Post Treatment Position chair  -KR     In Chair notified nsg;reclined;call light within reach;encouraged to call for assist;exit alarm on;with family/caregiver;waffle cushion;legs elevated  -KR               User Key  (r) = Recorded By, (t) = Taken By, (c) = Cosigned By      Initials Name Provider Type    Syl Carmichael, PT Physical Therapist                   Outcome Measures       Row Name 03/12/25 0949 03/12/25 0116       How much help from another person do you currently need...    Turning from your back to your side while in flat bed without using bedrails? 4  -KR 4  -TC    Moving from lying on back to sitting on the side of a flat bed without bedrails? 3  -KR 4  -TC    Moving to and from a bed to a chair (including a wheelchair)? 3  -KR 3  -TC    Standing up from a chair using your arms (e.g., wheelchair, bedside chair)? 3  -KR 3  -TC    Climbing 3-5 steps with a railing? 3  -KR 3  -TC    To walk in hospital room? 3  -KR 3  -TC    AM-PAC 6 Clicks Score (PT) 19  -KR 20  -TC    Highest Level of Mobility Goal 6 --> Walk 10 steps or more  -KR 6 --> Walk 10 steps or more  -TC      Row Name 03/12/25 0114          How much help from another person do you currently need...    Turning from your back to your side while in flat bed without using bedrails? 4  -TC     Moving from lying on back to sitting on the side of a flat bed without bedrails? 4  -TC     Moving to and from a bed to a chair (including a wheelchair)? 3  -TC     Standing up from a chair using your arms (e.g., wheelchair, bedside chair)? 3  -TC     Climbing 3-5 steps with a railing? 3  -TC     To walk in hospital room? 3  -TC     AM-PAC 6 Clicks Score (PT) 20  -TC     Highest Level of Mobility Goal 6 --> Walk 10 steps or more  -TC       Row Name 03/12/25 0949 03/12/25 0850       Modified [place] Scale    Pre-Stroke Modified [place] Scale 6 - Unable to determine (UTD) from the medical record documentation  -KR 4 - Moderately severe disability.  Unable  to walk without assistance, and unable to attend to own bodily needs without assistance.  -CS    Modified [place] Scale 3 - Moderate disability.  Requiring some help, but able to walk without assistance.  -KR --      Row Name 03/12/25 0949 03/12/25 0850       Functional Assessment    Outcome Measure Options AM-PAC 6 Clicks Basic Mobility (PT);Modified [place]  -KR AM-PAC 6 Clicks Daily Activity (OT);Modified [place]  -CS              User Key  (r) = Recorded By, (t) = Taken By, (c) = Cosigned By      Initials Name Provider Type    CS Josh Oliver OT Occupational Therapist    Syl Carmichael, PT Physical Therapist    Osiel Gaxiola, RN Registered Nurse                                 Physical Therapy Education       Title: PT OT SLP Therapies (Done)       Topic: Physical Therapy (Done)       Point: Mobility training (Done)       Learning Progress Summary            Patient Acceptance, E, VU by KR at 3/12/2025 0950    Acceptance, TB, DU,VU by CS at 3/12/2025 0849   Family Acceptance, E, VU by KR at 3/12/2025 0950                      Point: Home exercise program (Done)       Learning Progress Summary            Patient Acceptance, E, VU by KR at 3/12/2025 0950    Acceptance, TB, DU,VU by CS at 3/12/2025 0849   Family Acceptance, E, VU by KR at 3/12/2025 0950                      Point: Body mechanics (Done)       Learning Progress Summary            Patient Acceptance, E, VU by KR at 3/12/2025 0950    Acceptance, TB, DU,VU by CS at 3/12/2025 0849   Family Acceptance, E, VU by KR at 3/12/2025 0950                      Point: Precautions (Done)       Learning Progress Summary            Patient Acceptance, E, VU by KR at 3/12/2025 0950    Acceptance, TB, DU,VU by CS at 3/12/2025 0849   Family Acceptance, E, VU by KR at 3/12/2025 0950                                      User Key       Initials Effective Dates Name Provider Type [facility] 06/16/21 -  Josh Oliver OT Occupational Therapist [facility]  12/30/22 -  Syl Mcgee PT Physical Therapist PT                  PT Recommendation and Plan  Planned Therapy Interventions (PT): balance training, bed mobility training, gait training, home exercise program, neuromuscular re-education, patient/family education, postural re-education, transfer training, stretching, strengthening, stair training, ROM (range of motion)  Outcome Evaluation: Pt presents with generalized weakness, as well as balance and endurance below baseline contributing to transfer, ambulation, and stair navigation deficits. Pt will benefit from PT to address aforementioned deficits and return to PLOF. PT rec IRF upon dc.     Time Calculation:   PT Evaluation Complexity  History, PT Evaluation Complexity: 3 or more personal factors and/or comorbidities  Examination of Body Systems (PT Eval Complexity): total of 4 or more elements  Clinical Presentation (PT Evaluation Complexity): evolving  Clinical Decision Making (PT Evaluation Complexity): moderate complexity  Overall Complexity (PT Evaluation Complexity): moderate complexity     PT Charges       Row Name 03/12/25 0950             Time Calculation    Start Time 0820  -KR      PT Received On 03/12/25  -KR      PT Goal Re-Cert Due Date 03/22/25  -KR         Untimed Charges    PT Eval/Re-eval Minutes 50  -KR         Total Minutes    Untimed Charges Total Minutes 50  -KR       Total Minutes 50  -KR                User Key  (r) = Recorded By, (t) = Taken By, (c) = Cosigned By      Initials Name Provider Type    KR Syl Mcgee PT Physical Therapist                  Therapy Charges for Today       Code Description Service Date Service Provider Modifiers Qty    [id number]  PT EVAL MOD COMPLEXITY 4 3/12/2025 Syl Mcgee PT GP 1            PT G-Codes  Outcome Measure Options: AM-PAC 6 Clicks Basic Mobility (PT), Modified [place]  AM-PAC 6 Clicks Score (PT): 19  AM-PAC 6 Clicks Score (OT): 18  Modified [place] Scale: 3 - Moderate disability.   Requiring some help, but able to walk without assistance.  PT Discharge Summary  Anticipated Discharge Disposition (PT): inpatient rehabilitation facility    Syl Mcgee, PT  3/12/2025

## 2025-03-12 NOTE — CONSULTS
Stroke Consult Note    Patient Name: Lauryn Romo   MRN: [id number]  Age: 64 y.o.  Sex: female  : 1960    Primary Care Physician: Melissa Lazo APRN  Referring Physician: Rocco TURNER STROKE TEAM CALLED:  EST     TIME PATIENT SEEN:  EST    Handedness: Right  Race:     Chief Complaint/Reason for Consultation: Transient altered mental status, unresponsiveness, falling left-sided weakness left facial droop, transient onset of aphasia.    HPI:     This is Ms. Lauryn Romo as a 64-year-old white female, right-handed with significant health diagnosis for recent stroke, migraine headache, hypertension hyperlipidemia, known giant basilar aneurysm s/p pipeline embolization per Dr. Savage (, ) who presented with presents to [facility] ED transient onset of aphasia, unresponsiveness, fall, left facial droop and left-sided weakness.  Code stroke was alerted patient was taken emergently to CT suite.  CT head without contrast negative for acute abnormality unchanged from prior CT head back in October.  CTA head and neck no large vessel occlusion.  CTP negative for perfusion deficit no core infarct or penumbra.  Patient had similar episodes with similar symptoms on 10/06/2024 left-sided weakness and facial droop.  Patient currently on Plavix and ASA, does follow-up with Dr. Lundberg outpatient.  Upon exam patient is alert and oriented, follow commands, no focal weakness on my exam.  No vision deficit, tongue protrudes midline, no nystagmus, no gaze preference.  Patient is noted to have left facial droop.  Very mild dysarthria.  No aphasia.  No dysmetria or ataxia.  Patient lives with her  who does care for her and help her with ADL.  Patient denies any tobacco, alcohol, illicit drug or marijuana use.  Patient is compliant with her Plavix and aspirin.    Last Known Normal Date/Time:  EST     Review of Systems   Constitutional:  Positive for activity change.   HENT:  Negative for voice  change.    Eyes: Negative.    Respiratory: Negative.     Cardiovascular: Negative.    Gastrointestinal: Negative.    Endocrine: Negative.    Genitourinary: Negative.    Musculoskeletal:  Positive for gait problem.   Skin: Negative.    Neurological:  Positive for seizures, syncope, facial asymmetry, speech difficulty, weakness and headaches.   Hematological: Negative.    Psychiatric/Behavioral:  Positive for confusion.       Past Medical History:   Diagnosis Date    Abnormal ECG     Aneurysm 11/26/2022    Cancer 05/2024    Basal Cell Carcinoma removed from scalp by dermatology    Cluster headache 2022    Had headaches for several months before stroke and mass was found    Difficulty walking 11/26/22    After stroke    Headache     Headache, tension-type 2022    Hyperlipidemia     Hypertension     Memory loss 11/26/22    Migraine 2022    Stroke 11/26/2022    Stroke 10/06/2024    Vision loss 11/26/22    When dizzy or headache     Past Surgical History:   Procedure Laterality Date    ARTERIAL ANEURYSM REPAIR      BREAST LUMPECTOMY  2012    CYST REMOVAL Left 05/2023    EMBOLIZATION CEREBRAL N/A 11/29/2022    Procedure: CV EMBOLIZATION CEREBRAL IR;  Surgeon: Enoch Savage MD;  Location:  [facility];  Service: Interventional Radiology;  Laterality: N/A;    HIATAL HERNIA REPAIR  2015    INTERVENTIONAL RADIOLOGY PROCEDURE N/A 09/15/2023    Procedure: Embolization;  Surgeon: Enoch Savage MD;  Location:  [facility];  Service: Interventional Radiology;  Laterality: N/A;    INTERVENTIONAL RADIOLOGY PROCEDURE N/A 10/6/2024    Procedure: IR mechanical thrombectomy;  Surgeon: Joe Mendez MD;  Location:  [facility];  Service: Interventional Radiology;  Laterality: N/A;    SINUS SURGERY      x4    SKIN CANCER EXCISION      cancer removed off top of head     Family History   Problem Relation Age of Onset    No Known Problems Mother     Heart attack Father 42     Early death Father         Heart attack    Heart disease Father     Hyperlipidemia Father     Heart attack Paternal Aunt     Heart attack Paternal Uncle     No Known Problems Sister     Cancer Maternal Grandmother     Heart failure Maternal Grandmother     No Known Problems Maternal Grandfather     No Known Problems Paternal Grandmother     No Known Problems Paternal Grandfather     Diabetes Half-Brother      Social History     Socioeconomic History    Marital status:    Tobacco Use    Smoking status: Former     Current packs/day: 0.00     Average packs/day: 1 pack/day for 15.0 years (15.0 ttl pk-yrs)     Types: Cigarettes     Start date: 10/4/1989     Quit date: 10/4/2004     Years since quittin.4     Passive exposure: Past    Smokeless tobacco: Never   Vaping Use    Vaping status: Never Used   Substance and Sexual Activity    Alcohol use: No    Drug use: Never    Sexual activity: Not Currently     Partners: Male     Birth control/protection: Tubal ligation     Allergies   Allergen Reactions    Tramadol Other (See Comments)     Flushng and passing out     Prior to Admission medications    Medication Sig Start Date End Date Taking? Authorizing Provider   acetaminophen (TYLENOL) 325 MG tablet Take 2 tablets by mouth Every 6 (Six) Hours As Needed for Mild Pain. 10/11/24   Carolynn Delacruz APRN   aspirin 81 MG chewable tablet Chew 1 tablet Daily. 10/12/24   Carolynn Delacruz APRN   atorvastatin (LIPITOR) 80 MG tablet TAKE 1 TABLET BY MOUTH EVERY NIGHT 23   Jan Matos PA-C   bethanechol (URECHOLINE) 25 MG tablet Take 1 tablet by mouth 2 (Two) Times a Day. 12/3/24   Provider, MD Ghulam   clopidogrel (Plavix) 75 MG tablet Take 1 tablet by mouth Daily. 24   Nikki Carrero PA-C   famotidine (PEPCID) 20 MG tablet Take 1 tablet by mouth 2 (Two) Times a Day Before Meals. 10/11/24   Carolynn Delacruz APRN   fluticasone (FLONASE) 50 MCG/ACT nasal spray 2 sprays into the nostril(s) as  directed by provider Daily. 9/29/19   Kwesi Montanez MD   galcanezumab-gnlm (EMGALITY) 120 MG/ML auto-injector pen Inject 1 mL under the skin into the appropriate area as directed Every 28 (Twenty-Eight) Days. 7/18/24   Ruth Burris APRN   lactulose (CHRONULAC) 10 GM/15ML solution  11/5/24   Ghulam Harvey MD   magnesium oxide (MAG-OX) 400 tablet tablet Take 1 tablet by mouth Every Night. 10/11/24   Carolynn Delacruz APRN   nortriptyline (PAMELOR) 10 MG capsule Take 2 capsules by mouth Every Night. 12/30/24   Ruth Burris APRN   pantoprazole (PROTONIX) 20 MG EC tablet Take 1 tablet by mouth Daily.    Ghulam Harvey MD   rimegepant sulfate (Nurtec) 75 MG tablet Take 1 tablet by mouth Every Other Day. 1/2/25   Ruth Burris APRN   sertraline (ZOLOFT) 100 MG tablet Take 1.5 tablets by mouth Daily. 1/20/25   Erika Whatley MD   topiramate (TOPAMAX) 100 MG tablet Take 1 tablet by mouth Every Night. 12/30/24   Ruth Burris APRN   ubrogepant (Ubrelvy) 100 MG tablet Take 1 tablet by mouth Daily As Needed (migraines). Take at onset of headache - if symptoms persist or return, may repeat dose in 2 hours. Maximum: 200 mg per 24 hours 3/19/24   Ruth Burris APRN         Temp:  [98.5 °F (36.9 °C)] 98.5 °F (36.9 °C)  Heart Rate:  [81] 81  Resp:  [16] 16  BP: (142)/(84) 142/84  Neurological Exam  Mental Status  Alert. Oriented to person, place and time. Oriented to person, place, and time. Recent and remote memory are intact. Mild dysarthria present. Language is fluent with no aphasia. Attention and concentration are normal.    Cranial Nerves  CN II: Right visual acuity: Normal. Left visual acuity: Normal. Right normal visual field. Left normal visual field.  CN III, IV, VI: Extraocular movements intact bilaterally. Normal lids and orbits bilaterally. Pupils equal round and reactive to light bilaterally.  CN V: Facial sensation is normal.  CN VII:  Left: There  is peripheral facial weakness.  CN VIII: Intact hearing bilateral.  CN IX, X: Palate elevates symmetrically  CN XI: Shoulder shrug strength is normal.  CN XII: Tongue midline without atrophy or fasciculations.    Motor  Normal muscle bulk throughout. No fasciculations present. Normal muscle tone. No abnormal involuntary movements. Strength is 5/5 throughout all four extremities.    Sensory  Light touch is normal in upper and lower extremities.  No right-sided hemispatial neglect. No left-sided hemispatial neglect. Right extinction absent: Left extinction absent:    Reflexes  Right Plantar: downgoing  Left Plantar: downgoing    Coordination  Right: Finger-to-nose normal. Heel-to-shin normal.Left: Finger-to-nose normal. Heel-to-shin normal.    Gait    Unable to assess.      Physical Exam  HENT:      Head: Normocephalic and atraumatic.      Mouth/Throat:      Mouth: Mucous membranes are moist.   Eyes:      General: Lids are normal.      Extraocular Movements: Extraocular movements intact.      Pupils: Pupils are equal, round, and reactive to light.   Cardiovascular:      Rate and Rhythm: Normal rate and regular rhythm.   Pulmonary:      Effort: Pulmonary effort is normal.      Breath sounds: Normal breath sounds.   Abdominal:      Tenderness: There is no abdominal tenderness.   Musculoskeletal:         General: Normal range of motion.      Cervical back: Normal range of motion and neck supple.   Skin:     General: Skin is warm and dry.      Capillary Refill: Capillary refill takes less than 2 seconds.   Neurological:      Mental Status: She is alert and oriented to person, place, and time. Mental status is at baseline.      Cranial Nerves: Cranial nerve deficit and dysarthria present.      Motor: Motor strength is normal.  Psychiatric:         Mood and Affect: Mood normal.         Behavior: Behavior normal.         Thought Content: Thought content normal.         Judgment: Judgment normal.         Acute Stroke  Data    Thrombolytic Inclusion / Exclusion Criteria    Time: 21:13 EDT  Person Administering Scale: May CAILIN Kahn      YES NO INCLUSION CRITERIA CLASS I   [] []   Suspected diagnosis of acute ischemic stroke with measureable neurological deficit.  Low NIHSS with disabling stroke symptoms.   [] []   Onset of stroke symptoms < 3 hours before beginning treatment >/ 18 years old  Stroke symptom onset = time patient was last seen well or without symptoms (LKW)   [] []   Onset of symptoms between 3-4.5 hours: >/= 80 years old (safe Class IIa) with history of   both diabetes and prior CVA  (reasonable Class IIb) AND NIHSS </= 25  *If not eligible for IV Thrombolytic consider neuro intervention for LKW within 24 hours     YES NO EXCLUSION CRITERIA (CONTRAINDICATIONS) CLASS III EVIDENCE HARM   [] []   Blood pressure >185/110 medically refractory to IV medications   [] []   Active bleeding at a non-compressible site   [] []   Active intracranial hemorrhage (ICH)   [] []   Symptoms suggestive of subarachnoid hemorrhage (SAH)   [] []   GI bleed within 21 days   [] []   Ischemic stroke within 3 months   [] []   Severe head trauma within 3 months    [] []   Intracranial or intraspinal surgery within 3 months   [] []   Current GI malignancy   [] []   Intracranial neoplasm   [] []   Infective endocarditis   [] []   Aortic arch dissection   [] []   Active coagulopathy with  INR >1.7, platelets <100,000, PTT > 40 sec, PT > 15 sec   *For warfarin, administration can begin before blood tests resulted. Discontinue for above values.    [] []   Treatment dose* of LMWH (Lovenox) in last 24 hours  *prophylactic dosages are not a contraindication   [] []   Concurrent use of antiplatelet agents' glycoprotein inhibitors IIb/IIIa (Integrilin, etc.)   [] []   Thrombin or factor Xa inhibitors (Eliquis, Xarelto, Arixtra) taken in last 48 hours     YES NO CLASS II: AIS WITH THE FOLLOWING CONDITIONS -   TREATMENT RISKS SHOULD BE WEIGHED  AGAINST POSSIBLE BENEFITS.    [] []   Major trauma in last 14 days, recent major surgery in last 14 days, intracranial arterial dissection, giant unruptured and unsecured intracranial aneurysm, pericarditis     [] []   The risks and benefits have been discussed with the patient or family related to the administration of IV thrombolytic therapy for stroke symptoms.   [] []   I have discussed and reviewed the patient's case and imaging with the attending prior to IV thrombolytic therapy.   TIME na Time IV thrombolytic administered       Hospital Meds:  Scheduled-    Infusions-     PRNs-   sodium chloride    Functional Status Prior to Current Stroke/Robeson Score: 0    NIH Stroke Scale  Time: 21:13 EDT  Person Administering Scale: CAILIN Staples    Interval: baseline  1a. Level of Consciousness: 0-->Alert, keenly responsive  1b. LOC Questions: 0-->Answers both questions correctly  1c. LOC Commands: 0-->Performs both tasks correctly  2. Best Gaze: 0-->Normal  3. Visual: 0-->No visual loss  4. Facial Palsy: 2-->Partial paralysis (total or near-total paralysis of lower face)  5a. Motor Arm, Left: 0-->No drift, limb holds 90 (or 45) degrees for full 10 secs  5b. Motor Arm, Right: 0-->No drift, limb holds 90 (or 45) degrees for full 10 secs  6a. Motor Leg, Left: 0-->No drift, leg holds 30 degree position for full 5 secs  6b. Motor Leg, Right: 0-->No drift, leg holds 30 degree position for full 5 secs  7. Limb Ataxia: 0-->Absent  8. Sensory: 0-->Normal, no sensory loss  9. Best Language: 0-->No aphasia, normal  10. Dysarthria: 1-->Mild-to-moderate dysarthria, patient slurs at least some words and, at worst, can be understood with some difficulty  11. Extinction and Inattention (formerly Neglect): 0-->No abnormality    Total (NIH Stroke Scale): 3     Results Reviewed:  I have personally reviewed current lab, radiology, and data and agree with results.  Sodium 141  Potassium 4.3  BUN 21  Creatinine 0.70  AST 34  ALT  44  Lactate 1.2  WBC 7.43  H&H 13.8\42.1  Platelet 240  UA remarkable for trace of ketones and trace of leukocytes      XR Chest 1 View  Result Date: 3/11/2025  Impression: An acute pulmonary process is not apparent. Electronically Signed: Akash Lux MD  3/11/2025 10:21 PM EDT  Workstation ID: [id number]    CT Angiogram Head w AI Analysis of LVO  Result Date: 3/11/2025  Impression: No evidence of large vessel occlusion or hemodynamically significant stenosis. Post stenting of large basilar artery aneurysm. The stent is visualized in the distal vertebral artery and extending into the left basal artery. There is some contrast leakage to the left of the stent. This may be secondary to stent porosity or leakage from the distal aspect of the stent. This appears unchanged from prior CTA performed on October 6, 2024. Correlate with diagnostic cerebral angiography as clinically warranted. No focal area of decreased cerebral blood flow (CBF) is seen to suggest an acute infarct in a large vessel territory.  No defects are seen to suggest a core infarct or an area of reversible ischemia. Electronically Signed: Ben Ziegler MD  3/11/2025 9:50 PM EDT  Workstation ID: [id number]    CT Angiogram Neck  Result Date: 3/11/2025  Impression: No evidence of large vessel occlusion or hemodynamically significant stenosis. Post stenting of large basilar artery aneurysm. The stent is visualized in the distal vertebral artery and extending into the left basal artery. There is some contrast leakage to the left of the stent. This may be secondary to stent porosity or leakage from the distal aspect of the stent. This appears unchanged from prior CTA performed on October 6, 2024. Correlate with diagnostic cerebral angiography as clinically warranted. No focal area of decreased cerebral blood flow (CBF) is seen to suggest an acute infarct in a large vessel territory.  No defects are seen to suggest a core infarct or an area of reversible  ischemia. Electronically Signed: Ben Ziegler MD  3/11/2025 9:50 PM EDT  Workstation ID: [id number]    CT CEREBRAL PERFUSION WITH & WITHOUT CONTRAST  Result Date: 3/11/2025  Impression: No evidence of large vessel occlusion or hemodynamically significant stenosis. Post stenting of large basilar artery aneurysm. The stent is visualized in the distal vertebral artery and extending into the left basal artery. There is some contrast leakage to the left of the stent. This may be secondary to stent porosity or leakage from the distal aspect of the stent. This appears unchanged from prior CTA performed on October 6, 2024. Correlate with diagnostic cerebral angiography as clinically warranted. No focal area of decreased cerebral blood flow (CBF) is seen to suggest an acute infarct in a large vessel territory.  No defects are seen to suggest a core infarct or an area of reversible ischemia. Electronically Signed: Ben Ziegler MD  3/11/2025 9:50 PM EDT  Workstation ID: [id number]    CT Head Without Contrast Stroke Protocol  Result Date: 3/11/2025  Impression: 1.Basilar artery aneurysm which could be thrombosed. There is a radiopaque object along the more superior right aspect of the aneurysm that could reflect pipeline flow diverter. 2.There are white matter changes involving the cerebral hemispheres which could reflect sequela to small vessel ischemic change. 3.There is dilatation of the lateral ventricles which has been noted. Electronically Signed: Akash Lux MD  3/11/2025 9:03 PM EDT  Workstation ID: [id number]      Results for orders placed during the hospital encounter of 10/06/24    Adult Transthoracic Echo Complete W/ Cont if Necessary Per Protocol    Interpretation Summary    Left ventricular systolic function is normal. Estimated left ventricular EF = 65%    Left ventricular diastolic function is consistent with (grade I) impaired relaxation.    The cardiac valves are anatomically and functionally normal.     Estimated right ventricular systolic pressure from tricuspid regurgitation is normal (<35 mmHg).    Saline test results are negative.       Assessment/Plan:    This is a 64-year-old white female, right-handed with multiple vascular risk factor presents to [facility] ED for a transient onset of aphasia, unresponsiveness, syncope episode, falling.  Upon arrival CT head without contrast revealed no acute abnormality, no hemorrhage, CTA head and neck unchanged from prior images back in October no LVO.  CTP negative for perfusion deficit no core infarct or penumbra.  Patient is deemed not to be a candidate for TNK or mechanical thrombectomy secondary to return to baseline, improvement of symptoms.  No LVO on CTA head and neck.    Antiplatelet PTA: DAPT aspirin and Plavix  Anticoagulant PTA: None        Transient onset of aphasia  Transient onset of left side weakness  Transient onset of altered mental status  Fall\syncopal episode  History of basilar aneurysm s/p pipeline embolization per Dr. Savage (2022, 2023)  DDx-TIA\ischemic stroke, stroke recrudescence, seizure postictal, migraine complex  -TIA/CVA order set without thrombolytic therapy has been initiated  -NPO until bedside nursing dysphagia screen completed  -MRI brain ordered and pending  -TTE ordered and pending  -EEG ordered and pending  -CK, lactate ordered and pending  -Seizure precaution in place  -A1c and lipid panel in AM  -Meds: Continue DAPT aspirin and Plavix home dose for secondary stroke prevention and maintaining stent patent.  -P2 Y12 ordered to evaluate Plavix responsiveness.  -NIH\neurocheck per protocol  -Activity as tolerated, fall risk precautions  -PT/OT/SLP evaluation  -Neurology stroke will continue to follow-up    2.  Essential hypertension,   -Allow autoregulation of blood pressure for adequate cerebral blood flow till clearance of MRI    3.  Possible seizure activity\postictal  -EEG ordered in the a.m.  -Will hold off on antiseizure medication  for now, monitor for any seizure activity.    4.  Headache, in the setting of history of migraine headache  -Ordered IV mag 2 g x 1  -Okay for Tylenol 650 every 6 for mild to moderate pain headache  -Okay for migraine cocktail avoid NSAIDs.  -Multimodal pain control.  -If all above did not control headache we can try Depakote 800 mg IV x 1        Plan of care was discussed with patient, , ED physician, neurology attending [place].  Stroke neurology will continue to follow.  Please call with any questions or concerns.  Thank you for this consult.            CAILIN Staples  March 11, 2025  21:13 EDT

## 2025-03-12 NOTE — PLAN OF CARE
Goal Outcome Evaluation:  Plan of Care Reviewed With: patient        Progress: no change       VSS. No acute changes. Pt. Continues to have slight left facial droop and negligible left sided weakness. Ataxic gait, dizziness upon standing. And mild dysarthria. The current plan is for neurosurgery to place a  shunt on Tuesday.

## 2025-03-12 NOTE — CONSULTS
Chart review for diabetes educator consult.    At the time of this review patient A1c is 5.8, they have no noted history of diabetes and no home medications noted for treatment of diabetes. At this time we do not feel the patient would benefit from diabetes education. Thank you for this consult, should patient needs change please re consult us.

## 2025-03-12 NOTE — PLAN OF CARE
Goal Outcome Evaluation:  Plan of Care Reviewed With: (P) patient        Progress: (P) no change (Evaluation)       Anticipated Discharge Disposition (SLP): (P) inpatient rehabilitation facility    SLP Diagnosis: (P) mild-moderate, cognitive-linguistic disorder (03/12/25 0840)  SLP Diagnosis Comments: (P) Speech is slow but without a true dysarthira. No evidence of apraxia. Language is intact in conversation. (03/12/25 0840)  SLP Swallowing Diagnosis: (P) swallow WFL/no suspected pharyngeal impairment (03/12/25 0840)

## 2025-03-12 NOTE — H&P
"    [facility]  HISTORY AND PHYSICAL    Patient Name: Lauryn Romo  : 1960  MRN: [id number]  Primary Care Physician: Melissa Lazo APRN  Date of admission: 3/11/2025      Subjective   Subjective     Chief Complaint:  Left-sided weakness    HPI:  Lauryn Romo is a 64 y.o. female who states that around 7 PM she slipped at home and suffered a fall.  She states she fell backward and hit the back of her head \"really hard.\"  No laceration or other visible injury.  She denies any loss of consciousness.  She has a history of a prior CVA and a large cerebral aneurysm s/p stenting.  Tonight (Tuesday 3/11), soon after this event she noticed some left-sided facial droop, her  states she was confused and disoriented, she was exhibiting some weakness of the left upper and left lower extremities.  The patient states that the confusion has resolved, but the left-sided facial droop and the left lower extremity weakness have persisted during her stay here in the ED.  She denies any chest pain, shortness of breath, fever/chills, nausea/emesis, dizziness/lightheadedness, visual changes, abdominal pain, bowel habit change, or syncope.  Her medical history is significant for the aforementioned CVA, cerebral aneurysm s/p stenting x 2, hypertension, and anxiety/depression.      Personal History     Past Medical History:   Diagnosis Date    Abnormal ECG     Aneurysm 2022    Cancer 2024    Basal Cell Carcinoma removed from scalp by dermatology    Cluster headache     Had headaches for several months before stroke and mass was found    Difficulty walking 22    After stroke    Headache     Headache, tension-type     Hyperlipidemia     Hypertension     Memory loss 22    Migraine     Stroke 2022    Stroke 10/06/2024    Vision loss 22    When dizzy or headache           Past Surgical History:   Procedure Laterality Date    ARTERIAL " ANEURYSM REPAIR      BREAST LUMPECTOMY  2012    CYST REMOVAL Left 05/2023    EMBOLIZATION CEREBRAL N/A 11/29/2022    Procedure: CV EMBOLIZATION CEREBRAL IR;  Surgeon: Enoch Savage MD;  Location:  [facility];  Service: Interventional Radiology;  Laterality: N/A;    HIATAL HERNIA REPAIR  2015    INTERVENTIONAL RADIOLOGY PROCEDURE N/A 09/15/2023    Procedure: Embolization;  Surgeon: Enoch Savage MD;  Location:  [facility];  Service: Interventional Radiology;  Laterality: N/A;    INTERVENTIONAL RADIOLOGY PROCEDURE N/A 10/6/2024    Procedure: IR mechanical thrombectomy;  Surgeon: Joe Mendez MD;  Location:  [facility];  Service: Interventional Radiology;  Laterality: N/A;    SINUS SURGERY      x4    SKIN CANCER EXCISION      cancer removed off top of head       Family History: family history includes Cancer in her maternal grandmother; Diabetes in her half-brother; Early death in her father; Heart attack in her paternal aunt and paternal uncle; Heart attack (age of onset: 42) in her father; Heart disease in her father; Heart failure in her maternal grandmother; Hyperlipidemia in her father; No Known Problems in her maternal grandfather, mother, paternal grandfather, paternal grandmother, and sister.     Social History:  reports that she quit smoking about 20 years ago. Her smoking use included cigarettes. She started smoking about 35 years ago. She has a 15 pack-year smoking history. She has been exposed to tobacco smoke. She has never used smokeless tobacco. She reports that she does not drink alcohol and does not use drugs.  Social History     Social History Narrative    Caffeine: 6-7 cups daily       Medications:  Available home medication information reviewed.  acetaminophen, aspirin, atorvastatin, bethanechol, clopidogrel, famotidine, fluticasone, galcanezumab-gnlm, lactulose, magnesium oxide, nortriptyline, pantoprazole, rimegepant sulfate  ODT, sertraline, topiramate, and ubrogepant    Allergies   Allergen Reactions    Tramadol Other (See Comments)     Flushng and passing out       Objective   Objective     Vital Signs:   Temp:  [98.5 °F (36.9 °C)] 98.5 °F (36.9 °C)  Heart Rate:  [65-81] 74  Resp:  [16] 16  BP: (119-150)/(73-98) 129/98  Total (NIH Stroke Scale): 3    Physical Exam   Constitutional: Awake, alert, NAD, pleasant.  Eyes: PERRLA, sclerae anicteric, no conjunctival injection  HENT: NCAT, mucous membranes dry.  Neck: Supple, no thyromegaly, no lymphadenopathy, trachea midline  Respiratory: Clear to auscultation bilaterally, nonlabored respirations   Cardiovascular: RRR, no murmurs, rubs, or gallops, palpable pedal pulses bilaterally  Gastrointestinal: Positive bowel sounds, soft, nontender, nondistended  Musculoskeletal: No bilateral ankle edema, no clubbing or cyanosis to extremities  Psychiatric: Appropriate affect, cooperative  Neurologic: Oriented x 3, LUE strength 5/5, RUE strength 4/5, LLE strength 5/5, RLE strength 5/5; there is left-sided mouth droop but otherwise cranial nerves are all normal on my exam, speech quiet but clear, some answers are slow to come, but this is apparently her baseline and is residual from prior CVA.  Skin: No rashes, normal turgor.    Result Review:  I have personally reviewed the results from the time of this admission to 3/12/2025 00:17 EDT and agree with these findings:  [x]  Laboratory list / accordion  []  Microbiology  [x]  Radiology  [x]  EKG/Telemetry   []  Cardiology/Vascular   []  Pathology  []  Old records  []  Other:  Most notable findings include: Reviewed radiology reports from all CT imaging of the head and neck.  I reviewed EKG which by my read shows sinus rhythm, ventricular rate approximately 70 bpm, normal axis, no acute appearing ST/T wave changes.  I reviewed chest x-ray which is a single AP view and by my read shows nothing acute.      LAB RESULTS:      Lab 03/11/25 2047  03/11/25 1949   WBC 7.43  --    HEMOGLOBIN 13.8  --    HEMOGLOBIN, POC  --  14.3   HEMATOCRIT 42.1  --    HEMATOCRIT POC  --  42   PLATELETS 240  --    NEUTROS ABS 5.24  --    IMMATURE GRANS (ABS) 0.02  --    LYMPHS ABS 1.54  --    MONOS ABS 0.43  --    EOS ABS 0.16  --    MCV 93.3  --    LACTATE 1.2  --    PROTIME 12.9  --    INR 0.96  --    APTT 26.5  --          Lab 03/11/25 2047 03/11/25 1949   CREATININE  --  0.70   EGFR  --  97.3   TSH 4.090  --          Lab 03/11/25 2047   ALT (SGPT) 44*   AST (SGOT) 34*                     UA          7/21/2024    22:08 3/11/2025    22:13   Urinalysis   Squamous Epithelial Cells, UA None Seen  None Seen    Specific Otter, UA 1.012  1.019    Ketones, UA Negative  Trace    Blood, UA Negative  Negative    Leukocytes, UA Trace  Trace    Nitrite, UA Negative  Negative    RBC, UA 0-2  0-2    WBC, UA 3-5  0-2    Bacteria, UA None Seen  None Seen        Microbiology Results (last 10 days)       ** No results found for the last 240 hours. **            XR Chest 1 View  Result Date: 3/11/2025  XR CHEST 1 VW Date of Exam: 3/11/2025 9:45 PM EDT Indication: Acute Stroke Protocol (onset < 12 hrs) Comparison: October 6, 2024 Findings: Heart not enlarged. Lungs seem clear. There are no pleural effusions. There is a granuloma in the right upper lobe.     Impression: Impression: An acute pulmonary process is not apparent. Electronically Signed: Akash Lux MD  3/11/2025 10:21 PM EDT  Workstation ID: [id number]    CT Angiogram Head w AI Analysis of LVO  Result Date: 3/11/2025  CT ANGIOGRAM HEAD W AI ANALYSIS OF LVO, CT ANGIOGRAM NECK, CT CEREBRAL PERFUSION W WO CONTRAST Date of Exam: 3/11/2025 8:47 PM EDT Indication: Neuro Deficit, acute, Stroke suspected Neuro deficit, acute stroke suspected. Comparison: Study was correlated with noncontrast CT of the head performed on March 11, 2025. CTA of the head and neck performed on October 6, 2024 Technique: CTA of the head was performed after the  uneventful intravenous administration of Isovue-370, 115 mL. Reconstructed coronal and sagittal images were also obtained. In addition, a 3-D volume rendered image was created for interpretation. Automated exposure control and iterative reconstruction methods were used. Findings: Aortic arch: The aortic arch is unremarkable.  There is conventional 3 vessel arch anatomy. Small calcified plaques are visualized at the origin of the great vessels without evidence of luminal narrowing. The brachiocephalic artery and bilateral subclavian arteries are normal in caliber. Right carotid: The right CCA arises as expected from the brachiocephalic trunk.  The CCA follows a normal course and appears normal caliber. Tiny calcified plaque in the right carotid bifurcation is visualized without evidence of significant luminal narrowing. The external carotid artery and distal branches appear within normal limits.  The cervical internal carotid artery follows a normal course and appears normal caliber throughout the neck and into the head. Small calcified plaques are visualized  in the right carotid siphon causing mild luminal narrowing without evidence of hemodynamically significant stenosis. The ophthalmic artery origin is normal.  The A1 and M1 segments appear within normal limits.  The visualized distal DERECK and MCA branches  appear patent.  There is  a patent  anterior communicating artery. Posterior communicating artery is patent. Left carotid: The left CCA arises as expected from the aortic arch.   The CCA follows a normal course and appears normal caliber.  The carotid bifurcation is unremarkable.  The external carotid artery and distal branches appear within normal limits.  The  cervical internal carotid artery follows a normal course and appears normal caliber throughout the neck and into the head. Small calcified plaques in the left carotid siphon are visualized causing mild luminal narrowing without evidence of  hemodynamically significant stenosis. The ophthalmic artery origin is normal.  The A1 and M1 segments appear within normal limits.  The visualized distal DERECK and MCA branches appear patent.  Posterior communicating artery is patent. Posterior circulation: Vertebral arteries arise as expected from ipsilateral subclavian arteries.  The vertebral arteries are codominant.  The vertebral arteries follow a normal course and appear normal caliber throughout the neck and into the head.  The  V4 segments are patent.  Visualized posterior inferior cerebellar arteries are within normal limits. 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 Patient is post pipeline flow diverting stent starting in the distal right vertebral artery and terminating in the basilar artery. There is some contrast leakage to the left of the stent measuring 1.2 x 0.4 x 0.4 cm. The leak is either through the walls of the stent or distally. The basilar artery is patent distal to the stent. Superior cerebellar arteries are patent.  Bilateral P1 segments and posterior cerebral arteries appear within normal limits. Nonvascular findings: No acute intracranial process evident. Mild dilatation of the bilateral third and lateral ventricles is again visualized, stable. Orbits are within normal limits.  Paranasal sinuses and mastoid air cells appear well aerated.  Superficial soft tissues and underlying musculature appear within normal limits. Lung apices are clear aside from right upper lobe granuloma. The thyroid and salivary glands appear unremarkable.  The suprahyoid and infrahyoid spaces of the neck appear unremarkable.  There is no evidence of cervical lymphadenopathy or a neck mass.  There are no acute osseous abnormalities or destructive bone lesions. CT Perfusion: CBF (<30%) volume: 0 mL Tmax (>6.0s) volume: 0 mL Mismatch volume: 0 mL Mismatch ratio: None The examination appears to be  technically adequate. There is no reduced cerebral blood volume (CBV) or reduced cerebral blood flow (CBF) to suggest an area of acute infarction in a large vessel territory. The cerebral perfusion appears symmetric. No increased mean transit time (MTT) is seen. No areas of mismatch to suggest presence of a penumbra.     Impression: Impression: No evidence of large vessel occlusion or hemodynamically significant stenosis. Post stenting of large basilar artery aneurysm. The stent is visualized in the distal vertebral artery and extending into the left basal artery. There is some contrast leakage to the left of the stent. This may be secondary to stent porosity or leakage from the distal aspect of the stent. This appears unchanged from prior CTA performed on October 6, 2024. Correlate with diagnostic cerebral angiography as clinically warranted. No focal area of decreased cerebral blood flow (CBF) is seen to suggest an acute infarct in a large vessel territory.  No defects are seen to suggest a core infarct or an area of reversible ischemia. Electronically Signed: Ben Ziegler MD  3/11/2025 9:50 PM EDT  Workstation ID: [id number]    CT Angiogram Neck  Result Date: 3/11/2025  CT ANGIOGRAM HEAD W AI ANALYSIS OF LVO, CT ANGIOGRAM NECK, CT CEREBRAL PERFUSION W WO CONTRAST Date of Exam: 3/11/2025 8:47 PM EDT Indication: Neuro Deficit, acute, Stroke suspected Neuro deficit, acute stroke suspected. Comparison: Study was correlated with noncontrast CT of the head performed on March 11, 2025. CTA of the head and neck performed on October 6, 2024 Technique: CTA of the head was performed after the uneventful intravenous administration of Isovue-370, 115 mL. Reconstructed coronal and sagittal images were also obtained. In addition, a 3-D volume rendered image was created for interpretation. Automated exposure control and iterative reconstruction methods were used. Findings: Aortic arch: The aortic arch is unremarkable.  There is  conventional 3 vessel arch anatomy. Small calcified plaques are visualized at the origin of the great vessels without evidence of luminal narrowing. The brachiocephalic artery and bilateral subclavian arteries are normal in caliber. Right carotid: The right CCA arises as expected from the brachiocephalic trunk.  The CCA follows a normal course and appears normal caliber. Tiny calcified plaque in the right carotid bifurcation is visualized without evidence of significant luminal narrowing. The external carotid artery and distal branches appear within normal limits.  The cervical internal carotid artery follows a normal course and appears normal caliber throughout the neck and into the head. Small calcified plaques are visualized  in the right carotid siphon causing mild luminal narrowing without evidence of hemodynamically significant stenosis. The ophthalmic artery origin is normal.  The A1 and M1 segments appear within normal limits.  The visualized distal DERECK and MCA branches  appear patent.  There is  a patent  anterior communicating artery. Posterior communicating artery is patent. Left carotid: The left CCA arises as expected from the aortic arch.   The CCA follows a normal course and appears normal caliber.  The carotid bifurcation is unremarkable.  The external carotid artery and distal branches appear within normal limits.  The  cervical internal carotid artery follows a normal course and appears normal caliber throughout the neck and into the head. Small calcified plaques in the left carotid siphon are visualized causing mild luminal narrowing without evidence of hemodynamically significant stenosis. The ophthalmic artery origin is normal.  The A1 and M1 segments appear within normal limits.  The visualized distal DERECK and MCA branches appear patent.  Posterior communicating artery is patent. Posterior circulation: Vertebral arteries arise as expected from ipsilateral subclavian arteries.  The vertebral  arteries are codominant.  The vertebral arteries follow a normal course and appear normal caliber throughout the neck and into the head.  The  V4 segments are patent.  Visualized posterior inferior cerebellar arteries are within normal limits. 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 Patient is post pipeline flow diverting stent starting in the distal right vertebral artery and terminating in the basilar artery. There is some contrast leakage to the left of the stent measuring 1.2 x 0.4 x 0.4 cm. The leak is either through the walls of the stent or distally. The basilar artery is patent distal to the stent. Superior cerebellar arteries are patent.  Bilateral P1 segments and posterior cerebral arteries appear within normal limits. Nonvascular findings: No acute intracranial process evident. Mild dilatation of the bilateral third and lateral ventricles is again visualized, stable. Orbits are within normal limits.  Paranasal sinuses and mastoid air cells appear well aerated.  Superficial soft tissues and underlying musculature appear within normal limits. Lung apices are clear aside from right upper lobe granuloma. The thyroid and salivary glands appear unremarkable.  The suprahyoid and infrahyoid spaces of the neck appear unremarkable.  There is no evidence of cervical lymphadenopathy or a neck mass.  There are no acute osseous abnormalities or destructive bone lesions. CT Perfusion: CBF (<30%) volume: 0 mL Tmax (>6.0s) volume: 0 mL Mismatch volume: 0 mL Mismatch ratio: None The examination appears to be technically adequate. There is no reduced cerebral blood volume (CBV) or reduced cerebral blood flow (CBF) to suggest an area of acute infarction in a large vessel territory. The cerebral perfusion appears symmetric. No increased mean transit time (MTT) is seen. No areas of mismatch to suggest presence of a penumbra.     Impression: Impression: No  evidence of large vessel occlusion or hemodynamically significant stenosis. Post stenting of large basilar artery aneurysm. The stent is visualized in the distal vertebral artery and extending into the left basal artery. There is some contrast leakage to the left of the stent. This may be secondary to stent porosity or leakage from the distal aspect of the stent. This appears unchanged from prior CTA performed on October 6, 2024. Correlate with diagnostic cerebral angiography as clinically warranted. No focal area of decreased cerebral blood flow (CBF) is seen to suggest an acute infarct in a large vessel territory.  No defects are seen to suggest a core infarct or an area of reversible ischemia. Electronically Signed: Ben Ziegler MD  3/11/2025 9:50 PM EDT  Workstation ID: [id number]    CT CEREBRAL PERFUSION WITH & WITHOUT CONTRAST  Result Date: 3/11/2025  CT ANGIOGRAM HEAD W AI ANALYSIS OF LVO, CT ANGIOGRAM NECK, CT CEREBRAL PERFUSION W WO CONTRAST Date of Exam: 3/11/2025 8:47 PM EDT Indication: Neuro Deficit, acute, Stroke suspected Neuro deficit, acute stroke suspected. Comparison: Study was correlated with noncontrast CT of the head performed on March 11, 2025. CTA of the head and neck performed on October 6, 2024 Technique: CTA of the head was performed after the uneventful intravenous administration of Isovue-370, 115 mL. Reconstructed coronal and sagittal images were also obtained. In addition, a 3-D volume rendered image was created for interpretation. Automated exposure control and iterative reconstruction methods were used. Findings: Aortic arch: The aortic arch is unremarkable.  There is conventional 3 vessel arch anatomy. Small calcified plaques are visualized at the origin of the great vessels without evidence of luminal narrowing. The brachiocephalic artery and bilateral subclavian arteries are normal in caliber. Right carotid: The right CCA arises as expected from the brachiocephalic trunk.  The CCA  follows a normal course and appears normal caliber. Tiny calcified plaque in the right carotid bifurcation is visualized without evidence of significant luminal narrowing. The external carotid artery and distal branches appear within normal limits.  The cervical internal carotid artery follows a normal course and appears normal caliber throughout the neck and into the head. Small calcified plaques are visualized  in the right carotid siphon causing mild luminal narrowing without evidence of hemodynamically significant stenosis. The ophthalmic artery origin is normal.  The A1 and M1 segments appear within normal limits.  The visualized distal DERECK and MCA branches  appear patent.  There is  a patent  anterior communicating artery. Posterior communicating artery is patent. Left carotid: The left CCA arises as expected from the aortic arch.   The CCA follows a normal course and appears normal caliber.  The carotid bifurcation is unremarkable.  The external carotid artery and distal branches appear within normal limits.  The  cervical internal carotid artery follows a normal course and appears normal caliber throughout the neck and into the head. Small calcified plaques in the left carotid siphon are visualized causing mild luminal narrowing without evidence of hemodynamically significant stenosis. The ophthalmic artery origin is normal.  The A1 and M1 segments appear within normal limits.  The visualized distal DERECK and MCA branches appear patent.  Posterior communicating artery is patent. Posterior circulation: Vertebral arteries arise as expected from ipsilateral subclavian arteries.  The vertebral arteries are codominant.  The vertebral arteries follow a normal course and appear normal caliber throughout the neck and into the head.  The  V4 segments are patent.  Visualized posterior inferior cerebellar arteries are within normal limits. 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 Patient is post pipeline flow diverting stent starting in the distal right vertebral artery and terminating in the basilar artery. There is some contrast leakage to the left of the stent measuring 1.2 x 0.4 x 0.4 cm. The leak is either through the walls of the stent or distally. The basilar artery is patent distal to the stent. Superior cerebellar arteries are patent.  Bilateral P1 segments and posterior cerebral arteries appear within normal limits. Nonvascular findings: No acute intracranial process evident. Mild dilatation of the bilateral third and lateral ventricles is again visualized, stable. Orbits are within normal limits.  Paranasal sinuses and mastoid air cells appear well aerated.  Superficial soft tissues and underlying musculature appear within normal limits. Lung apices are clear aside from right upper lobe granuloma. The thyroid and salivary glands appear unremarkable.  The suprahyoid and infrahyoid spaces of the neck appear unremarkable.  There is no evidence of cervical lymphadenopathy or a neck mass.  There are no acute osseous abnormalities or destructive bone lesions. CT Perfusion: CBF (<30%) volume: 0 mL Tmax (>6.0s) volume: 0 mL Mismatch volume: 0 mL Mismatch ratio: None The examination appears to be technically adequate. There is no reduced cerebral blood volume (CBV) or reduced cerebral blood flow (CBF) to suggest an area of acute infarction in a large vessel territory. The cerebral perfusion appears symmetric. No increased mean transit time (MTT) is seen. No areas of mismatch to suggest presence of a penumbra.     Impression: Impression: No evidence of large vessel occlusion or hemodynamically significant stenosis. Post stenting of large basilar artery aneurysm. The stent is visualized in the distal vertebral artery and extending into the left basal artery. There is some contrast leakage to the left of the stent. This may be secondary to stent porosity or  leakage from the distal aspect of the stent. This appears unchanged from prior CTA performed on October 6, 2024. Correlate with diagnostic cerebral angiography as clinically warranted. No focal area of decreased cerebral blood flow (CBF) is seen to suggest an acute infarct in a large vessel territory.  No defects are seen to suggest a core infarct or an area of reversible ischemia. Electronically Signed: Ben Ziegler MD  3/11/2025 9:50 PM EDT  Workstation ID: [id number]    CT Head Without Contrast Stroke Protocol  Result Date: 3/11/2025  CT HEAD WO CONTRAST STROKE PROTOCOL Date of Exam: 3/11/2025 8:30 PM EDT Indication: Neuro deficit, acute, stroke suspected Neuro Deficit, acute, Stroke suspected. Comparison: MRI brain October 7, 2024, CT head October 6, 2024 Technique: Axial CT images were obtained of the head without contrast administration.  Reconstructed coronal images were also obtained. Automated exposure control and iterative construction methods were used. Findings: There is a basilar artery aneurysm measuring 2.3 x 2.2 cm previously measuring 2.2 x 2.3 cm. This may be thrombosed. There is a radiopaque object lying along the more superior right aspect of the aneurysm that could reflect pipeline flow diverter. There is dilatation of the lateral ventricles which has been noted. There are some periventricular white matter changes as well as more focal hypodensity in the subcortical right parietal area which has been noted and could reflect sequela of small vessel ischemic change. No definite underlying hemorrhage is confirmed. It looks like the patient has had previous paranasal sinus surgery.     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 There is a radiopaque object along the more superior right aspect of the aneurysm that could reflect pipeline flow diverter. 2.There are white matter changes involving the cerebral hemispheres which could reflect sequela to small vessel  ischemic change. 3.There is dilatation of the lateral ventricles which has been noted. Electronically Signed: Akash Lux MD  3/11/2025 9:03 PM EDT  Workstation ID: [id number]      Results for orders placed during the hospital encounter of 10/06/24    Adult Transthoracic Echo Complete W/ Cont if Necessary Per Protocol    Interpretation Summary    Left ventricular systolic function is normal. Estimated left ventricular EF = 65%    Left ventricular diastolic function is consistent with (grade I) impaired relaxation.    The cardiac valves are anatomically and functionally normal.    Estimated right ventricular systolic pressure from tricuspid regurgitation is normal (<35 mmHg).    Saline test results are negative.      Assessment & Plan   Assessment & Plan       Left-sided weakness      64F with transient left-sided weakness and persistent left-sided facial droop after a fall, history of CVA and cerebral aneurysm s/p stenting    Left-sided weakness  Left-sided facial droop  Concern for CVA  History of prior CVA and cerebral aneurysm s/p stents x 2  - Daily aspirin, Plavix, statin.  - PT/OT/SLP; HbA1c, lipid panel, TSH with a.m. labs; 2D echo; MRI brain; neuro checks.  - Remainder of CVA workup per neurology team, will follow up recommendations.    Hypertension  -  states that she takes no medication for this.  - Monitor per floor protocol.    Anxiety/depression  - Continue home nortriptyline and Zoloft.        Total time spent: 81 minutes  Time spent includes time reviewing chart, face-to-face time, counseling patient/family/caregiver, ordering medications/tests/procedures, communicating with other health care professionals, documenting clinical information in the electronic health record, and coordination of care.     VTE Prophylaxis:  scds        CODE STATUS:  full  Code Status and Medical Interventions: CPR (Attempt to Resuscitate); Full Support   Ordered at: 03/11/25 2248     Code Status (Patient has no pulse  and is not breathing):    CPR (Attempt to Resuscitate)     Medical Interventions (Patient has pulse or is breathing):    Full Support     Level Of Support Discussed With:    Patient       Expected Discharge   tbjayden Orozco,III, DO  03/12/25

## 2025-03-12 NOTE — PROGRESS NOTES
[facility] Medicine Services  PROGRESS NOTE    Patient Name: Lauryn Romo  : 1960  MRN: [id number]    Date of Admission: 3/11/2025  Primary Care Physician: Melissa Lazo APRN    Subjective   Subjective     CC:  fall    HPI:  Head a little sore after her fall yesterday.  Knows that we are at [facility].      Objective   Objective     Vital Signs:   Temp:  [97.7 °F (36.5 °C)-98.5 °F (36.9 °C)] 98.1 °F (36.7 °C)  Heart Rate:  [58-81] 69  Resp:  [16-18] 18  BP: (113-150)/(73-98) 130/73     Physical Exam:  Somewhat frail, up in chair  MM moist  RRR  CTAB  Abd soft  Awake, speech and mentation slowed  Flat affect    Results Reviewed:  LAB RESULTS:      Lab 25   WBC 6.92 7.43  --    HEMOGLOBIN 12.9 13.8  --    HEMOGLOBIN, POC  --   --  14.3   HEMATOCRIT 40.2 42.1  --    HEMATOCRIT POC  --   --  42   PLATELETS 232 240  --    NEUTROS ABS 4.12 5.24  --    IMMATURE GRANS (ABS) 0.02 0.02  --    LYMPHS ABS 2.02 1.54  --    MONOS ABS 0.52 0.43  --    EOS ABS 0.19 0.16  --    MCV 94.1 93.3  --    LACTATE  --  1.2  --    PROTIME  --  12.9  --    APTT  --  26.5  --          Lab 25   SODIUM 142  --   --    POTASSIUM 3.8  --   --    CHLORIDE 109*  --   --    CO2 23.0  --   --    ANION GAP 10.0  --   --    BUN 16  --   --    CREATININE 0.55*  --  0.70   EGFR 103.1  --  97.3   GLUCOSE 86  --   --    CALCIUM 8.6  --   --    MAGNESIUM 3.1*  --   --    HEMOGLOBIN A1C 5.80*  --   --    TSH  --  4.090  --          Lab 25   TOTAL PROTEIN 6.5  --    ALBUMIN 4.0  --    GLOBULIN 2.5  --    ALT (SGPT) 36* 44*   AST (SGOT) 26 34*   BILIRUBIN 0.3  --    ALK PHOS 115  --          Lab 25   PROTIME 12.9   INR 0.96         Lab 25  0525   CHOLESTEROL 114   LDL CHOL 42   HDL CHOL 60   TRIGLYCERIDES 50             Brief Urine Lab Results  (Last result in the past 365 days)        Color    Clarity   Blood   Leuk Est   Nitrite   Protein   CREAT   Urine HCG        03/11/25 2213 Yellow   Clear   Negative   Trace   Negative   Negative                   Microbiology Results Abnormal       None            EEG  Result Date: 3/12/2025  Reason for referral: 64 y.o.female with altered mental status, left-sided weakness, speech changes, consideration of seizure Technical Summary:  A 19 channel digital EEG was performed using the international 10-20 placement system, including eye leads and EKG leads. Duration: 20 minutes Findings: The patient is awake.  A medium amplitude 9 Hz posterior rhythm is evident symmetrically over the occipital leads.  Intermixed theta and alpha activity are seen anteriorly.  Sleep is not seen.  Hyperventilation is not performed.  Photic stimulation does not change the background.  No focal features or epileptiform activity are present. Video: Available Technical quality: Good Rhythm strip: Regular, 60 bpm SUMMARY: Normal EEG in the awake state No focal features or epileptiform activity are seen     Impression: Normal study This report is transcribed using the Dragon dictation system.      MRI Brain Without Contrast  Result Date: 3/12/2025  MRI BRAIN WO CONTRAST Date of Exam: 3/12/2025 2:52 AM EDT Indication: Stroke, follow up stroke.  Comparison: Head CT, CT cerebral perfusion 3/11/2025, brain MRI 10/7/2024 Technique:  Routine multiplanar/multisequence sequence images of the brain were obtained without contrast administration. Findings: No acute infarct. No intracranial hemorrhage. No intracranial mass. There is susceptibility associated with the basilar artery aneurysm and stent. There are periventricular and to lesser extent subcortical white matter FLAIR/T2 hyperintensities which are  nonspecific and can be seen in the setting of chronic small vessel ischemic change. No extra-axial collections. No midline shift or herniation. Normal size and configuration of the ventricles. Normal  appearance of the orbits. The cerebellopontine angles  are unremarkable. The basilar artery aneurysm exerts mass effect on the brainstem with posterior displacement of the jong and medulla. Basilar artery aneurysm is similar in size compared to prior MRI measuring approximately 2.3 x 2.4 x 2.4 cm (AP X TV X  CC). Normal appearance of the orbits. The paranasal sinuses are clear. The mastoid air cells are clear. No acute or suspicious bony findings.     Impression: Impression: No acute intracranial findings. No acute infarct. Similar basilar artery aneurysm exerting mass effect on the brainstem with posterior displacement of the jong and medulla. Please refer to yesterday's CTA exam for more complete details of this finding. Electronically Signed: Kwesi Guan MD  3/12/2025 8:19 AM EDT  Workstation ID: [id number]    XR Chest 1 View  Result Date: 3/11/2025  XR CHEST 1 VW Date of Exam: 3/11/2025 9:45 PM EDT Indication: Acute Stroke Protocol (onset < 12 hrs) Comparison: October 6, 2024 Findings: Heart not enlarged. Lungs seem clear. There are no pleural effusions. There is a granuloma in the right upper lobe.     Impression: Impression: An acute pulmonary process is not apparent. Electronically Signed: Akash Lux MD  3/11/2025 10:21 PM EDT  Workstation ID: [id number]    CT Angiogram Head w AI Analysis of LVO  Result Date: 3/11/2025  CT ANGIOGRAM HEAD W AI ANALYSIS OF LVO, CT ANGIOGRAM NECK, CT CEREBRAL PERFUSION W WO CONTRAST Date of Exam: 3/11/2025 8:47 PM EDT Indication: Neuro Deficit, acute, Stroke suspected Neuro deficit, acute stroke suspected. Comparison: Study was correlated with noncontrast CT of the head performed on March 11, 2025. CTA of the head and neck performed on October 6, 2024 Technique: CTA of the head was performed after the uneventful intravenous administration of Isovue-370, 115 mL. Reconstructed coronal and sagittal images were also obtained. In addition, a 3-D volume rendered image was created  for interpretation. Automated exposure control and iterative reconstruction methods were used. Findings: Aortic arch: The aortic arch is unremarkable.  There is conventional 3 vessel arch anatomy. Small calcified plaques are visualized at the origin of the great vessels without evidence of luminal narrowing. The brachiocephalic artery and bilateral subclavian arteries are normal in caliber. Right carotid: The right CCA arises as expected from the brachiocephalic trunk.  The CCA follows a normal course and appears normal caliber. Tiny calcified plaque in the right carotid bifurcation is visualized without evidence of significant luminal narrowing. The external carotid artery and distal branches appear within normal limits.  The cervical internal carotid artery follows a normal course and appears normal caliber throughout the neck and into the head. Small calcified plaques are visualized  in the right carotid siphon causing mild luminal narrowing without evidence of hemodynamically significant stenosis. The ophthalmic artery origin is normal.  The A1 and M1 segments appear within normal limits.  The visualized distal DERECK and MCA branches  appear patent.  There is  a patent  anterior communicating artery. Posterior communicating artery is patent. Left carotid: The left CCA arises as expected from the aortic arch.   The CCA follows a normal course and appears normal caliber.  The carotid bifurcation is unremarkable.  The external carotid artery and distal branches appear within normal limits.  The  cervical internal carotid artery follows a normal course and appears normal caliber throughout the neck and into the head. Small calcified plaques in the left carotid siphon are visualized causing mild luminal narrowing without evidence of hemodynamically significant stenosis. The ophthalmic artery origin is normal.  The A1 and M1 segments appear within normal limits.  The visualized distal DERECK and MCA branches appear patent.   Posterior communicating artery is patent. Posterior circulation: Vertebral arteries arise as expected from ipsilateral subclavian arteries.  The vertebral arteries are codominant.  The vertebral arteries follow a normal course and appear normal caliber throughout the neck and into the head.  The  V4 segments are patent.  Visualized posterior inferior cerebellar arteries are within normal limits. 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 Patient is post pipeline flow diverting stent starting in the distal right vertebral artery and terminating in the basilar artery. There is some contrast leakage to the left of the stent measuring 1.2 x 0.4 x 0.4 cm. The leak is either through the walls of the stent or distally. The basilar artery is patent distal to the stent. Superior cerebellar arteries are patent.  Bilateral P1 segments and posterior cerebral arteries appear within normal limits. Nonvascular findings: No acute intracranial process evident. Mild dilatation of the bilateral third and lateral ventricles is again visualized, stable. Orbits are within normal limits.  Paranasal sinuses and mastoid air cells appear well aerated.  Superficial soft tissues and underlying musculature appear within normal limits. Lung apices are clear aside from right upper lobe granuloma. The thyroid and salivary glands appear unremarkable.  The suprahyoid and infrahyoid spaces of the neck appear unremarkable.  There is no evidence of cervical lymphadenopathy or a neck mass.  There are no acute osseous abnormalities or destructive bone lesions. CT Perfusion: CBF (<30%) volume: 0 mL Tmax (>6.0s) volume: 0 mL Mismatch volume: 0 mL Mismatch ratio: None The examination appears to be technically adequate. There is no reduced cerebral blood volume (CBV) or reduced cerebral blood flow (CBF) to suggest an area of acute infarction in a large vessel territory. The cerebral  perfusion appears symmetric. No increased mean transit time (MTT) is seen. No areas of mismatch to suggest presence of a penumbra.     Impression: Impression: No evidence of large vessel occlusion or hemodynamically significant stenosis. Post stenting of large basilar artery aneurysm. The stent is visualized in the distal vertebral artery and extending into the left basal artery. There is some contrast leakage to the left of the stent. This may be secondary to stent porosity or leakage from the distal aspect of the stent. This appears unchanged from prior CTA performed on October 6, 2024. Correlate with diagnostic cerebral angiography as clinically warranted. No focal area of decreased cerebral blood flow (CBF) is seen to suggest an acute infarct in a large vessel territory.  No defects are seen to suggest a core infarct or an area of reversible ischemia. Electronically Signed: Ben Ziegler MD  3/11/2025 9:50 PM EDT  Workstation ID: [id number]    CT Angiogram Neck  Result Date: 3/11/2025  CT ANGIOGRAM HEAD W AI ANALYSIS OF LVO, CT ANGIOGRAM NECK, CT CEREBRAL PERFUSION W WO CONTRAST Date of Exam: 3/11/2025 8:47 PM EDT Indication: Neuro Deficit, acute, Stroke suspected Neuro deficit, acute stroke suspected. Comparison: Study was correlated with noncontrast CT of the head performed on March 11, 2025. CTA of the head and neck performed on October 6, 2024 Technique: CTA of the head was performed after the uneventful intravenous administration of Isovue-370, 115 mL. Reconstructed coronal and sagittal images were also obtained. In addition, a 3-D volume rendered image was created for interpretation. Automated exposure control and iterative reconstruction methods were used. Findings: Aortic arch: The aortic arch is unremarkable.  There is conventional 3 vessel arch anatomy. Small calcified plaques are visualized at the origin of the great vessels without evidence of luminal narrowing. The brachiocephalic artery and  bilateral subclavian arteries are normal in caliber. Right carotid: The right CCA arises as expected from the brachiocephalic trunk.  The CCA follows a normal course and appears normal caliber. Tiny calcified plaque in the right carotid bifurcation is visualized without evidence of significant luminal narrowing. The external carotid artery and distal branches appear within normal limits.  The cervical internal carotid artery follows a normal course and appears normal caliber throughout the neck and into the head. Small calcified plaques are visualized  in the right carotid siphon causing mild luminal narrowing without evidence of hemodynamically significant stenosis. The ophthalmic artery origin is normal.  The A1 and M1 segments appear within normal limits.  The visualized distal DERECK and MCA branches  appear patent.  There is  a patent  anterior communicating artery. Posterior communicating artery is patent. Left carotid: The left CCA arises as expected from the aortic arch.   The CCA follows a normal course and appears normal caliber.  The carotid bifurcation is unremarkable.  The external carotid artery and distal branches appear within normal limits.  The  cervical internal carotid artery follows a normal course and appears normal caliber throughout the neck and into the head. Small calcified plaques in the left carotid siphon are visualized causing mild luminal narrowing without evidence of hemodynamically significant stenosis. The ophthalmic artery origin is normal.  The A1 and M1 segments appear within normal limits.  The visualized distal DERECK and MCA branches appear patent.  Posterior communicating artery is patent. Posterior circulation: Vertebral arteries arise as expected from ipsilateral subclavian arteries.  The vertebral arteries are codominant.  The vertebral arteries follow a normal course and appear normal caliber throughout the neck and into the head.  The  V4 segments are patent.  Visualized  posterior inferior cerebellar arteries are within normal limits. 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 Patient is post pipeline flow diverting stent starting in the distal right vertebral artery and terminating in the basilar artery. There is some contrast leakage to the left of the stent measuring 1.2 x 0.4 x 0.4 cm. The leak is either through the walls of the stent or distally. The basilar artery is patent distal to the stent. Superior cerebellar arteries are patent.  Bilateral P1 segments and posterior cerebral arteries appear within normal limits. Nonvascular findings: No acute intracranial process evident. Mild dilatation of the bilateral third and lateral ventricles is again visualized, stable. Orbits are within normal limits.  Paranasal sinuses and mastoid air cells appear well aerated.  Superficial soft tissues and underlying musculature appear within normal limits. Lung apices are clear aside from right upper lobe granuloma. The thyroid and salivary glands appear unremarkable.  The suprahyoid and infrahyoid spaces of the neck appear unremarkable.  There is no evidence of cervical lymphadenopathy or a neck mass.  There are no acute osseous abnormalities or destructive bone lesions. CT Perfusion: CBF (<30%) volume: 0 mL Tmax (>6.0s) volume: 0 mL Mismatch volume: 0 mL Mismatch ratio: None The examination appears to be technically adequate. There is no reduced cerebral blood volume (CBV) or reduced cerebral blood flow (CBF) to suggest an area of acute infarction in a large vessel territory. The cerebral perfusion appears symmetric. No increased mean transit time (MTT) is seen. No areas of mismatch to suggest presence of a penumbra.     Impression: Impression: No evidence of large vessel occlusion or hemodynamically significant stenosis. Post stenting of large basilar artery aneurysm. The stent is visualized in the distal vertebral artery  and extending into the left basal artery. There is some contrast leakage to the left of the stent. This may be secondary to stent porosity or leakage from the distal aspect of the stent. This appears unchanged from prior CTA performed on October 6, 2024. Correlate with diagnostic cerebral angiography as clinically warranted. No focal area of decreased cerebral blood flow (CBF) is seen to suggest an acute infarct in a large vessel territory.  No defects are seen to suggest a core infarct or an area of reversible ischemia. Electronically Signed: Ben Ziegler MD  3/11/2025 9:50 PM EDT  Workstation ID: [id number]    CT CEREBRAL PERFUSION WITH & WITHOUT CONTRAST  Result Date: 3/11/2025  CT ANGIOGRAM HEAD W AI ANALYSIS OF LVO, CT ANGIOGRAM NECK, CT CEREBRAL PERFUSION W WO CONTRAST Date of Exam: 3/11/2025 8:47 PM EDT Indication: Neuro Deficit, acute, Stroke suspected Neuro deficit, acute stroke suspected. Comparison: Study was correlated with noncontrast CT of the head performed on March 11, 2025. CTA of the head and neck performed on October 6, 2024 Technique: CTA of the head was performed after the uneventful intravenous administration of Isovue-370, 115 mL. Reconstructed coronal and sagittal images were also obtained. In addition, a 3-D volume rendered image was created for interpretation. Automated exposure control and iterative reconstruction methods were used. Findings: Aortic arch: The aortic arch is unremarkable.  There is conventional 3 vessel arch anatomy. Small calcified plaques are visualized at the origin of the great vessels without evidence of luminal narrowing. The brachiocephalic artery and bilateral subclavian arteries are normal in caliber. Right carotid: The right CCA arises as expected from the brachiocephalic trunk.  The CCA follows a normal course and appears normal caliber. Tiny calcified plaque in the right carotid bifurcation is visualized without evidence of significant luminal narrowing. The  external carotid artery and distal branches appear within normal limits.  The cervical internal carotid artery follows a normal course and appears normal caliber throughout the neck and into the head. Small calcified plaques are visualized  in the right carotid siphon causing mild luminal narrowing without evidence of hemodynamically significant stenosis. The ophthalmic artery origin is normal.  The A1 and M1 segments appear within normal limits.  The visualized distal DERECK and MCA branches  appear patent.  There is  a patent  anterior communicating artery. Posterior communicating artery is patent. Left carotid: The left CCA arises as expected from the aortic arch.   The CCA follows a normal course and appears normal caliber.  The carotid bifurcation is unremarkable.  The external carotid artery and distal branches appear within normal limits.  The  cervical internal carotid artery follows a normal course and appears normal caliber throughout the neck and into the head. Small calcified plaques in the left carotid siphon are visualized causing mild luminal narrowing without evidence of hemodynamically significant stenosis. The ophthalmic artery origin is normal.  The A1 and M1 segments appear within normal limits.  The visualized distal DERECK and MCA branches appear patent.  Posterior communicating artery is patent. Posterior circulation: Vertebral arteries arise as expected from ipsilateral subclavian arteries.  The vertebral arteries are codominant.  The vertebral arteries follow a normal course and appear normal caliber throughout the neck and into the head.  The  V4 segments are patent.  Visualized posterior inferior cerebellar arteries are within normal limits. 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 Patient is post pipeline flow diverting stent starting in the distal right vertebral artery and terminating in the basilar artery. There is some  contrast leakage to the left of the stent measuring 1.2 x 0.4 x 0.4 cm. The leak is either through the walls of the stent or distally. The basilar artery is patent distal to the stent. Superior cerebellar arteries are patent.  Bilateral P1 segments and posterior cerebral arteries appear within normal limits. Nonvascular findings: No acute intracranial process evident. Mild dilatation of the bilateral third and lateral ventricles is again visualized, stable. Orbits are within normal limits.  Paranasal sinuses and mastoid air cells appear well aerated.  Superficial soft tissues and underlying musculature appear within normal limits. Lung apices are clear aside from right upper lobe granuloma. The thyroid and salivary glands appear unremarkable.  The suprahyoid and infrahyoid spaces of the neck appear unremarkable.  There is no evidence of cervical lymphadenopathy or a neck mass.  There are no acute osseous abnormalities or destructive bone lesions. CT Perfusion: CBF (<30%) volume: 0 mL Tmax (>6.0s) volume: 0 mL Mismatch volume: 0 mL Mismatch ratio: None The examination appears to be technically adequate. There is no reduced cerebral blood volume (CBV) or reduced cerebral blood flow (CBF) to suggest an area of acute infarction in a large vessel territory. The cerebral perfusion appears symmetric. No increased mean transit time (MTT) is seen. No areas of mismatch to suggest presence of a penumbra.     Impression: Impression: No evidence of large vessel occlusion or hemodynamically significant stenosis. Post stenting of large basilar artery aneurysm. The stent is visualized in the distal vertebral artery and extending into the left basal artery. There is some contrast leakage to the left of the stent. This may be secondary to stent porosity or leakage from the distal aspect of the stent. This appears unchanged from prior CTA performed on October 6, 2024. Correlate with diagnostic cerebral angiography as clinically  warranted. No focal area of decreased cerebral blood flow (CBF) is seen to suggest an acute infarct in a large vessel territory.  No defects are seen to suggest a core infarct or an area of reversible ischemia. Electronically Signed: Ben Ziegler MD  3/11/2025 9:50 PM EDT  Workstation ID: [id number]    CT Head Without Contrast Stroke Protocol  Result Date: 3/11/2025  CT HEAD WO CONTRAST STROKE PROTOCOL Date of Exam: 3/11/2025 8:30 PM EDT Indication: Neuro deficit, acute, stroke suspected Neuro Deficit, acute, Stroke suspected. Comparison: MRI brain October 7, 2024, CT head October 6, 2024 Technique: Axial CT images were obtained of the head without contrast administration.  Reconstructed coronal images were also obtained. Automated exposure control and iterative construction methods were used. Findings: There is a basilar artery aneurysm measuring 2.3 x 2.2 cm previously measuring 2.2 x 2.3 cm. This may be thrombosed. There is a radiopaque object lying along the more superior right aspect of the aneurysm that could reflect pipeline flow diverter. There is dilatation of the lateral ventricles which has been noted. There are some periventricular white matter changes as well as more focal hypodensity in the subcortical right parietal area which has been noted and could reflect sequela of small vessel ischemic change. No definite underlying hemorrhage is confirmed. It looks like the patient has had previous paranasal sinus surgery.     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 There is a radiopaque object along the more superior right aspect of the aneurysm that could reflect pipeline flow diverter. 2.There are white matter changes involving the cerebral hemispheres which could reflect sequela to small vessel ischemic change. 3.There is dilatation of the lateral ventricles which has been noted. Electronically Signed: Akash Lux MD  3/11/2025 9:03 PM EDT  Workstation ID:  [id number]      Results for orders placed during the hospital encounter of 10/06/24    Adult Transthoracic Echo Complete W/ Cont if Necessary Per Protocol    Interpretation Summary    Left ventricular systolic function is normal. Estimated left ventricular EF = 65%    Left ventricular diastolic function is consistent with (grade I) impaired relaxation.    The cardiac valves are anatomically and functionally normal.    Estimated right ventricular systolic pressure from tricuspid regurgitation is normal (<35 mmHg).    Saline test results are negative.      Current medications:  Scheduled Meds:aspirin, 81 mg, Oral, Daily   Or  aspirin, 300 mg, Rectal, Daily  atorvastatin, 80 mg, Oral, Nightly  bethanechol, 25 mg, Oral, BID  famotidine, 20 mg, Oral, BID AC  nortriptyline, 20 mg, Oral, Nightly  pantoprazole, 40 mg, Oral, Q AM  sertraline, 150 mg, Oral, Daily  sodium chloride, 10 mL, Intravenous, Q12H  sodium chloride, 10 mL, Intravenous, Q12H  topiramate, 100 mg, Oral, Nightly      Continuous Infusions:eptifibatide, 1 mcg/kg/min, Last Rate: 1 mcg/kg/min (03/12/25 1712)      PRN Meds:.  acetaminophen **OR** acetaminophen **OR** acetaminophen    Calcium Replacement - Follow Nurse / BPA Driven Protocol    HYDROcodone-acetaminophen    Magnesium Standard Dose Replacement - Follow Nurse / BPA Driven Protocol    melatonin    nitroglycerin    ondansetron    Phosphorus Replacement - Follow Nurse / BPA Driven Protocol    Potassium Replacement - Follow Nurse / BPA Driven Protocol    sodium chloride    sodium chloride    sodium chloride    sodium chloride    sodium chloride    Assessment & Plan   Assessment & Plan     Active Hospital Problems    Diagnosis  POA    **Left-sided weakness [R53.1]  Yes      Resolved Hospital Problems   No resolved problems to display.        Brief Hospital Course to date:  Lauryn Romo is a 64 y.o. 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    Transient aphasia  Confusion  Giant basilar artery aneurysm s/p flow diverter  Migraines  -Neurology and neurointerventional neurosurgery follow --consideration of possible  shunt next week.  -Continue home Topamax  -Neurology recommends Tylenol and migraine cocktails as needed for headaches.  Avoid NSAIDs.  Consider Depakote 800 mg IV x 1 for intractable headache.  -PT OT    Hypertension    Hyperlipidemia    Anxiety and depression  -Continue home nortriptyline and Zoloft      Expected Discharge Location and Transportation:   Expected Discharge   Expected Discharge Date: 3/14/2025; Expected Discharge Time:      VTE Prophylaxis:  Mechanical VTE prophylaxis orders are present.         AM-PAC 6 Clicks Score (PT): 19 (03/12/25 4678)    CODE STATUS:   Code Status and Medical Interventions: CPR (Attempt to Resuscitate); Full Support   Ordered at: 03/11/25 5104     Code Status (Patient has no pulse and is not breathing):    CPR (Attempt to Resuscitate)     Medical Interventions (Patient has pulse or is breathing):    Full Support     Level Of Support Discussed With:    Patient       Teodoro Okeefe MD  03/12/25

## 2025-03-12 NOTE — PROGRESS NOTES
NAME: MEDHAT ROMO  : 1960  PCP: Melissa Lazo APRN  Attending MD: Teodoro Okeefe MD    Date of Admission:  3/11/2025  Date of Service: 3/12/2025    History of Present Illness:  64 y.o.  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 She was seen in the neurointerventional clinic on 2024, and her Plavix was stopped, but she remained on low-dose aspirin.      Ms. Romo presented to the emergency department on 10/6/2024 with acute thrombosis of her pipeline stent and basilar artery.  She underwent successful mechanical thrombectomy, restoring normal (TICI 3) flow.  This did unmask a stenosis within the mid portions of the Pipeline stent construct, and this was treated with a 2.5 mm coronary MICHELLE, restoring robust flow within the vertebrobasilar system.     Ms. Romo is doing reasonably well following her thrombectomy, essentially returning to her neurologic baseline.  She remains on Plavix/aspirin dual antiplatelet regimen.  Yesterday evening, she had a spell where she fell, had some altered level of consciousness (but did not pass out), and reportedly had some left sided weakness and left facial droop (per patient's daughter who is at nurse).    Past Medical History:  Past Medical History:   Diagnosis Date    Abnormal ECG     Aneurysm 2022    Cancer 2024    Basal Cell Carcinoma removed from scalp by dermatology    Cluster headache     Had headaches for several months before stroke and mass was found    Difficulty walking 22    After stroke    Headache     Headache, tension-type     Hyperlipidemia     Hypertension     Memory loss 22    Migraine     Stroke 2022    Stroke 10/06/2024    Vision loss 22    When dizzy or headache       Past Surgical History:  Past Surgical History:    Procedure Laterality Date    ARTERIAL ANEURYSM REPAIR      BREAST LUMPECTOMY  2012    CYST REMOVAL Left 05/2023    EMBOLIZATION CEREBRAL N/A 11/29/2022    Procedure: CV EMBOLIZATION CEREBRAL IR;  Surgeon: Enoch Savage MD;  Location:  [facility];  Service: Interventional Radiology;  Laterality: N/A;    HIATAL HERNIA REPAIR  2015    INTERVENTIONAL RADIOLOGY PROCEDURE N/A 09/15/2023    Procedure: Embolization;  Surgeon: Enoch Savage MD;  Location:  [facility];  Service: Interventional Radiology;  Laterality: N/A;    INTERVENTIONAL RADIOLOGY PROCEDURE N/A 10/6/2024    Procedure: IR mechanical thrombectomy;  Surgeon: Joe Mendez MD;  Location:  [facility];  Service: Interventional Radiology;  Laterality: N/A;    SINUS SURGERY      x4    SKIN CANCER EXCISION      cancer removed off top of head         Medications  Medications Prior to Admission   Medication Sig Dispense Refill Last Dose/Taking    acetaminophen (TYLENOL) 325 MG tablet Take 2 tablets by mouth Every 6 (Six) Hours As Needed for Mild Pain.   Taking As Needed    aspirin 81 MG chewable tablet Chew 1 tablet Daily.   3/11/2025 Morning    atorvastatin (LIPITOR) 80 MG tablet TAKE 1 TABLET BY MOUTH EVERY NIGHT 90 tablet 1 3/11/2025 Morning    bethanechol (URECHOLINE) 25 MG tablet Take 1 tablet by mouth 2 (Two) Times a Day.   3/11/2025 Morning    clopidogrel (Plavix) 75 MG tablet Take 1 tablet by mouth Daily. 30 tablet 3 3/11/2025 Morning    famotidine (PEPCID) 20 MG tablet Take 1 tablet by mouth 2 (Two) Times a Day Before Meals.   3/11/2025 Morning    fluticasone (FLONASE) 50 MCG/ACT nasal spray 2 sprays into the nostril(s) as directed by provider Daily. 1 bottle 0 3/11/2025 Morning    lactulose (CHRONULAC) 10 GM/15ML solution    Taking    magnesium oxide (MAG-OX) 400 tablet tablet Take 1 tablet by mouth Every Night.   Taking    nortriptyline (PAMELOR) 10 MG capsule Take 2 capsules by mouth  Every Night. 60 capsule 6 Taking    pantoprazole (PROTONIX) 20 MG EC tablet Take 1 tablet by mouth Daily.   Past Week    sertraline (ZOLOFT) 100 MG tablet Take 1.5 tablets by mouth Daily. 135 tablet 1 3/11/2025 Morning    topiramate (TOPAMAX) 100 MG tablet Take 1 tablet by mouth Every Night. 30 tablet 6 Past Week    galcanezumab-gnlm (EMGALITY) 120 MG/ML auto-injector pen Inject 1 mL under the skin into the appropriate area as directed Every 28 (Twenty-Eight) Days. 1 mL 11 More than a month    rimegepant sulfate (Nurtec) 75 MG tablet Take 1 tablet by mouth Every Other Day. 16 tablet 11 More than a month    ubrogepant (Ubrelvy) 100 MG tablet Take 1 tablet by mouth Daily As Needed (migraines). Take at onset of headache - if symptoms persist or return, may repeat dose in 2 hours. Maximum: 200 mg per 24 hours 16 tablet 11 More than a month       Allergies:  Allergies   Allergen Reactions    Tramadol Other (See Comments)     Flushng and passing out       Social Hx:  Social History     Socioeconomic History    Marital status:    Tobacco Use    Smoking status: Former     Current packs/day: 0.00     Average packs/day: 1 pack/day for 15.0 years (15.0 ttl pk-yrs)     Types: Cigarettes     Start date: 10/4/1989     Quit date: 10/4/2004     Years since quittin.4     Passive exposure: Past    Smokeless tobacco: Never   Vaping Use    Vaping status: Never Used   Substance and Sexual Activity    Alcohol use: No    Drug use: Never    Sexual activity: Not Currently     Partners: Male     Birth control/protection: Tubal ligation       Family Hx:  Family History   Problem Relation Age of Onset    No Known Problems Mother     Heart attack Father 42    Early death Father         Heart attack    Heart disease Father     Hyperlipidemia Father     Heart attack Paternal Aunt     Heart attack Paternal Uncle     No Known Problems Sister     Cancer Maternal Grandmother     Heart failure Maternal Grandmother     No Known Problems  Maternal Grandfather     No Known Problems Paternal Grandmother     No Known Problems Paternal Grandfather     Diabetes Half-Brother        Review of Imaging:  MRI dated 3/12/2025 demonstrates no areas of restricted diffusion or acute infarction.  Again seen is the patient's largely thrombosed giant basilar aneurysm, status post flow diverter therapy.  The CT angiogram demonstrates widely patent Pipeline/coronary stents extending from the right vertebral artery into the basilar artery.  Again seen is a small endoleak from the left vertebral artery which courses through the aneurysm to supply a right AICA/PICA variant, and subsequently has not been treated.    She does have prominent ventricles along with an element of probable transependymal resorption of CSF, which overall appears largely stable compared to her most recent MRI.    Overall, her imaging studies are stable compared to prior studies.    Laboratory Result:  Lab Results   Component Value Date    WBC 6.92 03/12/2025    HGB 12.9 03/12/2025    HCT 40.2 03/12/2025    MCV 94.1 03/12/2025     03/12/2025     Lab Results   Component Value Date    GLUCOSE 86 03/12/2025    CALCIUM 8.6 03/12/2025     03/12/2025    K 3.8 03/12/2025    CO2 23.0 03/12/2025     (H) 03/12/2025    BUN 16 03/12/2025    CREATININE 0.55 (L) 03/12/2025    EGFRIFNONA 103 09/29/2019    BCR 29.1 (H) 03/12/2025    ANIONGAP 10.0 03/12/2025     Lab Results   Component Value Date    HGBA1C 5.80 (H) 03/12/2025     Lab Results   Component Value Date    CHOL 114 03/12/2025    TRIG 50 03/12/2025    HDL 60 03/12/2025    LDL 42 03/12/2025       Physical Examination:  Vitals:    03/12/25 0647   BP: 129/76   Pulse: 61   Resp: 18   Temp: 97.7 °F (36.5 °C)   SpO2: 98%        General Appearance:   Well developed, well nourished, well groomed, alert, and cooperative.      Neurological examination:  Ms. Romo is resting comfortably in the bedside chair.  She is tolerating p.o. intake.  She  "does have a slight left facial droop.  Strength is symmetric in the upper and lower extremities.  Her speech is clear.    Diagnoses/Plan:    Ms. Romo is a 64 y.o. female known to the neurointerventional service, having been followed for many years for a giant basilar aneurysm which was partially treated with flow diverter therapy.  There is some residual flow within the aneurysm via a left vertebral endoleak, but this supplies a right AICA/PICA variant, and thus has been managed medically.  She did present in October 2024 with thrombus within her Pipeline stent (while she was off Plavix), necessitating mechanical thrombectomy and additional coronary MICHELLE placement.  She has since been on dual antiplatelet regimen, and essentially returned back to her neurologic baseline.    Ms. Romo was admitted last night with a \"fall\", no loss of consciousness.  She did have some left facial drooping and some left-sided weakness, per her daughter who is a nurse.  She was subsequently brought to [facility].  Her MRI studies do not show any new areas of infarction, and her pipeline/coronary stents are widely patent with essentially no change in appearance of her basilar aneurysm.    Her MRI does demonstrate stable ventriculomegaly with probable transependymal resorption of CSF, but again this does not appear substantially different than on her most recent MRI examination.  She does say that her headaches and vision do seem to be getting worse, and thus I am going to asked Dr. Mendez of neurosurgery to come by and see her, to evaluate her ventriculomegaly, and assure that we are not missing anything.    I anticipate her being able to discharge tomorrow, unless she were in need of any further neurosurgical procedure/intervention.    I discussed these findings with the patient/family at the bedside, and they expressed an understanding and were in agreement with this treatment plan.                 "

## 2025-03-12 NOTE — CONSULTS
Patient Name:  Lauryn Romo  YOB: 1960  [id number]       Patient Care Team:  Melissa Lazo APRN as PCP - General (Nurse Practitioner)  Erika Whatley MD as Consulting Physician (Psychiatry)  Enoch Savage MD as Consulting Physician (Neurosurgery)      General Surgery Consult Note     Date of Consultation: 03/12/25    Referring Physician : Teodoro Okeefe MD    Reason for Consult : Hydrocephalus    Subjective     I have been asked to see  Lauryn Romo , a 64 y.o. female in consultation for hydrocephalus.  She has a known past medical history of prior stroke and a large cerebral aneurysm status post stenting, hypertension and anxiety.  In October 2024 she had acute thrombosis of her pipeline stent and the basilar artery and underwent successful mechanical thrombectomy.  She is doing well on dual Plavix and aspirin therapy.  She had some left-sided weakness and facial droop last evening which is improved.  She has been seen by neurosurgery who are planning for a  shunt after CT scanning of the head revealed worsening hydrocephalus.  We have been asked to see her for surgical placement of the catheter in the abdomen.    Of note, she is status post a laparoscopic hiatal hernia pair with Toupet fundoplication by me in 2016.  Otherwise she has had no previous abdominal surgery by report.      Allergy:   Allergies   Allergen Reactions    Tramadol Other (See Comments)     Flushng and passing out       Medications:  aspirin, 81 mg, Oral, Daily   Or  aspirin, 300 mg, Rectal, Daily  atorvastatin, 80 mg, Oral, Nightly  bethanechol, 25 mg, Oral, BID  famotidine, 20 mg, Oral, BID AC  nortriptyline, 20 mg, Oral, Nightly  pantoprazole, 40 mg, Oral, Q AM  sertraline, 150 mg, Oral, Daily  sodium chloride, 10 mL, Intravenous, Q12H  sodium chloride, 10 mL, Intravenous, Q12H  topiramate, 100 mg, Oral, Nightly      eptifibatide, 1 mcg/kg/min      No current facility-administered medications on file  prior to encounter.     Current Outpatient Medications on File Prior to Encounter   Medication Sig    acetaminophen (TYLENOL) 325 MG tablet Take 2 tablets by mouth Every 6 (Six) Hours As Needed for Mild Pain.    aspirin 81 MG chewable tablet Chew 1 tablet Daily.    atorvastatin (LIPITOR) 80 MG tablet TAKE 1 TABLET BY MOUTH EVERY NIGHT    bethanechol (URECHOLINE) 25 MG tablet Take 1 tablet by mouth 2 (Two) Times a Day.    clopidogrel (Plavix) 75 MG tablet Take 1 tablet by mouth Daily.    famotidine (PEPCID) 20 MG tablet Take 1 tablet by mouth 2 (Two) Times a Day Before Meals.    fluticasone (FLONASE) 50 MCG/ACT nasal spray 2 sprays into the nostril(s) as directed by provider Daily.    lactulose (CHRONULAC) 10 GM/15ML solution     magnesium oxide (MAG-OX) 400 tablet tablet Take 1 tablet by mouth Every Night.    nortriptyline (PAMELOR) 10 MG capsule Take 2 capsules by mouth Every Night.    pantoprazole (PROTONIX) 20 MG EC tablet Take 1 tablet by mouth Daily.    sertraline (ZOLOFT) 100 MG tablet Take 1.5 tablets by mouth Daily.    topiramate (TOPAMAX) 100 MG tablet Take 1 tablet by mouth Every Night.    galcanezumab-gnlm (EMGALITY) 120 MG/ML auto-injector pen Inject 1 mL under the skin into the appropriate area as directed Every 28 (Twenty-Eight) Days.    rimegepant sulfate (Nurtec) 75 MG tablet Take 1 tablet by mouth Every Other Day.    ubrogepant (Ubrelvy) 100 MG tablet Take 1 tablet by mouth Daily As Needed (migraines). Take at onset of headache - if symptoms persist or return, may repeat dose in 2 hours. Maximum: 200 mg per 24 hours       PMHx:   Past Medical History:   Diagnosis Date    Abnormal ECG     Aneurysm 11/26/2022    Cancer 05/2024    Basal Cell Carcinoma removed from scalp by dermatology    Cluster headache 2022    Had headaches for several months before stroke and mass was found    Difficulty walking 11/26/22    After stroke    Headache     Headache, tension-type 2022    Hyperlipidemia     Hypertension   "   Memory loss 11/26/22    Migraine 2022    Stroke 11/26/2022    Stroke 10/06/2024    Vision loss 11/26/22    When dizzy or headache       Past Surgical History:  Past Surgical History:   Procedure Laterality Date    ARTERIAL ANEURYSM REPAIR      BREAST LUMPECTOMY  2012    CYST REMOVAL Left 05/2023    EMBOLIZATION CEREBRAL N/A 11/29/2022    Procedure: CV EMBOLIZATION CEREBRAL IR;  Surgeon: Enoch Savage MD;  Location:  [facility];  Service: Interventional Radiology;  Laterality: N/A;    HIATAL HERNIA REPAIR  2015    INTERVENTIONAL RADIOLOGY PROCEDURE N/A 09/15/2023    Procedure: Embolization;  Surgeon: Enoch Savage MD;  Location:  [facility];  Service: Interventional Radiology;  Laterality: N/A;    INTERVENTIONAL RADIOLOGY PROCEDURE N/A 10/6/2024    Procedure: IR mechanical thrombectomy;  Surgeon: Joe Mendez MD;  Location:  [facility];  Service: Interventional Radiology;  Laterality: N/A;    SINUS SURGERY      x4    SKIN CANCER EXCISION      cancer removed off top of head         Family History: Noncontributory     Social History:     Tobacco use: Denies     EtOH use : Denies    Illicit drug use: Denies      Review of Systems        Constitutional: No fevers, chills or malaise   Eyes: Denies visual changes    Cardiovascular: Denies chest pain, palpitations   Pulmonary: Denies cough or shortness of breath   Abdominal/ GI: See HPI    Genitourinary: Denies dysuria or hematuria   Musculoskeletal: Denies any but chronic joint aches, pains or deformities   Psychiatric: No recent mood changes   Neurologic: No paresthesias or loss of function          Objective     Physical Exam:      Vital Signs  /73 (BP Location: Right arm, Patient Position: Lying)   Pulse 69   Temp 98.1 °F (36.7 °C) (Oral)   Resp 18   Ht 154.9 cm (61\")   Wt 59 kg (130 lb)   SpO2 100%   BMI 24.56 kg/m²     Intake/Output Summary (Last 24 hours) at 3/12/2025 1635  Last " data filed at 3/12/2025 1000  Gross per 24 hour   Intake 240 ml   Output --   Net 240 ml         Physical Exam:    Head: Normocephalic, atraumatic.   Eyes: Pupils equal, round, react to light and accommodation.   Mouth: Oral mucosa without lesions,   Neck: No masses, lymphadenopathy or carotid bruits bilaterally   CV: Rhythm  and rate regular , no  murmurs, rubs or gallops  Lungs: Clear  to auscultation bilaterally   Abdomen: Bowel sounds positive  , soft, nontender. Scar pattern c/w prior Lap hiatal hernia repair  Groin : No obvious hernias bilaterally   Extremities:  No cyanosis, clubbing or edema bilaterally   Lymphatics: No abnormal lymphadenopathy appreciated   Neurologic: No gross deficits       Results Review: I have personally reviewed all of the recent lab and imaging results available at this time.  Laboratory results today demonstrate essentially normal comprehensive metabolic panel with a hemoglobin A1c of 5.8.  White count 6.9 with hemoglobin 12.9 and platelet count 232.  Urinalysis essentially negative.           Assessment and Plan:      Visit Diagnoses                 History of ischemic stroke    - I have had an extensive discussion with the patient as well as with Dr. Mendez regarding  shunt placement.  I will assist in the laparoscopic placement of the abdominal portion.  Given her anticoagulation issues, this will be planned for this coming Tuesday to allow for her off of her Plavix.  I will continue to follow this patient with you.             Active Hospital Problems    Diagnosis  POA    **Left-sided weakness [R53.1]  Yes      Resolved Hospital Problems   No resolved problems to display.            I discussed the patient's findings and my recommendations with the patient and/or family, as well as the primary team     Reid Raya MD  03/12/25  16:35 EDT        Dictated using Dragon Dictation

## 2025-03-12 NOTE — CASE MANAGEMENT/SOCIAL WORK
Continued Stay Note  [facility]     Patient Name: Lauryn Romo  MRN: [id number]  Today's Date: 3/12/2025    Admit Date: 3/11/2025    Plan: [facility]   Discharge Plan       Row Name 03/12/25 1357       Plan    Plan [facility]    Patient/Family in Agreement with Plan yes    Plan Comments Discussed therapy recommendations with patient and spouse. They would like a referral to [facility] as patient has been there in the past for rehab. Referral sent to bobby Richey for review. Patient's commercial insurance will require a prior authorization for this rehab request once accepted. CM following.    Final Discharge Disposition Code 62 - inpatient rehab facility                   Discharge Codes    No documentation.                 Expected Discharge Date and Time       Expected Discharge Date Expected Discharge Time    Mar 14, 2025               Brigitte Ang RN

## 2025-03-12 NOTE — PLAN OF CARE
Goal Outcome Evaluation:  Plan of Care Reviewed With: patient, spouse        Progress: no change  Outcome Evaluation: Baseline ADL completion limited by increased L-sided weakness (LLE > LUE), balance impairment, and visual-perceptual deficit warranting IP OT services to promote return to PLOF. LLQ visual field deficit observed, grossly Min-ModA for ADLs and related mobility this date. Rec d/c to IRF for best functional deficits, if not then home with 24/7 increased assist and HHOT/PT.    Anticipated Discharge Disposition (OT): inpatient rehabilitation facility

## 2025-03-12 NOTE — THERAPY EVALUATION
Acute Care - Speech Language Pathology   Swallow Initial Evaluation [facility]  Clinical Swallow Evaluation      Patient Name: Lauryn Romo  : 1960  MRN: [id number]  Today's Date: 3/12/2025               Admit Date: 3/11/2025    Visit Dx:     ICD-10-CM ICD-9-CM   1. Aphasia  R47.01 784.3   2. Acute left-sided weakness  R53.1 728.87   3. History of ischemic stroke  Z86.73 V12.54     Patient Active Problem List   Diagnosis    Acute CVA    Cerebral aneurysm, nonruptured    Paraesophageal hernia    Hyperlipidemia    HTN    Moderate malnutrition    History of CVA (cerebrovascular accident)    Other headache syndrome    Acute cystitis without hematuria    Left-sided weakness     Past Medical History:   Diagnosis Date    Abnormal ECG     Aneurysm 2022    Cancer 2024    Basal Cell Carcinoma removed from scalp by dermatology    Cluster headache     Had headaches for several months before stroke and mass was found    Difficulty walking 22    After stroke    Headache     Headache, tension-type     Hyperlipidemia     Hypertension     Memory loss 22    Migraine     Stroke 2022    Stroke 10/06/2024    Vision loss 22    When dizzy or headache     Past Surgical History:   Procedure Laterality Date    ARTERIAL ANEURYSM REPAIR      BREAST LUMPECTOMY  2012    CYST REMOVAL Left 2023    EMBOLIZATION CEREBRAL N/A 2022    Procedure: CV EMBOLIZATION CEREBRAL IR;  Surgeon: Enoch Savage MD;  Location: [facility] CATH INVASIVE LOCATION;  Service: Interventional Radiology;  Laterality: N/A;    HIATAL HERNIA REPAIR      INTERVENTIONAL RADIOLOGY PROCEDURE N/A 09/15/2023    Procedure: Embolization;  Surgeon: Enoch Savage MD;  Location: [facility] CATH INVASIVE LOCATION;  Service: Interventional Radiology;  Laterality: N/A;    INTERVENTIONAL RADIOLOGY PROCEDURE N/A 10/6/2024    Procedure: IR mechanical thrombectomy;  Surgeon: Joe Mendez MD;  Location: [facility]  CATH INVASIVE LOCATION;  Service: Interventional Radiology;  Laterality: N/A;    SINUS SURGERY      x4    SKIN CANCER EXCISION      cancer removed off top of head       SLP Recommendation and Plan  SLP Swallowing Diagnosis: (P) swallow WFL/no suspected pharyngeal impairment (03/12/25 0840)  SLP Diet Recommendation: (P) regular textures, thin liquids (03/12/25 0840)  Recommended Precautions and Strategies: (P) general aspiration precautions (03/12/25 0840)  SLP Rec. for Method of Medication Administration: (P) as tolerated (03/12/25 0840)     Monitor for Signs of Aspiration: (P) yes, notify SLP if any concerns (03/12/25 0840)  Recommended Diagnostics: (P) No further SLP services recommended (03/12/25 0840)  Swallow Criteria for Skilled Therapeutic Interventions Met: (P) no problems identified which require skilled intervention (03/12/25 0840)  Anticipated Discharge Disposition (SLP): (P) inpatient rehabilitation facility (03/12/25 0840)     Therapy Frequency (Swallow): (P) evaluation only (03/12/25 0840)  Predicted Duration Therapy Intervention (Days): (P) 1 week (03/12/25 0840)  Oral Care Recommendations: (P) Oral Care BID/PRN, Toothbrush (03/12/25 0840)                                        Progress: (P) no change (Evaluation)      SWALLOW EVALUATION (Last 72 Hours)       SLP Adult Swallow Evaluation       Row Name 03/12/25 0840                   Rehab Evaluation    Document Type evaluation (P)   -MC        Subjective Information no complaints (P)   -MC        Patient Observations alert;cooperative;agree to therapy (P)   -MC        Patient/Family/Caregiver Comments/Observations  present (P)   -MC        Patient Effort good (P)   -MC        Symptoms Noted During/After Treatment none (P)   -MC           General Information    Patient Profile Reviewed yes (P)   -MC        Current Method of Nutrition NPO (P)   -MC        Precautions/Limitations, Vision WFL;for purposes of eval (P)   -MC         Precautions/Limitations, Hearing WFL;for purposes of eval (P)   -        Prior Level of Function-Communication WFL (P)   Pt seen at [facility] intermittently from November to December 2022 for treatment of cognitive deficits and dysarthria.  -        Prior Level of Function-Swallowing no diet consistency restrictions (P)   FEES in 10/24. Functional oral and pharyngeal phase, recommended regular textures and thin liquids.  -        Plans/Goals Discussed with patient;spouse/S.O.;agreed upon (P)   -        Barriers to Rehab none identified (P)   -        Patient's Goals for Discharge patient did not state (P)   -        Family Goals for Discharge family did not state (P)   -           Pain    Additional Documentation Pain Scale: FACES Pre/Post-Treatment (Group) (P)   -           Pain Scale: FACES Pre/Post-Treatment    Pain: FACES Scale, Pretreatment 2-->hurts little bit (P)   -        Posttreatment Pain Rating 2-->hurts little bit (P)   -           Oral Motor Structure and Function    Dentition Assessment natural, present and adequate (P)   -        Secretion Management WNL/WFL (P)   -        Mucosal Quality dry (P)   -           Oral Musculature and Cranial Nerve Assessment    Oral Motor General Assessment generalized oral motor weakness;oral labial or buccal impairment (P)   -        Oral Labial or Buccal Impairment, Detail, Cranial Nerve VII (Facial): left labial droop (P)   Very minimal  -           General Eating/Swallowing Observations    Respiratory Support Currently in Use room air (P)   -        Eating/Swallowing Skills self-fed;fed by SLP (P)   -        Positioning During Eating upright in chair (P)   -        Utensils Used cup;straw;spoon (P)   -        Consistencies Trialed ice chips;thin liquids;pureed;regular textures;mixed consistency (P)   -           Respiratory    Respiratory Status WFL (P)   -           Clinical Swallow Eval    Oral Prep Phase impaired (P)   -         Oral Transit WFL (P)   -        Oral Residue WFL (P)   -        Pharyngeal Phase no overt signs/symptoms of pharyngeal impairment (P)   -        Esophageal Phase unremarkable (P)   -           Oral Prep Concerns    Oral Prep Concerns oral holding (P)   -        Oral Holding all consistencies (P)   -        Oral Prep Concerns, Comment Slight increased time (P)   -           Oral Transit Concerns    Oral Transit Concerns -- (P)   -        Increased Oral Transit Time -- (P)   -        Oral Transit Concerns, Comment -- (P)   -           SLP Evaluation Clinical Impression    SLP Swallowing Diagnosis swallow WFL/no suspected pharyngeal impairment (P)   -        Functional Impact no impact on function (P)   -        Swallow Criteria for Skilled Therapeutic Interventions Met no problems identified which require skilled intervention (P)   -           Recommendations    Therapy Frequency (Swallow) evaluation only (P)   -        SLP Diet Recommendation regular textures;thin liquids (P)   -        Recommended Diagnostics No further SLP services recommended (P)   -        Recommended Precautions and Strategies general aspiration precautions (P)   -        Oral Care Recommendations Oral Care BID/PRN;Toothbrush (P)   -        SLP Rec. for Method of Medication Administration as tolerated (P)   -        Monitor for Signs of Aspiration yes;notify SLP if any concerns (P)   -                  User Key  (r) = Recorded By, (t) = Taken By, (c) = Cosigned By      Initials Name Effective Dates     Holley Austin, Speech Therapy Student 01/16/25 -                     EDUCATION  The patient has been educated in the following areas:   Dysphagia (Swallowing Impairment).        SLP GOALS       Row Name 03/12/25 0840             Patient will demonstrate functional cognitive-linguistic skills for return to discharge environment    [place] with minimal cues (P)   -      Time frame 1 week (P)   -       Progress/Outcomes new goal (P)   -         Prosody Goal 1 (SLP)    Improve Prosody by Goal 1 (SLP) increasing rate;80%;with minimal cues (75-90%) (P)   -      Time Frame (Prosody Goal 1, SLP) 1 week (P)   -      Progress/Outcomes (Prosody Goal 1, SLP) new goal (P)   -         Attention Goal 1 (SLP)    Improve Attention by Goal 1 (SLP) complete divided attention task;80%;with minimal cues (75-90%) (P)   -MC      Time Frame (Attention Goal 1, SLP) 1 week (P)   -      Progress/Outcomes (Attention Goal 1, SLP) new goal (P)   -         Memory Skills Goal 1 (SLP)    Improve Memory Skills Through Goal 1 (SLP) recalling related word lists with an imposed delay;recalling unrelated word lists with an imposed delay;80%;with minimal cues (75-90%) (P)   -      Time Frame (Memory Skills Goal 1, SLP) 1 week (P)   -MC      Progress/Outcomes (Memory Skills Goal 1, SLP) new goal (P)   -         Organizational Skills Goal 1 (SLP)    Improve Thought Organization Through Goal 1 (SLP) completing a divergent naming task;generating a list of items in a category;80%;with minimal cues (75-90%);completing a convergent naming task (P)   -      Time Frame (Thought Organization Skills Goal 1, SLP) 1 week (P)   -      Progress/Outcomes (Thought Organization Skills Goal 1, SLP) new goal (P)   -                User Key  (r) = Recorded By, (t) = Taken By, (c) = Cosigned By      Initials Name Provider Type    Holley Goff Speech Therapy Student SLP Student                         Time Calculation:    Time Calculation- SLP       Row Name 03/12/25 1324             Time Calculation- SLP    SLP Start Time 0840 (P)   -      SLP Received On 03/12/25 (P)   -         Untimed Charges    06055-QL Eval Speech and Production w/ Language Minutes 53 (P)   -      67154-AT Eval Oral Pharyng Swallow Minutes 53 (P)   -         Total Minutes    Untimed Charges Total Minutes 106 (P)   -       Total Minutes 106 (P)   -                 User Key  (r) = Recorded By, (t) = Taken By, (c) = Cosigned By      Initials Name Provider Type     Holley Austin Speech Therapy Student SLP Student                    Therapy Charges for Today       Code Description Service Date Service Provider Modifiers Qty    61734796818 HC ST EVAL ORAL PHARYNG SWALLOW 4 3/12/2025 Holley Austin Speech Therapy Student GN 1    [id number] HC ST EVAL SPEECH AND PROD W LANG  4 3/12/2025 Holley Austin Speech Therapy Student GN 1                 [facility] Speech Therapy Student  3/12/2025   and Acute Care - Speech Language Pathology Initial Evaluation  [facility]  Cognitive-Communication Evaluation      Patient Name: Lauryn Romo  : 1960  MRN: [id number]  Today's Date: 3/12/2025               Admit Date: 3/11/2025     Visit Dx:    ICD-10-CM ICD-9-CM   1. Aphasia  R47.01 784.3   2. Acute left-sided weakness  R53.1 728.87   3. History of ischemic stroke  Z86.73 V12.54     Patient Active Problem List   Diagnosis    Acute CVA    Cerebral aneurysm, nonruptured    Paraesophageal hernia    Hyperlipidemia    HTN    Moderate malnutrition    History of CVA (cerebrovascular accident)    Other headache syndrome    Acute cystitis without hematuria    Left-sided weakness     Past Medical History:   Diagnosis Date    Abnormal ECG     Aneurysm 2022    Cancer 2024    Basal Cell Carcinoma removed from scalp by dermatology    Cluster headache     Had headaches for several months before stroke and mass was found    Difficulty walking 22    After stroke    Headache     Headache, tension-type     Hyperlipidemia     Hypertension     Memory loss 22    Migraine     Stroke 2022    Stroke 10/06/2024    Vision loss 22    When dizzy or headache     Past Surgical History:   Procedure Laterality Date    ARTERIAL ANEURYSM REPAIR      BREAST LUMPECTOMY  2012    CYST REMOVAL Left 2023    EMBOLIZATION CEREBRAL N/A 2022     Procedure: CV EMBOLIZATION CEREBRAL IR;  Surgeon: Enoch Savage MD;  Location:  [facility] CATH INVASIVE LOCATION;  Service: Interventional Radiology;  Laterality: N/A;    HIATAL HERNIA REPAIR  2015    INTERVENTIONAL RADIOLOGY PROCEDURE N/A 09/15/2023    Procedure: Embolization;  Surgeon: Enoch Savage MD;  Location:  [facility] CATH INVASIVE LOCATION;  Service: Interventional Radiology;  Laterality: N/A;    INTERVENTIONAL RADIOLOGY PROCEDURE N/A 10/6/2024    Procedure: IR mechanical thrombectomy;  Surgeon: Joe Mendez MD;  Location:  [facility] CATH INVASIVE LOCATION;  Service: Interventional Radiology;  Laterality: N/A;    SINUS SURGERY      x4    SKIN CANCER EXCISION      cancer removed off top of head       SLP Recommendation and Plan  SLP Diagnosis: (P) mild-moderate, cognitive-linguistic disorder (03/12/25 0840)  SLP Diagnosis Comments: (P) Speech is slow but without a true dysarthira. No evidence of apraxia. Language is intact in conversation. (03/12/25 0840)     Monitor for Signs of Aspiration: (P) yes, notify SLP if any concerns (03/12/25 0840)  Swallow Criteria for Skilled Therapeutic Interventions Met: (P) no problems identified which require skilled intervention (03/12/25 0840)  SLC Criteria for Skilled Therapy Interventions Met: (P) yes (03/12/25 0840)  Anticipated Discharge Disposition (SLP): (P) inpatient rehabilitation facility (03/12/25 0840)     Therapy Frequency (Swallow): (P) evaluation only (03/12/25 0840)  Therapy Frequency (SLP SLC): (P) 5 days per week (03/12/25 0840)  Predicted Duration Therapy Intervention (Days): (P) 1 week (03/12/25 0840)  Oral Care Recommendations: (P) Oral Care BID/PRN, Toothbrush (03/12/25 0840)                          Progress: (P) no change (Evaluation) (03/12/25 1322)      SLP EVALUATION (Last 72 Hours)       SLP SLC Evaluation       Row Name 03/12/25 0840                   General Information    Pertinent History Of Current Problem Pt is a 63 y/o F who  presented to [facility] ED after a fall with aphasia, left facial droop, and left sided weaknesses. PMH includes recent stroke (data deficit), HTN, HLD, and basilar aneurysm s/p pipeline embolization (2022,2023). Per MD note, Ct head was negative, MRI was negative for acute infarct. Pt received SLP services intermittently from October to December 2022 for cognitive deficits, dysarthria, and dysphagia concerns. 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 (P)   -           Comprehension Assessment/Intervention    Comprehension Assessment/Intervention -- (P)   -           Auditory Comprehension Assessment/Intervention    Auditory Comprehension (Communication) -- (P)   -           Expression Assessment/Intervention    Expression Assessment/Intervention -- (P)   -           Verbal Expression Assessment/Intervention    Verbal Expression -- (P)   -           Motor Speech Assessment/Intervention    Motor Speech Function mild impairment (P)   -        Characteristics Consistent with Dysarthria slow rate (P)   -           Cognitive Assessment Intervention- SLP    Cognitive Function (Cognition) mild impairment;moderate impairment (P)   -           Standardized Tests    Cognitive/Memory Tests MOCA: [place] Cognitive Assessment (P)   MoCA-Blind  -           MOCA: The [place] Cognitive Assessment    MOCA Total Score 12 (P)   -        MOCA Total Score Indicative Of: Mild Cognitive Impairment (P)   -        MOCA Comments Pt assessed at bedside using the 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 Pt demonstrated mild cognitive deficits characterized by difficulty with delayed recall,  divergent naming, and complex attention tasks. Subtest scores are as follows: attention 3/6, language 1/3, abstraction 1/2, delayed recall 3/5, and orientation 4/6. (P)   -           SLP Evaluation Clinical Impressions    SLP Diagnosis mild-moderate;cognitive-linguistic disorder (P)   -        SLP Diagnosis Comments Speech is slow but without a true dysarthira. No evidence of apraxia. Language is intact in conversation. (P)   -        Rehab Potential/Prognosis good (P)   -[facility] Criteria for Skilled Therapy Interventions Met yes (P)   -        Functional Impact functional impact in social situations;functional impact in ADLs (P)   -           Recommendations    Therapy Frequency (SLP SLC) 5 days per week (P)   -        Predicted Duration Therapy Intervention (Days) 1 week (P)   -        Anticipated Discharge Disposition (SLP) inpatient rehabilitation facility (P)   -                  User Key  (r) = Recorded By, (t) = Taken By, (c) = Cosigned By      Initials Name Effective Dates     Holley Austin Speech Therapy Student 01/16/25 -                        EDUCATION  The patient has been educated in the following areas:     Cognitive Impairment Communication Impairment.           SLP GOALS       Row Name 03/12/25 0840             Patient will demonstrate functional cognitive-linguistic skills for return to discharge environment    [place] with minimal cues (P)   -      Time frame 1 week (P)   -      Progress/Outcomes new goal (P)   -         Prosody Goal 1 (SLP)    Improve Prosody by Goal 1 (SLP) increasing rate;80%;with minimal cues (75-90%) (P)   -      Time Frame (Prosody Goal 1, SLP) 1 week (P)   -      Progress/Outcomes (Prosody Goal 1, SLP) new goal (P)   -         Attention Goal 1 (SLP)    Improve Attention by Goal 1 (SLP) complete divided attention task;80%;with minimal cues (75-90%) (P)   -      Time Frame (Attention Goal 1, SLP) 1 week (P)   -       Progress/Outcomes (Attention Goal 1, SLP) new goal (P)   -         Memory Skills Goal 1 (SLP)    Improve Memory Skills Through Goal 1 (SLP) recalling related word lists with an imposed delay;recalling unrelated word lists with an imposed delay;80%;with minimal cues (75-90%) (P)   -      Time Frame (Memory Skills Goal 1, SLP) 1 week (P)   -MC      Progress/Outcomes (Memory Skills Goal 1, SLP) new goal (P)   -         Organizational Skills Goal 1 (SLP)    Improve Thought Organization Through Goal 1 (SLP) completing a divergent naming task;generating a list of items in a category;80%;with minimal cues (75-90%);completing a convergent naming task (P)   -      Time Frame (Thought Organization Skills Goal 1, SLP) 1 week (P)   -      Progress/Outcomes (Thought Organization Skills Goal 1, SLP) new goal (P)   -                User Key  (r) = Recorded By, (t) = Taken By, (c) = Cosigned By      Initials Name Provider Type     Holley Austin Speech Therapy Student SLP Student                              Time Calculation:      Time Calculation- SLP       Row Name 03/12/25 1324             Time Calculation- [facility]    SLP Start Time 0840 (P)   -      SLP Received On 03/12/25 (P)   -         Untimed Charges    93170-AP Eval Speech and Production w/ Language Minutes 53 (P)   -      77417-YL Eval Oral Pharyng Swallow Minutes 53 (P)   -         Total Minutes    Untimed Charges Total Minutes 106 (P)   -       Total Minutes 106 (P)   -                User Key  (r) = Recorded By, (t) = Taken By, (c) = Cosigned By      Initials Name Provider Type     Holley Austin Speech Therapy Student SLP Student                    Therapy Charges for Today       Code Description Service Date Service Provider Modifiers Qty    55432676391 HC ST EVAL ORAL PHARYNG SWALLOW 4 3/12/2025 Holley Austin Speech Therapy Student GN 1    [id number] HC ST EVAL SPEECH AND PROD W LANG  4 3/12/2025 Holley Austin Speech Therapy Student  GN 1                       Holley Austin, Speech Therapy Student  3/12/2025

## 2025-03-12 NOTE — CASE MANAGEMENT/SOCIAL WORK
Discharge Planning Assessment  [facility]     Patient Name: Lauryn Pal  MRN: [id number]  Today's Date: 3/12/2025    Admit Date: 3/11/2025    Plan: HH vs rehab   Discharge Needs Assessment       Row Name 03/12/25 0932       Living Environment    People in Home spouse    Name(s) of People in Home AZAR PAL Spouse [phone]    Current Living Arrangements home    Potentially Unsafe Housing Conditions none    In the past 12 months has the electric, gas, oil, or water company threatened to shut off services in your home? No    Primary Care Provided by self    Provides Primary Care For no one    Family Caregiver if Needed spouse    Family Caregiver Names AZAR PAL Spouse [phone]    Quality of Family Relationships helpful;involved;supportive    Able to Return to Prior Arrangements yes       Resource/Environmental Concerns    Resource/Environmental Concerns none    Transportation Concerns none       Transportation Needs    In the past 12 months, has lack of transportation kept you from medical appointments or from getting medications? no    In the past 12 months, has lack of transportation kept you from meetings, work, or from getting things needed for daily living? No       Food Insecurity    Within the past 12 months, you worried that your food would run out before you got the money to buy more. Never true    Within the past 12 months, the food you bought just didn't last and you didn't have money to get more. Never true       Transition Planning    Patient/Family Anticipates Transition to home with help/services    Patient/Family Anticipated Services at Transition ;rehabilitation services    Transportation Anticipated family or friend will provide       Discharge Needs Assessment    Readmission Within the Last 30 Days no previous admission in last 30 days    Equipment Currently Used at Home wheelchair;walker, rolling;rollator;cane, straight    Concerns to be Addressed discharge planning     Do you want help finding or keeping work or a job? I do not need or want help    Do you want help with school or training? For example, starting or completing job training or getting a high school diploma, GED or equivalent No    Anticipated Changes Related to Illness none    Equipment Needed After Discharge none                   Discharge Plan       Row Name 03/12/25 0933       Plan    Plan HH vs rehab    Patient/Family in Agreement with Plan yes    Plan Comments CM spoke with patient and spouse at bedside regarding DC planning. Patient resides in [place] [facility] with her spouse. Patient is independent with ADL's, has a cane, rolling walker, rollator & a wheelchair in her home. States she uses no DME in the home currently. Patient denies any current home health or outpatient services. Patient has medical insurance, prescription coverage and is able to afford/obtain medications without difficulty. Patient has no advanced directives. Patient has been to [facility] in the past for rehab. Patient agreeable to rehab or HH @ DC. Case management team will continue to follow plan of care and assist with discharge planning as recommendations are available.    Final Discharge Disposition Code 01 - home or self-care                  Selected Continued Care - Episodes Includes continued care and service providers with selected services from the active episodes listed below          Expected Discharge Date and Time       Expected Discharge Date Expected Discharge Time    Mar 14, 2025            Demographic Summary       Row Name 03/12/25 0931       General Information    Arrived From home    Referral Source emergency department    Reason for Consult discharge planning    Preferred Language English       Contact Information    Contact Information Comments AZAR PAL Spouse [phone]                   Functional Status       Row Name 03/12/25 0931       Functional Status    Usual Activity Tolerance good    Current Activity  Tolerance --  See PT/OT notes       Physical Activity    On average, how many days per week do you engage in moderate to strenuous exercise (like a brisk walk)? Pt Declined    On average, how many minutes do you engage in exercise at this level? Pt Declined       Assessment of Health Literacy    How often do you have someone help you read hospital materials? Occasionally    How often do you have problems learning about your medical condition because of difficulty understanding written information? Occasionally    How often do you have a problem understanding what is told to you about your medical condition? Occasionally    How confident are you filling out medical forms by yourself? Quite a bit    Health Literacy Good       Functional Status, IADL    Medications independent    Meal Preparation independent    Housekeeping independent    Laundry independent    Shopping assistive equipment    If for any reason you need help with day-to-day activities such as bathing, preparing meals, shopping, managing finances, etc., do you get the help you need? I get all the help I need       Mental Status    General Appearance WDL WDL       Mental Status Summary    Recent Changes in Mental Status/Cognitive Functioning no changes       Employment/    Employment Status disabled                   Psychosocial    No documentation.                  Abuse/Neglect    No documentation.                  Legal    No documentation.                  Substance Abuse    No documentation.                  Patient Forms    No documentation.                     Brigitte Ang RN

## 2025-03-12 NOTE — ED NOTES
Lauryn Romo    Nursing Report ED to Floor:  Mental status: A&OX4  Ambulatory status: STANDBY  Oxygen Therapy:  RA  Cardiac Rhythm: NSR  Admitted from: HOME  Safety Concerns:  FALL RISK  Precautions: NA  Social Issues: NA  ED Room #:  10    ED Nurse Phone Extension - [phone] or may call [phone].      HPI:   Chief Complaint   Patient presents with    Fall       Past Medical History:  Past Medical History:   Diagnosis Date    Abnormal ECG     Aneurysm 11/26/2022    Cancer 05/2024    Basal Cell Carcinoma removed from scalp by dermatology    Cluster headache 2022    Had headaches for several months before stroke and mass was found    Difficulty walking 11/26/22    After stroke    Headache     Headache, tension-type 2022    Hyperlipidemia     Hypertension     Memory loss 11/26/22    Migraine 2022    Stroke 11/26/2022    Stroke 10/06/2024    Vision loss 11/26/22    When dizzy or headache        Past Surgical History:  Past Surgical History:   Procedure Laterality Date    ARTERIAL ANEURYSM REPAIR      BREAST LUMPECTOMY  2012    CYST REMOVAL Left 05/2023    EMBOLIZATION CEREBRAL N/A 11/29/2022    Procedure: CV EMBOLIZATION CEREBRAL IR;  Surgeon: Enoch Savage MD;  Location:  [facility];  Service: Interventional Radiology;  Laterality: N/A;    HIATAL HERNIA REPAIR  2015    INTERVENTIONAL RADIOLOGY PROCEDURE N/A 09/15/2023    Procedure: Embolization;  Surgeon: Enoch Savage MD;  Location:  [facility];  Service: Interventional Radiology;  Laterality: N/A;    INTERVENTIONAL RADIOLOGY PROCEDURE N/A 10/6/2024    Procedure: IR mechanical thrombectomy;  Surgeon: Joe Mendez MD;  Location:  [facility];  Service: Interventional Radiology;  Laterality: N/A;    SINUS SURGERY      x4    SKIN CANCER EXCISION      cancer removed off top of head        Admitting Doctor:   Mitesh Orozco III, DO    Consulting Provider(s):  Consults       Date and Time Order  Name Status Description    3/11/2025  8:25 PM Inpatient Neurology Consult Stroke Completed              Admitting Diagnosis:   The primary encounter diagnosis was Aphasia. Diagnoses of Acute left-sided weakness and History of ischemic stroke were also pertinent to this visit.    Most Recent Vitals:   Vitals:    03/11/25 2240 03/11/25 2250 03/11/25 2300 03/11/25 2310   BP: 119/82 132/73 135/77 129/98   BP Location:       Patient Position:       Pulse: 70 66 65 74   Resp:       Temp:       TempSrc:       SpO2: 99% 96% 100% 100%   Weight:       Height:           Active LDAs/IV Access:   Lines, Drains & Airways       Active LDAs       Name Placement date Placement time Site Days    Peripheral IV 03/11/25 2047 Anterior;Right;Upper Arm 03/11/25 2047  Arm  less than 1                    Labs (abnormal labs have a star):   Labs Reviewed   AST - Abnormal; Notable for the following components:       Result Value    AST (SGOT) 34 (*)     All other components within normal limits   ALT - Abnormal; Notable for the following components:    ALT (SGPT) 44 (*)     All other components within normal limits   URINALYSIS W/ CULTURE IF INDICATED - Abnormal; Notable for the following components:    Ketones, UA Trace (*)     Leuk Esterase, UA Trace (*)     All other components within normal limits    Narrative:     In absence of clinical symptoms, the presence of pyuria, bacteria, and/or nitrites on the urinalysis result does not correlate with infection.   POCT CHEM 8 - Abnormal; Notable for the following components:    Total CO2 22 (*)     Ionized Calcium 1.17 (*)     All other components within normal limits   PROTIME-INR - Normal   APTT - Normal    Narrative:     PTT = The equivalent PTT values for the therapeutic range of heparin levels at 0.3 to 0.5 U/ml are 60 to 70 seconds.   CBC WITH AUTO DIFFERENTIAL - Normal   CK - Normal   LACTIC ACID, PLASMA - Normal   TSH RFX ON ABNORMAL TO FREE T4 - Normal   URINALYSIS, MICROSCOPIC ONLY    RAINBOW DRAW    Narrative:     The following orders were created for panel order [place] Draw.  Procedure                               Abnormality         Status                     ---------                               -----------         ------                     Green Top (Gel)[[id number]]                                  Final result               Lavender Top[[id number]]                                     Final result               Gold Top - SST[[id number]]                                                              Chin Top[[id number]]                                                                    Light Blue Top[[id number]]                                   Final result                 Please view results for these tests on the individual orders.   P2Y12 PLATELET INHIBITION   HEMOGLOBIN A1C   LIPID PANEL   CBC WITH AUTO DIFFERENTIAL   COMPREHENSIVE METABOLIC PANEL   MAGNESIUM   POCT CHEM 8   POCT GLUCOSE FINGERSTICK   POCT GLUCOSE FINGERSTICK   POCT GLUCOSE FINGERSTICK   POCT GLUCOSE FINGERSTICK   POCT GLUCOSE FINGERSTICK   CBC AND DIFFERENTIAL    Narrative:     The following orders were created for panel order CBC & Differential.  Procedure                               Abnormality         Status                     ---------                               -----------         ------                     CBC Auto Differential[[id number]]        Normal              Final result                 Please view results for these tests on the individual orders.   GREEN TOP   LAVENDER TOP   LIGHT BLUE TOP   GOLD TOP - SST   GRAY TOP       Meds Given in ED:   Medications   sodium chloride 0.9 % flush 10 mL (10 mL Intravenous Given 3/11/25 1563)   aspirin chewable tablet 81 mg (has no administration in time range)     Or   aspirin suppository 300 mg (has no administration in time range)   clopidogrel (PLAVIX) tablet 75 mg (has no administration in time range)   sodium chloride 0.9 % flush 10 mL (10 mL Intravenous  Given 3/11/25 2308)   sodium chloride 0.9 % flush 10 mL (has no administration in time range)   sodium chloride 0.9 % infusion 40 mL (has no administration in time range)   nitroglycerin (NITROSTAT) SL tablet 0.4 mg (has no administration in time range)   Potassium Replacement - Follow Nurse / BPA Driven Protocol (has no administration in time range)   Magnesium Standard Dose Replacement - Follow Nurse / BPA Driven Protocol (has no administration in time range)   Phosphorus Replacement - Follow Nurse / BPA Driven Protocol (has no administration in time range)   Calcium Replacement - Follow Nurse / BPA Driven Protocol (has no administration in time range)   acetaminophen (TYLENOL) tablet 650 mg (has no administration in time range)     Or   acetaminophen (TYLENOL) 160 MG/5ML oral solution 650 mg (has no administration in time range)     Or   acetaminophen (TYLENOL) suppository 650 mg (has no administration in time range)   HYDROcodone-acetaminophen (NORCO) 5-325 MG per tablet 1 tablet (has no administration in time range)   melatonin tablet 5 mg (has no administration in time range)   ondansetron (ZOFRAN) injection 4 mg (has no administration in time range)   iopamidol (ISOVUE-370) 76 % injection 115 mL (115 mL Intravenous Given 3/11/25 2106)           Last NIH score:  Interval: baseline  1a. Level of Consciousness: 0-->Alert, keenly responsive  1b. LOC Questions: 0-->Answers both questions correctly  1c. LOC Commands: 0-->Performs both tasks correctly  2. Best Gaze: 0-->Normal  3. Visual: 0-->No visual loss  4. Facial Palsy: 1-->Minor paralysis (flattened nasolabial fold, asymmetry on smiling)  5a. Motor Arm, Left: 0-->No drift, limb holds 90 (or 45) degrees for full 10 secs  5b. Motor Arm, Right: 0-->No drift, limb holds 90 (or 45) degrees for full 10 secs  6a. Motor Leg, Left: 1-->Drift, leg falls by the end of the 5-sec period but does not hit bed  6b. Motor Leg, Right: 1-->Drift, leg falls by the end of the  5-sec period but does not hit bed  7. Limb Ataxia: 0-->Absent  8. Sensory: 0-->Normal, no sensory loss  9. Best Language: 0-->No aphasia, normal  10. Dysarthria: 0-->Normal  11. Extinction and Inattention (formerly Neglect): 0-->No abnormality    Total (NIH Stroke Scale): 3     Dysphagia screening results:  Patient Factors Component (Dysphagia:Stroke or Rule-out)  Best Eye Response: 4-->(E4) spontaneous (03/11/25 2314)  Best Motor Response: 6-->(M6) obeys commands (03/11/25 2314)  Best Verbal Response: 5-->(V5) oriented (03/11/25 2314)  [place] Coma Scale Score: 15 (03/11/25 2314)  Is there Facial Asymmetry/Weakness?: Yes (03/11/25 2314)  Is there Tongue Asymmetry/Weakness?: No (03/11/25 2314)  Is there Palatal Asymmetry/Weakness?: No (03/11/25 2314)  Patient Assessment Result: (!) Fail (03/11/25 2314)     [place] Coma Scale:  No data recorded     CIWA:        Restraint Type:            Isolation Status:  No active isolations

## 2025-03-13 ENCOUNTER — APPOINTMENT (OUTPATIENT)
Dept: CARDIOLOGY | Facility: HOSPITAL | Age: 65
End: 2025-03-13
Payer: COMMERCIAL

## 2025-03-13 PROBLEM — G93.89 CEREBRAL VENTRICULOMEGALY: Status: ACTIVE | Noted: 2025-03-11

## 2025-03-13 LAB
AORTIC DIMENSIONLESS INDEX: 0.66 (DI)
ASCENDING AORTA: 3.3 CM
AV MEAN PRESS GRAD SYS DOP V1V2: 4.2 MMHG
AV VMAX SYS DOP: 135 CM/SEC
BH CV ECHO MEAS - AI P1/2T: 384.6 MSEC
BH CV ECHO MEAS - AO MAX PG: 7.3 MMHG
BH CV ECHO MEAS - AO ROOT DIAM: 3.2 CM
BH CV ECHO MEAS - AO V2 VTI: 31.1 CM
BH CV ECHO MEAS - AVA(I,D): 2.06 CM2
BH CV ECHO MEAS - EDV(CUBED): 64 ML
BH CV ECHO MEAS - EDV(MOD-SP2): 85.2 ML
BH CV ECHO MEAS - EDV(MOD-SP4): 90.7 ML
BH CV ECHO MEAS - EF(MOD-SP2): 65.4 %
BH CV ECHO MEAS - EF(MOD-SP4): 69 %
BH CV ECHO MEAS - ESV(CUBED): 13.8 ML
BH CV ECHO MEAS - ESV(MOD-SP2): 29.5 ML
BH CV ECHO MEAS - ESV(MOD-SP4): 28.1 ML
BH CV ECHO MEAS - FS: 40 %
BH CV ECHO MEAS - IVS/LVPW: 1.25 CM
BH CV ECHO MEAS - IVSD: 1 CM
BH CV ECHO MEAS - LA DIMENSION: 3.5 CM
BH CV ECHO MEAS - LAT PEAK E' VEL: 15.2 CM/SEC
BH CV ECHO MEAS - LV MASS(C)D: 109.7 GRAMS
BH CV ECHO MEAS - LV MAX PG: 3.2 MMHG
BH CV ECHO MEAS - LV MEAN PG: 2 MMHG
BH CV ECHO MEAS - LV V1 MAX: 89.6 CM/SEC
BH CV ECHO MEAS - LV V1 VTI: 20.4 CM
BH CV ECHO MEAS - LVIDD: 4 CM
BH CV ECHO MEAS - LVIDS: 2.4 CM
BH CV ECHO MEAS - LVOT AREA: 3.1 CM2
BH CV ECHO MEAS - LVOT DIAM: 2 CM
BH CV ECHO MEAS - LVPWD: 0.8 CM
BH CV ECHO MEAS - MED PEAK E' VEL: 8.4 CM/SEC
BH CV ECHO MEAS - MV A MAX VEL: 76.8 CM/SEC
BH CV ECHO MEAS - MV DEC SLOPE: 306 CM/SEC2
BH CV ECHO MEAS - MV DEC TIME: 0.2 SEC
BH CV ECHO MEAS - MV E MAX VEL: 63.2 CM/SEC
BH CV ECHO MEAS - MV E/A: 0.82
BH CV ECHO MEAS - MV P1/2T: 76.8 MSEC
BH CV ECHO MEAS - MVA(P1/2T): 2.9 CM2
BH CV ECHO MEAS - PA ACC TIME: 0.13 SEC
BH CV ECHO MEAS - PA V2 MAX: 106 CM/SEC
BH CV ECHO MEAS - PI END-D VEL: 4.2 CM/SEC
BH CV ECHO MEAS - RAP SYSTOLE: 8 MMHG
BH CV ECHO MEAS - SV(LVOT): 64 ML
BH CV ECHO MEAS - SV(MOD-SP2): 55.7 ML
BH CV ECHO MEAS - SV(MOD-SP4): 62.6 ML
BH CV ECHO MEAS - TAPSE (>1.6): 2.46 CM
BH CV ECHO MEAS - TR MAX PG: 21.3 MMHG
BH CV ECHO MEAS - TR MAX VEL: 231 CM/SEC
BH CV ECHO MEASUREMENTS AVERAGE E/E' RATIO: 5.36
BH CV VAS BP LEFT ARM: NORMAL MMHG
BH CV XLRA - RV BASE: 4 CM
BH CV XLRA - RV LENGTH: 6.4 CM
BH CV XLRA - RV MID: 3.7 CM
BH CV XLRA - TDI S': 17.1 CM/SEC
GLUCOSE BLDC GLUCOMTR-MCNC: 85 MG/DL (ref 70–130)
IVRT: 79 MS
LEFT ATRIUM VOLUME INDEX: 27 ML/M2
LV EF 2D ECHO EST: 70 %
LV EF BIPLANE MOD: 68.2 %

## 2025-03-13 PROCEDURE — 93306 TTE W/DOPPLER COMPLETE: CPT

## 2025-03-13 PROCEDURE — 82948 REAGENT STRIP/BLOOD GLUCOSE: CPT

## 2025-03-13 PROCEDURE — 93306 TTE W/DOPPLER COMPLETE: CPT | Performed by: INTERNAL MEDICINE

## 2025-03-13 PROCEDURE — 25010000002 EPTIFIBATIDE PER 5 MG

## 2025-03-13 PROCEDURE — 99232 SBSQ HOSP IP/OBS MODERATE 35: CPT | Performed by: NURSE PRACTITIONER

## 2025-03-13 PROCEDURE — 97116 GAIT TRAINING THERAPY: CPT

## 2025-03-13 PROCEDURE — 99231 SBSQ HOSP IP/OBS SF/LOW 25: CPT | Performed by: RADIOLOGY

## 2025-03-13 PROCEDURE — 99232 SBSQ HOSP IP/OBS MODERATE 35: CPT | Performed by: STUDENT IN AN ORGANIZED HEALTH CARE EDUCATION/TRAINING PROGRAM

## 2025-03-13 RX ADMIN — Medication 10 ML: at 20:44

## 2025-03-13 RX ADMIN — Medication 10 ML: at 09:24

## 2025-03-13 RX ADMIN — BETHANECHOL CHLORIDE 25 MG: 25 TABLET ORAL at 09:18

## 2025-03-13 RX ADMIN — TOPIRAMATE 100 MG: 100 TABLET, FILM COATED ORAL at 20:41

## 2025-03-13 RX ADMIN — FAMOTIDINE 20 MG: 20 TABLET, FILM COATED ORAL at 16:58

## 2025-03-13 RX ADMIN — EPTIFIBATIDE 1 MCG/KG/MIN: 0.75 INJECTION, SOLUTION INTRAVENOUS at 10:37

## 2025-03-13 RX ADMIN — PANTOPRAZOLE SODIUM 40 MG: 40 TABLET, DELAYED RELEASE ORAL at 05:13

## 2025-03-13 RX ADMIN — ASPIRIN 81 MG CHEWABLE TABLET 81 MG: 81 TABLET CHEWABLE at 09:16

## 2025-03-13 RX ADMIN — NORTRIPTYLINE HYDROCHLORIDE 20 MG: 10 CAPSULE ORAL at 20:41

## 2025-03-13 RX ADMIN — SERTRALINE HYDROCHLORIDE 150 MG: 50 TABLET ORAL at 09:17

## 2025-03-13 RX ADMIN — ATORVASTATIN CALCIUM 80 MG: 40 TABLET, FILM COATED ORAL at 20:41

## 2025-03-13 RX ADMIN — BETHANECHOL CHLORIDE 25 MG: 25 TABLET ORAL at 20:41

## 2025-03-13 RX ADMIN — FAMOTIDINE 20 MG: 20 TABLET, FILM COATED ORAL at 09:17

## 2025-03-13 NOTE — PROGRESS NOTES
NAME: MEDHAT ROMO  : 1960  PCP: Melissa Lazo APRN  ADMITTING PHYSICIAN: Jessica Haq MD    DATE OF ADMISSION:  3/11/2025  DATE OF SERVICE: 3/13/2025    HOSPITAL DAY:  0 days    HISTORY OF PRESENT ILLNESS:  64 y.o. 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      Ms. Romo presented to the emergency department on 10/6/2024 with acute thrombosis of her Pipeline stent and basilar artery (2 weeks after stopping Plavix).  She underwent successful mechanical thrombectomy, restoring normal (TICI 3) flow.  This did unmask a stenosis within the mid portions of the Pipeline stent construct, and this was treated with a 2.5 mm coronary MICHELLE, restoring robust flow within the vertebrobasilar system.      Ms. Romo essentially returning to her neurologic baseline following her thrombectomy.  She remains on Plavix/aspirin dual antiplatelet regimen.  On 3/11/25, she had a spell where she fell, had some altered level of consciousness (but did not pass out), and reportedly had some left sided weakness and left facial droop (per patient's daughter who is at nurse).    REVIEW OF IMAGING:  No new imaging    LABS:  Lab Results   Component Value Date    WBC 6.92 2025    HGB 12.9 2025    HCT 40.2 2025    MCV 94.1 2025     2025     Lab Results   Component Value Date    GLUCOSE 86 2025    CALCIUM 8.6 2025     2025    K 3.8 2025    CO2 23.0 2025     (H) 2025    BUN 16 2025    CREATININE 0.55 (L) 2025    EGFRIFNONA 103 2019    BCR 29.1 (H) 2025    ANIONGAP 10.0 2025     Lab Results   Component Value Date    HGBA1C 5.80 (H) 2025     Lab Results   Component Value Date    CHOL 114 2025    TRIG 50 2025    HDL 60  03/12/2025    LDL 42 03/12/2025       CURRENT MEDS:  Current Facility-Administered Medications   Medication Dose Route Frequency Provider Last Rate Last Admin    acetaminophen (TYLENOL) tablet 650 mg  650 mg Oral Q4H PRN Mitesh Orozco III, DO        Or    acetaminophen (TYLENOL) 160 MG/5ML oral solution 650 mg  650 mg Oral Q4H PRN Mitesh Orozco III, DO   650 mg at 03/12/25 1711    Or    acetaminophen (TYLENOL) suppository 650 mg  650 mg Rectal Q4H PRN Mitesh Orozco III, DO        aspirin chewable tablet 81 mg  81 mg Oral Daily Ghanim-Moustafa, May, APRN   81 mg at 03/13/25 0916    Or    aspirin suppository 300 mg  300 mg Rectal Daily Ghanim-Moustafa, May, APRN        atorvastatin (LIPITOR) tablet 80 mg  80 mg Oral Nightly Ghanim-Moustafa, May, APRN   80 mg at 03/12/25 2103    bethanechol (URECHOLINE) tablet 25 mg  25 mg Oral BID Mitesh Orozco III, DO   25 mg at 03/13/25 0918    Calcium Replacement - Follow Nurse / BPA Driven Protocol   Not Applicable PRN Mitesh Orozco III, DO        eptifibatide (INTEGRILIN) 75 mg in 100 mL solution  1 mcg/kg/min Intravenous Continuous AliseNikki helm PA-C 4.72 mL/hr at 03/13/25 0254 1 mcg/kg/min at 03/13/25 0254    famotidine (PEPCID) tablet 20 mg  20 mg Oral BID AC Mitesh Orozco III, DO   20 mg at 03/13/25 0917    HYDROcodone-acetaminophen (NORCO) 5-325 MG per tablet 1 tablet  1 tablet Oral Q6H PRN Mitesh Orozco III, DO   1 tablet at 03/12/25 1758    Magnesium Standard Dose Replacement - Follow Nurse / BPA Driven Protocol   Not Applicable PRN Mitesh Orozco III, DO        melatonin tablet 5 mg  5 mg Oral Nightly PRN Mitesh Orozco III, DO        nitroglycerin (NITROSTAT) SL tablet 0.4 mg  0.4 mg Sublingual Q5 Min PRN Orozco, Mitesh Rice III, DO        nortriptyline (PAMELOR) capsule 20 mg  20 mg Oral Nightly Mitesh Orozco III, DO   20 mg at 03/12/25 2102    ondansetron (ZOFRAN)  injection 4 mg  4 mg Intravenous Q6H PRN Mitesh Orozco III, DO        pantoprazole (PROTONIX) EC tablet 40 mg  40 mg Oral Q AM Mitesh Orozco III, DO   40 mg at 03/13/25 0513    Phosphorus Replacement - Follow Nurse / BPA Driven Protocol   Not Applicable PRN Mitesh Orozco III, DO        Potassium Replacement - Follow Nurse / BPA Driven Protocol   Not Applicable PRN Mitesh Orozco III, DO        sertraline (ZOLOFT) tablet 150 mg  150 mg Oral Daily Mitesh Orozco III, DO   150 mg at 03/13/25 0917    sodium chloride 0.9 % flush 10 mL  10 mL Intravenous PRN [employer], Haresh, DO   10 mL at 03/11/25 2307    sodium chloride 0.9 % flush 10 mL  10 mL Intravenous Q12H Ghanim-Moustafa, May, APRN   10 mL at 03/13/25 0924    sodium chloride 0.9 % flush 10 mL  10 mL Intravenous PRN Ghanim-Moustafa, May, APRN        sodium chloride 0.9 % flush 10 mL  10 mL Intravenous Q12H Mitesh Orozco III, DO   10 mL at 03/13/25 0924    sodium chloride 0.9 % flush 10 mL  10 mL Intravenous PRN Mitesh Orozco III, DO        sodium chloride 0.9 % infusion 40 mL  40 mL Intravenous PRN Ghanim-Moustafa, May, APRN        sodium chloride 0.9 % infusion 40 mL  40 mL Intravenous PRN Mitesh Orozco III, DO        topiramate (TOPAMAX) tablet 100 mg  100 mg Oral Nightly Mitesh Orozco III, DO   100 mg at 03/12/25 2102       PHYSICAL EXAM:  Vitals:    03/13/25 0900   BP:    Pulse: 71   Resp:    Temp:    SpO2: 98%      Resting comfortably in bed.  No acute distress.  Speech is clear.  Symmetric strength BUE/BLE    ASSESSMENT/PLAN:  Ms. Romo is a 64 y.o. female  known to the neurointerventional service, having been followed for many years for a giant basilar aneurysm which was partially treated with flow diverter therapy.  There is some residual flow within the aneurysm via a left vertebral endoleak, but this supplies a right AICA/PICA variant, and thus has been managed medically.   She did present in October 2024 with thrombus within her Pipeline stent (while she was off Plavix), necessitating mechanical thrombectomy and additional coronary MICHELLE placement.  She has since been on dual antiplatelet regimen, and essentially returned back to her neurologic baseline.     Ms. Romo was admitted with progressively unsteady gait, frequent falls, but no loss of consciousness.  Her MRI studies do not show any new areas of infarction, and her Pipeline/coronary stents are widely patent with essentially no change in appearance of her basilar aneurysm.     Her MRI does demonstrates progressive ventriculomegaly with probable transependymal resorption of CSF. She does say that her headaches and vision do seem to be getting worse, and combined with her progressive gait disturbance/frequent falls, and flat affect/social anxiety, I asked Dr. Mendez of neurosurgery to evaluate her ventriculomegaly.    I agree with Dr. Mendez that her clincial presentation, worsening symptoms, and progressive ventriculomegaly warrant  shunt placement.  We are going to stop her Plavix and bridge her with IV Integrillin (given her stent thrombosis last time she came off Plavix) and tentatively plan on  shunt next week..     I discussed these findings with the patient/family at the bedside, and they expressed an understanding and were in agreement with this treatment plan.    Copied text and portions of the note have been reviewed and are accurate as of 3/13/25.

## 2025-03-13 NOTE — PROGRESS NOTES
"Patient Name:  Lauryn Romo  YOB: 1960  [id number]    Surgery Progress Note    Date of visit: 3/13/2025    Subjective   Subjective: Stable, no new issues.         Objective     Objective:     /74 (BP Location: Right arm, Patient Position: Lying)   Pulse 71   Temp 97.9 °F (36.6 °C) (Oral)   Resp 18   Ht 154.9 cm (61\")   Wt 59 kg (130 lb)   SpO2 98%   BMI 24.56 kg/m²   No intake or output data in the 24 hours ending 03/13/25 1016    CV:  Rhythm  regular and rate regular   L:  Clear  to auscultation bilaterally   Abd:  Bowel sounds positive , soft, nontender  Ext:  No cyanosis, clubbing, edema    Recent labs that are back at this time have been reviewed.            Assessment/ Plan:    Problem List Items Addressed This Visit          Neuro    Cerebral ventriculomegaly - Primary- Plan for  shunt 3/18.    Relevant Orders    Case request (Completed)     Other Visit Diagnoses         Aphasia          Acute left-sided weakness          History of ischemic stroke                 Active Hospital Problems    Diagnosis  POA    **Left-sided weakness [R53.1]  Yes    Cerebral ventriculomegaly [G93.89]  Unknown      Resolved Hospital Problems   No resolved problems to display.              Reid Raya MD  3/13/2025  10:16 EDT      "

## 2025-03-13 NOTE — PLAN OF CARE
Goal Outcome Evaluation:  Plan of Care Reviewed With: patient        Progress: improving  Outcome Evaluation: Pt. continues to present below baseline function w/generalized weakness and balance deficits affecting her ability to safely participate in functional mobility. She performed bed mobility, transfers and ambulated 80' w/front wheeled walker, min assist. Activity limited by fatigue. Pt. tolerated ther-ex well. Continue acute PT POC to progress as able.    Anticipated Discharge Disposition (PT): inpatient rehabilitation facility

## 2025-03-13 NOTE — PROGRESS NOTES
[facility] Medicine Services  PROGRESS NOTE    Patient Name: Lauryn Romo  : 1960  MRN: [id number]    Date of Admission: 3/11/2025  Primary Care Physician: Melissa Lazo APRN    Subjective   Subjective     CC:  fall    HPI:  No new issues overnight, headache controlled      Objective   Objective     Vital Signs:   Temp:  [97.7 °F (36.5 °C)-98.2 °F (36.8 °C)] 98.2 °F (36.8 °C)  Heart Rate:  [58-75] 69  Resp:  [16-18] 18  BP: (118-146)/(72-80) 118/80     Physical Exam:  Constitutional: No acute distress, awake, alert  HENT: NCAT, mucous membranes moist  Respiratory: Clear to auscultation bilaterally, respiratory effort normal   Cardiovascular: RRR, no murmurs, rubs, or gallops  Gastrointestinal: Positive bowel sounds, soft, nontender, nondistended  Musculoskeletal: No bilateral ankle edema  Psychiatric: Appropriate affect, cooperative  Neurologic: Oriented x 3,no focal deficit, globally weak  Skin: No rashes      Results Reviewed:  LAB RESULTS:      Lab 25   WBC 6.92 7.43  --    HEMOGLOBIN 12.9 13.8  --    HEMOGLOBIN, POC  --   --  14.3   HEMATOCRIT 40.2 42.1  --    HEMATOCRIT POC  --   --  42   PLATELETS 232 240  --    NEUTROS ABS 4.12 5.24  --    IMMATURE GRANS (ABS) 0.02 0.02  --    LYMPHS ABS 2.02 1.54  --    MONOS ABS 0.52 0.43  --    EOS ABS 0.19 0.16  --    MCV 94.1 93.3  --    LACTATE  --  1.2  --    PROTIME  --  12.9  --    APTT  --  26.5  --          Lab 25  0525 25   SODIUM 142  --   --    POTASSIUM 3.8  --   --    CHLORIDE 109*  --   --    CO2 23.0  --   --    ANION GAP 10.0  --   --    BUN 16  --   --    CREATININE 0.55*  --  0.70   EGFR 103.1  --  97.3   GLUCOSE 86  --   --    CALCIUM 8.6  --   --    MAGNESIUM 3.1*  --   --    HEMOGLOBIN A1C 5.80*  --   --    TSH  --  4.090  --          Lab 25  0525 25   TOTAL PROTEIN 6.5  --    ALBUMIN 4.0  --    GLOBULIN 2.5  --    ALT  (SGPT) 36* 44*   AST (SGOT) 26 34*   BILIRUBIN 0.3  --    ALK PHOS 115  --          Lab 03/11/25 2047   PROTIME 12.9   INR 0.96         Lab 03/12/25  0525   CHOLESTEROL 114   LDL CHOL 42   HDL CHOL 60   TRIGLYCERIDES 50             Brief Urine Lab Results  (Last result in the past 365 days)        Color   Clarity   Blood   Leuk Est   Nitrite   Protein   CREAT   Urine HCG        03/11/25 2213 Yellow   Clear   Negative   Trace   Negative   Negative                   Microbiology Results Abnormal       None            EEG  Result Date: 3/12/2025  Reason for referral: 64 y.o.female with altered mental status, left-sided weakness, speech changes, consideration of seizure Technical Summary:  A 19 channel digital EEG was performed using the international 10-20 placement system, including eye leads and EKG leads. Duration: 20 minutes Findings: The patient is awake.  A medium amplitude 9 Hz posterior rhythm is evident symmetrically over the occipital leads.  Intermixed theta and alpha activity are seen anteriorly.  Sleep is not seen.  Hyperventilation is not performed.  Photic stimulation does not change the background.  No focal features or epileptiform activity are present. Video: Available Technical quality: Good Rhythm strip: Regular, 60 bpm SUMMARY: Normal EEG in the awake state No focal features or epileptiform activity are seen     Impression: Normal study This report is transcribed using the Dragon dictation system.      MRI Brain Without Contrast  Result Date: 3/12/2025  MRI BRAIN WO CONTRAST Date of Exam: 3/12/2025 2:52 AM EDT Indication: Stroke, follow up stroke.  Comparison: Head CT, CT cerebral perfusion 3/11/2025, brain MRI 10/7/2024 Technique:  Routine multiplanar/multisequence sequence images of the brain were obtained without contrast administration. Findings: No acute infarct. No intracranial hemorrhage. No intracranial mass. There is susceptibility associated with the basilar artery aneurysm and stent.  There are periventricular and to lesser extent subcortical white matter FLAIR/T2 hyperintensities which are  nonspecific and can be seen in the setting of chronic small vessel ischemic change. No extra-axial collections. No midline shift or herniation. Normal size and configuration of the ventricles. Normal appearance of the orbits. The cerebellopontine angles  are unremarkable. The basilar artery aneurysm exerts mass effect on the brainstem with posterior displacement of the jong and medulla. Basilar artery aneurysm is similar in size compared to prior MRI measuring approximately 2.3 x 2.4 x 2.4 cm (AP X TV X  CC). Normal appearance of the orbits. The paranasal sinuses are clear. The mastoid air cells are clear. No acute or suspicious bony findings.     Impression: Impression: No acute intracranial findings. No acute infarct. Similar basilar artery aneurysm exerting mass effect on the brainstem with posterior displacement of the jong and medulla. Please refer to yesterday's CTA exam for more complete details of this finding. Electronically Signed: Kwesi Guan MD  3/12/2025 8:19 AM EDT  Workstation ID: [id number]    XR Chest 1 View  Result Date: 3/11/2025  XR CHEST 1 VW Date of Exam: 3/11/2025 9:45 PM EDT Indication: Acute Stroke Protocol (onset < 12 hrs) Comparison: October 6, 2024 Findings: Heart not enlarged. Lungs seem clear. There are no pleural effusions. There is a granuloma in the right upper lobe.     Impression: Impression: An acute pulmonary process is not apparent. Electronically Signed: Akash Lux MD  3/11/2025 10:21 PM EDT  Workstation ID: [id number]    CT Angiogram Head w AI Analysis of LVO  Result Date: 3/11/2025  CT ANGIOGRAM HEAD W AI ANALYSIS OF LVO, CT ANGIOGRAM NECK, CT CEREBRAL PERFUSION W WO CONTRAST Date of Exam: 3/11/2025 8:47 PM EDT Indication: Neuro Deficit, acute, Stroke suspected Neuro deficit, acute stroke suspected. Comparison: Study was correlated with noncontrast CT of the head  performed on March 11, 2025. CTA of the head and neck performed on October 6, 2024 Technique: CTA of the head was performed after the uneventful intravenous administration of Isovue-370, 115 mL. Reconstructed coronal and sagittal images were also obtained. In addition, a 3-D volume rendered image was created for interpretation. Automated exposure control and iterative reconstruction methods were used. Findings: Aortic arch: The aortic arch is unremarkable.  There is conventional 3 vessel arch anatomy. Small calcified plaques are visualized at the origin of the great vessels without evidence of luminal narrowing. The brachiocephalic artery and bilateral subclavian arteries are normal in caliber. Right carotid: The right CCA arises as expected from the brachiocephalic trunk.  The CCA follows a normal course and appears normal caliber. Tiny calcified plaque in the right carotid bifurcation is visualized without evidence of significant luminal narrowing. The external carotid artery and distal branches appear within normal limits.  The cervical internal carotid artery follows a normal course and appears normal caliber throughout the neck and into the head. Small calcified plaques are visualized  in the right carotid siphon causing mild luminal narrowing without evidence of hemodynamically significant stenosis. The ophthalmic artery origin is normal.  The A1 and M1 segments appear within normal limits.  The visualized distal DERECK and MCA branches  appear patent.  There is  a patent  anterior communicating artery. Posterior communicating artery is patent. Left carotid: The left CCA arises as expected from the aortic arch.   The CCA follows a normal course and appears normal caliber.  The carotid bifurcation is unremarkable.  The external carotid artery and distal branches appear within normal limits.  The  cervical internal carotid artery follows a normal course and appears normal caliber throughout the neck and into the  head. Small calcified plaques in the left carotid siphon are visualized causing mild luminal narrowing without evidence of hemodynamically significant stenosis. The ophthalmic artery origin is normal.  The A1 and M1 segments appear within normal limits.  The visualized distal DERECK and MCA branches appear patent.  Posterior communicating artery is patent. Posterior circulation: Vertebral arteries arise as expected from ipsilateral subclavian arteries.  The vertebral arteries are codominant.  The vertebral arteries follow a normal course and appear normal caliber throughout the neck and into the head.  The  V4 segments are patent.  Visualized posterior inferior cerebellar arteries are within normal limits. 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 Patient is post pipeline flow diverting stent starting in the distal right vertebral artery and terminating in the basilar artery. There is some contrast leakage to the left of the stent measuring 1.2 x 0.4 x 0.4 cm. The leak is either through the walls of the stent or distally. The basilar artery is patent distal to the stent. Superior cerebellar arteries are patent.  Bilateral P1 segments and posterior cerebral arteries appear within normal limits. Nonvascular findings: No acute intracranial process evident. Mild dilatation of the bilateral third and lateral ventricles is again visualized, stable. Orbits are within normal limits.  Paranasal sinuses and mastoid air cells appear well aerated.  Superficial soft tissues and underlying musculature appear within normal limits. Lung apices are clear aside from right upper lobe granuloma. The thyroid and salivary glands appear unremarkable.  The suprahyoid and infrahyoid spaces of the neck appear unremarkable.  There is no evidence of cervical lymphadenopathy or a neck mass.  There are no acute osseous abnormalities or destructive bone lesions. CT Perfusion: CBF  (<30%) volume: 0 mL Tmax (>6.0s) volume: 0 mL Mismatch volume: 0 mL Mismatch ratio: None The examination appears to be technically adequate. There is no reduced cerebral blood volume (CBV) or reduced cerebral blood flow (CBF) to suggest an area of acute infarction in a large vessel territory. The cerebral perfusion appears symmetric. No increased mean transit time (MTT) is seen. No areas of mismatch to suggest presence of a penumbra.     Impression: Impression: No evidence of large vessel occlusion or hemodynamically significant stenosis. Post stenting of large basilar artery aneurysm. The stent is visualized in the distal vertebral artery and extending into the left basal artery. There is some contrast leakage to the left of the stent. This may be secondary to stent porosity or leakage from the distal aspect of the stent. This appears unchanged from prior CTA performed on October 6, 2024. Correlate with diagnostic cerebral angiography as clinically warranted. No focal area of decreased cerebral blood flow (CBF) is seen to suggest an acute infarct in a large vessel territory.  No defects are seen to suggest a core infarct or an area of reversible ischemia. Electronically Signed: Ben Ziegler MD  3/11/2025 9:50 PM EDT  Workstation ID: [id number]    CT Angiogram Neck  Result Date: 3/11/2025  CT ANGIOGRAM HEAD W AI ANALYSIS OF LVO, CT ANGIOGRAM NECK, CT CEREBRAL PERFUSION W WO CONTRAST Date of Exam: 3/11/2025 8:47 PM EDT Indication: Neuro Deficit, acute, Stroke suspected Neuro deficit, acute stroke suspected. Comparison: Study was correlated with noncontrast CT of the head performed on March 11, 2025. CTA of the head and neck performed on October 6, 2024 Technique: CTA of the head was performed after the uneventful intravenous administration of Isovue-370, 115 mL. Reconstructed coronal and sagittal images were also obtained. In addition, a 3-D volume rendered image was created for interpretation. Automated exposure  control and iterative reconstruction methods were used. Findings: Aortic arch: The aortic arch is unremarkable.  There is conventional 3 vessel arch anatomy. Small calcified plaques are visualized at the origin of the great vessels without evidence of luminal narrowing. The brachiocephalic artery and bilateral subclavian arteries are normal in caliber. Right carotid: The right CCA arises as expected from the brachiocephalic trunk.  The CCA follows a normal course and appears normal caliber. Tiny calcified plaque in the right carotid bifurcation is visualized without evidence of significant luminal narrowing. The external carotid artery and distal branches appear within normal limits.  The cervical internal carotid artery follows a normal course and appears normal caliber throughout the neck and into the head. Small calcified plaques are visualized  in the right carotid siphon causing mild luminal narrowing without evidence of hemodynamically significant stenosis. The ophthalmic artery origin is normal.  The A1 and M1 segments appear within normal limits.  The visualized distal DERECK and MCA branches  appear patent.  There is  a patent  anterior communicating artery. Posterior communicating artery is patent. Left carotid: The left CCA arises as expected from the aortic arch.   The CCA follows a normal course and appears normal caliber.  The carotid bifurcation is unremarkable.  The external carotid artery and distal branches appear within normal limits.  The  cervical internal carotid artery follows a normal course and appears normal caliber throughout the neck and into the head. Small calcified plaques in the left carotid siphon are visualized causing mild luminal narrowing without evidence of hemodynamically significant stenosis. The ophthalmic artery origin is normal.  The A1 and M1 segments appear within normal limits.  The visualized distal DERECK and MCA branches appear patent.  Posterior communicating artery is  patent. Posterior circulation: Vertebral arteries arise as expected from ipsilateral subclavian arteries.  The vertebral arteries are codominant.  The vertebral arteries follow a normal course and appear normal caliber throughout the neck and into the head.  The  V4 segments are patent.  Visualized posterior inferior cerebellar arteries are within normal limits. 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 Patient is post pipeline flow diverting stent starting in the distal right vertebral artery and terminating in the basilar artery. There is some contrast leakage to the left of the stent measuring 1.2 x 0.4 x 0.4 cm. The leak is either through the walls of the stent or distally. The basilar artery is patent distal to the stent. Superior cerebellar arteries are patent.  Bilateral P1 segments and posterior cerebral arteries appear within normal limits. Nonvascular findings: No acute intracranial process evident. Mild dilatation of the bilateral third and lateral ventricles is again visualized, stable. Orbits are within normal limits.  Paranasal sinuses and mastoid air cells appear well aerated.  Superficial soft tissues and underlying musculature appear within normal limits. Lung apices are clear aside from right upper lobe granuloma. The thyroid and salivary glands appear unremarkable.  The suprahyoid and infrahyoid spaces of the neck appear unremarkable.  There is no evidence of cervical lymphadenopathy or a neck mass.  There are no acute osseous abnormalities or destructive bone lesions. CT Perfusion: CBF (<30%) volume: 0 mL Tmax (>6.0s) volume: 0 mL Mismatch volume: 0 mL Mismatch ratio: None The examination appears to be technically adequate. There is no reduced cerebral blood volume (CBV) or reduced cerebral blood flow (CBF) to suggest an area of acute infarction in a large vessel territory. The cerebral perfusion appears symmetric. No increased  mean transit time (MTT) is seen. No areas of mismatch to suggest presence of a penumbra.     Impression: Impression: No evidence of large vessel occlusion or hemodynamically significant stenosis. Post stenting of large basilar artery aneurysm. The stent is visualized in the distal vertebral artery and extending into the left basal artery. There is some contrast leakage to the left of the stent. This may be secondary to stent porosity or leakage from the distal aspect of the stent. This appears unchanged from prior CTA performed on October 6, 2024. Correlate with diagnostic cerebral angiography as clinically warranted. No focal area of decreased cerebral blood flow (CBF) is seen to suggest an acute infarct in a large vessel territory.  No defects are seen to suggest a core infarct or an area of reversible ischemia. Electronically Signed: Ben Ziegler MD  3/11/2025 9:50 PM EDT  Workstation ID: [id number]    CT CEREBRAL PERFUSION WITH & WITHOUT CONTRAST  Result Date: 3/11/2025  CT ANGIOGRAM HEAD W AI ANALYSIS OF LVO, CT ANGIOGRAM NECK, CT CEREBRAL PERFUSION W WO CONTRAST Date of Exam: 3/11/2025 8:47 PM EDT Indication: Neuro Deficit, acute, Stroke suspected Neuro deficit, acute stroke suspected. Comparison: Study was correlated with noncontrast CT of the head performed on March 11, 2025. CTA of the head and neck performed on October 6, 2024 Technique: CTA of the head was performed after the uneventful intravenous administration of Isovue-370, 115 mL. Reconstructed coronal and sagittal images were also obtained. In addition, a 3-D volume rendered image was created for interpretation. Automated exposure control and iterative reconstruction methods were used. Findings: Aortic arch: The aortic arch is unremarkable.  There is conventional 3 vessel arch anatomy. Small calcified plaques are visualized at the origin of the great vessels without evidence of luminal narrowing. The brachiocephalic artery and bilateral subclavian  arteries are normal in caliber. Right carotid: The right CCA arises as expected from the brachiocephalic trunk.  The CCA follows a normal course and appears normal caliber. Tiny calcified plaque in the right carotid bifurcation is visualized without evidence of significant luminal narrowing. The external carotid artery and distal branches appear within normal limits.  The cervical internal carotid artery follows a normal course and appears normal caliber throughout the neck and into the head. Small calcified plaques are visualized  in the right carotid siphon causing mild luminal narrowing without evidence of hemodynamically significant stenosis. The ophthalmic artery origin is normal.  The A1 and M1 segments appear within normal limits.  The visualized distal DERECK and MCA branches  appear patent.  There is  a patent  anterior communicating artery. Posterior communicating artery is patent. Left carotid: The left CCA arises as expected from the aortic arch.   The CCA follows a normal course and appears normal caliber.  The carotid bifurcation is unremarkable.  The external carotid artery and distal branches appear within normal limits.  The  cervical internal carotid artery follows a normal course and appears normal caliber throughout the neck and into the head. Small calcified plaques in the left carotid siphon are visualized causing mild luminal narrowing without evidence of hemodynamically significant stenosis. The ophthalmic artery origin is normal.  The A1 and M1 segments appear within normal limits.  The visualized distal DERECK and MCA branches appear patent.  Posterior communicating artery is patent. Posterior circulation: Vertebral arteries arise as expected from ipsilateral subclavian arteries.  The vertebral arteries are codominant.  The vertebral arteries follow a normal course and appear normal caliber throughout the neck and into the head.  The  V4 segments are patent.  Visualized posterior inferior  cerebellar arteries are within normal limits. 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 Patient is post pipeline flow diverting stent starting in the distal right vertebral artery and terminating in the basilar artery. There is some contrast leakage to the left of the stent measuring 1.2 x 0.4 x 0.4 cm. The leak is either through the walls of the stent or distally. The basilar artery is patent distal to the stent. Superior cerebellar arteries are patent.  Bilateral P1 segments and posterior cerebral arteries appear within normal limits. Nonvascular findings: No acute intracranial process evident. Mild dilatation of the bilateral third and lateral ventricles is again visualized, stable. Orbits are within normal limits.  Paranasal sinuses and mastoid air cells appear well aerated.  Superficial soft tissues and underlying musculature appear within normal limits. Lung apices are clear aside from right upper lobe granuloma. The thyroid and salivary glands appear unremarkable.  The suprahyoid and infrahyoid spaces of the neck appear unremarkable.  There is no evidence of cervical lymphadenopathy or a neck mass.  There are no acute osseous abnormalities or destructive bone lesions. CT Perfusion: CBF (<30%) volume: 0 mL Tmax (>6.0s) volume: 0 mL Mismatch volume: 0 mL Mismatch ratio: None The examination appears to be technically adequate. There is no reduced cerebral blood volume (CBV) or reduced cerebral blood flow (CBF) to suggest an area of acute infarction in a large vessel territory. The cerebral perfusion appears symmetric. No increased mean transit time (MTT) is seen. No areas of mismatch to suggest presence of a penumbra.     Impression: Impression: No evidence of large vessel occlusion or hemodynamically significant stenosis. Post stenting of large basilar artery aneurysm. The stent is visualized in the distal vertebral artery and extending into  the left basal artery. There is some contrast leakage to the left of the stent. This may be secondary to stent porosity or leakage from the distal aspect of the stent. This appears unchanged from prior CTA performed on October 6, 2024. Correlate with diagnostic cerebral angiography as clinically warranted. No focal area of decreased cerebral blood flow (CBF) is seen to suggest an acute infarct in a large vessel territory.  No defects are seen to suggest a core infarct or an area of reversible ischemia. Electronically Signed: Ben Ziegler MD  3/11/2025 9:50 PM EDT  Workstation ID: [id number]    CT Head Without Contrast Stroke Protocol  Result Date: 3/11/2025  CT HEAD WO CONTRAST STROKE PROTOCOL Date of Exam: 3/11/2025 8:30 PM EDT Indication: Neuro deficit, acute, stroke suspected Neuro Deficit, acute, Stroke suspected. Comparison: MRI brain October 7, 2024, CT head October 6, 2024 Technique: Axial CT images were obtained of the head without contrast administration.  Reconstructed coronal images were also obtained. Automated exposure control and iterative construction methods were used. Findings: There is a basilar artery aneurysm measuring 2.3 x 2.2 cm previously measuring 2.2 x 2.3 cm. This may be thrombosed. There is a radiopaque object lying along the more superior right aspect of the aneurysm that could reflect pipeline flow diverter. There is dilatation of the lateral ventricles which has been noted. There are some periventricular white matter changes as well as more focal hypodensity in the subcortical right parietal area which has been noted and could reflect sequela of small vessel ischemic change. No definite underlying hemorrhage is confirmed. It looks like the patient has had previous paranasal sinus surgery.     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 There is a radiopaque object along the more superior right aspect of the aneurysm that could reflect pipeline flow  diverter. 2.There are white matter changes involving the cerebral hemispheres which could reflect sequela to small vessel ischemic change. 3.There is dilatation of the lateral ventricles which has been noted. Electronically Signed: Akash Lux MD  3/11/2025 9:03 PM EDT  Workstation ID: [id number]      Results for orders placed during the hospital encounter of 03/11/25    Adult Transthoracic Echo Complete W/ Cont if Necessary Per Protocol (With Agitated Saline)    Interpretation Summary    Left ventricular systolic function is normal. Estimated left ventricular EF = 70%    Mild to moderate aortic valve regurgitation is present      Current medications:  Scheduled Meds:aspirin, 81 mg, Oral, Daily   Or  aspirin, 300 mg, Rectal, Daily  atorvastatin, 80 mg, Oral, Nightly  bethanechol, 25 mg, Oral, BID  famotidine, 20 mg, Oral, BID AC  nortriptyline, 20 mg, Oral, Nightly  pantoprazole, 40 mg, Oral, Q AM  sertraline, 150 mg, Oral, Daily  sodium chloride, 10 mL, Intravenous, Q12H  sodium chloride, 10 mL, Intravenous, Q12H  topiramate, 100 mg, Oral, Nightly      Continuous Infusions:eptifibatide, 1 mcg/kg/min, Last Rate: 1 mcg/kg/min (03/13/25 1037)      PRN Meds:.  acetaminophen **OR** acetaminophen **OR** acetaminophen    Calcium Replacement - Follow Nurse / BPA Driven Protocol    HYDROcodone-acetaminophen    Magnesium Standard Dose Replacement - Follow Nurse / BPA Driven Protocol    melatonin    nitroglycerin    ondansetron    Phosphorus Replacement - Follow Nurse / BPA Driven Protocol    Potassium Replacement - Follow Nurse / BPA Driven Protocol    sodium chloride    sodium chloride    sodium chloride    sodium chloride    sodium chloride    Assessment & Plan   Assessment & Plan     Active Hospital Problems    Diagnosis  POA    **Left-sided weakness [R53.1]  Yes    Cerebral ventriculomegaly [G93.89]  Unknown      Resolved Hospital Problems   No resolved problems to display.        Brief Hospital Course to date:  Lauryn  Ivet Romo is a 64 y.o. 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    Transient aphasia  Confusion  Giant basilar artery aneurysm s/p flow diverter  Migraines  -Neurology and neurointerventional neurosurgery follow --consideration of possible  shunt next week. Gen surg also to coordinate.  Surgery for  shunt planned for this coming Tuesday, needs to be off Plavix.  Started on Integrilin in interim  -Continue home Topamax  -Neurology recommends Tylenol and migraine cocktails as needed for headaches.  Avoid NSAIDs.  Consider Depakote 800 mg IV x 1 for intractable headache.  -PT OT    Hypertension  -Well-controlled currently    Hyperlipidemia    Anxiety and depression  -Continue home nortriptyline and Zoloft      Expected Discharge Location and Transportation:   Expected Discharge   Expected Discharge Date: 3/14/2025; Expected Discharge Time:      VTE Prophylaxis:  Mechanical VTE prophylaxis orders are present.         AM-PAC 6 Clicks Score (PT): 19 (03/13/25 2898)    CODE STATUS:   Code Status and Medical Interventions: CPR (Attempt to Resuscitate); Full Support   Ordered at: 03/11/25 9189     Code Status (Patient has no pulse and is not breathing):    CPR (Attempt to Resuscitate)     Medical Interventions (Patient has pulse or is breathing):    Full Support     Level Of Support Discussed With:    Patient       Jessica Haq MD  03/13/25

## 2025-03-13 NOTE — PROGRESS NOTES
Stroke Progress Note       Chief Complaint:  Headache with left sided weakness.      Subjective    Subjective     Subjective:  This patient was seen today at bedside.  She is awake alert and having her echocardiogram completed.  Per chart review, patient will be undergoing ventriculostomy on Tuesday, 3/18/2025.  Plavix was stopped yesterday 3/12/2025 at approximately 3 PM.  She is currently on an Integrilin drip for antiplatelet coverage with a plan to stop Integrilin and restart Plavix postop day 1.  On exam, there are no focal deficits.  She continues to struggle with bilateral frontal headaches.  Topamax was started yesterday, which helps.Patient denies any other neurological symptoms, including: headache, vision changes, dysesthesias, loss of consciousness, seizure or new areas of motor weakness.         Objective    Objective      Temp:  [97.7 °F (36.5 °C)-98.3 °F (36.8 °C)] 97.9 °F (36.6 °C)  Heart Rate:  [58-76] 69  Resp:  [16-18] 18  BP: (113-146)/(73-97) 126/74    Hospital Meds:  Scheduled- aspirin, 81 mg, Oral, Daily   Or  aspirin, 300 mg, Rectal, Daily  atorvastatin, 80 mg, Oral, Nightly  bethanechol, 25 mg, Oral, BID  famotidine, 20 mg, Oral, BID AC  nortriptyline, 20 mg, Oral, Nightly  pantoprazole, 40 mg, Oral, Q AM  sertraline, 150 mg, Oral, Daily  sodium chloride, 10 mL, Intravenous, Q12H  sodium chloride, 10 mL, Intravenous, Q12H  topiramate, 100 mg, Oral, Nightly      Infusions- eptifibatide, 1 mcg/kg/min, Last Rate: 1 mcg/kg/min (03/13/25 0254)       PRNs-   acetaminophen **OR** acetaminophen **OR** acetaminophen    Calcium Replacement - Follow Nurse / BPA Driven Protocol    HYDROcodone-acetaminophen    Magnesium Standard Dose Replacement - Follow Nurse / BPA Driven Protocol    melatonin    nitroglycerin    ondansetron    Phosphorus Replacement - Follow Nurse / BPA Driven Protocol    Potassium Replacement - Follow Nurse / BPA Driven Protocol    sodium chloride    sodium chloride    sodium  chloride    sodium chloride    sodium chloride    Physical Exam:  General Appearance: Alert  Eyes: Anicteric sclera  HEENT: no scleral injection   Lungs: respirations appear comfortable, no obvious increased work of breathing  Extremities: No cyanosis or fingernail clubbing   Skin: No rashes in exposed skin areas     Neurological Examination:   Mental status: Alert and oriented to person, place, and time. Speech with no dysarthria, able to name and repeat with no difficulty.    Cranial Nerves: Visual fields intact. Extraocular movements are intact with no nystagmus. Facial sensation intact. Face symmetrical. Full shoulder shrug bilaterally. Tongue protrudes midline.   Sensory: Sensory exam to light touch in all four extremities distally is normal. Double simultaneous sensory stimulation shows no extinction  Motor: Normal tone throughout. Normal bulk. Pronator drift is absent.  Left upper extremity: 5/5 deltoid, tricep, bicep, interosseous, hand .   Right upper extremity: 5/5 deltoid, tricep, bicep, interosseous, hand .   Left lower extremity: 5/5 iliopsoas, knee extension/flexion, foot dorsi/plantarflexion.  Right lower extremity: 5/5 iliopsoas, knee extension/flexion, foot dorsi/plantarflexion.  Cerebellar: Finger-to-nose intact. Heel-to-shin intact. Rapid alternating movements are intact.   Gait: Deferred        LABS:  Lab Results   Component Value Date    HGBA1C 5.80 (H) 03/12/2025      WBC   Date Value Ref Range Status   03/12/2025 6.92 3.40 - 10.80 10*3/mm3 Final     RBC   Date Value Ref Range Status   03/12/2025 4.27 3.77 - 5.28 10*6/mm3 Final     Hemoglobin   Date Value Ref Range Status   03/12/2025 12.9 12.0 - 15.9 g/dL Final     Hematocrit   Date Value Ref Range Status   03/12/2025 40.2 34.0 - 46.6 % Final     MCV   Date Value Ref Range Status   03/12/2025 94.1 79.0 - 97.0 fL Final     MCH   Date Value Ref Range Status   03/12/2025 30.2 26.6 - 33.0 pg Final     MCHC   Date Value Ref Range Status    03/12/2025 32.1 31.5 - 35.7 g/dL Final     RDW   Date Value Ref Range Status   03/12/2025 13.3 12.3 - 15.4 % Final     RDW-SD   Date Value Ref Range Status   03/12/2025 45.9 37.0 - 54.0 fl Final     MPV   Date Value Ref Range Status   03/12/2025 11.1 6.0 - 12.0 fL Final     Platelets   Date Value Ref Range Status   03/12/2025 232 140 - 450 10*3/mm3 Final     Neutrophil %   Date Value Ref Range Status   03/12/2025 59.6 42.7 - 76.0 % Final     Lymphocyte %   Date Value Ref Range Status   03/12/2025 29.2 19.6 - 45.3 % Final     Monocyte %   Date Value Ref Range Status   03/12/2025 7.5 5.0 - 12.0 % Final     Eosinophil %   Date Value Ref Range Status   03/12/2025 2.7 0.3 - 6.2 % Final     Basophil %   Date Value Ref Range Status   03/12/2025 0.7 0.0 - 1.5 % Final     Immature Grans %   Date Value Ref Range Status   03/12/2025 0.3 0.0 - 0.5 % Final     Neutrophils, Absolute   Date Value Ref Range Status   03/12/2025 4.12 1.70 - 7.00 10*3/mm3 Final     Lymphocytes, Absolute   Date Value Ref Range Status   03/12/2025 2.02 0.70 - 3.10 10*3/mm3 Final     Monocytes, Absolute   Date Value Ref Range Status   03/12/2025 0.52 0.10 - 0.90 10*3/mm3 Final     Eosinophils, Absolute   Date Value Ref Range Status   03/12/2025 0.19 0.00 - 0.40 10*3/mm3 Final     Basophils, Absolute   Date Value Ref Range Status   03/12/2025 0.05 0.00 - 0.20 10*3/mm3 Final     Immature Grans, Absolute   Date Value Ref Range Status   03/12/2025 0.02 0.00 - 0.05 10*3/mm3 Final     nRBC   Date Value Ref Range Status   03/12/2025 0.0 0.0 - 0.2 /100 WBC Final       Lab Results   Component Value Date    GLUCOSE 86 03/12/2025    BUN 16 03/12/2025    CREATININE 0.55 (L) 03/12/2025     03/12/2025    K 3.8 03/12/2025     (H) 03/12/2025    CALCIUM 8.6 03/12/2025    PROTEINTOT 6.5 03/12/2025    ALBUMIN 4.0 03/12/2025    ALT 36 (H) 03/12/2025    AST 26 03/12/2025    ALKPHOS 115 03/12/2025    BILITOT 0.3 03/12/2025    GLOB 2.5 03/12/2025    AGRATIO 1.6  03/12/2025    BCR 29.1 (H) 03/12/2025    ANIONGAP 10.0 03/12/2025    EGFR 103.1 03/12/2025           Lab 03/12/25  0525   HEMOGLOBIN A1C 5.80*       Lipid Panel          10/7/2024    05:55 3/12/2025    05:25   Lipid Panel   Total Cholesterol 132  114    Triglycerides 62  50    HDL Cholesterol 61  60    VLDL Cholesterol 13  12    LDL Cholesterol  58  42    LDL/HDL Ratio 0.96  0.73         TSH          3/11/2025    20:47   TSH   TSH 4.090           Results Review:    I reviewed the patient's new clinical results.  EEG  Result Date: 3/12/2025  Normal study This report is transcribed using the Dragon dictation system.      MRI Brain Without Contrast  Result Date: 3/12/2025  Impression: No acute intracranial findings. No acute infarct. Similar basilar artery aneurysm exerting mass effect on the brainstem with posterior displacement of the jong and medulla. Please refer to yesterday's CTA exam for more complete details of this finding. Electronically Signed: Kwesi Guan MD  3/12/2025 8:19 AM EDT  Workstation ID: [id number]      CT Angiogram Head w AI Analysis of LVO  Result Date: 3/11/2025  Impression: No evidence of large vessel occlusion or hemodynamically significant stenosis. Post stenting of large basilar artery aneurysm. The stent is visualized in the distal vertebral artery and extending into the left basal artery. There is some contrast leakage to the left of the stent. This may be secondary to stent porosity or leakage from the distal aspect of the stent. This appears unchanged from prior CTA performed on October 6, 2024. Correlate with diagnostic cerebral angiography as clinically warranted. No focal area of decreased cerebral blood flow (CBF) is seen to suggest an acute infarct in a large vessel territory.  No defects are seen to suggest a core infarct or an area of reversible ischemia. Electronically Signed: Ben Ziegler MD  3/11/2025 9:50 PM EDT  Workstation ID: [id number]    CT Angiogram Neck  Result Date:  3/11/2025  Impression: No evidence of large vessel occlusion or hemodynamically significant stenosis. Post stenting of large basilar artery aneurysm. The stent is visualized in the distal vertebral artery and extending into the left basal artery. There is some contrast leakage to the left of the stent. This may be secondary to stent porosity or leakage from the distal aspect of the stent. This appears unchanged from prior CTA performed on October 6, 2024. Correlate with diagnostic cerebral angiography as clinically warranted. No focal area of decreased cerebral blood flow (CBF) is seen to suggest an acute infarct in a large vessel territory.  No defects are seen to suggest a core infarct or an area of reversible ischemia. Electronically Signed: Ben Ziegler MD  3/11/2025 9:50 PM EDT  Workstation ID: [id number]    CT CEREBRAL PERFUSION WITH & WITHOUT CONTRAST  Result Date: 3/11/2025  Impression: No evidence of large vessel occlusion or hemodynamically significant stenosis. Post stenting of large basilar artery aneurysm. The stent is visualized in the distal vertebral artery and extending into the left basal artery. There is some contrast leakage to the left of the stent. This may be secondary to stent porosity or leakage from the distal aspect of the stent. This appears unchanged from prior CTA performed on October 6, 2024. Correlate with diagnostic cerebral angiography as clinically warranted. No focal area of decreased cerebral blood flow (CBF) is seen to suggest an acute infarct in a large vessel territory.  No defects are seen to suggest a core infarct or an area of reversible ischemia. Electronically Signed: Ben Ziegler MD  3/11/2025 9:50 PM EDT  Workstation ID: [id number]    CT Head Without Contrast Stroke Protocol  Result Date: 3/11/2025  Impression: 1.Basilar artery aneurysm which could be thrombosed. There is a radiopaque object along the more superior right aspect of the aneurysm that could reflect  pipeline flow diverter. 2.There are white matter changes involving the cerebral hemispheres which could reflect sequela to small vessel ischemic change. 3.There is dilatation of the lateral ventricles which has been noted. Electronically Signed: Akash Lux MD  3/11/2025 9:03 PM EDT  Workstation ID: [id number]     EEG  Result Date: 3/12/2025  Normal study This report is transcribed using the Dragon dictation system.      MRI Brain Without Contrast  Result Date: 3/12/2025  Impression: No acute intracranial findings. No acute infarct. Similar basilar artery aneurysm exerting mass effect on the brainstem with posterior displacement of the jong and medulla. Please refer to yesterday's CTA exam for more complete details of this finding. Electronically Signed: Kwesi Guan MD  3/12/2025 8:19 AM EDT  Workstation ID: [id number]    XR Chest 1 View  Result Date: 3/11/2025  Impression: An acute pulmonary process is not apparent. Electronically Signed: Akash Lux MD  3/11/2025 10:21 PM EDT  Workstation ID: [id number]    CT Angiogram Head w AI Analysis of LVO  Result Date: 3/11/2025  Impression: No evidence of large vessel occlusion or hemodynamically significant stenosis. Post stenting of large basilar artery aneurysm. The stent is visualized in the distal vertebral artery and extending into the left basal artery. There is some contrast leakage to the left of the stent. This may be secondary to stent porosity or leakage from the distal aspect of the stent. This appears unchanged from prior CTA performed on October 6, 2024. Correlate with diagnostic cerebral angiography as clinically warranted. No focal area of decreased cerebral blood flow (CBF) is seen to suggest an acute infarct in a large vessel territory.  No defects are seen to suggest a core infarct or an area of reversible ischemia. Electronically Signed: Ben Ziegler MD  3/11/2025 9:50 PM EDT  Workstation ID: [id number]    CT Angiogram Neck  Result Date:  3/11/2025  Impression: No evidence of large vessel occlusion or hemodynamically significant stenosis. Post stenting of large basilar artery aneurysm. The stent is visualized in the distal vertebral artery and extending into the left basal artery. There is some contrast leakage to the left of the stent. This may be secondary to stent porosity or leakage from the distal aspect of the stent. This appears unchanged from prior CTA performed on October 6, 2024. Correlate with diagnostic cerebral angiography as clinically warranted. No focal area of decreased cerebral blood flow (CBF) is seen to suggest an acute infarct in a large vessel territory.  No defects are seen to suggest a core infarct or an area of reversible ischemia. Electronically Signed: Ben Ziegler MD  3/11/2025 9:50 PM EDT  Workstation ID: [id number]    CT CEREBRAL PERFUSION WITH & WITHOUT CONTRAST  Result Date: 3/11/2025  Impression: No evidence of large vessel occlusion or hemodynamically significant stenosis. Post stenting of large basilar artery aneurysm. The stent is visualized in the distal vertebral artery and extending into the left basal artery. There is some contrast leakage to the left of the stent. This may be secondary to stent porosity or leakage from the distal aspect of the stent. This appears unchanged from prior CTA performed on October 6, 2024. Correlate with diagnostic cerebral angiography as clinically warranted. No focal area of decreased cerebral blood flow (CBF) is seen to suggest an acute infarct in a large vessel territory.  No defects are seen to suggest a core infarct or an area of reversible ischemia. Electronically Signed: Ben Ziegler MD  3/11/2025 9:50 PM EDT  Workstation ID: [id number]    CT Head Without Contrast Stroke Protocol  Result Date: 3/11/2025  Impression: 1.Basilar artery aneurysm which could be thrombosed. There is a radiopaque object along the more superior right aspect of the aneurysm that could reflect  pipeline flow diverter. 2.There are white matter changes involving the cerebral hemispheres which could reflect sequela to small vessel ischemic change. 3.There is dilatation of the lateral ventricles which has been noted. Electronically Signed: Akash Lux MD  3/11/2025 9:03 PM EDT  Workstation ID: [id number]        Results for orders placed during the hospital encounter of 10/06/24    Adult Transthoracic Echo Complete W/ Cont if Necessary Per Protocol    Interpretation Summary    Left ventricular systolic function is normal. Estimated left ventricular EF = 65%    Left ventricular diastolic function is consistent with (grade I) impaired relaxation.    The cardiac valves are anatomically and functionally normal.    Estimated right ventricular systolic pressure from tricuspid regurgitation is normal (<35 mmHg).    Saline test results are negative.            Assessment/Plan     Assessment/Plan:    Lauryn Romo is a 64 y.o.  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 Savage (2022, 2023) , hypertension, Basal cell carcinoma 5/2024, migraine with visual aura, and anxiety.  In October 2024 she had acute thrombosis of her pipeline stent and basilar artery and underwent successful mechanical thrombectomy.  She presented to [facility] ED with left-sided weakness, facial droop, with aphasia, and unresponsiveness following a fall PTA.   On exam, the patient's left unilateral weakness has improved.  CT head without contrast negative for acute abnormality unchanged from prior CT head back in October. CTA head and neck no large vessel occlusion. CTP negative for perfusion deficit no core infarct or penumbra.   Patient currently on 10 Integrilin drilinta drip Plavix and ASA, does follow-up with Dr. Lundberg outpatient.     Neurosurgery  shunt scheduled for Tuesday 18/2025    Etiology of Headache is likely secondary to hydrocephalus     1. Hydrocephalus  2.  Bilateral Frontal Headaches.   Neurosurgery  shunt scheduled for Tuesday 3/8/2025   -MRI Brain is negative for any new or acute ischemic stroke findings.    -TTE LV EF 65%, no PFO   -EEG ordered and pending  -CK, lactate ordered and negative for any epileptiform acitivity   Recommend longer study as outpatient if patient has any additional episodes of seizure.    -Seizure precaution in place  -A1c 5.8 %  -LDL 58 (patient at goal, no need to change current antilipid medication)  -Meds: Post Procedure recommend to Continue DAPT aspirin and Plavix home dose for secondary stroke prevention and maintaining stent   -P2 Y12 is 57, patient is responding to antiplatelet therapy.    -NIH\neurocheck per protocol  -Activity as tolerated, fall risk precautions  -PT/OT/SLP evaluation for IRF  -Neurology stroke will continue to follow-up     2.  Essential hypertension,   -Normotensive 120 - 140 SBP       3.  Possible seizure activity\postictal  -EEG ordered neg.   -Will hold off on antiseizure medication for now, monitor for any seizure activity.     4.  Headache, in the setting of Hydrocephalus  -Continue Topamax         Plan of care was discussed with Dr. Purvis.  Stroke neurology will follow peripherally.  Please call with any questions or concerns.  Thank you for this consult.               CAILIN Richardson  03/13/25  07:51 EDT    This was an audio and video enabled telemedicine encounter.

## 2025-03-13 NOTE — THERAPY TREATMENT NOTE
Patient Name: Lauryn Romo  : 1960    MRN: [id number]                              Today's Date: 3/13/2025       Admit Date: 3/11/2025    Visit Dx:     ICD-10-CM ICD-9-CM   1. Cerebral ventriculomegaly  G93.89 348.89   2. Aphasia  R47.01 784.3   3. Acute left-sided weakness  R53.1 728.87   4. History of ischemic stroke  Z86.73 V12.54     Patient Active Problem List   Diagnosis    Acute CVA    Cerebral aneurysm, nonruptured    Paraesophageal hernia    Hyperlipidemia    HTN    Moderate malnutrition    History of CVA (cerebrovascular accident)    Other headache syndrome    Acute cystitis without hematuria    Left-sided weakness    Cerebral ventriculomegaly     Past Medical History:   Diagnosis Date    Abnormal ECG     Aneurysm 2022    Cancer 2024    Basal Cell Carcinoma removed from scalp by dermatology    Cluster headache     Had headaches for several months before stroke and mass was found    Difficulty walking 22    After stroke    Headache     Headache, tension-type     Hyperlipidemia     Hypertension     Memory loss 22    Migraine     Stroke 2022    Stroke 10/06/2024    Vision loss 22    When dizzy or headache     Past Surgical History:   Procedure Laterality Date    ARTERIAL ANEURYSM REPAIR      BREAST LUMPECTOMY  2012    CYST REMOVAL Left 2023    EMBOLIZATION CEREBRAL N/A 2022    Procedure: CV EMBOLIZATION CEREBRAL IR;  Surgeon: Enoch Savage MD;  Location:  [facility];  Service: Interventional Radiology;  Laterality: N/A;    HIATAL HERNIA REPAIR      INTERVENTIONAL RADIOLOGY PROCEDURE N/A 09/15/2023    Procedure: Embolization;  Surgeon: Enoch Savage MD;  Location:  [facility];  Service: Interventional Radiology;  Laterality: N/A;    INTERVENTIONAL RADIOLOGY PROCEDURE N/A 10/6/2024    Procedure: IR mechanical thrombectomy;  Surgeon: Joe Mendez MD;  Location:  [facility];  Service: Interventional Radiology;  Laterality: N/A;    SINUS SURGERY      x4    SKIN CANCER EXCISION      cancer removed off top of head      General Information       Row Name 03/13/25 1614          Physical Therapy Time and Intention    Document Type therapy note (daily note)  -     Mode of Treatment physical therapy  -       Row Name 03/13/25 1614          General Information    Patient Profile Reviewed yes  -SS     Existing Precautions/Restrictions fall;other (see comments)  L-sided weakness (LLE > LUE), low vision  -     Barriers to Rehab medically complex;previous functional deficit  -       Row Name 03/13/25 1614          Cognition    Orientation Status (Cognition) oriented x 3  -SS       Row Name 03/13/25 1614          Safety Issues/Impairments Affecting Functional Mobility    Safety Issues Affecting Function (Mobility) awareness of need for assistance;insight into deficits/self-awareness;judgment;positioning of assistive device;problem-solving;safety precaution awareness;safety precautions follow-through/compliance;sequencing abilities  -     Impairments Affecting Function (Mobility) balance;endurance/activity tolerance;strength;visual/perceptual;motor control;postural/trunk control;pain  -SS               User Key  (r) = Recorded By, (t) = Taken By, (c) = Cosigned By      Initials Name Provider Type     Vandana Deluca PT Physical Therapist                   Mobility       Row Name 03/13/25 1614          Bed Mobility    Bed Mobility scooting/bridging;supine-sit;sit-supine  -SS     Scooting/Bridging [place] (Bed Mobility) standby assist;verbal cues  -     Supine-Sit [place] (Bed Mobility) standby assist;verbal cues  -SS     Sit-Supine [place] (Bed Mobility) standby assist;verbal cues  -     Assistive Device (Bed Mobility) bed rails;head of bed elevated  -     Comment, (Bed Mobility) VC for sequencing; pt. asymptomatic  -       Row Name 03/13/25 1614           Bed-Chair Transfer    Bed-Chair [place] (Transfers) contact guard;verbal cues  -     Assistive Device (Bed-Chair Transfers) walker, front-wheeled  -SS     Comment, (Bed-Chair Transfer) VC for sequencing  -       Row Name 03/13/25 1614          Sit-Stand Transfer    Sit-Stand [place] (Transfers) contact guard;verbal cues  -     Assistive Device (Sit-Stand Transfers) walker, front-wheeled  -SS     Comment, (Sit-Stand Transfer) VC for hand placement, appropriate alignment, lowering with eccentric control  -       Row Name 03/13/25 1614          Gait/Stairs (Locomotion)    [place] Level (Gait) minimum assist (75% patient effort)  -SS     Assistive Device (Gait) walker, front-wheeled  -SS     Patient was able to Ambulate yes  -SS     Distance in Feet (Gait) 80  -SS     Deviations/Abnormal Patterns (Gait) torrie decreased;gait speed decreased;stride length decreased;weight shifting decreased;bilateral deviations;base of support, narrow  -SS     Bilateral Gait Deviations forward flexed posture;heel strike decreased  -     Comment, (Gait/Stairs) Pt. ambulated with a step through gait pattern w/narrow REBECCA. VC for upright posture, increased REBECCA, obstacle navigation. Activity limited by fatigue.  -               User Key  (r) = Recorded By, (t) = Taken By, (c) = Cosigned By      Initials Name Provider Type     Vandana Deluca, PT Physical Therapist                   Obj/Interventions       Row Name 03/13/25 1615          Motor Skills    Therapeutic Exercise hip;knee;ankle  -       Row Name 03/13/25 1615          Hip (Therapeutic Exercise)    Hip (Therapeutic Exercise) strengthening exercise  -     Hip Strengthening (Therapeutic Exercise) bilateral;aBduction;aDduction;heel slides;marching while seated;10 repetitions  -       Row Name 03/13/25 1615          Knee (Therapeutic Exercise)    Knee (Therapeutic Exercise) strengthening exercise  -     Knee Strengthening (Therapeutic Exercise)  bilateral;SLR (straight leg raise);LAQ (long arc quad);10 repetitions  -       Row Name 03/13/25 1615          Ankle (Therapeutic Exercise)    Ankle (Therapeutic Exercise) AROM (active range of motion)  -     Ankle AROM (Therapeutic Exercise) bilateral;plantarflexion;dorsiflexion;10 repetitions  -SS       Row Name 03/13/25 1615          Balance    Balance Assessment sitting static balance;sitting dynamic balance;standing static balance;standing dynamic balance  -     Static Sitting Balance standby assist  -     Dynamic Sitting Balance standby assist  -     Position, Sitting Balance unsupported;sitting edge of bed  -     Static Standing Balance contact guard  -     Dynamic Standing Balance minimal assist  -     Position/Device Used, Standing Balance supported;walker, front-wheeled  -     Balance Interventions sitting;standing;sit to stand;supported;static;dynamic  -               User Key  (r) = Recorded By, (t) = Taken By, (c) = Cosigned By      Initials Name Provider Type     Vandana Deluca, BALDO Physical Therapist                   Goals/Plan    No documentation.                  Clinical Impression       Row Name 03/13/25 1616          Pain    Pretreatment Pain Rating 5/10  -     Posttreatment Pain Rating 5/10  -     Pain Location head  -     Pain Management Interventions exercise or physical activity utilized;movement retraining implemented;complementary health approaches promoted;positioning techniques utilized  -     Response to Pain Interventions activity level improved;activity participation with tolerable pain;functional ability improved;mobility function improved;movement patterns improved  -       Row Name 03/13/25 1616          Plan of Care Review    Plan of Care Reviewed With patient  -     Progress improving  -     Outcome Evaluation Pt. continues to present below baseline function w/generalized weakness and balance deficits affecting her ability to safely participate  in functional mobility. She performed bed mobility, transfers and ambulated 80' w/front wheeled walker, min assist. Activity limited by fatigue. Pt. tolerated ther-ex well. Continue acute PT POC to progress as able.  -       Row Name 03/13/25 1616          Therapy Assessment/Plan (PT)    Rehab Potential (PT) good  -SS     Criteria for Skilled Interventions Met (PT) yes;skilled treatment is necessary  -SS     Therapy Frequency (PT) daily  -       Row Name 03/13/25 1616          Vital Signs    Pre Systolic BP Rehab 118  -SS     Pre Treatment Diastolic BP 80  -SS     Pretreatment Heart Rate (beats/min) 67  -SS     Pre SpO2 (%) 100  -SS     O2 Delivery Pre Treatment room air  -SS     Pre Patient Position Supine  -SS       Row Name 03/13/25 1616          Positioning and Restraints    Pre-Treatment Position in bed  -SS     Post Treatment Position bed  -SS     In Bed notified nsg;fowlers;call light within reach;encouraged to call for assist;exit alarm on;legs elevated  -SS               User Key  (r) = Recorded By, (t) = Taken By, (c) = Cosigned By      Initials Name Provider Type    SS Vandana Deluca, PT Physical Therapist                   Outcome Measures       Row Name 03/13/25 1618 03/13/25 1608       How much help from another person do you currently need...    Turning from your back to your side while in flat bed without using bedrails? 4  -SS 4  -BL    Moving from lying on back to sitting on the side of a flat bed without bedrails? 3  -SS 3  -BL    Moving to and from a bed to a chair (including a wheelchair)? 3  -SS 3  -BL    Standing up from a chair using your arms (e.g., wheelchair, bedside chair)? 3  -SS 3  -BL    Climbing 3-5 steps with a railing? 3  -SS 3  -BL    To walk in hospital room? 3  -SS 3  -BL    AM-PAC 6 Clicks Score (PT) 19  -SS 19  -BL    Highest Level of Mobility Goal 6 --> Walk 10 steps or more  -SS 6 --> Walk 10 steps or more  -BL      Row Name 03/13/25 1400 03/13/25 1200       How much help  from another person do you currently need...    Turning from your back to your side while in flat bed without using bedrails? 4  -BL 4  -BL    Moving from lying on back to sitting on the side of a flat bed without bedrails? 3  -BL 3  -BL    Moving to and from a bed to a chair (including a wheelchair)? 3  -BL 3  -BL    Standing up from a chair using your arms (e.g., wheelchair, bedside chair)? 3  -BL 3  -BL    Climbing 3-5 steps with a railing? 3  -BL 3  -BL    To walk in hospital room? 3  -BL 3  -BL    AM-PAC 6 Clicks Score (PT) 19  -BL 19  -BL    Highest Level of Mobility Goal 6 --> Walk 10 steps or more  -BL 6 --> Walk 10 steps or more  -BL      Row Name 03/13/25 1045 03/13/25 0800       How much help from another person do you currently need...    Turning from your back to your side while in flat bed without using bedrails? 4  -BL 4  -BL    Moving from lying on back to sitting on the side of a flat bed without bedrails? 3  -BL 3  -BL    Moving to and from a bed to a chair (including a wheelchair)? 3  -BL 3  -BL    Standing up from a chair using your arms (e.g., wheelchair, bedside chair)? 3  -BL 3  -BL    Climbing 3-5 steps with a railing? 3  -BL 3  -BL    To walk in hospital room? 3  -BL 3  -BL    AM-PAC 6 Clicks Score (PT) 19  -BL 19  -BL    Highest Level of Mobility Goal 6 --> Walk 10 steps or more  -BL 6 --> Walk 10 steps or more  -BL      Row Name 03/13/25 1618          Functional Assessment    Outcome Measure Options AM-PAC 6 Clicks Basic Mobility (PT)  -               User Key  (r) = Recorded By, (t) = Taken By, (c) = Cosigned By      Initials Name Provider Type     Vandana Deluca, PT Physical Therapist    Catarino Darden, RN Registered Nurse                                 Physical Therapy Education       Title: PT OT SLP Therapies (Done)       Topic: Physical Therapy (Done)       Point: Mobility training (Done)       Learning Progress Summary            Patient Eager, E, VU,DU,NR by  at 3/13/2025  1618    Comment: Reviewed safety/technique w/bed mobility, transfers, ambulation, HEP, PT POC    Acceptance, E, VU by KR at 3/12/2025 0950    Acceptance, TB, DU,VU by CS at 3/12/2025 0849   Family Acceptance, E, VU by KR at 3/12/2025 0950                      Point: Home exercise program (Done)       Learning Progress Summary            Patient Eager, E, VU,DU,NR by  at 3/13/2025 1618    Comment: Reviewed safety/technique w/bed mobility, transfers, ambulation, HEP, PT POC    Acceptance, E, VU by KR at 3/12/2025 0950    Acceptance, TB, DU,VU by CS at 3/12/2025 0849   Family Acceptance, E, VU by KR at 3/12/2025 0950                      Point: Body mechanics (Done)       Learning Progress Summary            Patient Eager, E, VU,DU,NR by  at 3/13/2025 1618    Comment: Reviewed safety/technique w/bed mobility, transfers, ambulation, HEP, PT POC    Acceptance, E, VU by KR at 3/12/2025 0950    Acceptance, TB, DU,VU by CS at 3/12/2025 0849   Family Acceptance, E, VU by KR at 3/12/2025 0950                      Point: Precautions (Done)       Learning Progress Summary            Patient Eager, E, VU,DU,NR by  at 3/13/2025 1618    Comment: Reviewed safety/technique w/bed mobility, transfers, ambulation, HEP, PT POC    Acceptance, E, VU by KR at 3/12/2025 0950    Acceptance, TB, DU,VU by  at 3/12/2025 0849   Family Acceptance, E, VU by KR at 3/12/2025 0950                                      User Key       Initials Effective Dates Name Provider Type Discipline     06/16/21 -  Josh Oliver OT Occupational Therapist OT     06/01/21 -  Vandana Deluca, BALDO Physical Therapist PT    KR 12/30/22 -  Syl Mcgee, PT Physical Therapist PT                  PT Recommendation and Plan     Progress: improving  Outcome Evaluation: Pt. continues to present below baseline function w/generalized weakness and balance deficits affecting her ability to safely participate in functional mobility. She performed bed mobility,  transfers and ambulated 80' w/front wheeled walker, min assist. Activity limited by fatigue. Pt. tolerated ther-ex well. Continue acute PT POC to progress as able.     Time Calculation:         PT Charges       Row Name 03/13/25 1619             Time Calculation    Start Time 1546  -SS      PT Received On 03/13/25  -SS         Timed Charges    96165 - PT Therapeutic Exercise Minutes 5  -SS      75075 - Gait Training Minutes  8  -SS      95248 - PT Therapeutic Activity Minutes 4  -SS         Total Minutes    Timed Charges Total Minutes 17  -SS       Total Minutes 17  -SS                User Key  (r) = Recorded By, (t) = Taken By, (c) = Cosigned By      Initials Name Provider Type    SS Vandana Deluca, PT Physical Therapist                  Therapy Charges for Today       Code Description Service Date Service Provider Modifiers Qty    42453306275 HC GAIT TRAINING EA 15 MIN 3/13/2025 Vandana Deluca, PT GP 1    33142324900 HC PT THER SUPP EA 15 MIN 3/13/2025 Vandana Deluca, PT GP 2            PT G-Codes  Outcome Measure Options: AM-PAC 6 Clicks Basic Mobility (PT)  AM-PAC 6 Clicks Score (PT): 19  AM-PAC 6 Clicks Score (OT): 18  Modified [place] Scale: 3 - Moderate disability.  Requiring some help, but able to walk without assistance.  PT Discharge Summary  Anticipated Discharge Disposition (PT): inpatient rehabilitation facility    Vandana Deluca PT  3/13/2025

## 2025-03-14 LAB
ANION GAP SERPL CALCULATED.3IONS-SCNC: 11 MMOL/L (ref 5–15)
BUN SERPL-MCNC: 15 MG/DL (ref 8–23)
BUN/CREAT SERPL: 24.6 (ref 7–25)
CALCIUM SPEC-SCNC: 8.8 MG/DL (ref 8.6–10.5)
CHLORIDE SERPL-SCNC: 107 MMOL/L (ref 98–107)
CO2 SERPL-SCNC: 23 MMOL/L (ref 22–29)
CREAT SERPL-MCNC: 0.61 MG/DL (ref 0.57–1)
EGFRCR SERPLBLD CKD-EPI 2021: 100.6 ML/MIN/1.73
GLUCOSE SERPL-MCNC: 114 MG/DL (ref 65–99)
POTASSIUM SERPL-SCNC: 4.5 MMOL/L (ref 3.5–5.2)
SODIUM SERPL-SCNC: 141 MMOL/L (ref 136–145)

## 2025-03-14 PROCEDURE — 25010000002 EPTIFIBATIDE PER 5 MG

## 2025-03-14 PROCEDURE — S0260 H&P FOR SURGERY: HCPCS

## 2025-03-14 PROCEDURE — 80048 BASIC METABOLIC PNL TOTAL CA: CPT | Performed by: NURSE PRACTITIONER

## 2025-03-14 PROCEDURE — 25010000002 ONDANSETRON PER 1 MG: Performed by: INTERNAL MEDICINE

## 2025-03-14 PROCEDURE — 99232 SBSQ HOSP IP/OBS MODERATE 35: CPT | Performed by: NURSE PRACTITIONER

## 2025-03-14 PROCEDURE — 97535 SELF CARE MNGMENT TRAINING: CPT

## 2025-03-14 PROCEDURE — 97530 THERAPEUTIC ACTIVITIES: CPT

## 2025-03-14 RX ORDER — MAGNESIUM SULFATE 1 G/100ML
1 INJECTION INTRAVENOUS ONCE
Status: DISCONTINUED | OUTPATIENT
Start: 2025-03-14 | End: 2025-03-14

## 2025-03-14 RX ORDER — LACTULOSE 10 G/15ML
30 SOLUTION ORAL DAILY PRN
Status: DISCONTINUED | OUTPATIENT
Start: 2025-03-14 | End: 2025-03-20 | Stop reason: HOSPADM

## 2025-03-14 RX ORDER — PROCHLORPERAZINE EDISYLATE 5 MG/ML
10 INJECTION INTRAMUSCULAR; INTRAVENOUS ONCE
Status: DISCONTINUED | OUTPATIENT
Start: 2025-03-14 | End: 2025-03-17

## 2025-03-14 RX ADMIN — ASPIRIN 81 MG CHEWABLE TABLET 81 MG: 81 TABLET CHEWABLE at 08:03

## 2025-03-14 RX ADMIN — PANTOPRAZOLE SODIUM 40 MG: 40 TABLET, DELAYED RELEASE ORAL at 05:20

## 2025-03-14 RX ADMIN — HYDROCODONE BITARTRATE AND ACETAMINOPHEN 1 TABLET: 5; 325 TABLET ORAL at 16:49

## 2025-03-14 RX ADMIN — FAMOTIDINE 20 MG: 20 TABLET, FILM COATED ORAL at 16:45

## 2025-03-14 RX ADMIN — EPTIFIBATIDE 1 MCG/KG/MIN: 0.75 INJECTION, SOLUTION INTRAVENOUS at 09:32

## 2025-03-14 RX ADMIN — EPTIFIBATIDE 1 MCG/KG/MIN: 0.75 INJECTION, SOLUTION INTRAVENOUS at 20:48

## 2025-03-14 RX ADMIN — BETHANECHOL CHLORIDE 25 MG: 25 TABLET ORAL at 20:48

## 2025-03-14 RX ADMIN — TOPIRAMATE 100 MG: 100 TABLET, FILM COATED ORAL at 20:49

## 2025-03-14 RX ADMIN — NORTRIPTYLINE HYDROCHLORIDE 20 MG: 10 CAPSULE ORAL at 20:48

## 2025-03-14 RX ADMIN — ATORVASTATIN CALCIUM 80 MG: 40 TABLET, FILM COATED ORAL at 20:49

## 2025-03-14 RX ADMIN — BETHANECHOL CHLORIDE 25 MG: 25 TABLET ORAL at 08:03

## 2025-03-14 RX ADMIN — Medication 10 ML: at 21:14

## 2025-03-14 RX ADMIN — FAMOTIDINE 20 MG: 20 TABLET, FILM COATED ORAL at 08:03

## 2025-03-14 RX ADMIN — SERTRALINE HYDROCHLORIDE 150 MG: 50 TABLET ORAL at 08:03

## 2025-03-14 RX ADMIN — ONDANSETRON 4 MG: 2 INJECTION INTRAMUSCULAR; INTRAVENOUS at 16:45

## 2025-03-14 NOTE — PROGRESS NOTES
"Patient Name:  Lauryn Romo  YOB: 1960  [id number]    Surgery Progress Note    Date of visit: 3/14/2025    Subjective   Subjective: Stable, headache better.         Objective     Objective:     /62 (BP Location: Right arm, Patient Position: Lying)   Pulse 58   Temp 98 °F (36.7 °C) (Oral)   Resp 18   Ht 154.9 cm (61\")   Wt 59 kg (130 lb)   SpO2 96%   BMI 24.56 kg/m²   No intake or output data in the 24 hours ending 03/14/25 0722    CV:  Rhythm  regular and rate regular   L:  Clear  to auscultation bilaterally   Abd:  Bowel sounds positive , soft, nontender  Ext:  No cyanosis, clubbing, edema    Recent labs that are back at this time have been reviewed.            Assessment/ Plan:    Problem List Items Addressed This Visit          Neuro    Cerebral ventriculomegaly - Primary- Plan for  shunt 3/18. Will revisit Monday.     Relevant Orders    Case request (Completed)     Other Visit Diagnoses         Aphasia          Acute left-sided weakness          History of ischemic stroke                 Active Hospital Problems    Diagnosis  POA    **Left-sided weakness [R53.1]  Yes    Cerebral ventriculomegaly [G93.89]  Unknown      Resolved Hospital Problems   No resolved problems to display.              Reid Raya MD  3/14/2025  07:22 EDT      "

## 2025-03-14 NOTE — PLAN OF CARE
Goal Outcome Evaluation:  Plan of Care Reviewed With: patient        Progress: improving  Outcome Evaluation: Pt continues to present below baseline with generalized weakness, impaired balance, and decreased ADL performance, warranting cont. IPOT services. Pt complete LB dressing and grooming with SBA. Pt reported head pain 9/10 throughout session. Recommend IRF at d/c.    Anticipated Discharge Disposition (OT): inpatient rehabilitation facility

## 2025-03-14 NOTE — THERAPY TREATMENT NOTE
Patient Name: Lauryn Romo  : 1960    MRN: [id number]                              Today's Date: 3/14/2025       Admit Date: 3/11/2025    Visit Dx:     ICD-10-CM ICD-9-CM   1. Cerebral ventriculomegaly  G93.89 348.89   2. Aphasia  R47.01 784.3   3. Acute left-sided weakness  R53.1 728.87   4. History of ischemic stroke  Z86.73 V12.54     Patient Active Problem List   Diagnosis    Acute CVA    Cerebral aneurysm, nonruptured    Paraesophageal hernia    Hyperlipidemia    HTN    Moderate malnutrition    History of CVA (cerebrovascular accident)    Other headache syndrome    Acute cystitis without hematuria    Left-sided weakness    Cerebral ventriculomegaly     Past Medical History:   Diagnosis Date    Abnormal ECG     Aneurysm 2022    Cancer 2024    Basal Cell Carcinoma removed from scalp by dermatology    Cluster headache     Had headaches for several months before stroke and mass was found    Difficulty walking 22    After stroke    Headache     Headache, tension-type     Hyperlipidemia     Hypertension     Memory loss 22    Migraine     Stroke 2022    Stroke 10/06/2024    Vision loss 22    When dizzy or headache     Past Surgical History:   Procedure Laterality Date    ARTERIAL ANEURYSM REPAIR      BREAST LUMPECTOMY  2012    CYST REMOVAL Left 2023    EMBOLIZATION CEREBRAL N/A 2022    Procedure: CV EMBOLIZATION CEREBRAL IR;  Surgeon: Enoch Savage MD;  Location:  [facility];  Service: Interventional Radiology;  Laterality: N/A;    HIATAL HERNIA REPAIR      INTERVENTIONAL RADIOLOGY PROCEDURE N/A 09/15/2023    Procedure: Embolization;  Surgeon: Enoch Savage MD;  Location:  [facility];  Service: Interventional Radiology;  Laterality: N/A;    INTERVENTIONAL RADIOLOGY PROCEDURE N/A 10/6/2024    Procedure: IR mechanical thrombectomy;  Surgeon: Joe Mendez MD;  Location:  [facility];  Service: Interventional Radiology;  Laterality: N/A;    SINUS SURGERY      x4    SKIN CANCER EXCISION      cancer removed off top of head      General Information       Row Name 03/14/25 1143          OT Time and Intention    Document Type therapy note (daily note)  -CS (r) SJ (t) CS (c)     Mode of Treatment occupational therapy  -CS (r) SJ (t) CS (c)     Patient Effort good  -CS (r) SJ (t) CS (c)       Row Name 03/14/25 1143          General Information    Patient Profile Reviewed yes  -CS (r) SJ (t) CS (c)     Existing Precautions/Restrictions fall;other (see comments)  L-sided weakness, low vision  -CS (r) SJ (t) CS (c)     Barriers to Rehab medically complex;previous functional deficit  -CS (r) SJ (t) CS (c)       Row Name 03/14/25 1143          Cognition    Orientation Status (Cognition) oriented x 3  -CS (r) SJ (t) CS (c)       Row Name 03/14/25 1143          Safety Issues/Impairments Affecting Functional Mobility    Safety Issues Affecting Function (Mobility) awareness of need for assistance;insight into deficits/self-awareness;judgment;safety precaution awareness;safety precautions follow-through/compliance;sequencing abilities  -CS (r) SJ (t) CS (c)     Impairments Affecting Function (Mobility) balance;strength;visual/perceptual;motor control;postural/trunk control;pain;endurance/activity tolerance  -CS (r) SJ (t) CS (c)               User Key  (r) = Recorded By, (t) = Taken By, (c) = Cosigned By      Initials Name Provider Type    CS Josh Oliver, OT Occupational Therapist    Angelika Warner, OT Student OT Student                     Mobility/ADL's       Row Name 03/14/25 1146          Bed Mobility    Bed Mobility sit-supine;rolling left  -CS (r) SJ (t) CS (c)     Rolling Left [place] (Bed Mobility) independent  -CS (r) SJ (t) CS (c)     Sit-Supine Colp (Bed Mobility) standby assist  -CS (r) SJ (t) CS (c)     Assistive Device (Bed Mobility) bed rails;head of bed elevated   -CS (r) SJ (t) CS (c)       Row Name 03/14/25 1146          Transfers    Transfers sit-stand transfer;stand-sit transfer  -CS (r) SJ (t) CS (c)       Row Name 03/14/25 1146          Sit-Stand Transfer    Sit-Stand [place] (Transfers) verbal cues;standby assist  -CS (r) SJ (t) CS (c)     Assistive Device (Sit-Stand Transfers) walker, front-wheeled  -CS (r) SJ (t) CS (c)       Row Name 03/14/25 1146          Stand-Sit Transfer    Stand-Sit [place] (Transfers) standby assist;verbal cues  -CS (r) SJ (t) CS (c)     Assistive Device (Stand-Sit Transfers) walker, front-wheeled  -CS (r) SJ (t) CS (c)       Row Name 03/14/25 1146          Activities of Daily Living    BADL Assessment/Intervention lower body dressing;grooming  -CS (r) SJ (t) CS (c)       Row Name 03/14/25 1146          Lower Body Dressing Assessment/Training    [place] Level (Lower Body Dressing) doff;socks;independent  -CS (r) SJ (t) CS (c)     Position (Lower Body Dressing) sitting up in bed;supported sitting  -CS (r) SJ (t) CS (c)       Row Name 03/14/25 1146          Grooming Assessment/Training    [place] Level (Grooming) hair care, combing/brushing;wash face, hands;set up;oral care regimen  -CS (r) SJ (t) CS (c)     Position (Grooming) supported standing;sink side  -CS (r) SJ (t) CS (c)               User Key  (r) = Recorded By, (t) = Taken By, (c) = Cosigned By      Initials Name Provider Type    CS Josh Oliver, OT Occupational Therapist    Angelika Warner, OT Student OT Student                   Obj/Interventions       Row Name 03/14/25 1156          Balance    Balance Assessment sitting static balance;sitting dynamic balance;sit to stand dynamic balance;standing static balance;standing dynamic balance  -CS (r) SJ (t) CS (c)     Static Sitting Balance independent  -CS (r) SJ (t) CS (c)     Dynamic Sitting Balance standby assist  -CS (r) SJ (t) CS (c)     Position, Sitting Balance supported;sitting in chair  -CS (r) SJ (t)  CS (c)     Sit to Stand Dynamic Balance contact guard  -CS (r) SJ (t) CS (c)     Static Standing Balance standby assist  -CS (r) SJ (t) CS (c)     Dynamic Standing Balance contact guard  -CS (r) SJ (t) CS (c)     Position/Device Used, Standing Balance walker, front-wheeled;supported  -CS (r) SJ (t) CS (c)     Balance Interventions sitting;standing;supported;sit to stand;static;dynamic;minimal challenge;occupation based/functional task  -CS (r) SJ (t) CS (c)               User Key  (r) = Recorded By, (t) = Taken By, (c) = Cosigned By      Initials Name Provider Type    CS Josh Oliver, OT Occupational Therapist    Angelika Warner, OT Student OT Student                   Goals/Plan    No documentation.                  Clinical Impression       Row Name 03/14/25 115          Pain Assessment    Pretreatment Pain Rating 9/10  -CS (r) SJ (t) CS (c)     Posttreatment Pain Rating 9/10  -CS (r) SJ (t) CS (c)     Pain Location head  -CS (r) SJ (t) CS (c)     Pain Management Interventions exercise or physical activity utilized  -CS (r) SJ (t) CS (c)     Response to Pain Interventions activity participation with tolerable pain  -CS (r) SJ (t) CS (c)       Row Name 03/14/25 1154          Plan of Care Review    Plan of Care Reviewed With patient  -CS (r) SJ (t) CS (c)     Progress improving  -CS (r) SJ (t) CS (c)     Outcome Evaluation Pt continues to present below baseline with generalized weakness, impaired balance, and decreased ADL performance, warranting cont. IPOT services. Pt complete LB dressing and grooming with SBA. Pt reported head pain 9/10 throughout session. Recommend IRF at d/c.  -CS (r) SJ (t) CS (c)       Row Name 03/14/25 1158          Therapy Plan Review/Discharge Plan (OT)    Anticipated Discharge Disposition (OT) inpatient rehabilitation facility  -CS (r) SJ (t) CS (c)       Row Name 03/14/25 1159          Vital Signs    Pre Systolic BP Rehab 128  -CS (r) SJ (t) CS (c)     Pre Treatment Diastolic BP  75  -CS (r) SJ (t) CS (c)     Post Systolic BP Rehab 146  -CS (r) SJ (t) CS (c)     Post Treatment Diastolic BP 75  -CS (r) SJ (t) CS (c)     Pretreatment Heart Rate (beats/min) 75  -CS (r) SJ (t) CS (c)     Posttreatment Heart Rate (beats/min) 70  -CS (r) SJ (t) CS (c)     Pre Patient Position Sitting  -CS (r) SJ (t) CS (c)     Intra Patient Position Standing  -CS (r) SJ (t) CS (c)     Post Patient Position Supine  -CS (r) SJ (t) CS (c)       Row Name 03/14/25 1158          Positioning and Restraints    Pre-Treatment Position sitting in chair/recliner  -CS (r) SJ (t) CS (c)     Post Treatment Position bed  -CS (r) SJ (t) CS (c)     In Bed notified nsg;supine;call light within reach;encouraged to call for assist;exit alarm on;legs elevated;side rails up x2  -CS (r) SJ (t) CS (c)               User Key  (r) = Recorded By, (t) = Taken By, (c) = Cosigned By      Initials Name Provider Type    CS Josh Oliver, OT Occupational Therapist    Angelika Warner, OT Student OT Student                   Outcome Measures       Row Name 03/14/25 1202          How much help from another is currently needed...    Putting on and taking off regular lower body clothing? 3  -CS (r) SJ (t) CS (c)     Bathing (including washing, rinsing, and drying) 2  -CS (r) SJ (t) CS (c)     Toileting (which includes using toilet bed pan or urinal) 3  -CS (r) SJ (t) CS (c)     Putting on and taking off regular upper body clothing 4  -CS (r) SJ (t) CS (c)     Taking care of personal grooming (such as brushing teeth) 4  -CS (r) SJ (t) CS (c)     Eating meals 4  -CS (r) SJ (t) CS (c)     AM-PAC 6 Clicks Score (OT) 20  -CS (r) SJ (t)       Row Name 03/14/25 0800          How much help from another person do you currently need...    Turning from your back to your side while in flat bed without using bedrails? 4  -AL     Moving from lying on back to sitting on the side of a flat bed without bedrails? 4  -AL     Moving to and from a bed to a chair  (including a wheelchair)? 3  -AL     Standing up from a chair using your arms (e.g., wheelchair, bedside chair)? 4  -AL     Climbing 3-5 steps with a railing? 3  -AL     To walk in hospital room? 3  -AL     AM-PAC 6 Clicks Score (PT) 21  -AL     Highest Level of Mobility Goal 6 --> Walk 10 steps or more  -AL       Row Name 03/14/25 1202          Functional Assessment    Outcome Measure Options AM-PAC 6 Clicks Daily Activity (OT)  -CS (r) SJ (t) CS (c)               User Key  (r) = Recorded By, (t) = Taken By, (c) = Cosigned By      Initials Name Provider Type    CS Josh Oliver, OT Occupational Therapist    Kaylee Valencia RN Registered Nurse     Angelika Dunn, OT Student OT Student                    Occupational Therapy Education       Title: PT OT SLP Therapies (Done)       Topic: Occupational Therapy (Done)       Point: ADL training (Done)       Learning Progress Summary            Patient Acceptance, TB, VU by  at 3/14/2025 1120    Acceptance, TB, DU,VU by  at 3/12/2025 0849                      Point: Home exercise program (Done)       Learning Progress Summary            Patient Acceptance, TB, DU,VU by  at 3/12/2025 0849                      Point: Precautions (Done)       Learning Progress Summary            Patient Acceptance, TB, VU by  at 3/14/2025 1120    Acceptance, TB, DU,VU by  at 3/12/2025 0849                      Point: Body mechanics (Done)       Learning Progress Summary            Patient Acceptance, TB, VU by  at 3/14/2025 1120    Acceptance, TB, DU,VU by  at 3/12/2025 0849                                      User Key       Initials Effective Dates Name Provider Type Discipline     06/16/21 -  Josh Oliver, OT Occupational Therapist OT     02/13/25 -  Angelika Dunn, OT Student OT Student OT                  OT Recommendation and Plan     Plan of Care Review  Plan of Care Reviewed With: patient  Progress: improving  Outcome Evaluation: Pt continues to  present below baseline with generalized weakness, impaired balance, and decreased ADL performance, warranting cont. IPOT services. Pt complete LB dressing and grooming with SBA. Pt reported head pain 9/10 throughout session. Recommend IRF at d/c.     Time Calculation:         Time Calculation- OT       Row Name 03/14/25 1204             Time Calculation- OT    OT Start Time 1120  -CS (r) SJ (t) CS (c)      OT Received On 03/14/25  -CS (r) SJ (t) CS (c)      OT Goal Re-Cert Due Date 03/24/25  -CS (r) SJ (t) CS (c)         Timed Charges    93744 - OT Therapeutic Activity Minutes 12  -CS (r) SJ (t) CS (c)      39300 - OT Self Care/Mgmt Minutes 12  -CS (r) SJ (t) CS (c)         Total Minutes    Timed Charges Total Minutes 24  -CS (r) SJ (t)       Total Minutes 24  -CS (r) SJ (t)                User Key  (r) = Recorded By, (t) = Taken By, (c) = Cosigned By      Initials Name Provider Type    CS Josh Oliver, OT Occupational Therapist     Angelika Dunn, OT Student OT Student                  Therapy Charges for Today       Code Description Service Date Service Provider Modifiers Qty    39028434407 HC OT THERAPEUTIC ACT EA 15 MIN 3/14/2025 Agnelika Dunn, OT Student GO 1    97735116001 HC OT SELF CARE/MGMT/TRAIN EA 15 MIN 3/14/2025 Angelika Dunn OT Student GO 1                 TOMI Lee  3/14/2025

## 2025-03-14 NOTE — PROGRESS NOTES
NAME: MEDHAT ROMO  : 1960  PCP: Melissa Lazo APRN  ADMITTING PHYSICIAN: Jessica Haq MD    DATE OF ADMISSION:  3/11/2025  DATE OF SERVICE: 3/14/2025    HOSPITAL DAY:  1 day    CC:   Chief Complaint   Patient presents with    Fall       HISTORY OF PRESENT ILLNESS:  64 y.o. 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      Ms. Romo presented to the emergency department on 10/6/2024 with acute thrombosis of her Pipeline stent and basilar artery (2 weeks after stopping Plavix).  She underwent successful mechanical thrombectomy, restoring normal (TICI 3) flow.  This did unmask a stenosis within the mid portions of the Pipeline stent construct, and this was treated with a 2.5 mm coronary MICHELLE, restoring robust flow within the vertebrobasilar system.      Ms. Romo essentially returning to her neurologic baseline following her thrombectomy.  She was on Plavix/aspirin dual antiplatelet regimen.  On 3/11/25, she had a spell where she fell, had some altered level of consciousness (but did not pass out), and reportedly had some left sided weakness and left facial droop (per patient's daughter who is a nurse).    Today, she complains of a headache.  Hospital medicine is working to find a regimen that will relieve her headaches.  I am hoping that she will get some relief with shunt placement next week.  She has no other complaints at this time.  Plavix was discontinued on 3/12/2025 and she has been on an Integrilin drip.       LABS:  Lab Results   Component Value Date    WBC 6.92 2025    HGB 12.9 2025    HCT 40.2 2025    MCV 94.1 2025     2025     Lab Results   Component Value Date    GLUCOSE 86 2025    CALCIUM 8.6 2025     2025    K 3.8 2025    CO2 23.0 2025      (H) 03/12/2025    BUN 16 03/12/2025    CREATININE 0.55 (L) 03/12/2025    EGFRIFNONA 103 09/29/2019    BCR 29.1 (H) 03/12/2025    ANIONGAP 10.0 03/12/2025     Lab Results   Component Value Date    HGBA1C 5.80 (H) 03/12/2025     Lab Results   Component Value Date    CHOL 114 03/12/2025    TRIG 50 03/12/2025    HDL 60 03/12/2025    LDL 42 03/12/2025       CURRENT MEDS:  Current Facility-Administered Medications   Medication Dose Route Frequency Provider Last Rate Last Admin    acetaminophen (TYLENOL) tablet 650 mg  650 mg Oral Q4H PRN Mitesh Orozco III, DO        Or    acetaminophen (TYLENOL) 160 MG/5ML oral solution 650 mg  650 mg Oral Q4H PRN Mitesh Orozco III, DO   650 mg at 03/12/25 1711    Or    acetaminophen (TYLENOL) suppository 650 mg  650 mg Rectal Q4H PRN Mitesh Orozco III, DO        aspirin chewable tablet 81 mg  81 mg Oral Daily Ghanim-Moustafa, May, APRN   81 mg at 03/14/25 0803    Or    aspirin suppository 300 mg  300 mg Rectal Daily Ghanim-Moustafa, May, APRN        atorvastatin (LIPITOR) tablet 80 mg  80 mg Oral Nightly Ghanim-Moustafa, May, APRN   80 mg at 03/13/25 2041    bethanechol (URECHOLINE) tablet 25 mg  25 mg Oral BID Mitesh Orozco III, DO   25 mg at 03/14/25 0803    Calcium Replacement - Follow Nurse / BPA Driven Protocol   Not Applicable PRN Mitesh Orozco III, DO        eptifibatide (INTEGRILIN) 75 mg in 100 mL solution  1 mcg/kg/min Intravenous Continuous AliseNikki helm PA-C 4.72 mL/hr at 03/14/25 1240 1 mcg/kg/min at 03/14/25 1240    famotidine (PEPCID) tablet 20 mg  20 mg Oral BID AC Mitesh Orozco III, DO   20 mg at 03/14/25 0803    HYDROcodone-acetaminophen (NORCO) 5-325 MG per tablet 1 tablet  1 tablet Oral Q6H PRN Mitesh Orozco III, DO   1 tablet at 03/12/25 1758    lactulose (CHRONULAC) 10 GM/15ML solution 30 g  30 g Oral Daily PRN Eligio Holliday APRN        Magnesium Standard Dose Replacement - Follow  Nurse / BPA Driven Protocol   Not Applicable PRN Mitesh Orozco III, DO        magnesium sulfate in D5W 1g/100mL (PREMIX)  1 g Intravenous Once Philomena Mott APRN        melatonin tablet 5 mg  5 mg Oral Nightly PRN Mitesh Orozco III, DO        nitroglycerin (NITROSTAT) SL tablet 0.4 mg  0.4 mg Sublingual Q5 Min PRN Mitesh Orozco III, DO        nortriptyline (PAMELOR) capsule 20 mg  20 mg Oral Nightly Mitesh Orozco III, DO   20 mg at 03/13/25 2041    ondansetron (ZOFRAN) injection 4 mg  4 mg Intravenous Q6H PRN Mitesh Orozco III, DO        pantoprazole (PROTONIX) EC tablet 40 mg  40 mg Oral Q AM Mitesh Orozco III, DO   40 mg at 03/14/25 0520    Phosphorus Replacement - Follow Nurse / BPA Driven Protocol   Not Applicable PRN Mitesh Orozco III, DO        Potassium Replacement - Follow Nurse / BPA Driven Protocol   Not Applicable PRN Mitesh Orozco III, DO        prochlorperazine (COMPAZINE) injection 10 mg  10 mg Intravenous Once Philomena Mott APRN        sertraline (ZOLOFT) tablet 150 mg  150 mg Oral Daily Mitesh Orozco III, DO   150 mg at 03/14/25 0803    sodium chloride 0.9 % flush 10 mL  10 mL Intravenous PRN Haresh Valiente, DO   10 mL at 03/11/25 2307    sodium chloride 0.9 % flush 10 mL  10 mL Intravenous Q12H Ghanim-Moustafa, May, APRN   10 mL at 03/13/25 2044    sodium chloride 0.9 % flush 10 mL  10 mL Intravenous PRN Ghanim-Moustafa, May, APRN        sodium chloride 0.9 % flush 10 mL  10 mL Intravenous Q12H Mitesh Orozco III, DO   10 mL at 03/13/25 2044    sodium chloride 0.9 % flush 10 mL  10 mL Intravenous PRN Mitesh Orozco III, DO        sodium chloride 0.9 % infusion 40 mL  40 mL Intravenous PRN Ghanim-Moustafa, May, APRN        sodium chloride 0.9 % infusion 40 mL  40 mL Intravenous PRN Mitesh Orozco III, DO        topiramate (TOPAMAX) tablet 100 mg  100 mg Oral Nightly Mitesh Orozco  Rice III, DO   100 mg at 03/13/25 2041       PHYSICAL EXAM:  Vitals:    03/14/25 1300   BP:    Pulse: 74   Resp:    Temp:    SpO2:       General:   Resting comfortably in bed.  No acute distress.    Neurological examination:  AAOx3.  Speech is clear.  Strength is symmetric in bilateral upper and lower extremities.    ASSESSMENT/PLAN:  Ms. Romo is a 64 y.o. female known to the neurointerventional service, having been followed for many years for a giant basilar aneurysm which was partially treated with flow diverter therapy.  There is some residual flow within the aneurysm via a left vertebral endoleak, but this supplies a right AICA/PICA variant, and thus has been managed medically.  She did present in October 2024 with thrombus within her Pipeline stent (while she was off Plavix), necessitating mechanical thrombectomy and additional coronary MICHELLE placement.  She has since been on dual antiplatelet regimen, and essentially returned back to her neurologic baseline.     Ms. Romo was admitted with progressively unsteady gait, frequent falls, but no loss of consciousness.  Her MRI studies do not show any new areas of infarction, and her Pipeline/coronary stents are widely patent with essentially no change in appearance of her basilar aneurysm.     Her MRI does demonstrates progressive ventriculomegaly with probable transependymal resorption of CSF. She does say that her headaches and vision do seem to be getting worse, and combined with her progressive gait disturbance/frequent falls, and flat affect/social anxiety. Dr. Mendez evaluated her for ventriculomegaly and has recommended a  shunt. Plavix was discontinued and IV Integrilin was started (given her stent thrombosis last time she came off Plavix).  We are tentatively planning for  shunt with Dr. Savage Tuesday morning, and IV Integrilin will be turned off at midnight Monday night.    This was again discussed with Ms. Rmoo and she expressed an  understanding and is in agreement with this treatment plan.    Copy text and portions of the note have been reviewed and are accurate as of 3/14/2025.

## 2025-03-14 NOTE — PROGRESS NOTES
Stroke Progress Note       Chief Complaint:  Headache with left sided weakness.      Subjective    Subjective     Subjective:  This patient was seen at bedside, she is complaining of bilateral eye pain with her eyes from side-to-side.  Patient states, this is a typical aura that she has prior to a more intense bilateral frontal migraine.  She is currently taking Topimax for migraine management and takes Ubrelvy at home for breakthrough, if needed.  The patient denies any new or worsening stroke symptoms.    Temp:  [98 °F (36.7 °C)-98.2 °F (36.8 °C)] 98 °F (36.7 °C)  Heart Rate:  [58-75] 63  Resp:  [18] 18  BP: (107-130)/(62-80) 112/67    Hospital Meds:  Scheduled- aspirin, 81 mg, Oral, Daily   Or  aspirin, 300 mg, Rectal, Daily  atorvastatin, 80 mg, Oral, Nightly  bethanechol, 25 mg, Oral, BID  famotidine, 20 mg, Oral, BID AC  nortriptyline, 20 mg, Oral, Nightly  pantoprazole, 40 mg, Oral, Q AM  sertraline, 150 mg, Oral, Daily  sodium chloride, 10 mL, Intravenous, Q12H  sodium chloride, 10 mL, Intravenous, Q12H  topiramate, 100 mg, Oral, Nightly      Infusions- eptifibatide, 1 mcg/kg/min, Last Rate: Stopped (03/14/25 0810)       PRNs-   acetaminophen **OR** acetaminophen **OR** acetaminophen    Calcium Replacement - Follow Nurse / BPA Driven Protocol    HYDROcodone-acetaminophen    Magnesium Standard Dose Replacement - Follow Nurse / BPA Driven Protocol    melatonin    nitroglycerin    ondansetron    Phosphorus Replacement - Follow Nurse / BPA Driven Protocol    Potassium Replacement - Follow Nurse / BPA Driven Protocol    sodium chloride    sodium chloride    sodium chloride    sodium chloride    sodium chloride    Physical Exam:  General Appearance: Alert  Eyes: Anicteric sclera  HEENT: no scleral injection   Lungs: respirations appear comfortable, no obvious increased work of breathing  Extremities: No cyanosis or fingernail clubbing   Skin: No rashes in exposed skin areas     Neurological Examination:   Mental  status: Alert and oriented to person, place, and time. Speech with no dysarthria, able to name and repeat with no difficulty.    Cranial Nerves: Visual fields intact. Extraocular movements are intact with no nystagmus. Facial sensation intact. Face symmetrical. Full shoulder shrug bilaterally. Tongue protrudes midline.   Sensory: Sensory exam to light touch in all four extremities distally is normal. Double simultaneous sensory stimulation shows no extinction  Motor: Normal tone throughout. Normal bulk. Pronator drift is absent.  Left upper extremity: 5/5 deltoid, tricep, bicep, interosseous, hand .   Right upper extremity: 5/5 deltoid, tricep, bicep, interosseous, hand .   Left lower extremity: 5-/5 iliopsoas, knee extension/flexion, foot dorsi/plantarflexion.  Right lower extremity: 5/5 iliopsoas, knee extension/flexion, foot dorsi/plantarflexion.  Cerebellar: Finger-to-nose intact. Heel-to-shin intact. Rapid alternating movements are intact.   Gait: Deferred        LABS:  Lab Results   Component Value Date    HGBA1C 5.80 (H) 03/12/2025      WBC   Date Value Ref Range Status   03/12/2025 6.92 3.40 - 10.80 10*3/mm3 Final     RBC   Date Value Ref Range Status   03/12/2025 4.27 3.77 - 5.28 10*6/mm3 Final     Hemoglobin   Date Value Ref Range Status   03/12/2025 12.9 12.0 - 15.9 g/dL Final     Hematocrit   Date Value Ref Range Status   03/12/2025 40.2 34.0 - 46.6 % Final     MCV   Date Value Ref Range Status   03/12/2025 94.1 79.0 - 97.0 fL Final     MCH   Date Value Ref Range Status   03/12/2025 30.2 26.6 - 33.0 pg Final     MCHC   Date Value Ref Range Status   03/12/2025 32.1 31.5 - 35.7 g/dL Final     RDW   Date Value Ref Range Status   03/12/2025 13.3 12.3 - 15.4 % Final     RDW-SD   Date Value Ref Range Status   03/12/2025 45.9 37.0 - 54.0 fl Final     MPV   Date Value Ref Range Status   03/12/2025 11.1 6.0 - 12.0 fL Final     Platelets   Date Value Ref Range Status   03/12/2025 232 140 - 450 10*3/mm3  Final     Neutrophil %   Date Value Ref Range Status   03/12/2025 59.6 42.7 - 76.0 % Final     Lymphocyte %   Date Value Ref Range Status   03/12/2025 29.2 19.6 - 45.3 % Final     Monocyte %   Date Value Ref Range Status   03/12/2025 7.5 5.0 - 12.0 % Final     Eosinophil %   Date Value Ref Range Status   03/12/2025 2.7 0.3 - 6.2 % Final     Basophil %   Date Value Ref Range Status   03/12/2025 0.7 0.0 - 1.5 % Final     Immature Grans %   Date Value Ref Range Status   03/12/2025 0.3 0.0 - 0.5 % Final     Neutrophils, Absolute   Date Value Ref Range Status   03/12/2025 4.12 1.70 - 7.00 10*3/mm3 Final     Lymphocytes, Absolute   Date Value Ref Range Status   03/12/2025 2.02 0.70 - 3.10 10*3/mm3 Final     Monocytes, Absolute   Date Value Ref Range Status   03/12/2025 0.52 0.10 - 0.90 10*3/mm3 Final     Eosinophils, Absolute   Date Value Ref Range Status   03/12/2025 0.19 0.00 - 0.40 10*3/mm3 Final     Basophils, Absolute   Date Value Ref Range Status   03/12/2025 0.05 0.00 - 0.20 10*3/mm3 Final     Immature Grans, Absolute   Date Value Ref Range Status   03/12/2025 0.02 0.00 - 0.05 10*3/mm3 Final     nRBC   Date Value Ref Range Status   03/12/2025 0.0 0.0 - 0.2 /100 WBC Final       Lab Results   Component Value Date    GLUCOSE 86 03/12/2025    BUN 16 03/12/2025    CREATININE 0.55 (L) 03/12/2025     03/12/2025    K 3.8 03/12/2025     (H) 03/12/2025    CALCIUM 8.6 03/12/2025    PROTEINTOT 6.5 03/12/2025    ALBUMIN 4.0 03/12/2025    ALT 36 (H) 03/12/2025    AST 26 03/12/2025    ALKPHOS 115 03/12/2025    BILITOT 0.3 03/12/2025    GLOB 2.5 03/12/2025    AGRATIO 1.6 03/12/2025    BCR 29.1 (H) 03/12/2025    ANIONGAP 10.0 03/12/2025    EGFR 103.1 03/12/2025           Lab 03/12/25  0525   HEMOGLOBIN A1C 5.80*       Lipid Panel          10/7/2024    05:55 3/12/2025    05:25   Lipid Panel   Total Cholesterol 132  114    Triglycerides 62  50    HDL Cholesterol 61  60    VLDL Cholesterol 13  12    LDL Cholesterol  58  42     LDL/HDL Ratio 0.96  0.73         TSH          3/11/2025    20:47   TSH   TSH 4.090           Results Review:    I reviewed the patient's new clinical results.  EEG  Result Date: 3/12/2025  Normal study This report is transcribed using the Dragon dictation system.      MRI Brain Without Contrast  Result Date: 3/12/2025  Impression: No acute intracranial findings. No acute infarct. Similar basilar artery aneurysm exerting mass effect on the brainstem with posterior displacement of the jong and medulla. Please refer to yesterday's CTA exam for more complete details of this finding. Electronically Signed: Kwesi Guan MD  3/12/2025 8:19 AM EDT  Workstation ID: [id number]      CT Angiogram Head w AI Analysis of LVO  Result Date: 3/11/2025  Impression: No evidence of large vessel occlusion or hemodynamically significant stenosis. Post stenting of large basilar artery aneurysm. The stent is visualized in the distal vertebral artery and extending into the left basal artery. There is some contrast leakage to the left of the stent. This may be secondary to stent porosity or leakage from the distal aspect of the stent. This appears unchanged from prior CTA performed on October 6, 2024. Correlate with diagnostic cerebral angiography as clinically warranted. No focal area of decreased cerebral blood flow (CBF) is seen to suggest an acute infarct in a large vessel territory.  No defects are seen to suggest a core infarct or an area of reversible ischemia. Electronically Signed: Ben Ziegler MD  3/11/2025 9:50 PM EDT  Workstation ID: [id number]    CT Angiogram Neck  Result Date: 3/11/2025  Impression: No evidence of large vessel occlusion or hemodynamically significant stenosis. Post stenting of large basilar artery aneurysm. The stent is visualized in the distal vertebral artery and extending into the left basal artery. There is some contrast leakage to the left of the stent. This may be secondary to stent porosity or leakage  from the distal aspect of the stent. This appears unchanged from prior CTA performed on October 6, 2024. Correlate with diagnostic cerebral angiography as clinically warranted. No focal area of decreased cerebral blood flow (CBF) is seen to suggest an acute infarct in a large vessel territory.  No defects are seen to suggest a core infarct or an area of reversible ischemia. Electronically Signed: Ben Ziegler MD  3/11/2025 9:50 PM EDT  Workstation ID: [id number]    CT CEREBRAL PERFUSION WITH & WITHOUT CONTRAST  Result Date: 3/11/2025  Impression: No evidence of large vessel occlusion or hemodynamically significant stenosis. Post stenting of large basilar artery aneurysm. The stent is visualized in the distal vertebral artery and extending into the left basal artery. There is some contrast leakage to the left of the stent. This may be secondary to stent porosity or leakage from the distal aspect of the stent. This appears unchanged from prior CTA performed on October 6, 2024. Correlate with diagnostic cerebral angiography as clinically warranted. No focal area of decreased cerebral blood flow (CBF) is seen to suggest an acute infarct in a large vessel territory.  No defects are seen to suggest a core infarct or an area of reversible ischemia. Electronically Signed: Ben Ziegler MD  3/11/2025 9:50 PM EDT  Workstation ID: [id number]    CT Head Without Contrast Stroke Protocol  Result Date: 3/11/2025  Impression: 1.Basilar artery aneurysm which could be thrombosed. There is a radiopaque object along the more superior right aspect of the aneurysm that could reflect pipeline flow diverter. 2.There are white matter changes involving the cerebral hemispheres which could reflect sequela to small vessel ischemic change. 3.There is dilatation of the lateral ventricles which has been noted. Electronically Signed: Akash Lux MD  3/11/2025 9:03 PM EDT  Workstation ID: [id number]     No radiology results for the last  day        Results for orders placed during the hospital encounter of 03/11/25    Adult Transthoracic Echo Complete W/ Cont if Necessary Per Protocol (With Agitated Saline)    Interpretation Summary    Left ventricular systolic function is normal. Estimated left ventricular EF = 70%    Mild to moderate aortic valve regurgitation is present            Assessment/Plan     Assessment/Plan:    Lauryn Romo is a 64 y.o.  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 Savage (2022, 2023) , hypertension, Basal cell carcinoma 5/2024, migraine with visual aura, and anxiety.  In October 2024 she had acute thrombosis of her pipeline stent and basilar artery and underwent successful mechanical thrombectomy.  She presented to [facility] ED with left-sided weakness, facial droop, with aphasia, and unresponsiveness following a fall PTA.   On exam, the patient's left unilateral weakness is unchanged.  She is experiencing bilateral headache with eye pain.  This is similar to her aura prior to migraine, there is no characteristic changes of her longstanding headaches.  CT head without contrast negative for acute abnormality unchanged from prior CT head back in October. CTA head and neck no large vessel occlusion. CTP negative for perfusion deficit no core infarct or penumbra.   Patient currently on 10mg Integrilin drip.  Plavix on hold until cleared, postoperatively.  Patient follows-up with Dr. Lundberg outpatient.      Neurosurgery  shunt scheduled for Tuesday 18/2025    Etiology of Headache is likely secondary to hydrocephalus     1. Hydrocephalus  2. Bilateral Frontal Headaches in the setting of hydrocephalus  3.  Bilateral frontal migraine (chronic)  Neurosurgery  shunt scheduled for Tuesday 3/8/2025  -Integrilin, per neurosurgery   -MRI Brain is negative for any new or acute ischemic stroke findings.    -TTE LV EF 65%, left ventricle systolic function  normal   Recommend longer study as outpatient if patient has any additional episodes of seizure.    -LDL 58 (patient at goal, no need to change current antilipid medication)  -Meds: Post Procedure recommend to Continue DAPT, aspirin and Plavix home dose for secondary stroke prevention and maintaining stent   -P2 Y12 is 57, patient is responding to antiplatelet therapy.    -NIH\neurocheck per protocol  -Activity as tolerated, fall risk precautions  -PT/OT/SLP recommendation IRF  -Per pharmacy, patient may bring in her home medication of Ubrelvy for breakthrough headache pain.  -Neurology stroke will continue to follow-up     2.  Essential hypertension,   -Normotensive 120 - 140 SBP       3.  Bilateral frontal migraine (chronic), with visual aura and nausea  -Continue Topamax home dose  -Per pharmacy, patient may bring in her home 100 mg dose of Ubrelvy for breakthrough pain.  -One-time dose 1 mg magnesium IV and10 mg Compazine  -CMP to evaluate magnesium level, last serum mag level was elevated.    3.  Possible seizure activity\postictal  -EEG ordered neg.   -Will hold off on antiseizure medication for now, monitor for any seizure activity.  -Seizure precaution in place  -Recommend longer study as outpatient if patient has any additional episodes of seizure.      Plan of care was discussed with the patient and Dr. Purvis.  Stroke neurology will follow peripherally.  Please call with any questions or concerns.  Thank you for this consult.               CAILIN Richardson  03/14/25  09:07 EDT    This was an audio and video enabled telemedicine encounter.

## 2025-03-14 NOTE — CASE MANAGEMENT/SOCIAL WORK
Continued Stay Note  [facility]     Patient Name: Lauryn Romo  MRN: [id number]  Today's Date: 3/14/2025    Admit Date: 3/11/2025    Plan: TBD   Discharge Plan       Row Name 03/14/25 1523       Plan    Plan TBD    Patient/Family in Agreement with Plan yes    Plan Comments Followed up with Ms. Romo and her , Chris, at the bedside, for discharge planning.    Ms. Romo is having surgery next week.  DC plan is TBD.  Referral has been given to Rossi with [facility], at patient's request.  [facility] will review Ms. Romo's insurance benefits regarding the rehab admission and initiate a prior auth, if necessary.  The patient is agreeable to the DC plan.  CM will follow up.    Final Discharge Disposition Code 01 - home or self-care                         Expected Discharge Date and Time       Expected Discharge Date Expected Discharge Time    Mar 20, 2025               Alysha Gale RN

## 2025-03-14 NOTE — PROGRESS NOTES
[facility] Medicine Services  PROGRESS NOTE    Patient Name: Lauryn Romo  : 1960  MRN: [id number]    Date of Admission: 3/11/2025  Primary Care Physician: Melissa Lazo APRN    Subjective   Subjective     CC:  Follow-up fall     HPI:  Patient was seen resting in recliner in no apparent distress.  No acute events overnight per nursing.  Patient reports she was feeling better yesterday.  Notes slight headache this morning.  She has not had a bowel movement in several days.  Reports that she takes lactulose daily as needed at home for constipation.  No new complaints at this time      Objective   Objective     Vital Signs:   Temp:  [98 °F (36.7 °C)-98.2 °F (36.8 °C)] 98 °F (36.7 °C)  Heart Rate:  [58-75] 65  Resp:  [18] 18  BP: (107-130)/(62-80) 112/67     Physical Exam:  Constitutional: No acute distress, awake, alert, frail   HENT: NCAT, mucous membranes moist  Respiratory: Clear to auscultation bilaterally, respiratory effort normal   Cardiovascular: RRR, no murmurs, palpable pedal pulses bilaterally, cap refill brisk   Gastrointestinal: Positive bowel sounds, soft, nontender, nondistended  Musculoskeletal: No BLE edema   Psychiatric: Appropriate affect, cooperative  Neurologic: Oriented x 3, moves all extremities, speech clear  Skin: warm, dry, no visible rash     Results Reviewed:  LAB RESULTS:      Lab 25   WBC 6.92 7.43  --    HEMOGLOBIN 12.9 13.8  --    HEMOGLOBIN, POC  --   --  14.3   HEMATOCRIT 40.2 42.1  --    HEMATOCRIT POC  --   --  42   PLATELETS 232 240  --    NEUTROS ABS 4.12 5.24  --    IMMATURE GRANS (ABS) 0.02 0.02  --    LYMPHS ABS 2.02 1.54  --    MONOS ABS 0.52 0.43  --    EOS ABS 0.19 0.16  --    MCV 94.1 93.3  --    LACTATE  --  1.2  --    PROTIME  --  12.9  --    APTT  --  26.5  --          Lab 25   SODIUM 142  --   --    POTASSIUM 3.8  --   --    CHLORIDE 109*  --   --     CO2 23.0  --   --    ANION GAP 10.0  --   --    BUN 16  --   --    CREATININE 0.55*  --  0.70   EGFR 103.1  --  97.3   GLUCOSE 86  --   --    CALCIUM 8.6  --   --    MAGNESIUM 3.1*  --   --    HEMOGLOBIN A1C 5.80*  --   --    TSH  --  4.090  --          Lab 03/12/25  0525 03/11/25 2047   TOTAL PROTEIN 6.5  --    ALBUMIN 4.0  --    GLOBULIN 2.5  --    ALT (SGPT) 36* 44*   AST (SGOT) 26 34*   BILIRUBIN 0.3  --    ALK PHOS 115  --          Lab 03/11/25 2047   PROTIME 12.9   INR 0.96         Lab 03/12/25 0525   CHOLESTEROL 114   LDL CHOL 42   HDL CHOL 60   TRIGLYCERIDES 50             Brief Urine Lab Results  (Last result in the past 365 days)        Color   Clarity   Blood   Leuk Est   Nitrite   Protein   CREAT   Urine HCG        03/11/25 2213 Yellow   Clear   Negative   Trace   Negative   Negative                   Microbiology Results Abnormal       None            No radiology results from the last 24 hrs    Results for orders placed during the hospital encounter of 03/11/25    Adult Transthoracic Echo Complete W/ Cont if Necessary Per Protocol (With Agitated Saline)    Interpretation Summary    Left ventricular systolic function is normal. Estimated left ventricular EF = 70%    Mild to moderate aortic valve regurgitation is present      Current medications:  Scheduled Meds:aspirin, 81 mg, Oral, Daily   Or  aspirin, 300 mg, Rectal, Daily  atorvastatin, 80 mg, Oral, Nightly  bethanechol, 25 mg, Oral, BID  famotidine, 20 mg, Oral, BID AC  magnesium sulfate, 1 g, Intravenous, Once  nortriptyline, 20 mg, Oral, Nightly  pantoprazole, 40 mg, Oral, Q AM  prochlorperazine, 10 mg, Intravenous, Once  sertraline, 150 mg, Oral, Daily  sodium chloride, 10 mL, Intravenous, Q12H  sodium chloride, 10 mL, Intravenous, Q12H  topiramate, 100 mg, Oral, Nightly      Continuous Infusions:eptifibatide, 1 mcg/kg/min, Last Rate: 1 mcg/kg/min (03/14/25 0932)      PRN Meds:.  acetaminophen **OR** acetaminophen **OR** acetaminophen     Calcium Replacement - Follow Nurse / BPA Driven Protocol    HYDROcodone-acetaminophen    lactulose    Magnesium Standard Dose Replacement - Follow Nurse / BPA Driven Protocol    melatonin    nitroglycerin    ondansetron    Phosphorus Replacement - Follow Nurse / BPA Driven Protocol    Potassium Replacement - Follow Nurse / BPA Driven Protocol    sodium chloride    sodium chloride    sodium chloride    sodium chloride    sodium chloride    Assessment & Plan   Assessment & Plan     Active Hospital Problems    Diagnosis  POA    **Left-sided weakness [R53.1]  Yes    Cerebral ventriculomegaly [G93.89]  Unknown      Resolved Hospital Problems   No resolved problems to display.        Brief Hospital Course to date:  Lauryn Romo is a 64 y.o. 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     Transient aphasia  Confusion  Giant basilar artery aneurysm s/p flow diverter  Migraines  -Neurology and neurointerventional neurosurgery follow   --Gen surgery Dr. Raya following, plan for  shunt on 3/18  -Plavix on hold,  Started on Integrilin in interim  -Continue home Topamax  -Neurology recommends Tylenol and migraine cocktails as needed for headaches.  Avoid NSAIDs.  Consider Depakote 800 mg IV x 1 for intractable headache.  -PT/OT  -AM labs      Hypertension  -Well-controlled currently     Hyperlipidemia     Anxiety and depression  -Continue home nortriptyline and Zoloft    Constipation  -lactulose PRN        Expected Discharge Location and Transportation: TBD  Expected Discharge   Expected Discharge Date: 3/20/2025; Expected Discharge Time:       VTE Prophylaxis:  Mechanical VTE prophylaxis orders are present.         AM-PAC 6 Clicks Score (PT): 19 (03/13/25 1504)    CODE STATUS:   Code Status and Medical  Interventions: CPR (Attempt to Resuscitate); Full Support   Ordered at: 03/11/25 2248     Code Status (Patient has no pulse and is not breathing):    CPR (Attempt to Resuscitate)     Medical Interventions (Patient has pulse or is breathing):    Full Support     Level Of Support Discussed With:    Patient       Eligio Holliday, CAILIN  03/14/25

## 2025-03-15 LAB
ANION GAP SERPL CALCULATED.3IONS-SCNC: 11 MMOL/L (ref 5–15)
BUN SERPL-MCNC: 14 MG/DL (ref 8–23)
BUN/CREAT SERPL: 20.9 (ref 7–25)
CALCIUM SPEC-SCNC: 9.6 MG/DL (ref 8.6–10.5)
CHLORIDE SERPL-SCNC: 106 MMOL/L (ref 98–107)
CO2 SERPL-SCNC: 24 MMOL/L (ref 22–29)
CREAT SERPL-MCNC: 0.67 MG/DL (ref 0.57–1)
DEPRECATED RDW RBC AUTO: 46.4 FL (ref 37–54)
EGFRCR SERPLBLD CKD-EPI 2021: 98.4 ML/MIN/1.73
ERYTHROCYTE [DISTWIDTH] IN BLOOD BY AUTOMATED COUNT: 13.5 % (ref 12.3–15.4)
GLUCOSE SERPL-MCNC: 93 MG/DL (ref 65–99)
HCT VFR BLD AUTO: 45.1 % (ref 34–46.6)
HGB BLD-MCNC: 14.7 G/DL (ref 12–15.9)
MCH RBC QN AUTO: 30.5 PG (ref 26.6–33)
MCHC RBC AUTO-ENTMCNC: 32.6 G/DL (ref 31.5–35.7)
MCV RBC AUTO: 93.6 FL (ref 79–97)
PLATELET # BLD AUTO: 265 10*3/MM3 (ref 140–450)
PMV BLD AUTO: 10.7 FL (ref 6–12)
POTASSIUM SERPL-SCNC: 4.3 MMOL/L (ref 3.5–5.2)
RBC # BLD AUTO: 4.82 10*6/MM3 (ref 3.77–5.28)
SODIUM SERPL-SCNC: 141 MMOL/L (ref 136–145)
WBC NRBC COR # BLD AUTO: 7.75 10*3/MM3 (ref 3.4–10.8)

## 2025-03-15 PROCEDURE — 99232 SBSQ HOSP IP/OBS MODERATE 35: CPT | Performed by: NURSE PRACTITIONER

## 2025-03-15 PROCEDURE — 99024 POSTOP FOLLOW-UP VISIT: CPT

## 2025-03-15 PROCEDURE — 25010000002 EPTIFIBATIDE PER 5 MG

## 2025-03-15 PROCEDURE — 80048 BASIC METABOLIC PNL TOTAL CA: CPT | Performed by: NURSE PRACTITIONER

## 2025-03-15 PROCEDURE — 99233 SBSQ HOSP IP/OBS HIGH 50: CPT | Performed by: NURSE PRACTITIONER

## 2025-03-15 PROCEDURE — 85027 COMPLETE CBC AUTOMATED: CPT | Performed by: NURSE PRACTITIONER

## 2025-03-15 PROCEDURE — 25010000002 ONDANSETRON PER 1 MG: Performed by: INTERNAL MEDICINE

## 2025-03-15 RX ADMIN — BETHANECHOL CHLORIDE 25 MG: 25 TABLET ORAL at 20:58

## 2025-03-15 RX ADMIN — LACTULOSE 30 G: 20 SOLUTION ORAL at 09:35

## 2025-03-15 RX ADMIN — Medication 5 MG: at 21:05

## 2025-03-15 RX ADMIN — SERTRALINE HYDROCHLORIDE 150 MG: 50 TABLET ORAL at 09:35

## 2025-03-15 RX ADMIN — TOPIRAMATE 100 MG: 100 TABLET, FILM COATED ORAL at 20:59

## 2025-03-15 RX ADMIN — Medication: at 09:36

## 2025-03-15 RX ADMIN — ASPIRIN 81 MG CHEWABLE TABLET 81 MG: 81 TABLET CHEWABLE at 09:35

## 2025-03-15 RX ADMIN — BETHANECHOL CHLORIDE 25 MG: 25 TABLET ORAL at 09:35

## 2025-03-15 RX ADMIN — FAMOTIDINE 20 MG: 20 TABLET, FILM COATED ORAL at 17:20

## 2025-03-15 RX ADMIN — ONDANSETRON 4 MG: 2 INJECTION INTRAMUSCULAR; INTRAVENOUS at 21:05

## 2025-03-15 RX ADMIN — EPTIFIBATIDE 1 MCG/KG/MIN: 0.75 INJECTION, SOLUTION INTRAVENOUS at 16:18

## 2025-03-15 RX ADMIN — ATORVASTATIN CALCIUM 80 MG: 40 TABLET, FILM COATED ORAL at 20:59

## 2025-03-15 RX ADMIN — FAMOTIDINE 20 MG: 20 TABLET, FILM COATED ORAL at 09:35

## 2025-03-15 RX ADMIN — NORTRIPTYLINE HYDROCHLORIDE 20 MG: 10 CAPSULE ORAL at 21:00

## 2025-03-15 RX ADMIN — PANTOPRAZOLE SODIUM 40 MG: 40 TABLET, DELAYED RELEASE ORAL at 05:15

## 2025-03-15 NOTE — PROGRESS NOTES
[facility] Medicine Services  PROGRESS NOTE    Patient Name: Lauryn Romo  : 1960  MRN: [id number]    Date of Admission: 3/11/2025  Primary Care Physician: Melissa Lazo APRN    Subjective   Subjective     CC:  Follow up fall     HPI:  Patient was seen resting in bed in no apparent distress. No acute events overnight per nursing. Headache is currently improved. Denies nausea. She was able to eat most of her breakfast. She is aware of tentative plan for surgery on Tuesday, 3/18/2025. No new complaints at this time.     Objective   Objective     Vital Signs:   Temp:  [98.3 °F (36.8 °C)-98.6 °F (37 °C)] 98.3 °F (36.8 °C)  Heart Rate:  [60-79] 60  Resp:  [18] 18  BP: (124-128)/(70-75) 124/74     Physical Exam:  Constitutional: No acute distress, awake, alert, pleasant   HENT: NCAT, mucous membranes moist  Respiratory: Clear to auscultation bilaterally, respiratory effort normal   Cardiovascular: RRR, no murmurs, palpable pedal pulses bilaterally, cap refill brisk   Gastrointestinal: Positive bowel sounds, soft, nontender, nondistended  Musculoskeletal: No BLE edema   Psychiatric: Appropriate affect, cooperative  Neurologic: Oriented x 3, moves all extremities, speech clear  Skin: warm, dry, no visible rash     Results Reviewed:  LAB RESULTS:      Lab 25   WBC 6.92 7.43  --    HEMOGLOBIN 12.9 13.8  --    HEMOGLOBIN, POC  --   --  14.3   HEMATOCRIT 40.2 42.1  --    HEMATOCRIT POC  --   --  42   PLATELETS 232 240  --    NEUTROS ABS 4.12 5.24  --    IMMATURE GRANS (ABS) 0.02 0.02  --    LYMPHS ABS 2.02 1.54  --    MONOS ABS 0.52 0.43  --    EOS ABS 0.19 0.16  --    MCV 94.1 93.3  --    LACTATE  --  1.2  --    PROTIME  --  12.9  --    APTT  --  26.5  --          Lab 25  1652 25  0525 25   SODIUM 141 142  --   --    POTASSIUM 4.5 3.8  --   --    CHLORIDE 107 109*  --   --    CO2 23.0 23.0  --   --    ANION  GAP 11.0 10.0  --   --    BUN 15 16  --   --    CREATININE 0.61 0.55*  --  0.70   EGFR 100.6 103.1  --  97.3   GLUCOSE 114* 86  --   --    CALCIUM 8.8 8.6  --   --    MAGNESIUM  --  3.1*  --   --    HEMOGLOBIN A1C  --  5.80*  --   --    TSH  --   --  4.090  --          Lab 03/12/25 0525 03/11/25 2047   TOTAL PROTEIN 6.5  --    ALBUMIN 4.0  --    GLOBULIN 2.5  --    ALT (SGPT) 36* 44*   AST (SGOT) 26 34*   BILIRUBIN 0.3  --    ALK PHOS 115  --          Lab 03/11/25 2047   PROTIME 12.9   INR 0.96         Lab 03/12/25 0525   CHOLESTEROL 114   LDL CHOL 42   HDL CHOL 60   TRIGLYCERIDES 50             Brief Urine Lab Results  (Last result in the past 365 days)        Color   Clarity   Blood   Leuk Est   Nitrite   Protein   CREAT   Urine HCG        03/11/25 2213 Yellow   Clear   Negative   Trace   Negative   Negative                   Microbiology Results Abnormal       None            No radiology results from the last 24 hrs    Results for orders placed during the hospital encounter of 03/11/25    Adult Transthoracic Echo Complete W/ Cont if Necessary Per Protocol (With Agitated Saline)    Interpretation Summary    Left ventricular systolic function is normal. Estimated left ventricular EF = 70%    Mild to moderate aortic valve regurgitation is present      Current medications:  Scheduled Meds:Pharmacy Consult, , Not Applicable, BID  aspirin, 81 mg, Oral, Daily   Or  aspirin, 300 mg, Rectal, Daily  atorvastatin, 80 mg, Oral, Nightly  bethanechol, 25 mg, Oral, BID  famotidine, 20 mg, Oral, BID AC  nortriptyline, 20 mg, Oral, Nightly  pantoprazole, 40 mg, Oral, Q AM  prochlorperazine, 10 mg, Intravenous, Once  sertraline, 150 mg, Oral, Daily  sodium chloride, 10 mL, Intravenous, Q12H  sodium chloride, 10 mL, Intravenous, Q12H  topiramate, 100 mg, Oral, Nightly      Continuous Infusions:eptifibatide, 1 mcg/kg/min, Last Rate: 1 mcg/kg/min (03/14/25 2048)      PRN Meds:.  acetaminophen **OR** acetaminophen **OR**  acetaminophen    Calcium Replacement - Follow Nurse / BPA Driven Protocol    HYDROcodone-acetaminophen    lactulose    Magnesium Standard Dose Replacement - Follow Nurse / BPA Driven Protocol    melatonin    nitroglycerin    ondansetron    Phosphorus Replacement - Follow Nurse / BPA Driven Protocol    Potassium Replacement - Follow Nurse / BPA Driven Protocol    sodium chloride    sodium chloride    sodium chloride    sodium chloride    sodium chloride    ubrogepant    Assessment & Plan   Assessment & Plan     Active Hospital Problems    Diagnosis  POA    **Left-sided weakness [R53.1]  Yes    Cerebral ventriculomegaly [G93.89]  Unknown      Resolved Hospital Problems   No resolved problems to display.        Brief Hospital Course to date:  Lauryn Romo is a 64 y.o. 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     This patient's problems and plans were partially entered by my partner and updated as appropriate by me 03/15/25.    Transient aphasia  Confusion  Giant basilar artery aneurysm s/p flow diverter  Migraines  -Neurology and neurointerventional neurosurgery follow   --Gen surgery Dr. Raya following, plan for  shunt on 3/18  -Plavix on hold for procedure   -continue Integrilin for now   -Continue home Topamax  -Neurology recommends Tylenol and migraine cocktails as needed for headaches.  Avoid NSAIDs.  Consider Depakote 800 mg IV x 1 for intractable headache.  -PT/OT  -AM labs      Hypertension  -Well-controlled currently     Hyperlipidemia  -continue Lipitor      Anxiety and depression  -Continue home nortriptyline and Zoloft     Constipation  -lactulose PRN     Expected Discharge Location and Transportation: TBD  Expected Discharge   Expected Discharge Date: 3/20/2025; Expected  Discharge Time:       VTE Prophylaxis:  Mechanical VTE prophylaxis orders are present.         AM-PAC 6 Clicks Score (PT): 21 (03/14/25 0800)    CODE STATUS:   Code Status and Medical Interventions: CPR (Attempt to Resuscitate); Full Support   Ordered at: 03/11/25 1081     Code Status (Patient has no pulse and is not breathing):    CPR (Attempt to Resuscitate)     Medical Interventions (Patient has pulse or is breathing):    Full Support     Level Of Support Discussed With:    Patient       Eligio Holliday, APRN  03/15/25

## 2025-03-15 NOTE — CONSULTS
Stroke Progress Note       Chief Complaint: Headache, left-sided weakness    Subjective    Subjective     Subjective:  No adverse events overnight.  No acute distress noted.  Sitting up in the chair.  Reports that her headache has resolved.  Continues with left-sided weakness, LFD.    Review of Systems   Neurological:  Positive for weakness. Negative for headaches.            Objective    Objective      Temp:  [98.3 °F (36.8 °C)-98.6 °F (37 °C)] 98.3 °F (36.8 °C)  Heart Rate:  [60-79] 60  Resp:  [18] 18  BP: (124-128)/(70-75) 124/74    Physical Exam  Constitutional:       General: She is not in acute distress.  HENT:      Head: Normocephalic and atraumatic.   Eyes:      Extraocular Movements: Extraocular movements intact.      Pupils: Pupils are equal, round, and reactive to light.   Cardiovascular:      Rate and Rhythm: Normal rate and regular rhythm.   Pulmonary:      Effort: Pulmonary effort is normal. No respiratory distress.   Musculoskeletal:         General: Normal range of motion.      Cervical back: Normal range of motion and neck supple.   Skin:     General: Skin is warm and dry.      Capillary Refill: Capillary refill takes less than 2 seconds.   Neurological:      Mental Status: She is alert and oriented to person, place, and time.      Comments: LFD, LUE/LLE 4 out of 5 strength no drift, speech is fluent with no aphasia, tracks without difficulty, VFF       Results Review:    I reviewed the patient's new clinical results.    EEG  Result Date: 3/12/2025  Normal study This report is transcribed using the Dragon dictation system.      MRI Brain Without Contrast  Result Date: 3/12/2025  Impression: No acute intracranial findings. No acute infarct. Similar basilar artery aneurysm exerting mass effect on the brainstem with posterior displacement of the jong and medulla. Please refer to yesterday's CTA exam for more complete details of this finding. Electronically Signed: Kwesi Guan MD  3/12/2025 8:19 AM  EDT  Workstation ID: [id number]    XR Chest 1 View  Result Date: 3/11/2025  Impression: An acute pulmonary process is not apparent. Electronically Signed: Akash Lux MD  3/11/2025 10:21 PM EDT  Workstation ID: [id number]    CT Angiogram Head w AI Analysis of LVO  Result Date: 3/11/2025  Impression: No evidence of large vessel occlusion or hemodynamically significant stenosis. Post stenting of large basilar artery aneurysm. The stent is visualized in the distal vertebral artery and extending into the left basal artery. There is some contrast leakage to the left of the stent. This may be secondary to stent porosity or leakage from the distal aspect of the stent. This appears unchanged from prior CTA performed on October 6, 2024. Correlate with diagnostic cerebral angiography as clinically warranted. No focal area of decreased cerebral blood flow (CBF) is seen to suggest an acute infarct in a large vessel territory.  No defects are seen to suggest a core infarct or an area of reversible ischemia. Electronically Signed: Ben Ziegler MD  3/11/2025 9:50 PM EDT  Workstation ID: [id number]    CT Angiogram Neck  Result Date: 3/11/2025  Impression: No evidence of large vessel occlusion or hemodynamically significant stenosis. Post stenting of large basilar artery aneurysm. The stent is visualized in the distal vertebral artery and extending into the left basal artery. There is some contrast leakage to the left of the stent. This may be secondary to stent porosity or leakage from the distal aspect of the stent. This appears unchanged from prior CTA performed on October 6, 2024. Correlate with diagnostic cerebral angiography as clinically warranted. No focal area of decreased cerebral blood flow (CBF) is seen to suggest an acute infarct in a large vessel territory.  No defects are seen to suggest a core infarct or an area of reversible ischemia. Electronically Signed: Ben Ziegler MD  3/11/2025 9:50 PM EDT  Workstation ID:  [id number]    CT CEREBRAL PERFUSION WITH & WITHOUT CONTRAST  Result Date: 3/11/2025  Impression: No evidence of large vessel occlusion or hemodynamically significant stenosis. Post stenting of large basilar artery aneurysm. The stent is visualized in the distal vertebral artery and extending into the left basal artery. There is some contrast leakage to the left of the stent. This may be secondary to stent porosity or leakage from the distal aspect of the stent. This appears unchanged from prior CTA performed on October 6, 2024. Correlate with diagnostic cerebral angiography as clinically warranted. No focal area of decreased cerebral blood flow (CBF) is seen to suggest an acute infarct in a large vessel territory.  No defects are seen to suggest a core infarct or an area of reversible ischemia. Electronically Signed: Ben Ziegler MD  3/11/2025 9:50 PM EDT  Workstation ID: [id number]    CT Head Without Contrast Stroke Protocol  Result Date: 3/11/2025  Impression: 1.Basilar artery aneurysm which could be thrombosed. There is a radiopaque object along the more superior right aspect of the aneurysm that could reflect pipeline flow diverter. 2.There are white matter changes involving the cerebral hemispheres which could reflect sequela to small vessel ischemic change. 3.There is dilatation of the lateral ventricles which has been noted. Electronically Signed: Akash Lux MD  3/11/2025 9:03 PM EDT  Workstation ID: [id number]       Results for orders placed during the hospital encounter of 03/11/25    Adult Transthoracic Echo Complete W/ Cont if Necessary Per Protocol (With Agitated Saline)    Interpretation Summary    Left ventricular systolic function is normal. Estimated left ventricular EF = 70%    Mild to moderate aortic valve regurgitation is present            No results found for: \"WBC\", \"RBC\", \"HGB\", \"HCT\", \"MCV\", \"MCH\", \"MCHC\", \"RDW\", \"RDWSD\", \"MPV\", \"PLT\", \"NEUTRORELPCT\", \"LYMPHORELPCT\", \"MONORELPCT\", \"EOSRELPCT\", " "\"BASORELPCT\", \"AUTOIGPER\", \"NEUTROABS\", \"LYMPHSABS\", \"MONOSABS\", \"EOSABS\", \"BASOSABS\", \"AUTOIGNUM\", \"NRBC\"    Lab Results   Component Value Date    GLUCOSE 114 (H) 03/14/2025    BUN 15 03/14/2025    CREATININE 0.61 03/14/2025     03/14/2025    K 4.5 03/14/2025     03/14/2025    CALCIUM 8.8 03/14/2025    PROTEINTOT 6.5 03/12/2025    ALBUMIN 4.0 03/12/2025    ALT 36 (H) 03/12/2025    AST 26 03/12/2025    ALKPHOS 115 03/12/2025    BILITOT 0.3 03/12/2025    GLOB 2.5 03/12/2025    AGRATIO 1.6 03/12/2025    BCR 24.6 03/14/2025    ANIONGAP 11.0 03/14/2025    EGFR 100.6 03/14/2025           Lab 03/12/25  0525   HEMOGLOBIN A1C 5.80*       Lipid Panel          10/7/2024    05:55 3/12/2025    05:25   Lipid Panel   Total Cholesterol 132  114    Triglycerides 62  50    HDL Cholesterol 61  60    VLDL Cholesterol 13  12    LDL Cholesterol  58  42    LDL/HDL Ratio 0.96  0.73        Assessment/Plan     Assessment/Plan:  Lauryn Romo is a 64 y.o. female with known medical diagnoses of HLD, HTN,  migraines, cerebellar stroke stroke (residual dizziness, ataxia), and giant basilar aneurysm s/p pipeline embolization (Dr. Savage, 2022/2023), s/p thrombectomy of pipeline stent thrombosis with resultant TICI 3 flow (after stopping Plavix, Dr. Savage 10/2024) who presented to [facility] on [facility]11[facility]2025 with complaints of a syncopal episode (no LOC), transient aphasia, headache, and fall.  She reported progressively unsteady gait as well as frequent falls.  MRI was negative for any new infarct.  Progressive ventriculomegaly with possible transependymal resorption of CSF noted.  Imaging was reviewed by Dr. Dr. Savage of neurointervention who reviewed with Dr. Mendez of neurosurgery.  Given her progressive decline and progressive ventriculomegaly, Dr. Mendez recommends  shunt placement on 3/18/2025.     Plavix was discontinued given her upcoming surgery.  IV Integrilin was started given her recent in-stent thrombosis while off Plavix " with plans to stop the Integrilin on midnight Monday night prior to surgery.    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 Progressive ventriculomegaly with possible transependymal resorption of CSF noted per Dr. Savage.    - Neurosurgery following. Plan for  shunt with Dr. Mendez on 3/18/2025  - Continue Integrilin drip until midnight on Monday  - P2 Y12 57.  Plavix currently discontinued due to plan for  shunt  - Continue aspirin 81 mg daily for secondary stroke prevention  - Continue atorvastatin 80 mg daily for secondary stroke prevention.  LDL 42.  At goal of less than 70 for secondary stroke prevention.  - Hemoglobin A1c 5.8 on 3/12/2025.  In the prediabetes range.  Dietary counseling.  - /75.  BP goals less than 130/80.  Management per primary team.  - Headaches; resolved.  Continue home Topamax.  Okay to bring home Ubrelvy per pharmacy with any continued headache.  - EEG negative for epileptiform activity.  No need for AEDs at this time.  Can consider longer monitoring with any additional episodes concerning for possible seizure.  - TTE 3/13/2025 with EF 66 to 70%, no LAE.  Negative bubble study noted on TTE from 10/7/2024.  - PT/OT/SLP recommend IRF at DC  - Plan discussed with Dr. Haq, the patient, and nursing staff.Stroke neurology will continue to follow along peripherally while admitted.  Please call with any questions or concerns.    Leila Lucas, CAILIN, AGACNP-BC  03/15/25  08:18 EDT

## 2025-03-15 NOTE — PROGRESS NOTES
HOD# : 2    No events last night    Ms. Romo reports that she is overall doing well. She has still had some mild headaches. She was resting comfortably in bedside chair during examination, Integrilin drip running.    Patient denied having any questions or concerns at this time.      Left-sided weakness    Cerebral ventriculomegaly      Temp:  [98.3 °F (36.8 °C)-98.6 °F (37 °C)] 98.3 °F (36.8 °C)  Heart Rate:  [60-74] 64  Resp:  [18] 18  BP: (111-125)/(70-75) 111/75  I/O last 3 completed shifts:  In: 240 [P.O.:240]  Out: -   No intake/output data recorded.  Vital signs were reviewed and documented in the chart      EXAM   Body mass index is 24.56 kg/m².    Patient was resting comfortably in bedside chair during examination.  On Integrilin drip  Patient appeared in good neurologic function with normal comprehension   CN grossly intact  Moves all extremities to command  Vital signs were reviewed and documented in the chart    Sense of smell deferred    Pupils symmetric equally reactive funduscopic exam not visualized   Visual fields intact to confrontation  Extraocular movements intact  Face motor function is symmetric  Facial sensations normal  Hearing intact to finger rub  Tongue is midline  Palate symmetric  Swallowing normal  Shoulder shrug normal    Muscle bulk and tone normal  Right bicep 5 out of 5 strength   Right tricep 5 out of 5 strength   Left bicep 4+ out of 5 strength  Left tricep 4 out of 5 strength  Left hip flexion 4 out of 5 strength  Right hip flexion 5 out of 5 strength   Dorsiflexion 5 out of 5 strength bilaterally  Plantarflexion 5 out of 5 strength bilaterally  Gait not tested  Trace hoffmans on R hand  No hoffmans on L  No clonus     DIAGNOSIS  -Cerebral ventriculomegaly   Plan for  shunt placement by Dr. Mendez on 3/18  -Gait disturbance  -Frequent falls    PLAN  Patient is scheduled for  shunt placement on Tuesday.  Her Plavix was discontinued on 3/12/2025 and she was started on  Integrilin drip at that time. The plan is for her Integrilin to be turned off at 0000 on Tuesday morning with her posted as first case for her shunt placement.

## 2025-03-15 NOTE — PROGRESS NOTES
Stroke Progress Note       Chief Complaint: Headache, left-sided weakness    Subjective    Subjective     No adverse events overnight.  No acute distress noted.  Sitting up in the chair.  Reports that her headache has resolved.  Continues with left-sided weakness, LFD.    Review of Systems   Neurological:  Positive for weakness. Negative for headaches.      Objective    Objective      Temp:  [98.3 °F (36.8 °C)-98.6 °F (37 °C)] 98.3 °F (36.8 °C)  Heart Rate:  [60-79] 60  Resp:  [18] 18  BP: (124-128)/(70-75) 124/74    Physical Exam  Constitutional:       General: She is not in acute distress.  HENT:      Head: Normocephalic and atraumatic.   Eyes:      Extraocular Movements: Extraocular movements intact.      Pupils: Pupils are equal, round, and reactive to light.   Cardiovascular:      Rate and Rhythm: Normal rate and regular rhythm.   Pulmonary:      Effort: Pulmonary effort is normal. No respiratory distress.   Musculoskeletal:         General: Normal range of motion.      Cervical back: Normal range of motion and neck supple.   Skin:     General: Skin is warm and dry.      Capillary Refill: Capillary refill takes less than 2 seconds.   Neurological:      Mental Status: She is alert and oriented to person, place, and time.      Comments: LFD, LUE/LLE 4 out of 5 strength no drift, speech is fluent with no aphasia, tracks without difficulty, VFF       Results Review:    I reviewed the patient's new clinical results.    EEG  Result Date: 3/12/2025  Normal study This report is transcribed using the Dragon dictation system.      MRI Brain Without Contrast  Result Date: 3/12/2025  Impression: No acute intracranial findings. No acute infarct. Similar basilar artery aneurysm exerting mass effect on the brainstem with posterior displacement of the jong and medulla. Please refer to yesterday's CTA exam for more complete details of this finding. Electronically Signed: Kwesi Guan MD  3/12/2025 8:19 AM EDT  Workstation  ID: [id number]    XR Chest 1 View  Result Date: 3/11/2025  Impression: An acute pulmonary process is not apparent. Electronically Signed: Akash Lux MD  3/11/2025 10:21 PM EDT  Workstation ID: [id number]    CT Angiogram Head w AI Analysis of LVO  Result Date: 3/11/2025  Impression: No evidence of large vessel occlusion or hemodynamically significant stenosis. Post stenting of large basilar artery aneurysm. The stent is visualized in the distal vertebral artery and extending into the left basal artery. There is some contrast leakage to the left of the stent. This may be secondary to stent porosity or leakage from the distal aspect of the stent. This appears unchanged from prior CTA performed on October 6, 2024. Correlate with diagnostic cerebral angiography as clinically warranted. No focal area of decreased cerebral blood flow (CBF) is seen to suggest an acute infarct in a large vessel territory.  No defects are seen to suggest a core infarct or an area of reversible ischemia. Electronically Signed: Ben Ziegler MD  3/11/2025 9:50 PM EDT  Workstation ID: [id number]    CT Angiogram Neck  Result Date: 3/11/2025  Impression: No evidence of large vessel occlusion or hemodynamically significant stenosis. Post stenting of large basilar artery aneurysm. The stent is visualized in the distal vertebral artery and extending into the left basal artery. There is some contrast leakage to the left of the stent. This may be secondary to stent porosity or leakage from the distal aspect of the stent. This appears unchanged from prior CTA performed on October 6, 2024. Correlate with diagnostic cerebral angiography as clinically warranted. No focal area of decreased cerebral blood flow (CBF) is seen to suggest an acute infarct in a large vessel territory.  No defects are seen to suggest a core infarct or an area of reversible ischemia. Electronically Signed: Ben Ziegler MD  3/11/2025 9:50 PM EDT  Workstation ID: [id number]    CT  "CEREBRAL PERFUSION WITH & WITHOUT CONTRAST  Result Date: 3/11/2025  Impression: No evidence of large vessel occlusion or hemodynamically significant stenosis. Post stenting of large basilar artery aneurysm. The stent is visualized in the distal vertebral artery and extending into the left basal artery. There is some contrast leakage to the left of the stent. This may be secondary to stent porosity or leakage from the distal aspect of the stent. This appears unchanged from prior CTA performed on October 6, 2024. Correlate with diagnostic cerebral angiography as clinically warranted. No focal area of decreased cerebral blood flow (CBF) is seen to suggest an acute infarct in a large vessel territory.  No defects are seen to suggest a core infarct or an area of reversible ischemia. Electronically Signed: Ben Ziegler MD  3/11/2025 9:50 PM EDT  Workstation ID: [id number]    CT Head Without Contrast Stroke Protocol  Result Date: 3/11/2025  Impression: 1.Basilar artery aneurysm which could be thrombosed. There is a radiopaque object along the more superior right aspect of the aneurysm that could reflect pipeline flow diverter. 2.There are white matter changes involving the cerebral hemispheres which could reflect sequela to small vessel ischemic change. 3.There is dilatation of the lateral ventricles which has been noted. Electronically Signed: Akash Lux MD  3/11/2025 9:03 PM EDT  Workstation ID: [id number]       Results for orders placed during the hospital encounter of 03/11/25    Adult Transthoracic Echo Complete W/ Cont if Necessary Per Protocol (With Agitated Saline)    Interpretation Summary    Left ventricular systolic function is normal. Estimated left ventricular EF = 70%    Mild to moderate aortic valve regurgitation is present            No results found for: \"WBC\", \"RBC\", \"HGB\", \"HCT\", \"MCV\", \"MCH\", \"MCHC\", \"RDW\", \"RDWSD\", \"MPV\", \"PLT\", \"NEUTRORELPCT\", \"LYMPHORELPCT\", \"MONORELPCT\", \"EOSRELPCT\", \"BASORELPCT\", " "\"AUTOIGPER\", \"NEUTROABS\", \"LYMPHSABS\", \"MONOSABS\", \"EOSABS\", \"BASOSABS\", \"AUTOIGNUM\", \"NRBC\"    Lab Results   Component Value Date    GLUCOSE 114 (H) 03/14/2025    BUN 15 03/14/2025    CREATININE 0.61 03/14/2025     03/14/2025    K 4.5 03/14/2025     03/14/2025    CALCIUM 8.8 03/14/2025    PROTEINTOT 6.5 03/12/2025    ALBUMIN 4.0 03/12/2025    ALT 36 (H) 03/12/2025    AST 26 03/12/2025    ALKPHOS 115 03/12/2025    BILITOT 0.3 03/12/2025    GLOB 2.5 03/12/2025    AGRATIO 1.6 03/12/2025    BCR 24.6 03/14/2025    ANIONGAP 11.0 03/14/2025    EGFR 100.6 03/14/2025           Lab 03/12/25  0525   HEMOGLOBIN A1C 5.80*       Lipid Panel          10/7/2024    05:55 3/12/2025    05:25   Lipid Panel   Total Cholesterol 132  114    Triglycerides 62  50    HDL Cholesterol 61  60    VLDL Cholesterol 13  12    LDL Cholesterol  58  42    LDL/HDL Ratio 0.96  0.73        Assessment/Plan     Assessment/Plan:  Lauryn Romo is a 64 y.o. female with known medical diagnoses of HLD, HTN,  migraines, cerebellar stroke stroke (residual dizziness, ataxia), and giant basilar aneurysm s/p pipeline embolization (Dr. Savage, 2022/2023), s/p thrombectomy of pipeline stent thrombosis with resultant TICI 3 flow (after stopping Plavix, Dr. Savage 10/2024) who presented to [facility] on 3/11/2025 with complaints of a syncopal episode (no LOC), transient aphasia, headache, and fall.  She reported progressively unsteady gait as well as frequent falls.  MRI was negative for any new infarct.  Progressive ventriculomegaly with possible transependymal resorption of CSF noted.  Imaging was reviewed by Dr. Dr. Savage of neurointervention who reviewed with Dr. Mendez of neurosurgery.  Given her progressive decline and progressive ventriculomegaly, Dr. Mendez recommends  shunt placement on 3/18/2025.     Plavix was discontinued given her upcoming surgery.  IV Integrilin was started given her recent in-stent thrombosis while off Plavix with plans to " stop the Integrilin on midnight Monday night prior to surgery.    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 Progressive ventriculomegaly with possible transependymal resorption of CSF noted per Dr. Savage.    - Neurosurgery following. Plan for  shunt with Dr. Mendez on 3/18/2025  - Continue Integrilin drip until midnight on Monday  - P2 Y12 57.  Plavix currently discontinued due to plan for  shunt  - Continue aspirin 81 mg daily for secondary stroke prevention  - Continue atorvastatin 80 mg daily for secondary stroke prevention.  LDL 42.  At goal of less than 70 for secondary stroke prevention.  - Hemoglobin A1c 5.8 on 3/12/2025.  In the prediabetes range.  Dietary counseling.  - /75.  BP goals less than 130/80.  Management per primary team.  - Headaches; resolved.  Continue home Topamax.  Okay to bring home Ubrelvy per pharmacy with any continued headache.  - EEG negative for epileptiform activity.  No need for AEDs at this time.  Can consider longer monitoring with any additional episodes concerning for possible seizure.  - TTE 3/13/2025 with EF 66 to 70%, no LAE.  Negative bubble study noted on TTE from 10/7/2024.  - PT/OT/SLP recommend IRF at DC  - Plan discussed with Dr. Haq, the patient, and nursing staff.Stroke neurology will continue to follow along peripherally while admitted.  Please call with any questions or concerns.    Leila Lucas, CAILIN, AGACNP-BC  03/15/25  08:18 EDT

## 2025-03-16 LAB
DEPRECATED RDW RBC AUTO: 47 FL (ref 37–54)
ERYTHROCYTE [DISTWIDTH] IN BLOOD BY AUTOMATED COUNT: 13.6 % (ref 12.3–15.4)
HCT VFR BLD AUTO: 41.8 % (ref 34–46.6)
HGB BLD-MCNC: 13.5 G/DL (ref 12–15.9)
MCH RBC QN AUTO: 30.5 PG (ref 26.6–33)
MCHC RBC AUTO-ENTMCNC: 32.3 G/DL (ref 31.5–35.7)
MCV RBC AUTO: 94.6 FL (ref 79–97)
PLATELET # BLD AUTO: 238 10*3/MM3 (ref 140–450)
PMV BLD AUTO: 10.8 FL (ref 6–12)
RBC # BLD AUTO: 4.42 10*6/MM3 (ref 3.77–5.28)
WBC NRBC COR # BLD AUTO: 10.82 10*3/MM3 (ref 3.4–10.8)

## 2025-03-16 PROCEDURE — 25010000002 EPTIFIBATIDE PER 5 MG

## 2025-03-16 PROCEDURE — 99232 SBSQ HOSP IP/OBS MODERATE 35: CPT | Performed by: NURSE PRACTITIONER

## 2025-03-16 PROCEDURE — 85027 COMPLETE CBC AUTOMATED: CPT | Performed by: NURSE PRACTITIONER

## 2025-03-16 PROCEDURE — 99232 SBSQ HOSP IP/OBS MODERATE 35: CPT

## 2025-03-16 RX ADMIN — ATORVASTATIN CALCIUM 80 MG: 40 TABLET, FILM COATED ORAL at 20:27

## 2025-03-16 RX ADMIN — ASPIRIN 81 MG CHEWABLE TABLET 81 MG: 81 TABLET CHEWABLE at 08:03

## 2025-03-16 RX ADMIN — FAMOTIDINE 20 MG: 20 TABLET, FILM COATED ORAL at 08:03

## 2025-03-16 RX ADMIN — FAMOTIDINE 20 MG: 20 TABLET, FILM COATED ORAL at 17:18

## 2025-03-16 RX ADMIN — BETHANECHOL CHLORIDE 25 MG: 25 TABLET ORAL at 20:26

## 2025-03-16 RX ADMIN — TOPIRAMATE 100 MG: 100 TABLET, FILM COATED ORAL at 20:28

## 2025-03-16 RX ADMIN — Medication 10 ML: at 08:03

## 2025-03-16 RX ADMIN — NORTRIPTYLINE HYDROCHLORIDE 20 MG: 10 CAPSULE ORAL at 20:27

## 2025-03-16 RX ADMIN — PANTOPRAZOLE SODIUM 40 MG: 40 TABLET, DELAYED RELEASE ORAL at 05:29

## 2025-03-16 RX ADMIN — EPTIFIBATIDE 1 MCG/KG/MIN: 0.75 INJECTION, SOLUTION INTRAVENOUS at 01:49

## 2025-03-16 RX ADMIN — BETHANECHOL CHLORIDE 25 MG: 25 TABLET ORAL at 08:02

## 2025-03-16 RX ADMIN — SERTRALINE HYDROCHLORIDE 150 MG: 50 TABLET ORAL at 08:03

## 2025-03-16 RX ADMIN — Medication 5 MG: at 20:28

## 2025-03-16 RX ADMIN — EPTIFIBATIDE 1 MCG/KG/MIN: 0.75 INJECTION, SOLUTION INTRAVENOUS at 18:57
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
## 2025-03-16 NOTE — PROGRESS NOTES
[facility] Medicine Services  PROGRESS NOTE    Patient Name: Lauryn Romo  : 1960  MRN: [id number]    Date of Admission: 3/11/2025  Primary Care Physician: Melissa Lazo APRN    Subjective   Subjective     CC:  Follow up fall     HPI:  Seen resting up in the chair awake and alert.  No acute distress.  Currently denies headache, vision changes, nausea, vomiting.   Left-sided weakness stable.  Continues on Integrilin drip.  Awaiting surgery for shunt placement on Tuesday.    Objective   Objective     Vital Signs:   Temp:  [97.5 °F (36.4 °C)-98.2 °F (36.8 °C)] 97.6 °F (36.4 °C)  Heart Rate:  [58-77] 73  Resp:  [16-18] 16  BP: (107-130)/(68-81) 107/68     Physical Exam:  Constitutional: No acute distress, awake, alert, sitting up in the chair.  HENT: NCAT, mucous membranes moist  Respiratory: Clear to auscultation bilaterally, respiratory effort normal on room air.  Cardiovascular: RRR, no murmurs, palpable pedal pulses bilaterally, cap refill brisk   Gastrointestinal: Positive bowel sounds, soft, nontender, nondistended  Musculoskeletal: No BLE edema.  HAWK spontaneously.  Psychiatric: Appropriate affect, cooperative  Neurologic: Oriented x 3, moves all extremities, speech clear  Skin: warm, dry, no visible rash     Results Reviewed:  LAB RESULTS:      Lab 25  0442 03/15/25  1102 25  0525 25  2047 25  1949   WBC 10.82* 7.75 6.92 7.43  --    HEMOGLOBIN 13.5 14.7 12.9 13.8  --    HEMOGLOBIN, POC  --   --   --   --  14.3   HEMATOCRIT 41.8 45.1 40.2 42.1  --    HEMATOCRIT POC  --   --   --   --  42   PLATELETS 238 265 232 240  --    NEUTROS ABS  --   --  4.12 5.24  --    IMMATURE GRANS (ABS)  --   --  0.02 0.02  --    LYMPHS ABS  --   --  2.02 1.54  --    MONOS ABS  --   --  0.52 0.43  --    EOS ABS  --   --  0.19 0.16  --    MCV 94.6 93.6 94.1 93.3  --    LACTATE  --   --   --  1.2  --    PROTIME  --   --   --  12.9  --    APTT  --   --   --  26.5  --           Lab 03/15/25  1102 03/14/25  1652 03/12/25 0525 03/11/25 2047 03/11/25  1949   SODIUM 141 141 142  --   --    POTASSIUM 4.3 4.5 3.8  --   --    CHLORIDE 106 107 109*  --   --    CO2 24.0 23.0 23.0  --   --    ANION GAP 11.0 11.0 10.0  --   --    BUN 14 15 16  --   --    CREATININE 0.67 0.61 0.55*  --  0.70   EGFR 98.4 100.6 103.1  --  97.3   GLUCOSE 93 114* 86  --   --    CALCIUM 9.6 8.8 8.6  --   --    MAGNESIUM  --   --  3.1*  --   --    HEMOGLOBIN A1C  --   --  5.80*  --   --    TSH  --   --   --  4.090  --          Lab 03/12/25 0525 03/11/25 2047   TOTAL PROTEIN 6.5  --    ALBUMIN 4.0  --    GLOBULIN 2.5  --    ALT (SGPT) 36* 44*   AST (SGOT) 26 34*   BILIRUBIN 0.3  --    ALK PHOS 115  --          Lab 03/11/25 2047   PROTIME 12.9   INR 0.96         Lab 03/12/25 0525   CHOLESTEROL 114   LDL CHOL 42   HDL CHOL 60   TRIGLYCERIDES 50             Brief Urine Lab Results  (Last result in the past 365 days)        Color   Clarity   Blood   Leuk Est   Nitrite   Protein   CREAT   Urine HCG        03/11/25 2213 Yellow   Clear   Negative   Trace   Negative   Negative                   Microbiology Results Abnormal       None            No radiology results from the last 24 hrs    Results for orders placed during the hospital encounter of 03/11/25    Adult Transthoracic Echo Complete W/ Cont if Necessary Per Protocol (With Agitated Saline)    Interpretation Summary    Left ventricular systolic function is normal. Estimated left ventricular EF = 70%    Mild to moderate aortic valve regurgitation is present      Current medications:  Scheduled Meds:Pharmacy Consult, , Not Applicable, BID  aspirin, 81 mg, Oral, Daily   Or  aspirin, 300 mg, Rectal, Daily  atorvastatin, 80 mg, Oral, Nightly  bethanechol, 25 mg, Oral, BID  famotidine, 20 mg, Oral, BID AC  nortriptyline, 20 mg, Oral, Nightly  pantoprazole, 40 mg, Oral, Q AM  prochlorperazine, 10 mg, Intravenous, Once  sertraline, 150 mg, Oral, Daily  sodium chloride, 10  mL, Intravenous, Q12H  sodium chloride, 10 mL, Intravenous, Q12H  topiramate, 100 mg, Oral, Nightly      Continuous Infusions:eptifibatide, 1 mcg/kg/min, Last Rate: 1 mcg/kg/min (03/16/25 0149)      PRN Meds:.  acetaminophen **OR** acetaminophen **OR** acetaminophen    Calcium Replacement - Follow Nurse / BPA Driven Protocol    HYDROcodone-acetaminophen    lactulose    Magnesium Standard Dose Replacement - Follow Nurse / BPA Driven Protocol    melatonin    nitroglycerin    ondansetron    Phosphorus Replacement - Follow Nurse / BPA Driven Protocol    Potassium Replacement - Follow Nurse / BPA Driven Protocol    sodium chloride    sodium chloride    sodium chloride    sodium chloride    sodium chloride    ubrogepant    Assessment & Plan   Assessment & Plan     Active Hospital Problems    Diagnosis  POA    **Left-sided weakness [R53.1]  Yes    Cerebral ventriculomegaly [G93.89]  Unknown      Resolved Hospital Problems   No resolved problems to display.        Brief Hospital Course to date:  Lauryn Romo is a 64 y.o. 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     This patient's problems and plans were partially entered by my partner and updated as appropriate by me 03/16/25.    Assessment/Plan:  Pt is new to me today     Transient aphasia  Confusion  Giant basilar artery aneurysm s/p flow diverter  Migraines  -Neurology and neurointerventional neurosurgery follow   -Gen surgery Dr. Raya following, plan for  shunt on*3/18  -Plavix on hold for procedure   -continue Integrilin for now   -Continue home Topamax  -Neurology recommends Tylenol and migraine cocktails as needed for headaches.  Avoid NSAIDs.  Consider Depakote 800 mg IV x 1 for intractable headache.  -PT/OT  -AM labs       Hypertension  -Well-controlled currently     Hyperlipidemia  -continue Lipitor      Anxiety and depression  -Continue home nortriptyline and Zoloft, stable     Constipation  -lactulose PRN     Expected Discharge Location and Transportation: D  Expected Discharge   Expected Discharge Date: 3/20/2025; Expected Discharge Time:       VTE Prophylaxis:  Mechanical VTE prophylaxis orders are present.         AM-PAC 6 Clicks Score (PT): 22 (03/16/25 0600)    CODE STATUS:   Code Status and Medical Interventions: CPR (Attempt to Resuscitate); Full Support   Ordered at: 03/11/25 2076     Code Status (Patient has no pulse and is not breathing):    CPR (Attempt to Resuscitate)     Medical Interventions (Patient has pulse or is breathing):    Full Support     Level Of Support Discussed With:    Patient       Shoshana Elizabeth High, APRN  03/16/25

## 2025-03-17 ENCOUNTER — PREP FOR SURGERY (OUTPATIENT)
Dept: OTHER | Facility: HOSPITAL | Age: 65
End: 2025-03-17
Payer: COMMERCIAL

## 2025-03-17 ENCOUNTER — ANESTHESIA EVENT (OUTPATIENT)
Dept: PERIOP | Facility: HOSPITAL | Age: 65
End: 2025-03-17
Payer: COMMERCIAL

## 2025-03-17 ENCOUNTER — APPOINTMENT (OUTPATIENT)
Dept: CT IMAGING | Facility: HOSPITAL | Age: 65
End: 2025-03-17
Payer: COMMERCIAL

## 2025-03-17 LAB
ANION GAP SERPL CALCULATED.3IONS-SCNC: 12 MMOL/L (ref 5–15)
BASOPHILS # BLD AUTO: 0.04 10*3/MM3 (ref 0–0.2)
BASOPHILS NFR BLD AUTO: 0.7 % (ref 0–1.5)
BUN SERPL-MCNC: 13 MG/DL (ref 8–23)
BUN/CREAT SERPL: 20.3 (ref 7–25)
CALCIUM SPEC-SCNC: 9 MG/DL (ref 8.6–10.5)
CHLORIDE SERPL-SCNC: 104 MMOL/L (ref 98–107)
CO2 SERPL-SCNC: 23 MMOL/L (ref 22–29)
CREAT SERPL-MCNC: 0.64 MG/DL (ref 0.57–1)
DEPRECATED RDW RBC AUTO: 52.2 FL (ref 37–54)
EGFRCR SERPLBLD CKD-EPI 2021: 99.4 ML/MIN/1.73
EOSINOPHIL # BLD AUTO: 0.17 10*3/MM3 (ref 0–0.4)
EOSINOPHIL NFR BLD AUTO: 2.8 % (ref 0.3–6.2)
ERYTHROCYTE [DISTWIDTH] IN BLOOD BY AUTOMATED COUNT: 13.7 % (ref 12.3–15.4)
GLUCOSE SERPL-MCNC: 137 MG/DL (ref 65–99)
HCT VFR BLD AUTO: 46.3 % (ref 34–46.6)
HGB BLD-MCNC: 14 G/DL (ref 12–15.9)
IMM GRANULOCYTES # BLD AUTO: 0.02 10*3/MM3 (ref 0–0.05)
IMM GRANULOCYTES NFR BLD AUTO: 0.3 % (ref 0–0.5)
LYMPHOCYTES # BLD AUTO: 1.2 10*3/MM3 (ref 0.7–3.1)
LYMPHOCYTES NFR BLD AUTO: 19.9 % (ref 19.6–45.3)
MCH RBC QN AUTO: 30.9 PG (ref 26.6–33)
MCHC RBC AUTO-ENTMCNC: 30.2 G/DL (ref 31.5–35.7)
MCV RBC AUTO: 102.2 FL (ref 79–97)
MONOCYTES # BLD AUTO: 0.26 10*3/MM3 (ref 0.1–0.9)
MONOCYTES NFR BLD AUTO: 4.3 % (ref 5–12)
NEUTROPHILS NFR BLD AUTO: 4.35 10*3/MM3 (ref 1.7–7)
NEUTROPHILS NFR BLD AUTO: 72 % (ref 42.7–76)
NRBC BLD AUTO-RTO: 0 /100 WBC (ref 0–0.2)
PLATELET # BLD AUTO: 243 10*3/MM3 (ref 140–450)
PMV BLD AUTO: 10.9 FL (ref 6–12)
POTASSIUM SERPL-SCNC: 4.4 MMOL/L (ref 3.5–5.2)
RBC # BLD AUTO: 4.53 10*6/MM3 (ref 3.77–5.28)
SODIUM SERPL-SCNC: 139 MMOL/L (ref 136–145)
WBC NRBC COR # BLD AUTO: 6.04 10*3/MM3 (ref 3.4–10.8)

## 2025-03-17 PROCEDURE — 80048 BASIC METABOLIC PNL TOTAL CA: CPT | Performed by: NURSE PRACTITIONER

## 2025-03-17 PROCEDURE — 99232 SBSQ HOSP IP/OBS MODERATE 35: CPT | Performed by: NURSE PRACTITIONER

## 2025-03-17 PROCEDURE — 97112 NEUROMUSCULAR REEDUCATION: CPT

## 2025-03-17 PROCEDURE — 99024 POSTOP FOLLOW-UP VISIT: CPT | Performed by: PHYSICIAN ASSISTANT

## 2025-03-17 PROCEDURE — 92507 TX SP LANG VOICE COMM INDIV: CPT

## 2025-03-17 PROCEDURE — 97530 THERAPEUTIC ACTIVITIES: CPT

## 2025-03-17 PROCEDURE — 85025 COMPLETE CBC W/AUTO DIFF WBC: CPT | Performed by: NURSE PRACTITIONER

## 2025-03-17 PROCEDURE — 70450 CT HEAD/BRAIN W/O DYE: CPT

## 2025-03-17 RX ORDER — SODIUM CHLORIDE 0.9 % (FLUSH) 0.9 %
10 SYRINGE (ML) INJECTION EVERY 12 HOURS SCHEDULED
Status: CANCELLED | OUTPATIENT
Start: 2025-03-17

## 2025-03-17 RX ORDER — FAMOTIDINE 20 MG/1
20 TABLET, FILM COATED ORAL ONCE
Status: CANCELLED | OUTPATIENT
Start: 2025-03-17 | End: 2025-03-17

## 2025-03-17 RX ORDER — EPTIFIBATIDE 0.75 MG/ML
1 INJECTION, SOLUTION INTRAVENOUS CONTINUOUS
Status: CANCELLED | OUTPATIENT
Start: 2025-03-17 | End: 2025-03-18

## 2025-03-17 RX ORDER — FAMOTIDINE 10 MG/ML
20 INJECTION, SOLUTION INTRAVENOUS ONCE
Status: CANCELLED | OUTPATIENT
Start: 2025-03-17 | End: 2025-03-17

## 2025-03-17 RX ORDER — SODIUM CHLORIDE 0.9 % (FLUSH) 0.9 %
10 SYRINGE (ML) INJECTION AS NEEDED
Status: CANCELLED | OUTPATIENT
Start: 2025-03-17

## 2025-03-17 RX ADMIN — SERTRALINE HYDROCHLORIDE 150 MG: 50 TABLET ORAL at 08:37

## 2025-03-17 RX ADMIN — FAMOTIDINE 20 MG: 20 TABLET, FILM COATED ORAL at 08:37

## 2025-03-17 RX ADMIN — NORTRIPTYLINE HYDROCHLORIDE 20 MG: 10 CAPSULE ORAL at 20:44

## 2025-03-17 RX ADMIN — Medication 10 ML: at 20:45

## 2025-03-17 RX ADMIN — BETHANECHOL CHLORIDE 25 MG: 25 TABLET ORAL at 08:38

## 2025-03-17 RX ADMIN — Medication: at 08:38

## 2025-03-17 RX ADMIN — ASPIRIN 81 MG CHEWABLE TABLET 81 MG: 81 TABLET CHEWABLE at 08:38

## 2025-03-17 RX ADMIN — BETHANECHOL CHLORIDE 25 MG: 25 TABLET ORAL at 20:43

## 2025-03-17 RX ADMIN — TOPIRAMATE 100 MG: 100 TABLET, FILM COATED ORAL at 20:44

## 2025-03-17 RX ADMIN — ATORVASTATIN CALCIUM 80 MG: 40 TABLET, FILM COATED ORAL at 20:45

## 2025-03-17 RX ADMIN — PANTOPRAZOLE SODIUM 40 MG: 40 TABLET, DELAYED RELEASE ORAL at 05:21

## 2025-03-17 RX ADMIN — FAMOTIDINE 20 MG: 20 TABLET, FILM COATED ORAL at 20:44

## 2025-03-17 NOTE — PLAN OF CARE
Goal Outcome Evaluation:  Plan of Care Reviewed With: patient, spouse                Anticipated Discharge Disposition (SLP): inpatient rehabilitation facility    SLP Diagnosis: mild-moderate, cognitive-linguistic disorder (03/17/25 1500)  SLP Diagnosis Comments: Speech is slow but without a true dysarthira. No evidence of apraxia. Language is intact in conversation. (03/17/25 1500)     Treatment Assessment (SLP): improved, cognitive-linguistic disorder (03/17/25 1500)     Plan for Continued Treatment (SLP): continue treatment per plan of care (03/17/25 1500)

## 2025-03-17 NOTE — PLAN OF CARE
Goal Outcome Evaluation:  Plan of Care Reviewed With: patient        Progress: improving  Outcome Evaluation: Pt increased ambulation distance and required less assist for all mobility tasks, however demonstrates significant decrease in gait speed with addition of cognitive dual task. Pt will benefit from PT to address ongoing strength, balance, and endurance deficits.    Anticipated Discharge Disposition (PT): inpatient rehabilitation facility

## 2025-03-17 NOTE — PROGRESS NOTES
[facility] Neurosurgery Services  PROGRESS NOTE    Patient Name: Lauryn Romo  : 1960  MRN: [id number]    Date of Admission: 3/11/2025  Length of Stay: 4  Primary Care Physician: Melissa Lazo APRN    Subjective     CC: Cerebral ventriculomegaly     HPI: No events over the weekend. Doing well overall.     Review of Systems  ROS is negative except as mentioned in the HPI.    Objective     Vital Signs:   Temp:  [97.6 °F (36.4 °C)-98.3 °F (36.8 °C)] 97.7 °F (36.5 °C)  Heart Rate:  [51-73] 56  Resp:  [16-18] 16  BP: (107-132)/(65-73) 117/72  Total (NIH Stroke Scale): 1  Pulse  Av.5  Min: 51  Max: 73  Systolic (24hrs), Av , Min:107 , Max:132     Diastolic (24hrs), Av, Min:65, Max:73    Temp (24hrs), Av.9 °F (36.6 °C), Min:97.6 °F (36.4 °C), Max:98.3 °F (36.8 °C)      No intake/output data recorded.    Results Reviewed:  I have personally reviewed current lab, radiology, and data and agree.    Results from last 7 days   Lab Units 25  0442 03/15/25  1102 25   WBC 10*3/mm3 10.82* 7.75 6.92 7.43   HEMOGLOBIN g/dL 13.5 14.7 12.9 13.8   HEMATOCRIT % 41.8 45.1 40.2 42.1   PLATELETS 10*3/mm3 238 265 232 240   INR   --   --   --  0.96     Results from last 7 days   Lab Units 03/15/25  1102 25  1652 25  0525  2047   SODIUM mmol/L 141 141 142  --    POTASSIUM mmol/L 4.3 4.5 3.8  --    CHLORIDE mmol/L 106 107 109*  --    CO2 mmol/L 24.0 23.0 23.0  --    BUN mg/dL 14 15 16  --    CREATININE mg/dL 0.67 0.61 0.55*  --    GLUCOSE mg/dL 93 114* 86  --    CALCIUM mg/dL 9.6 8.8 8.6  --    ALK PHOS U/L  --   --  115  --    ALT (SGPT) U/L  --   --  36* 44*   AST (SGOT) U/L  --   --  26 34*     Estimated Creatinine Clearance: 71 mL/min (by C-G formula based on SCr of 0.67 mg/dL).    Microbiology Results Abnormal       None            Imaging Results (Last 24 Hours)       ** No results found for the last 24 hours. **          Results  for orders placed during the hospital encounter of 03/11/25    Adult Transthoracic Echo Complete W/ Cont if Necessary Per Protocol (With Agitated Saline)    Interpretation Summary    Left ventricular systolic function is normal. Estimated left ventricular EF = 70%    Mild to moderate aortic valve regurgitation is present      I have reviewed the medications:  Scheduled Meds:Pharmacy Consult, , Not Applicable, BID  aspirin, 81 mg, Oral, Daily   Or  aspirin, 300 mg, Rectal, Daily  atorvastatin, 80 mg, Oral, Nightly  bethanechol, 25 mg, Oral, BID  famotidine, 20 mg, Oral, BID AC  nortriptyline, 20 mg, Oral, Nightly  pantoprazole, 40 mg, Oral, Q AM  prochlorperazine, 10 mg, Intravenous, Once  sertraline, 150 mg, Oral, Daily  sodium chloride, 10 mL, Intravenous, Q12H  sodium chloride, 10 mL, Intravenous, Q12H  topiramate, 100 mg, Oral, Nightly      PRN Meds:  acetaminophen **OR** acetaminophen **OR** acetaminophen    Calcium Replacement - Follow Nurse / BPA Driven Protocol    lactulose    Magnesium Standard Dose Replacement - Follow Nurse / BPA Driven Protocol    melatonin    nitroglycerin    ondansetron    Phosphorus Replacement - Follow Nurse / BPA Driven Protocol    Potassium Replacement - Follow Nurse / BPA Driven Protocol    sodium chloride    sodium chloride    sodium chloride    sodium chloride    sodium chloride    ubrogepant    Physical Exam: Today I find patient seated in bedside recliner having breakfast.   she Is alert and oriented, in no acute distress.  Conversant.  Able to move all four extremities with no sensory deficits.       Assessment / Plan         Left-sided weakness    Cerebral ventriculomegaly    Patient is scheduled for first case tomorrow with Dr. Mendez and Dr. Raya for placement of ventriculoperitoneal shunt.  I have ordered a CT without contrast with stealth protocol to be done today to be used for guidance tomorrow.  Patient will need to be n.p.o. at midnight.  Her Integrilin will be  stopped at midnight.  She did not have any concerns or questions at this time.          Electronically signed by Lilliana Dunn PA-C, 03/17/25, 09:20 EDT.

## 2025-03-17 NOTE — THERAPY TREATMENT NOTE
Patient Name: Lauryn Romo  : 1960    MRN: [id number]                              Today's Date: 3/17/2025       Admit Date: 3/11/2025    Visit Dx:     ICD-10-CM ICD-9-CM   1. Cerebral ventriculomegaly  G93.89 348.89   2. Aphasia  R47.01 784.3   3. Acute left-sided weakness  R53.1 728.87   4. History of ischemic stroke  Z86.73 V12.54     Patient Active Problem List   Diagnosis    Acute CVA    Cerebral aneurysm, nonruptured    Paraesophageal hernia    Hyperlipidemia    HTN    Moderate malnutrition    History of CVA (cerebrovascular accident)    Other headache syndrome    Acute cystitis without hematuria    Left-sided weakness    Cerebral ventriculomegaly     Past Medical History:   Diagnosis Date    Abnormal ECG     Aneurysm 2022    Cancer 2024    Basal Cell Carcinoma removed from scalp by dermatology    Cluster headache     Had headaches for several months before stroke and mass was found    Difficulty walking 22    After stroke    Headache     Headache, tension-type     Hyperlipidemia     Hypertension     Memory loss 22    Migraine     Stroke 2022    Stroke 10/06/2024    Vision loss 22    When dizzy or headache     Past Surgical History:   Procedure Laterality Date    ARTERIAL ANEURYSM REPAIR      BREAST LUMPECTOMY  2012    CYST REMOVAL Left 2023    EMBOLIZATION CEREBRAL N/A 2022    Procedure: CV EMBOLIZATION CEREBRAL IR;  Surgeon: Enoch Savage MD;  Location:  [facility];  Service: Interventional Radiology;  Laterality: N/A;    HIATAL HERNIA REPAIR      INTERVENTIONAL RADIOLOGY PROCEDURE N/A 09/15/2023    Procedure: Embolization;  Surgeon: Enoch Savage MD;  Location:  [facility];  Service: Interventional Radiology;  Laterality: N/A;    INTERVENTIONAL RADIOLOGY PROCEDURE N/A 10/6/2024    Procedure: IR mechanical thrombectomy;  Surgeon: Joe Mendez MD;  Location:  [facility]  [facility]ATION;  Service: Interventional Radiology;  Laterality: N/A;    SINUS SURGERY      x4    SKIN CANCER EXCISION      cancer removed off top of head      General Information       Row Name 03/17/25 1119          Physical Therapy Time and Intention    Document Type therapy note (daily note)  -KR     Mode of Treatment physical therapy;individual therapy  -KR       Row Name 03/17/25 1119          General Information    Patient Profile Reviewed yes  -KR     Existing Precautions/Restrictions fall;other (see comments)  L-sided weakness, low vision  -KR     Barriers to Rehab medically complex;previous functional deficit  -KR       Row Name 03/17/25 1119          Cognition    Orientation Status (Cognition) oriented x 3  -KR       Row Name 03/17/25 1119          Safety Issues/Impairments Affecting Functional Mobility    Safety Issues Affecting Function (Mobility) positioning of assistive device;safety precaution awareness;safety precautions follow-through/compliance  -KR     Impairments Affecting Function (Mobility) balance;strength;visual/perceptual;motor control;postural/trunk control;pain;endurance/activity tolerance  -KR     Comment, Safety Issues/Impairments (Mobility) LLQ visual field deficits  -KR               User Key  (r) = Recorded By, (t) = Taken By, (c) = Cosigned By      Initials Name Provider Type    KR Syl Mcgee, PT Physical Therapist                   Mobility       Row Name 03/17/25 1120          Sit-Stand Transfer    Sit-Stand [place] (Transfers) verbal cues;standby assist  -KR     Assistive Device (Sit-Stand Transfers) walker, front-wheeled  -KR     Comment, (Sit-Stand Transfer) 2x from bed with cues for hand placement  -KR       Row Name 03/17/25 1120          Gait/Stairs (Locomotion)    [place] Level (Gait) contact guard  -KR     Assistive Device (Gait) walker, front-wheeled  -KR     Distance in Feet (Gait) 300  -KR     Deviations/Abnormal Patterns (Gait) torrie decreased;gait speed  "decreased;stride length decreased;bilateral deviations;base of support, narrow  -KR     Bilateral Gait Deviations heel strike decreased  -KR     Comment, (Gait/Stairs) pt with gradual R lateral excursion, able to correct without cues. Pt participated in cog dual task with significant decrease in gait speed noted. CGA progressing to SBA for ambulation.  -KR               User Key  (r) = Recorded By, (t) = Taken By, (c) = Cosigned By      Initials Name Provider Type    Syl Carmichael PT Physical Therapist                   Obj/Interventions       Row Name 03/17/25 1124          Motor Skills    Therapeutic Exercise hip  -KR       Row Name 03/17/25 1124          Hip (Therapeutic Exercise)    Hip (Therapeutic Exercise) other (see comments)  5x STS with BUE push up progressing to unsupported  -KR     Hip Strengthening (Therapeutic Exercise) 10 repetitions;bilateral;marching while standing  -KR       Row Name 03/17/25 1124          Balance    Balance Assessment sitting static balance;sitting dynamic balance;standing static balance;standing dynamic balance  -KR     Static Sitting Balance independent  -KR     Dynamic Sitting Balance standby assist  -KR     Position, Sitting Balance unsupported;sitting in chair  -KR     Static Standing Balance standby assist  -KR     Dynamic Standing Balance contact guard  -KR     Position/Device Used, Standing Balance supported;walker, front-wheeled  -KR     Balance Interventions sitting;standing;sit to stand;supported;static;dynamic;other (see comments)  30\" WBOS E.C. CGA, 30\" NBOS E.O and E.C. Yudelka d/t R posterolateral lean, 30\" semi-tandem RLE forward/LLE forward E.O. Yudelka d/t R posterolateral lean  -KR               User Key  (r) = Recorded By, (t) = Taken By, (c) = Cosigned By      Initials Name Provider Type    Syl Carmichael PT Physical Therapist                   Goals/Plan    No documentation.                  Clinical Impression       Row Name 03/17/25 1127          Pain "    Pretreatment Pain Rating 0/10 - no pain  -KR     Posttreatment Pain Rating 0/10 - no pain  -KR       Row Name 03/17/25 1127          Plan of Care Review    Plan of Care Reviewed With patient  -KR     Progress improving  -KR     Outcome Evaluation Pt increased ambulation distance and required less assist for all mobility tasks, however demonstrates significant decrease in gait speed with addition of cognitive dual task. Pt will benefit from PT to address ongoing strength, balance, and endurance deficits.  -KR       Row Name 03/17/25 1127          Vital Signs    Pre Patient Position Sitting  -KR     Intra Patient Position Standing  -KR     Post Patient Position Sitting  -KR       Row Name 03/17/25 1127          Positioning and Restraints    Pre-Treatment Position sitting in chair/recliner  -KR     Post Treatment Position chair  -KR     In Chair reclined;call light within reach;encouraged to call for assist;exit alarm on;waffle cushion;legs elevated  -KR               User Key  (r) = Recorded By, (t) = Taken By, (c) = Cosigned By      Initials Name Provider Type    KR Syl Mcgee, PT Physical Therapist                   Outcome Measures       Row Name 03/17/25 1128 03/17/25 0830       How much help from another person do you currently need...    Turning from your back to your side while in flat bed without using bedrails? 4  -KR 4  -AL    Moving from lying on back to sitting on the side of a flat bed without bedrails? 3  -KR 3  -AL    Moving to and from a bed to a chair (including a wheelchair)? 3  -KR 3  -AL    Standing up from a chair using your arms (e.g., wheelchair, bedside chair)? 3  -KR 4  -AL    Climbing 3-5 steps with a railing? 3  -KR 3  -AL    To walk in hospital room? 3  -KR 3  -AL    AM-PAC 6 Clicks Score (PT) 19  -KR 20  -AL    Highest Level of Mobility Goal 6 --> Walk 10 steps or more  -KR 6 --> Walk 10 steps or more  -AL              User Key  (r) = Recorded By, (t) = Taken By, (c) = Cosigned By       Initials Name Provider Type    Syl Carmichael, PT Physical Therapist    Kaylee Vlaencia RN Registered Nurse                                 Physical Therapy Education       Title: PT OT SLP Therapies (Done)       Topic: Physical Therapy (Done)       Point: Mobility training (Done)       Learning Progress Summary            Patient Acceptance, E, VU by KR at 3/17/2025 1130    Eager, E, VU,DU,NR by SS at 3/13/2025 1618    Comment: Reviewed safety/technique w/bed mobility, transfers, ambulation, HEP, PT POC    Acceptance, E, VU by KR at 3/12/2025 0950    Acceptance, TB, DU,VU by CS at 3/12/2025 0849   Family Acceptance, E, VU by KR at 3/12/2025 0950                      Point: Home exercise program (Done)       Learning Progress Summary            Patient Acceptance, E, VU by KR at 3/17/2025 1130    Eager, E, VU,DU,NR by SS at 3/13/2025 1618    Comment: Reviewed safety/technique w/bed mobility, transfers, ambulation, HEP, PT POC    Acceptance, E, VU by KR at 3/12/2025 0950    Acceptance, TB, DU,VU by CS at 3/12/2025 0849   Family Acceptance, E, VU by KR at 3/12/2025 0950                      Point: Body mechanics (Done)       Learning Progress Summary            Patient Acceptance, E, VU by KR at 3/17/2025 1130    Eager, E, VU,DU,NR by  at 3/13/2025 1618    Comment: Reviewed safety/technique w/bed mobility, transfers, ambulation, HEP, PT POC    Acceptance, E, VU by KR at 3/12/2025 0950    Acceptance, TB, DU,VU by CS at 3/12/2025 0849   Family Acceptance, E, VU by KR at 3/12/2025 0950                      Point: Precautions (Done)       Learning Progress Summary            Patient Acceptance, E, VU by KR at 3/17/2025 1130    Eager, E, VU,DU,NR by  at 3/13/2025 1618    Comment: Reviewed safety/technique w/bed mobility, transfers, ambulation, HEP, PT POC    Acceptance, E, VU by KR at 3/12/2025 0950    Acceptance, TB, DU,VU by CS at 3/12/2025 0849   Family Acceptance, E, VU by BRITTANEY at 3/12/2025 3122                                       User Key       Initials Effective Dates Name Provider Type Discipline    CS 06/16/21 -  Josh Oliver OT Occupational Therapist OT    SS 06/01/21 -  Vandana Deluca, BALDO Physical Therapist PT    KR 12/30/22 -  Syl Mcgee PT Physical Therapist PT                  PT Recommendation and Plan  Planned Therapy Interventions (PT): balance training, bed mobility training, gait training, home exercise program, neuromuscular re-education, patient/family education, postural re-education, transfer training, stretching, strengthening, stair training, ROM (range of motion)  Progress: improving  Outcome Evaluation: Pt increased ambulation distance and required less assist for all mobility tasks, however demonstrates significant decrease in gait speed with addition of cognitive dual task. Pt will benefit from PT to address ongoing strength, balance, and endurance deficits.     Time Calculation:   PT Evaluation Complexity  History, PT Evaluation Complexity: 3 or more personal factors and/or comorbidities  Examination of Body Systems (PT Eval Complexity): total of 4 or more elements  Clinical Presentation (PT Evaluation Complexity): evolving  Clinical Decision Making (PT Evaluation Complexity): moderate complexity  Overall Complexity (PT Evaluation Complexity): moderate complexity     PT Charges       Row Name 03/17/25 1132             Time Calculation    Start Time 1046  -KR      PT Received On 03/17/25  -KR         Timed Charges    58275 -  PT Neuromuscular Reeducation Minutes 16  -KR      84970 - PT Therapeutic Activity Minutes 16  -KR         Total Minutes    Timed Charges Total Minutes 32  -KR       Total Minutes 32  -KR                User Key  (r) = Recorded By, (t) = Taken By, (c) = Cosigned By      Initials Name Provider Type    KR Syl Mcgee PT Physical Therapist                  Therapy Charges for Today       Code Description Service Date Service Provider Modifiers Qty     72075661302 HC PT NEUROMUSC RE EDUCATION EA 15 MIN 3/17/2025 Syl Mcgee, PT GP 1    74285396365 HC PT THERAPEUTIC ACT EA 15 MIN 3/17/2025 Syl Mcgee, PT GP 1            PT G-Codes  Outcome Measure Options: AM-PAC 6 Clicks Daily Activity (OT)  AM-PAC 6 Clicks Score (PT): 19  AM-PAC 6 Clicks Score (OT): 20  Modified Jose Roberto Scale: 3 - Moderate disability.  Requiring some help, but able to walk without assistance.  PT Discharge Summary  Anticipated Discharge Disposition (PT): inpatient rehabilitation facility    Syl Mcgee, BALDO  3/17/2025

## 2025-03-17 NOTE — PROGRESS NOTES
"Patient Name:  Lauryn Romo  YOB: 1960  [id number]    Surgery Progress Note    Date of visit: 3/17/2025    Subjective   Subjective: Feels OK, headache better.         Objective     Objective:     /70 (BP Location: Left arm, Patient Position: Lying)   Pulse 56   Temp 97.9 °F (36.6 °C) (Oral)   Resp 16   Ht 154.9 cm (61\")   Wt 59 kg (130 lb)   SpO2 96%   BMI 24.56 kg/m²     Intake/Output Summary (Last 24 hours) at 3/17/2025 0704  Last data filed at 3/17/2025 0600  Gross per 24 hour   Intake 52.16 ml   Output --   Net 52.16 ml       CV:  Rhythm  regular and rate regular   L:  Clear  to auscultation bilaterally   Abd:  Bowel sounds positive , soft, nontender  Ext:  No cyanosis, clubbing, edema    Recent labs that are back at this time have been reviewed.            Assessment/ Plan:    Problem List Items Addressed This Visit          Neuro    Cerebral ventriculomegaly - Primary- Plan for  shunt tomorrow.    Relevant Orders    Case request (Completed)     Other Visit Diagnoses         Aphasia          Acute left-sided weakness          History of ischemic stroke                 Active Hospital Problems    Diagnosis  POA    **Left-sided weakness [R53.1]  Yes    Cerebral ventriculomegaly [G93.89]  Unknown      Resolved Hospital Problems   No resolved problems to display.              Reid Raay MD  3/17/2025  07:04 EDT      "

## 2025-03-17 NOTE — THERAPY TREATMENT NOTE
Acute Care - Speech Language Pathology Treatment Note  [facility]     Patient Name: Lauryn Romo  : 1960  MRN: [id number]  Today's Date: 3/17/2025               Admit Date: 3/11/2025     Visit Dx:    ICD-10-CM ICD-9-CM   1. Cerebral ventriculomegaly  G93.89 348.89   2. Aphasia  R47.01 784.3   3. Acute left-sided weakness  R53.1 728.87   4. History of ischemic stroke  Z86.73 V12.54     Patient Active Problem List   Diagnosis    Acute CVA    Cerebral aneurysm, nonruptured    Paraesophageal hernia    Hyperlipidemia    HTN    Moderate malnutrition    History of CVA (cerebrovascular accident)    Other headache syndrome    Acute cystitis without hematuria    Left-sided weakness    Cerebral ventriculomegaly     Past Medical History:   Diagnosis Date    Abnormal ECG     Aneurysm 2022    Cancer 2024    Basal Cell Carcinoma removed from scalp by dermatology    Cluster headache     Had headaches for several months before stroke and mass was found    Difficulty walking 22    After stroke    Headache     Headache, tension-type     Hyperlipidemia     Hypertension     Memory loss 22    Migraine     Stroke 2022    Stroke 10/06/2024    Vision loss 22    When dizzy or headache     Past Surgical History:   Procedure Laterality Date    ARTERIAL ANEURYSM REPAIR      BREAST LUMPECTOMY  2012    CYST REMOVAL Left 2023    EMBOLIZATION CEREBRAL N/A 2022    Procedure: CV EMBOLIZATION CEREBRAL IR;  Surgeon: Enoch Savage MD;  Location: [facility] CATH INVASIVE LOCATION;  Service: Interventional Radiology;  Laterality: N/A;    HIATAL HERNIA REPAIR      INTERVENTIONAL RADIOLOGY PROCEDURE N/A 09/15/2023    Procedure: Embolization;  Surgeon: Enoch Savage MD;  Location: [facility] CATH INVASIVE LOCATION;  Service: Interventional Radiology;  Laterality: N/A;    INTERVENTIONAL RADIOLOGY PROCEDURE N/A 10/6/2024    Procedure: IR mechanical thrombectomy;  Surgeon: Joe Mendez  MD Tomasz;  Location:  [facility] INVASIVE LOCATION;  Service: Interventional Radiology;  Laterality: N/A;    SINUS SURGERY      x4    SKIN CANCER EXCISION      cancer removed off top of head       SLP Recommendation and Plan  SLP Diagnosis: mild-moderate, cognitive-linguistic disorder (03/17/25 1500)  SLP Diagnosis Comments: Speech is slow but without a true dysarthira. No evidence of apraxia. Language is intact in conversation. (03/17/25 1500)           SLC Criteria for Skilled Therapy Interventions Met: yes (03/17/25 1500)  Anticipated Discharge Disposition (SLP): inpatient rehabilitation facility (03/17/25 1500)        Therapy Frequency (SLP SLC): 5 days per week (03/17/25 1500)  Predicted Duration Therapy Intervention (Days): 1 week (03/17/25 1500)        Daily Summary of Progress (SLP): progress toward functional goals as expected (03/17/25 1500)           Treatment Assessment (SLP): improved, cognitive-linguistic disorder (03/17/25 1500)     Plan for Continued Treatment (SLP): continue treatment per plan of care (03/17/25 1500)         SLP EVALUATION (Last 72 Hours)       SLP SLC Evaluation       Row Name 03/17/25 1500                   Communication Assessment/Intervention    Document Type therapy note (daily note)  -CH        Subjective Information no complaints  -CH        Patient Observations alert;cooperative;agree to therapy  -CH        Patient/Family/Caregiver Comments/Observations spouse present  -CH        Patient Effort good  -CH        Symptoms Noted During/After Treatment none  -CH           General Information    Patient Profile Reviewed yes  -CH           SLP Evaluation Clinical Impressions    SLP Diagnosis mild-moderate;cognitive-linguistic disorder  -CH        SLP Diagnosis Comments Speech is slow but without a true dysarthira. No evidence of apraxia. Language is intact in conversation.  -CH        Rehab Potential/Prognosis good  -CH        SLC Criteria for Skilled Therapy Interventions Met  yes  -CH        Functional Impact functional impact in social situations;functional impact in ADLs  -           SLP Treatment Clinical Impressions    Treatment Assessment (SLP) improved;cognitive-linguistic disorder  -        Daily Summary of Progress (SLP) progress toward functional goals as expected  -        Plan for Continued Treatment (SLP) continue treatment per plan of care  -        Care Plan Review evaluation/treatment results reviewed;care plan/treatment goals reviewed;risks/benefits reviewed  -        Care Plan Review, Other Participant(s) spouse  -           Recommendations    Therapy Frequency (SLP SLC) 5 days per week  -        Predicted Duration Therapy Intervention (Days) 1 week  -        Anticipated Discharge Disposition (SLP) inpatient rehabilitation facility  -                  User Key  (r) = Recorded By, (t) = Taken By, (c) = Cosigned By      Initials Name Effective Dates    CH Larisa Cao MS CCC-SLP 01/20/25 -                        EDUCATION  The patient has been educated in the following areas:     Cognitive Impairment Communication Impairment.           SLP GOALS       Row Name 03/17/25 1500             Patient will demonstrate functional cognitive-linguistic skills for return to discharge environment    [place] with minimal cues  -      Time frame 1 week  -CH      Progress/Outcomes good progress toward goal  -CH         Prosody Goal 1 (SLP)    Improve Prosody by Goal 1 (SLP) increasing rate;80%;with minimal cues (75-90%)  -      Time Frame (Prosody Goal 1, SLP) 1 week  -CH      Progress/Outcomes (Prosody Goal 1, SLP) goal met  -CH         Attention Goal 1 (SLP)    Improve Attention by Goal 1 (SLP) complete divided attention task;80%;with minimal cues (75-90%)  -      Time Frame (Attention Goal 1, SLP) 1 week  -CH      Progress/Outcomes (Attention Goal 1, SLP) goal ongoing  -         Memory Skills Goal 1 (SLP)    Improve Memory Skills Through Goal 1  (SLP) recalling related word lists with an imposed delay;recalling unrelated word lists with an imposed delay;80%;with minimal cues (75-90%)  -CH      Time Frame (Memory Skills Goal 1, SLP) 1 week  -CH      Progress (Memory Skills Goal 1, SLP) 80%;with minimal cues (75-90%)  -CH      Progress/Outcomes (Memory Skills Goal 1, SLP) continuing progress toward goal  -CH         Organizational Skills Goal 1 (SLP)    Improve Thought Organization Through Goal 1 (SLP) completing a divergent naming task;generating a list of items in a category;80%;with minimal cues (75-90%);completing a convergent naming task  -CH      Time Frame (Thought Organization Skills Goal 1, SLP) 1 week  -CH      Progress (Thought Organization Skills Goal 1, SLP) 100%;independently (over 90% accuracy)  -CH      Progress/Outcomes (Thought Organization Skills Goal 1, SLP) goal met  -CH                User Key  (r) = Recorded By, (t) = Taken By, (c) = Cosigned By      Initials Name Provider Type    Larisa Garcia MS CCC-SLP Speech and Language Pathologist                              Time Calculation:      Time Calculation- SLP       Row Name 03/17/25 1526             Time Calculation- SLP    SLP Start Time 1500  -CH      SLP Received On 03/17/25  -CH         Untimed Charges    63462-UV Treatment/ST Modification Prosth Aug Alter  38  -CH         Total Minutes    Untimed Charges Total Minutes 38  -CH       Total Minutes 38  -CH                User Key  (r) = Recorded By, (t) = Taken By, (c) = Cosigned By      Initials Name Provider Type    Larisa Garcia MS CCC-SLP Speech and Language Pathologist                    Therapy Charges for Today       Code Description Service Date Service Provider Modifiers Qty    [id number]  ST TREATMENT SPEECH 3 3/17/2025 Larisa Cao MS CCC-SLP GN 1                       Larisa Cao MS CCC-DAGMAR  3/17/2025

## 2025-03-17 NOTE — PROGRESS NOTES
Stroke Progress Note       Chief Complaint: Headache, left-sided weakness    Subjective    Subjective   Sitting up in the recliner.  Patient reports very mild headache today, 2/10 not requiring any PRNs.  No new strokelike symptoms.  She is anxious to have her procedure tomorrow    Review of Systems   Neurological:  Mild headache, slight left facial droop. no unilateral weakness or numbness.  No vision change or speech disturbance     Objective    Objective      Temp:  [98.3 °F (36.8 °C)-98.6 °F (37 °C)] 98.3 °F (36.8 °C)  Heart Rate:  [60-79] 60  Resp:  [18] 18  BP: (124-128)/(70-75) 124/74    Physical Exam  Constitutional:       General: She is not in acute distress.  HENT:      Head: Normocephalic and atraumatic.   Eyes:      Extraocular Movements: Extraocular movements intact.      Pupils: Pupils are equal, round, and reactive to light.   Cardiovascular:      Rate and Rhythm: Normal rate and regular rhythm.   Pulmonary:      Effort: Pulmonary effort is normal. No respiratory distress.   Musculoskeletal:         General: Normal range of motion.      Cervical back: Normal range of motion and neck supple.   Skin:     General: Skin is warm and dry.      Capillary Refill: Capillary refill takes less than 2 seconds.   Neurological:      Mental Status: She is alert and oriented to person, place, and time.      Comments: LFD, no drift in any extremity, speech is fluent with no aphasia, tracks without difficulty, VFF      Results Review:    I reviewed the patient's new clinical results.    EEG  Result Date: 3/12/2025  Normal study This report is transcribed using the Dragon dictation system.      MRI Brain Without Contrast  Result Date: 3/12/2025  Impression: No acute intracranial findings. No acute infarct. Similar basilar artery aneurysm exerting mass effect on the brainstem with posterior displacement of the jong and medulla. Please refer to yesterday's CTA exam for more complete details of this finding.  Electronically Signed: Kwesi Guan MD  3/12/2025 8:19 AM EDT  Workstation ID: [id number]    CT Angiogram Head w AI Analysis of LVO  Result Date: 3/11/2025  Impression: No evidence of large vessel occlusion or hemodynamically significant stenosis. Post stenting of large basilar artery aneurysm. The stent is visualized in the distal vertebral artery and extending into the left basal artery. There is some contrast leakage to the left of the stent. This may be secondary to stent porosity or leakage from the distal aspect of the stent. This appears unchanged from prior CTA performed on October 6, 2024. Correlate with diagnostic cerebral angiography as clinically warranted. No focal area of decreased cerebral blood flow (CBF) is seen to suggest an acute infarct in a large vessel territory.  No defects are seen to suggest a core infarct or an area of reversible ischemia. Electronically Signed: Ben Ziegler MD  3/11/2025 9:50 PM EDT  Workstation ID: [id number]    CT Angiogram Neck  Result Date: 3/11/2025  Impression: No evidence of large vessel occlusion or hemodynamically significant stenosis. Post stenting of large basilar artery aneurysm. The stent is visualized in the distal vertebral artery and extending into the left basal artery. There is some contrast leakage to the left of the stent. This may be secondary to stent porosity or leakage from the distal aspect of the stent. This appears unchanged from prior CTA performed on October 6, 2024. Correlate with diagnostic cerebral angiography as clinically warranted. No focal area of decreased cerebral blood flow (CBF) is seen to suggest an acute infarct in a large vessel territory.  No defects are seen to suggest a core infarct or an area of reversible ischemia. Electronically Signed: Ben Ziegler MD  3/11/2025 9:50 PM EDT  Workstation ID: [id number]    CT CEREBRAL PERFUSION WITH & WITHOUT CONTRAST  Result Date: 3/11/2025  Impression: No evidence of large vessel occlusion  or hemodynamically significant stenosis. Post stenting of large basilar artery aneurysm. The stent is visualized in the distal vertebral artery and extending into the left basal artery. There is some contrast leakage to the left of the stent. This may be secondary to stent porosity or leakage from the distal aspect of the stent. This appears unchanged from prior CTA performed on October 6, 2024. Correlate with diagnostic cerebral angiography as clinically warranted. No focal area of decreased cerebral blood flow (CBF) is seen to suggest an acute infarct in a large vessel territory.  No defects are seen to suggest a core infarct or an area of reversible ischemia. Electronically Signed: Ben Ziegler MD  3/11/2025 9:50 PM EDT  Workstation ID: [id number]    CT Head Without Contrast Stroke Protocol  Result Date: 3/11/2025  Impression: 1.Basilar artery aneurysm which could be thrombosed. There is a radiopaque object along the more superior right aspect of the aneurysm that could reflect pipeline flow diverter. 2.There are white matter changes involving the cerebral hemispheres which could reflect sequela to small vessel ischemic change. 3.There is dilatation of the lateral ventricles which has been noted. Electronically Signed: Akash Lux MD  3/11/2025 9:03 PM EDT  Workstation ID: [id number]       Results for orders placed during the hospital encounter of 03/11/25    Adult Transthoracic Echo Complete W/ Cont if Necessary Per Protocol (With Agitated Saline)    Interpretation Summary    Left ventricular systolic function is normal. Estimated left ventricular EF = 70%    Mild to moderate aortic valve regurgitation is present            WBC   Date Value Ref Range Status   03/16/2025 10.82 (H) 3.40 - 10.80 10*3/mm3 Final     RBC   Date Value Ref Range Status   03/16/2025 4.42 3.77 - 5.28 10*6/mm3 Final     Hemoglobin   Date Value Ref Range Status   03/16/2025 13.5 12.0 - 15.9 g/dL Final     Hematocrit   Date Value Ref Range  Status   03/16/2025 41.8 34.0 - 46.6 % Final     MCV   Date Value Ref Range Status   03/16/2025 94.6 79.0 - 97.0 fL Final     MCH   Date Value Ref Range Status   03/16/2025 30.5 26.6 - 33.0 pg Final     MCHC   Date Value Ref Range Status   03/16/2025 32.3 31.5 - 35.7 g/dL Final     RDW   Date Value Ref Range Status   03/16/2025 13.6 12.3 - 15.4 % Final     RDW-SD   Date Value Ref Range Status   03/16/2025 47.0 37.0 - 54.0 fl Final     MPV   Date Value Ref Range Status   03/16/2025 10.8 6.0 - 12.0 fL Final     Platelets   Date Value Ref Range Status   03/16/2025 238 140 - 450 10*3/mm3 Final     Lab Results   Component Value Date    GLUCOSE 93 03/15/2025    BUN 14 03/15/2025    CREATININE 0.67 03/15/2025     03/15/2025    K 4.3 03/15/2025     03/15/2025    CALCIUM 9.6 03/15/2025    PROTEINTOT 6.5 03/12/2025    ALBUMIN 4.0 03/12/2025    ALT 36 (H) 03/12/2025    AST 26 03/12/2025    ALKPHOS 115 03/12/2025    BILITOT 0.3 03/12/2025    GLOB 2.5 03/12/2025    AGRATIO 1.6 03/12/2025    BCR 20.9 03/15/2025    ANIONGAP 11.0 03/15/2025    EGFR 98.4 03/15/2025           Lab 03/12/25  0525   HEMOGLOBIN A1C 5.80*       Lipid Panel          10/7/2024    05:55 3/12/2025    05:25   Lipid Panel   Total Cholesterol 132  114    Triglycerides 62  50    HDL Cholesterol 61  60    VLDL Cholesterol 13  12    LDL Cholesterol  58  42    LDL/HDL Ratio 0.96  0.73        Assessment/Plan     Assessment/Plan:  Lauryn Romo is a 64 y.o. female with known medical diagnoses of HLD, HTN,  migraines, cerebellar stroke stroke (residual dizziness, ataxia), and giant basilar aneurysm s/p pipeline embolization (Dr. Savage, 2022/2023), s/p thrombectomy of pipeline stent thrombosis with resultant TICI 3 flow (after stopping Plavix, Dr. Savage 10/2024) who presented to [facility] on 3/11/2025 with complaints of a syncopal episode (no LOC), transient aphasia, headache, and fall.  She reported progressively unsteady gait as well as frequent falls.  MRI  was negative for any new infarct.  Progressive ventriculomegaly with possible transependymal resorption of CSF noted.  Imaging was reviewed by Dr. Dr. Savage of neurointervention who reviewed with Dr. Mendez of neurosurgery.  Given her progressive decline and progressive ventriculomegaly, Dr. Mendez recommends  shunt placement on 3/18/2025.     Plavix was discontinued given her upcoming surgery.  IV Integrilin was started given her recent in-stent thrombosis while off Plavix with plans to stop the Integrilin on midnight Monday night prior to surgery.    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 Progressive ventriculomegaly with possible transependymal resorption of CSF noted per Dr. Savage.    - Neurosurgery following. Plan for  shunt with Dr. Mendez on 3/18/2025  - Continue Integrilin drip until midnight on Monday  - P2Y12 57.  Plavix currently discontinued due to plan for  shunt  - Continue aspirin 81 mg daily for secondary stroke prevention  - Continue atorvastatin 80 mg daily for secondary stroke prevention.  LDL 42.  At goal of less than 70 for secondary stroke prevention.  - Hemoglobin A1c 5.8 on 3/12/2025.  In the prediabetes range.  Dietary counseling.  - BP goals less than 130/80.  Management per primary team.  - Headaches; improving.  Continue home Topamax.  Okay to bring home Ubrelvy per pharmacy with any continued headache.  - EEG negative for epileptiform activity.  No need for AEDs at this time.  Can consider longer monitoring with any additional episodes concerning for possible seizure.  - TTE 3/13/2025 with EF 66 to 70%, no LAE.  Negative bubble study noted on TTE from 10/7/2024.  - PT/OT/SLP recommend IRF at [place]    Plan  discussed with the patient and Dr. Purvis (vascular neurologist). Stroke neurology will continue to follow along peripherally while admitted.  Please call with any questions or concerns.    CAILIN Santiago  Stroke Neurology

## 2025-03-17 NOTE — PLAN OF CARE
Problem: Adult Inpatient Plan of Care  Goal: Plan of Care Review  Outcome: Progressing  Flowsheets (Taken 3/17/2025 0321)  Progress: improving  Outcome Evaluation: Pt A&Ox4, VSS on RA. NIH performed by charge nurse and scored 1. Fall and DVT precautions in place. Pt on a Integrilin gtt. Plan for  shunt placement on Tuesday.  Plan of Care Reviewed With: patient  Goal: Patient-Specific Goal (Individualized)  Outcome: Progressing  Goal: Absence of Hospital-Acquired Illness or Injury  Outcome: Progressing  Intervention: Identify and Manage Fall Risk  Recent Flowsheet Documentation  Taken 3/17/2025 0200 by Mayda Doyle, RN  Safety Promotion/Fall Prevention:   assistive device/personal items within reach   clutter free environment maintained   fall prevention program maintained   gait belt   lighting adjusted   nonskid shoes/slippers when out of bed   room organization consistent   safety round/check completed   toileting scheduled  Taken 3/17/2025 0000 by Mayda Doyle, RN  Safety Promotion/Fall Prevention:   assistive device/personal items within reach   clutter free environment maintained   fall prevention program maintained   gait belt   lighting adjusted   nonskid shoes/slippers when out of bed   room organization consistent   safety round/check completed   toileting scheduled  Taken 3/16/2025 2200 by Mayda Doyle, RN  Safety Promotion/Fall Prevention:   assistive device/personal items within reach   clutter free environment maintained   gait belt   fall prevention program maintained   lighting adjusted   nonskid shoes/slippers when out of bed   room organization consistent   safety round/check completed   toileting scheduled  Taken 3/16/2025 2000 by Mayda Doyle, RN  Safety Promotion/Fall Prevention:   assistive device/personal items within reach   clutter free environment maintained   fall prevention program maintained   gait belt   lighting adjusted   nonskid shoes/slippers when out of  bed   room organization consistent   safety round/check completed   toileting scheduled  Intervention: Prevent Skin Injury  Recent Flowsheet Documentation  Taken 3/17/2025 0200 by Mayda Doyle RN  Body Position: position changed independently  Skin Protection:   incontinence pads utilized   transparent dressing maintained  Taken 3/17/2025 0000 by Mayda Doyle RN  Body Position: position changed independently  Skin Protection:   incontinence pads utilized   transparent dressing maintained  Taken 3/16/2025 2200 by Mayda Doyle RN  Body Position: position changed independently  Skin Protection:   incontinence pads utilized   transparent dressing maintained  Taken 3/16/2025 2000 by Mayda Doyle RN  Body Position: position changed independently  Skin Protection:   incontinence pads utilized   transparent dressing maintained  Intervention: Prevent and Manage VTE (Venous Thromboembolism) Risk  Recent Flowsheet Documentation  Taken 3/17/2025 0200 by Mayda Doyle RN  VTE Prevention/Management: patient refused intervention  Taken 3/17/2025 0000 by Mayda Doyle RN  VTE Prevention/Management: patient refused intervention  Taken 3/16/2025 2200 by Mayda Doyle RN  VTE Prevention/Management: patient refused intervention  Taken 3/16/2025 2000 by Mayda Doyle RN  VTE Prevention/Management: patient refused intervention  Intervention: Prevent Infection  Recent Flowsheet Documentation  Taken 3/17/2025 0200 by Mayda Doyle RN  Infection Prevention:   environmental surveillance performed   hand hygiene promoted   personal protective equipment utilized   rest/sleep promoted  Taken 3/17/2025 0000 by Mayda Doyle RN  Infection Prevention:   environmental surveillance performed   hand hygiene promoted   rest/sleep promoted   personal protective equipment utilized  Taken 3/16/2025 2200 by Mayda Doyle RN  Infection Prevention:   environmental surveillance performed    hand hygiene promoted   personal protective equipment utilized   rest/sleep promoted  Taken 3/16/2025 2000 by Mayda Doyle RN  Infection Prevention:   environmental surveillance performed   hand hygiene promoted   personal protective equipment utilized   rest/sleep promoted  Goal: Optimal Comfort and Wellbeing  Outcome: Progressing  Goal: Readiness for Transition of Care  Outcome: Progressing     Problem: Fall Injury Risk  Goal: Absence of Fall and Fall-Related Injury  Outcome: Progressing  Intervention: Identify and Manage Contributors  Recent Flowsheet Documentation  Taken 3/17/2025 0200 by Mayda Doyle RN  Medication Review/Management: medications reviewed  Taken 3/17/2025 0000 by Mayda Doyle RN  Medication Review/Management: medications reviewed  Taken 3/16/2025 2200 by Mayda Doyle RN  Medication Review/Management: medications reviewed  Taken 3/16/2025 2000 by Mayda Doyle RN  Medication Review/Management: medications reviewed  Intervention: Promote Injury-Free Environment  Recent Flowsheet Documentation  Taken 3/17/2025 0200 by Mayda Doyle RN  Safety Promotion/Fall Prevention:   assistive device/personal items within reach   clutter free environment maintained   fall prevention program maintained   gait belt   lighting adjusted   nonskid shoes/slippers when out of bed   room organization consistent   safety round/check completed   toileting scheduled  Taken 3/17/2025 0000 by Mayda Doyle RN  Safety Promotion/Fall Prevention:   assistive device/personal items within reach   clutter free environment maintained   fall prevention program maintained   gait belt   lighting adjusted   nonskid shoes/slippers when out of bed   room organization consistent   safety round/check completed   toileting scheduled  Taken 3/16/2025 2200 by Mayda Doyle RN  Safety Promotion/Fall Prevention:   assistive device/personal items within reach   clutter free environment  maintained   gait belt   fall prevention program maintained   lighting adjusted   nonskid shoes/slippers when out of bed   room organization consistent   safety round/check completed   toileting scheduled  Taken 3/16/2025 2000 by Mayda Doyle RN  Safety Promotion/Fall Prevention:   assistive device/personal items within reach   clutter free environment maintained   fall prevention program maintained   gait belt   lighting adjusted   nonskid shoes/slippers when out of bed   room organization consistent   safety round/check completed   toileting scheduled   Goal Outcome Evaluation:  Plan of Care Reviewed With: patient        Progress: improving  Outcome Evaluation: Pt A&Ox4, VSS on RA. NIH performed by charge nurse and scored 1. Fall and DVT precautions in place. Pt on a Integrilin gtt. Plan for  shunt placement on Tuesday.

## 2025-03-17 NOTE — CASE MANAGEMENT/SOCIAL WORK
Continued Stay Note  [facility]     Patient Name: Lauryn Romo  MRN: [id number]  Today's Date: 3/17/2025    Admit Date: 3/11/2025    Plan: TBD   Discharge Plan       Row Name 03/17/25 1621       Plan    Plan TBD    Patient/Family in Agreement with Plan yes    Plan Comments Followed up with Ms. Romo, her , and family, at the bedside, for discharge planning.    he patient is having surgery tomorrow morning.   Ms. Romo was referred to [facility], last week, at her request for rehab needs.  Per [facility], the patient's insurance does not have any IPR or SNF rehab benefits.  The patient states that she will be OK to rehab on her own at home.  CM can also follow up with outpatient PT in Ms. Romo's home area for in network PT clinics.    CM will continue to follow to assist with discharge needs.    Final Discharge Disposition Code 01 - home or self-care                       Expected Discharge Date and Time       Expected Discharge Date Expected Discharge Time    Mar 20, 2025               Alysha Glae RN

## 2025-03-17 NOTE — PROGRESS NOTES
[facility] Medicine Services  PROGRESS NOTE    Patient Name: Lauryn Romo  : 1960  MRN: [id number]    Date of Admission: 3/11/2025  Primary Care Physician: Melissa Lazo APRN    Subjective   Subjective     CC:  Follow up fall     HPI:  Was seen resting up in the chair no acute distress.  Awake and alert.  Has a slight headache rated 3/10 scale.  Mild nausea intermittently.  Feels a little anxious preparing for her surgery tomorrow but declines any medications to help.  Integrilin drip still infusing.  Plans for surgery tomorrow morning.    Objective   Objective     Vital Signs:   Temp:  [97.6 °F (36.4 °C)-98.3 °F (36.8 °C)] 97.7 °F (36.5 °C)  Heart Rate:  [51-73] 56  Resp:  [16-18] 16  BP: (107-132)/(65-73) 117/72     Physical Exam:  Constitutional: No acute distress, awake, alert, sitting up in the chair. No visitors at bs.   HENT: NCAT, mucous membranes moist  Respiratory: Clear to auscultation bilaterally, respiratory effort normal on room air. Sats wnl   Cardiovascular: RRR, no murmurs, palpable pedal pulses bilaterally, cap refill brisk   Gastrointestinal: Positive bowel sounds, soft, nontender, nondistended  Musculoskeletal: No BLE edema.  HAWK spontaneously.  Psychiatric: Appropriate affect, cooperative and calm   Neurologic: Oriented x 3, moves all extremities, speech clear  Skin: warm, dry, no visible rash     Results Reviewed:  LAB RESULTS:      Lab 25  0442 03/15/25  1102 25  0525 257 25  1949   WBC 10.82* 7.75 6.92 7.43  --    HEMOGLOBIN 13.5 14.7 12.9 13.8  --    HEMOGLOBIN, POC  --   --   --   --  14.3   HEMATOCRIT 41.8 45.1 40.2 42.1  --    HEMATOCRIT POC  --   --   --   --  42   PLATELETS 238 265 232 240  --    NEUTROS ABS  --   --  4.12 5.24  --    IMMATURE GRANS (ABS)  --   --  0.02 0.02  --    LYMPHS ABS  --   --  2.02 1.54  --    MONOS ABS  --   --  0.52 0.43  --    EOS ABS  --   --  0.19 0.16  --    MCV 94.6 93.6 94.1 93.3  --     LACTATE  --   --   --  1.2  --    PROTIME  --   --   --  12.9  --    APTT  --   --   --  26.5  --          Lab 03/15/25  1102 03/14/25  1652 03/12/25 0525 03/11/25 2047 03/11/25 1949   SODIUM 141 141 142  --   --    POTASSIUM 4.3 4.5 3.8  --   --    CHLORIDE 106 107 109*  --   --    CO2 24.0 23.0 23.0  --   --    ANION GAP 11.0 11.0 10.0  --   --    BUN 14 15 16  --   --    CREATININE 0.67 0.61 0.55*  --  0.70   EGFR 98.4 100.6 103.1  --  97.3   GLUCOSE 93 114* 86  --   --    CALCIUM 9.6 8.8 8.6  --   --    MAGNESIUM  --   --  3.1*  --   --    HEMOGLOBIN A1C  --   --  5.80*  --   --    TSH  --   --   --  4.090  --          Lab 03/12/25 0525 03/11/25 2047   TOTAL PROTEIN 6.5  --    ALBUMIN 4.0  --    GLOBULIN 2.5  --    ALT (SGPT) 36* 44*   AST (SGOT) 26 34*   BILIRUBIN 0.3  --    ALK PHOS 115  --          Lab 03/11/25 2047   PROTIME 12.9   INR 0.96         Lab 03/12/25 0525   CHOLESTEROL 114   LDL CHOL 42   HDL CHOL 60   TRIGLYCERIDES 50             Brief Urine Lab Results  (Last result in the past 365 days)        Color   Clarity   Blood   Leuk Est   Nitrite   Protein   CREAT   Urine HCG        03/11/25 2213 Yellow   Clear   Negative   Trace   Negative   Negative                   Microbiology Results Abnormal       None            No radiology results from the last 24 hrs    Results for orders placed during the hospital encounter of 03/11/25    Adult Transthoracic Echo Complete W/ Cont if Necessary Per Protocol (With Agitated Saline)    Interpretation Summary    Left ventricular systolic function is normal. Estimated left ventricular EF = 70%    Mild to moderate aortic valve regurgitation is present      Current medications:  Scheduled Meds:Pharmacy Consult, , Not Applicable, BID  aspirin, 81 mg, Oral, Daily   Or  aspirin, 300 mg, Rectal, Daily  atorvastatin, 80 mg, Oral, Nightly  bethanechol, 25 mg, Oral, BID  famotidine, 20 mg, Oral, BID AC  nortriptyline, 20 mg, Oral, Nightly  pantoprazole, 40 mg,  Oral, Q AM  prochlorperazine, 10 mg, Intravenous, Once  sertraline, 150 mg, Oral, Daily  sodium chloride, 10 mL, Intravenous, Q12H  sodium chloride, 10 mL, Intravenous, Q12H  topiramate, 100 mg, Oral, Nightly      Continuous Infusions:eptifibatide, 1 mcg/kg/min, Last Rate: 1 mcg/kg/min (03/17/25 0751)      PRN Meds:.  acetaminophen **OR** acetaminophen **OR** acetaminophen    Calcium Replacement - Follow Nurse / BPA Driven Protocol    lactulose    Magnesium Standard Dose Replacement - Follow Nurse / BPA Driven Protocol    melatonin    nitroglycerin    ondansetron    Phosphorus Replacement - Follow Nurse / BPA Driven Protocol    Potassium Replacement - Follow Nurse / BPA Driven Protocol    sodium chloride    sodium chloride    sodium chloride    sodium chloride    sodium chloride    ubrogepant    Assessment & Plan   Assessment & Plan     Active Hospital Problems    Diagnosis  POA    **Left-sided weakness [R53.1]  Yes    Cerebral ventriculomegaly [G93.89]  Unknown      Resolved Hospital Problems   No resolved problems to display.        Brief Hospital Course to date:  Lauryn Romo is a 64 y.o. 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     This patient's problems and plans were partially entered by my partner and updated as appropriate by me 03/17/25.    Assessment/Plan:    Transient aphasia  Confusion  Giant basilar artery aneurysm s/p flow diverter  Migraines  -Neurology and neurointerventional neurosurgery follow   -Gen surgery Dr. Raya following, plan for  shunt on*3/18  -Plavix on hold for procedure   -continue Integrilin for now   -Continue home Topamax  -Neurology recommends Tylenol and migraine cocktails as needed for headaches.  Avoid NSAIDs.    -Consider Depakote 800 mg IV x 1 for  intractable headache.  -PT/OT  -AM labs still pending      Hypertension  -Well-controlled currently     Hyperlipidemia  -continue Lipitor      Anxiety and depression  -Continue home nortriptyline and Zoloft  --reports a little more anxious today thinking about sx in am.  Declines any meds to help at this time      Constipation  -lactulose PRN     Expected Discharge Location and Transportation: TBD. For surgery in am.    Expected Discharge   Expected Discharge Date: 3/20/2025; Expected Discharge Time:       VTE Prophylaxis:  Mechanical VTE prophylaxis orders are present.         AM-PAC 6 Clicks Score (PT): 20 (03/17/25 0830)    CODE STATUS:   Code Status and Medical Interventions: CPR (Attempt to Resuscitate); Full Support   Ordered at: 03/11/25 9204     Code Status (Patient has no pulse and is not breathing):    CPR (Attempt to Resuscitate)     Medical Interventions (Patient has pulse or is breathing):    Full Support     Level Of Support Discussed With:    Patient       Shoshana Johnson Anila, APRN  03/17/25

## 2025-03-18 ENCOUNTER — APPOINTMENT (OUTPATIENT)
Dept: CT IMAGING | Facility: HOSPITAL | Age: 65
End: 2025-03-18
Payer: COMMERCIAL

## 2025-03-18 ENCOUNTER — ANESTHESIA (OUTPATIENT)
Dept: PERIOP | Facility: HOSPITAL | Age: 65
End: 2025-03-18
Payer: COMMERCIAL

## 2025-03-18 LAB
QT INTERVAL: 402 MS
QTC INTERVAL: 436 MS

## 2025-03-18 PROCEDURE — 25010000002 SUGAMMADEX 200 MG/2ML SOLUTION

## 2025-03-18 PROCEDURE — 0WJG4ZZ INSPECTION OF PERITONEAL CAVITY, PERCUTANEOUS ENDOSCOPIC APPROACH: ICD-10-PCS | Performed by: SURGERY

## 2025-03-18 PROCEDURE — 25010000002 LIDOCAINE PF 1% 1 % SOLUTION

## 2025-03-18 PROCEDURE — 25010000002 PHENYLEPHRINE 10 MG/ML SOLUTION

## 2025-03-18 PROCEDURE — 25010000002 FENTANYL CITRATE (PF) 100 MCG/2ML SOLUTION

## 2025-03-18 PROCEDURE — 99233 SBSQ HOSP IP/OBS HIGH 50: CPT | Performed by: STUDENT IN AN ORGANIZED HEALTH CARE EDUCATION/TRAINING PROGRAM

## 2025-03-18 PROCEDURE — 25010000002 DEXAMETHASONE PER 1 MG

## 2025-03-18 PROCEDURE — C1729 CATH, DRAINAGE: HCPCS | Performed by: NEUROLOGICAL SURGERY

## 2025-03-18 PROCEDURE — 25010000002 GLYCOPYRROLATE 1 MG/5ML SOLUTION

## 2025-03-18 PROCEDURE — 25010000002 PROPOFOL 10 MG/ML EMULSION

## 2025-03-18 PROCEDURE — C1889 IMPLANT/INSERT DEVICE, NOC: HCPCS | Performed by: NEUROLOGICAL SURGERY

## 2025-03-18 PROCEDURE — 25010000002 BUPIVACAINE 0.25 % SOLUTION: Performed by: NEUROLOGICAL SURGERY

## 2025-03-18 PROCEDURE — 25810000003 LACTATED RINGERS PER 1000 ML: Performed by: ANESTHESIOLOGY

## 2025-03-18 PROCEDURE — 70450 CT HEAD/BRAIN W/O DYE: CPT

## 2025-03-18 PROCEDURE — 61781 SCAN PROC CRANIAL INTRA: CPT | Performed by: NEUROLOGICAL SURGERY

## 2025-03-18 PROCEDURE — 62223 ESTABLISH BRAIN CAVITY SHUNT: CPT | Performed by: NEUROLOGICAL SURGERY

## 2025-03-18 PROCEDURE — 00164J6 BYPASS CEREBRAL VENTRICLE TO PERITONEAL CAVITY WITH SYNTHETIC SUBSTITUTE, PERCUTANEOUS ENDOSCOPIC APPROACH: ICD-10-PCS | Performed by: NEUROLOGICAL SURGERY

## 2025-03-18 PROCEDURE — 25010000002 CEFAZOLIN PER 500 MG: Performed by: NEUROLOGICAL SURGERY

## 2025-03-18 PROCEDURE — 25010000002 LIDOCAIN 0.5%-EPINEPHRINE 1:200000 0.5 %-1:200000 SOLUTION: Performed by: NEUROLOGICAL SURGERY

## 2025-03-18 DEVICE — FLOSEAL WITH RECOTHROM - 10ML.
Type: IMPLANTABLE DEVICE | Site: PERITONEUM | Status: FUNCTIONAL
Brand: FLOSEAL HEMOSTATIC MATRIX

## 2025-03-18 DEVICE — CATH 9025 VENT STD XTRA LENGTH MARK IMP: Type: IMPLANTABLE DEVICE | Site: PERITONEUM | Status: FUNCTIONAL

## 2025-03-18 DEVICE — VALVE 42866 FP-STRATA 2 REGULAR
Type: IMPLANTABLE DEVICE | Site: BRAIN | Status: FUNCTIONAL
Brand: STRATA®

## 2025-03-18 DEVICE — AVITENE ULTRAFOAM, 8 CM X 12.5 CM (3-1/8" X 5"), 100 SQ CM
Type: IMPLANTABLE DEVICE | Site: BRAIN | Status: FUNCTIONAL
Brand: AVITENE

## 2025-03-18 DEVICE — CATH 23092 PERIT STD NO SLITS 120CM IMP: Type: IMPLANTABLE DEVICE | Site: ABDOMEN | Status: FUNCTIONAL

## 2025-03-18 RX ORDER — AMOXICILLIN 250 MG
2 CAPSULE ORAL 2 TIMES DAILY
Status: DISCONTINUED | OUTPATIENT
Start: 2025-03-18 | End: 2025-03-20 | Stop reason: HOSPADM

## 2025-03-18 RX ORDER — PROPOFOL 10 MG/ML
VIAL (ML) INTRAVENOUS AS NEEDED
Status: DISCONTINUED | OUTPATIENT
Start: 2025-03-18 | End: 2025-03-18 | Stop reason: SURG

## 2025-03-18 RX ORDER — FENTANYL CITRATE 50 UG/ML
INJECTION, SOLUTION INTRAMUSCULAR; INTRAVENOUS AS NEEDED
Status: DISCONTINUED | OUTPATIENT
Start: 2025-03-18 | End: 2025-03-18 | Stop reason: SDUPTHER

## 2025-03-18 RX ORDER — ROCURONIUM BROMIDE 10 MG/ML
INJECTION, SOLUTION INTRAVENOUS AS NEEDED
Status: DISCONTINUED | OUTPATIENT
Start: 2025-03-18 | End: 2025-03-18 | Stop reason: SURG

## 2025-03-18 RX ORDER — DEXAMETHASONE SODIUM PHOSPHATE 4 MG/ML
INJECTION, SOLUTION INTRA-ARTICULAR; INTRALESIONAL; INTRAMUSCULAR; INTRAVENOUS; SOFT TISSUE AS NEEDED
Status: DISCONTINUED | OUTPATIENT
Start: 2025-03-18 | End: 2025-03-18 | Stop reason: SURG

## 2025-03-18 RX ORDER — PROMETHAZINE HYDROCHLORIDE 25 MG/1
25 SUPPOSITORY RECTAL ONCE AS NEEDED
Status: DISCONTINUED | OUTPATIENT
Start: 2025-03-18 | End: 2025-03-18 | Stop reason: HOSPADM

## 2025-03-18 RX ORDER — HYDROMORPHONE HYDROCHLORIDE 1 MG/ML
0.5 INJECTION, SOLUTION INTRAMUSCULAR; INTRAVENOUS; SUBCUTANEOUS
Status: DISCONTINUED | OUTPATIENT
Start: 2025-03-18 | End: 2025-03-18 | Stop reason: HOSPADM

## 2025-03-18 RX ORDER — BISACODYL 10 MG
10 SUPPOSITORY, RECTAL RECTAL DAILY PRN
Status: DISCONTINUED | OUTPATIENT
Start: 2025-03-18 | End: 2025-03-20 | Stop reason: HOSPADM

## 2025-03-18 RX ORDER — HYDRALAZINE HYDROCHLORIDE 20 MG/ML
5 INJECTION INTRAMUSCULAR; INTRAVENOUS
Status: DISCONTINUED | OUTPATIENT
Start: 2025-03-18 | End: 2025-03-18 | Stop reason: HOSPADM

## 2025-03-18 RX ORDER — ONDANSETRON 2 MG/ML
4 INJECTION INTRAMUSCULAR; INTRAVENOUS EVERY 6 HOURS PRN
Status: DISCONTINUED | OUTPATIENT
Start: 2025-03-18 | End: 2025-03-20 | Stop reason: HOSPADM

## 2025-03-18 RX ORDER — SODIUM CHLORIDE 0.9 % (FLUSH) 0.9 %
3-10 SYRINGE (ML) INJECTION AS NEEDED
Status: DISCONTINUED | OUTPATIENT
Start: 2025-03-18 | End: 2025-03-18 | Stop reason: HOSPADM

## 2025-03-18 RX ORDER — ONDANSETRON 4 MG/1
4 TABLET, ORALLY DISINTEGRATING ORAL EVERY 6 HOURS PRN
Status: DISCONTINUED | OUTPATIENT
Start: 2025-03-18 | End: 2025-03-20 | Stop reason: HOSPADM

## 2025-03-18 RX ORDER — POLYETHYLENE GLYCOL 3350 17 G/17G
17 POWDER, FOR SOLUTION ORAL DAILY PRN
Status: DISCONTINUED | OUTPATIENT
Start: 2025-03-18 | End: 2025-03-20 | Stop reason: HOSPADM

## 2025-03-18 RX ORDER — GLYCOPYRROLATE 0.2 MG/ML
INJECTION INTRAMUSCULAR; INTRAVENOUS AS NEEDED
Status: DISCONTINUED | OUTPATIENT
Start: 2025-03-18 | End: 2025-03-18 | Stop reason: SURG

## 2025-03-18 RX ORDER — LIDOCAINE HYDROCHLORIDE 10 MG/ML
0.5 INJECTION, SOLUTION EPIDURAL; INFILTRATION; INTRACAUDAL; PERINEURAL ONCE AS NEEDED
Status: DISCONTINUED | OUTPATIENT
Start: 2025-03-18 | End: 2025-03-18 | Stop reason: HOSPADM

## 2025-03-18 RX ORDER — HYDROCODONE BITARTRATE AND ACETAMINOPHEN 5; 325 MG/1; MG/1
1 TABLET ORAL EVERY 4 HOURS PRN
Refills: 0 | Status: DISCONTINUED | OUTPATIENT
Start: 2025-03-18 | End: 2025-03-20 | Stop reason: HOSPADM

## 2025-03-18 RX ORDER — LIDOCAINE HYDROCHLORIDE 10 MG/ML
INJECTION, SOLUTION EPIDURAL; INFILTRATION; INTRACAUDAL; PERINEURAL AS NEEDED
Status: DISCONTINUED | OUTPATIENT
Start: 2025-03-18 | End: 2025-03-18 | Stop reason: SURG

## 2025-03-18 RX ORDER — ONDANSETRON 2 MG/ML
4 INJECTION INTRAMUSCULAR; INTRAVENOUS ONCE AS NEEDED
Status: DISCONTINUED | OUTPATIENT
Start: 2025-03-18 | End: 2025-03-18 | Stop reason: HOSPADM

## 2025-03-18 RX ORDER — MIDAZOLAM HYDROCHLORIDE 1 MG/ML
1 INJECTION, SOLUTION INTRAMUSCULAR; INTRAVENOUS
Status: DISCONTINUED | OUTPATIENT
Start: 2025-03-18 | End: 2025-03-18 | Stop reason: HOSPADM

## 2025-03-18 RX ORDER — SODIUM CHLORIDE, SODIUM LACTATE, POTASSIUM CHLORIDE, CALCIUM CHLORIDE 600; 310; 30; 20 MG/100ML; MG/100ML; MG/100ML; MG/100ML
9 INJECTION, SOLUTION INTRAVENOUS CONTINUOUS
Status: DISCONTINUED | OUTPATIENT
Start: 2025-03-19 | End: 2025-03-18

## 2025-03-18 RX ORDER — BISACODYL 5 MG/1
5 TABLET, DELAYED RELEASE ORAL DAILY PRN
Status: DISCONTINUED | OUTPATIENT
Start: 2025-03-18 | End: 2025-03-20 | Stop reason: HOSPADM

## 2025-03-18 RX ORDER — LIDOCAINE HYDROCHLORIDE AND EPINEPHRINE 5; 5 MG/ML; UG/ML
INJECTION, SOLUTION INFILTRATION; PERINEURAL AS NEEDED
Status: DISCONTINUED | OUTPATIENT
Start: 2025-03-18 | End: 2025-03-18 | Stop reason: HOSPADM

## 2025-03-18 RX ORDER — SODIUM CHLORIDE, SODIUM LACTATE, POTASSIUM CHLORIDE, CALCIUM CHLORIDE 600; 310; 30; 20 MG/100ML; MG/100ML; MG/100ML; MG/100ML
9 INJECTION, SOLUTION INTRAVENOUS CONTINUOUS
Status: DISCONTINUED | OUTPATIENT
Start: 2025-03-18 | End: 2025-03-18

## 2025-03-18 RX ORDER — PROMETHAZINE HYDROCHLORIDE 25 MG/1
25 TABLET ORAL ONCE AS NEEDED
Status: DISCONTINUED | OUTPATIENT
Start: 2025-03-18 | End: 2025-03-18 | Stop reason: HOSPADM

## 2025-03-18 RX ORDER — BUPIVACAINE HYDROCHLORIDE 2.5 MG/ML
INJECTION, SOLUTION INFILTRATION; PERINEURAL AS NEEDED
Status: DISCONTINUED | OUTPATIENT
Start: 2025-03-18 | End: 2025-03-18 | Stop reason: HOSPADM

## 2025-03-18 RX ORDER — IPRATROPIUM BROMIDE AND ALBUTEROL SULFATE 2.5; .5 MG/3ML; MG/3ML
3 SOLUTION RESPIRATORY (INHALATION) ONCE AS NEEDED
Status: DISCONTINUED | OUTPATIENT
Start: 2025-03-18 | End: 2025-03-18 | Stop reason: HOSPADM

## 2025-03-18 RX ORDER — LABETALOL HYDROCHLORIDE 5 MG/ML
5 INJECTION, SOLUTION INTRAVENOUS
Status: DISCONTINUED | OUTPATIENT
Start: 2025-03-18 | End: 2025-03-18 | Stop reason: HOSPADM

## 2025-03-18 RX ORDER — MAGNESIUM HYDROXIDE 1200 MG/15ML
LIQUID ORAL AS NEEDED
Status: DISCONTINUED | OUTPATIENT
Start: 2025-03-18 | End: 2025-03-18 | Stop reason: HOSPADM

## 2025-03-18 RX ORDER — SODIUM CHLORIDE 0.9 % (FLUSH) 0.9 %
3 SYRINGE (ML) INJECTION EVERY 12 HOURS SCHEDULED
Status: DISCONTINUED | OUTPATIENT
Start: 2025-03-18 | End: 2025-03-18 | Stop reason: HOSPADM

## 2025-03-18 RX ORDER — HYDROCODONE BITARTRATE AND ACETAMINOPHEN 5; 325 MG/1; MG/1
1 TABLET ORAL ONCE AS NEEDED
Status: DISCONTINUED | OUTPATIENT
Start: 2025-03-18 | End: 2025-03-18 | Stop reason: HOSPADM

## 2025-03-18 RX ORDER — SODIUM CHLORIDE 9 MG/ML
9 INJECTION, SOLUTION INTRAVENOUS AS NEEDED
Status: DISCONTINUED | OUTPATIENT
Start: 2025-03-18 | End: 2025-03-18 | Stop reason: HOSPADM

## 2025-03-18 RX ORDER — NALOXONE HCL 0.4 MG/ML
0.4 VIAL (ML) INJECTION AS NEEDED
Status: DISCONTINUED | OUTPATIENT
Start: 2025-03-18 | End: 2025-03-18 | Stop reason: HOSPADM

## 2025-03-18 RX ORDER — OXYCODONE AND ACETAMINOPHEN 7.5; 325 MG/1; MG/1
1 TABLET ORAL EVERY 4 HOURS PRN
Status: DISCONTINUED | OUTPATIENT
Start: 2025-03-18 | End: 2025-03-18 | Stop reason: HOSPADM

## 2025-03-18 RX ORDER — FENTANYL CITRATE 50 UG/ML
50 INJECTION, SOLUTION INTRAMUSCULAR; INTRAVENOUS
Status: DISCONTINUED | OUTPATIENT
Start: 2025-03-18 | End: 2025-03-18 | Stop reason: HOSPADM

## 2025-03-18 RX ORDER — PHENYLEPHRINE HYDROCHLORIDE 10 MG/ML
INJECTION INTRAVENOUS AS NEEDED
Status: DISCONTINUED | OUTPATIENT
Start: 2025-03-18 | End: 2025-03-18 | Stop reason: SURG

## 2025-03-18 RX ADMIN — FENTANYL CITRATE 50 MCG: 50 INJECTION, SOLUTION INTRAMUSCULAR; INTRAVENOUS at 08:23

## 2025-03-18 RX ADMIN — BETHANECHOL CHLORIDE 25 MG: 25 TABLET ORAL at 20:17

## 2025-03-18 RX ADMIN — HYDROCODONE BITARTRATE AND ACETAMINOPHEN 1 TABLET: 5; 325 TABLET ORAL at 15:23

## 2025-03-18 RX ADMIN — LIDOCAINE HYDROCHLORIDE 40 MG: 10 INJECTION, SOLUTION EPIDURAL; INFILTRATION; INTRACAUDAL; PERINEURAL at 07:33

## 2025-03-18 RX ADMIN — SENNOSIDES AND DOCUSATE SODIUM 2 TABLET: 50; 8.6 TABLET ORAL at 20:15

## 2025-03-18 RX ADMIN — GLYCOPYRROLATE 0.1 MG: 0.2 INJECTION, SOLUTION INTRAMUSCULAR; INTRAVENOUS at 07:51

## 2025-03-18 RX ADMIN — PROPOFOL 50 MG: 10 INJECTION, EMULSION INTRAVENOUS at 09:12

## 2025-03-18 RX ADMIN — PHENYLEPHRINE HYDROCHLORIDE 50 MCG: 10 INJECTION INTRAVENOUS at 08:39

## 2025-03-18 RX ADMIN — PHENYLEPHRINE HYDROCHLORIDE 100 MCG: 10 INJECTION INTRAVENOUS at 08:29

## 2025-03-18 RX ADMIN — FENTANYL CITRATE 50 MCG: 50 INJECTION, SOLUTION INTRAMUSCULAR; INTRAVENOUS at 07:33

## 2025-03-18 RX ADMIN — ROCURONIUM 10 MG: 50 INJECTION, SOLUTION INTRAVENOUS at 08:39

## 2025-03-18 RX ADMIN — SODIUM CHLORIDE 2000 MG: 900 INJECTION INTRAVENOUS at 07:37

## 2025-03-18 RX ADMIN — ROCURONIUM 50 MG: 50 INJECTION, SOLUTION INTRAVENOUS at 07:34

## 2025-03-18 RX ADMIN — PANTOPRAZOLE SODIUM 40 MG: 40 TABLET, DELAYED RELEASE ORAL at 04:01

## 2025-03-18 RX ADMIN — SUGAMMADEX 200 MG: 100 INJECTION, SOLUTION INTRAVENOUS at 08:54

## 2025-03-18 RX ADMIN — PHENYLEPHRINE HYDROCHLORIDE 50 MCG: 10 INJECTION INTRAVENOUS at 08:21

## 2025-03-18 RX ADMIN — PROPOFOL 20 MG: 10 INJECTION, EMULSION INTRAVENOUS at 09:07

## 2025-03-18 RX ADMIN — GLYCOPYRROLATE 0.1 MG: 0.2 INJECTION, SOLUTION INTRAMUSCULAR; INTRAVENOUS at 07:48

## 2025-03-18 RX ADMIN — Medication 10 ML: at 20:22

## 2025-03-18 RX ADMIN — PHENYLEPHRINE HYDROCHLORIDE 100 MCG: 10 INJECTION INTRAVENOUS at 08:47

## 2025-03-18 RX ADMIN — TOPIRAMATE 100 MG: 100 TABLET, FILM COATED ORAL at 20:16

## 2025-03-18 RX ADMIN — PROPOFOL 20 MG: 10 INJECTION, EMULSION INTRAVENOUS at 08:54

## 2025-03-18 RX ADMIN — PHENYLEPHRINE HYDROCHLORIDE 100 MCG: 10 INJECTION INTRAVENOUS at 07:39

## 2025-03-18 RX ADMIN — PROPOFOL 120 MG: 10 INJECTION, EMULSION INTRAVENOUS at 07:33

## 2025-03-18 RX ADMIN — PHENYLEPHRINE HYDROCHLORIDE 50 MCG: 10 INJECTION INTRAVENOUS at 07:45

## 2025-03-18 RX ADMIN — NORTRIPTYLINE HYDROCHLORIDE 20 MG: 10 CAPSULE ORAL at 20:19

## 2025-03-18 RX ADMIN — SODIUM CHLORIDE, SODIUM LACTATE, POTASSIUM CHLORIDE, CALCIUM CHLORIDE 9 ML/HR: 20; 30; 600; 310 INJECTION, SOLUTION INTRAVENOUS at 06:37

## 2025-03-18 RX ADMIN — FAMOTIDINE 20 MG: 20 TABLET, FILM COATED ORAL at 17:22

## 2025-03-18 RX ADMIN — DEXAMETHASONE SODIUM PHOSPHATE 8 MG: 4 INJECTION INTRA-ARTICULAR; INTRALESIONAL; INTRAMUSCULAR; INTRAVENOUS; SOFT TISSUE at 07:38

## 2025-03-18 RX ADMIN — ATORVASTATIN CALCIUM 80 MG: 40 TABLET, FILM COATED ORAL at 20:15

## 2025-03-18 NOTE — PROGRESS NOTES
Chief complaint: Hydrocephalus    Admit Diagnosis:   Left-sided weakness [R53.1]     Subjective: No events overnight    Objective:    Vitals:    25 0635   BP: 148/81   Pulse: 68   Resp: 16   Temp: 97.8 °F (36.6 °C)   SpO2: 100%     Pulse  Av.2  Min: 57  Max: 72  Systolic (24hrs), Av , Min:107 , Max:148     Diastolic (24hrs), Av, Min:69, Max:86    Temp (24hrs), Av.9 °F (36.6 °C), Min:97.6 °F (36.4 °C), Max:98.5 °F (36.9 °C)      Patient is awake, alert, conversant, pleasant, and appropriate.  Speech production is fluent with no paraphasic errors.    Lab Results   Component Value Date     2025       A/P:   Admit Diagnosis:   Left-sided weakness [R53.1]     Patient's son is at the bedside.  Informed consent was once again obtained for a right sided ventriculoperitoneal shunt placement.  All questions were answered.  No guarantees were given or implied.  The patient consents to proceed with surgery.

## 2025-03-18 NOTE — PROGRESS NOTES
"Patient Name:  Lauryn Romo  YOB: 1960  [id number]    Surgery Post - Operative Note    Date of visit: 3/18/2025    Subjective   Subjective: Stable after OR, some stuttering.       Objective     Objective:    /76 (BP Location: Left arm, Patient Position: Lying)   Pulse 65   Temp 98.7 °F (37.1 °C) (Oral)   Resp 17   Ht 154.9 cm (61\")   Wt 59 kg (130 lb)   SpO2 100%   BMI 24.56 kg/m²     CV:  Rate  regular and rhythm  regular  L:  Clear  to auscultation bilaterally   ABD:  Soft, appropriately tender. Dressings  clean, dry and intact   EXT:  No cyanosis, clubbing or edema             Assessment/ Plan: Doing well after Lap  shunt. Continue Pulmonary toilet    Problem List Items Addressed This Visit          Neuro    Cerebral ventriculomegaly - Primary    Relevant Orders    Case request (Completed)     Other Visit Diagnoses         Aphasia          Acute left-sided weakness          History of ischemic stroke                 Active Hospital Problems    Diagnosis  POA    **Left-sided weakness [R53.1]  Yes    Cerebral ventriculomegaly [G93.89]  Yes      Resolved Hospital Problems   No resolved problems to display.            Reid Raya MD  3/18/2025  14:51 EDT    "

## 2025-03-18 NOTE — PROGRESS NOTES
Stroke Progress Note       Chief Complaint: Patient with basilar aneurysm status post ventriculoperitoneal shunt on 3/18/2025    Subjective    Subjective     Subjective:  The patient is lying down in the bed in NAD. Family were at the bedside. The patient was having severe stuttering but was able to follow commands and answering this provider's questions with yes and no.  The patient family at the bedside were updated and all questions and concerns were answered.  CT head was done after the  shunt placement and showed interval improvement of the ventriculomegaly and placement of the right-sided  shunt.    No other acute complains at this time    Review of Systems   Unable to obtain due to severe stuttering  Objective      Temp:  [96.9 °F (36.1 °C)-98.9 °F (37.2 °C)] 98.7 °F (37.1 °C)  Heart Rate:  [57-81] 65  Resp:  [12-18] 17  BP: (105-148)/(69-86) 136/76    Objective    GEN: lying in bed; in NAD  HENT: normocephalic, non-erythematous oropharynx  CV: no LE edema    NEURO:  Mental Status: The patient is alert and able to follow commands  Speech: Patient is stuttering and unable to say more than a single word at time  CN 2-12:  II - PERRLA  III, IV, VI -no gross extraocular movement restriction  V - Facial sensation intact  VII -no gross facial asymmetry  VIII - Auditory acuity intact  XII - Tongue protrudes midline    Motor: The patient can move all 4 extremities against gravity  Sensory: intact light touch throughout  Reflexes: negative Gardiner's sign BL  Coordination: no ataxia with finger-to-nose testing  Gait/Station: deferred     Results Review:    I reviewed the patient's new clinical results.    WBC   Date Value Ref Range Status   03/17/2025 6.04 3.40 - 10.80 10*3/mm3 Final     RBC   Date Value Ref Range Status   03/17/2025 4.53 3.77 - 5.28 10*6/mm3 Final     Hemoglobin   Date Value Ref Range Status   03/17/2025 14.0 12.0 - 15.9 g/dL Final     Hematocrit   Date Value Ref Range Status   03/17/2025 46.3  34.0 - 46.6 % Final     MCV   Date Value Ref Range Status   03/17/2025 102.2 (H) 79.0 - 97.0 fL Final     MCH   Date Value Ref Range Status   03/17/2025 30.9 26.6 - 33.0 pg Final     MCHC   Date Value Ref Range Status   03/17/2025 30.2 (L) 31.5 - 35.7 g/dL Final     RDW   Date Value Ref Range Status   03/17/2025 13.7 12.3 - 15.4 % Final     RDW-SD   Date Value Ref Range Status   03/17/2025 52.2 37.0 - 54.0 fl Final     MPV   Date Value Ref Range Status   03/17/2025 10.9 6.0 - 12.0 fL Final     Platelets   Date Value Ref Range Status   03/17/2025 243 140 - 450 10*3/mm3 Final     Neutrophil %   Date Value Ref Range Status   03/17/2025 72.0 42.7 - 76.0 % Final     Lymphocyte %   Date Value Ref Range Status   03/17/2025 19.9 19.6 - 45.3 % Final     Monocyte %   Date Value Ref Range Status   03/17/2025 4.3 (L) 5.0 - 12.0 % Final     Eosinophil %   Date Value Ref Range Status   03/17/2025 2.8 0.3 - 6.2 % Final     Basophil %   Date Value Ref Range Status   03/17/2025 0.7 0.0 - 1.5 % Final     Immature Grans %   Date Value Ref Range Status   03/17/2025 0.3 0.0 - 0.5 % Final     Neutrophils, Absolute   Date Value Ref Range Status   03/17/2025 4.35 1.70 - 7.00 10*3/mm3 Final     Lymphocytes, Absolute   Date Value Ref Range Status   03/17/2025 1.20 0.70 - 3.10 10*3/mm3 Final     Monocytes, Absolute   Date Value Ref Range Status   03/17/2025 0.26 0.10 - 0.90 10*3/mm3 Final     Eosinophils, Absolute   Date Value Ref Range Status   03/17/2025 0.17 0.00 - 0.40 10*3/mm3 Final     Basophils, Absolute   Date Value Ref Range Status   03/17/2025 0.04 0.00 - 0.20 10*3/mm3 Final     Immature Grans, Absolute   Date Value Ref Range Status   03/17/2025 0.02 0.00 - 0.05 10*3/mm3 Final     nRBC   Date Value Ref Range Status   03/17/2025 0.0 0.0 - 0.2 /100 WBC Final       Lab Results   Component Value Date    GLUCOSE 137 (H) 03/17/2025    BUN 13 03/17/2025    CREATININE 0.64 03/17/2025     03/17/2025    K 4.4 03/17/2025      03/17/2025    CALCIUM 9.0 03/17/2025    PROTEINTOT 6.5 03/12/2025    ALBUMIN 4.0 03/12/2025    ALT 36 (H) 03/12/2025    AST 26 03/12/2025    ALKPHOS 115 03/12/2025    BILITOT 0.3 03/12/2025    GLOB 2.5 03/12/2025    AGRATIO 1.6 03/12/2025    BCR 20.3 03/17/2025    ANIONGAP 12.0 03/17/2025    EGFR 99.4 03/17/2025     CT Head Without Contrast  Result Date: 3/18/2025  Impression: Interval placement of a right-sided ventriculoperitoneal shunt with slight decrease in the size of the ventricles. There is no acute intracranial hemorrhage. Electronically Signed: Rinku Tran MD  3/18/2025 12:45 PM EDT  Workstation ID: [id number]    CT Head Without Contrast  Result Date: 3/17/2025  Impression: 1.No acute intracranial process identified. 2.Stable prominent ventricular caliber. Electronically Signed: Chris Acuña MD  3/17/2025 12:31 PM EDT  Workstation ID: [id number]    Results for orders placed during the hospital encounter of 03/11/25    Adult Transthoracic Echo Complete W/ Cont if Necessary Per Protocol (With Agitated Saline)    Interpretation Summary    Left ventricular systolic function is normal. Estimated left ventricular EF = 70%    Mild to moderate aortic valve regurgitation is present    -[facility]H wo on 3/18/2025 images were personally reviewed and showed no acute ischemic or hemorrhagic stroke with interval placement of right-sided ventriculoperitoneal shunt with slight improvement of the ventriculomegaly  -CTA of the head and neck images from 3/11/2025 were personally reviewed and showed no flow limiting stenosis or LVO, however there was stent in the distal vertebral artery extending into the basilar artery  -MRI brain images from 3/12/2025 were personally reviewed and showed no ischemic or hemorrhagic stroke  -Transthoracic echocardiogram from 3/12/2025 report was personally reviewed and showed left ventricular ejection fraction of 70%, no left atrial dilation   -A1c from 3/12/2025 was 5.8%  -LDL from 3/12/2025 was  42      Assessment/Plan   Lauryn Romo is a 64 y.o. female with known medical diagnoses of HLD, HTN,  migraines, cerebellar stroke stroke (residual dizziness, ataxia), and giant basilar aneurysm s/p pipeline embolization (Dr. Savage, 2022/2023), s/p thrombectomy of pipeline stent thrombosis with resultant TICI 3 flow (after stopping Plavix, Dr. Savage 10/2024) who presented to [facility] on 3/11/2025 with complaints of a syncopal episode (no LOC), transient aphasia, headache, and fall.  She reported progressively unsteady gait as well as frequent falls.  MRI was negative for any new infarct.  Progressive ventriculomegaly with possible transependymal resorption of CSF noted.  Imaging was reviewed by Dr. Dr. Savage of neurointervention who reviewed with Dr. Mendez of neurosurgery.  Given her progressive decline and progressive ventriculomegaly, Dr. Mendez recommends  shunt placement on 3/18/2025.      Plavix was discontinued given her upcoming surgery.  IV Integrilin was started given her recent in-stent thrombosis while off Plavix with plans to stop the Integrilin on midnight Monday night prior to surgery.     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 Progressive ventriculomegaly with possible transependymal resorption of CSF noted per Dr. Savage.    - Neurosurgery following. Plan for  shunt with Dr. Mendez on 3/18/2025  -[facility] wo on 3/18/2025 images were personally reviewed and showed no acute ischemic or hemorrhagic stroke with interval placement of right-sided ventriculoperitoneal shunt with slight improvement of the ventriculomegaly  -CTA of the head and neck images from 3/11/2025 were personally reviewed and showed no flow limiting stenosis or  LVO, however there was stent in the distal vertebral artery extending into the basilar artery  -MRI brain images from 3/12/2025 were personally reviewed and showed no ischemic or hemorrhagic stroke  -Transthoracic echocardiogram from 3/12/2025 report was personally reviewed and showed left ventricular ejection fraction of 70%, no left atrial dilation   -A1c from 3/12/2025 was 5.8%  -LDL from 3/12/2025 was 42  - P2Y12 57.  Plavix currently discontinued due to plan for  shunt  - EEG negative for epileptiform activity.  No need for AEDs at this time.  Can consider longer monitoring with any additional episodes concerning for possible seizure.  - TTE 3/13/2025 with EF 66 to 70%, no LAE.  Negative bubble study noted on TTE from 10/7/2024.    Recommendation  - Continue aspirin 81 mg daily for secondary stroke prevention.  Resume Plavix once appropriate per neurosurgery  - Continue atorvastatin 80 mg daily for secondary stroke prevention.  LDL 42.  At goal of less than 70 for secondary stroke prevention.  - Hemoglobin A1c 5.8 on 3/12/2025.  In the prediabetes range.  Dietary counseling.  - BP goals less than 130/80.  Management per primary team.  - Headaches; improving.  Continue home Topamax.  Okay to bring home Ubrelvy per pharmacy with any continued headache.  - PT/OT/SLP recommend IRF at [place]      Stroke team will follow peripherally as needed. Please call for any further questions or concerns  =================================  Hugo Laureano MD, Msc, PhD  Vascular Neurologist  [facility]

## 2025-03-18 NOTE — ANESTHESIA PROCEDURE NOTES
Airway  Reason: elective    Date/Time: 3/18/2025 7:36 AM  Airway not difficult    General Information and Staff    Patient location during procedure: OR    Indications and Patient Condition  Indications for airway management: airway protection    Preoxygenated: yes  MILS not maintained throughout    Mask difficulty assessment: 2 - vent by mask + OA or adjuvant +/- NMBA    Final Airway Details    Final airway type: endotracheal airway      Successful airway: ETT  Cuffed: yes   Successful intubation technique: video laryngoscopy  Adjuncts used in placement: intubating stylet  Endotracheal tube insertion site: oral  Blade: Quinteros  Blade size: 3  ETT size (mm): 7.0  Cormack-Lehane Classification: grade I - full view of glottis  Placement verified by: chest auscultation and capnometry   Cuff volume (mL): 6  Measured from: lips  ETT/EBT  to lips (cm): 20  Number of attempts at approach: 1  Assessment: lips, teeth, and gum same as pre-op and atraumatic intubation    Additional Comments  Negative epigastric sounds, Breath sound equal bilaterally with symmetric chest rise and fall

## 2025-03-18 NOTE — PROGRESS NOTES
INTENSIVIST   PROGRESS NOTE         SUBJECTIVE     Ms. Lauryn Romo, 64 y.o. female is followed for: Fall       S/P Procedure(s) (LRB):  LAPAROSCOPIC ASSISTED VENTRICULAR PERITONEAL SHUNT REVISION (N/A)   Perioperative medical management of comorbid conditions, including glycemic control and electrolytes disorders.    As an Intensivist, we have completed an accredited Critical Care program and maintain Board Certification and dedicate most of our professional work to critical/intensive care. We serve as the  or member of an interprofessional team, coordinating and guiding healthcare professionals to provide specialized care for critically ill patients. We manage and resuscitate patients with, or at risk for, critical illness and organ failure and initiate interventions to prevent imminent deterioration. (2024 Consensus Statement from the Society of Critical Care Medicine Defining Intensivist Task Force. [facility] 2025. DOI: 10.1097/[facility].[id number]     Interval History:  POD: * Day of Surgery *  Procedure(s) (LRB):  LAPAROSCOPIC ASSISTED VENTRICULAR PERITONEAL SHUNT REVISION (N/A)   Dr. Mendez  03/18/25     She was seen in 2H ICU upon arrival from PACU.    Events from surgery were reviewed.    Stuttering. New since surgery. Stat CT Head done was negative for new lesions.    Temp  Min: 96.9 °F (36.1 °C)  Max: 98.9 °F (37.2 °C)     The patient's relevant past medical, surgical and social history were reviewed and updated in Epic as appropriate.       History     Last Reviewed by Kurt Alarcon MD on 3/18/2025 at 12:12 PM    Sections Reviewed    Medical, Surgical, Family, Tobacco, Alcohol, Drug Use, Sexual Activity,   Social Documentation    Problem list reviewed by Kurt Alarcon MD on 3/18/2025 at 12:11 PM  Medicines reviewed by Kurt Alarcon MD on 3/18/2025 at 12:11 PM  Allergies reviewed by Kurt Alarcon MD on 3/18/2025 at 12:11 PM           OBJECTIVE     Vitals:  Temp: 97.7 °F (36.5 °C)  (25 1140) Temp  Min: 96.9 °F (36.1 °C)  Max: 98.9 °F (37.2 °C)   Temp core:      BP: 130/78 (25 1140) BP  Min: 105/70  Max: 148/81   MAP (non-invasive) Noninvasive MAP (mmHg): 98 (25 1140) Noninvasive MAP (mmHg)  Av.7  Min: 55  Max: 119   Pulse: 68 (25 1140) Pulse  Min: 57  Max: 73   Resp: 16 (25 1140) Resp  Min: 12  Max: 17   SpO2: 100 % (25 1140) SpO2  Min: 95 %  Max: 100 %   Device: nasal cannula with ETCO2 (25 114)    Flow Rate: 2 (25 114) Flow (L/min) (Oxygen Therapy)  Min: 2  Max: 4         25  1030   Weight: 59 kg (130 lb) 59 kg (130 lb)        Intake/Ouptut 24 hrs (7:00AM - 6:59 AM)  Intake & Output (last 2 days)          0701   0700  0701   0700  0701   0700    I.V. (mL/kg) 52.2 (0.9)  600 (10.2)    IV Piggyback   100    Total Intake(mL/kg) 52.2 (0.9)  700 (11.9)    Net +52.2  +700           Urine Unmeasured Occurrence 4 x 3 x     Stool Unmeasured Occurrence  1 x             Medications (drips):  [START ON 3/19/2025] lactated ringers, Last Rate: 9 mL/hr (25 0637)  lactated ringers  niCARdipine        Physical Examination  Telemetry:  Rhythm: normal sinus rhythm (25 1100)      Constitutional:  No acute distress.   Cardiovascular: RRR.    Respiratory: Normal breath sounds  No adventitious sounds   Abdominal:  Soft with no tenderness.   Extremities: No Edema   Neurological:   Awake.    Best Eye Response: 4-->(E4) spontaneous (25 1140)  Best Motor Response: 6-->(M6) obeys commands (25 1140)  Best Verbal Response: 5-->(V5) oriented (25 1140)  Carrillo Coma Scale Score: 15 (25 1140)               Hematology:  Results from last 7 days   Lab Units 25  0945 25  0442 03/15/25  1102   WBC 10*3/mm3 6.04 10.82* 7.75   HEMOGLOBIN g/dL 14.0 13.5 14.7   MCV fL 102.2* 94.6 93.6   PLATELETS 10*3/mm3 243 238 265     Results from last 7 days   Lab Units 25  0945  03/12/25  0525 03/11/25 2047   NEUTROS ABS 10*3/mm3 4.35 4.12 5.24   LYMPHS ABS 10*3/mm3 1.20 2.02 1.54   EOS ABS 10*3/mm3 0.17 0.19 0.16     Chemistry:  Estimated Creatinine Clearance: 74.3 mL/min (by C-G formula based on SCr of 0.64 mg/dL).    Results from last 7 days   Lab Units 03/17/25  0945 03/15/25  1102   SODIUM mmol/L 139 141   POTASSIUM mmol/L 4.4 4.3   CHLORIDE mmol/L 104 106   CO2 mmol/L 23.0 24.0   BUN mg/dL 13 14   CREATININE mg/dL 0.64 0.67   GLUCOSE mg/dL 137* 93     Results from last 7 days   Lab Units 03/17/25  0945 03/15/25  1102 03/14/25  1652 03/12/25  0525   CALCIUM mg/dL 9.0 9.6   < > 8.6   MAGNESIUM mg/dL  --   --   --  3.1*    < > = values in this interval not displayed.     Hepatic Panel:  Results from last 7 days   Lab Units 03/12/25  0525 03/11/25 2047   ALBUMIN g/dL 4.0  --    TOTAL PROTEIN g/dL 6.5  --    BILIRUBIN mg/dL 0.3  --    AST (SGOT) U/L 26 34*   ALT (SGPT) U/L 36* 44*   ALK PHOS U/L 115  --       Images:  CT Head Without Contrast  Result Date: 3/17/2025  Impression: 1.No acute intracranial process identified. 2.Stable prominent ventricular caliber. Electronically Signed: Chris Acuña MD  3/17/2025 12:31 PM EDT  Workstation ID: [id number]    Echo:  Results for orders placed during the hospital encounter of 03/11/25    Adult Transthoracic Echo Complete W/ Cont if Necessary Per Protocol (With Agitated Saline)    Interpretation Summary    Left ventricular systolic function is normal. Estimated left ventricular EF = 70%    Mild to moderate aortic valve regurgitation is present      Results: Reviewed.  I reviewed the patient's new laboratory and imaging results.  I independently reviewed the patient's new images.    Medications: Reviewed.      Assessment   A/P     Hospital:  LOS: 5 days   ICU: 38m     Active Hospital Problems    Diagnosis  POA    **Left-sided weakness [R53.1]  Yes    Cerebral ventriculomegaly [G93.89]  Yes     Lauryn is a 64 y.o. female admitted on 3/11/2025  with Left-sided weakness [R53.1]     Assessment/Management/Treatment Plan:    Obstructive Hydrocephalus  Procedure(s) (LRB):  LAPAROSCOPIC ASSISTED VENTRICULAR PERITONEAL SHUNT REVISION (N/A)   Dr. Mendez / Dr. Raya  03/18/25   Antibiotic Surgical Prophylaxis: Cefazolin    Cardiovascular  HTN  Dyslipidemia   Endocrine   Body mass index is 24.56 kg/m². Normal: 18.5-24.9kg/m2  Prediabetes (A1C 5.7%-6.4%)    Lab Results   Lab Value Date/Time    HGBA1C 5.80 (H) 03/12/2025 0525    HGBA1C 5.60 10/07/2024 0555     Results from last 7 days   Lab Units 03/13/25  0620 03/12/25  1144 03/11/25  1949   GLUCOSE mg/dL 85 88 90       Diet: No diet orders on file  Orders Placed This Encounter      DIET MESSAGE Please send hot breakfast tray as able      DIET MESSAGE Please send late tray      Advance Directives: Code Status and Medical Interventions: CPR (Attempt to Resuscitate); Full   Ordered at: 03/18/25 0847     Code Status (Patient has no pulse and is not breathing):    CPR (Attempt to Resuscitate)     Medical Interventions (Patient has pulse or is breathing):    Full     Level Of Support Discussed With:    Patient        VTE Prophylaxis:  Mechanical VTE prophylaxis orders are present.          In brief:  ICU post operative medical management.  Hemodynamic management. Adequate preload.  Nicardipine continuous intravenous infusion as clinically indicated.  Urinary Output > = 0.5 mL/kg/hr  Watch for arrhythmias.  Watch for bleeding.  Insulin Sub Q as clinically indicated. Avoid oral antidiabetic medications. Target glucose < = 180 mg/dL.   Discussed with Dr. Hugo Laureano (Vascular Neurologist) Stuttering probably not secondary to a new stroke.  Disposition: Admit to ICU    Plan of care and goals reviewed during interdisciplinary rounds.  I discussed the patient's findings and my recommendations with patient, family, nursing staff, and consulting provider    MDM:    Problem(s) High due to: Acute or Chronic illness or injury  that may poses a threat to life or bodily function  Data: High due to: Review of prior external records from each unique source, Review of the result(s) of each unique test, Ordering of each unique test, and Discuss management or test interpretation with external physician or NPP (not separately reported)    High    [x] Primary Attending Intensive Care Medicine - Nutrition Support   [] Consultant    Copied text in this note has been reviewed and is accurate as of 03/18/25

## 2025-03-18 NOTE — PLAN OF CARE
"Called due to neuro change reported by RN. On exam I find patient awake, when asked where she is she stutters \"b-b-b-b-b-...\"  and was unable to finish word but did nod in acknowledgement when asked \"Restorationist?\" Similar with other neuro exam questions. No focal deficits noted on bedside exam aside from this and slight weakness of left compared to right upper and lower extremities. Spoke with Dr Fraser with Stroke, and he was not concerned for new stroke, but will repeat head CT. Dr. Mendez updated as well.   "

## 2025-03-18 NOTE — OP NOTE
Preoperative diagnosis: Obstructive hydrocephalus  Postoperative diagnosis: Same    Procedures performed:  1.  Right parietal ventriculoperitoneal shunt placement  2.  Use of intraoperative navigation for intradural target    Estimated blood loss: Negligible    Procedure in detail: The patient was identified in the preoperative holding area and brought to the operating suite where she underwent the uneventful induction general, endotracheal anesthesia.  Venodyne's were placed by the nursing staff.  The bed was rotated 90 degrees from anesthesia.  The patient's head was rotated to the left exposing the right hemicranium.  The patient's face was registered with the navigation system, and the entry point near Dandy's point over the right parietal boss was marked out after confirming the surgical trajectory using the navigation system.  The overlying skin was then shaved.  The patient's parietal scalp, right neck, right anterior chest, and right abdomen were then prepped, and draped in the usual, sterile fashion.    A timeout was performed.  Intravenous antibiotics were ministered.  A curvilinear incision over the planned entry point was then made after anesthetizing the skin.  Hemostasis was achieved using bipolar and monopolar electrocautery.  A small subcutaneous pocket was created using the Garcia scissors.  A bur hole was then placed using the  and epidural oozing was controlled using thrombin foam.  The shunt passer was then used to create a subcutaneous tract in a cephalad to caudad fashion.    A [employer] programmable shunt valve was programmed to 1.5 and was attached to the peritoneal catheter and secured in place using a silk tie.  The peritoneal portion of the catheter was then passed in a cephalad caudad fashion through the tract that was created using the passer.  The dura was coagulated and opened using a #11 blade.  The cruciate leaflets were then coagulated back and a small corticectomy was made.   The atrium of the right lateral ventricle was then cannulated on the first pass at a depth of approximately 5 cm.  CSF was seen to egress under a moderate degree of pressure, I would estimate the opening pressure was between 15 to 18 cm of water.  The catheter was cut to the appropriate length and affixed to the shunt valve.  A second silk tie was used to secure the ventricular portion of the catheter to the shunt and CSF continuity distally was confirmed.    The wound was copiously irrigated with saline.  Please refer to Dr. Raya's separate dictation for the details of the implantation of the peritoneal portion of the catheter in the abdominal cavity.    The cranial incision was closed using 2-0 Vicryl sutures in interrupted buried fashion.  Glue and a sterile dressing were subsequently applied.    Sponge, instrument, and needle counts were all correct at the conclusion of the case.    Lilliana Dunn, physician assistant was responsible for performing the following activities: Retraction, Suction, Irrigation, Suturing and Closing and their skilled assistance was necessary for the success of this case.

## 2025-03-18 NOTE — ANESTHESIA POSTPROCEDURE EVALUATION
Patient: Lauryn Romo    Procedure Summary       Date: 03/18/25 Room / Location:  [facility]  /  [facility]    Anesthesia Start: 0727 Anesthesia Stop: 0927    Procedure: LAPAROSCOPIC ASSISTED VENTRICULAR PERITONEAL SHUNT REVISION (Head) Diagnosis:       Cerebral ventriculomegaly      (Cerebral ventriculomegaly [G93.89])    Surgeons: Joe Mendez MD Provider: Yong Michaels MD    Anesthesia Type: general ASA Status: 3            Anesthesia Type: general    Vitals  Vitals Value Taken Time   /72 03/18/25 09:27   Temp     Pulse 63 03/18/25 09:27   Resp 12 03/18/25 09:27   SpO2 100 % 03/18/25 09:27           Post Anesthesia Care and Evaluation    Patient location during evaluation: PACU  Patient participation: waiting for patient participation  Level of consciousness: lethargic  Pain management: adequate    Airway patency: patent  Anesthetic complications: No anesthetic complications  PONV Status: none  Cardiovascular status: hemodynamically stable and acceptable  Respiratory status: nonlabored ventilation, acceptable and nasal cannula  Hydration status: acceptable    Comments: PACU RN educated on s/sx serotonin syndrome and told to notify Dr. Michaels if pt shows any sx

## 2025-03-18 NOTE — OP NOTE
OPERATIVE NOTE    Patient Name:  Lauryn Romo  YOB: 1960  [id number]    Date of Surgery:  3/18/2025      PREOPERATIVE DIAGNOSIS: Hydrocephalus      POSTOPERATIVE DIAGNOSIS: Same        PROCEDURE PERFORMED: Laparoscopic placement of  shunt          SURGEON: Reid Raya MD       Circulator: Kristina Gillespie RN  Physician Assistant: Lilliana Dunn PA  Scrub Person: Iqra Faye  Nursing Assistant: Sydney Smalls  Assistant: Nikki Carrero PA-C; Melanie Call PA-C  Other: Elza Perez       Assistant: Nikki Carrero PA-C; Melanie Call PA-C    ASSISTANT SURGEON: None       SPECIMENS: None       EBL: Minimal       ANESTHESIA: General.   ASA Score: III       FINDINGS:   1.  Ventriculoperitoneal shunt placed into the pelvis without difficulty       INDICATIONS: The patient is a 64 y.o. female who presented with hydrocephalus.  She is planning to undergo ventriculoperitoneal shunt by the neurosurgery service.  We have been asked to place the abdominal portion of the shunt.  The risks and benefits were discussed at length with the patient, who agreed to proceed.         DESCRIPTION OF PROCEDURE:      Upon my entry into the operating room, Dr. Mendez had performed his portion of the procedure.  Please see his separately dictated operative note.  The abdomen had already been prepped and draped in standard sterile fashion.  At this point, after infiltrating local anesthetic, a transverse 5 mm skin incision was made inferior to the right costal margin in the anterior axillary line.  An optically guided trocar was advanced without difficulty into the abdominal cavity.  The abdomen was insufflated with carbon dioxide gas to a pressure of 15 mmHg.  At this point the laparoscope was advanced through the trocar and the abdominal contents inspected.  There was no evidence of bowel, bladder, or visceral injury with entrance of the trocar.  We then placed a 5 mm trocar adjacent to the   catheter which had been tunneled to the subxiphoid area.  This was advanced under laparoscopic guidance into the abdominal cavity.  An additional 5 mm port was placed in the right lower quadrant in the anterior axillary line after infiltrating skin with local anesthetic.  A grasper was advanced through the right lower quadrant trocar through the subxiphoid trocar.  The subxiphoid trocar was removed.  The ventriculoperitoneal shunt was then grasped with a grasper which brought the shunt back into the abdominal cavity.  All excess was placed within the pelvis.  There was no kinking of the catheter.  Thus, the  shunt had been successfully placed in the abdominal cavity.  At this point all trocars were removed under direct and laparoscopic visualization, and the abdomen was desufflated  The wounds were irrigated with saline and closed in each area using running subcuticular sutures.  The incisions were dressed with tissue glue.    All sponge and needle counts were correct times two at the completion of the procedure. The patient recovered from anesthesia, was extubated in the operating room  and was transported to the PACU  in stable condition.    COMPLICATIONS: None       Reid Raya MD  3/18/2025  09:11 EDT        Dictated using Dragon Dictation

## 2025-03-18 NOTE — ANESTHESIA PREPROCEDURE EVALUATION
Anesthesia Evaluation     Patient summary reviewed and Nursing notes reviewed   NPO Solid Status: > 8 hours  NPO Liquid Status: > 2 hours           Airway   Mallampati: I  TM distance: >3 FB  Neck ROM: full  No difficulty expected  Dental      Pulmonary     breath sounds clear to auscultation  Cardiovascular     ECG reviewed  Rhythm: regular  Rate: normal    (+) hypertension, hyperlipidemia      Neuro/Psych  (+) CVA (imbalance), headaches  GI/Hepatic/Renal/Endo    (+) hiatal hernia    Musculoskeletal     Abdominal    Substance History      OB/GYN          Other        ROS/Med Hx Other: Left ventricular systolic function is normal. Estimated left ventricular EF = 70%  ·  Mild to moderate aortic valve regurgitation is present                     Anesthesia Plan    ASA 3     general     intravenous induction     Anesthetic plan, risks, benefits, and alternatives have been provided, discussed and informed consent has been obtained with: patient.    Plan discussed with CRNA.    CODE STATUS:    Code Status (Patient has no pulse and is not breathing): CPR (Attempt to Resuscitate)  Medical Interventions (Patient has pulse or is breathing): Full Support  Level Of Support Discussed With: Patient

## 2025-03-18 NOTE — PLAN OF CARE
Goal Outcome Evaluation:  Plan of Care Reviewed With: spouse, patient        Progress: improving  Outcome Evaluation: CT head obtained d/t increased NIHS score, new deficits noted, see assessments. No further deterioration noted on Q1H neuro checks. See CT scan, repeat CT scan tomorrow morning. VSS. Remains free of falls/injury. Will continue to monitor.       Problem: Adult Inpatient Plan of Care  Goal: Plan of Care Review  Outcome: Progressing  Flowsheets (Taken 3/18/2025 1820)  Progress: improving  Outcome Evaluation: CT head obtained d/t increased NIHS score, new deficits noted, see assessments. No further deterioration noted on Q1H neuro checks. See CT scan, repeat CT scan tomorrow morning. VSS. Remains free of falls/injury. Will continue to monitor.  Plan of Care Reviewed With:   spouse   patient  Goal: Patient-Specific Goal (Individualized)  Outcome: Progressing  Goal: Absence of Hospital-Acquired Illness or Injury  Outcome: Progressing  Intervention: Identify and Manage Fall Risk  Recent Flowsheet Documentation  Taken 3/18/2025 1800 by Martina Delgadillo RN  Safety Promotion/Fall Prevention:   safety round/check completed   room organization consistent   lighting adjusted   fall prevention program maintained   clutter free environment maintained  Taken 3/18/2025 1600 by Martina Delgadillo, RN  Safety Promotion/Fall Prevention:   safety round/check completed   room organization consistent   lighting adjusted   fall prevention program maintained   clutter free environment maintained  Taken 3/18/2025 1400 by Martina Delgadillo, RN  Safety Promotion/Fall Prevention:   activity supervised   assistive device/personal items within reach   clutter free environment maintained   fall prevention program maintained   room organization consistent   safety round/check completed   toileting scheduled  Taken 3/18/2025 1140 by Martina Delgadillo, RN  Safety Promotion/Fall Prevention:   clutter free environment maintained   fall  prevention program maintained   lighting adjusted   nonskid shoes/slippers when out of bed   room organization consistent   safety round/check completed   toileting scheduled  Intervention: Prevent Skin Injury  Recent Flowsheet Documentation  Taken 3/18/2025 1800 by Martina Delgadillo RN  Body Position: position changed independently  Skin Protection:   silicone foam dressing in place   transparent dressing maintained   incontinence pads utilized  Taken 3/18/2025 1600 by Martina Delgadillo RN  Body Position: position changed independently  Skin Protection:   silicone foam dressing in place   transparent dressing maintained   incontinence pads utilized  Taken 3/18/2025 1400 by Martina Delgadillo RN  Body Position:   turned   left  Skin Protection:   silicone foam dressing in place   incontinence pads utilized  Taken 3/18/2025 1334 by Martina Delgadillo RN  Body Position: position changed independently  Taken 3/18/2025 1140 by Martina Delgadillo RN  Body Position: position changed independently  Skin Protection:   silicone foam dressing in place   pulse oximeter probe site changed   incontinence pads utilized  Intervention: Prevent and Manage VTE (Venous Thromboembolism) Risk  Recent Flowsheet Documentation  Taken 3/18/2025 1800 by Martina Delgadillo RN  VTE Prevention/Management:   bilateral   SCDs (sequential compression devices) on  Taken 3/18/2025 1600 by Martina Delgadillo RN  VTE Prevention/Management:   bilateral   SCDs (sequential compression devices) on  Taken 3/18/2025 1400 by Martina Delgadillo RN  VTE Prevention/Management:   bilateral   SCDs (sequential compression devices) on  Taken 3/18/2025 1140 by Martina Delgadillo RN  VTE Prevention/Management:   bilateral   SCDs (sequential compression devices) on  Intervention: Prevent Infection  Recent Flowsheet Documentation  Taken 3/18/2025 1800 by Martina Delgadillo RN  Infection Prevention:   environmental surveillance performed   rest/sleep promoted   single patient room  provided   personal protective equipment utilized   hand hygiene promoted   equipment surfaces disinfected  Taken 3/18/2025 1600 by Martina Delgaidllo RN  Infection Prevention:   environmental surveillance performed   rest/sleep promoted   single patient room provided   personal protective equipment utilized   hand hygiene promoted   equipment surfaces disinfected  Taken 3/18/2025 1400 by Martina Delgadillo RN  Infection Prevention:   environmental surveillance performed   equipment surfaces disinfected   hand hygiene promoted   personal protective equipment utilized   rest/sleep promoted   single patient room provided  Taken 3/18/2025 1140 by Martina Delgadillo RN  Infection Prevention:   environmental surveillance performed   equipment surfaces disinfected   hand hygiene promoted   rest/sleep promoted   single patient room provided   visitors restricted/screened  Goal: Optimal Comfort and Wellbeing  Outcome: Progressing  Intervention: Monitor Pain and Promote Comfort  Recent Flowsheet Documentation  Taken 3/18/2025 1615 by Martina Delgadillo RN  Pain Management Interventions:   around-the-clock dosing utilized   relaxation techniques promoted   quiet environment facilitated   pillow support provided   pain management plan reviewed with patient/caregiver  Taken 3/18/2025 1600 by Martina Delgadillo RN  Pain Management Interventions:   breathing exercises   care clustered   diversional activity provided   pillow support provided   relaxation techniques promoted   quiet environment facilitated  Taken 3/18/2025 1553 by Martina Delgadillo RN  Pain Management Interventions:   care clustered   pillow support provided   quiet environment facilitated   relaxation techniques promoted  Taken 3/18/2025 1515 by Martina Delgadillo RN  Pain Management Interventions:   awakened for pain meds per patient request   around-the-clock dosing utilized   care clustered   diversional activity provided   pain medication given   pillow support  provided  Taken 3/18/2025 1400 by Martina Delgadillo RN  Pain Management Interventions:   medication offered but refused   position adjusted   relaxation techniques promoted   quiet environment facilitated  Taken 3/18/2025 1140 by Martina Delgadillo RN  Pain Management Interventions:   care clustered   pillow support provided   position adjusted   quiet environment facilitated   relaxation techniques promoted  Intervention: Provide Person-Centered Care  Recent Flowsheet Documentation  Taken 3/18/2025 1600 by Martina Delgadillo RN  Trust Relationship/Rapport:   choices provided   care explained   emotional support provided   empathic listening provided   questions answered   questions encouraged   reassurance provided   thoughts/feelings acknowledged  Taken 3/18/2025 1400 by Martina Delgadillo RN  Trust Relationship/Rapport:   care explained   choices provided   emotional support provided   empathic listening provided   questions answered   questions encouraged   thoughts/feelings acknowledged   reassurance provided  Taken 3/18/2025 1140 by Martina Delgadillo RN  Trust Relationship/Rapport:   care explained   choices provided   emotional support provided   empathic listening provided   questions answered   questions encouraged   reassurance provided   thoughts/feelings acknowledged  Goal: Readiness for Transition of Care  Outcome: Progressing     Problem: Fall Injury Risk  Goal: Absence of Fall and Fall-Related Injury  Outcome: Progressing  Intervention: Identify and Manage Contributors  Recent Flowsheet Documentation  Taken 3/18/2025 1800 by Martina Delgadillo RN  Medication Review/Management: medications reviewed  Self-Care Promotion: independence encouraged  Taken 3/18/2025 1600 by Martina Delgadillo RN  Medication Review/Management: medications reviewed  Self-Care Promotion: independence encouraged  Taken 3/18/2025 1400 by Martina Delgadillo RN  Medication Review/Management: medications reviewed  Taken 3/18/2025 1140 by Elie  HARVEY Mack  Medication Review/Management: medications reviewed  Intervention: Promote Injury-Free Environment  Recent Flowsheet Documentation  Taken 3/18/2025 1800 by Martina Delgadillo RN  Safety Promotion/Fall Prevention:   safety round/check completed   room organization consistent   lighting adjusted   fall prevention program maintained   clutter free environment maintained  Taken 3/18/2025 1600 by Martina Delgadillo RN  Safety Promotion/Fall Prevention:   safety round/check completed   room organization consistent   lighting adjusted   fall prevention program maintained   clutter free environment maintained  Taken 3/18/2025 1400 by Martina Delgadillo RN  Safety Promotion/Fall Prevention:   activity supervised   assistive device/personal items within reach   clutter free environment maintained   fall prevention program maintained   room organization consistent   safety round/check completed   toileting scheduled  Taken 3/18/2025 1140 by Martina Delgadillo RN  Safety Promotion/Fall Prevention:   clutter free environment maintained   fall prevention program maintained   lighting adjusted   nonskid shoes/slippers when out of bed   room organization consistent   safety round/check completed   toileting scheduled     Problem: Stroke, Intracerebral Hemorrhage  Goal: Optimal Coping  Outcome: Progressing  Intervention: Support Psychosocial Response to Stroke  Recent Flowsheet Documentation  Taken 3/18/2025 1800 by Martina Delgadillo RN  Family/Support System Care:   support provided   presence promoted   involvement promoted  Taken 3/18/2025 1600 by Martina Delgadillo RN  Family/Support System Care:   support provided   self-care encouraged   presence promoted  Taken 3/18/2025 1400 by Martina Delgadillo RN  Family/Support System Care:   support provided   presence promoted  Taken 3/18/2025 1140 by Martina Delgadillo, RN  Family/Support System Care:   presence promoted   support provided   self-care encouraged  Goal: Effective Bowel  Elimination  Outcome: Progressing  Goal: Optimal Cerebral Tissue Perfusion  Outcome: Progressing  Goal: Optimal Cognitive Function  Outcome: Progressing  Goal: Effective Communication Skills  Outcome: Progressing  Intervention: Optimize Communication Skills  Recent Flowsheet Documentation  Taken 3/18/2025 1800 by Martina Delgadillo RN  Communication Enhancement Strategies:   call light answered in person   extra time allowed for response   nonverbal strategies used   one-step directions provided  Taken 3/18/2025 1600 by Martina Delgadillo RN  Communication Enhancement Strategies:   call light answered in person   family involved in communication plan   one-step directions provided  Taken 3/18/2025 1400 by Martina Delgadillo RN  Communication Enhancement Strategies:   call light answered in person   repetition utilized  Goal: Optimal Functional Ability  Outcome: Progressing  Intervention: Optimize Functional Ability  Recent Flowsheet Documentation  Taken 3/18/2025 1800 by Martina Delgadillo RN  Activity Management: bedrest  Self-Care Promotion: independence encouraged  Taken 3/18/2025 1600 by Martina Delgadillo RN  Activity Management: bedrest  Self-Care Promotion: independence encouraged  Taken 3/18/2025 1400 by Martina Delgadillo RN  Activity Management: bedrest  Taken 3/18/2025 1140 by Martina Delgadillo RN  Activity Management: bedrest  Goal: Improved Oral Intake  Outcome: Progressing  Goal: Optimal Pain Control and Function  Outcome: Progressing  Intervention: Monitor and Manage Pain  Recent Flowsheet Documentation  Taken 3/18/2025 1615 by Martina Delgadillo RN  Pain Management Interventions:   around-the-clock dosing utilized   relaxation techniques promoted   quiet environment facilitated   pillow support provided   pain management plan reviewed with patient/caregiver  Taken 3/18/2025 1600 by Martina Delgadillo RN  Pain Management Interventions:   breathing exercises   care clustered   diversional activity provided   pillow  support provided   relaxation techniques promoted   quiet environment facilitated  Taken 3/18/2025 1553 by Martina Delgadillo RN  Pain Management Interventions:   care clustered   pillow support provided   quiet environment facilitated   relaxation techniques promoted  Taken 3/18/2025 1515 by Martina Delgadillo RN  Pain Management Interventions:   awakened for pain meds per patient request   around-the-clock dosing utilized   care clustered   diversional activity provided   pain medication given   pillow support provided  Taken 3/18/2025 1400 by Martina Delgadillo RN  Pain Management Interventions:   medication offered but refused   position adjusted   relaxation techniques promoted   quiet environment facilitated  Taken 3/18/2025 1140 by Martina Delgadillo RN  Pain Management Interventions:   care clustered   pillow support provided   position adjusted   quiet environment facilitated   relaxation techniques promoted  Goal: Effective Oxygenation and Ventilation  Outcome: Progressing  Intervention: Optimize Oxygenation and Ventilation  Recent Flowsheet Documentation  Taken 3/18/2025 1800 by Martina Delgadillo RN  Head of Bed (HOB) Positioning: HOB at 20 degrees  Taken 3/18/2025 1600 by Martina Delgadillo RN  Head of Bed (HOB) Positioning: HOB at 15 degrees  Taken 3/18/2025 1400 by Martina Delgadillo RN  Head of Bed (HOB) Positioning: HOB at 15 degrees  Taken 3/18/2025 1334 by Martina Delgadillo RN  Head of Bed (HOB) Positioning: HOB at 20-30 degrees  Taken 3/18/2025 1140 by Martina Delgadillo RN  Head of Bed (HOB) Positioning: HOB at 20 degrees  Goal: Improved Sensorimotor Function  Outcome: Progressing  Intervention: Optimize Range of Motion, Motor Control and Function  Recent Flowsheet Documentation  Taken 3/18/2025 1800 by Martina Delgadillo RN  Positioning/Transfer Devices:   pillows   in use  Taken 3/18/2025 1600 by Martina Delgadillo RN  Positioning/Transfer Devices:   pillows   in use  Taken 3/18/2025 1400 by Martina Delgadillo  RN  Positioning/Transfer Devices:   pillows   in use  Taken 3/18/2025 1334 by Martina Delgadillo RN  Positioning/Transfer Devices:   in use   pillows  Taken 3/18/2025 1140 by Martina Delgadillo RN  Positioning/Transfer Devices:   pillows   in use  Intervention: Optimize Sensory and Perceptual Ability  Recent Flowsheet Documentation  Taken 3/18/2025 1800 by Martina Delgadillo RN  Pressure Reduction Techniques:   heels elevated off bed   positioned off wounds   pressure points protected   weight shift assistance provided  Pressure Reduction Devices:   positioning supports utilized   pressure-redistributing mattress utilized  Taken 3/18/2025 1600 by Martina Delgadillo RN  Pressure Reduction Techniques:   pressure points protected   frequent weight shift encouraged   heels elevated off bed  Pressure Reduction Devices:   specialty bed utilized   positioning supports utilized   heel offloading device utilized  Taken 3/18/2025 1400 by Martina Delgadillo RN  Pressure Reduction Techniques:   heels elevated off bed   pressure points protected   weight shift assistance provided   frequent weight shift encouraged  Pressure Reduction Devices:   positioning supports utilized   pressure-redistributing mattress utilized  Taken 3/18/2025 1140 by Martina Delgadillo RN  Pressure Reduction Techniques:   heels elevated off bed   positioned off wounds   pressure points protected   weight shift assistance provided  Pressure Reduction Devices:   heel offloading device utilized   positioning supports utilized   specialty bed utilized  Goal: Safe and Effective Swallow  Outcome: Progressing  Goal: Effective Urinary Elimination  Outcome: Progressing

## 2025-03-19 ENCOUNTER — APPOINTMENT (OUTPATIENT)
Dept: CT IMAGING | Facility: HOSPITAL | Age: 65
End: 2025-03-19
Payer: COMMERCIAL

## 2025-03-19 ENCOUNTER — APPOINTMENT (OUTPATIENT)
Dept: MRI IMAGING | Facility: HOSPITAL | Age: 65
End: 2025-03-19
Payer: COMMERCIAL

## 2025-03-19 LAB
ANION GAP SERPL CALCULATED.3IONS-SCNC: 12 MMOL/L (ref 5–15)
BASOPHILS # BLD AUTO: 0.04 10*3/MM3 (ref 0–0.2)
BASOPHILS NFR BLD AUTO: 0.3 % (ref 0–1.5)
BUN SERPL-MCNC: 15 MG/DL (ref 8–23)
BUN/CREAT SERPL: 23.1 (ref 7–25)
CALCIUM SPEC-SCNC: 9.2 MG/DL (ref 8.6–10.5)
CHLORIDE SERPL-SCNC: 106 MMOL/L (ref 98–107)
CO2 SERPL-SCNC: 22 MMOL/L (ref 22–29)
CREAT SERPL-MCNC: 0.65 MG/DL (ref 0.57–1)
DEPRECATED RDW RBC AUTO: 49 FL (ref 37–54)
EGFRCR SERPLBLD CKD-EPI 2021: 99.1 ML/MIN/1.73
EOSINOPHIL # BLD AUTO: 0.06 10*3/MM3 (ref 0–0.4)
EOSINOPHIL NFR BLD AUTO: 0.5 % (ref 0.3–6.2)
ERYTHROCYTE [DISTWIDTH] IN BLOOD BY AUTOMATED COUNT: 13.6 % (ref 12.3–15.4)
GLUCOSE SERPL-MCNC: 86 MG/DL (ref 65–99)
HCT VFR BLD AUTO: 42.4 % (ref 34–46.6)
HGB BLD-MCNC: 13.5 G/DL (ref 12–15.9)
IMM GRANULOCYTES # BLD AUTO: 0.03 10*3/MM3 (ref 0–0.05)
IMM GRANULOCYTES NFR BLD AUTO: 0.3 % (ref 0–0.5)
LYMPHOCYTES # BLD AUTO: 2.04 10*3/MM3 (ref 0.7–3.1)
LYMPHOCYTES NFR BLD AUTO: 17.4 % (ref 19.6–45.3)
MCH RBC QN AUTO: 30.8 PG (ref 26.6–33)
MCHC RBC AUTO-ENTMCNC: 31.8 G/DL (ref 31.5–35.7)
MCV RBC AUTO: 96.6 FL (ref 79–97)
MONOCYTES # BLD AUTO: 0.87 10*3/MM3 (ref 0.1–0.9)
MONOCYTES NFR BLD AUTO: 7.4 % (ref 5–12)
NEUTROPHILS NFR BLD AUTO: 74.1 % (ref 42.7–76)
NEUTROPHILS NFR BLD AUTO: 8.66 10*3/MM3 (ref 1.7–7)
NRBC BLD AUTO-RTO: 0 /100 WBC (ref 0–0.2)
PLATELET # BLD AUTO: 256 10*3/MM3 (ref 140–450)
PMV BLD AUTO: 11.1 FL (ref 6–12)
POTASSIUM SERPL-SCNC: 4.9 MMOL/L (ref 3.5–5.2)
RBC # BLD AUTO: 4.39 10*6/MM3 (ref 3.77–5.28)
SODIUM SERPL-SCNC: 140 MMOL/L (ref 136–145)
WBC NRBC COR # BLD AUTO: 11.7 10*3/MM3 (ref 3.4–10.8)

## 2025-03-19 PROCEDURE — 99024 POSTOP FOLLOW-UP VISIT: CPT

## 2025-03-19 PROCEDURE — 97116 GAIT TRAINING THERAPY: CPT

## 2025-03-19 PROCEDURE — 70551 MRI BRAIN STEM W/O DYE: CPT

## 2025-03-19 PROCEDURE — 97168 OT RE-EVAL EST PLAN CARE: CPT

## 2025-03-19 PROCEDURE — 80048 BASIC METABOLIC PNL TOTAL CA: CPT | Performed by: NEUROLOGICAL SURGERY

## 2025-03-19 PROCEDURE — 70450 CT HEAD/BRAIN W/O DYE: CPT

## 2025-03-19 PROCEDURE — 97164 PT RE-EVAL EST PLAN CARE: CPT

## 2025-03-19 PROCEDURE — 92523 SPEECH SOUND LANG COMPREHEN: CPT

## 2025-03-19 PROCEDURE — 85025 COMPLETE CBC W/AUTO DIFF WBC: CPT | Performed by: NEUROLOGICAL SURGERY

## 2025-03-19 PROCEDURE — 97535 SELF CARE MNGMENT TRAINING: CPT

## 2025-03-19 RX ORDER — CLOPIDOGREL BISULFATE 75 MG/1
75 TABLET ORAL DAILY
Status: DISCONTINUED | OUTPATIENT
Start: 2025-03-19 | End: 2025-03-20 | Stop reason: HOSPADM

## 2025-03-19 RX ADMIN — FAMOTIDINE 20 MG: 20 TABLET, FILM COATED ORAL at 20:29

## 2025-03-19 RX ADMIN — SENNOSIDES AND DOCUSATE SODIUM 2 TABLET: 50; 8.6 TABLET ORAL at 20:29

## 2025-03-19 RX ADMIN — PANTOPRAZOLE SODIUM 40 MG: 40 TABLET, DELAYED RELEASE ORAL at 05:41

## 2025-03-19 RX ADMIN — NORTRIPTYLINE HYDROCHLORIDE 20 MG: 10 CAPSULE ORAL at 21:19

## 2025-03-19 RX ADMIN — ASPIRIN 81 MG CHEWABLE TABLET 81 MG: 81 TABLET CHEWABLE at 08:09

## 2025-03-19 RX ADMIN — FAMOTIDINE 20 MG: 20 TABLET, FILM COATED ORAL at 08:09

## 2025-03-19 RX ADMIN — Medication 5 MG: at 20:29

## 2025-03-19 RX ADMIN — HYDROCODONE BITARTRATE AND ACETAMINOPHEN 1 TABLET: 5; 325 TABLET ORAL at 21:56

## 2025-03-19 RX ADMIN — TOPIRAMATE 100 MG: 100 TABLET, FILM COATED ORAL at 20:29

## 2025-03-19 RX ADMIN — ATORVASTATIN CALCIUM 80 MG: 40 TABLET, FILM COATED ORAL at 20:29

## 2025-03-19 RX ADMIN — Medication 10 ML: at 08:14

## 2025-03-19 RX ADMIN — BETHANECHOL CHLORIDE 25 MG: 25 TABLET ORAL at 20:29

## 2025-03-19 RX ADMIN — CLOPIDOGREL BISULFATE 75 MG: 75 TABLET, FILM COATED ORAL at 11:16

## 2025-03-19 RX ADMIN — SERTRALINE HYDROCHLORIDE 150 MG: 50 TABLET ORAL at 08:09

## 2025-03-19 RX ADMIN — BETHANECHOL CHLORIDE 25 MG: 25 TABLET ORAL at 08:09

## 2025-03-19 NOTE — PROGRESS NOTES
INTENSIVIST   PROGRESS NOTE         SUBJECTIVE     Ms. Lauryn Romo, 64 y.o. female is followed for: Fall       S/P Procedure(s) (LRB):  LAPAROSCOPIC ASSISTED VENTRICULAR PERITONEAL SHUNT REVISION (N/A)   Perioperative medical management of comorbid conditions, including glycemic control and electrolytes disorders.    As an Intensivist, we have completed an accredited Critical Care program and maintain Board Certification and dedicate most of our professional work to critical/intensive care. We serve as the  or member of an interprofessional team, coordinating and guiding healthcare professionals to provide specialized care for critically ill patients. We manage and resuscitate patients with, or at risk for, critical illness and organ failure and initiate interventions to prevent imminent deterioration. (2024 Consensus Statement from the Society of Critical Care Medicine Defining Intensivist Task Force. [facility] 2025. DOI: 10.1097/[facility].[id number]     Interval History:  POD: 1 Day Post-Op  Procedure(s) (LRB):  LAPAROSCOPIC ASSISTED VENTRICULAR PERITONEAL SHUNT REVISION (N/A)   Dr. Mendez  03/18/25     Still stuttering.    Otherwise doing OK.    Temp  Min: 96.9 °F (36.1 °C)  Max: 98.9 °F (37.2 °C)     The patient's relevant past medical, surgical and social history were reviewed and updated in Epic as appropriate.       History     Last Reviewed by Kurt Alarcon MD on 3/19/2025 at  7:54 AM    Sections Reviewed    Medical, Surgical, Family, Tobacco, Alcohol, Drug Use, Sexual Activity,   Social Documentation    Problem list reviewed by Kurt Alarcon MD on 3/18/2025 at 12:11 PM  Medicines reviewed by Kurt Alarcon MD on 3/18/2025 at 12:11 PM  Allergies reviewed by Kurt Alarcon MD on 3/18/2025 at 12:11 PM           OBJECTIVE     Vitals:  Temp: 97.7 °F (36.5 °C) (03/19/25 0430) Temp  Min: 96.9 °F (36.1 °C)  Max: 98.9 °F (37.2 °C)   Temp core:      BP: 141/68 (03/19/25 0600) BP  Min: 105/70  Max:  143/82   MAP (non-invasive) Noninvasive MAP (mmHg): 90 (25 0600) Noninvasive MAP (mmHg)  Av.9  Min: 55  Max: 119   Pulse: 77 (25 0630) Pulse  Min: 55  Max: 81   Resp: 18 (25 0430) Resp  Min: 12  Max: 18   SpO2: 96 % (25 0630) SpO2  Min: 96 %  Max: 100 %   Device: room air (25 0556)    Flow Rate: 2 (25 2340) Flow (L/min) (Oxygen Therapy)  Min: 2  Max: 4         25  1030   Weight: 59 kg (130 lb) 59 kg (130 lb)        Intake/Ouptut 24 hrs (7:00AM - 6:59 AM)  Intake & Output (last 2 days)          07 07 0701   07 07 0700    P.O.  100     I.V. (mL/kg)  600 (10.2)     IV Piggyback  100     Total Intake(mL/kg)  800 (13.6)     Urine (mL/kg/hr)  450 (0.3)     Total Output  450     Net  +350            Urine Unmeasured Occurrence 3 x      Stool Unmeasured Occurrence 1 x              Medications (drips):  niCARdipine, Last Rate: Stopped (25 1317)        Physical Examination  Telemetry:  Rhythm: normal sinus rhythm (25 05)      Constitutional:  No acute distress.   Cardiovascular: RRR.    Respiratory: Normal breath sounds  No adventitious sounds   Abdominal:  Soft with no tenderness.   Extremities: No Edema   Neurological:   Awake.    Best Eye Response: 3-->(E3) to speech (25 0556)  Best Motor Response: 6-->(M6) obeys commands (25 0556)  Best Verbal Response: 5-->(V5) oriented (25 0556)  [place] Coma Scale Score: 14 (25 0556)               Hematology:  Results from last 7 days   Lab Units 25  0322 25  0945 25  0442   WBC 10*3/mm3 11.70* 6.04 10.82*   HEMOGLOBIN g/dL 13.5 14.0 13.5   MCV fL 96.6 102.2* 94.6   PLATELETS 10*3/mm3 256 243 238     Results from last 7 days   Lab Units 25  0322 25  0945   NEUTROS ABS 10*3/mm3 8.66* 4.35   LYMPHS ABS 10*3/mm3 2.04 1.20   EOS ABS 10*3/mm3 0.06 0.17     Chemistry:  Estimated Creatinine Clearance: 73.1 mL/min (by C-G  formula based on SCr of 0.65 mg/dL).    Results from last 7 days   Lab Units 03/19/25  0322 03/17/25  0945   SODIUM mmol/L 140 139   POTASSIUM mmol/L 4.9 4.4   CHLORIDE mmol/L 106 104   CO2 mmol/L 22.0 23.0   BUN mg/dL 15 13   CREATININE mg/dL 0.65 0.64   GLUCOSE mg/dL 86 137*     Results from last 7 days   Lab Units 03/19/25  0322 03/17/25  0945   CALCIUM mg/dL 9.2 9.0        Images:  CT Head Without Contrast  Result Date: 3/19/2025  Impression: 1.Stable right parietal approach ventriculostomy catheter with interval decrease in size of the lateral and third ventricles. 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 3.Mild chronic small vessel ischemic change. Electronically Signed: Carlos Bowden MD  3/19/2025 4:57 AM EDT  Workstation ID: [id number]    CT Head Without Contrast  Result Date: 3/18/2025  Impression: Interval placement of a right-sided ventriculoperitoneal shunt with slight decrease in the size of the ventricles. There is no acute intracranial hemorrhage. Electronically Signed: Rinku Tran MD  3/18/2025 12:45 PM EDT  Workstation ID: [id number]    CT Head Without Contrast  Result Date: 3/17/2025  Impression: 1.No acute intracranial process identified. 2.Stable prominent ventricular caliber. Electronically Signed: Chris Acuña MD  3/17/2025 12:31 PM EDT  Workstation ID: [id number]    Echo:  Results for orders placed during the hospital encounter of 03/11/25    Adult Transthoracic Echo Complete W/ Cont if Necessary Per Protocol (With Agitated Saline)    Interpretation Summary    Left ventricular systolic function is normal. Estimated left ventricular EF = 70%    Mild to moderate aortic valve regurgitation is present      Results: Reviewed.  I reviewed the patient's new laboratory and imaging results.  I independently reviewed the patient's new images.    Medications: Reviewed.      Assessment   A/P     Hospital:  LOS: 6 days   ICU: 20h     Active Hospital Problems     Diagnosis  POA    **Left-sided weakness [R53.1]  Yes    Cerebral ventriculomegaly [G93.89]  Yes     Lauryn is a 64 y.o. female admitted on 3/11/2025 with Left-sided weakness [R53.1]     Assessment/Management/Treatment Plan:    Obstructive Hydrocephalus  Procedure(s) (LRB):  LAPAROSCOPIC ASSISTED VENTRICULAR PERITONEAL SHUNT REVISION (N/A)   Dr. Mendez / Dr. Raya  03/18/25   Antibiotic Surgical Prophylaxis: Cefazolin    Cardiovascular  HTN  Dyslipidemia   Endocrine   Body mass index is 24.56 kg/m². Normal: 18.5-24.9kg/m2  Prediabetes (A1C 5.7%-6.4%)    Lab Results   Lab Value Date/Time    HGBA1C 5.80 (H) 03/12/2025 0525    HGBA1C 5.60 10/07/2024 0555     Results from last 7 days   Lab Units 03/13/25  0620 03/12/25  1144   GLUCOSE mg/dL 85 88       Diet: Diet: Regular/House; Fluid Consistency: Thin (IDDSI 0)  Orders Placed This Encounter      DIET MESSAGE Please send hot breakfast tray as able      DIET MESSAGE Please send late tray      Advance Directives: Code Status and Medical Interventions: CPR (Attempt to Resuscitate); Full   Ordered at: 03/18/25 0847     Code Status (Patient has no pulse and is not breathing):    CPR (Attempt to Resuscitate)     Medical Interventions (Patient has pulse or is breathing):    Full     Level Of Support Discussed With:    Patient        VTE Prophylaxis:  Mechanical VTE prophylaxis orders are present.          In brief:  Discussed with Dr. Mendez  MRI today  OK to transfer to floor.  Discussed with family at bedside.  Disposition: Transfer to Telemetry Unit    Plan of care and goals reviewed during interdisciplinary rounds.  I discussed the patient's findings and my recommendations with patient, family, nursing staff, and consulting provider    MDM:    Problem(s) High due to: Acute or Chronic illness or injury that may poses a threat to life or bodily function  Data: High due to: Review of prior external records from each unique source, Review of the result(s) of each unique test,  Ordering of each unique test, and Discuss management or test interpretation with external physician or NPP (not separately reported)    High    [x] Primary Attending Intensive Care Medicine - Nutrition Support   [] Consultant    Copied text in this note has been reviewed and is accurate as of 03/19/25

## 2025-03-19 NOTE — PLAN OF CARE
I came by to check and adjust shunt settings after her MRI. It was previously set at 1.5, currently at 0.5, and was adjusted to 1.5. Incision looks great with no drainage or signs of infection. Placed new coviderm dressing. All questions and concerns were answered with pt and family at bedside.

## 2025-03-19 NOTE — CASE MANAGEMENT/SOCIAL WORK
Continued Stay Note  [facility]     Patient Name: Lauryn Romo  MRN: [id number]  Today's Date: 3/19/2025    Admit Date: 3/11/2025    Plan: Home w HH or OPPT   Discharge Plan       Row Name 03/19/25 1357       Plan    Plan Home w HH or OPPT    Plan Comments Mrs. Romo is s/p Right parietal ventriculoperitoneal shunt placement performed yesterday by Dr. Mendez.  Per MDR, plan for MRI today and possibility of transfer to floor.  PT/OT re-evaluations will be beneficial to deterrmine discharge planning needs.  CM will cont to follow plan of care, await therapy recommendations and cont to assist with discharge planning as recommendations become available.    Final Discharge Disposition Code 01 - home or self-care               Expected Discharge Date and Time       Expected Discharge Date Expected Discharge Time    Mar 20, 2025               Lorena Henao RN

## 2025-03-19 NOTE — PLAN OF CARE
Goal Outcome Evaluation:  Plan of Care Reviewed With: patient, family        Progress: no change  Outcome Evaluation: OT re-evaluation completed. The pt presents below baseline with generalized weakness, BUE coordination deficits, decreased activity tolerance, and balance deficits warranting continued IP OT services. The pt performed LBD, toileting, and standing sink side grooming ADLS with set up/CGA. Pt ambulated to/from the bathroom with Yudelka x1, no AD. Pt had no reports of dizziness with mobility. Anticipate pt will be appropriate for a d/c home with assist and HHOT when medically ready.    Anticipated Discharge Disposition (OT): home with assist, home with outpatient therapy services

## 2025-03-19 NOTE — PROGRESS NOTES
Chief complaint: Hydrocephalus, s/p  shunt     Admit Diagnosis:   Left-sided weakness [R53.1]     Subjective: Patient is lying in bed, her  is at the bedside. She developed severe stuttering since yesterday evening. Stuttering is still ongoing during our bedside visit this morning.     Objective:    Vitals:    25 0800   BP: 122/64   Pulse: 67   Resp: 18   Temp: 98 °F (36.7 °C)   SpO2: 96%     Pulse  Av.2  Min: 55  Max: 81  Systolic (24hrs), Av , Min:105 , Max:143     Diastolic (24hrs), Av, Min:64, Max:87    Temp (24hrs), Av °F (36.7 °C), Min:96.9 °F (36.1 °C), Max:98.9 °F (37.2 °C)      Awake, alert, and oriented. Lying in bed, no acute distress. Speech with severe ataxia. Stutter is relatively severe this morning.CN 2-12 grossly intact. Able to move all 4 extremities to command, still with some ongoing LUE weakness. She is able to follow prompted, one step, and two step commands. Face symmetric.     Lab Results   Component Value Date     2025       A/P:   Admit Diagnosis:   Left-sided weakness  S/p  shunt placement   Obstructive Hydrocephalus   History of giant basilar aneurysm s/p pipeline embolization x2   History of basilar pipeline stent thrombosis s/p thrombectomy   History of stroke    Patient is postoperative day 1 from V/P placement with Dr. Mendez yesterday secondary to obstructive hydrocephalus. Shunt was placed at 1.5. She developed severe stuttering and what appears to be ataxia yesterday evening. I am going to get an MRI of the brain without contrast to look for a possible stroke contributing to her speech difficulties. She will need to restart her Plavix today due to her history. I will come back and adjust her shunt this afternoon back to 1.5 after her MRI. She is cleared to move out of the ICU today and to the floor. Continue to ambulate patient. Neurosurgery will continue to follow closely. Discussed her case with Dr. Mendez who also spoke with the  patient at the bedside this morning.

## 2025-03-19 NOTE — PROGRESS NOTES
"                  Clinical Nutrition     Patient Name: Lauryn Romo  YOB: 1960  MRN: [id number]  Date of Encounter: 03/19/25 18:54 EDT  Admission date: 3/11/2025  Reason for Visit: MDR, Identified at risk by screening criteria, LOS    Assessment   Nutrition Assessment   Admission Diagnosis:  Left-sided weakness [R53.1]    Problem List:    Left-sided weakness    Cerebral ventriculomegaly      PMH:   She  has a past medical history of Abnormal ECG, Aneurysm (11/26/2022), Cancer (05/2024), Cluster headache (2022), Difficulty walking (11/26/22), Headache, Headache, tension-type (2022), Hyperlipidemia, Hypertension, Memory loss (11/26/22), Migraine (2022), Stroke (11/26/2022), Stroke (10/06/2024), and Vision loss (11/26/22).    PSH:  She  has a past surgical history that includes Sinus surgery; Hiatal hernia repair (2015); Breast lumpectomy (2012); embolization cerebral (N/A, 11/29/2022); Arterial aneurysm repair; Cyst Removal (Left, 05/2023); Interventional radiology procedure (N/A, 09/15/2023); Skin cancer excision; Interventional radiology procedure (N/A, 10/6/2024); and Ventriculoperitoneal shunt (N/A, 3/18/2025).    Substance history: tobacco remote    Applicable Nutrition History:     s/p  shunt 3-18-25    Labs    Labs Reviewed: Yes    Results from last 7 days   Lab Units 03/19/25  0322 03/17/25  0945 03/15/25  1102   GLUCOSE mg/dL 86 137* 93   BUN mg/dL 15 13 14   CREATININE mg/dL 0.65 0.64 0.67   SODIUM mmol/L 140 139 141   CHLORIDE mmol/L 106 104 106   POTASSIUM mmol/L 4.9 4.4 4.3             Invalid input(s): \"PLAT\"    Results from last 7 days   Lab Units 03/13/25  0620   GLUCOSE mg/dL 85     Lab Results   Lab Value Date/Time    HGBA1C 5.80 (H) 03/12/2025 0525    HGBA1C 5.60 10/07/2024 0555               Medications    Medications Reviewed: yes    Scheduled Meds:aspirin, 81 mg, Oral, Daily   Or  aspirin, 300 mg, Rectal, Daily  atorvastatin, 80 mg, Oral, Nightly  bethanechol, 25 mg, Oral, " "BID  clopidogrel, 75 mg, Oral, Daily  famotidine, 20 mg, Oral, BID AC  nortriptyline, 20 mg, Oral, Nightly  pantoprazole, 40 mg, Oral, Q AM  Pharmacy Consult, , Not Applicable, BID  senna-docusate sodium, 2 tablet, Oral, BID  sertraline, 150 mg, Oral, Daily  sodium chloride, 10 mL, Intravenous, Q12H  topiramate, 100 mg, Oral, Nightly      Continuous Infusions:niCARdipine, 5-15 mg/hr, Last Rate: Stopped (03/18/25 1317)      PRN Meds:.  acetaminophen **OR** acetaminophen **OR** [DISCONTINUED] acetaminophen    senna-docusate sodium **AND** polyethylene glycol **AND** bisacodyl **AND** bisacodyl    HYDROcodone-acetaminophen    lactulose    Magnesium Standard Dose Replacement - Follow Nurse / BPA Driven Protocol    melatonin    nitroglycerin    ondansetron ODT **OR** ondansetron    Phosphorus Replacement - Follow Nurse / BPA Driven Protocol    Potassium Replacement - Follow Nurse / BPA Driven Protocol    sodium chloride    sodium chloride    ubrogepant    Intake/Ouptut 24 hrs (0701 - 0700)   I&O's Reviewed: yes    Intake & Output (last day)         03/19 0701  03/20 0700    P.O. 118    I.V. (mL/kg)     IV Piggyback     Total Intake(mL/kg) 118 (2)    Urine (mL/kg/hr)     Total Output     Net +118         Urine Unmeasured Occurrence 2 x           Anthropometrics     Height: Height: 154.9 cm (61\")  Last Filed Weight: Weight: 59 kg (130 lb) (03/13/25 1030)  Method: Weight Method: Stated  BMI: BMI (Calculated): 24.6    UBW: 125-130lb's    Weight change: No significant changes     Weight       Weight (kg) Weight (lbs) Weight Method Visit Report   3/12/2024 56.246 kg  124 lb   --    3/25/2024 55.475 kg  122 lb 4.8 oz   --    5/20/2024 55.339 kg  122 lb   --    6/7/2024 55.838 kg  123 lb 1.6 oz   --    6/17/2024 56.337 kg  124 lb 3.2 oz   --    7/9/2024 56.881 kg  125 lb 6.4 oz   --    7/21/2024 56.9 kg  125 lb 7.1 oz  Stated        Stated     8/13/2024 57.108 kg  125 lb 14.4 oz   --    9/10/2024 58.786 kg  129 lb 9.6 oz   --  "   9/23/2024 59.512 kg  131 lb 3.2 oz   --    9/24/2024 59.421 kg  131 lb   --    10/6/2024 60 kg  132 lb 4.4 oz  Stated     10/7/2024 58.1 kg  128 lb 1.4 oz       58.1 kg  128 lb 1.4 oz      11/11/2024 58.968 kg  130 lb   --    12/30/2024 60.328 kg  133 lb   --    1/13/2025 60.328 kg  133 lb   --    1/20/2025 58.514 kg  129 lb   --    3/11/2025 58.968 kg  130 lb  Stated     3/13/2025 58.968 kg  130 lb          Nutrition Focused Physical Exam     Date: 3-19-25    Pt does not meet criteria for malnutrition diagnosis, at this time.      Subjective   Reported/Observed/Food/Nutrition Related History:       Per RN: pt having difficulty with speech, poor appetite this am, has only eaten a cracker since surgery, is following commands, swallows ok    Pt resting in bed this eveneing, has moved to the floor, is alert, oriented, no difficulties with speech noted, not stuttering    Pt reports that at 2pm she was able to speak normally, her appetite has improved a bit, she ate a few bites of dinner, is drinking muscle milk with 40g protein, pt states she normally weigh's 125-130lb's, weight is stable,  has not had ant recent wt loss, states that she normally works out at least an hour every day, does weights      Current Nutrition Prescription     PO: Diet: Regular/House; Fluid Consistency: Thin (IDDSI 0)  Oral Nutrition Supplement:  Intake:  58% of 6 meals    Assessment & Plan   Nutrition Diagnosis   Date: 3-19-25 Updated:   Problem Inadequate oral intake    Etiology Hydrocephalus s/p  shunt   Signs/Symptoms Po intake 58%   Status: New    Goal:   Nutrition to support treatment and Increase intake      Nutrition Intervention      Follow treatment progress, Care plan reviewed, Advise alternate selection, Advised available snacks, Interview for preferences, Menu adjusted, Supplement provided    Encourage good source protein all meals    Add Boost+ daily, pt reports she will also drink her muscle milk which has more  protein    Advise pt to discuss with Surgery what exercise she is allowed to do post-op, as some exercises, especially weight lifting, might increase intracranial pressure     Monitoring/Evaluation:   Per protocol, I&O, PO intake, Supplement intake, Pertinent labs, Weight, Skin status, GI status, Symptoms, Swallow function    Jory Colon, GALE  Time Spent: 60min

## 2025-03-19 NOTE — THERAPY RE-EVALUATION
Patient Name: Lauryn Romo  : 1960    MRN: [id number]                              Today's Date: 3/19/2025       Admit Date: 3/11/2025    Visit Dx:     ICD-10-CM ICD-9-CM   1. Cerebral ventriculomegaly  G93.89 348.89   2. Aphasia  R47.01 784.3   3. Acute left-sided weakness  R53.1 728.87   4. History of ischemic stroke  Z86.73 V12.54     Patient Active Problem List   Diagnosis    Acute CVA    Cerebral aneurysm, nonruptured    Paraesophageal hernia    Hyperlipidemia    HTN    Moderate malnutrition    History of CVA (cerebrovascular accident)    Other headache syndrome    Acute cystitis without hematuria    Left-sided weakness    Cerebral ventriculomegaly     Past Medical History:   Diagnosis Date    Abnormal ECG     Aneurysm 2022    Cancer 2024    Basal Cell Carcinoma removed from scalp by dermatology    Cluster headache     Had headaches for several months before stroke and mass was found    Difficulty walking 22    After stroke    Headache     Headache, tension-type     Hyperlipidemia     Hypertension     Memory loss 22    Migraine     Stroke 2022    Stroke 10/06/2024    Vision loss 22    When dizzy or headache     Past Surgical History:   Procedure Laterality Date    ARTERIAL ANEURYSM REPAIR      BREAST LUMPECTOMY  2012    CYST REMOVAL Left 2023    EMBOLIZATION CEREBRAL N/A 2022    Procedure: CV EMBOLIZATION CEREBRAL IR;  Surgeon: Enoch Savage MD;  Location:  [facility];  Service: Interventional Radiology;  Laterality: N/A;    HIATAL HERNIA REPAIR      INTERVENTIONAL RADIOLOGY PROCEDURE N/A 09/15/2023    Procedure: Embolization;  Surgeon: Enoch Savage MD;  Location:  [facility];  Service: Interventional Radiology;  Laterality: N/A;    INTERVENTIONAL RADIOLOGY PROCEDURE N/A 10/6/2024    Procedure: IR mechanical thrombectomy;  Surgeon: Joe Mendez MD;  Location:  [facility];  Service: Interventional Radiology;  Laterality: N/A;    SINUS SURGERY      x4    SKIN CANCER EXCISION      cancer removed off top of head    VENTRICULOPERITONEAL SHUNT N/A 3/18/2025    Procedure: LAPAROSCOPIC ASSISTED VENTRICULAR PERITONEAL SHUNT REVISION;  Surgeon: Joe Mendez MD;  Location: [facility] OR;  Service: Neurosurgery;  Laterality: N/A;      General Information       Row Name 03/19/25 1407          Physical Therapy Time and Intention    Document Type re-evaluation  -ES     Mode of Treatment physical therapy  -ES       Row Name 03/19/25 1407          General Information    Patient Profile Reviewed yes  -ES     Prior Level of Function --  see IE  -ES     Existing Precautions/Restrictions fall;other (see comments)  BUE incoordination; expressive aphasia  -ES     Barriers to Rehab medically complex;previous functional deficit  -ES       Row Name 03/19/25 1407          Living Environment    Current Living Arrangements --  see IE  -ES       Row Name 03/19/25 1407          Cognition    Orientation Status (Cognition) oriented x 3  -ES       Row Name 03/19/25 1407          Safety Issues/Impairments Affecting Functional Mobility    Safety Issues Affecting Function (Mobility) awareness of need for assistance;insight into deficits/self-awareness;safety precautions follow-through/compliance  -ES     Impairments Affecting Function (Mobility) balance;strength;motor control;postural/trunk control;pain;endurance/activity tolerance;coordination  -ES               User Key  (r) = Recorded By, (t) = Taken By, (c) = Cosigned By      Initials Name Provider Type    ES Estelita Coronado PT Physical Therapist                   Mobility       Row Name 03/19/25 1408          Bed Mobility    Comment, (Bed Mobility) UIC  -ES       Row Name 03/19/25 1408          Sit-Stand Transfer    Sit-Stand [place] (Transfers) contact guard  -ES     Assistive Device (Sit-Stand Transfers) other (see comments)  none  -ES        Row Name 03/19/25 1408          Gait/Stairs (Locomotion)    [place] Level (Gait) contact guard  -ES     Assistive Device (Gait) other (see comments)  none  -ES     Patient was able to Ambulate yes  -ES     Distance in Feet (Gait) 250  -ES     Deviations/Abnormal Patterns (Gait) torrie decreased;gait speed decreased;stride length decreased;bilateral deviations;base of support, narrow  -ES     Comment, (Gait/Stairs) Pt amb w/ CGA and no AD. Demo'd narrow REBECCA with decreased stride length but able to improve torrie/stride length w/ cueing. One minor LOB while turning but pt able to correct without assistance. C/o minor dizziness, especially with head turns.  -ES               User Key  (r) = Recorded By, (t) = Taken By, (c) = Cosigned By      Initials Name Provider Type    ES Estelita Coronado, PT Physical Therapist                   Obj/Interventions       Row Name 03/19/25 1409          Range of Motion Comprehensive    General Range of Motion bilateral lower extremity ROM WFL  -ES       Row Name 03/19/25 1409          Strength Comprehensive (MMT)    General Manual Muscle Testing (MMT) Assessment lower extremity strength deficits identified  -ES     Comment, General Manual Muscle Testing (MMT) Assessment BLE grossly 4/5  -ES       Row Name 03/19/25 1409          Motor Skills    Motor Skills coordination  -ES     Coordination dysdiadochokinesia;heel to shin;WFL;finger to nose;moderate impairment  moderate impairement finger>nose w/ eyes closed. WFL w/ eyes open  -ES       Row Name 03/19/25 1409          Balance    Balance Assessment sitting static balance;sitting dynamic balance;standing static balance;standing dynamic balance  -ES     Static Sitting Balance supervision  -ES     Dynamic Sitting Balance standby assist  -ES     Position, Sitting Balance unsupported;sitting in chair  -ES     Static Standing Balance contact guard  -ES     Dynamic Standing Balance contact guard  -ES     Position/Device Used,  Standing Balance unsupported  -ES     Balance Interventions sitting;standing;sit to stand;dynamic;static;occupation based/functional task  -ES       Row Name 03/19/25 1409          Sensory Assessment (Somatosensory)    Sensory Assessment (Somatosensory) LE sensation intact  -ES               User Key  (r) = Recorded By, (t) = Taken By, (c) = Cosigned By      Initials Name Provider Type    ES Estelita Coronado, PT Physical Therapist                   Goals/Plan       Row Name 03/19/25 1413          Bed Mobility Goal 1 (PT)    Activity/Assistive Device (Bed Mobility Goal 1, PT) sit to supine/supine to sit  -ES     [place] Level/Cues Needed (Bed Mobility Goal 1, PT) modified independence  -ES     Time Frame (Bed Mobility Goal 1, PT) long term goal (LTG);5 days  -ES       Row Name 03/19/25 1413          Transfer Goal 1 (PT)    Activity/Assistive Device (Transfer Goal 1, PT) sit-to-stand/stand-to-sit;bed-to-chair/chair-to-bed  -ES     [place] Level/Cues Needed (Transfer Goal 1, PT) supervision required  -ES     Time Frame (Transfer Goal 1, PT) long term goal (LTG);10 days  -ES       Row Name 03/19/25 1413          Gait Training Goal 1 (PT)    Activity/Assistive Device (Gait Training Goal 1, PT) gait (walking locomotion);decrease fall risk;increase endurance/gait distance  -ES     [place] Level (Gait Training Goal 1, PT) supervision required  -ES     Distance (Gait Training Goal 1, PT) 200  -ES     Time Frame (Gait Training Goal 1, PT) long term goal (LTG);10 days  -ES       Row Name 03/19/25 1413          Therapy Assessment/Plan (PT)    Planned Therapy Interventions (PT) balance training;bed mobility training;home exercise program;gait training;neuromuscular re-education;patient/family education;strengthening;stair training;postural re-education;transfer training  -ES               User Key  (r) = Recorded By, (t) = Taken By, (c) = Cosigned By      Initials Name Provider Type    ES Estelita Coronado, PT  Physical Therapist                   Clinical Impression       Row Name 03/19/25 1411          Pain    Pretreatment Pain Rating 0/10 - no pain  -ES     Posttreatment Pain Rating 0/10 - no pain  -ES     Pre/Posttreatment Pain Comment tolerated  -ES       Row Name 03/19/25 1411          Plan of Care Review    Plan of Care Reviewed With patient;family  -ES     Progress no change  -ES     Outcome Evaluation PT re-eval complete. Pt presents with generalized weakness, balance and coordination deficits, and decreased activity tolerance warranting IPPT services. Pt ambulated with CGA, but c/o mild dizziness especially with head turns. PT rec home w/ assist and OPPT/OT at d/c.  -ES       Row Name 03/19/25 1411          Therapy Assessment/Plan (PT)    Rehab Potential (PT) good  -ES     Criteria for Skilled Interventions Met (PT) yes;skilled treatment is necessary  -ES     Therapy Frequency (PT) daily  -ES     Predicted Duration of Therapy Intervention (PT) 10 days  -ES       Row Name 03/19/25 1411          Vital Signs    Pre Systolic BP Rehab 148  -ES     Pre Treatment Diastolic   -ES     Post Systolic BP Rehab 141  -ES     Post Treatment Diastolic BP 64  -ES     Pretreatment Heart Rate (beats/min) 69  -ES     Posttreatment Heart Rate (beats/min) 66  -ES     Pre SpO2 (%) 93  -ES     O2 Delivery Pre Treatment room air  -ES     O2 Delivery Intra Treatment room air  -ES     Post SpO2 (%) 95  -ES     O2 Delivery Post Treatment room air  -ES     Pre Patient Position Sitting  -ES     Intra Patient Position Standing  -ES     Post Patient Position Sitting  -ES       Row Name 03/19/25 1411          Positioning and Restraints    Pre-Treatment Position sitting in chair/recliner  -ES     Post Treatment Position chair  -ES     In Chair notified nsg;reclined;sitting;call light within reach;encouraged to call for assist;exit alarm on;with family/caregiver;waffle cushion;legs elevated;heels elevated  -ES               User Key  (r) =  Recorded By, (t) = Taken By, (c) = Cosigned By      Initials Name Provider Type    ES Estelita Coronado, PT Physical Therapist                   Outcome Measures       Row Name 03/19/25 1413          How much help from another person do you currently need...    Turning from your back to your side while in flat bed without using bedrails? 4  -ES     Moving from lying on back to sitting on the side of a flat bed without bedrails? 4  -ES     Moving to and from a bed to a chair (including a wheelchair)? 3  -ES     Standing up from a chair using your arms (e.g., wheelchair, bedside chair)? 3  -ES     Climbing 3-5 steps with a railing? 3  -ES     To walk in hospital room? 3  -ES     AM-PAC 6 Clicks Score (PT) 20  -ES     Highest Level of Mobility Goal 6 --> Walk 10 steps or more  -ES       Row Name 03/19/25 1413 03/19/25 1407       Modified [place] Scale    Pre-Stroke Modified [place] Scale 6 - Unable to determine (UTD) from the medical record documentation  -ES 6 - Unable to determine (UTD) from the medical record documentation  -KF    Modified [place] Scale 3 - Moderate disability.  Requiring some help, but able to walk without assistance.  -ES 3 - Moderate disability.  Requiring some help, but able to walk without assistance.  -KF      Row Name 03/19/25 1413 03/19/25 1407       Functional Assessment    Outcome Measure Options AM-PAC 6 Clicks Basic Mobility (PT);Modified [place]  -ES AM-PAC 6 Clicks Daily Activity (OT);Modified [place]  -KF              User Key  (r) = Recorded By, (t) = Taken By, (c) = Cosigned By      Initials Name Provider Type    Estelita Iraheta, PT Physical Therapist    Nadia Horvath OT Occupational Therapist                                 Physical Therapy Education       Title: PT OT SLP Therapies (Done)       Topic: Physical Therapy (Done)       Point: Mobility training (Done)       Learning Progress Summary            Patient Acceptance, E,TB, VU by CORRIE at 3/19/2025 1414    Acceptance, E, VU  by KR at 3/17/2025 1130    Eager, E, VU,DU,NR by SS at 3/13/2025 1618    Comment: Reviewed safety/technique w/bed mobility, transfers, ambulation, HEP, PT POC    Acceptance, E, VU by KR at 3/12/2025 0950    Acceptance, TB, DU,VU by CS at 3/12/2025 0849   Family Acceptance, E, VU by KR at 3/12/2025 0950                      Point: Home exercise program (Done)       Learning Progress Summary            Patient Acceptance, E, VU by KR at 3/17/2025 1130    Eager, E, VU,DU,NR by SS at 3/13/2025 1618    Comment: Reviewed safety/technique w/bed mobility, transfers, ambulation, HEP, PT POC    Acceptance, E, VU by KR at 3/12/2025 0950    Acceptance, TB, DU,VU by CS at 3/12/2025 0849   Family Acceptance, E, VU by KR at 3/12/2025 0950                      Point: Body mechanics (Done)       Learning Progress Summary            Patient Acceptance, E,TB, VU by ES at 3/19/2025 1414    Acceptance, E, VU by KR at 3/17/2025 1130    Eager, E, VU,DU,NR by SS at 3/13/2025 1618    Comment: Reviewed safety/technique w/bed mobility, transfers, ambulation, HEP, PT POC    Acceptance, E, VU by KR at 3/12/2025 0950    Acceptance, TB, DU,VU by CS at 3/12/2025 0849   Family Acceptance, E, VU by KR at 3/12/2025 0950                      Point: Precautions (Done)       Learning Progress Summary            Patient Acceptance, E,TB, VU by ES at 3/19/2025 1414    Acceptance, E, VU by KR at 3/17/2025 1130    Eager, E, VU,DU,NR by SS at 3/13/2025 1618    Comment: Reviewed safety/technique w/bed mobility, transfers, ambulation, HEP, PT POC    Acceptance, E, VU by KR at 3/12/2025 0950    Acceptance, TB, DU,VU by CS at 3/12/2025 0849   Family Acceptance, E, VU by KR at 3/12/2025 0950                                      User Key       Initials Effective Dates Name Provider Type Discipline    CS 06/16/21 -  Josh Oliver OT Occupational Therapist OT    SS 06/01/21 -  Vandana Deluca, BALDO Physical Therapist PT    ES 08/11/22 -  Estelita Coronado, BALDO  Physical Therapist PT    KR 12/30/22 -  Syl Mcgee PT Physical Therapist PT                  PT Recommendation and Plan  Planned Therapy Interventions (PT): balance training, bed mobility training, home exercise program, gait training, neuromuscular re-education, patient/family education, strengthening, stair training, postural re-education, transfer training  Progress: no change  Outcome Evaluation: PT re-eval complete. Pt presents with generalized weakness, balance and coordination deficits, and decreased activity tolerance warranting IPPT services. Pt ambulated with CGA, but c/o mild dizziness especially with head turns. PT rec home w/ assist and OPPT/OT at d/c.     Time Calculation:         PT Charges       Row Name 03/19/25 1414             Time Calculation    Start Time 1116  -ES      PT Received On 03/19/25  -ES      PT Goal Re-Cert Due Date 03/29/25  -ES         Time Calculation- PT    Total Timed Code Minutes- PT 23 minute(s)  -ES         Timed Charges    16669 - Gait Training Minutes  23  -ES         Untimed Charges    PT Eval/Re-eval Minutes 25  -ES         Total Minutes    Timed Charges Total Minutes 23  -ES      Untimed Charges Total Minutes 25  -ES       Total Minutes 48  -ES                User Key  (r) = Recorded By, (t) = Taken By, (c) = Cosigned By      Initials Name Provider Type    ES Estelita Coronado PT Physical Therapist                  Therapy Charges for Today       Code Description Service Date Service Provider Modifiers Qty    [id number]  GAIT TRAINING EA 15 MIN 3/19/2025 Estelita Coronado, PT GP 2    [id number] HC PT RE-EVAL ESTABLISHED PLAN 2 3/19/2025 Estelita Coronado PT GP 1            PT G-Codes  Outcome Measure Options: AM-PAC 6 Clicks Basic Mobility (PT), Modified [place]  AM-PAC 6 Clicks Score (PT): 20  AM-PAC 6 Clicks Score (OT): 20  Modified [place] Scale: 3 - Moderate disability.  Requiring some help, but able to walk without assistance.  PT Discharge  Summary  Anticipated Discharge Disposition (PT): home with assist, home with outpatient therapy services    Estelita Coronado, PT  3/19/2025

## 2025-03-19 NOTE — THERAPY RE-EVALUATION
Acute Care - Speech Language Pathology Re-Evaluation  [facility]  Cognitive-Communication Re-Evaluation       Patient Name: Lauryn Romo  : 1960  MRN: [id number]  Today's Date: 3/19/2025               Admit Date: 3/11/2025     Visit Dx:    ICD-10-CM ICD-9-CM   1. Cerebral ventriculomegaly  G93.89 348.89   2. Aphasia  R47.01 784.3   3. Acute left-sided weakness  R53.1 728.87   4. History of ischemic stroke  Z86.73 V12.54     Patient Active Problem List   Diagnosis    Acute CVA    Cerebral aneurysm, nonruptured    Paraesophageal hernia    Hyperlipidemia    HTN    Moderate malnutrition    History of CVA (cerebrovascular accident)    Other headache syndrome    Acute cystitis without hematuria    Left-sided weakness    Cerebral ventriculomegaly     Past Medical History:   Diagnosis Date    Abnormal ECG     Aneurysm 2022    Cancer 2024    Basal Cell Carcinoma removed from scalp by dermatology    Cluster headache     Had headaches for several months before stroke and mass was found    Difficulty walking 22    After stroke    Headache     Headache, tension-type     Hyperlipidemia     Hypertension     Memory loss 22    Migraine     Stroke 2022    Stroke 10/06/2024    Vision loss 22    When dizzy or headache     Past Surgical History:   Procedure Laterality Date    ARTERIAL ANEURYSM REPAIR      BREAST LUMPECTOMY  2012    CYST REMOVAL Left 2023    EMBOLIZATION CEREBRAL N/A 2022    Procedure: CV EMBOLIZATION CEREBRAL IR;  Surgeon: Enoch Savage MD;  Location:  [facility];  Service: Interventional Radiology;  Laterality: N/A;    HIATAL HERNIA REPAIR      INTERVENTIONAL RADIOLOGY PROCEDURE N/A 09/15/2023    Procedure: Embolization;  Surgeon: Enoch Savage MD;  Location:  [facility];  Service: Interventional Radiology;  Laterality: N/A;    INTERVENTIONAL RADIOLOGY PROCEDURE N/A 10/6/2024    Procedure: IR mechanical  thrombectomy;  Surgeon: Joe Mendez MD;  Location:  [facility];  Service: Interventional Radiology;  Laterality: N/A;    SINUS SURGERY      x4    SKIN CANCER EXCISION      cancer removed off top of head    VENTRICULOPERITONEAL SHUNT N/A 3/18/2025    Procedure: LAPAROSCOPIC ASSISTED VENTRICULAR PERITONEAL SHUNT REVISION;  Surgeon: Joe Mendez MD;  Location:  [facility] OR;  Service: Neurosurgery;  Laterality: N/A;       SLP Recommendation and Plan  SLP Diagnosis: mild-moderate, cognitive-linguistic disorder (fluency disorder behaviors) (03/19/25 1344)  SLP Diagnosis Comments: Pt presents with stuttering behaviors when in high pressure conversation, reduced with reduction of pressure. SLP discussed compensatory strategies such as slow rate, smooth onset, circumlocution, etc. with pt and pt's family verbalizing understanding. (03/19/25 1344)           SLC Criteria for Skilled Therapy Interventions Met: yes (03/19/25 1344)  Anticipated Discharge Disposition (SLP): inpatient rehabilitation facility (03/19/25 1344)        Therapy Frequency (SLP SLC): 5 days per week (03/19/25 1344)  Predicted Duration Therapy Intervention (Days): 1 week (03/19/25 1344)                             Progress: declining (03/19/25 1508)      SLP EVALUATION (Last 72 Hours)       SLP SLC Evaluation       Row Name 03/19/25 1344 03/17/25 1500                Communication Assessment/Intervention    Document Type re-evaluation  -MM therapy note (daily note)  -CH       Subjective Information no complaints  -MM no complaints  -CH       Patient Observations alert;cooperative  -MM alert;cooperative;agree to therapy  -CH       Patient/Family/Caregiver Comments/Observations Son Shashank present  -MM spouse present  -CH       Patient Effort good  -MM good  -CH       Symptoms Noted During/After Treatment none  -MM none  -CH          General Information    Patient Profile Reviewed yes  -MM yes  -CH       Pertinent History  Of Current Problem See initial eval. Per RN, re-evaluation was ordered due to pt's change in language after ventriculoperitoneal shunt placement on 3/18.  -MM --       Precautions/Limitations, Vision WFL;for purposes of eval  -MM --       Precautions/Limitations, Hearing WFL;for purposes of eval  -MM --       Prior Level of Function-Communication WFL  -MM --       Plans/Goals Discussed with patient and family;agreed upon  -MM --       Barriers to Rehab none identified  -MM --       Patient's Goals for Discharge patient did not state  -MM --          Pain    Pretreatment Pain Rating 2/10  -MM --       Posttreatment Pain Rating 2/10  -MM --       Pain Location hand  -MM --       Pain Side/Orientation right  -MM --       Pain Management Interventions nursing notified  -MM --          Comprehension Assessment/Intervention    Comprehension Assessment/Intervention Auditory Comprehension  -MM --          Auditory Comprehension Assessment/Intervention    Auditory Comprehension (Communication) WFL  -MM --       Able to Follow Commands (Communication) multi-step;WFL  -MM --       Auditory Comprehension Communication, Comment Pt presents with grossly functional auditory comprehension skills.  -MM --          Expression Assessment/Intervention    Expression Assessment/Intervention verbal expression;graphic expression  -MM --          Verbal Expression Assessment/Intervention    Verbal Expression moderate impairment  -MM --       Automatic Speech (Communication) counting 1-20;mild impairment  -MM --       Repetition sounds;words;phrases;mild impairment  -MM --       Confrontational Naming high frequency;WFL  -MM --       Spontaneous/Functional Words delayed responses;other (see comments)  stuttering  -MM --       Conversational Discourse/Fluency delayed responses;other (see comments)  stuttering  -MM --       Verbal Expression, Comment Pt presents with functional word finding for simple objects, however, demonstrated slowed and  effortful speech requiring additional time to provide responses. When asked a more direct question (such as her last name) the pt demonstrated significant delay and stutter prior to being able to produce the target word. When speaking more in conversation the pt demonstrated delayed response and effortful speech but significant improvement in stuttering behavior.  -MM --          Graphic Expression Assessment/Intervention    Graphic Expression WFL  -MM --       Sentence Formulation WFL  -MM --       Graphic Expression, Comment Pt demonstrated fluent writing with no deficits or pauses.  -MM --          SLP Evaluation Clinical Impressions    SLP Diagnosis mild-moderate;cognitive-linguistic disorder  fluency disorder behaviors  -MM mild-moderate;cognitive-linguistic disorder  -CH       SLP Diagnosis Comments Pt presents with stuttering behaviors when in high pressure conversation, reduced with reduction of pressure. SLP discussed compensatory strategies such as slow rate, smooth onset, circumlocution, etc. with pt and pt's family verbalizing understanding.  -MM Speech is slow but without a true dysarthira. No evidence of apraxia. Language is intact in conversation.  -       Rehab Potential/Prognosis good  -MM good  -[facility] Criteria for Skilled Therapy Interventions Met yes  -MM yes  -       Functional Impact functional impact in social situations;functional impact in ADLs  -MM functional impact in social situations;functional impact in ADLs  -          SLP Treatment Clinical Impressions    Treatment Assessment (SLP) -- improved;cognitive-linguistic disorder  -       Daily Summary of Progress (SLP) -- progress toward functional goals as expected  -       Plan for Continued Treatment (SLP) -- continue treatment per plan of care  -       Care Plan Review -- evaluation/treatment results reviewed;care plan/treatment goals reviewed;risks/benefits reviewed  -       Care Plan Review, Other Participant(s) --  spouse  -CH          Recommendations    Therapy Frequency (SLP SLC) 5 days per week  -MM 5 days per week  -CH       Predicted Duration Therapy Intervention (Days) 1 week  -MM 1 week  -CH       Anticipated Discharge Disposition (SLP) inpatient rehabilitation facility  - inpatient rehabilitation facility  -                 User Key  (r) = Recorded By, (t) = Taken By, (c) = Cosigned By      Initials Name Effective Dates    CH Larisa Cao, MS CCC-SLP 01/20/25 -     MM Ge Infanten, MS CCC-SLP 08/30/24 -                        EDUCATION  The patient has been educated in the following areas:     Communication Impairment.           SLP GOALS       Row Name 03/19/25 1344 03/17/25 1500          Patient will demonstrate functional cognitive-linguistic skills for return to discharge environment    [place] with minimal cues  -MM with minimal cues  -CH     Time frame 1 week  -MM 1 week  -CH     Progress/Outcomes goal ongoing  -MM good progress toward goal  -        Word Retrieval Skills Goal 1 (SLP)    Improve Word Retrieval Skills By Goal 1 (SLP) completing open ended structured sentence;completing open ended unstructured sentence;completing functional word finding tasks;answer WH question with one word;supplying an antonym;supplying a synonym;completing a divergent task;completing a convergent task;80%;with minimal cues (75-90%)  -MM --     Time Frame (Word Retrieval Goal 1, SLP) 1 week  -MM --     Progress/Outcomes (Word Retrieval Goal 1, SLP) new goal  -MM --        Connected Speech to Express Thoughts Goal 1 (SLP)    Improve Narrative Discourse to Express Thoughts By Goal 1 (SLP) explaining a proverb or idiom;conversational task on a given topic;conversational task, self-directed;90%;with minimal cues (75-90%)  -MM --     Time Frame (Connected Speech Goal 1, SLP) 1 week  -MM --     Progress/Outcomes (Connected Speech Goal 1, SLP) new goal  -MM --        Patient Will Implement Strategies Goal 1 (SLP)     Implement Strategies of Goal 1 (SLP) using external rate control;writing;90%;with minimal cues (75-90%)  -MM --     Time Frame (Strategy Implementation Goal 1, SLP) 1 week  -MM --     Progress/Outcomes (Strategy Implementation Goal 1, SLP) new goal  -MM --        [place] Goal 1 ([facility])    Improve [place] by Goal 1 (SLP) -- increasing rate;80%;with minimal cues (75-90%)  -     Time Frame ([place] Goal 1, SLP) -- 1 week  -CH     Progress/Outcomes ([place] Goal 1, SLP) -- goal met  -        Attention Goal 1 (SLP)    Improve Attention by Goal 1 (SLP) complete divided attention task;80%;with minimal cues (75-90%)  -MM complete divided attention task;80%;with minimal cues (75-90%)  -     Time Frame (Attention Goal 1, SLP) 1 week  -MM 1 week  -CH     Progress/Outcomes (Attention Goal 1, SLP) goal ongoing  -MM goal ongoing  -        Memory Skills Goal 1 (SLP)    Improve Memory Skills Through Goal 1 (SLP) recalling related word lists with an imposed delay;recalling unrelated word lists with an imposed delay;80%;with minimal cues (75-90%)  -MM recalling related word lists with an imposed delay;recalling unrelated word lists with an imposed delay;80%;with minimal cues (75-90%)  -     Time Frame (Memory Skills Goal 1, SLP) 1 week  -MM 1 week  -CH     Progress (Memory Skills Goal 1, SLP) -- 80%;with minimal cues (75-90%)  -     Progress/Outcomes (Memory Skills Goal 1, SLP) goal ongoing  -MM continuing progress toward goal  -        Organizational Skills Goal 1 (SLP)    Improve Thought Organization Through Goal 1 (SLP) -- completing a divergent naming task;generating a list of items in a category;80%;with minimal cues (75-90%);completing a convergent naming task  -     Time Frame (Thought Organization Skills Goal 1, SLP) -- 1 week  -     Progress (Thought Organization Skills Goal 1, SLP) -- 100%;independently (over 90% accuracy)  -     Progress/Outcomes (Thought Organization Skills Goal 1, SLP) -- goal met  -                User Key  (r) = Recorded By, (t) = Taken By, (c) = Cosigned By      Initials Name Provider Type    Larisa Garcia, MS CCC-SLP Speech and Language Pathologist    Lakisha Cummings MS CCC-SLP Speech and Language Pathologist                              Time Calculation:      Time Calculation- SLP       Row Name 03/19/25 1344             Time Calculation- SLP    SLP Start Time 1344  -MM      SLP Received On 03/19/25  -MM         Untimed Charges    [id number] Eval Speech and Production w/ Language Minutes 40  -MM         Total Minutes    Untimed Charges Total Minutes 40  -MM       Total Minutes 40  -MM                User Key  (r) = Recorded By, (t) = Taken By, (c) = Cosigned By      Initials Name Provider Type    Lakisha Cummings MS CCC-SLP Speech and Language Pathologist                    Therapy Charges for Today       Code Description Service Date Service Provider Modifiers Qty    [id number]  ST EVAL SPEECH AND PROD W LANG  3 3/19/2025 Lakisha Infante MS CCC-SLP GN 1                       Lakisha Infante MS CCC-SLP  3/19/2025

## 2025-03-19 NOTE — THERAPY RE-EVALUATION
Patient Name: Lauryn Romo  : 1960    MRN: [id number]                              Today's Date: 3/19/2025       Admit Date: 3/11/2025    Visit Dx:     ICD-10-CM ICD-9-CM   1. Cerebral ventriculomegaly  G93.89 348.89   2. Aphasia  R47.01 784.3   3. Acute left-sided weakness  R53.1 728.87   4. History of ischemic stroke  Z86.73 V12.54     Patient Active Problem List   Diagnosis    Acute CVA    Cerebral aneurysm, nonruptured    Paraesophageal hernia    Hyperlipidemia    HTN    Moderate malnutrition    History of CVA (cerebrovascular accident)    Other headache syndrome    Acute cystitis without hematuria    Left-sided weakness    Cerebral ventriculomegaly     Past Medical History:   Diagnosis Date    Abnormal ECG     Aneurysm 2022    Cancer 2024    Basal Cell Carcinoma removed from scalp by dermatology    Cluster headache     Had headaches for several months before stroke and mass was found    Difficulty walking 22    After stroke    Headache     Headache, tension-type     Hyperlipidemia     Hypertension     Memory loss 22    Migraine     Stroke 2022    Stroke 10/06/2024    Vision loss 22    When dizzy or headache     Past Surgical History:   Procedure Laterality Date    ARTERIAL ANEURYSM REPAIR      BREAST LUMPECTOMY  2012    CYST REMOVAL Left 2023    EMBOLIZATION CEREBRAL N/A 2022    Procedure: CV EMBOLIZATION CEREBRAL IR;  Surgeon: Enoch Savage MD;  Location:  [facility];  Service: Interventional Radiology;  Laterality: N/A;    HIATAL HERNIA REPAIR      INTERVENTIONAL RADIOLOGY PROCEDURE N/A 09/15/2023    Procedure: Embolization;  Surgeon: Enoch Savage MD;  Location:  [facility];  Service: Interventional Radiology;  Laterality: N/A;    INTERVENTIONAL RADIOLOGY PROCEDURE N/A 10/6/2024    Procedure: IR mechanical thrombectomy;  Surgeon: Joe Mendez MD;  Location:  [facility];  Service: Interventional Radiology;  Laterality: N/A;    SINUS SURGERY      x4    SKIN CANCER EXCISION      cancer removed off top of head    VENTRICULOPERITONEAL SHUNT N/A 3/18/2025    Procedure: LAPAROSCOPIC ASSISTED VENTRICULAR PERITONEAL SHUNT REVISION;  Surgeon: Joe Mendez MD;  [facility];  Service: Neurosurgery;  Laterality: N/A;      General Information       Row Name 03/19/25 1312          OT Time and Intention    Document Type re-evaluation  -KF     Mode of Treatment occupational therapy;individual therapy  -KF       Row Name 03/19/25 1312          General Information    Patient Profile Reviewed yes  -KF     Prior Level of Function --  Please refer to IE  -KF     Existing Precautions/Restrictions fall;other (see comments)  BUE incoordination; expressive aphasia  -KF     Barriers to Rehab medically complex;previous functional deficit  -KF       Row Name 03/19/25 1312          Living Environment    Current Living Arrangements other (see comments)  Please refer to IE  -KF       Row Name 03/19/25 1312          Cognition    Orientation Status (Cognition) oriented x 3;other (see comments)  yes/no head shakes with minimal verbal communication  -KF       Row Name 03/19/25 1312          Safety Issues/Impairments Affecting Functional Mobility    Safety Issues Affecting Function (Mobility) awareness of need for assistance;safety precaution awareness;safety precautions follow-through/compliance;sequencing abilities  -KF     Impairments Affecting Function (Mobility) balance;strength;motor control;postural/trunk control;pain;endurance/activity tolerance;coordination  -KF               User Key  (r) = Recorded By, (t) = Taken By, (c) = Cosigned By      Initials Name Provider Type    KF Nadia Friend OT Occupational Therapist                     Mobility/ADL's       Row Name 03/19/25 1319          Bed Mobility    Comment, (Bed Mobility) Pt found and left up in the chair.  -KF       Row  Name 03/19/25 1319          Transfers    Transfers sit-stand transfer;stand-sit transfer;toilet transfer  -KF       Row Name 03/19/25 1319          Sit-Stand Transfer    Sit-Stand [place] (Transfers) contact guard  -KF     Comment, (Sit-Stand Transfer) x1 from chair, x1 from commode  -KF       Row Name 03/19/25 1319          Stand-Sit Transfer    Stand-Sit [place] (Transfers) contact guard  -KF       Row Name 03/19/25 1319          Toilet Transfer    Type (Toilet Transfer) sit-stand;stand-sit  -KF     [place] Level (Toilet Transfer) contact guard  -KF     Assistive Device (Toilet Transfer) commode;grab bars/safety frame  -KF       Row Name 03/19/25 1319          Functional Mobility    Functional Mobility- Ind. Level minimum assist (75% patient effort);1 person  -KF     Functional Mobility- Device other (see comments)  no AD  -KF     Functional Mobility-Distance (Feet) --  to/from the bathroom  -KF     Functional Mobility- Comment Pt with left/right lateral LOB instances with Yudelka provided from therapist to maintain dynamic standing balance.  -KF       Row Name 03/19/25 1319          Activities of Daily Living    BADL Assessment/Intervention lower body dressing;grooming;toileting  -KF       Row Name 03/19/25 1319          Lower Body Dressing Assessment/Training    [place] Level (Lower Body Dressing) doff;don;socks;set up  -KF     Position (Lower Body Dressing) unsupported sitting  -KF       Row Name 03/19/25 1319          Grooming Assessment/Training    [place] Level (Grooming) wash face, hands;contact guard assist  -KF     Position (Grooming) sink side;unsupported standing  -KF       Row Name 03/19/25 1319          Toileting Assessment/Training    [place] Level (Toileting) adjust/manage clothing;perform perineal hygiene;standby assist  -KF     Position (Toileting) unsupported sitting  -KF               User Key  (r) = Recorded By, (t) = Taken By, (c) = Cosigned By      Initials Name  Provider Type    KF Nadia Friend OT Occupational Therapist                   Obj/Interventions       Row Name 03/19/25 1325          Sensory Assessment (Somatosensory)    Sensory Assessment (Somatosensory) UE sensation intact  -KF       Row Name 03/19/25 1325          Vision Assessment/Intervention    Visual Impairment/Limitations corrective lenses full-time  -     Vision Assessment Comment Pt able to track left/right and up/down, no visual deficits noted compared to baseline.  -KF       Row Name 03/19/25 1325          Range of Motion Comprehensive    General Range of Motion bilateral upper extremity ROM WFL  -       Row Name 03/19/25 1325          Strength Comprehensive (MMT)    General Manual Muscle Testing (MMT) Assessment upper extremity strength deficits identified  -KF     Comment, General Manual Muscle Testing (MMT) Assessment BUE grossly 4/5  -       Row Name 03/19/25 1325          Motor Skills    Motor Skills coordination  -     Coordination gross motor deficit;bilateral;upper extremity;minimal impairment  -KF     Functional Endurance Pt able to perform BUE coordination activities with eyes open, but deficits noted during finger to nose.  -       Row Name 03/19/25 1325          Balance    Balance Assessment sitting static balance;sitting dynamic balance;sit to stand dynamic balance;standing static balance;standing dynamic balance  -KF     Static Sitting Balance supervision  -KF     Dynamic Sitting Balance standby assist  -KF     Position, Sitting Balance unsupported;sitting in chair  -KF     Sit to Stand Dynamic Balance contact guard  -KF     Static Standing Balance contact guard  -KF     Dynamic Standing Balance minimal assist  -KF     Position/Device Used, Standing Balance unsupported  -KF     Balance Interventions sitting;standing;sit to stand;supported;static;dynamic;occupation based/functional task  -               User Key  (r) = Recorded By, (t) = Taken By, (c) = Cosigned By       Initials Name Provider Type    KF Nadia Friend OT Occupational Therapist                   Goals/Plan       Row Name 03/19/25 1331          Transfer Goal 1 (OT)    Activity/Assistive Device (Transfer Goal 1, OT) bed-to-chair/chair-to-bed;toilet  -KF     [place] Level/Cues Needed (Transfer Goal 1, OT) modified independence  -KF     Time Frame (Transfer Goal 1, OT) long term goal (LTG);10 days  -KF     Strategies/Barriers (Transfers Goal 1, OT) AD recs per PT  -KF     Progress/Outcome (Transfer Goal 1, OT) goal ongoing  -KF       Row Name 03/19/25 1331          Dressing Goal 1 (OT)    Progress/Outcome (Dressing Goal 1, OT) goal met  -KF       Row Name 03/19/25 1331          Toileting Goal 1 (OT)    Activity/Device (Toileting Goal 1, OT) adjust/manage clothing;perform perineal hygiene  -KF     [place] Level/Cues Needed (Toileting Goal 1, OT) independent  -KF     Time Frame (Toileting Goal 1, OT) long term goal (LTG);10 days  -KF     Progress/Outcome (Toileting Goal 1, OT) goal ongoing  -KF       Row Name 03/19/25 1331          Grooming Goal 1 (OT)    Activity/Device (Grooming Goal 1, OT) hair care;oral care;wash face, hands  -KF     [place] (Grooming Goal 1, OT) set-up required  -KF     Time Frame (Grooming Goal 1, OT) short term goal (STG);5 days  -KF     Strategies/Barriers (Grooming Goal 1, OT) standing sink side  -KF     Progress/Outcome (Grooming Goal 1, OT) goal ongoing  -KF               User Key  (r) = Recorded By, (t) = Taken By, (c) = Cosigned By      Initials Name Provider Type    KF Nadia Friend OT Occupational Therapist                   Clinical Impression       Row Name 03/19/25 1321          Pain Assessment    Pretreatment Pain Rating 3/10  -KF     Posttreatment Pain Rating 3/10  -KF     Pain Location head  -KF     Pain Side/Orientation generalized  -KF     Pain Management Interventions activity modification encouraged;exercise or physical activity utilized;positioning techniques  utilized  -KF     Response to Pain Interventions activity participation with tolerable pain  -       Row Name 03/19/25 1327          Plan of Care Review    Plan of Care Reviewed With patient;family  -KF     Progress no change  -KF     Outcome Evaluation OT re-evaluation completed. The pt presents below baseline with generalized weakness, BUE coordination deficits, decreased activity tolerance, and balance deficits warranting continued IP OT services. The pt performed LBD, toileting, and standing sink side grooming ADLS with set up/CGA. Pt ambulated to/from the bathroom with Yudelka x1, no AD. Pt had no reports of dizziness with mobility. Anticipate pt will be appropriate for a d/c home with assist and HHOT when medically ready.  -       Row Name 03/19/25 1327          Therapy Assessment/Plan (OT)    Patient/Family Therapy Goal Statement (OT) Return home  -KF     Rehab Potential (OT) good  -KF     Criteria for Skilled Therapeutic Interventions Met (OT) yes;skilled treatment is necessary  -KF     Therapy Frequency (OT) daily  -KF     Predicted Duration of Therapy Intervention (OT) 10 days  -       Row Name 03/19/25 1327          Therapy Plan Review/Discharge Plan (OT)    Anticipated Discharge Disposition (OT) home with assist;home with outpatient therapy services  -       Row Name 03/19/25 1327          Vital Signs    Pre Systolic BP Rehab 122  -KF     Pre Treatment Diastolic BP 70  -KF     Post Systolic BP Rehab 129  -KF     Post Treatment Diastolic BP 71  -KF     Pretreatment Heart Rate (beats/min) 77  -KF     Posttreatment Heart Rate (beats/min) 75  -KF     Pre SpO2 (%) 94  -KF     O2 Delivery Pre Treatment room air  -KF     Post SpO2 (%) 100  -KF     O2 Delivery Post Treatment room air  -KF     Pre Patient Position Sitting  -KF     Intra Patient Position Standing  -KF     Post Patient Position Sitting  -KF       Row Name 03/19/25 1327          Positioning and Restraints    Pre-Treatment Position sitting in  chair/recliner  -KF     Post Treatment Position chair  -KF     In Chair notified nsg;reclined;call light within reach;encouraged to call for assist;exit alarm on;with family/caregiver;waffle cushion;legs elevated;with other staff  -KF               User Key  (r) = Recorded By, (t) = Taken By, (c) = Cosigned By      Initials Name Provider Type     Nadia Friend OT Occupational Therapist                   Outcome Measures       Row Name 03/19/25 1407          How much help from another is currently needed...    Putting on and taking off regular lower body clothing? 3  -KF     Bathing (including washing, rinsing, and drying) 3  -KF     Toileting (which includes using toilet bed pan or urinal) 3  -KF     Putting on and taking off regular upper body clothing 4  -KF     Taking care of personal grooming (such as brushing teeth) 3  -KF     Eating meals 4  -KF     AM-PAC 6 Clicks Score (OT) 20  -       Row Name 03/19/25 1407          Modified [place] Scale    Pre-Stroke Modified [place] Scale 6 - Unable to determine (UTD) from the medical record documentation  -KF     Modified [place] Scale 3 - Moderate disability.  Requiring some help, but able to walk without assistance.  -       Row Name 03/19/25 1408          Functional Assessment    Outcome Measure Options AM-PAC 6 Clicks Daily Activity (OT);Modified [place]  -KF               User Key  (r) = Recorded By, (t) = Taken By, (c) = Cosigned By      Initials Name Provider Type    Nadia Horvath OT Occupational Therapist                    Occupational Therapy Education       Title: PT OT SLP Therapies (Done)       Topic: Occupational Therapy (Done)       Point: ADL training (Done)       Learning Progress Summary            Patient Acceptance, E,TB, VU,DU by  at 3/19/2025 1250                      Point: Precautions (Done)       Learning Progress Summary            Patient Acceptance, E,TB, VU,DU by  at 3/19/2025 1250                      Point: Body mechanics  (Done)       Learning Progress Summary            Patient Acceptance, E,TB, VU,DU by  at 3/19/2025 1250                                      User Key       Initials Effective Dates Name Provider Type Discipline     08/09/23 -  Nadia Friend OT Occupational Therapist OT                  OT Recommendation and Plan  Therapy Frequency (OT): daily  Plan of Care Review  Plan of Care Reviewed With: patient, family  Progress: no change  Outcome Evaluation: OT re-evaluation completed. The pt presents below baseline with generalized weakness, BUE coordination deficits, decreased activity tolerance, and balance deficits warranting continued IP OT services. The pt performed LBD, toileting, and standing sink side grooming ADLS with set up/CGA. Pt ambulated to/from the bathroom with Yudelka x1, no AD. Pt had no reports of dizziness with mobility. Anticipate pt will be appropriate for a d/c home with assist and HHOT when medically ready.     Time Calculation:         Time Calculation- OT       Row Name 03/19/25 1408             Time Calculation- OT    OT Start Time 1250  -KF      OT Received On 03/19/25  -KF      OT Goal Re-Cert Due Date 03/29/25  -KF         Timed Charges    42253 - OT Self Care/Mgmt Minutes 15  -KF         Untimed Charges    OT Eval/Re-eval Minutes 25  -KF         Total Minutes    Timed Charges Total Minutes 15  -KF      Untimed Charges Total Minutes 25  -KF       Total Minutes 40  -KF                User Key  (r) = Recorded By, (t) = Taken By, (c) = Cosigned By      Initials Name Provider Type     Nadia Friend OT Occupational Therapist                  Therapy Charges for Today       Code Description Service Date Service Provider Modifiers Qty    07012421911 HC OT RE-EVAL 2 3/19/2025 Nadia Friend OT GO 1    [id number]  OT SELF CARE/MGMT/TRAIN EA 15 MIN 3/19/2025 Nadia Friend OT GO 1                 Nadia Friend OT  3/19/2025

## 2025-03-19 NOTE — PLAN OF CARE
Goal Outcome Evaluation:  Plan of Care Reviewed With: patient, child        Progress: declining after shunt placement but improving on this date       Anticipated Discharge Disposition (SLP): inpatient rehabilitation facility    SLP Diagnosis: mild-moderate, cognitive-linguistic disorder (fluency disorder behaviors) (03/19/25 6145)  SLP Diagnosis Comments: Pt presents with stuttering behaviors when in high pressure conversation, reduced with reduction of pressure. SLP discussed compensatory strategies such as slow rate, smooth onset, circumlocution, etc. with pt and pt's family verbalizing understanding. (03/19/25 8737)

## 2025-03-19 NOTE — PLAN OF CARE
Goal Outcome Evaluation:  Plan of Care Reviewed With: patient, family        Progress: no change  Outcome Evaluation: PT re-eval complete. Pt presents with generalized weakness, balance and coordination deficits, and decreased activity tolerance warranting IPPT services. Pt ambulated with CGA, but c/o mild dizziness especially with head turns. PT rec home w/ assist and OPPT/OT at d/c.    Anticipated Discharge Disposition (PT): home with assist, home with outpatient therapy services

## 2025-03-19 NOTE — PROGRESS NOTES
"Patient Name:  Lauryn Romo  YOB: 1960  [id number]    Surgery Progress Note    Date of visit: 3/19/2025    Subjective   Subjective: Some difficulty with speech         Objective     Objective:     /68   Pulse 77   Temp 97.7 °F (36.5 °C) (Oral)   Resp 18   Ht 154.9 cm (61\")   Wt 59 kg (130 lb)   SpO2 96%   BMI 24.56 kg/m²     Intake/Output Summary (Last 24 hours) at 3/19/2025 0724  Last data filed at 3/19/2025 0200  Gross per 24 hour   Intake 800 ml   Output 450 ml   Net 350 ml       CV:  Rhythm  regular and rate regular   L:  Clear  to auscultation bilaterally   Abd:  Bowel sounds positive , soft, dressings c/d/I.  Ext:  No cyanosis, clubbing, edema    Recent labs that are back at this time have been reviewed.            Assessment/ Plan:    Problem List Items Addressed This Visit          Neuro    Cerebral ventriculomegaly - Primary- Stable after  shunt. Will sign off. Please call with questions.    Relevant Orders    Case request (Completed)     Other Visit Diagnoses         Aphasia          Acute left-sided weakness          History of ischemic stroke                 Active Hospital Problems    Diagnosis  POA    **Left-sided weakness [R53.1]  Yes    Cerebral ventriculomegaly [G93.89]  Yes      Resolved Hospital Problems   No resolved problems to display.              Reid Raya MD  3/19/2025  07:24 EDT      "

## 2025-03-20 ENCOUNTER — READMISSION MANAGEMENT (OUTPATIENT)
Dept: CALL CENTER | Facility: HOSPITAL | Age: 65
End: 2025-03-20
Payer: COMMERCIAL

## 2025-03-20 VITALS
TEMPERATURE: 98.3 F | RESPIRATION RATE: 18 BRPM | HEART RATE: 66 BPM | WEIGHT: 130 LBS | BODY MASS INDEX: 24.55 KG/M2 | DIASTOLIC BLOOD PRESSURE: 72 MMHG | OXYGEN SATURATION: 95 % | SYSTOLIC BLOOD PRESSURE: 130 MMHG | HEIGHT: 61 IN

## 2025-03-20 PROCEDURE — 99239 HOSP IP/OBS DSCHRG MGMT >30: CPT | Performed by: INTERNAL MEDICINE

## 2025-03-20 RX ADMIN — ASPIRIN 81 MG CHEWABLE TABLET 81 MG: 81 TABLET CHEWABLE at 08:33

## 2025-03-20 RX ADMIN — CLOPIDOGREL BISULFATE 75 MG: 75 TABLET, FILM COATED ORAL at 08:33

## 2025-03-20 RX ADMIN — FAMOTIDINE 20 MG: 20 TABLET, FILM COATED ORAL at 08:30

## 2025-03-20 RX ADMIN — SERTRALINE HYDROCHLORIDE 150 MG: 50 TABLET ORAL at 08:33

## 2025-03-20 RX ADMIN — SENNOSIDES AND DOCUSATE SODIUM 2 TABLET: 50; 8.6 TABLET ORAL at 08:33

## 2025-03-20 RX ADMIN — BETHANECHOL CHLORIDE 25 MG: 25 TABLET ORAL at 08:32

## 2025-03-20 RX ADMIN — PANTOPRAZOLE SODIUM 40 MG: 40 TABLET, DELAYED RELEASE ORAL at 06:21

## 2025-03-20 NOTE — CASE MANAGEMENT/SOCIAL WORK
Case Management Discharge Note      Final Note: I met with the patient and discussed therapy's recommendation for outpatient therapy. Patient stated she is not sure she wants to do that due to insurance. I explained to her that if she wants to do home or outpatient services after discharge, she can call her PCP to arrange. She verbalized understanding. Spouse at bedside to transport her home. Patient denied needs.         Selected Continued Care - Discharged on 3/20/2025 Admission date: 3/11/2025 - Discharge disposition: Home or Self Care      Destination    No services have been selected for the patient.                Durable Medical Equipment    No services have been selected for the patient.                Dialysis/Infusion    No services have been selected for the patient.                Home Medical Care    No services have been selected for the patient.                Therapy    No services have been selected for the patient.                Community Resources    No services have been selected for the patient.                Community & DME    No services have been selected for the patient.                    Selected Continued Care - Episodes Includes continued care and service providers with selected services from the active episodes listed below          Transportation Services  Private: Car    Final Discharge Disposition Code: 01 - home or self-care

## 2025-03-20 NOTE — DISCHARGE SUMMARY
[facility] Medicine Services  DISCHARGE SUMMARY    Patient Name: Lauryn Romo  : 1960  MRN: [id number]    Date of Admission: 3/11/2025  8:23 PM  Date of Discharge:  3/20/2025  Primary Care Physician: Melissa Lazo APRN    Consults       Date and Time Order Name Status Description    3/12/2025 10:07 AM Inpatient Neurosurgery Consult Completed     3/11/2025  8:25 PM Inpatient Neurology Consult Stroke Completed             Hospital Course     Presenting Problem:     Active Hospital Problems    Diagnosis  POA    **Left-sided weakness [R53.1]  Yes    Cerebral ventriculomegaly [G93.89]  Yes      Resolved Hospital Problems   No resolved problems to display.      ------------------------final diagnoses--------------------  Obstructive hydrocephalus (symptomatic) due to previously known john basilar aneurysm  -s/p  shunt placement this admission (Dr. 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 Savage))  Hx previous cerebellar CVA (w/ some residual dizziness/ataxia)  HTN  HL  -continue asa, plavix, statin  Migraines  -continue topamax  --------------------------------------    Hospital Course:  Lauryn Romo is a 64 y.o. 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 Savage 10/2024) who presented with transient aphasia, headache, fall, ataxia. Mri negative for any new cva. Imagign did show progressive ventriculomegaly w/ possible transependymal resorption of CSF noted. Imaging was reviewed w/ Dr. Savage (neurointerventional) and Dr. Mendez (neurosurgery) and patient underwent  shunt placement on 3/18/25 w/  improvement in symptoms. Plavix restarted as well prior to discharge. Patient now cleared for discharge home.      Discharge Follow Up Recommendations for outpatient labs/diagnostics:   F/u 2 weeks w/ Dr. Mendez (neurosurgery)   F/u pcp 1 week after discharge    Day of Discharge     HPI:   Feeling better, headache persists but improved, no new neuro symptoms, more steady, etc. Eager to go home    Review of Systems  No f/c  No chest pain  No dyspnea    Vital Signs:   Temp:  [97.6 °F (36.4 °C)-98.7 °F (37.1 °C)] 98.3 °F (36.8 °C)  Heart Rate:  [66-86] 66  Resp:  [16-18] 18  BP: (107-145)/(61-74) 130/72      Physical Exam:  Constitutional:Alert, oriented x 3, nontoxic appearing  Psych:Normal/appropriate affect  HEENT:NCAT, oropharynx clear  Neck: neck supple, full range of motion  Neuro: Face symmetric, speech clear, equal , moves all extremities; gait not assessed  Cardiac: RRR; No pretibial pitting edema  Resp: CTAB, normal effort  GI: abd soft, nontender  Skin: No extremity rash  Musculoskeletal/extremities: no cyanosis of extremities; no significant ankle edema          Pertinent  and/or Most Recent Results     LAB RESULTS:      Lab 03/19/25 0322 03/17/25 0945 03/16/25  0442 03/15/25  1102   WBC 11.70* 6.04 10.82* 7.75   HEMOGLOBIN 13.5 14.0 13.5 14.7   HEMATOCRIT 42.4 46.3 41.8 45.1   PLATELETS 256 243 238 265   NEUTROS ABS 8.66* 4.35  --   --    IMMATURE GRANS (ABS) 0.03 0.02  --   --    LYMPHS ABS 2.04 1.20  --   --    MONOS ABS 0.87 0.26  --   --    EOS ABS 0.06 0.17  --   --    MCV 96.6 102.2* 94.6 93.6         Lab 03/19/25 0322 03/17/25  0945 03/15/25  1102 03/14/25  1652   SODIUM 140 139 141 141   POTASSIUM 4.9 4.4 4.3 4.5   CHLORIDE 106 104 106 107   CO2 22.0 23.0 24.0 23.0   ANION GAP 12.0 12.0 11.0 11.0   BUN 15 13 14 15   CREATININE 0.65 0.64 0.67 0.61   EGFR 99.1 99.4 98.4 100.6   GLUCOSE 86 137* 93 114*   CALCIUM 9.2 9.0 9.6 8.8                         Brief Urine Lab Results  (Last result in  the past 365 days)        Color   Clarity   Blood   Leuk Est   Nitrite   Protein   CREAT   Urine HCG        03/11/25 2213 Yellow   Clear   Negative   Trace   Negative   Negative                 Microbiology Results (last 10 days)       ** No results found for the last 240 hours. **            MRI Brain Without Contrast  Result Date: 3/19/2025  MRI BRAIN WO CONTRAST Date of Exam: 3/19/2025 10:47 AM EDT Indication: speech difficulties, R/O Stroke.  Comparison: CT 3/19/2025. Technique:  Routine multiplanar/multisequence sequence images of the brain were obtained without contrast administration. Findings: Evaluation is partially limited due to artifact from right-sided shunt valve, without evidence of diffusion restriction to suggest acute infarct. Midline structures redemonstrate heterogeneous appearing prepontine round mass consistent with known posterior circulation aneurysm, partially thrombosed and unchanged in size measuring around 23 x 24 mm in greatest transverse dimension. There is unchanged mass effect including posterior displacement of the jong and medulla without significant component  of new parenchymal edema or worsening mass effect. There is no specific evidence of recent or acute hemorrhage. Ventricular caliber is stable without evidence of hydrocephalus. Small amount of pneumocephalus again noted within the right frontal horn. The orbits are normal. The paranasal sinuses are clear.     Impression: Accounting for some hardware artifact relating to patient's shunt valve, no evidence of recent infarct, acute hemorrhage or new mass effect. Grossly stable giant posterior circulation aneurysm with some associated posterior displacement of the jong and medulla. Electronically Signed: Marcelino Francis MD  3/19/2025 11:05 AM EDT  Workstation ID: [id number]    CT Head Without Contrast  Result Date: 3/19/2025  CT HEAD WO CONTRAST Date of Exam: 3/19/2025 4:13 AM EDT Indication: s/p shunt. Post Op Craniotomoy  Evaluation. Comparison: 3/18/2025. Technique: Axial CT images were obtained of the head without contrast administration.  Automated exposure control and iterative construction methods were used. Findings: There is a stable right parietal approach ventriculostomy catheter that terminates in the posterior body of the right lateral ventricle. There is a decrease in the size of the lateral ventricles as compared to yesterday's exam. There is persistent small volume right frontal pneumocephalus there is a new focus of gas in the frontal horn of the right lateral ventricle. Third ventricle is decreased in size. There is normal size of the fourth ventricle. There is mild patchy white matter hypoattenuation. No acute intracranial hemorrhage. There is stable appearance of a giant basilar artery aneurysm measuring 25 mm diameter in the axial plane with a pipeline device or stent traversing the superior aspect from prior treatment. No midline shift or herniation. No significant mass effect. Orbits are symmetric. There is evidence of prior sinus surgery including partial ethmoidectomy and maxillary antrostomy/uncinectomy and augmented communication of the sphenoid and ethmoid sinuses. No acute calvarial abnormalities.     Impression: 1.Stable right parietal approach ventriculostomy catheter with interval decrease in size of the lateral and third ventricles. 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 3.Mild chronic small vessel ischemic change. Electronically Signed: Carlos Bowden MD  3/19/2025 4:57 AM EDT  Workstation ID: [id number]    CT Head Without Contrast  Result Date: 3/18/2025  CT HEAD WO CONTRAST Date of Exam: 3/18/2025 12:20 PM EDT Indication: Aphasia and ataxia post surgical. Comparison: CT scan of the head 3/17/2025 Technique: Axial CT images were obtained of the head without contrast administration.  Automated exposure control and iterative construction methods were used. Findings:  There is been interval placement of a right-sided ventriculoperitoneal shunt with the tip terminating in the midportion of the right lateral ventricle. There is subtle slight decrease in the size of the prominent ventricles. Frontal pneumocephalus is present consistent with recent surgery. There is a vascular stent spanning the basilar artery with surrounding thrombosed aneurysm of the basilar artery. There is no intraventricular hemorrhage. There is no parenchymal hemorrhage. There are no sinus air-fluid levels. The orbital structures are intact.     Impression: Interval placement of a right-sided ventriculoperitoneal shunt with slight decrease in the size of the ventricles. There is no acute intracranial hemorrhage. Electronically Signed: Rinku Tran MD  3/18/2025 12:45 PM EDT  Workstation ID: [id number]    CT Head Without Contrast  Result Date: 3/17/2025  CT HEAD WO CONTRAST Date of Exam: 3/17/2025 12:08 PM EDT Indication: Pre-shunt placement. Surgery tomorrow. Needs STEALTH protocol.. Comparison: MRI brain from March 12, 2025 and CT head from March 11, 2025 Technique: Axial CT images were obtained of the head without contrast administration.  Automated exposure control and iterative construction methods were used. Findings: No acute intracranial hemorrhage or extra-axial collection is identified. A large basilar artery aneurysm with an adjacent flow diverter stent appears unchanged. Prominent ventricular caliber appears unchanged. There is no midline shift. The gray-white differentiation appears preserved. Degenerative changes appear stable. The calvarium appears intact. The paranasal sinuses and mastoid air cells are well-aerated.     Impression: 1.No acute intracranial process identified. 2.Stable prominent ventricular caliber. Electronically Signed: Chris Acuña MD  3/17/2025 12:31 PM EDT  Workstation ID: [id number]    EEG  Result Date: 3/12/2025  Reason for referral: 64 y.o.female with altered mental  status, left-sided weakness, speech changes, consideration of seizure Technical Summary:  A 19 channel digital EEG was performed using the international 10-20 placement system, including eye leads and EKG leads. Duration: 20 minutes Findings: The patient is awake.  A medium amplitude 9 Hz posterior rhythm is evident symmetrically over the occipital leads.  Intermixed theta and alpha activity are seen anteriorly.  Sleep is not seen.  Hyperventilation is not performed.  Photic stimulation does not change the background.  No focal features or epileptiform activity are present. Video: Available Technical quality: Good Rhythm strip: Regular, 60 bpm SUMMARY: Normal EEG in the awake state No focal features or epileptiform activity are seen     Normal study This report is transcribed using the Dragon dictation system.      MRI Brain Without Contrast  Result Date: 3/12/2025  MRI BRAIN WO CONTRAST Date of Exam: 3/12/2025 2:52 AM EDT Indication: Stroke, follow up stroke.  Comparison: Head CT, CT cerebral perfusion 3/11/2025, brain MRI 10/7/2024 Technique:  Routine multiplanar/multisequence sequence images of the brain were obtained without contrast administration. Findings: No acute infarct. No intracranial hemorrhage. No intracranial mass. There is susceptibility associated with the basilar artery aneurysm and stent. There are periventricular and to lesser extent subcortical white matter FLAIR/T2 hyperintensities which are  nonspecific and can be seen in the setting of chronic small vessel ischemic change. No extra-axial collections. No midline shift or herniation. Normal size and configuration of the ventricles. Normal appearance of the orbits. The cerebellopontine angles  are unremarkable. The basilar artery aneurysm exerts mass effect on the brainstem with posterior displacement of the jong and medulla. Basilar artery aneurysm is similar in size compared to prior MRI measuring approximately 2.3 x 2.4 x 2.4 cm (AP X TV X   CC). Normal appearance of the orbits. The paranasal sinuses are clear. The mastoid air cells are clear. No acute or suspicious bony findings.     Impression: No acute intracranial findings. No acute infarct. Similar basilar artery aneurysm exerting mass effect on the brainstem with posterior displacement of the jong and medulla. Please refer to yesterday's CTA exam for more complete details of this finding. Electronically Signed: Kwesi Guan MD  3/12/2025 8:19 AM EDT  Workstation ID: [id number]    XR Chest 1 View  Result Date: 3/11/2025  XR CHEST 1 VW Date of Exam: 3/11/2025 9:45 PM EDT Indication: Acute Stroke Protocol (onset < 12 hrs) Comparison: October 6, 2024 Findings: Heart not enlarged. Lungs seem clear. There are no pleural effusions. There is a granuloma in the right upper lobe.     Impression: An acute pulmonary process is not apparent. Electronically Signed: Akash Lux MD  3/11/2025 10:21 PM EDT  Workstation ID: [id number]    CT Angiogram Head w AI Analysis of LVO  Result Date: 3/11/2025  CT ANGIOGRAM HEAD W AI ANALYSIS OF LVO, CT ANGIOGRAM NECK, CT CEREBRAL PERFUSION W WO CONTRAST Date of Exam: 3/11/2025 8:47 PM EDT Indication: Neuro Deficit, acute, Stroke suspected Neuro deficit, acute stroke suspected. Comparison: Study was correlated with noncontrast CT of the head performed on March 11, 2025. CTA of the head and neck performed on October 6, 2024 Technique: CTA of the head was performed after the uneventful intravenous administration of Isovue-370, 115 mL. Reconstructed coronal and sagittal images were also obtained. In addition, a 3-D volume rendered image was created for interpretation. Automated exposure control and iterative reconstruction methods were used. Findings: Aortic arch: The aortic arch is unremarkable.  There is conventional 3 vessel arch anatomy. Small calcified plaques are visualized at the origin of the great vessels without evidence of luminal narrowing. The brachiocephalic artery  and bilateral subclavian arteries are normal in caliber. Right carotid: The right CCA arises as expected from the brachiocephalic trunk.  The CCA follows a normal course and appears normal caliber. Tiny calcified plaque in the right carotid bifurcation is visualized without evidence of significant luminal narrowing. The external carotid artery and distal branches appear within normal limits.  The cervical internal carotid artery follows a normal course and appears normal caliber throughout the neck and into the head. Small calcified plaques are visualized  in the right carotid siphon causing mild luminal narrowing without evidence of hemodynamically significant stenosis. The ophthalmic artery origin is normal.  The A1 and M1 segments appear within normal limits.  The visualized distal DERECK and MCA branches  appear patent.  There is  a patent  anterior communicating artery. Posterior communicating artery is patent. Left carotid: The left CCA arises as expected from the aortic arch.   The CCA follows a normal course and appears normal caliber.  The carotid bifurcation is unremarkable.  The external carotid artery and distal branches appear within normal limits.  The  cervical internal carotid artery follows a normal course and appears normal caliber throughout the neck and into the head. Small calcified plaques in the left carotid siphon are visualized causing mild luminal narrowing without evidence of hemodynamically significant stenosis. The ophthalmic artery origin is normal.  The A1 and M1 segments appear within normal limits.  The visualized distal DERECK and MCA branches appear patent.  Posterior communicating artery is patent. Posterior circulation: Vertebral arteries arise as expected from ipsilateral subclavian arteries.  The vertebral arteries are codominant.  The vertebral arteries follow a normal course and appear normal caliber throughout the neck and into the head.  The  V4 segments are patent.  Visualized  posterior inferior cerebellar arteries are within normal limits. 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 Patient is post pipeline flow diverting stent starting in the distal right vertebral artery and terminating in the basilar artery. There is some contrast leakage to the left of the stent measuring 1.2 x 0.4 x 0.4 cm. The leak is either through the walls of the stent or distally. The basilar artery is patent distal to the stent. Superior cerebellar arteries are patent.  Bilateral P1 segments and posterior cerebral arteries appear within normal limits. Nonvascular findings: No acute intracranial process evident. Mild dilatation of the bilateral third and lateral ventricles is again visualized, stable. Orbits are within normal limits.  Paranasal sinuses and mastoid air cells appear well aerated.  Superficial soft tissues and underlying musculature appear within normal limits. Lung apices are clear aside from right upper lobe granuloma. The thyroid and salivary glands appear unremarkable.  The suprahyoid and infrahyoid spaces of the neck appear unremarkable.  There is no evidence of cervical lymphadenopathy or a neck mass.  There are no acute osseous abnormalities or destructive bone lesions. CT Perfusion: CBF (<30%) volume: 0 mL Tmax (>6.0s) volume: 0 mL Mismatch volume: 0 mL Mismatch ratio: None The examination appears to be technically adequate. There is no reduced cerebral blood volume (CBV) or reduced cerebral blood flow (CBF) to suggest an area of acute infarction in a large vessel territory. The cerebral perfusion appears symmetric. No increased mean transit time (MTT) is seen. No areas of mismatch to suggest presence of a penumbra.     Impression: No evidence of large vessel occlusion or hemodynamically significant stenosis. Post stenting of large basilar artery aneurysm. The stent is visualized in the distal vertebral artery and extending  into the left basal artery. There is some contrast leakage to the left of the stent. This may be secondary to stent porosity or leakage from the distal aspect of the stent. This appears unchanged from prior CTA performed on October 6, 2024. Correlate with diagnostic cerebral angiography as clinically warranted. No focal area of decreased cerebral blood flow (CBF) is seen to suggest an acute infarct in a large vessel territory.  No defects are seen to suggest a core infarct or an area of reversible ischemia. Electronically Signed: Ben Ziegler MD  3/11/2025 9:50 PM EDT  Workstation ID: [id number]    CT Angiogram Neck  Result Date: 3/11/2025  CT ANGIOGRAM HEAD W AI ANALYSIS OF LVO, CT ANGIOGRAM NECK, CT CEREBRAL PERFUSION W WO CONTRAST Date of Exam: 3/11/2025 8:47 PM EDT Indication: Neuro Deficit, acute, Stroke suspected Neuro deficit, acute stroke suspected. Comparison: Study was correlated with noncontrast CT of the head performed on March 11, 2025. CTA of the head and neck performed on October 6, 2024 Technique: CTA of the head was performed after the uneventful intravenous administration of Isovue-370, 115 mL. Reconstructed coronal and sagittal images were also obtained. In addition, a 3-D volume rendered image was created for interpretation. Automated exposure control and iterative reconstruction methods were used. Findings: Aortic arch: The aortic arch is unremarkable.  There is conventional 3 vessel arch anatomy. Small calcified plaques are visualized at the origin of the great vessels without evidence of luminal narrowing. The brachiocephalic artery and bilateral subclavian arteries are normal in caliber. Right carotid: The right CCA arises as expected from the brachiocephalic trunk.  The CCA follows a normal course and appears normal caliber. Tiny calcified plaque in the right carotid bifurcation is visualized without evidence of significant luminal narrowing. The external carotid artery and distal branches  appear within normal limits.  The cervical internal carotid artery follows a normal course and appears normal caliber throughout the neck and into the head. Small calcified plaques are visualized  in the right carotid siphon causing mild luminal narrowing without evidence of hemodynamically significant stenosis. The ophthalmic artery origin is normal.  The A1 and M1 segments appear within normal limits.  The visualized distal DERECK and MCA branches  appear patent.  There is  a patent  anterior communicating artery. Posterior communicating artery is patent. Left carotid: The left CCA arises as expected from the aortic arch.   The CCA follows a normal course and appears normal caliber.  The carotid bifurcation is unremarkable.  The external carotid artery and distal branches appear within normal limits.  The  cervical internal carotid artery follows a normal course and appears normal caliber throughout the neck and into the head. Small calcified plaques in the left carotid siphon are visualized causing mild luminal narrowing without evidence of hemodynamically significant stenosis. The ophthalmic artery origin is normal.  The A1 and M1 segments appear within normal limits.  The visualized distal DERECK and MCA branches appear patent.  Posterior communicating artery is patent. Posterior circulation: Vertebral arteries arise as expected from ipsilateral subclavian arteries.  The vertebral arteries are codominant.  The vertebral arteries follow a normal course and appear normal caliber throughout the neck and into the head.  The  V4 segments are patent.  Visualized posterior inferior cerebellar arteries are within normal limits. 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 Patient is post pipeline flow diverting stent starting in the distal right vertebral artery and terminating in the basilar artery. There is some contrast leakage to the left of the stent  measuring 1.2 x 0.4 x 0.4 cm. The leak is either through the walls of the stent or distally. The basilar artery is patent distal to the stent. Superior cerebellar arteries are patent.  Bilateral P1 segments and posterior cerebral arteries appear within normal limits. Nonvascular findings: No acute intracranial process evident. Mild dilatation of the bilateral third and lateral ventricles is again visualized, stable. Orbits are within normal limits.  Paranasal sinuses and mastoid air cells appear well aerated.  Superficial soft tissues and underlying musculature appear within normal limits. Lung apices are clear aside from right upper lobe granuloma. The thyroid and salivary glands appear unremarkable.  The suprahyoid and infrahyoid spaces of the neck appear unremarkable.  There is no evidence of cervical lymphadenopathy or a neck mass.  There are no acute osseous abnormalities or destructive bone lesions. CT Perfusion: CBF (<30%) volume: 0 mL Tmax (>6.0s) volume: 0 mL Mismatch volume: 0 mL Mismatch ratio: None The examination appears to be technically adequate. There is no reduced cerebral blood volume (CBV) or reduced cerebral blood flow (CBF) to suggest an area of acute infarction in a large vessel territory. The cerebral perfusion appears symmetric. No increased mean transit time (MTT) is seen. No areas of mismatch to suggest presence of a penumbra.     Impression: No evidence of large vessel occlusion or hemodynamically significant stenosis. Post stenting of large basilar artery aneurysm. The stent is visualized in the distal vertebral artery and extending into the left basal artery. There is some contrast leakage to the left of the stent. This may be secondary to stent porosity or leakage from the distal aspect of the stent. This appears unchanged from prior CTA performed on October 6, 2024. Correlate with diagnostic cerebral angiography as clinically warranted. No focal area of decreased cerebral blood flow  (CBF) is seen to suggest an acute infarct in a large vessel territory.  No defects are seen to suggest a core infarct or an area of reversible ischemia. Electronically Signed: Ben Ziegler MD  3/11/2025 9:50 PM EDT  Workstation ID: [id number]    CT CEREBRAL PERFUSION WITH & WITHOUT CONTRAST  Result Date: 3/11/2025  CT ANGIOGRAM HEAD W AI ANALYSIS OF LVO, CT ANGIOGRAM NECK, CT CEREBRAL PERFUSION W WO CONTRAST Date of Exam: 3/11/2025 8:47 PM EDT Indication: Neuro Deficit, acute, Stroke suspected Neuro deficit, acute stroke suspected. Comparison: Study was correlated with noncontrast CT of the head performed on March 11, 2025. CTA of the head and neck performed on October 6, 2024 Technique: CTA of the head was performed after the uneventful intravenous administration of Isovue-370, 115 mL. Reconstructed coronal and sagittal images were also obtained. In addition, a 3-D volume rendered image was created for interpretation. Automated exposure control and iterative reconstruction methods were used. Findings: Aortic arch: The aortic arch is unremarkable.  There is conventional 3 vessel arch anatomy. Small calcified plaques are visualized at the origin of the great vessels without evidence of luminal narrowing. The brachiocephalic artery and bilateral subclavian arteries are normal in caliber. Right carotid: The right CCA arises as expected from the brachiocephalic trunk.  The CCA follows a normal course and appears normal caliber. Tiny calcified plaque in the right carotid bifurcation is visualized without evidence of significant luminal narrowing. The external carotid artery and distal branches appear within normal limits.  The cervical internal carotid artery follows a normal course and appears normal caliber throughout the neck and into the head. Small calcified plaques are visualized  in the right carotid siphon causing mild luminal narrowing without evidence of hemodynamically significant stenosis. The ophthalmic  artery origin is normal.  The A1 and M1 segments appear within normal limits.  The visualized distal DERECK and MCA branches  appear patent.  There is  a patent  anterior communicating artery. Posterior communicating artery is patent. Left carotid: The left CCA arises as expected from the aortic arch.   The CCA follows a normal course and appears normal caliber.  The carotid bifurcation is unremarkable.  The external carotid artery and distal branches appear within normal limits.  The  cervical internal carotid artery follows a normal course and appears normal caliber throughout the neck and into the head. Small calcified plaques in the left carotid siphon are visualized causing mild luminal narrowing without evidence of hemodynamically significant stenosis. The ophthalmic artery origin is normal.  The A1 and M1 segments appear within normal limits.  The visualized distal DERECK and MCA branches appear patent.  Posterior communicating artery is patent. Posterior circulation: Vertebral arteries arise as expected from ipsilateral subclavian arteries.  The vertebral arteries are codominant.  The vertebral arteries follow a normal course and appear normal caliber throughout the neck and into the head.  The  V4 segments are patent.  Visualized posterior inferior cerebellar arteries are within normal limits. 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 Patient is post pipeline flow diverting stent starting in the distal right vertebral artery and terminating in the basilar artery. There is some contrast leakage to the left of the stent measuring 1.2 x 0.4 x 0.4 cm. The leak is either through the walls of the stent or distally. The basilar artery is patent distal to the stent. Superior cerebellar arteries are patent.  Bilateral P1 segments and posterior cerebral arteries appear within normal limits. Nonvascular findings: No acute intracranial process evident. Mild  dilatation of the bilateral third and lateral ventricles is again visualized, stable. Orbits are within normal limits.  Paranasal sinuses and mastoid air cells appear well aerated.  Superficial soft tissues and underlying musculature appear within normal limits. Lung apices are clear aside from right upper lobe granuloma. The thyroid and salivary glands appear unremarkable.  The suprahyoid and infrahyoid spaces of the neck appear unremarkable.  There is no evidence of cervical lymphadenopathy or a neck mass.  There are no acute osseous abnormalities or destructive bone lesions. CT Perfusion: CBF (<30%) volume: 0 mL Tmax (>6.0s) volume: 0 mL Mismatch volume: 0 mL Mismatch ratio: None The examination appears to be technically adequate. There is no reduced cerebral blood volume (CBV) or reduced cerebral blood flow (CBF) to suggest an area of acute infarction in a large vessel territory. The cerebral perfusion appears symmetric. No increased mean transit time (MTT) is seen. No areas of mismatch to suggest presence of a penumbra.     Impression: No evidence of large vessel occlusion or hemodynamically significant stenosis. Post stenting of large basilar artery aneurysm. The stent is visualized in the distal vertebral artery and extending into the left basal artery. There is some contrast leakage to the left of the stent. This may be secondary to stent porosity or leakage from the distal aspect of the stent. This appears unchanged from prior CTA performed on October 6, 2024. Correlate with diagnostic cerebral angiography as clinically warranted. No focal area of decreased cerebral blood flow (CBF) is seen to suggest an acute infarct in a large vessel territory.  No defects are seen to suggest a core infarct or an area of reversible ischemia. Electronically Signed: Ben Ziegler MD  3/11/2025 9:50 PM EDT  Workstation ID: [id number]    CT Head Without Contrast Stroke Protocol  Result Date: 3/11/2025  CT HEAD WO CONTRAST  STROKE PROTOCOL Date of Exam: 3/11/2025 8:30 PM EDT Indication: Neuro deficit, acute, stroke suspected Neuro Deficit, acute, Stroke suspected. Comparison: MRI brain October 7, 2024, CT head October 6, 2024 Technique: Axial CT images were obtained of the head without contrast administration.  Reconstructed coronal images were also obtained. Automated exposure control and iterative construction methods were used. Findings: There is a basilar artery aneurysm measuring 2.3 x 2.2 cm previously measuring 2.2 x 2.3 cm. This may be thrombosed. There is a radiopaque object lying along the more superior right aspect of the aneurysm that could reflect pipeline flow diverter. There is dilatation of the lateral ventricles which has been noted. There are some periventricular white matter changes as well as more focal hypodensity in the subcortical right parietal area which has been noted and could reflect sequela of small vessel ischemic change. No definite underlying hemorrhage is confirmed. It looks like the patient has had previous paranasal sinus surgery.     Impression: 1.Basilar artery aneurysm which could be thrombosed. There is a radiopaque object along the more superior right aspect of the aneurysm that could reflect pipeline flow diverter. 2.There are white matter changes involving the cerebral hemispheres which could reflect sequela to small vessel ischemic change. 3.There is dilatation of the lateral ventricles which has been noted. Electronically Signed: Akash Lux MD  3/11/2025 9:03 PM EDT  Workstation ID: [id number]      Results for orders placed during the hospital encounter of 10/06/24    Duplex Pseudoaneurysm CAR    Interpretation Summary    Hematoma present in the right groin measuring 3.4 x 1 cm    No evidence of AV fistula or pseudoaneurysm.    Patent right femoral artery and vein    Monophasic flow of the right femoral artery.      Results for orders placed during the hospital encounter of 10/06/24    Duplex  Pseudoaneurysm CAR    Interpretation Summary    Hematoma present in the right groin measuring 3.4 x 1 cm    No evidence of AV fistula or pseudoaneurysm.    Patent right femoral artery and vein    Monophasic flow of the right femoral artery.      Results for orders placed during the hospital encounter of 03/11/25    Adult Transthoracic Echo Complete W/ Cont if Necessary Per Protocol (With Agitated Saline)    Interpretation Summary    Left ventricular systolic function is normal. Estimated left ventricular EF = 70%    Mild to moderate aortic valve regurgitation is present      Plan for Follow-up of Pending Labs/Results:     Discharge Details        Discharge Medications        Continue These Medications        Instructions Start Date   acetaminophen 325 MG tablet  Commonly known as: TYLENOL   650 mg, Oral, Every 6 Hours PRN      aspirin 81 MG chewable tablet   81 mg, Oral, Daily      atorvastatin 80 MG tablet  Commonly known as: LIPITOR   80 mg, Oral, Nightly      bethanechol 25 MG tablet  Commonly known as: URECHOLINE   25 mg, 2 Times Daily      clopidogrel 75 MG tablet  Commonly known as: Plavix   75 mg, Oral, Daily      Emgality 120 MG/ML auto-injector pen  Generic drug: galcanezumab-gnlm   120 mg, Subcutaneous, Every 28 Days      famotidine 20 MG tablet  Commonly known as: PEPCID   20 mg, Oral, 2 Times Daily Before Meals      fluticasone 50 MCG/ACT nasal spray  Commonly known as: FLONASE   2 sprays, Nasal, Daily      lactulose 10 GM/15ML solution  Commonly known as: CHRONULAC       magnesium oxide 400 tablet tablet  Commonly known as: MAG-OX   400 mg, Oral, Nightly      nortriptyline 10 MG capsule  Commonly known as: PAMELOR   20 mg, Oral, Nightly      Nurtec 75 MG dispersible tablet  Generic drug: rimegepant sulfate ODT   75 mg, Oral, Every Other Day      pantoprazole 20 MG EC tablet  Commonly known as: PROTONIX   1 tablet, Daily      sertraline 100 MG tablet  Commonly known as: ZOLOFT   150 mg, Oral, Daily       topiramate 100 MG tablet  Commonly known as: TOPAMAX   100 mg, Oral, Nightly      Ubrelvy 100 MG tablet  Generic drug: ubrogepant   100 mg, Oral, Daily PRN, Take at onset of headache - if symptoms persist or return, may repeat dose in 2 hours. Maximum: 200 mg per 24 hours               Allergies   Allergen Reactions    Tramadol Other (See Comments)     Flushng and passing out         Discharge Disposition:  Home or Self Care    Diet:  Hospital:  Diet Order   Procedures    Diet: Regular/House; Fluid Consistency: Thin (IDDSI 0)            Activity:           CODE STATUS:    Code Status and Medical Interventions: CPR (Attempt to Resuscitate); Full   Ordered at: 03/18/25 0847     Code Status (Patient has no pulse and is not breathing):    CPR (Attempt to Resuscitate)     Medical Interventions (Patient has pulse or is breathing):    Full     Level Of Support Discussed With:    Patient       Future Appointments   Date Time Provider Department Center   3/31/2025  9:30 AM Ruth Burris APRN [facility]   3/31/2025  2:00 PM Karel Kulkarni III, MD [facility]   4/15/2025 11:00 AM Jessica Rae APRN [facility]   5/12/2025 11:00 AM [facility] [place]   5/12/2025  2:00 PM Nikki Carrero PA-C [facility]   7/21/2025 11:00 AM Erika Whatley MD [facility]       Additional Instructions for the Follow-ups that You Need to Schedule       Discharge Follow-up with PCP   As directed       Currently Documented PCP:    Melissa Lazo APRN    PCP Phone Number:    [phone]     Follow Up Details: 1 week after discharge (post-hospitalization follow up)        Discharge Follow-up with Specialty: Dr. Mendez (neurosurgery) 2 weeks   As directed      Specialty: Dr. Mendez (neurosurgery) 2 weeks                      Jeramie Trammell MD  03/20/25      Time Spent on Discharge:  I spent  35  minutes on this discharge activity which included: face-to-face encounter with the patient,  reviewing the data in the system, coordination of the care with the nursing staff as well as consultants, documentation, and entering orders.

## 2025-03-20 NOTE — PROGRESS NOTES
Chief complaint: Hydrocephalus, s/p  shunt     Admit Diagnosis:   Left-sided weakness [R53.1]     Subjective: Patient able to speak more clearly this morning. States that this began around 2pm yesterday.     Objective:    Vitals:    25 0705   BP: 130/72   Pulse: 66   Resp: 18   Temp: 98.3 °F (36.8 °C)   SpO2: 95%     Pulse  Av.1  Min: 66  Max: 86  Systolic (24hrs), Av , Min:107 , Max:145     Diastolic (24hrs), Av, Min:61, Max:74    Temp (24hrs), Av.3 °F (36.8 °C), Min:97.6 °F (36.4 °C), Max:98.7 °F (37.1 °C)      Awake, alert, and oriented. Lying in bed, no acute distress. Speech clear with no presence of ataxia. CN 2-12 grossly intact. Able to move all 4 extremities to command, still with some ongoing LUE weakness. She is able to follow prompted, one step, and two step commands. Face symmetric.     Lab Results   Component Value Date     2025       A/P:   Admit Diagnosis:   Left-sided weakness  S/p  shunt placement   Obstructive Hydrocephalus   History of giant basilar aneurysm s/p pipeline embolization x2   History of basilar pipeline stent thrombosis s/p thrombectomy   History of stroke    Patient is postoperative day 2 from V/P placement with Dr. Mendez yesterday secondary to obstructive hydrocephalus. Shunt was placed at 1.5. She underwent MRI of the brain yesterday after developing symptoms of severe stutter and MRI was ultimately clear. Shunt settings set back to 1.5 after her MRI. Stutter resolved yesterday evening. From a neurosurgical perspective, she can follow up with me in about 2 weeks for a postoperative incision check, or sooner if clinically indicated. She will need to remain on her Plavix. Signs/symptoms that would require further urgent/emergent workup or for her to reach out to our office reviewed at the bedside this morning prior to discharge. Case discussed with Dr. Mendez, Dr. Savage, and Dr. Trammell.

## 2025-03-21 NOTE — OUTREACH NOTE
Prep Survey      Flowsheet Row Responses   [facility] patient discharged from? [facility]   Is LACE score < 7 ? No   Eligibility Readm Mgmt   Discharge diagnosis LAPAROSCOPIC ASSISTED VENTRICULAR PERITONEAL SHUNT REVISION, Left-sided weakness   Does the patient have one of the following disease processes/diagnoses(primary or secondary)? General Surgery   Does the patient have Home health ordered? No   Is there a DME ordered? No   Prep survey completed? Yes            OSWALDO CRAIG - Registered Nurse

## 2025-03-24 ENCOUNTER — READMISSION MANAGEMENT (OUTPATIENT)
Dept: CALL CENTER | Facility: HOSPITAL | Age: 65
End: 2025-03-24
Payer: COMMERCIAL

## 2025-03-24 NOTE — OUTREACH NOTE
General Surgery Week 1 Survey      Flowsheet Row Responses   [facility] patient discharged from? [facility]   Does the patient have one of the following disease processes/diagnoses(primary or secondary)? General Surgery   Week 1 attempt successful? Yes   Call start time 1703   Call end time 1705   Discharge diagnosis LAPAROSCOPIC ASSISTED VENTRICULAR PERITONEAL SHUNT REVISION, Left-sided weakness   Person spoke with today (if not patient) and relationship pt   Meds reviewed with patient/caregiver? Yes   Is the patient having any side effects they believe may be caused by any medication additions or changes? No   Does the patient have all medications related to this admission filled (includes all antibiotics, pain medications, etc.) Yes   Is the patient taking all medications as directed (includes completed medication regime)? Yes   Does the patient have a follow up appointment scheduled with their surgeon? Yes   Has the patient kept scheduled appointments due by today? N/A   Psychosocial issues? No   Did the patient receive a copy of their discharge instructions? Yes   Nursing interventions Reviewed instructions with patient   What is the patient's perception of their health status since discharge? Improving   Nursing interventions Nurse provided patient education   Is the patient /caregiver able to teach back basic post-op care? Continue use of incentive spirometry at least 1 week post discharge   Is the patient/caregiver able to teach back signs and symptoms of incisional infection? Increased redness, swelling or pain at the incisonal site, Increased drainage or bleeding, Incisional warmth, Pus or odor from incision, Fever   Is the patient/caregiver able to teach back steps to recovery at home? Set small, achievable goals for return to baseline health, Make a list of questions for surgeon's appointment   If the patient is a current smoker, are they able to teach back resources for cessation? Not a smoker   Is  the patient/caregiver able to teach back the hierarchy of who to call/visit for symptoms/problems? PCP, Specialist, Home health nurse, Urgent Care, ED, 911 Yes   Week 1 call completed? Yes   Is the patient interested in additional calls from an ambulatory ? No   Would this patient benefit from a Referral to Amb Social Work? No   Wrap up additional comments pt doing well no needs or concerns noted   Call end time 1705            FREDO DE PAZ - Registered Nurse

## 2025-03-31 ENCOUNTER — OFFICE VISIT (OUTPATIENT)
Dept: CARDIOLOGY | Facility: CLINIC | Age: 65
End: 2025-03-31
Payer: COMMERCIAL

## 2025-03-31 ENCOUNTER — OFFICE VISIT (OUTPATIENT)
Dept: NEUROLOGY | Facility: CLINIC | Age: 65
End: 2025-03-31
Payer: COMMERCIAL

## 2025-03-31 VITALS
BODY MASS INDEX: 23.59 KG/M2 | OXYGEN SATURATION: 99 % | WEIGHT: 128.2 LBS | HEART RATE: 75 BPM | SYSTOLIC BLOOD PRESSURE: 130 MMHG | DIASTOLIC BLOOD PRESSURE: 72 MMHG | HEIGHT: 62 IN

## 2025-03-31 VITALS
OXYGEN SATURATION: 99 % | HEART RATE: 65 BPM | BODY MASS INDEX: 24.35 KG/M2 | HEIGHT: 61 IN | SYSTOLIC BLOOD PRESSURE: 116 MMHG | DIASTOLIC BLOOD PRESSURE: 72 MMHG | WEIGHT: 129 LBS

## 2025-03-31 DIAGNOSIS — Z86.73 HISTORY OF CVA (CEREBROVASCULAR ACCIDENT): ICD-10-CM

## 2025-03-31 DIAGNOSIS — I67.1 CEREBRAL ANEURYSM, NONRUPTURED: ICD-10-CM

## 2025-03-31 DIAGNOSIS — I10 ESSENTIAL HYPERTENSION: ICD-10-CM

## 2025-03-31 DIAGNOSIS — G44.89 OTHER HEADACHE SYNDROME: Primary | ICD-10-CM

## 2025-03-31 DIAGNOSIS — E78.2 MIXED HYPERLIPIDEMIA: Primary | ICD-10-CM

## 2025-03-31 PROCEDURE — 99214 OFFICE O/P EST MOD 30 MIN: CPT | Performed by: NURSE PRACTITIONER

## 2025-03-31 PROCEDURE — 99204 OFFICE O/P NEW MOD 45 MIN: CPT | Performed by: INTERNAL MEDICINE

## 2025-03-31 NOTE — PROGRESS NOTES
Neuro Office Visit      Encounter Date: 2025   Patient Name: Lauryn Romo  : 1960   MRN: [id number]   PCP:  Melissa Lazo APRN     Chief Complaint:    Chief Complaint   Patient presents with    Headache       History of Present Illness: Lauryn Romo is a 64 y.o. female who is here today in Neurology for migraine.    Last visit with me on 2024, Nurtec every other day, continue Topamax, nortriptyline.  History of Present Illness  She is accompanied by her .    On a prior occasion, she fell in the kitchen, hitting her head on the cabinet door. Confusion and difficulty with speech were noted.     She was taken to the emergency room, where scans did not show any significant findings.  She was admitted to [facility] from 3/11 - 3/20/2025. MRI of the brain was performed, and Dr. Mendez suggested that the aneurysm had shifted, blocking the drainage of brain fluid, causing pressure buildup.     A shunt procedure was performed on 2025, which has successfully alleviated her headaches. No dizziness or balance issues have been reported post-procedure.     Post-recovery, she initially could not talk, but communication improved unexpectedly. A sheet of paper and pen were used to facilitate communication with her son and daughter-in-law to avoid frustration.    Soreness and tenderness at the site of the shunt persist.     She continues to take Topamax and nortriptyline.    Recently, sleepwalking episodes have developed, including a fall in the tub during one of these episodes.    Another incident involved finding herself kneeling in the middle of the bed without recollection.     Her  is concerned about potential side effects from her medications. A few dreams are recalled, but she does not believe they are related to the sleepwalking. The only change in her medication regimen was the addition of a blood thinner prior to surgery, after which she resumed  Plavix.              Subjective      Past Medical History:   Past Medical History:   Diagnosis Date    Abnormal ECG     Aneurysm 11/26/2022    Cancer 05/2024    Basal Cell Carcinoma removed from scalp by dermatology    Cluster headache 2022    Had headaches for several months before stroke and mass was found    Difficulty walking 11/26/22    After stroke    Headache     Headache, tension-type 2022    Hyperlipidemia     Hypertension     Memory loss 11/26/22    Migraine 2022    Stroke 11/26/2022    Stroke 10/06/2024    Vision loss 11/26/22    When dizzy or headache       Past Surgical History:   Past Surgical History:   Procedure Laterality Date    ARTERIAL ANEURYSM REPAIR      BREAST LUMPECTOMY  2012    CYST REMOVAL Left 05/2023    EMBOLIZATION CEREBRAL N/A 11/29/2022    Procedure: CV EMBOLIZATION CEREBRAL IR;  Surgeon: Enoch Savage MD;  Location:  [facility];  Service: Interventional Radiology;  Laterality: N/A;    HIATAL HERNIA REPAIR  2015    INTERVENTIONAL RADIOLOGY PROCEDURE N/A 09/15/2023    Procedure: Embolization;  Surgeon: Enoch Savage MD;  Location:  [facility];  Service: Interventional Radiology;  Laterality: N/A;    INTERVENTIONAL RADIOLOGY PROCEDURE N/A 10/06/2024    Procedure: IR mechanical thrombectomy;  Surgeon: Joe Mendez MD;  Location:  [facility];  Service: Interventional Radiology;  Laterality: N/A;    SINUS SURGERY      x4    SKIN CANCER EXCISION      cancer removed off top of head    VENTRICULOPERITONEAL SHUNT N/A 03/18/2025    Procedure: LAPAROSCOPIC ASSISTED VENTRICULAR PERITONEAL SHUNT REVISION;  Surgeon: Joe Mendez MD;  Location:  [facility] OR;  Service: Neurosurgery;  Laterality: N/A;    VENTRICULOPERITONEAL SHUNT  3/18/2025       Family History:   Family History   Problem Relation Age of Onset    No Known Problems Mother     Heart attack Father 42    Early death Father         Heart attack    Heart  disease Father     Hyperlipidemia Father     Heart attack Paternal Aunt     Heart attack Paternal Uncle     No Known Problems Sister     Cancer Maternal Grandmother     Heart failure Maternal Grandmother     No Known Problems Maternal Grandfather     No Known Problems Paternal Grandmother     No Known Problems Paternal Grandfather     Diabetes Half-Brother        Social History:   Social History     Socioeconomic History    Marital status:    Tobacco Use    Smoking status: Former     Current packs/day: 0.00     Average packs/day: 1 pack/day for 15.0 years (15.0 ttl pk-yrs)     Types: Cigarettes     Start date: 10/4/1989     Quit date: 10/4/2004     Years since quittin.5     Passive exposure: Past    Smokeless tobacco: Never   Vaping Use    Vaping status: Never Used   Substance and Sexual Activity    Alcohol use: No    Drug use: Never    Sexual activity: Not Currently     Partners: Male     Birth control/protection: Tubal ligation       Medications:     Current Outpatient Medications:     acetaminophen (TYLENOL) 325 MG tablet, Take 2 tablets by mouth Every 6 (Six) Hours As Needed for Mild Pain., Disp: , Rfl:     aspirin 81 MG chewable tablet, Chew 1 tablet Daily., Disp: , Rfl:     atorvastatin (LIPITOR) 80 MG tablet, TAKE 1 TABLET BY MOUTH EVERY NIGHT, Disp: 90 tablet, Rfl: 1    bethanechol (URECHOLINE) 25 MG tablet, Take 1 tablet by mouth 2 (Two) Times a Day., Disp: , Rfl:     clopidogrel (Plavix) 75 MG tablet, Take 1 tablet by mouth Daily., Disp: 30 tablet, Rfl: 3    famotidine (PEPCID) 20 MG tablet, Take 1 tablet by mouth 2 (Two) Times a Day Before Meals., Disp: , Rfl:     fluticasone (FLONASE) 50 MCG/ACT nasal spray, 2 sprays into the nostril(s) as directed by provider Daily., Disp: 1 bottle, Rfl: 0    lactulose (CHRONULAC) 10 GM/15ML solution, , Disp: , Rfl:     magnesium oxide (MAG-OX) 400 tablet tablet, Take 1 tablet by mouth Every Night., Disp: , Rfl:     nortriptyline (PAMELOR) 10 MG capsule,  Take 2 capsules by mouth Every Night., Disp: 60 capsule, Rfl: 6    pantoprazole (PROTONIX) 20 MG EC tablet, Take 1 tablet by mouth Daily., Disp: , Rfl:     sertraline (ZOLOFT) 100 MG tablet, Take 1.5 tablets by mouth Daily., Disp: 135 tablet, Rfl: 1    topiramate (TOPAMAX) 100 MG tablet, Take 1 tablet by mouth Every Night., Disp: 30 tablet, Rfl: 6    Allergies:   Allergies   Allergen Reactions    Tramadol Other (See Comments)     Flushng and passing out       PHQ-9 Total Score:     STEADI Fall Risk Assessment was completed, and patient is at HIGH risk for falls. Assessment completed on:1/13/2025    Objective     Physical Exam:     Neurological Exam  Mental Status  Awake, alert and oriented to person, place and time. Recent and remote memory are intact. Speech is normal. Language is fluent with no aphasia. Attention and concentration are normal. Fund of knowledge is appropriate for level of education.    Cranial Nerves  CN II: Visual acuity is normal.  CN III, IV, VI: Extraocular movements intact bilaterally. Pupils equal round and reactive to light bilaterally.  CN V: Facial sensation is normal.  CN VII: Full and symmetric facial movement.  CN IX, X: Palate elevates symmetrically  CN XI: Shoulder shrug strength is normal.  CN XII: Tongue midline without atrophy or fasciculations.    Motor  Normal muscle bulk throughout. No fasciculations present. Normal muscle tone. Strength is 5/5 throughout all four extremities.    Sensory  Sensation is intact to light touch, pinprick, vibration and proprioception in all four extremities.    Reflexes                                            Right                      Left  Brachioradialis                    2+                         2+  Biceps                                 2+                         2+  Triceps                                2+                         2+  Finger flex                           2+                         2+  Hamstring                             "2+                         2+  Patellar                                2+                         2+  Achilles                                2+                         2+    Coordination    Finger-to-nose, rapid alternating movements and heel-to-shin normal bilaterally without dysmetria.    Gait  Normal casual, toe, heel and tandem gait.        Vital Signs:   Vitals:    03/31/25 0923   BP: 116/72   Pulse: 65   SpO2: 99%   Weight: 58.5 kg (129 lb)   Height: 154.9 cm (61\")     Body mass index is 24.37 kg/m².     Results:   Results  Imaging   - MRI with and without contrast: No abnormalities detected. Aneurysm looks fine. Possible fluid buildup or aneurysm shift blocking brain fluid drainage.     Imaging:   MRI Brain Without Contrast  Result Date: 3/19/2025  Impression: Accounting for some hardware artifact relating to patient's shunt valve, no evidence of recent infarct, acute hemorrhage or new mass effect. Grossly stable giant posterior circulation aneurysm with some associated posterior displacement of the jong and medulla. Electronically Signed: Marcelino Francis MD  3/19/2025 11:05 AM EDT  Workstation ID: [id number]    CT Head Without Contrast  Result Date: 3/19/2025  Impression: 1.Stable right parietal approach ventriculostomy catheter with interval decrease in size of the lateral and third ventricles. 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 3.Mild chronic small vessel ischemic change. Electronically Signed: Carlos Bowden MD  3/19/2025 4:57 AM EDT  Workstation ID: [id number]    CT Head Without Contrast  Result Date: 3/18/2025  Impression: Interval placement of a right-sided ventriculoperitoneal shunt with slight decrease in the size of the ventricles. There is no acute intracranial hemorrhage. Electronically Signed: Rinku Tran MD  3/18/2025 12:45 PM EDT  Workstation ID: [id number]    CT Head Without Contrast  Result Date: 3/17/2025  Impression: 1.No acute intracranial process " identified. 2.Stable prominent ventricular caliber. Electronically Signed: Chris Acuña MD  3/17/2025 12:31 PM EDT  Workstation ID: [id number]    EEG  Result Date: 3/12/2025  Normal study This report is transcribed using the Dragon dictation system.      MRI Brain Without Contrast  Result Date: 3/12/2025  Impression: No acute intracranial findings. No acute infarct. Similar basilar artery aneurysm exerting mass effect on the brainstem with posterior displacement of the jong and medulla. Please refer to yesterday's CTA exam for more complete details of this finding. Electronically Signed: Kwesi Guan MD  3/12/2025 8:19 AM EDT  Workstation ID: [id number]    XR Chest 1 View  Result Date: 3/11/2025  Impression: An acute pulmonary process is not apparent. Electronically Signed: Akash Lux MD  3/11/2025 10:21 PM EDT  Workstation ID: [id number]    CT Angiogram Head w AI Analysis of LVO  Result Date: 3/11/2025  Impression: No evidence of large vessel occlusion or hemodynamically significant stenosis. Post stenting of large basilar artery aneurysm. The stent is visualized in the distal vertebral artery and extending into the left basal artery. There is some contrast leakage to the left of the stent. This may be secondary to stent porosity or leakage from the distal aspect of the stent. This appears unchanged from prior CTA performed on October 6, 2024. Correlate with diagnostic cerebral angiography as clinically warranted. No focal area of decreased cerebral blood flow (CBF) is seen to suggest an acute infarct in a large vessel territory.  No defects are seen to suggest a core infarct or an area of reversible ischemia. Electronically Signed: Ben Ziegler MD  3/11/2025 9:50 PM EDT  Workstation ID: [id number]    CT Angiogram Neck  Result Date: 3/11/2025  Impression: No evidence of large vessel occlusion or hemodynamically significant stenosis. Post stenting of large basilar artery aneurysm. The stent is visualized in the  distal vertebral artery and extending into the left basal artery. There is some contrast leakage to the left of the stent. This may be secondary to stent porosity or leakage from the distal aspect of the stent. This appears unchanged from prior CTA performed on October 6, 2024. Correlate with diagnostic cerebral angiography as clinically warranted. No focal area of decreased cerebral blood flow (CBF) is seen to suggest an acute infarct in a large vessel territory.  No defects are seen to suggest a core infarct or an area of reversible ischemia. Electronically Signed: Ben Ziegler MD  3/11/2025 9:50 PM EDT  Workstation ID: [id number]    CT CEREBRAL PERFUSION WITH & WITHOUT CONTRAST  Result Date: 3/11/2025  Impression: No evidence of large vessel occlusion or hemodynamically significant stenosis. Post stenting of large basilar artery aneurysm. The stent is visualized in the distal vertebral artery and extending into the left basal artery. There is some contrast leakage to the left of the stent. This may be secondary to stent porosity or leakage from the distal aspect of the stent. This appears unchanged from prior CTA performed on October 6, 2024. Correlate with diagnostic cerebral angiography as clinically warranted. No focal area of decreased cerebral blood flow (CBF) is seen to suggest an acute infarct in a large vessel territory.  No defects are seen to suggest a core infarct or an area of reversible ischemia. Electronically Signed: Ben Ziegler MD  3/11/2025 9:50 PM EDT  Workstation ID: [id number]    CT Head Without Contrast Stroke Protocol  Result Date: 3/11/2025  Impression: 1.Basilar artery aneurysm which could be thrombosed. There is a radiopaque object along the more superior right aspect of the aneurysm that could reflect pipeline flow diverter. 2.There are white matter changes involving the cerebral hemispheres which could reflect sequela to small vessel ischemic change. 3.There is dilatation of the  "lateral ventricles which has been noted. Electronically Signed: Akash Lux MD  3/11/2025 9:03 PM EDT  Workstation ID: [id number]       Labs:   No results found for: \"CMP\", \"PROTEIN\", \"ANTIMOGAB\", \"BPHBKP3JBGE\", \"JCVRESULT\", \"QUANTTBGOLD\", \"CBCDIF\", \"IGGALBSER\"     Assessment / Plan      Assessment/Plan:   Diagnoses and all orders for this visit:    1. Other headache syndrome (Primary)    2. Cerebral aneurysm, nonruptured    3. History of CVA (cerebrovascular accident)  Comments:  Keep track of all headaches  Continue nortriptyline and Topamax for now         Assessment & Plan  1. Headaches.  The patient's headaches have improved since the shunt placement on the 18th. She is currently on Topamax and nortriptyline. She is advised to maintain a log of any headaches, detailing their characteristics and locations, to aid in distinguishing between different types of headaches. If there are no headaches between now and September, a gradual reduction in medication will be initiated, starting with Topamax, followed by nortriptyline.    2. Sleepwalking.  The patient has started sleepwalking, which is a new symptom. There have been no recent changes in her medication regimen that could explain this. Nortriptyline can cause nightmares, but no medications are known to cause sleepwalking. The patient is advised to monitor this symptom and report any further incidents.    Patient Education:     Reviewed medications, potential side effects and signs and symptoms to report. Discussed risk versus benefits of treatment plan with patient and/or family-including medications, labs and radiology that may be ordered. Addressed questions and concerns during visit. Patient and/or family verbalized understanding and agree with plan. Instructed to call the office with any questions and report to ER with any life-threatening symptoms.     Follow Up:   Return in about 6 months (around 9/30/2025).    I spent 30 minutes caring for Lauryn on this " date of service. This time includes time spent by me in the following activities: preparing for the visit, performing a medically appropriate examination and/or evaluation, counseling and educating the patient/family/caregiver, and documenting information in the medical record.        During this visit the following were done:  Labs Reviewed [x]    Labs Ordered []    Radiology Reports Reviewed [x]    Radiology Ordered []    PCP Records Reviewed []    Referring Provider Records Reviewed []    ER Records Reviewed []    Hospital Records Reviewed [x]    History Obtained From Family []    Radiology Images Reviewed [x]    Other Reviewed []    Records Requested []      Patient or patient representative verbalized consent for the use of Ambient Listening during the visit with  CAILIN Parra for chart documentation. 3/31/2025  09:03 EDT    CAILIN Parra   [facility] NEUROLOGY  [address] [place] [id number] [phone]

## 2025-03-31 NOTE — PROGRESS NOTES
"[facility] Cardiology  Consultation H&P  Lauryn Romo  1960  [phone]  There is no work phone number on file..    VISIT DATE:  03/31/2025    PCP: Melissa Lazo, CAILIN  [address] [place], [place] [id number]    CC:  Chief Complaint   Patient presents with    Shortness of Breath       Previous cardiac studies and procedures:  Summer 2022 TTE    Left ventricular systolic function is normal. Left ventricular ejection fraction appears to be 56 - 60%.    Mild aortic valve regurgitation is present.    March 2025 TTE    Left ventricular systolic function is normal. Estimated left ventricular EF = 70%    Mild to moderate aortic valve regurgitation is present    ASSESSMENT:   Diagnosis Plan   1. Mixed hyperlipidemia        2. HTN              PLAN:  Hyperlipidemia: Goal LDL less than 70.  Currently well-controlled.  Continue atorvastatin 80 mg p.o. daily.  Continue heart healthy, predominant fibrous diet.    Hypertension: Goal less than 130/80 mmHg.  Currently well-controlled.  Continue current medical therapy.    Aortic insufficiency: Moderate: Continue echocardiographic surveillance every 2 to 3 years.    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  Basilar stent thrombosis occurred on single antiplatelet therapy.  Blood pressures running less than 130/80 mmHg.  She denies chest discomfort or limiting dyspnea on exertion.  No palpitations.  She is compliant with medical therapy.  Extensive family history of precocious CAD on her father side.      PHYSICAL EXAMINATION:  Vitals:    03/31/25 1310   BP: 130/72   BP Location: Left arm   Patient Position: Sitting   Cuff Size: Adult   Pulse: 75   SpO2: 99%   Weight: 58.2 kg (128 lb 3.2 oz)   Height: 156.2 cm (61.5\")     General Appearance:    Alert, " cooperative, no distress, appears stated age   Head:    Normocephalic, without obvious abnormality, atraumatic   Eyes:    conjunctiva/corneas clear, EOM's intact, fundi     benign, both eyes   Ears:    Normal TM's and external ear canals, both ears   Nose:   Nares normal, septum midline, mucosa normal, no drainage    or sinus tenderness   Throat:   Lips, mucosa, and tongue normal; teeth and gums normal   Neck:   Supple, symmetrical, trachea midline, no adenopathy;     thyroid:  no enlargement/tenderness/nodules; no carotid    bruit or JVD   Back:     Symmetric, no curvature, ROM normal, no CVA tenderness   Lungs:     Clear to auscultation bilaterally, respirations unlabored   Chest Wall:    No tenderness or deformity    Heart:    Regular rate and rhythm, S1 and S2 normal, no murmur, rub   or gallop, normal carotid impulse bilaterally without bruit.   Abdomen:     Soft, non-tender, bowel sounds active all four quadrants,     no masses, no organomegaly   Extremities:   Extremities normal, atraumatic, no cyanosis or edema   Pulses:   2+ and symmetric all extremities   Skin:   Skin color, texture, turgor normal, no rashes or lesions   Lymph nodes:   Cervical, supraclavicular, and axillary nodes normal   Neurologic:   normal strength, sensation intact     throughout       Diagnostic Data:  Procedures  Lab Results   Component Value Date    TRIG 50 03/12/2025    HDL 60 03/12/2025     Lab Results   Component Value Date    GLUCOSE 86 03/19/2025    BUN 15 03/19/2025    CREATININE 0.65 03/19/2025     03/19/2025    K 4.9 03/19/2025     03/19/2025    CO2 22.0 03/19/2025     Lab Results   Component Value Date    HGBA1C 5.80 (H) 03/12/2025     Lab Results   Component Value Date    WBC 11.70 (H) 03/19/2025    HGB 13.5 03/19/2025    HCT 42.4 03/19/2025     03/19/2025       PROBLEM LIST:  Patient Active Problem List   Diagnosis    Acute CVA    Cerebral aneurysm, nonruptured    Paraesophageal hernia     Hyperlipidemia    HTN    Moderate malnutrition    History of CVA (cerebrovascular accident)    Other headache syndrome    Acute cystitis without hematuria    Left-sided weakness    Cerebral ventriculomegaly       PAST MEDICAL HX  Past Medical History:   Diagnosis Date    Abnormal ECG     Aneurysm 11/26/2022    Cancer 05/2024    Basal Cell Carcinoma removed from scalp by dermatology    Cluster headache 2022    Had headaches for several months before stroke and mass was found    Difficulty walking 11/26/22    After stroke    Headache     Headache, tension-type 2022    Hyperlipidemia     Hypertension     Memory loss 11/26/22    Migraine 2022    Stroke 11/26/2022    Stroke 10/06/2024    Vision loss 11/26/22    When dizzy or headache       Allergies  Allergies   Allergen Reactions    Tramadol Other (See Comments)     Flushng and passing out       Current Medications    Current Outpatient Medications:     acetaminophen (TYLENOL) 325 MG tablet, Take 2 tablets by mouth Every 6 (Six) Hours As Needed for Mild Pain., Disp: , Rfl:     aspirin 81 MG chewable tablet, Chew 1 tablet Daily., Disp: , Rfl:     atorvastatin (LIPITOR) 80 MG tablet, TAKE 1 TABLET BY MOUTH EVERY NIGHT, Disp: 90 tablet, Rfl: 1    bethanechol (URECHOLINE) 25 MG tablet, Take 1 tablet by mouth 2 (Two) Times a Day., Disp: , Rfl:     clopidogrel (Plavix) 75 MG tablet, Take 1 tablet by mouth Daily., Disp: 30 tablet, Rfl: 3    famotidine (PEPCID) 20 MG tablet, Take 1 tablet by mouth 2 (Two) Times a Day Before Meals., Disp: , Rfl:     fluticasone (FLONASE) 50 MCG/ACT nasal spray, 2 sprays into the nostril(s) as directed by provider Daily., Disp: 1 bottle, Rfl: 0    lactulose (CHRONULAC) 10 GM/15ML solution, , Disp: , Rfl:     magnesium oxide (MAG-OX) 400 tablet tablet, Take 1 tablet by mouth Every Night., Disp: , Rfl:     nortriptyline (PAMELOR) 10 MG capsule, Take 2 capsules by mouth Every Night., Disp: 60 capsule, Rfl: 6    pantoprazole (PROTONIX) 20 MG EC  tablet, Take 1 tablet by mouth Daily., Disp: , Rfl:     sertraline (ZOLOFT) 100 MG tablet, Take 1.5 tablets by mouth Daily., Disp: 135 tablet, Rfl: 1    topiramate (TOPAMAX) 100 MG tablet, Take 1 tablet by mouth Every Night., Disp: 30 tablet, Rfl: 6         ROS  ROS      SOCIAL HX  Social History     Socioeconomic History    Marital status:    Tobacco Use    Smoking status: Former     Current packs/day: 0.00     Average packs/day: 1 pack/day for 15.0 years (15.0 ttl pk-yrs)     Types: Cigarettes     Start date: 10/4/1989     Quit date: 10/4/2004     Years since quittin.5     Passive exposure: Past    Smokeless tobacco: Never   Vaping Use    Vaping status: Never Used   Substance and Sexual Activity    Alcohol use: No    Drug use: Never    Sexual activity: Not Currently     Partners: Male     Birth control/protection: Tubal ligation       FAMILY HX  Family History   Problem Relation Age of Onset    No Known Problems Mother     Heart attack Father 42    Early death Father         Heart attack    Heart disease Father     Hyperlipidemia Father     Heart attack Paternal Aunt     Heart attack Paternal Uncle     No Known Problems Sister     Cancer Maternal Grandmother     Heart failure Maternal Grandmother     No Known Problems Maternal Grandfather     No Known Problems Paternal Grandmother     No Known Problems Paternal Grandfather     Diabetes Half-Brother              Karel Kulkarni III, MD, formerly [facility]

## 2025-04-08 ENCOUNTER — PATIENT ROUNDING (BHMG ONLY) (OUTPATIENT)
Dept: CARDIOLOGY | Facility: CLINIC | Age: 65
End: 2025-04-08
Payer: COMMERCIAL

## 2025-04-08 NOTE — PROGRESS NOTES
April 8, 2025    Hello, may I speak with Lauryn Romo?    My name is Lexis      I am  with [facility] CARDIOLOGY  [address]  [facility] [address] [phone].    Before we get started may I verify your date of birth? 1960    I am calling to officially welcome you to our practice and ask about your recent visit. Is this a good time to talk? yes    Tell me about your visit with us. What things went well?  very satisfied       We're always looking for ways to make our patients' experiences even better. Do you have recommendations on ways we may improve?  no    Overall were you satisfied with your first visit to our practice? yes       I appreciate you taking the time to speak with me today. Is there anything else I can do for you? no      Thank you, and have a great day.

## 2025-04-09 ENCOUNTER — TELEPHONE (OUTPATIENT)
Dept: CARDIOLOGY | Facility: CLINIC | Age: 65
End: 2025-04-09
Payer: COMMERCIAL

## 2025-04-09 ENCOUNTER — OFFICE VISIT (OUTPATIENT)
Dept: NEUROSURGERY | Facility: CLINIC | Age: 65
End: 2025-04-09
Payer: COMMERCIAL

## 2025-04-09 VITALS — BODY MASS INDEX: 24.37 KG/M2 | TEMPERATURE: 97.3 F | HEIGHT: 61 IN | WEIGHT: 129.1 LBS

## 2025-04-09 DIAGNOSIS — I67.1 CEREBRAL ANEURYSM, NONRUPTURED: ICD-10-CM

## 2025-04-09 DIAGNOSIS — Z98.2 S/P VP SHUNT: ICD-10-CM

## 2025-04-09 DIAGNOSIS — G93.89 CEREBRAL VENTRICULOMEGALY: Primary | ICD-10-CM

## 2025-04-09 PROCEDURE — 99024 POSTOP FOLLOW-UP VISIT: CPT | Performed by: PHYSICIAN ASSISTANT

## 2025-04-09 NOTE — PROGRESS NOTES
Lauryn Romo  1960 04/09/2025  [id number]    CC: Status post-op Ventricular peritoneal shunt placement     HPI: Lauryn Romo is a 64 y.o.  female  whom I have been following postoperatively for 2 weeks since her  Ventricular peritoneal shunt placement on 3/18/2025 with Dr. 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 She presented to ED with recent history of a spell where she fell, had some altered level of consciousness (but did not pass out), and reportedly had some left sided weakness and left facial droop (per patient's daughter who is at nurse).     She has done well post-operatively although had some speech difficulties immediately following her surgery. This has resolved fully, with no language or speech deficits remaining. Her left sided weakness has fully improved and there is no residual facial droop. Additionally her  states she is just overall more well than he has seen her in a long time, even with less anxiety in crowds and traffic. She has had a couple headaches she associated more with weather changes. She has had a fall in the bathtub about 2 weeks ago but no neuro changes associated with this. She notes she gets a bit dizzy when she gets up sometimes.     They would like to know specifically about showering/getting wound wet  as well as whether she can be left home alone safely. She denies any hx seizures, and both of them deny any concerns for cognitive deficits. She acknowledges some difficulty with balance at times but is comfortable at home and does not take stairs alone. She has been easing back into her exercises from previous PT.         Past Medical History:   Diagnosis Date    Abnormal ECG     Aneurysm 11/26/2022    Cancer 05/2024    Basal Cell Carcinoma removed from scalp by dermatology    Cluster headache 2022    Had headaches for several months before stroke  and mass was found    Difficulty walking 11/26/22    After stroke    Headache     Headache, tension-type 2022    Hyperlipidemia     Hypertension     Memory loss 11/26/22    Migraine 2022    Stroke 11/26/2022    Stroke 10/06/2024    Vision loss 11/26/22    When dizzy or headache       Allergies   Allergen Reactions    Tramadol Other (See Comments)     Flushng and passing out         Current Outpatient Medications:     acetaminophen (TYLENOL) 325 MG tablet, Take 2 tablets by mouth Every 6 (Six) Hours As Needed for Mild Pain., Disp: , Rfl:     aspirin 81 MG chewable tablet, Chew 1 tablet Daily., Disp: , Rfl:     atorvastatin (LIPITOR) 80 MG tablet, TAKE 1 TABLET BY MOUTH EVERY NIGHT, Disp: 90 tablet, Rfl: 1    bethanechol (URECHOLINE) 25 MG tablet, Take 1 tablet by mouth 2 (Two) Times a Day., Disp: , Rfl:     clopidogrel (Plavix) 75 MG tablet, Take 1 tablet by mouth Daily., Disp: 30 tablet, Rfl: 3    famotidine (PEPCID) 20 MG tablet, Take 1 tablet by mouth 2 (Two) Times a Day Before Meals., Disp: , Rfl:     fluticasone (FLONASE) 50 MCG/ACT nasal spray, 2 sprays into the nostril(s) as directed by provider Daily., Disp: 1 bottle, Rfl: 0    lactulose (CHRONULAC) 10 GM/15ML solution, , Disp: , Rfl:     magnesium oxide (MAG-OX) 400 tablet tablet, Take 1 tablet by mouth Every Night., Disp: , Rfl:     nortriptyline (PAMELOR) 10 MG capsule, Take 2 capsules by mouth Every Night., Disp: 60 capsule, Rfl: 6    pantoprazole (PROTONIX) 20 MG EC tablet, Take 1 tablet by mouth Daily., Disp: , Rfl:     sertraline (ZOLOFT) 100 MG tablet, Take 1.5 tablets by mouth Daily., Disp: 135 tablet, Rfl: 1    topiramate (TOPAMAX) 100 MG tablet, Take 1 tablet by mouth Every Night., Disp: 30 tablet, Rfl: 6    Review of Systems   Constitutional:  Negative for activity change, appetite change, chills, diaphoresis, fatigue, fever and unexpected weight change.   HENT:  Negative for congestion, dental problem, drooling, ear discharge, ear pain, facial  "swelling, hearing loss, mouth sores, nosebleeds, postnasal drip, rhinorrhea, sinus pressure, sinus pain, sneezing, sore throat, tinnitus, trouble swallowing and voice change.    Eyes:  Negative for photophobia, pain, discharge, redness, itching and visual disturbance.   Respiratory:  Negative for apnea, cough, choking, chest tightness, shortness of breath, wheezing and stridor.    Cardiovascular:  Negative for chest pain, palpitations and leg swelling.   Gastrointestinal:  Negative for abdominal distention, abdominal pain, anal bleeding, blood in stool, constipation, diarrhea, nausea, rectal pain and vomiting.   Endocrine: Negative for cold intolerance, heat intolerance, polydipsia, polyphagia and polyuria.   Genitourinary:  Negative for decreased urine volume, difficulty urinating, dyspareunia, dysuria, enuresis, flank pain, frequency, genital sores, hematuria, menstrual problem, pelvic pain, urgency, vaginal bleeding, vaginal discharge and vaginal pain.   Musculoskeletal:  Negative for arthralgias, back pain, gait problem, joint swelling, myalgias, neck pain and neck stiffness.   Skin:  Negative for color change, pallor, rash and wound.   Allergic/Immunologic: Negative for environmental allergies, food allergies and immunocompromised state.   Neurological:  Negative for dizziness, tremors, seizures, syncope, facial asymmetry, speech difficulty, weakness, light-headedness, numbness and headaches.   Hematological:  Negative for adenopathy. Does not bruise/bleed easily.   Psychiatric/Behavioral:  Negative for agitation, behavioral problems, confusion, decreased concentration, dysphoric mood, hallucinations, self-injury, sleep disturbance and suicidal ideas. The patient is not nervous/anxious and is not hyperactive.          PE:  Temp 97.3 °F (36.3 °C) (Infrared)   Ht 156.2 cm (61.5\")   Wt 58.6 kg (129 lb 1.6 oz)   BMI 24.00 kg/m²    Patient is well nourished, respiratory effort is normal. Her scalp incision is " clean, dry, well healing with some residual glue present. No signs of infection. The surrounding tissues are  to palpation. Shunt valve checked and found to be at 1.75. I will not adjust as she is feeling quite well with this.     Motor function intact and symmetrical in all four extremities. Sensation intact in all four proximal and distal extremities. Cranial nerves II-XII intact.    Social History    Tobacco Use      Smoking status: Former        Packs/day: 0.00        Years: 1 pack/day for 15.0 years (15.0 ttl pk-yrs)        Types: Cigarettes        Start date: 10/4/1989        Quit date: 10/4/2004        Years since quittin.5        Passive exposure: Past      Smokeless tobacco: Never       Tobacco Use: Medium Risk (2025)    Patient History     Smoking Tobacco Use: Former     Smokeless Tobacco Use: Never     Passive Exposure: Past         STEADI Fall Risk Assessment was completed, and patient is at MODERATE risk for falls. Assessment completed on:2025      Diagnoses and all orders for this visit:    1. Cerebral ventriculomegaly (Primary)    2. Cerebral aneurysm, nonruptured    3. S/P  shunt       Assessment/Plan  Activities and restrictions were discussed. Wound care was discussed with the patient.  Patient was encouraged to contact us if she has any changes in her condition or any concerns.    Recommend follow up with PCP for evaluation of possible orthostatic hypotension and to take a few seconds to assess balance before moving when she stands up.     I will follow up with [employer] re a wallet card for her shunt.       Any copied data from previous notes included in the (1) HPI, (2) PE, (3) MDM and/or Assessment and Plan has been reviewed and is accurate as of 25    BETTY Hare 2025 12:23 EDT

## 2025-04-24 ENCOUNTER — TELEPHONE (OUTPATIENT)
Dept: NEUROSURGERY | Facility: CLINIC | Age: 65
End: 2025-04-24
Payer: COMMERCIAL

## 2025-04-24 NOTE — TELEPHONE ENCOUNTER
Provider:  Dr. Savage  Surgery/Procedure: Laparoscopic shunt revision   Surgery/Procedure Date:  03/18/2025  Last visit:   04/09/2025  Next visit:  05/14/2025     Reason for call:  Patient called regarding short term disability. Patient said that [employer] sent over paperwork, and asked if this could be filled out. I called patient back and I do not see any forms in her chart, patient will contact them and have them resend it.

## 2025-05-05 ENCOUNTER — OFFICE VISIT (OUTPATIENT)
Dept: INTERNAL MEDICINE | Facility: CLINIC | Age: 65
End: 2025-05-05
Payer: MEDICARE

## 2025-05-05 ENCOUNTER — SPECIALTY PHARMACY (OUTPATIENT)
Dept: ONCOLOGY | Facility: HOSPITAL | Age: 65
End: 2025-05-05
Payer: MEDICARE

## 2025-05-05 ENCOUNTER — TELEPHONE (OUTPATIENT)
Dept: NEUROLOGY | Facility: CLINIC | Age: 65
End: 2025-05-05
Payer: MEDICARE

## 2025-05-05 VITALS
HEART RATE: 80 BPM | WEIGHT: 127 LBS | DIASTOLIC BLOOD PRESSURE: 120 MMHG | HEIGHT: 61 IN | TEMPERATURE: 98 F | SYSTOLIC BLOOD PRESSURE: 170 MMHG | BODY MASS INDEX: 23.98 KG/M2 | OXYGEN SATURATION: 98 %

## 2025-05-05 DIAGNOSIS — Z86.73 HISTORY OF CVA (CEREBROVASCULAR ACCIDENT): ICD-10-CM

## 2025-05-05 DIAGNOSIS — I67.1 CEREBRAL ANEURYSM, NONRUPTURED: ICD-10-CM

## 2025-05-05 DIAGNOSIS — Z12.11 SCREENING FOR MALIGNANT NEOPLASM OF COLON: ICD-10-CM

## 2025-05-05 DIAGNOSIS — K59.00 CONSTIPATION, UNSPECIFIED CONSTIPATION TYPE: ICD-10-CM

## 2025-05-05 DIAGNOSIS — Z00.00 ENCOUNTER FOR MEDICAL EXAMINATION TO ESTABLISH CARE: Primary | ICD-10-CM

## 2025-05-05 DIAGNOSIS — F32.0 CURRENT MILD EPISODE OF MAJOR DEPRESSIVE DISORDER, UNSPECIFIED WHETHER RECURRENT: ICD-10-CM

## 2025-05-05 DIAGNOSIS — N32.81 OVERACTIVE BLADDER: ICD-10-CM

## 2025-05-05 DIAGNOSIS — G43.809 OTHER MIGRAINE WITHOUT STATUS MIGRAINOSUS, NOT INTRACTABLE: ICD-10-CM

## 2025-05-05 DIAGNOSIS — Z12.31 ENCOUNTER FOR SCREENING MAMMOGRAM FOR BREAST CANCER: ICD-10-CM

## 2025-05-05 DIAGNOSIS — T78.40XD ALLERGY, SUBSEQUENT ENCOUNTER: ICD-10-CM

## 2025-05-05 DIAGNOSIS — K21.9 GASTROESOPHAGEAL REFLUX DISEASE WITHOUT ESOPHAGITIS: ICD-10-CM

## 2025-05-05 RX ORDER — ASPIRIN 81 MG/1
81 TABLET, CHEWABLE ORAL DAILY
Start: 2025-05-05

## 2025-05-05 RX ORDER — TOPIRAMATE 100 MG/1
100 TABLET, FILM COATED ORAL NIGHTLY
Qty: 30 TABLET | Refills: 6 | Status: SHIPPED | OUTPATIENT
Start: 2025-05-05

## 2025-05-05 RX ORDER — FAMOTIDINE 20 MG/1
20 TABLET, FILM COATED ORAL
Qty: 60 TABLET | Refills: 5 | Status: SHIPPED | OUTPATIENT
Start: 2025-05-05 | End: 2025-11-01

## 2025-05-05 RX ORDER — ATORVASTATIN CALCIUM 80 MG/1
80 TABLET, FILM COATED ORAL NIGHTLY
Qty: 90 TABLET | Refills: 1 | Status: SHIPPED | OUTPATIENT
Start: 2025-05-05

## 2025-05-05 RX ORDER — PANTOPRAZOLE SODIUM 20 MG/1
20 TABLET, DELAYED RELEASE ORAL DAILY
Qty: 90 TABLET | Refills: 0 | Status: SHIPPED | OUTPATIENT
Start: 2025-05-05 | End: 2025-08-03

## 2025-05-05 RX ORDER — FLUTICASONE PROPIONATE 50 MCG
2 SPRAY, SUSPENSION (ML) NASAL DAILY
Qty: 18.2 G | Refills: 1 | Status: SHIPPED | OUTPATIENT
Start: 2025-05-05 | End: 2025-05-05

## 2025-05-05 RX ORDER — NORTRIPTYLINE HYDROCHLORIDE 10 MG/1
20 CAPSULE ORAL NIGHTLY
Qty: 60 CAPSULE | Refills: 6 | Status: SHIPPED | OUTPATIENT
Start: 2025-05-05

## 2025-05-05 RX ORDER — FAMOTIDINE 20 MG/1
20 TABLET, FILM COATED ORAL
Qty: 60 TABLET | Refills: 5 | Status: SHIPPED | OUTPATIENT
Start: 2025-05-05 | End: 2025-05-05

## 2025-05-05 RX ORDER — FLUTICASONE PROPIONATE 50 MCG
2 SPRAY, SUSPENSION (ML) NASAL DAILY
Qty: 18.2 G | Refills: 1 | Status: SHIPPED | OUTPATIENT
Start: 2025-05-05

## 2025-05-05 NOTE — TELEPHONE ENCOUNTER
"Caller: Lauryn Romo \"Lexis\"    Relationship: Self    Best call back number: [phone]    Which medication are you concerned about: nortriptyline (PAMELOR) 10 MG capsule   topiramate (TOPAMAX) 100 MG tablet     What are your concerns: PATIENT IS SWITCHING PHARMACIES. PATIENT IS SWITCHING TO [facility] AND NEEDS THESE MEDICATIONS SENT THERE INSTEAD.    PLEASE REVIEW AND ADVISE    "

## 2025-05-05 NOTE — PROGRESS NOTES
New Patient Office Visit      Date: 2025   Patient Name: Lauryn Romo  : 1960   MRN: [id number]     Chief Complaint:    Chief Complaint   Patient presents with    Hyperlipidemia       History of Present Illness: Lauryn Romo is a 64 y.o. female who is here today to establish care.      History of Present Illness  The patient presents to establish care.    She has a history of stroke and aneurysm, for which she is on aspirin and Plavix. A previous attempt to discontinue Plavix resulted in a stroke 2 weeks later, necessitating the placement of another stent due to a blood clot. She has been on baby aspirin since before these events due to a family history of heart disease. Her father  at age 43 with a massive heart attack. She has established care with a cardiologist for ongoing monitoring. She reports no episodes of chest pain or difficulty breathing but occasionally experiences transient discomfort. She has no history of heart attacks or stress testing. She was informed of a minor blockage in one artery, which is currently being monitored.    She is on famotidine for acid reflux symptoms and Flonase for sinus issues. She has not attempted to discontinue either medication and reports well-controlled symptoms without flare-ups. These medications were initiated during a hospital stay following her last stroke, during which she underwent a scope procedure for swallowing difficulties. She had a mesh implant performed by Dr. Robins approximately 7 to 8 years ago, which has since started to tear.    She is on magnesium due to a low level detected during her last surgery, which required IV supplementation. Recent labs from 2025 showed her magnesium levels were on the higher side, reassuring that she is not currently low.    She is on nortriptyline and Topamax for migraines, which have improved since the placement of a shunt. However, she continues to experience headaches, which are  exacerbated by weather changes. She has previously tried Emgality, Nurtec, and Ubrelvy but had to discontinue them due to insurance issues. She still has some Ubrelvy at home, which she uses sparingly.    She is on Zoloft 150 mg daily and sees Erika Contreras for this. She expresses a desire to eventually discontinue the medication and cease seeing Dr. Contreras. She was referred to Dr. Contreras by Dr. Ngo following her first stroke in 2022, as she was experiencing depression. She believes the Zoloft has been beneficial. Dr. Ngo suspected she may have PTSD.    She is on bethanechol for overactive bladder and has an appointment with Dr. Coon next month. She reports improvement in her symptoms.    She is on lactulose for severe constipation, which she takes as needed. She reports that her bowels become inactive during hospital stays, sometimes going up to 10 days without a bowel movement. She does not experience abdominal pain associated with this.    She has been informed that she is borderline diabetic. Her primary source of sugar is fruit, and she was advised not to eliminate it from her diet. She maintains a daily exercise routine.    She has not had a mammogram in several years and has never had a Medicare wellness visit. She has a history of UTIs but has not had one in the past few months. She underwent a colonoscopy around  and is overdue for another one.    She has a history of keratosis and is under the care of a dermatologist, Dr. Sigala. She has an upcoming appointment with Dr. Sigala in 2025.    PAST SURGICAL HISTORY:  She had a mesh implant performed by Dr. Robins approximately 7 to 8 years ago. She had a  shunt placed in 2025. She underwent cataract surgery approximately 2 months ago. She had stents placed following her strokes.    FAMILY HISTORY  Her father  at age 43 with a massive heart attack. All of his brothers and sisters also had heart problems.    Blood pressure -170/120 (patient  reports normotension on home readings)      Subjective      Review of Systems:   Review of Systems   All other systems reviewed and are negative.      Past Medical History:   Past Medical History:   Diagnosis Date    Abnormal ECG     Aneurysm 11/26/2022    Cancer 05/2024    Basal Cell Carcinoma removed from scalp by dermatology    Cluster headache 2022    Had headaches for several months before stroke and mass was found    Depression     Difficulty walking 11/26/22    After stroke    Headache     Headache, tension-type 2022    Hyperlipidemia     Hypertension     Memory loss 11/26/22    Migraine 2022    Stroke 11/26/2022    Stroke 10/06/2024    Vision loss 11/26/22    When dizzy or headache       Past Surgical History:   Past Surgical History:   Procedure Laterality Date    ARTERIAL ANEURYSM REPAIR      BREAST LUMPECTOMY  2012    CYST REMOVAL Left 05/2023    EMBOLIZATION CEREBRAL N/A 11/29/2022    Procedure: CV EMBOLIZATION CEREBRAL IR;  Surgeon: Enoch Savage MD;  Location:  [facility];  Service: Interventional Radiology;  Laterality: N/A;    HIATAL HERNIA REPAIR  2015    INTERVENTIONAL RADIOLOGY PROCEDURE N/A 09/15/2023    Procedure: Embolization;  Surgeon: Enoch Savage MD;  Location:  [facility];  Service: Interventional Radiology;  Laterality: N/A;    INTERVENTIONAL RADIOLOGY PROCEDURE N/A 10/06/2024    Procedure: IR mechanical thrombectomy;  Surgeon: Joe Mendez MD;  Location:  [facility];  Service: Interventional Radiology;  Laterality: N/A;    SINUS SURGERY      x4    SKIN CANCER EXCISION      cancer removed off top of head    VENTRICULOPERITONEAL SHUNT N/A 03/18/2025    Procedure: LAPAROSCOPIC ASSISTED VENTRICULAR PERITONEAL SHUNT REVISION;  Surgeon: Joe Mendez MD;  Location:  [facility] OR;  Service: Neurosurgery;  Laterality: N/A;    VENTRICULOPERITONEAL SHUNT  3/18/2025       Family History:   Family History   Problem  Relation Age of Onset    No Known Problems Mother     Heart attack Father 42    Early death Father         Heart attack    Heart disease Father     Hyperlipidemia Father     Heart attack Paternal Aunt     Heart attack Paternal Uncle     No Known Problems Sister     Cancer Maternal Grandmother     Heart failure Maternal Grandmother     No Known Problems Maternal Grandfather     No Known Problems Paternal Grandmother     No Known Problems Paternal Grandfather     Diabetes Half-Brother        Social History:   Social History     Socioeconomic History    Marital status:    Tobacco Use    Smoking status: Former     Current packs/day: 0.00     Average packs/day: 1 pack/day for 15.0 years (15.0 ttl pk-yrs)     Types: Cigarettes     Start date: 10/4/1989     Quit date: 10/4/2004     Years since quittin.6     Passive exposure: Past    Smokeless tobacco: Never   Vaping Use    Vaping status: Never Used   Substance and Sexual Activity    Alcohol use: No    Drug use: Never    Sexual activity: Not Currently     Partners: Male     Birth control/protection: Tubal ligation       Medications:     Current Outpatient Medications:     acetaminophen (TYLENOL) 325 MG tablet, Take 2 tablets by mouth Every 6 (Six) Hours As Needed for Mild Pain., Disp: , Rfl:     aspirin 81 MG chewable tablet, Chew 1 tablet Daily., Disp: , Rfl:     atorvastatin (LIPITOR) 80 MG tablet, Take 1 tablet by mouth Every Night., Disp: 90 tablet, Rfl: 1    bethanechol (URECHOLINE) 25 MG tablet, Take 1 tablet by mouth 2 (Two) Times a Day., Disp: , Rfl:     clopidogrel (Plavix) 75 MG tablet, Take 1 tablet by mouth Daily., Disp: 30 tablet, Rfl: 3    famotidine (PEPCID) 20 MG tablet, Take 1 tablet by mouth 2 (Two) Times a Day Before Meals for 180 days., Disp: 60 tablet, Rfl: 5    fluticasone (FLONASE) 50 MCG/ACT nasal spray, Administer 2 sprays into the nostril(s) as directed by provider Daily., Disp: 18.2 g, Rfl: 1    lactulose (CHRONULAC) 10 GM/15ML  "solution, , Disp: , Rfl:     pantoprazole (PROTONIX) 20 MG EC tablet, Take 1 tablet by mouth Daily for 90 days., Disp: 90 tablet, Rfl: 0    sertraline (ZOLOFT) 100 MG tablet, Take 1.5 tablets by mouth Daily., Disp: 135 tablet, Rfl: 1    nortriptyline (PAMELOR) 10 MG capsule, Take 2 capsules by mouth Every Night., Disp: 60 capsule, Rfl: 6    topiramate (TOPAMAX) 100 MG tablet, Take 1 tablet by mouth Every Night., Disp: 30 tablet, Rfl: 6    Allergies:   Allergies   Allergen Reactions    Tramadol Other (See Comments)     Flushng and passing out       Objective     Physical Exam:  Vital Signs:   Vitals:    05/05/25 1237   BP: (!) 170/120   BP Location: Left arm   Patient Position: Sitting   Cuff Size: Adult   Pulse: 80   Temp: 98 °F (36.7 °C)   TempSrc: Infrared   SpO2: 98%   Weight: 57.6 kg (127 lb)   Height: 156.2 cm (61.5\")   PainSc: 7    PainLoc: Head     Body mass index is 23.61 kg/m².  BMI is within normal parameters. No other follow-up for BMI required.       Physical Exam  Constitutional:       Appearance: Normal appearance.   HENT:      Head: Normocephalic and atraumatic.   Eyes:      Extraocular Movements: Extraocular movements intact.      Conjunctiva/sclera: Conjunctivae normal.   Pulmonary:      Effort: Pulmonary effort is normal. No respiratory distress.   Neurological:      General: No focal deficit present.      Mental Status: She is alert and oriented to person, place, and time.   Psychiatric:         Mood and Affect: Mood normal.         Behavior: Behavior normal.         Assessment / Plan      Assessment/Plan:   Diagnoses and all orders for this visit:    1. Encounter for medical examination to establish care (Primary)  - Reviewed chronic medical conditions and current medications.  Reviewed age-appropriate cancer screenings and recommended vaccinations.      Healthcare Maintenance:   Immunizations:  Td/Tdap(Booster Q 10 yrs): Recommended  PCV20: Recommended  Shingles: " Recommended    Screening:  Colorectal Screening: Cologuard ordered today  Mammogram: Mammogram ordered today  Bone Density/DEXA: at 67 yo    2. Cerebral aneurysm, nonruptured  -Follows with neurosurgery  -     aspirin 81 MG chewable tablet; Chew 1 tablet Daily.  -     atorvastatin (LIPITOR) 80 MG tablet; Take 1 tablet by mouth Every Night.  Dispense: 90 tablet; Refill: 1    3. Gastroesophageal reflux disease without esophagitis  - Symptoms are well-controlled.  Continue as prescribed.  -     pantoprazole (PROTONIX) 20 MG EC tablet; Take 1 tablet by mouth Daily for 90 days.  Dispense: 90 tablet; Refill: 0  -     famotidine (PEPCID) 20 MG tablet; Take 1 tablet by mouth 2 (Two) Times a Day Before Meals for 180 days.  Dispense: 60 tablet; Refill: 5    4. Allergy, subsequent encounter  -     fluticasone (FLONASE) 50 MCG/ACT nasal spray; Administer 2 sprays into the nostril(s) as directed by provider Daily.  Dispense: 18.2 g; Refill: 1    5. History of CVA (cerebrovascular accident)  - She is on aspirin and Plavix to prevent further strokes.   - She is advised to continue her current medications  - Following with neurology and cardiology    6. Current mild episode of major depressive disorder, unspecified whether recurrent  - She is on Zoloft 150 mg daily for PTSD and depression  - She expresses a desire to eventually discontinue the medication and cease seeing her therapist.  - She is advised to continue her current medications and follow up with her therapist as needed.    7. Other migraine without status migrainosus, not intractable  - She is on nortriptyline and Topamax for migraines, which have improved since the placement of a  shunt. However, she continues to experience headaches, which are exacerbated by weather changes.  - She is advised to continue her current medications and follow up with her neurologist as needed.    8. Overactive bladder  - She is on bethanechol for overactive bladder, with symptoms  showing improvement.    9. Constipation, unspecified constipation type  - She is on lactulose for severe constipation, which she takes as needed. She reports that her bowels become inactive during hospital stays, sometimes going up to 10 days without a bowel movement.  - She does not experience abdominal pain associated with this.    10. Screening for malignant neoplasm of colon  -     Cologuard - Stool, Per Rectum; Future    11. Encounter for screening mammogram for breast cancer  -     Mammo Screening Digital Tomosynthesis Bilateral With CAD; Future        Follow Up:   Return in about 3 months (around 8/5/2025) for Medicare Wellness.    Patient or patient representative verbalized consent for the use of Ambient Listening during the visit with  Laurie Little MD for chart documentation. 5/8/2025  09:09 EDT      Laurie Little MD   [facility] [place] [address]

## 2025-05-08 PROBLEM — N32.81 OVERACTIVE BLADDER: Status: ACTIVE | Noted: 2025-05-08

## 2025-05-08 PROBLEM — F32.0 CURRENT MILD EPISODE OF MAJOR DEPRESSIVE DISORDER: Status: ACTIVE | Noted: 2025-05-08

## 2025-05-08 PROBLEM — G43.909 MIGRAINE: Status: ACTIVE | Noted: 2025-05-08

## 2025-05-12 ENCOUNTER — APPOINTMENT (OUTPATIENT)
Dept: MRI IMAGING | Facility: HOSPITAL | Age: 65
End: 2025-05-12
Payer: MEDICARE

## 2025-05-12 ENCOUNTER — PRIOR AUTHORIZATION (OUTPATIENT)
Dept: NEUROLOGY | Facility: CLINIC | Age: 65
End: 2025-05-12
Payer: MEDICARE

## 2025-05-12 ENCOUNTER — TELEPHONE (OUTPATIENT)
Dept: NEUROLOGY | Facility: CLINIC | Age: 65
End: 2025-05-12
Payer: MEDICARE

## 2025-05-12 NOTE — TELEPHONE ENCOUNTER
Status:Approved;Review Type:Prior Auth;Coverage Start Date:05/01/2025;Coverage End Date:05/13/2026;    Topiramate 100MG tablets    (Key: [id number])

## 2025-05-12 NOTE — TELEPHONE ENCOUNTER
"    Caller: Lauryn Romo \"Lexis\"    Relationship to patient: Self      Best call back number: [phone]    Provider: RODRIGO MOSLEY APRISHAN    Medication PA needed: TOPIRAMATE    Reason for call/Prior Auth: PT STATED THAT THE NEW PHARMACY IS HAVING AN ISSUE WITH INS GETTING THE RX APPROVED AND SHE BELIEVES IT MAYBE DUE TO A PA.     PLEASE ADVISE  THANK YOU   "

## 2025-05-14 ENCOUNTER — OFFICE VISIT (OUTPATIENT)
Dept: NEUROSURGERY | Facility: CLINIC | Age: 65
End: 2025-05-14
Payer: MEDICARE

## 2025-05-14 ENCOUNTER — HOSPITAL ENCOUNTER (OUTPATIENT)
Dept: MRI IMAGING | Facility: HOSPITAL | Age: 65
Discharge: HOME | End: 2025-05-14
Payer: MEDICARE

## 2025-05-14 VITALS
TEMPERATURE: 98.4 F | HEART RATE: 79 BPM | DIASTOLIC BLOOD PRESSURE: 70 MMHG | BODY MASS INDEX: 23.96 KG/M2 | SYSTOLIC BLOOD PRESSURE: 120 MMHG | WEIGHT: 128.9 LBS | OXYGEN SATURATION: 100 %

## 2025-05-14 DIAGNOSIS — Z86.73 HISTORY OF CVA (CEREBROVASCULAR ACCIDENT): ICD-10-CM

## 2025-05-14 DIAGNOSIS — I67.1 CEREBRAL ANEURYSM, NONRUPTURED: ICD-10-CM

## 2025-05-14 DIAGNOSIS — I67.1 CEREBRAL ANEURYSM, NONRUPTURED: Primary | ICD-10-CM

## 2025-05-14 PROCEDURE — 25510000002 GADOBENATE DIMEGLUMINE 529 MG/ML SOLUTION

## 2025-05-14 PROCEDURE — A9577 INJ MULTIHANCE: HCPCS

## 2025-05-14 PROCEDURE — 70553 MRI BRAIN STEM W/O & W/DYE: CPT

## 2025-05-14 RX ORDER — ASPIRIN 81 MG/1
81 TABLET, CHEWABLE ORAL DAILY
Qty: 90 TABLET | Refills: 3
Start: 2025-05-14

## 2025-05-14 RX ORDER — CLOPIDOGREL BISULFATE 75 MG/1
75 TABLET ORAL DAILY
Qty: 90 TABLET | Refills: 3 | Status: SHIPPED | OUTPATIENT
Start: 2025-05-14

## 2025-05-14 RX ADMIN — GADOBENATE DIMEGLUMINE 10 ML: 529 INJECTION, SOLUTION INTRAVENOUS at 10:34

## 2025-05-14 NOTE — PROGRESS NOTES
"  Name: Lauryn Romo    : 1960     MRN: [id number]     Primary Care Provider: Laurie Little MD    Chief Complaint  Follow-up (History of CVA/Doing better after the shunt)      History of Present Illness:  Lauryn Romo is a 64 y.o. 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 She was seen in the neurointerventional clinic on 2024, and her Plavix was stopped, but she remained on low-dose aspirin.      Ms. Romo presented to the emergency department on 10/6/2024 with acute thrombosis of her pipeline stent and basilar artery.  She underwent successful mechanical thrombectomy, restoring normal (TICI 3) flow.  This did unmask a stenosis within the mid portions of the Pipeline stent construct, and this was treated with a 2.5 mm coronary MICHELLE, restoring robust flow within the vertebrobasilar system. MRI during her hospital stay demonstrated only a few punctate areas of infarct (not in the brainstem).     Ms. Romo did continue to have dizziness and unsteady gait (which was present prior to stroke), worsening of headaches and vision, and also had an episode of left-sided weakness with a fall who presented to [facility] ED.  Imaging did not demonstrate ventriculomegaly and she subsequently underwent  shunt placement with Dr. Mendez on 3/18/2025.      Ms. Romo has done exceptionally well following her most recent hospitalization.  She reports that her dizziness and gait have improved significantly, and she appears much more \"lively\".  She does continue to have chronic headaches, but no singular headache to suggest SAH/ICH.  She is on a chronic Plavix/aspirin regimen and does report increased bruising, but is otherwise tolerating this well.  She presents today for routine follow-up with " MRI.        PMHX  Allergies:  Allergies   Allergen Reactions    Tramadol Other (See Comments)     Flushng and passing out     Medications    Current Outpatient Medications:     acetaminophen (TYLENOL) 325 MG tablet, Take 2 tablets by mouth Every 6 (Six) Hours As Needed for Mild Pain., Disp: , Rfl:     aspirin 81 MG chewable tablet, Chew 1 tablet Daily., Disp: , Rfl:     atorvastatin (LIPITOR) 80 MG tablet, Take 1 tablet by mouth Every Night., Disp: 90 tablet, Rfl: 1    bethanechol (URECHOLINE) 25 MG tablet, Take 1 tablet by mouth 2 (Two) Times a Day., Disp: , Rfl:     clopidogrel (Plavix) 75 MG tablet, Take 1 tablet by mouth Daily., Disp: 30 tablet, Rfl: 3    famotidine (PEPCID) 20 MG tablet, Take 1 tablet by mouth 2 (Two) Times a Day Before Meals for 180 days., Disp: 60 tablet, Rfl: 5    fluticasone (FLONASE) 50 MCG/ACT nasal spray, Administer 2 sprays into the nostril(s) as directed by provider Daily., Disp: 18.2 g, Rfl: 1    lactulose (CHRONULAC) 10 GM/15ML solution, , Disp: , Rfl:     nortriptyline (PAMELOR) 10 MG capsule, Take 2 capsules by mouth Every Night., Disp: 60 capsule, Rfl: 6    pantoprazole (PROTONIX) 20 MG EC tablet, Take 1 tablet by mouth Daily for 90 days., Disp: 90 tablet, Rfl: 0    sertraline (ZOLOFT) 100 MG tablet, Take 1.5 tablets by mouth Daily., Disp: 135 tablet, Rfl: 1    topiramate (TOPAMAX) 100 MG tablet, Take 1 tablet by mouth Every Night., Disp: 30 tablet, Rfl: 6  No current facility-administered medications for this visit.  Past Medical History:  Past Medical History:   Diagnosis Date    Abnormal ECG     Aneurysm 11/26/2022    Cancer 05/2024    Basal Cell Carcinoma removed from scalp by dermatology    Cluster headache 2022    Had headaches for several months before stroke and mass was found    Depression     Difficulty walking 11/26/22    After stroke    Headache     Headache, tension-type 2022    Hyperlipidemia     Hypertension     Memory loss 11/26/22    Migraine 2022    Stroke  2022    Stroke 10/06/2024    Vision loss 22    When dizzy or headache     Past Surgical History:  Past Surgical History:   Procedure Laterality Date    ARTERIAL ANEURYSM REPAIR      BREAST LUMPECTOMY  2012    CYST REMOVAL Left 2023    EMBOLIZATION CEREBRAL N/A 2022    Procedure: CV EMBOLIZATION CEREBRAL IR;  Surgeon: Enoch Savage MD;  Location:  [facility];  Service: Interventional Radiology;  Laterality: N/A;    HIATAL HERNIA REPAIR      INTERVENTIONAL RADIOLOGY PROCEDURE N/A 09/15/2023    Procedure: Embolization;  Surgeon: Enoch Savage MD;  Location: [facility];  Service: Interventional Radiology;  Laterality: N/A;    INTERVENTIONAL RADIOLOGY PROCEDURE N/A 10/06/2024    Procedure: IR mechanical thrombectomy;  Surgeon: Joe Mendez MD;  Location: [facility];  Service: Interventional Radiology;  Laterality: N/A;    SINUS SURGERY      x4    SKIN CANCER EXCISION      cancer removed off top of head    VENTRICULOPERITONEAL SHUNT N/A 2025    Procedure: LAPAROSCOPIC ASSISTED VENTRICULAR PERITONEAL SHUNT REVISION;  Surgeon: Joe Mendez MD;  Location:  [facility] OR;  Service: Neurosurgery;  Laterality: N/A;    VENTRICULOPERITONEAL SHUNT  3/18/2025     Social Hx:  Social History     Tobacco Use    Smoking status: Former     Current packs/day: 0.00     Average packs/day: 1 pack/day for 15.0 years (15.0 ttl pk-yrs)     Types: Cigarettes     Start date: 10/4/1989     Quit date: 10/4/2004     Years since quittin.6     Passive exposure: Past    Smokeless tobacco: Never   Vaping Use    Vaping status: Never Used   Substance Use Topics    Alcohol use: No    Drug use: Never     Family Hx:  Family History   Problem Relation Age of Onset    No Known Problems Mother     Heart attack Father 42    Early death Father         Heart attack    Heart disease Father     Hyperlipidemia Father     Heart attack Paternal Aunt      Heart attack Paternal Uncle     No Known Problems Sister     Cancer Maternal Grandmother     Heart failure Maternal Grandmother     No Known Problems Maternal Grandfather     No Known Problems Paternal Grandmother     No Known Problems Paternal Grandfather     Diabetes Half-Brother      Review of Systems:        Review of Systems   Constitutional:  Negative for activity change, appetite change, chills, diaphoresis, fatigue, fever and unexpected weight change.   HENT:  Negative for congestion, dental problem, drooling, ear discharge, ear pain, facial swelling, hearing loss, mouth sores, nosebleeds, postnasal drip, rhinorrhea, sinus pressure, sinus pain, sneezing, sore throat, tinnitus, trouble swallowing and voice change.    Eyes:  Negative for photophobia, pain, discharge, redness, itching and visual disturbance.   Respiratory:  Negative for apnea, cough, choking, chest tightness, shortness of breath, wheezing and stridor.    Cardiovascular:  Negative for chest pain, palpitations and leg swelling.   Gastrointestinal:  Negative for abdominal distention, abdominal pain, anal bleeding, blood in stool, constipation, diarrhea, nausea, rectal pain and vomiting.   Endocrine: Negative for cold intolerance, heat intolerance, polydipsia, polyphagia and polyuria.   Genitourinary:  Negative for decreased urine volume, difficulty urinating, dysuria, enuresis, flank pain, frequency, genital sores, hematuria and urgency.   Musculoskeletal:  Negative for arthralgias, back pain, gait problem, joint swelling, myalgias, neck pain and neck stiffness.   Skin:  Negative for color change, pallor, rash and wound.   Allergic/Immunologic: Negative for environmental allergies, food allergies and immunocompromised state.   Neurological:  Positive for headaches. Negative for dizziness, tremors, seizures, syncope, facial asymmetry, speech difficulty, weakness, light-headedness and numbness.   Hematological:  Negative for adenopathy. Does not  bruise/bleed easily.   Psychiatric/Behavioral:  Negative for agitation, behavioral problems, confusion, decreased concentration, dysphoric mood, hallucinations, self-injury, sleep disturbance and suicidal ideas. The patient is not nervous/anxious and is not hyperactive.         Review of  Imaging: MRI brain dated 2025 [facility] was reviewed along with corresponding radiologic report.  Comparison is made to multiple prior studies, most recently MRI brain dated 3/19/2025.  There is evidence of right parieto-occipital approach  shunt valve.  There is a stable appearance of basilar artery aneurysm and basilar artery stent.    Vital Signs:   Vitals:    25 1358   BP: 120/70   Pulse: 79   Temp: 98.4 °F (36.9 °C)   SpO2: 100%        Physical Exam  General: Well-developed, well-nourished, in no acute distress.    Cardiovascular: No carotid bruits.    Neuro: Alert and oriented x 3.  Nonfocal neurologic exam.  Speech is clear.  No facial droop or pronator drift.  I do not appreciate any gross visual field deficits.  EOMs intact bilaterally.  Strength is symmetric in upper and lower extremities. Ambulates unassisted.        Social History    Tobacco Use      Smoking status: Former        Packs/day: 0.00        Years: 1 pack/day for 15.0 years (15.0 ttl pk-yrs)        Types: Cigarettes        Start date: 10/4/1989        Quit date: 10/4/2004        Years since quittin.6        Passive exposure: Past      Smokeless tobacco: Never       Tobacco Use: Medium Risk (2025)    Patient History     Smoking Tobacco Use: Former     Smokeless Tobacco Use: Never     Passive Exposure: Past       STEADI Fall Risk Assessment was completed, and patient is at MODERATE risk for falls. Assessment completed on:2025        Assessment/Plan    Diagnoses and all orders for this visit:    1. Cerebral aneurysm, nonruptured (Primary)  -     CT Angiogram Head; Future         Lauryn Romo is a 64 y.o.  "female s/p multiple embolizations for giant basilar apex aneurysm (most recent embolization on 9/15/2023) with Pipeline Shield flow diverter therapy.  The aneurysm is completely isolated from the right vertebral/basilar circulation (side of flow diverter's), but there is a small amount of residual aneurysm supplied via the left vertebral artery; however, the outflow of this residual aneurysm supplies the right PICA, and thus this has been managed conservatively (as occlusion would result in a high risk of stroke).      There was acute thrombosis of her pipeline stent and basilar artery, which necessitated emergent mechanical thrombectomy and a 2.5 mm coronary MICHELLE.  Following her hospitalization she did continue to struggle with dizziness and unsteady gait, as well as increased headaches and blurry vision.  She subsequently underwent  shunt with Dr. Mendez in March 2025.  Ms. Romo has done exceptionally well following her most recent hospitalization, and reports that her dizziness and gait have significantly improved.  She is on a chronic Plavix/aspirin regimen and will continue this indefinitely.  She did have an MRI today, and I readjusted her shunt to the previous setting of 1.5.  We will plan to follow-up in 6 months time with CTA head to ensure stability/exclude progression of disease that might necessitate further treatment/intervention.  In the interim, she will contact our office and/or 911 if she experiences any new stroke/TIA-like symptoms, or \"thunderclap\" headache.    Patient encouraged to contact us if she has any changes in her condition or any concerns.    Any copied data from previous notes included in the (1) HPI, (2) PE, (3) MDM and/or Assessment and Plan has been reviewed and accurate as of 05/14/25.    Nikki Carrero PA-C  05/14/25  "

## 2025-05-19 NOTE — ANESTHESIA PROCEDURE NOTES
Airway  Urgency: elective    Date/Time: 9/15/2023 2:39 PM  Airway not difficult    General Information and Staff    Patient location during procedure: OR  CRNA/CAA: Jennifer Davis CRNA    Indications and Patient Condition  Indications for airway management: airway protection    Preoxygenated: yes  MILS not maintained throughout  Mask difficulty assessment: 1 - vent by mask    Final Airway Details  Final airway type: endotracheal airway      Successful airway: ETT  Cuffed: yes   Successful intubation technique: direct laryngoscopy  Endotracheal tube insertion site: oral  Blade: [place]  Blade size: 2  ETT size (mm): 7.0  Cormack-Lehane Classification: grade I - full view of glottis  Placement verified by: chest auscultation and capnometry   Measured from: lips  ETT/EBT  to lips (cm): 20  Number of attempts at approach: 1  Assessment: lips, teeth, and gum same as pre-op and atraumatic intubation    Additional Comments  Negative epigastric sounds, Breath sound equal bilaterally with symmetric chest rise and fall         "Reason For Consult  Heart failure education    History Of Present Illness  Meryl Hester is a 89 y.o. female presenting with shortness of breath and generalized weakness.     Past Medical History  She has a past medical history of Diverticulitis of intestine, part unspecified, without perforation or abscess without bleeding, Personal history of other diseases of the circulatory system, Personal history of other endocrine, nutritional and metabolic disease, Personal history of other endocrine, nutritional and metabolic disease, Vitreomacular adhesion, left eye (12/04/2017), and Vitreous degeneration, right eye (12/04/2017).    Surgical History  She has a past surgical history that includes Other surgical history (11/29/2016); Cataract extraction (11/29/2016); Other surgical history (11/29/2016); Cardiac catheterization (N/A, 8/9/2024); Cardiac catheterization (N/A, 11/8/2024); Cardiac electrophysiology procedure (N/A, 4/18/2025); and Cardiac electrophysiology procedure (N/A, 4/18/2025).     Social History  She reports that she has quit smoking. Her smoking use included cigarettes. She has never used smokeless tobacco. She reports that she does not drink alcohol and does not use drugs.    Family History  Family History[1]     Allergies  Sulfa (sulfonamide antibiotics), Lisinopril, and Apixaban    Review of Systems  deferred     Physical Exam  deferred     Last Recorded Vitals  Blood pressure 119/73, pulse 80, temperature 35.9 °C (96.6 °F), temperature source Temporal, resp. rate 20, height 1.499 m (4' 11\"), weight 61.4 kg (135 lb 5.8 oz), SpO2 97%.    Relevant Results  KARINA Details: The KARINA probe used was [id number]. Technically adequate omniplane transesophageal echocardiogram performed. Agitated saline contrast and color flow Doppler echo was performed to assess for the presence of a patent foramen ovale.  KARINA Medication: The pharynx was anesthetized with Cetacaine spray and viscous lidocaine. The patient was sedated " using moderate sedation. Total intraservice time for moderate sedation was 28 minutes. Midazolam and Fentanyl was used to sedate the patient for this exam.  KARINA Procedure: The probe was passed without difficulty. The following complication was encountered during the procedure: Patient tolerated the procedure well without any apparent complications.  Left Ventricle: The left ventricular systolic function is severely decreased, with a visually estimated ejection fraction of 20-25%. There is global hypokinesis of the left ventricle with minor regional variations. The left ventricular cavity size is mildly dilated. Left ventricular diastolic filling was indeterminate.  Left Atrium: The left atrial size is moderate to severely dilated. There is no definite left atrial thrombus present. There is a small patent foramen ovale with predominantly left to right shunting across the atrial septum. A patent foramen ovale was visualized using color Doppler. A bubble study using agitated saline was performed. Bubble study is negative. There is spontaneous contrast noted in the left atrial appendage. S/p Watchman device that appears well-seated with no thrombus or peridevice vanessa.  Right Ventricle: The right ventricle is mildly enlarged. There is severely reduced right ventricular systolic function.  Right Atrium: The right atrial size is moderately dilated.  Aortic Valve: The aortic valve is trileaflet. There is mild aortic valve regurgitation.  Mitral Valve: The mitral valve is moderately thickened. There is moderate mitral valve regurgitation.  Tricuspid Valve: The tricuspid valve is structurally normal. There is moderate tricuspid regurgitation.  Pulmonic Valve: The pulmonic valve is not well visualized. The pulmonic valve regurgitation was not well visualized.  Pericardium: Trivial to small pericardial effusion.  Aorta: The aortic root was not well visualized. There is plaque visualized in the descending aorta which is  classified as a Grade 3 [moderate atheroma >3-5 mm (no mobile/ulcerated component)] atherosclerosis.  Pulmonary Veins: The pulmonary veins appear normal and return normally to the left atrium.        CONCLUSIONS:   1. The left ventricular systolic function is severely decreased, with a visually estimated ejection fraction of 20-25%.   2. There is global hypokinesis of the left ventricle with minor regional variations.   3. Left ventricular diastolic filling was indeterminate.   4. Left ventricular cavity size is mildly dilated.   5. There is severely reduced right ventricular systolic function.   6. Mildly enlarged right ventricle.   7. The left atrial size is moderate to severely dilated.   8. S/p Watchman device that appears well-seated with no thrombus or peridevice vanessa.   9. The right atrial size is moderately dilated.  10. The mitral valve is moderately thickened.  11. Moderate mitral valve regurgitation.  12. Moderate tricuspid regurgitation.  13. Mild aortic valve regurgitation.  14. No left atrial thrombus.  15. Small patent foramen ovale.  16. There is plaque visualized in the descending aorta.        Assessment/Plan     Heart Failure Nurse Navigator    The role of the HF nurse navigator is to (1) characterize risk profiles of patients with heart failure transitioning from [id number] after hospitalization, (2) recommend interventions to improve care and reduce risks of worse post-hospitalization outcomes. Potential recommendations may touch base on patient/family education, additional consults that may bring value, and appointment scheduling.    Asssessment    I met with Meryl Hester at the bedside, and my impressions include: Patient resting comfortably in bed, no complaints of SOB.  Introduced myself and reason for visit.  Patient agreeable to education and discussion.  Patients endorses chronic afib with Watchman device implanted in 2024.  Patient noticed increasing shortness of breath and  increasing swelling in abdominal area.  When asked about weighing herself she does endorse weighing herself from time to time but did notice her weight was going down due to lack of appetite. Then she noticed the abdominal area getting larger and her weight going back up.  Also short of breath more often.  Unable to lie flat to sleep.  We discussed daily weights and monitoring her signs and symptoms of fluid increase.  Calling Dr. Corona's office when this starts to occur so he  adjustments to her medications.  Daughter does all of her cooking, so we briefly discussed low sodium options for foods for her.     Patients Cardiologist(s):    Patients Primary Care Provider:    1. Medical Domain  What is the patient's most recent LVEF? 20-25%  HFrEF (LVEF <= 40%) Quadruple therapy recommended  HFmrEF (LVEF 41-49%) Quadruple therapy recommended  HFpEF (LVEF >= 50%) Minimum recommendations: SGLT2i and MRA  Is the patient on OP GDMT for their condition?   ARNI/ACEI/ARB: Yes Losartan 75mg daily  BB: No None  MRA: No None  SGLT2i: No None  Is the patient prescribed a diuretic? Yes  Torsemide 20 mg daily  Does this patient have an implanted device (ie cardioMEMS, ICD, CRT-D)?  Device type: NA  Could this patient have advanced heart failure (Stage D heart failure)?: Yes   If yes, the potential markers of advanced heart failure include: LVEF<25    REFERENCE: Potential markers of advanced HF   Inotrope (dobutamine or milrinone) used during this admission?   LVEF<=25%?   >=2 hospitalizations for ADHF in the last year?   Severe symptoms of HF (fatigue, dyspnea, confusion, edema) despite medical therapy?   Downtitration of GDMT as compared to home medications?   Discontinuation of GDMT because of hypotension or renal intolerance?   Recurrent arrhythmias (AF, VT with ICD shocks)?   Cardiac cachexia (i.e., unintentional weight loss due to HF)?   High-risk biomarker profile (e.g., hyponatremia [Na<135], very elevated BNP,  "worsening kidney function)   Escalating doses of diuretics or persistent edema despite escalation     2. Mind and Emotion  Does this patient have possible cognitive impairment?: No   Ask the patient to memorize these 3 words: banana, sunrise, chair  Ask the patient to draw a clock with hands pointing at \"20 minutes after 8\"  Ask the patient to recall the 3 words  Score: Add number of words recalled + clock drawing (0 points for any errors, 2 points if correct)  Interpretation: A score of 0-2 suggests cognitive impairment is present, a score of 3-5 suggests cognitive impairment is absent  Does this patient have major depression?: No   Over the last 2 weeks: Little interest or pleasure in doing things? (Not at all +0, Several days +1, More than half the days +2, Nearly every day +3)  Over the last 2 weeks: Feeling down, depressed or hopeless? (Not at all +0, Several days +1, More than half the days +2, Nearly every day +3)  Score: Add points  Interpretation: A score of 3 or more suggests that major depression is likely.     3. Physical Function  Could this patient be frail?: No   Defined by presence of all of these: slowness, weakness, shrinking, inactivity, exhaustion  Is this patient at risk for falling?: No   Defined by having experienced a fall in the last 12 months.    4. Social Determinants of Health  Transportation deficits?: No   Lack of insurance?: No   Poor financial resources?: No   Living conditions (homelessness, unstable home)?: No   Poor family/social support?: No   Poor personal care?: No   Food insecurity?: No   Lack of HCPOA?: No    I provided the following heart failure education:  Met with patient for education. Discussed CHF, signs and symptoms, and when to call cardiologist. Reinforced the importance of following a Low Sodium Diet and monitoring daily weight, lower leg edema, and shortness of breath. Reviewed importance of taking prescribed medications after discharge. Reinforced importance of " following up with cardiologist within a week of discharge. Reminded patient that they have my contact information should any questions or concerns arise.   IP Medications:    ARNi/ACEi/ARB: Losartain 75 mg daily  BB: None  MRA: spironolactone 12.5 mg daily  SGLT2i: None  Diuretic: Furosemide 40 mg IV BID    Recommendations/ Plan  Ongoing education while in the hospital  Meet with daughter to discuss low sodium food options  One week post discharge follow up phone call      *Cardiology Discharge Appointment Follow-up Plan:               Tatum Frances RN         [1] No family history on file.

## 2025-05-27 ENCOUNTER — OFFICE VISIT (OUTPATIENT)
Dept: NEUROLOGY | Facility: CLINIC | Age: 65
End: 2025-05-27
Payer: MEDICARE

## 2025-05-27 VITALS
HEART RATE: 75 BPM | WEIGHT: 128 LBS | TEMPERATURE: 98.6 F | SYSTOLIC BLOOD PRESSURE: 124 MMHG | BODY MASS INDEX: 24.17 KG/M2 | HEIGHT: 61 IN | OXYGEN SATURATION: 93 % | DIASTOLIC BLOOD PRESSURE: 82 MMHG

## 2025-05-27 DIAGNOSIS — E78.5 HYPERLIPIDEMIA LDL GOAL <70: ICD-10-CM

## 2025-05-27 DIAGNOSIS — Z86.73 HISTORY OF CVA (CEREBROVASCULAR ACCIDENT): Primary | ICD-10-CM

## 2025-05-27 DIAGNOSIS — I10 ESSENTIAL HYPERTENSION: ICD-10-CM

## 2025-05-27 RX ORDER — MAGNESIUM OXIDE 400 MG/1
400 TABLET ORAL DAILY
COMMUNITY

## 2025-05-27 RX ORDER — LORAZEPAM 0.5 MG/1
0.5 TABLET ORAL EVERY 8 HOURS PRN
COMMUNITY

## 2025-05-27 NOTE — PROGRESS NOTES
Follow Up Office Visit      Encounter Date: 2025   Patient Name: Lauryn Romo  : 1960   MRN: [id number]   PCP: Laurie Little MD    Chief Complaint:    Chief Complaint   Patient presents with    Follow-up for management of stroke       History of Present Illness: Lauryn Romo is a 64 y.o. female who is here today to follow up in stroke clinic. Patient has known medical diagnoses of HLD, HTN, migraines, cerebellar stroke stroke (residual dizziness, ataxia), and giant basilar aneurysm s/p pipeline embolization (Dr. Savage, ), s/p thrombectomy of pipeline stent thrombosis with resultant TICI 3 flow (after stopping Plavix, Dr. Savage 10/2024). Since our last visit patient presented to [facility] on 3/11/2025 with complaints of a syncopal episode (no LOC), transient aphasia, headache, and fall. She reported progressively unsteady gait as well as frequent falls. MRI was negative for any new infarct. Progressive ventriculomegaly with possible transependymal resorption of CSF noted. Imaging was reviewed by Dr. Savage of neurointervention who reviewed with Dr. Mendez of neurosurgery. Given her progressive decline and progressive ventriculomegaly, Dr. Mendez recommends  shunt placement on 3/18/2025.     Clinic visit 2025: Patient presents to clinic today for routine follow-up after a recent hospitalization in March.  She is recovering quite well.  She has already followed up with neurosurgery on May 14.  Her dizziness and gait have improved significantly.  She does still have occasional dull headache.  She will be maintained on a regimen of Plavix and aspirin as well as a statin lifelong.  Her follow-up MRI was stable per my review of documentation from neurosurgery.  Her shunt was readjusted to a setting of 1.5.  She will plan to follow-up with neurosurgery in 6 months time with a CTA of the head to ensure stability and exclude progression of disease.        Subjective        I have  reviewed and the following portions of the patient's history were updated as appropriate: past family history, past medical history, past social history, past surgical history and problem list.    Medications:     Current Outpatient Medications:     acetaminophen (TYLENOL) 325 MG tablet, Take 2 tablets by mouth Every 6 (Six) Hours As Needed for Mild Pain., Disp: , Rfl:     aspirin 81 MG chewable tablet, Chew 1 tablet Daily., Disp: 90 tablet, Rfl: 3    atorvastatin (LIPITOR) 80 MG tablet, Take 1 tablet by mouth Every Night., Disp: 90 tablet, Rfl: 1    famotidine (PEPCID) 20 MG tablet, Take 1 tablet by mouth 2 (Two) Times a Day Before Meals for 180 days., Disp: 60 tablet, Rfl: 5    fluticasone (FLONASE) 50 MCG/ACT nasal spray, Administer 2 sprays into the nostril(s) as directed by provider Daily., Disp: 18.2 g, Rfl: 1    lactulose (CHRONULAC) 10 GM/15ML solution, , Disp: , Rfl:     LORazepam (ATIVAN) 0.5 MG tablet, Take 1 tablet by mouth Every 8 (Eight) Hours As Needed for Anxiety., Disp: , Rfl:     magnesium oxide (MAG-OX) 400 MG tablet, Take 1 tablet by mouth Daily., Disp: , Rfl:     nortriptyline (PAMELOR) 10 MG capsule, Take 2 capsules by mouth Every Night., Disp: 60 capsule, Rfl: 6    pantoprazole (PROTONIX) 20 MG EC tablet, Take 1 tablet by mouth Daily for 90 days., Disp: 90 tablet, Rfl: 0    sertraline (ZOLOFT) 100 MG tablet, Take 1.5 tablets by mouth Daily., Disp: 135 tablet, Rfl: 1    topiramate (TOPAMAX) 100 MG tablet, Take 1 tablet by mouth Every Night., Disp: 30 tablet, Rfl: 6    bethanechol (URECHOLINE) 25 MG tablet, Take 1 tablet by mouth 2 (Two) Times a Day. (Patient not taking: Reported on 5/27/2025), Disp: , Rfl:     clopidogrel (PLAVIX) 75 MG tablet, TAKE 1 TABLET BY MOUTH DAILY, Disp: 30 tablet, Rfl: 2    Allergies:   Allergies   Allergen Reactions    Tramadol Other (See Comments)     Flushng and passing out       Objective     Physical Exam:   Vital Signs:   Vitals:    05/27/25 1351   BP: 124/82  "  Pulse: 75   Temp: 98.6 °F (37 °C)   SpO2: 93%   Weight: 58.1 kg (128 lb)   Height: 156.2 cm (61.5\")     Body mass index is 23.8 kg/m².    Physical Exam  Vitals and nursing note reviewed.   Constitutional:       General: She is awake. She is not in acute distress.     Appearance: Normal appearance. She is normal weight. She is not ill-appearing.      Comments: 64-year-old  female   HENT:      Head: Normocephalic and atraumatic.      Nose: Nose normal.      Mouth/Throat:      Mouth: Mucous membranes are moist.   Eyes:      General: Lids are normal.      Extraocular Movements: Extraocular movements intact.      Pupils: Pupils are equal, round, and reactive to light.   Cardiovascular:      Rate and Rhythm: Normal rate and regular rhythm.      Pulses: Normal pulses.   Pulmonary:      Effort: Pulmonary effort is normal. No respiratory distress.   Skin:     General: Skin is warm and dry.   Neurological:      General: No focal deficit present.      Mental Status: She is alert and oriented to person, place, and time.      Cranial Nerves: No cranial nerve deficit.      Sensory: No sensory deficit.      Motor: Motor strength is normal.No weakness.      Coordination: Coordination normal.      Gait: Gait normal.   Psychiatric:         Mood and Affect: Mood normal.         Speech: Speech normal.         Behavior: Behavior normal.       Neurological Exam  Mental Status  Awake and alert. Oriented to person, place, time and situation. Oriented to person, place, and time. Speech is normal. Language: Occasional slow speech trying to find a word otherwise fluent. Attention and concentration are normal. Fund of knowledge is appropriate for level of education.    Cranial Nerves  CN II: Right visual acuity: Counts fingers. Left visual acuity: Counts fingers. Visual fields full to confrontation.  CN III, IV, VI: Extraocular movements intact bilaterally. Normal lids and orbits bilaterally. Pupils equal round and reactive to light " bilaterally.  CN V: Facial sensation is normal.  CN VII: Full and symmetric facial movement.  CN VIII: Hearing is normal to speech .    Motor  Normal muscle bulk throughout. No fasciculations present. Normal muscle tone. Strength is 5/5 throughout all four extremities.  Moves all extremities equally.    Sensory  Light touch is normal in upper and lower extremities.     Coordination  Right: Finger-to-nose normal.Left: Finger-to-nose normal.  No obvious dysmetria .    Gait   Normal gait.Casual gait is normal including stance, stride, and arm swing.       Modified Barry Score: 0        0  No Symptoms    1 No significant disability. Able to carry out all usual activities, despite some symptoms.    2 Slight disability. Able to look after own affairs without assistance, but unable to carry out all previous activities.    3 Moderate disability. Requires some help, but able to walk unassisted.    4 Moderately severe disability. Unable to attend to own bodily needs without assistance, and unable to walk unassisted.    5 Severe disability. Requires constant nursing care and attention, bedridden, incontinent.    6 Dead      PHQ-9 Depression Screening  Little interest or pleasure in doing things? Not at all   Feeling down, depressed, or hopeless? Not at all   PHQ-2 Total Score 0   Trouble falling or staying asleep, or sleeping too much?     Feeling tired or having little energy?     Poor appetite or overeating?     Feeling bad about yourself - or that you are a failure or have let yourself or your family down?     Trouble concentrating on things, such as reading the newspaper or watching television?     Moving or speaking so slowly that other people could have noticed? Or the opposite - being so fidgety or restless that you have been moving around a lot more than usual?       Thoughts that you would be better off dead, or of hurting yourself in some way?     PHQ-9 Total Score     If you checked off any problems, how difficult  have these problems made it for you to do your work, take care of things at home, or get along with other people?           [facility] Fall Risk Clinician Key Questions   Have you fallen in the past year?: Yes      Hemoglobin   Date Value Ref Range Status   03/19/2025 13.5 12.0 - 15.9 g/dL Final     Hematocrit   Date Value Ref Range Status   03/19/2025 42.4 34.0 - 46.6 % Final     Platelets   Date Value Ref Range Status   03/19/2025 256 140 - 450 10*3/mm3 Final     Hemoglobin A1C   Date Value Ref Range Status   03/12/2025 5.80 (H) 4.80 - 5.60 % Final     LDL Cholesterol    Date Value Ref Range Status   03/12/2025 42 0 - 100 mg/dL Final     AST (SGOT)   Date Value Ref Range Status   03/12/2025 26 1 - 32 U/L Final     ALT (SGPT)   Date Value Ref Range Status   03/12/2025 36 (H) 1 - 33 U/L Final      MRI Brain With & Without Contrast  Result Date: 5/14/2025  1.Susceptibility artifact arises from right parieto-occipital approach  shunt valve. 2.2.6 x 2.3 x 2.4 cm T1 hypointense, T2 heterogeneous structure within the prepontine cistern, compatible with known basilar artery aneurysm, status post stenting of the basilar artery. Appearance is not significantly changed since 3/19/2025 with unchanged mass effect upon the brainstem structures. 3.Findings suggestive of chronic microvascular ischemic change. 4.No diffusion restriction is identified to suggest acute infarct. 5.No suspicious contrast enhancement is identified. 6.Please see above for additional details. 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 Progressive ventriculomegaly with possible transependymal resorption of CSF noted per Dr. Savage.   shunt with Dr. Mendez on 3/18/2025  -A1c from 3/12/2025 was 5.8%  -LDL from 3/12/2025 was 42  - P2Y12= 57  - EEG negative for epileptiform activity.  No need for AEDs at this time.  Can consider longer monitoring with any additional episodes concerning for possible seizure.  - TTE 3/13/2025 with EF 66 to 70%, no LAE.  Negative bubble study noted on TTE from 10/7/2024  - Continue aspirin 81 mg daily for secondary stroke prevention.   -Continue Plavix 75 mg daily   - Continue atorvastatin 80 mg daily for secondary stroke prevention.  LDL 42.  At goal of less than 70 for secondary stroke prevention.  - Hemoglobin A1c 5.8 on 3/12/2025.  In the prediabetes range.  Dietary counseling.  - BP goals less than 130/80.  Management per primary team.  - Headaches; improving.  Continue home Topamax.    -Reviewed s/sxs of stroke and when to call 911  -Follow up with neurosurgery in 6 months with repeat imaging  -Follow up in stroke clinic in 6 months     Follow Up:   Return in about 6 months (around 11/17/2025).      CAILIN Walls  [facility]

## 2025-05-27 NOTE — PATIENT INSTRUCTIONS
-Continue aspirin 81 mg daily   -Continue Plavix 75 mg daily  -Continue Lipitor 80 mg nightly   -Heart healthy diet   -Continue to be active  -Return to the ED for any new stroke like symptoms

## 2025-06-09 DIAGNOSIS — I67.1 CEREBRAL ANEURYSM, NONRUPTURED: ICD-10-CM

## 2025-06-09 DIAGNOSIS — Z86.73 HISTORY OF CVA (CEREBROVASCULAR ACCIDENT): ICD-10-CM

## 2025-06-09 RX ORDER — CLOPIDOGREL BISULFATE 75 MG/1
75 TABLET ORAL DAILY
Qty: 30 TABLET | Refills: 2 | Status: SHIPPED | OUTPATIENT
Start: 2025-06-09

## 2025-06-23 ENCOUNTER — LAB (OUTPATIENT)
Dept: LAB | Facility: HOSPITAL | Age: 65
End: 2025-06-23
Payer: MEDICARE

## 2025-06-23 ENCOUNTER — OFFICE VISIT (OUTPATIENT)
Dept: INTERNAL MEDICINE | Facility: CLINIC | Age: 65
End: 2025-06-23
Payer: MEDICARE

## 2025-06-23 VITALS
DIASTOLIC BLOOD PRESSURE: 58 MMHG | SYSTOLIC BLOOD PRESSURE: 94 MMHG | HEIGHT: 62 IN | WEIGHT: 127.2 LBS | HEART RATE: 70 BPM | TEMPERATURE: 98.3 F | BODY MASS INDEX: 23.41 KG/M2

## 2025-06-23 DIAGNOSIS — R11.0 NAUSEA: ICD-10-CM

## 2025-06-23 DIAGNOSIS — R53.83 OTHER FATIGUE: Primary | ICD-10-CM

## 2025-06-23 DIAGNOSIS — R53.83 OTHER FATIGUE: ICD-10-CM

## 2025-06-23 DIAGNOSIS — R09.81 NASAL CONGESTION: ICD-10-CM

## 2025-06-23 DIAGNOSIS — K59.00 CONSTIPATION, UNSPECIFIED CONSTIPATION TYPE: ICD-10-CM

## 2025-06-23 LAB
ALBUMIN SERPL-MCNC: 4.4 G/DL (ref 3.5–5.2)
ALBUMIN/GLOB SERPL: 1.5 G/DL
ALP SERPL-CCNC: 127 U/L (ref 39–117)
ALT SERPL W P-5'-P-CCNC: 52 U/L (ref 1–33)
ANION GAP SERPL CALCULATED.3IONS-SCNC: 14.1 MMOL/L (ref 5–15)
AST SERPL-CCNC: 35 U/L (ref 1–32)
BASOPHILS # BLD AUTO: 0.05 10*3/MM3 (ref 0–0.2)
BASOPHILS NFR BLD AUTO: 0.6 % (ref 0–1.5)
BILIRUB SERPL-MCNC: 0.4 MG/DL (ref 0–1.2)
BUN SERPL-MCNC: 15 MG/DL (ref 8–23)
BUN/CREAT SERPL: 21.4 (ref 7–25)
CALCIUM SPEC-SCNC: 9.9 MG/DL (ref 8.6–10.5)
CHLORIDE SERPL-SCNC: 106 MMOL/L (ref 98–107)
CO2 SERPL-SCNC: 20.9 MMOL/L (ref 22–29)
CREAT SERPL-MCNC: 0.7 MG/DL (ref 0.57–1)
DEPRECATED RDW RBC AUTO: 41.7 FL (ref 37–54)
EGFRCR SERPLBLD CKD-EPI 2021: 97.3 ML/MIN/1.73
EOSINOPHIL # BLD AUTO: 0.16 10*3/MM3 (ref 0–0.4)
EOSINOPHIL NFR BLD AUTO: 2 % (ref 0.3–6.2)
ERYTHROCYTE [DISTWIDTH] IN BLOOD BY AUTOMATED COUNT: 12.2 % (ref 12.3–15.4)
GLOBULIN UR ELPH-MCNC: 3 GM/DL
GLUCOSE SERPL-MCNC: 86 MG/DL (ref 65–99)
HCT VFR BLD AUTO: 44.1 % (ref 34–46.6)
HGB BLD-MCNC: 14.7 G/DL (ref 12–15.9)
IMM GRANULOCYTES # BLD AUTO: 0.03 10*3/MM3 (ref 0–0.05)
IMM GRANULOCYTES NFR BLD AUTO: 0.4 % (ref 0–0.5)
LYMPHOCYTES # BLD AUTO: 1.92 10*3/MM3 (ref 0.7–3.1)
LYMPHOCYTES NFR BLD AUTO: 24.1 % (ref 19.6–45.3)
MCH RBC QN AUTO: 31.5 PG (ref 26.6–33)
MCHC RBC AUTO-ENTMCNC: 33.3 G/DL (ref 31.5–35.7)
MCV RBC AUTO: 94.6 FL (ref 79–97)
MONOCYTES # BLD AUTO: 0.47 10*3/MM3 (ref 0.1–0.9)
MONOCYTES NFR BLD AUTO: 5.9 % (ref 5–12)
NEUTROPHILS NFR BLD AUTO: 5.35 10*3/MM3 (ref 1.7–7)
NEUTROPHILS NFR BLD AUTO: 67 % (ref 42.7–76)
NRBC BLD AUTO-RTO: 0 /100 WBC (ref 0–0.2)
PLATELET # BLD AUTO: 279 10*3/MM3 (ref 140–450)
PMV BLD AUTO: 11 FL (ref 6–12)
POTASSIUM SERPL-SCNC: 5.1 MMOL/L (ref 3.5–5.2)
PROT SERPL-MCNC: 7.4 G/DL (ref 6–8.5)
RBC # BLD AUTO: 4.66 10*6/MM3 (ref 3.77–5.28)
SODIUM SERPL-SCNC: 141 MMOL/L (ref 136–145)
WBC NRBC COR # BLD AUTO: 7.98 10*3/MM3 (ref 3.4–10.8)

## 2025-06-23 PROCEDURE — 1160F RVW MEDS BY RX/DR IN RCRD: CPT | Performed by: STUDENT IN AN ORGANIZED HEALTH CARE EDUCATION/TRAINING PROGRAM

## 2025-06-23 PROCEDURE — 80053 COMPREHEN METABOLIC PANEL: CPT

## 2025-06-23 PROCEDURE — 3074F SYST BP LT 130 MM HG: CPT | Performed by: STUDENT IN AN ORGANIZED HEALTH CARE EDUCATION/TRAINING PROGRAM

## 2025-06-23 PROCEDURE — 3078F DIAST BP <80 MM HG: CPT | Performed by: STUDENT IN AN ORGANIZED HEALTH CARE EDUCATION/TRAINING PROGRAM

## 2025-06-23 PROCEDURE — 1159F MED LIST DOCD IN RCRD: CPT | Performed by: STUDENT IN AN ORGANIZED HEALTH CARE EDUCATION/TRAINING PROGRAM

## 2025-06-23 PROCEDURE — 36415 COLL VENOUS BLD VENIPUNCTURE: CPT

## 2025-06-23 PROCEDURE — 99214 OFFICE O/P EST MOD 30 MIN: CPT | Performed by: STUDENT IN AN ORGANIZED HEALTH CARE EDUCATION/TRAINING PROGRAM

## 2025-06-23 PROCEDURE — 85025 COMPLETE CBC W/AUTO DIFF WBC: CPT

## 2025-06-23 PROCEDURE — 1126F AMNT PAIN NOTED NONE PRSNT: CPT | Performed by: STUDENT IN AN ORGANIZED HEALTH CARE EDUCATION/TRAINING PROGRAM

## 2025-06-23 RX ORDER — POLYETHYLENE GLYCOL 3350 17 G/17G
17 POWDER, FOR SOLUTION ORAL DAILY
Qty: 510 G | Refills: 0 | Status: SHIPPED | OUTPATIENT
Start: 2025-06-23

## 2025-06-23 RX ORDER — ONDANSETRON 4 MG/1
4 TABLET, ORALLY DISINTEGRATING ORAL EVERY 8 HOURS PRN
Qty: 28 TABLET | Refills: 0 | Status: SHIPPED | OUTPATIENT
Start: 2025-06-23 | End: 2025-07-07

## 2025-06-23 RX ORDER — MELATONIN 3 MG
1 CAPSULE ORAL NIGHTLY
COMMUNITY

## 2025-06-23 RX ORDER — DOCUSATE SODIUM 100 MG/1
100 CAPSULE, LIQUID FILLED ORAL 2 TIMES DAILY PRN
Qty: 30 CAPSULE | Refills: 0 | Status: SHIPPED | OUTPATIENT
Start: 2025-06-23 | End: 2025-07-23

## 2025-06-23 NOTE — PROGRESS NOTES
Office Note     Name: Lauryn Romo    : 1960     MRN: [id number]     Chief Complaint  Feeling unwell in general (X3 weeks, nausea, fatigue, dizziness, nasal congestion, blurry vision, headaches, not sleeping well, legs feel heavy)    Subjective     History of Present Illness:  Lauryn Romo is a 64 y.o. female who presents today for denies feeling of being unwell.  Most of her symptoms are related to fatigue and being uninterested in completing her previously regular physical activity.  She also is experiencing bilateral maxillary and frontal nasal congestion, diffuse headache as well as a bit of new nausea.  She has also had some episodes of lightheadedness and dizziness.  Although her blood pressure is low at 94/58 today she reports she checked her blood pressure earlier today and was normotensive.she is not on any blood pressure medication.  She does not experience any specific abdominal pain however on exam she does have a bit of mild tenderness to right upper quadrant.  She has a history of chronic constipation and her last bowel movement was believed to be last Friday.  She denies any fever, chills, shortness of breath.  She did have a small amount of bright red blood after a recent bowel movement but believes it may be secondary to straining and hemorrhoids.  History of Present Illness      Review of Systems:   Review of Systems   Constitutional:  Positive for fatigue. Negative for chills and fever.   HENT:  Positive for congestion.    Respiratory:  Negative for cough and shortness of breath.    Gastrointestinal:  Positive for constipation and nausea. Negative for diarrhea.   Neurological:  Positive for dizziness, light-headedness and headache.       Past Medical History:   Past Medical History:   Diagnosis Date    Abnormal ECG     Aneurysm 2022    Cancer 2024    Basal Cell Carcinoma removed from scalp by dermatology    Cluster headache     Had headaches for several months  before stroke and mass was found    Depression     Difficulty walking 11/26/22    After stroke    Headache     Headache, tension-type 2022    Hyperlipidemia     Hypertension     Memory loss 11/26/22    Migraine 2022    Stroke 11/26/2022    Stroke 10/06/2024    Vision loss 11/26/22    When dizzy or headache       Past Surgical History:   Past Surgical History:   Procedure Laterality Date    ARTERIAL ANEURYSM REPAIR      BREAST LUMPECTOMY  2012    CYST REMOVAL Left 05/2023    EMBOLIZATION CEREBRAL N/A 11/29/2022    Procedure: CV EMBOLIZATION CEREBRAL IR;  Surgeon: Enoch Savage MD;  Location: [facility] C[facility] INVASIVE LOCATION;  Service: Interventional Radiology;  Laterality: N/A;    HIATAL HERNIA REPAIR  2015    INTERVENTIONAL RADIOLOGY PROCEDURE N/A 09/15/2023    Procedure: Embolization;  Surgeon: Enoch Savage MD;  Location: [facility] CATH INVASIVE LOCATION;  Service: Interventional Radiology;  Laterality: N/A;    INTERVENTIONAL RADIOLOGY PROCEDURE N/A 10/06/2024    Procedure: IR mechanical thrombectomy;  Surgeon: Joe Mendez MD;  Location: [facility] CATH INVASIVE LOCATION;  Service: Interventional Radiology;  Laterality: N/A;    SINUS SURGERY      x4    SKIN CANCER EXCISION      cancer removed off top of head    VENTRICULOPERITONEAL SHUNT N/A 03/18/2025    Procedure: LAPAROSCOPIC ASSISTED VENTRICULAR PERITONEAL SHUNT REVISION;  Surgeon: Joe Mendez MD;  Location: [facility] OR;  Service: Neurosurgery;  Laterality: N/A;    VENTRICULOPERITONEAL SHUNT  3/18/2025       Family History:   Family History   Problem Relation Age of Onset    No Known Problems Mother     Heart attack Father 42    Early death Father         Heart attack    Heart disease Father     Hyperlipidemia Father     Heart attack Paternal Aunt     Heart attack Paternal Uncle     No Known Problems Sister     Cancer Maternal Grandmother     Heart failure Maternal Grandmother     No Known Problems Maternal Grandfather     No Known  Problems Paternal Grandmother     No Known Problems Paternal Grandfather     Diabetes Half-Brother        Social History:   Social History     Socioeconomic History    Marital status:    Tobacco Use    Smoking status: Former     Current packs/day: 0.00     Average packs/day: 1 pack/day for 15.0 years (15.0 ttl pk-yrs)     Types: Cigarettes     Start date: 10/4/1989     Quit date: 10/4/2004     Years since quittin.7     Passive exposure: Past    Smokeless tobacco: Never   Vaping Use    Vaping status: Never Used   Substance and Sexual Activity    Alcohol use: No    Drug use: Never    Sexual activity: Not Currently     Partners: Male     Birth control/protection: Tubal ligation       Immunizations:   Immunization History   Administered Date(s) Administered    31-influenza Vac Quardvalent Preservativ 2017    Fluzone (or Fluarix & Flulaval for VFC) >6mos 10/12/2018, 10/10/2019, 2020    Fluzone Quad >6mos (Multi-dose) 2020    Hepatitis B Adult/Adolescent IM 2001, 2001, 2001        Medications:     Current Outpatient Medications:     acetaminophen (TYLENOL) 325 MG tablet, Take 2 tablets by mouth Every 6 (Six) Hours As Needed for Mild Pain., Disp: , Rfl:     aspirin 81 MG chewable tablet, Chew 1 tablet Daily., Disp: 90 tablet, Rfl: 3    atorvastatin (LIPITOR) 80 MG tablet, Take 1 tablet by mouth Every Night., Disp: 90 tablet, Rfl: 1    bethanechol (URECHOLINE) 25 MG tablet, Take 1 tablet by mouth 2 (Two) Times a Day., Disp: , Rfl:     clopidogrel (PLAVIX) 75 MG tablet, TAKE 1 TABLET BY MOUTH DAILY, Disp: 30 tablet, Rfl: 2    famotidine (PEPCID) 20 MG tablet, Take 1 tablet by mouth 2 (Two) Times a Day Before Meals for 180 days., Disp: 60 tablet, Rfl: 5    fluticasone (FLONASE) 50 MCG/ACT nasal spray, Administer 2 sprays into the nostril(s) as directed by provider Daily., Disp: 18.2 g, Rfl: 1    lactulose (CHRONULAC) 10 GM/15ML solution, , Disp: , Rfl:     LORazepam (ATIVAN) 0.5  "MG tablet, Take 1 tablet by mouth Every 8 (Eight) Hours As Needed for Anxiety., Disp: , Rfl:     magnesium oxide (MAG-OX) 400 MG tablet, Take 1 tablet by mouth Daily., Disp: , Rfl:     Melatonin 3 MG capsule, Take 1 capsule by mouth Every Night., Disp: , Rfl:     nortriptyline (PAMELOR) 10 MG capsule, Take 2 capsules by mouth Every Night., Disp: 60 capsule, Rfl: 6    pantoprazole (PROTONIX) 20 MG EC tablet, Take 1 tablet by mouth Daily for 90 days., Disp: 90 tablet, Rfl: 0    sertraline (ZOLOFT) 100 MG tablet, Take 1.5 tablets by mouth Daily., Disp: 135 tablet, Rfl: 1    topiramate (TOPAMAX) 100 MG tablet, Take 1 tablet by mouth Every Night., Disp: 30 tablet, Rfl: 6    ondansetron ODT (ZOFRAN-ODT) 4 MG disintegrating tablet, Place 1 tablet on the tongue Every 8 (Eight) Hours As Needed for Nausea or Vomiting for up to 14 days., Disp: 28 tablet, Rfl: 0    polyethylene glycol (MIRALAX) 17 GM/SCOOP powder, Take 17 g by mouth Daily., Disp: 510 g, Rfl: 0    Allergies:   Allergies   Allergen Reactions    Tramadol Other (See Comments)     Flushng and passing out       Objective     Vital Signs  BP 94/58 (BP Location: Left arm, Patient Position: Sitting, Cuff Size: Adult)   Pulse 70   Temp 98.3 °F (36.8 °C) (Infrared)   Ht 156.2 cm (61.5\")   Wt 57.7 kg (127 lb 3.2 oz)   BMI 23.65 kg/m²   Body mass index is 23.65 kg/m².     BMI is within normal parameters. No other follow-up for BMI required.       Physical Exam  Constitutional:       Appearance: Normal appearance.   HENT:      Head: Normocephalic and atraumatic.   Pulmonary:      Effort: Pulmonary effort is normal. No respiratory distress.   Abdominal:      General: Abdomen is flat.      Palpations: Abdomen is soft.      Tenderness: There is abdominal tenderness (mild, RUQ).   Neurological:      General: No focal deficit present.      Mental Status: She is alert and oriented to person, place, and time.   Psychiatric:         Mood and Affect: Mood normal.         " Behavior: Behavior normal.            Assessment and Plan     Assessment/Plan:  Diagnoses and all orders for this visit:    1. Other fatigue (Primary)  - Patient reports 3 weeks history of feeling generally unwell with symptoms including fatigue, headache, dizziness, nasal congestion, nausea.  She is not having any fever, chills  -At this time I am unsure what is causing her symptoms.  I am wondering if there may be multiple diagnoses at play such as seasonal allergies and constipation.  She has also had an increase in her depressive symptoms recently which may be contributing.  -To investigate further I would like to complete CBC and CMP to rule out anemia, electrolyte abnormalities, kidney and liver issues.  -     CBC w AUTO Differential; Future  -     Comprehensive metabolic panel; Future    2. Constipation, unspecified constipation type  - Constipation may be contributing to her nausea, nonspecific abdominal pain.  Her last bowel movement was last Friday.  Constipation is a chronic issue for her.  We will start with daily MiraLAX.  Can add Colace if needed.  -     polyethylene glycol (MIRALAX) 17 GM/SCOOP powder; Take 17 g by mouth Daily.  Dispense: 510 g; Refill: 0    3. Nausea  - Nausea may be secondary to constipation.  Will give as needed Zofran to use for symptoms in the short-term.  Will collect labs as above as well.  -     ondansetron ODT (ZOFRAN-ODT) 4 MG disintegrating tablet; Place 1 tablet on the tongue Every 8 (Eight) Hours As Needed for Nausea or Vomiting for up to 14 days.  Dispense: 28 tablet; Refill: 0    4. Nasal congestion  - May be secondary to uncontrolled seasonal allergies.  Recommend daily Zyrtec or Claritin, daily Flonase.  Can add daily nasal azelastine spray for additional treatment if necessary.      Follow Up  No follow-ups on file.      Laurie Little MD   [facility] [address]

## 2025-06-25 DIAGNOSIS — T78.40XA ALLERGY, INITIAL ENCOUNTER: Primary | ICD-10-CM

## 2025-06-25 RX ORDER — METHYLPREDNISOLONE 4 MG/1
TABLET ORAL
Qty: 21 TABLET | Refills: 0 | Status: SHIPPED | OUTPATIENT
Start: 2025-06-25

## 2025-07-08 ENCOUNTER — TELEPHONE (OUTPATIENT)
Dept: NEUROSURGERY | Facility: CLINIC | Age: 65
End: 2025-07-08

## 2025-07-13 DIAGNOSIS — K21.9 GASTROESOPHAGEAL REFLUX DISEASE WITHOUT ESOPHAGITIS: ICD-10-CM

## 2025-07-14 RX ORDER — PANTOPRAZOLE SODIUM 20 MG/1
20 TABLET, DELAYED RELEASE ORAL DAILY
Qty: 90 TABLET | Refills: 3 | Status: SHIPPED | OUTPATIENT
Start: 2025-07-14

## 2025-07-14 NOTE — TELEPHONE ENCOUNTER
Rx Refill Note  Requested Prescriptions     Pending Prescriptions Disp Refills    pantoprazole (PROTONIX) 20 MG EC tablet [Pharmacy Med Name: PANTOPRAZOLE SODIUM DR TABS 20MG] 90 tablet 3     Sig: TAKE 1 TABLET DAILY      Last office visit with prescribing clinician: 6/23/2025   Last telemedicine visit with prescribing clinician: Visit date not found   Next office visit with prescribing clinician: 8/4/2025                         Would you like a call back once the refill request has been completed: [] Yes [] No    If the office needs to give you a call back, can they leave a voicemail: [] Yes [] No    Tari Justice MA  07/14/25, 09:13 EDT

## 2025-07-20 LAB
NCCN CRITERIA FLAG: NORMAL
TYRER CUZICK SCORE: 3.7

## 2025-07-21 ENCOUNTER — OFFICE VISIT (OUTPATIENT)
Age: 65
End: 2025-07-21
Payer: MEDICARE

## 2025-07-21 ENCOUNTER — HOSPITAL ENCOUNTER (OUTPATIENT)
Dept: MAMMOGRAPHY | Facility: HOSPITAL | Age: 65
Discharge: HOME | End: 2025-07-21
Admitting: STUDENT IN AN ORGANIZED HEALTH CARE EDUCATION/TRAINING PROGRAM
Payer: MEDICARE

## 2025-07-21 VITALS
DIASTOLIC BLOOD PRESSURE: 68 MMHG | OXYGEN SATURATION: 98 % | SYSTOLIC BLOOD PRESSURE: 104 MMHG | WEIGHT: 130.2 LBS | BODY MASS INDEX: 24.58 KG/M2 | HEIGHT: 61 IN | HEART RATE: 73 BPM

## 2025-07-21 DIAGNOSIS — F33.1 MAJOR DEPRESSIVE DISORDER, RECURRENT EPISODE, MODERATE DEGREE: Primary | ICD-10-CM

## 2025-07-21 DIAGNOSIS — F41.1 GENERALIZED ANXIETY DISORDER: ICD-10-CM

## 2025-07-21 DIAGNOSIS — Z12.31 ENCOUNTER FOR SCREENING MAMMOGRAM FOR BREAST CANCER: ICD-10-CM

## 2025-07-21 PROCEDURE — 1160F RVW MEDS BY RX/DR IN RCRD: CPT | Performed by: STUDENT IN AN ORGANIZED HEALTH CARE EDUCATION/TRAINING PROGRAM

## 2025-07-21 PROCEDURE — 99214 OFFICE O/P EST MOD 30 MIN: CPT | Performed by: STUDENT IN AN ORGANIZED HEALTH CARE EDUCATION/TRAINING PROGRAM

## 2025-07-21 PROCEDURE — 3078F DIAST BP <80 MM HG: CPT | Performed by: STUDENT IN AN ORGANIZED HEALTH CARE EDUCATION/TRAINING PROGRAM

## 2025-07-21 PROCEDURE — 77063 BREAST TOMOSYNTHESIS BI: CPT

## 2025-07-21 PROCEDURE — 77067 SCR MAMMO BI INCL CAD: CPT

## 2025-07-21 PROCEDURE — 1159F MED LIST DOCD IN RCRD: CPT | Performed by: STUDENT IN AN ORGANIZED HEALTH CARE EDUCATION/TRAINING PROGRAM

## 2025-07-21 PROCEDURE — 3074F SYST BP LT 130 MM HG: CPT | Performed by: STUDENT IN AN ORGANIZED HEALTH CARE EDUCATION/TRAINING PROGRAM

## 2025-07-21 RX ORDER — AMOXICILLIN 250 MG
CAPSULE ORAL
COMMUNITY

## 2025-07-21 RX ORDER — LORAZEPAM 0.5 MG/1
0.5 TABLET ORAL EVERY 8 HOURS PRN
Qty: 15 TABLET | Refills: 0 | Status: SHIPPED | OUTPATIENT
Start: 2025-07-21

## 2025-07-21 RX ORDER — SERTRALINE HYDROCHLORIDE 100 MG/1
150 TABLET, FILM COATED ORAL DAILY
Qty: 135 TABLET | Refills: 1 | Status: SHIPPED | OUTPATIENT
Start: 2025-07-21

## 2025-07-21 NOTE — PROGRESS NOTES
[facility]             Follow Up Office Visit      Patient Name: Lauryn Romo  : 1960   MRN: [id number]     Referring Provider: Laurie Little MD    Chief Complaint:      ICD-10-CM ICD-9-CM   1. Major depressive disorder, recurrent episode, moderate degree  F33.1 296.32   2. Generalized anxiety disorder  F41.1 300.02     History of Present Illness:   Lauryn Romo is a 64 y.o. female   History of Present Illness  Patient is seen and evaluated in the office with her  present. She appears to be in no acute distress at this time. She is calm and cooperative with the evaluation, and exhibits a linear and goal directed thought process. She reports a decrease in her energy levels, attributing it to the demands of caring for her  who has been diagnosed with cancer. She has resumed taking daytime naps due to feeling drained and lacks motivation to engage in activities. She mentioned that her energy levels were good after a shunt was placed in 2025, but have since declined. Despite this, she has been keeping herself occupied with household chores. She has not been able to drive yet. She has been taking Zoloft 150 mg, which she believes is beneficial. She does not think that Zoloft is causing her fatigue. She has also started taking B12 supplements a week ago. Her recent blood work did not reveal any abnormalities. She has not been exercising regularly for the past few months. She is experiencing difficulty sleeping at night and has been observed talking to herself during sleep. She takes Ativan as needed for severe anxiety episodes. She wishes to keep medications the same today. She denies any SI, intent, or plan. She feels as though anxiety has been much more manageable. We will follow up in 6 mo.    Pertinent Negatives: No abnormalities in recent blood work.    PAST SURGICAL HISTORY:   - Shunt placement in 2025     Previous Medication  Trials:  Wellbutrin, Vraylar     Subjective      Review of Systems:   Review of Systems   Constitutional:  Negative for chills, fatigue and fever.   HENT:  Negative for congestion, hearing loss, sore throat and trouble swallowing.    Eyes:  Negative for blurred vision and double vision.   Respiratory:  Negative for cough, chest tightness and shortness of breath.    Cardiovascular:  Negative for chest pain and palpitations.   Gastrointestinal:  Negative for abdominal pain, constipation, diarrhea, nausea and vomiting.   Endocrine: Negative for polydipsia and polyuria.   Genitourinary:  Negative for hematuria and urgency.   Musculoskeletal:  Negative for arthralgias.   Skin:  Negative for skin lesions and bruise.   Neurological:  Negative for dizziness, tremors, seizures, light-headedness and confusion.   Hematological:  Negative for adenopathy.     Screening Scores:   PHQ-9 : 5  FOREST-7 : 2    Medications:     Current Outpatient Medications:     acetaminophen (TYLENOL) 325 MG tablet, Take 2 tablets by mouth Every 6 (Six) Hours As Needed for Mild Pain., Disp: , Rfl:     aspirin 81 MG chewable tablet, Chew 1 tablet Daily., Disp: 90 tablet, Rfl: 3    atorvastatin (LIPITOR) 80 MG tablet, Take 1 tablet by mouth Every Night., Disp: 90 tablet, Rfl: 1    bethanechol (URECHOLINE) 25 MG tablet, Take 1 tablet by mouth 2 (Two) Times a Day., Disp: , Rfl:     clopidogrel (PLAVIX) 75 MG tablet, TAKE 1 TABLET BY MOUTH DAILY, Disp: 30 tablet, Rfl: 2    Cobalamin Combinations (B-12) 100-5000 MCG sublingual tablet, Place  under the tongue. PT taking B12 drops 5000IU, Disp: , Rfl:     docusate sodium (Colace) 100 MG capsule, Take 1 capsule by mouth 2 (Two) Times a Day As Needed for Constipation for up to 30 days., Disp: 30 capsule, Rfl: 0    famotidine (PEPCID) 20 MG tablet, Take 1 tablet by mouth 2 (Two) Times a Day Before Meals for 180 days., Disp: 60 tablet, Rfl: 5    fluticasone (FLONASE) 50 MCG/ACT nasal spray, Administer 2 sprays  "into the nostril(s) as directed by provider Daily., Disp: 18.2 g, Rfl: 1    LORazepam (ATIVAN) 0.5 MG tablet, Take 1 tablet by mouth Every 8 (Eight) Hours As Needed for Anxiety., Disp: 15 tablet, Rfl: 0    Melatonin 3 MG capsule, Take 1 capsule by mouth Every Night., Disp: , Rfl:     nortriptyline (PAMELOR) 10 MG capsule, Take 2 capsules by mouth Every Night., Disp: 60 capsule, Rfl: 6    pantoprazole (PROTONIX) 20 MG EC tablet, TAKE 1 TABLET DAILY, Disp: 90 tablet, Rfl: 3    polyethylene glycol (MIRALAX) 17 GM/SCOOP powder, Take 17 g by mouth Daily., Disp: 510 g, Rfl: 0    sertraline (ZOLOFT) 100 MG tablet, Take 1.5 tablets by mouth Daily., Disp: 135 tablet, Rfl: 1    topiramate (TOPAMAX) 100 MG tablet, Take 1 tablet by mouth Every Night., Disp: 30 tablet, Rfl: 6    Medication Considerations:  GORGE reviewed and appropriate.     Allergies:   Allergies   Allergen Reactions    Tramadol Other (See Comments)     Flushng and passing out     Objective     Vital Signs:   Vitals:    07/21/25 1028   BP: 104/68   Pulse: 73   SpO2: 98%   Weight: 59.1 kg (130 lb 3.2 oz)   Height: 156.2 cm (61.5\")       Body mass index is 24.21 kg/m².     Mental Status Exam:   MENTAL STATUS EXAM   General Appearance:  Cleanly groomed and dressed  Eye Contact:  Good eye contact  Attitude:  Cooperative  Motor Activity:  Normal gait, posture  Muscle Strength:  Normal  Speech:  Normal rate, tone, volume  Language:  Spontaneous  Mood and affect:  Normal, pleasant and appropriate  Hopelessness:  Denies  Thought Process:  Logical and goal-directed  Associations/ Thought Content:  No delusions  Hallucinations:  None  Suicidal Ideations:  Not present  Homicidal Ideation:  Not present  Sensorium:  Alert  Orientation:  Person, place, time and situation  Immediate Recall, Recent, and Remote Memory:  Intact  Attention Span/ Concentration:  Good  Fund of Knowledge:  Appropriate for age and educational level  Intellectual Functioning:  Average range  Insight: "  Fair  Judgement:  Fair  Reliability:  Fair  Impulse Control:  Fair      SUICIDE RISK ASSESSMENT/CSSRS:  1. Does patient have thoughts of suicide? denies  2. Does patient have intent for suicide? denies  3. Does patient have a current plan for suicide? denies  4. History of suicide attempts: denies  5. Family history of suicide or attempts: denies  6. History of violent behaviors towards others or property or thoughts of committing suicide: denies  7. History of sexual aggression toward others: denies  8. Access to firearms or weapons: denies    Assessment / Plan      Visit Diagnosis/Orders Placed This Visit:  Diagnoses and all orders for this visit:  Assessment & Plan  Problems:  - Anxiety  - Fatigue    Content of Therapy:  During the session, the patient discussed her ongoing struggles with anxiety and fatigue. She reported feeling drained and lacking energy, which she attributes to the heat and her caregiving responsibilities. The patient expressed frustration with her decreased energy levels and the need to force herself to complete tasks. She also mentioned experiencing disrupted sleep and talking during the night. The patient noted that her anxiety is somewhat managed with Zoloft and Ativan, but she continues to feel fatigued.    Clinical Impression:  The patient's anxiety appears to be well-managed with her current medication regimen, including Zoloft 150 mg and Ativan as needed. However, she continues to experience significant fatigue and low energy levels, which may be exacerbated by environmental factors and caregiving responsibilities. Her sleep is disrupted, and she reports talking during the night. The patient has started taking B12 supplements and has been advised to try magnesium glycinate to improve sleep and anxiety.    Therapeutic Intervention:  The patient was encouraged to continue her current medication regimen and B12 supplementation. Magnesium glycinate was recommended to help with sleep and  anxiety. The clinician discussed the option of increasing the Zoloft dosage to 200 mg if the patient feels it is necessary. The importance of self-care and maintaining a routine was emphasized.    Plan:  - Continue Zoloft 150 mg  - Continue Ativan as needed  - Continue B12 supplementation  - Start magnesium glycinate for sleep and anxiety  - Maintain self-care routines and try to engage in activities that promote energy and well-being    Follow-up:  - Schedule follow-up visit in 6 months, or earlier if necessary    Notes & Risk Factors:  - Patient reports feeling drained and lacking energy  - Disrupted sleep and talking during the night  - Caregiving responsibilities may be contributing to fatigue  - Protective factors include supportive spouse and engagement in household activities     Major depressive disorder, recurrent episode, moderate degree (Primary)  Generalized Anxiety Disorder  - Continue Zoloft 150 mg po daily  - Continue Ativan 0.5 mg po PRN anxiety  - Continue B12  - Recommended Magnesium Glycinate  - No longer participating in therapy  - Follow up with writer in 6 mo  Labs Reviewed : labs from 9/29/23 reviewed  EKG Reviewed : EKG from 9/15/23 reviewed:   Chart Reviewed      Functional Status: Mild impairment      Prognosis: Fair with Ongoing Treatment     Impression/Formulation:  Patient appeared alert and oriented. Patient is receptive to assistance with maintaining a stable lifestyle.  Patient presents with history of     ICD-10-CM ICD-9-CM   1. Major depressive disorder, recurrent episode, moderate degree  F33.1 296.32   2. Generalized anxiety disorder  F41.1 300.02     Treatment Plan:     Patient will continue supportive psychotherapy efforts and medications as indicated. Clinic will obtain release of information for current treatment team for continuity of care as needed. Patient will contact this office, call 911 or present to the nearest emergency room should suicidal or homicidal  ideations occur.  Discussed medication options and treatment plan of prescribed medication(s) as well as the risks, benefits, and potential side effects. Patient ackowledged and verbally consented to continue with current treatment plan and was educated on the importance of compliance with treatment and follow-up appointments.     Quality Measures:  Tobacco: Lauryn Romo  reports that she quit smoking about 20 years ago. Her smoking use included cigarettes. She started smoking about 35 years ago. She has a 15 pack-year smoking history. She has been exposed to tobacco smoke. She has never used smokeless tobacco. I have educated her on the risk of diseases from using tobacco products such as cancer, COPD, and heart disease.     Depression (PHQ >11): Patient screened positive for depression based on a PHQ-9 score of 5 on 7/21/2025. Follow-up recommendations include: follow up with writer in 1 mo, continue medications as prescribed, continue therapy.     Follow Up:   Return in about 6 months (around 1/21/2026) for Medication Management, follow up with therapy.    Short-term goals: Patient will adhere to medication regimen and experience continued improvement in symptoms over the next 3 months.   Long-term goals: Patient will adhere to medication treatment plan and report improvement in symptoms over the next 6 months    Erika Whatley MD  [facility] [address]     This is electronically signed by Erika Whatley MD     Patient or patient representative verbalized consent for the use of Ambient Listening during the visit with  Erkia Whatley MD for chart documentation. 7/21/2025  11:53 EST

## 2025-07-30 ENCOUNTER — HOSPITAL ENCOUNTER (OUTPATIENT)
Dept: ULTRASOUND IMAGING | Facility: HOSPITAL | Age: 65
Discharge: HOME | End: 2025-07-30
Payer: MEDICARE

## 2025-07-30 ENCOUNTER — HOSPITAL ENCOUNTER (OUTPATIENT)
Dept: MAMMOGRAPHY | Facility: HOSPITAL | Age: 65
Discharge: HOME | End: 2025-07-30
Payer: MEDICARE

## 2025-07-30 ENCOUNTER — TRANSCRIBE ORDERS (OUTPATIENT)
Dept: INTERNAL MEDICINE | Facility: CLINIC | Age: 65
End: 2025-07-30
Payer: MEDICARE

## 2025-07-30 DIAGNOSIS — R92.8 ABNORMAL MAMMOGRAM: ICD-10-CM

## 2025-07-30 DIAGNOSIS — R92.8 ABNORMAL MAMMOGRAM: Primary | ICD-10-CM

## 2025-07-30 PROCEDURE — 77065 DX MAMMO INCL CAD UNI: CPT

## 2025-07-30 PROCEDURE — G0279 TOMOSYNTHESIS, MAMMO: HCPCS

## 2025-07-30 PROCEDURE — 76642 ULTRASOUND BREAST LIMITED: CPT

## 2025-08-04 ENCOUNTER — OFFICE VISIT (OUTPATIENT)
Dept: INTERNAL MEDICINE | Facility: CLINIC | Age: 65
End: 2025-08-04
Payer: MEDICARE

## 2025-08-04 VITALS
OXYGEN SATURATION: 96 % | DIASTOLIC BLOOD PRESSURE: 72 MMHG | TEMPERATURE: 98.2 F | BODY MASS INDEX: 24.02 KG/M2 | WEIGHT: 129.2 LBS | HEART RATE: 77 BPM | SYSTOLIC BLOOD PRESSURE: 118 MMHG

## 2025-08-04 DIAGNOSIS — Z00.00 MEDICARE ANNUAL WELLNESS VISIT, INITIAL: Primary | ICD-10-CM

## 2025-08-04 DIAGNOSIS — Z86.73 HISTORY OF CVA (CEREBROVASCULAR ACCIDENT): ICD-10-CM

## 2025-08-04 DIAGNOSIS — N32.81 OVERACTIVE BLADDER: ICD-10-CM

## 2025-08-04 DIAGNOSIS — F32.0 CURRENT MILD EPISODE OF MAJOR DEPRESSIVE DISORDER WITHOUT PRIOR EPISODE: ICD-10-CM

## 2025-08-04 PROCEDURE — 1160F RVW MEDS BY RX/DR IN RCRD: CPT | Performed by: STUDENT IN AN ORGANIZED HEALTH CARE EDUCATION/TRAINING PROGRAM

## 2025-08-04 PROCEDURE — G0402 INITIAL PREVENTIVE EXAM: HCPCS | Performed by: STUDENT IN AN ORGANIZED HEALTH CARE EDUCATION/TRAINING PROGRAM

## 2025-08-04 PROCEDURE — 1126F AMNT PAIN NOTED NONE PRSNT: CPT | Performed by: STUDENT IN AN ORGANIZED HEALTH CARE EDUCATION/TRAINING PROGRAM

## 2025-08-04 PROCEDURE — 1159F MED LIST DOCD IN RCRD: CPT | Performed by: STUDENT IN AN ORGANIZED HEALTH CARE EDUCATION/TRAINING PROGRAM

## 2025-08-04 PROCEDURE — 3074F SYST BP LT 130 MM HG: CPT | Performed by: STUDENT IN AN ORGANIZED HEALTH CARE EDUCATION/TRAINING PROGRAM

## 2025-08-04 PROCEDURE — 3078F DIAST BP <80 MM HG: CPT | Performed by: STUDENT IN AN ORGANIZED HEALTH CARE EDUCATION/TRAINING PROGRAM

## 2025-08-07 RX ORDER — TOPIRAMATE 100 MG/1
100 TABLET, FILM COATED ORAL NIGHTLY
Qty: 90 TABLET | Refills: 1 | Status: SHIPPED | OUTPATIENT
Start: 2025-08-07

## 2025-08-20 DIAGNOSIS — T78.40XD ALLERGY, SUBSEQUENT ENCOUNTER: ICD-10-CM

## 2025-08-20 RX ORDER — FLUTICASONE PROPIONATE 50 MCG
SPRAY, SUSPENSION (ML) NASAL
Qty: 16 G | Refills: 11 | Status: SHIPPED | OUTPATIENT
Start: 2025-08-20

## (undated) DEVICE — KT VLV HEMO MAP ACC PLS LG/BORE MTL/INTRO W/TORQ/DEV

## (undated) DEVICE — CATHETER,URETHRAL,REDRUBBER,STERILE,8FR: Brand: MEDLINE

## (undated) DEVICE — RADIFOCUS GLIDEWIRE: Brand: GLIDEWIRE

## (undated) DEVICE — 3M™ IOBAN™ 2 ANTIMICROBIAL INCISE DRAPE 6651EZ: Brand: IOBAN™ 2

## (undated) DEVICE — ROTATING HEMOSTATIC VALVE .096": Brand: RHV

## (undated) DEVICE — SUT VIC 4/0 RB1 27IN VCP214H

## (undated) DEVICE — PERFORATOR CRANI 14MM 11MM

## (undated) DEVICE — LONG SHEATH: Brand: AXS INFINITY LS

## (undated) DEVICE — SUT SILK 2/0 TIES 18IN A185H

## (undated) DEVICE — LIMB HOLDER, WRIST/ANKLE: Brand: DEROYAL

## (undated) DEVICE — SYR LL TP 10ML STRL

## (undated) DEVICE — RADIFOCUS TORQUE DEVICE MULTI-TORQUE VISE: Brand: RADIFOCUS TORQUE DEVICE

## (undated) DEVICE — DISPOSABLE BIPOLAR FORCEPS 7 3/4" (19.7CM) SCOVILLE BAYONET, INSULATED, 1.5MM TIP AND 12 FT. (3.6M) CABLE: Brand: KIRWAN

## (undated) DEVICE — CATH MIC PHENOM .021 .018IN 160CM

## (undated) DEVICE — SYR LUERLOK 20CC BX/50

## (undated) DEVICE — INTRO SHEATH ART/FEM ENGAGE .038 5F12CM

## (undated) DEVICE — PK CATH CARD 10

## (undated) DEVICE — Device

## (undated) DEVICE — APPL DURAPREP IODOPHOR APL 26ML

## (undated) DEVICE — ELECTRD NDL EZ CLN MOD 2.75IN

## (undated) DEVICE — CODMAN® DISPOSABLE CATHETER PASSER: Brand: CODMAN®

## (undated) DEVICE — PINNACLE INTRODUCER SHEATH: Brand: PINNACLE

## (undated) DEVICE — HYPODERMIC SAFETY NEEDLE: Brand: MONOJECT

## (undated) DEVICE — CATH TEMPO 5F BER 100CM: Brand: TEMPO

## (undated) DEVICE — SPNG GZ WOVN 4X4IN 12PLY 10/BX STRL

## (undated) DEVICE — BOOT,SUTURE,STANDARD,YELLOW-IN-BLUE: Brand: MEDLINE

## (undated) DEVICE — CATH MIC PHENOM 27 .040IN 15CM

## (undated) DEVICE — CATH TEMPO 5F SIM 2 100CM: Brand: TEMPO

## (undated) DEVICE — INTRO SHEATH ENGAGE W/50 GW .038 8F12

## (undated) DEVICE — DRESSING,GAUZE,XEROFORM,CURAD,1"X8",ST: Brand: CURAD

## (undated) DEVICE — DISTAL ACCESS CATHETER: Brand: AXS CATALYST 5

## (undated) DEVICE — DRAPE,INSTRUMENT,MAGNETIC,10X16: Brand: MEDLINE

## (undated) DEVICE — STPCK 3/WY HP M/RA W/OFF/HNDL 1050PSI STRL

## (undated) DEVICE — CVR PROB ULTRASND/TRANSD W/GEL 7X11IN STRL

## (undated) DEVICE — UNDERGLV SURG BIOGEL INDICAT PI SZ8.5 BLU

## (undated) DEVICE — ANGIO-SEAL VIP VASCULAR CLOSURE DEVICE: Brand: ANGIO-SEAL

## (undated) DEVICE — CATHETER,URETHRAL,REDRUBBER,STRL,14FR: Brand: MEDLINE INDUSTRIES, INC.

## (undated) DEVICE — DISTAL ACCESS CATHETER: Brand: AXS CATALYST 6

## (undated) DEVICE — TOTAL TRAY, 16FR 10ML SIL FOLEY, URN: Brand: MEDLINE

## (undated) DEVICE — DEV INFL MONARCH 25W

## (undated) DEVICE — INTRO SHEATH PRELUDE IDEAL SPRNG COIL 021 6F 23X80CM

## (undated) DEVICE — CONN TBG Y 5 IN 1 LF STRL

## (undated) DEVICE — STPCK LP 1WY RA 200PSI

## (undated) DEVICE — PATIENT RETURN ELECTRODE, SINGLE-USE, CONTACT QUALITY MONITORING, ADULT, WITH 9FT CORD, FOR PATIENTS WEIGING OVER 33LBS. (15KG): Brand: MEGADYNE

## (undated) DEVICE — STYLET 9735428 2 COIL SINGLE PACKAGE

## (undated) DEVICE — BLANKT WARM LOWR/BDY 100X120CM

## (undated) DEVICE — PROVUE RETRIEVER: Brand: TREVO NXT

## (undated) DEVICE — LEX NEURO ANGIOGRAPHY: Brand: MEDLINE INDUSTRIES, INC.

## (undated) DEVICE — ST ACC MICROPUNCTURE .018 TRANSLSS/SS/TP 5F/10CM 21G/7CM

## (undated) DEVICE — DRSNG WND BORDR/ADHS NONADHR/GZ LF 4X4IN STRL

## (undated) DEVICE — GLV SURG PREMIERPRO MIC LTX PF SZ6.5 BRN

## (undated) DEVICE — MICRO HVTSA, 0.5G AND HVTSA SOURCEMARK PRODUCT CODE M1206 AND M1206-01: Brand: EXOFIN MICRO HVTSA, 0.5G

## (undated) DEVICE — GUIDEWIRE WITH ICE™ HYDROPHILIC COATING: Brand: TRANSEND™ EX

## (undated) DEVICE — STANDARD PRE-SHAPED GUIDEWIRE WITH HYDROPHILIC COATING: Brand: SYNCHRO SELECT

## (undated) DEVICE — SUT VICYL 2/0 CR8 18IN DYED J726D

## (undated) DEVICE — NEURO GUIDEWIRE WITH HYDROPHILIC COATING: Brand: SYNCHRO 14

## (undated) DEVICE — SURGUARD3 SAFETY HYPODERMIC NEEDLE: Brand: SURGUARD3

## (undated) DEVICE — BLANKT WARM UNDER/BDY FUL/ACC A/ 90X206CM

## (undated) DEVICE — DEV COMPR RADL PRELUDESYNCEZ 30ML 26CM

## (undated) DEVICE — PK CRANI 10

## (undated) DEVICE — TREK CORONARY DILATATION CATHETER 2.50 MM X 12 MM / RAPID-EXCHANGE: Brand: TREK

## (undated) DEVICE — DELIVERY ASSIST CATHETER: Brand: AXS OFFSET

## (undated) DEVICE — ADAPT LUER STUB INTRAMEDIC PE/200 17G

## (undated) DEVICE — ADHS LIQ MASTISOL 2/3ML

## (undated) DEVICE — GLV SURG BIOGEL LTX PF 8

## (undated) DEVICE — GLV SURG DERMASSURE GRN LF PF SZ 6.5

## (undated) DEVICE — PATIENT TRACKER 9734887 NON-INVASIVE